# Patient Record
Sex: MALE | Race: WHITE | NOT HISPANIC OR LATINO | URBAN - METROPOLITAN AREA
[De-identification: names, ages, dates, MRNs, and addresses within clinical notes are randomized per-mention and may not be internally consistent; named-entity substitution may affect disease eponyms.]

---

## 2021-09-30 PROBLEM — Z00.00 ENCOUNTER FOR PREVENTIVE HEALTH EXAMINATION: Status: ACTIVE | Noted: 2021-09-30

## 2023-06-12 ENCOUNTER — OUTPATIENT (OUTPATIENT)
Dept: OUTPATIENT SERVICES | Facility: HOSPITAL | Age: 78
LOS: 1 days | End: 2023-06-12
Payer: OTHER GOVERNMENT

## 2023-06-12 ENCOUNTER — APPOINTMENT (OUTPATIENT)
Dept: NUCLEAR MEDICINE | Facility: IMAGING CENTER | Age: 78
End: 2023-06-12
Payer: OTHER GOVERNMENT

## 2023-06-12 DIAGNOSIS — Z00.8 ENCOUNTER FOR OTHER GENERAL EXAMINATION: ICD-10-CM

## 2023-06-12 PROCEDURE — 78816 PET IMAGE W/CT FULL BODY: CPT | Mod: 26,PS,MH

## 2023-06-12 PROCEDURE — A9552: CPT

## 2023-06-12 PROCEDURE — 78816 PET IMAGE W/CT FULL BODY: CPT

## 2023-07-06 ENCOUNTER — INPATIENT (INPATIENT)
Facility: HOSPITAL | Age: 78
LOS: 4 days | Discharge: NURSING, REHAB OR LONG-TERM CARE | End: 2023-07-11
Attending: INTERNAL MEDICINE | Admitting: INTERNAL MEDICINE
Payer: MEDICARE

## 2023-07-06 VITALS
HEART RATE: 79 BPM | OXYGEN SATURATION: 99 % | SYSTOLIC BLOOD PRESSURE: 106 MMHG | DIASTOLIC BLOOD PRESSURE: 60 MMHG | RESPIRATION RATE: 16 BRPM | TEMPERATURE: 98 F

## 2023-07-06 LAB
ALBUMIN SERPL ELPH-MCNC: 3.2 G/DL — LOW (ref 3.3–5)
ALP SERPL-CCNC: 129 U/L — HIGH (ref 40–120)
ALT FLD-CCNC: 10 U/L — SIGNIFICANT CHANGE UP (ref 4–41)
ANION GAP SERPL CALC-SCNC: 9 MMOL/L — SIGNIFICANT CHANGE UP (ref 7–14)
AST SERPL-CCNC: 13 U/L — SIGNIFICANT CHANGE UP (ref 4–40)
BASE EXCESS BLDV CALC-SCNC: 2.6 MMOL/L — SIGNIFICANT CHANGE UP (ref -2–3)
BASOPHILS # BLD AUTO: 0.01 K/UL — SIGNIFICANT CHANGE UP (ref 0–0.2)
BASOPHILS NFR BLD AUTO: 0.2 % — SIGNIFICANT CHANGE UP (ref 0–2)
BILIRUB SERPL-MCNC: 0.3 MG/DL — SIGNIFICANT CHANGE UP (ref 0.2–1.2)
BLOOD GAS VENOUS COMPREHENSIVE RESULT: SIGNIFICANT CHANGE UP
BUN SERPL-MCNC: 29 MG/DL — HIGH (ref 7–23)
CALCIUM SERPL-MCNC: 9.5 MG/DL — SIGNIFICANT CHANGE UP (ref 8.4–10.5)
CHLORIDE BLDV-SCNC: 111 MMOL/L — HIGH (ref 96–108)
CHLORIDE SERPL-SCNC: 108 MMOL/L — HIGH (ref 98–107)
CO2 BLDV-SCNC: 29.3 MMOL/L — HIGH (ref 22–26)
CO2 SERPL-SCNC: 24 MMOL/L — SIGNIFICANT CHANGE UP (ref 22–31)
CREAT SERPL-MCNC: 1.44 MG/DL — HIGH (ref 0.5–1.3)
EGFR: 50 ML/MIN/1.73M2 — LOW
EOSINOPHIL # BLD AUTO: 0.07 K/UL — SIGNIFICANT CHANGE UP (ref 0–0.5)
EOSINOPHIL NFR BLD AUTO: 1.3 % — SIGNIFICANT CHANGE UP (ref 0–6)
GAS PNL BLDV: 143 MMOL/L — SIGNIFICANT CHANGE UP (ref 136–145)
GLUCOSE BLDV-MCNC: 138 MG/DL — HIGH (ref 70–99)
GLUCOSE SERPL-MCNC: 136 MG/DL — HIGH (ref 70–99)
HCO3 BLDV-SCNC: 28 MMOL/L — SIGNIFICANT CHANGE UP (ref 22–29)
HCT VFR BLD CALC: 35 % — LOW (ref 39–50)
HCT VFR BLDA CALC: 36 % — LOW (ref 39–51)
HGB BLD CALC-MCNC: 11.9 G/DL — LOW (ref 12.6–17.4)
HGB BLD-MCNC: 11.5 G/DL — LOW (ref 13–17)
IANC: 3.59 K/UL — SIGNIFICANT CHANGE UP (ref 1.8–7.4)
IMM GRANULOCYTES NFR BLD AUTO: 0.2 % — SIGNIFICANT CHANGE UP (ref 0–0.9)
LACTATE BLDV-MCNC: 1.9 MMOL/L — SIGNIFICANT CHANGE UP (ref 0.5–2)
LYMPHOCYTES # BLD AUTO: 0.97 K/UL — LOW (ref 1–3.3)
LYMPHOCYTES # BLD AUTO: 18.1 % — SIGNIFICANT CHANGE UP (ref 13–44)
MCHC RBC-ENTMCNC: 32.9 GM/DL — SIGNIFICANT CHANGE UP (ref 32–36)
MCHC RBC-ENTMCNC: 34.2 PG — HIGH (ref 27–34)
MCV RBC AUTO: 104.2 FL — HIGH (ref 80–100)
MONOCYTES # BLD AUTO: 0.72 K/UL — SIGNIFICANT CHANGE UP (ref 0–0.9)
MONOCYTES NFR BLD AUTO: 13.4 % — SIGNIFICANT CHANGE UP (ref 2–14)
NEUTROPHILS # BLD AUTO: 3.59 K/UL — SIGNIFICANT CHANGE UP (ref 1.8–7.4)
NEUTROPHILS NFR BLD AUTO: 66.8 % — SIGNIFICANT CHANGE UP (ref 43–77)
NRBC # BLD: 0 /100 WBCS — SIGNIFICANT CHANGE UP (ref 0–0)
NRBC # FLD: 0 K/UL — SIGNIFICANT CHANGE UP (ref 0–0)
PCO2 BLDV: 45 MMHG — SIGNIFICANT CHANGE UP (ref 42–55)
PH BLDV: 7.4 — SIGNIFICANT CHANGE UP (ref 7.32–7.43)
PLATELET # BLD AUTO: 275 K/UL — SIGNIFICANT CHANGE UP (ref 150–400)
PO2 BLDV: 23 MMHG — LOW (ref 25–45)
POTASSIUM BLDV-SCNC: 4 MMOL/L — SIGNIFICANT CHANGE UP (ref 3.5–5.1)
POTASSIUM SERPL-MCNC: 4 MMOL/L — SIGNIFICANT CHANGE UP (ref 3.5–5.3)
POTASSIUM SERPL-SCNC: 4 MMOL/L — SIGNIFICANT CHANGE UP (ref 3.5–5.3)
PROT SERPL-MCNC: 11.8 G/DL — HIGH (ref 6–8.3)
RBC # BLD: 3.36 M/UL — LOW (ref 4.2–5.8)
RBC # FLD: 13.5 % — SIGNIFICANT CHANGE UP (ref 10.3–14.5)
SAO2 % BLDV: 29.9 % — LOW (ref 67–88)
SODIUM SERPL-SCNC: 141 MMOL/L — SIGNIFICANT CHANGE UP (ref 135–145)
WBC # BLD: 5.37 K/UL — SIGNIFICANT CHANGE UP (ref 3.8–10.5)
WBC # FLD AUTO: 5.37 K/UL — SIGNIFICANT CHANGE UP (ref 3.8–10.5)

## 2023-07-06 PROCEDURE — 73521 X-RAY EXAM HIPS BI 2 VIEWS: CPT | Mod: 26

## 2023-07-06 PROCEDURE — 72100 X-RAY EXAM L-S SPINE 2/3 VWS: CPT | Mod: 26

## 2023-07-06 PROCEDURE — 99285 EMERGENCY DEPT VISIT HI MDM: CPT | Mod: FS

## 2023-07-06 RX ORDER — SODIUM CHLORIDE 9 MG/ML
1000 INJECTION INTRAMUSCULAR; INTRAVENOUS; SUBCUTANEOUS ONCE
Refills: 0 | Status: COMPLETED | OUTPATIENT
Start: 2023-07-06 | End: 2023-07-06

## 2023-07-06 RX ORDER — HALOPERIDOL DECANOATE 100 MG/ML
2.5 INJECTION INTRAMUSCULAR ONCE
Refills: 0 | Status: DISCONTINUED | OUTPATIENT
Start: 2023-07-06 | End: 2023-07-07

## 2023-07-06 RX ORDER — HALOPERIDOL DECANOATE 100 MG/ML
2.5 INJECTION INTRAMUSCULAR ONCE
Refills: 0 | Status: COMPLETED | OUTPATIENT
Start: 2023-07-06 | End: 2023-07-06

## 2023-07-06 RX ADMIN — SODIUM CHLORIDE 1000 MILLILITER(S): 9 INJECTION INTRAMUSCULAR; INTRAVENOUS; SUBCUTANEOUS at 19:10

## 2023-07-06 RX ADMIN — HALOPERIDOL DECANOATE 2.5 MILLIGRAM(S): 100 INJECTION INTRAMUSCULAR at 19:51

## 2023-07-06 RX ADMIN — SODIUM CHLORIDE 1000 MILLILITER(S): 9 INJECTION INTRAMUSCULAR; INTRAVENOUS; SUBCUTANEOUS at 20:10

## 2023-07-06 NOTE — ED ADULT NURSE NOTE - BIRTH SEX
[facility] ENDO 01 / [address]    Anesthesia Start:  4739 Anesthesia Stop:      Procedures:       EGD BIOPSY (N/A Esophagus)      EGD DILATION SAVORY (N/A Esophagus) Diagnosis:  (CHRONIC HEARTBURN)    Surgeon:  Evonne Flanagan MD Responsible Provider:  MACKENZIE Agustin CRNA    Male

## 2023-07-06 NOTE — ED ADULT NURSE NOTE - CHIEF COMPLAINT QUOTE
Pt accompanied by family who reports pt sustained fall this morning, falling off bed now c/o of lower back pain. Was advised to come see MD Grace for r/o fracture. Hx: dementia, multiple myeloma

## 2023-07-06 NOTE — ED ADULT NURSE NOTE - NSFALLRISKINTERV_ED_ALL_ED
Assistance OOB with selected safe patient handling equipment if applicable/Assistance with ambulation/Communicate fall risk and risk factors to all staff, patient, and family/Monitor gait and stability/Provide visual cue: yellow wristband, yellow gown, etc/Reinforce activity limits and safety measures with patient and family/Call bell, personal items and telephone in reach/Instruct patient to call for assistance before getting out of bed/chair/stretcher/Non-slip footwear applied when patient is off stretcher/[place] to call system/Physically safe environment - no spills, clutter or unnecessary equipment/Purposeful Proactive Rounding/Room/bathroom lighting operational, light cord in reach

## 2023-07-06 NOTE — ED ADULT NURSE NOTE - OBJECTIVE STATEMENT
Pt brought to the ED by daughter due to unwitnessed fall. As per daughter, pt was at home with his son, and son heard a loud fall. Then found patient on the floor complaining of back pain. Daughter reports since the fall patient has been having difficulty ambulating. Pt disoriented. Pt poor historian. All information obtained via daughter. As per daughter pt has history of multiple myeloma, and dementia. Pt hypotensive on assessment, BP 86/55. 20g IV placed in RAC. Labs collected. NS infusing. BP increased to 98/60. Pt unable to have CT scan and Xray completed due to combative and anxious behavior. MD Celaya made aware. Support ongoing. Ciara

## 2023-07-06 NOTE — ED ADULT NURSE REASSESSMENT NOTE - NSFALLHARMRISKINTERV_ED_ALL_ED

## 2023-07-06 NOTE — ED ADULT NURSE REASSESSMENT NOTE - NS ED NURSE REASSESS COMMENT FT1
Pt agitated and combative at CT scan and Xray. Unable to have test completed due to behavior. MD Bocanegra made aware. Haldol 2.5mg IM administered in R deltoid. Baseline EKG in chart. Pt on cardiac monitor; Sb noted at rate 60 bpm. Pulse ox- 97% on room air. BP 97/58. Support ongoing. Ciara
Pt received from previous shift on stretcher, hx of dementia, baseline confused  Pt denies chest pain and SOB during reassessment, Breathing is even and unlabored on room air  Abdomin is soft and non distended, non tender to touch  Pt moves all four extremities on command, skin is intact  Pt not in any visible apparent distress at time of reassessment, resting comfortably at the bedside  Vital sign stable  Pending Ct imaging

## 2023-07-06 NOTE — ED PROVIDER NOTE - OBJECTIVE STATEMENT
78 y/o male pmh multiple myeloma, dementia c/o unwitnessed fall. Pt unable to provide any hx due to dementia. As per daughter, pt was at home and his brother heard a loud noise, went up to check on him and he was getting up off the ground. Pt then began to cry and c/o low back pain. Pt was brought to the ER for eval. Pt currently eating pizza during exam. 78 y/o male pmh multiple myeloma, dementia c/o unwitnessed fall. Pt unable to provide any hx due to dementia. As per daughter, pt was at home and his brother heard a loud noise, went up to check on him and he was getting up off the ground. Pt then began to cry and c/o low back pain. Pt was brought to the ER for eval. Pt currently eating pizza during exam.    Attending/Yomi: 76 yo M as described above, pt is a poor historian. Pt at beside reports being a "friend" and requesting placement in a nursing home.

## 2023-07-06 NOTE — ED PROVIDER NOTE - NS ED ATTENDING STATEMENT MOD
This was a shared visit with the TRISHA. I reviewed and verified the documentation and independently performed the documented:

## 2023-07-06 NOTE — ED PROVIDER NOTE - CLINICAL SUMMARY MEDICAL DECISION MAKING FREE TEXT BOX
78 y/o male pmh multiple myeloma (not on treatment), dementia w/ unwitnessed fall while at home. Pt was able to get up on his own but then began to c/o of back pain. unknown LOC due to poor historian. Pt was found by his brother shortly after the fall. Wll appearing on exam, nad, at baseline mental status as per family member, no signs of trauma, no bony midline tenderness in neck or back- due to unwitnessed fall will obtain labs, ua, ekg, xrays, head/c spine CT and reassess.

## 2023-07-06 NOTE — ED PROVIDER NOTE - PROGRESS NOTE DETAILS
Rogelio Stoddard MD PGY2: pt signed out to me. pt agitated and would not hold still for cxr and cth. to give haldol and retry. Rogelio Stoddard MD PGY2: Labs unactionable. Imaging without fxr, cth and cs neg. Called pt brother who he lives with, would be unsafe to go home as he cannot take care of pt. Called friend Karla Adiel x2 with no answer, message left. Pt tba as unsafe discharge. Called Dr. Kuldeep Muñiz x1 to admit pt, no answer, will retry. Rogelio Stoddard MD PGY2: Spoke with dr. sky, pt admitted.

## 2023-07-06 NOTE — ED ADULT TRIAGE NOTE - CHIEF COMPLAINT QUOTE
Pt accompanied by family who reports pt sustained fall this morning, falling off bed now c/o of lower back pain. Was advised to come see MD Grace. Hx: dementia, multiple myeloma Pt accompanied by family who reports pt sustained fall this morning, falling off bed now c/o of lower back pain. Was advised to come see MD Grace for r/o fracture. Hx: dementia, multiple myeloma

## 2023-07-06 NOTE — ED PROVIDER NOTE - PHYSICAL EXAMINATION
no bony midline tenderness in neck or back.  no signs of trauma, no abrasions or alcerations no bony midline tenderness in neck or back.  no signs of trauma, no abrasions or alcerations    Attending/Yomi: NAD; PERRL/EOMI, non-icterus, supple, no JAG, no JVD, RRR, CTAB; Abd-soft, NT/ND, no HSM; no spinal PT, no LE edema, A&Ox1, nonfocal; Skin-warm/dry/no ecchymosis

## 2023-07-07 DIAGNOSIS — R29.6 REPEATED FALLS: ICD-10-CM

## 2023-07-07 PROCEDURE — 70450 CT HEAD/BRAIN W/O DYE: CPT | Mod: 26,MA

## 2023-07-07 PROCEDURE — 93010 ELECTROCARDIOGRAM REPORT: CPT

## 2023-07-07 PROCEDURE — 72125 CT NECK SPINE W/O DYE: CPT | Mod: 26,MA

## 2023-07-07 RX ORDER — DONEPEZIL HYDROCHLORIDE 10 MG/1
1 TABLET, FILM COATED ORAL
Refills: 0 | DISCHARGE
Start: 2023-07-07

## 2023-07-07 RX ORDER — SERTRALINE 25 MG/1
12.5 TABLET, FILM COATED ORAL DAILY
Refills: 0 | Status: DISCONTINUED | OUTPATIENT
Start: 2023-07-07 | End: 2023-07-11

## 2023-07-07 RX ORDER — SERTRALINE 25 MG/1
0.5 TABLET, FILM COATED ORAL
Refills: 0 | DISCHARGE
Start: 2023-07-07

## 2023-07-07 RX ORDER — MEMANTINE HYDROCHLORIDE 10 MG/1
10 TABLET ORAL
Refills: 0 | Status: DISCONTINUED | OUTPATIENT
Start: 2023-07-07 | End: 2023-07-11

## 2023-07-07 RX ORDER — METOPROLOL TARTRATE 50 MG
12.5 TABLET ORAL
Refills: 0 | Status: DISCONTINUED | OUTPATIENT
Start: 2023-07-07 | End: 2023-07-11

## 2023-07-07 RX ORDER — DONEPEZIL HYDROCHLORIDE 10 MG/1
10 TABLET, FILM COATED ORAL AT BEDTIME
Refills: 0 | Status: DISCONTINUED | OUTPATIENT
Start: 2023-07-07 | End: 2023-07-11

## 2023-07-07 RX ORDER — HALOPERIDOL DECANOATE 100 MG/ML
2.5 INJECTION INTRAMUSCULAR ONCE
Refills: 0 | Status: COMPLETED | OUTPATIENT
Start: 2023-07-07 | End: 2023-07-07

## 2023-07-07 RX ORDER — LISINOPRIL 2.5 MG/1
20 TABLET ORAL DAILY
Refills: 0 | Status: DISCONTINUED | OUTPATIENT
Start: 2023-07-07 | End: 2023-07-11

## 2023-07-07 RX ORDER — PREGABALIN 225 MG/1
1000 CAPSULE ORAL DAILY
Refills: 0 | Status: DISCONTINUED | OUTPATIENT
Start: 2023-07-08 | End: 2023-07-11

## 2023-07-07 RX ORDER — MEMANTINE HYDROCHLORIDE 10 MG/1
1 TABLET ORAL
Refills: 0 | DISCHARGE
Start: 2023-07-07

## 2023-07-07 RX ADMIN — MEMANTINE HYDROCHLORIDE 10 MILLIGRAM(S): 10 TABLET ORAL at 19:13

## 2023-07-07 RX ADMIN — DONEPEZIL HYDROCHLORIDE 10 MILLIGRAM(S): 10 TABLET, FILM COATED ORAL at 22:56

## 2023-07-07 RX ADMIN — HALOPERIDOL DECANOATE 2.5 MILLIGRAM(S): 100 INJECTION INTRAMUSCULAR at 01:07

## 2023-07-07 RX ADMIN — HALOPERIDOL DECANOATE 2.5 MILLIGRAM(S): 100 INJECTION INTRAMUSCULAR at 17:21

## 2023-07-07 RX ADMIN — Medication 1 MILLIGRAM(S): at 19:12

## 2023-07-07 NOTE — PATIENT PROFILE ADULT - FUNCTIONAL ASSESSMENT - BASIC MOBILITY 6.
3-calculated by average/Not able to assess (calculate score using [facility] averaging method)

## 2023-07-07 NOTE — CONSULT NOTE ADULT - ASSESSMENT
78 y/o male with multiple myeloma, dementia c/o unwitnessed fall.    CTH neg   CT c spine neg     Imprssion:   1) ams likely delerium on top of dementia   2) fall    - check reversible causes of dementia, b2, RPR and TSH if not arleady cehcked   - cardio called for syncope workup.  TTE, tele   - check orthoatics   - can check CD   - will defer rEEG    - PT/OT   - check FS, glucose control <180  - GI/DVT ppx  - Counseling on diet, exercise, and medication adherence was done  - Counseling on smoking cessation and alcohol consumption offered when appropriate.  - Pain assessed and judicious use of narcotics when appropriate was discussed.    - Stroke education given when appropriate.  - Importance of fall prevention discussed.   - Differential diagnosis and plan of care discussed with patient and/or family and primary team  - Thank you for allowing me to participate in the care of this patient. Call with questions.   Nahun Montero MD  Vascular Neurology  Office: [phone] 
 78 y/o male pmh multiple myeloma, dementia c/o unwitnessed fall. nephrology consulted for elevated scr    DAVID vs CKD  unclear baseline  admitted with scr 1.44  check urine cr, urine na, UA  check renal us  avoid nephrotoxic agents  monitor    s/p fall  check CK level  check orthostatic  f/u cardio    htn  controlled  continue home meds  monitor
This is a 77 year old male with multiple myeloma who presents after a fall.    Multiple myeloma   -- History obtained from his brother Mookie- pt is currently on Revlimid, uncertain about the name of his hematologist/oncologist but states last visit was about 1.5 months ago   -- Hold all systemic treatment at this time until he follows up outpatient   -- Will send myeloma labs for now   -- PET/CT from 06/12 reviewed: FDG-avid compression fracture/deformity of T9, T10, L2; several FDG-avid bilateral rib lesions w expansile lesion in L 8th rib suspicious for myeloma   -- Cont to monitor CBC      Jihan Matthews PA-C  Hematology/Oncology  [facility]  [phone] (office)  [phone] (alt office)  Evenings and weekends please call MD on call or office

## 2023-07-07 NOTE — PATIENT PROFILE ADULT - FALL HARM RISK - HARM RISK INTERVENTIONS
Assistance OOB with selected safe patient handling equipment/Communicate Risk of Fall with Harm to all staff/Discuss with provider need for PT consult/Monitor for mental status changes/Monitor gait and stability/Move patient closer to nurses' station/Provide patient with walking aids - walker, cane, crutches/Reinforce activity limits and safety measures with patient and family/Reorient to person, place and time as needed/Tailored Fall Risk Interventions/Toileting schedule using arm’s reach rule for commode and bathroom/Use of alarms - bed, chair and/or voice tab/Visual Cue: Yellow wristband and red socks/Bed in lowest position, wheels locked, appropriate side rails in place/Call bell, personal items and telephone in reach/Instruct patient to call for assistance before getting out of bed or chair/Non-slip footwear when patient is out of bed/[place] to call system/Physically safe environment - no spills, clutter or unnecessary equipment/Purposeful Proactive Rounding/Room/bathroom lighting operational, light cord in reach

## 2023-07-07 NOTE — H&P ADULT - HISTORY OF PRESENT ILLNESS
78 y/o male pmh multiple myeloma, dementia c/o unwitnessed fall. Pt unable to provide any hx due to dementia. As per daughter, pt was at home and his brother heard a loud noise, went up to check on him and he was getting up off the ground. Pt then began to cry and c/o low back pain. Pt was brought to the ER for eval.

## 2023-07-07 NOTE — CONSULT NOTE ADULT - SUBJECTIVE AND OBJECTIVE BOX
Reason for consult: multiple myeloma     HPI:   78 y/o male pmh multiple myeloma, dementia c/o unwitnessed fall. Pt unable to provide any hx due to dementia. As per daughter, pt was at home and his brother heard a loud noise, went up to check on him and he was getting up off the ground. Pt then began to cry and c/o low back pain. Pt was brought to the ER for eval.    (07 Jul 2023 07:56)    Hematology/Oncology consulted on this 77 year old male with multiple myeloma who presented after an unwitnessed fall. Unable to obtain history from patient due to dementia. Called his brother Mookie on the phone for collateral. Per Mookie, patient is actively on treatment for his multiple myeloma with Revlimid, though he is uncertain the name of his oncologist.     PAST MEDICAL & SURGICAL HISTORY:  Dementia          FAMILY HISTORY:      Alochol: Denied  Smoking: Nonsmoker  Drug Use: Denied  Marital Status:         Allergies    No Known Allergies    Intolerances        MEDICATIONS  (STANDING):    MEDICATIONS  (PRN):      ROS  Unable to obtain     T(C): 36.7 (07-07-23 @ 13:40), Max: 36.7 (07-06-23 @ 17:10)  HR: 91 (07-07-23 @ 13:40) (65 - 91)  BP: 124/77 (07-07-23 @ 13:40) (89/54 - 146/84)  RR: 16 (07-07-23 @ 13:40) (13 - 18)  SpO2: 96% (07-07-23 @ 13:40) (96% - 100%)  Wt(kg): --    PE  NAD  Awake, confused   Anicteric, MMM  No c/c/e  No rash grossly                            11.5   5.37  )-----------( 275      ( 06 Jul 2023 18:39 )             35.0       07-06    141  |  108<H>  |  29<H>  ----------------------------<  136<H>  4.0   |  24  |  1.44<H>    Ca    9.5      06 Jul 2023 18:39    TPro  11.8<H>  /  Alb  3.2<L>  /  TBili  0.3  /  DBili  x   /  AST  13  /  ALT  10  /  AlkPhos  129<H>  07-06        ADDENDUM:  Please note that this addendum is being made to correct the   ordering physician's demographic information noted above. There is no   change in the interpretation/impression of this report.  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 Evaluate for lytic lesion. Evaluate for FDG-avid disease.    TREATMENT STRATEGY EVALUATION: Subsequent  FASTING BLOOD SUGAR: 180 mg/dL  RADIOPHARMACEUTICAL:  10.86 mCi F-18, FDG, I.V.  UPTAKE PERIOD: 55 minutes  SCANNER: [employer]  ORAL CONTRAST: None  PHARMACOLOGIC INTERVENTION: None.    TECHNIQUE: Following intravenous injection of radiopharmaceutical and   above uptake period, PET/CT was obtained from vertex of skull to feet. CT   protocol was optimized for PET attenuation correction and anatomic   localization and was not designed to produce and cannot replace   state-of-the-art diagnostic CT images with specific imaging protocols for   different body parts and indications. Images were reconstructed and   reviewed in axial, coronal and sagittal views and three-dimensional MIP.    The standardized uptake values (SUV) are normalized to patient body   weight and indicate the highest activity concentration (SUVmax) in a   given site. All image numbers refer to axial image number.    COMPARISON:  No prior FDG-PET/CT    OTHER STUDIES USED FOR CORRELATION: None    FINDINGS:    HEAD/NECK: Physiologic FDG activity in visualized brain, head, and neck.    THORAX: No abnormal FDG activity. No lymphadenopathy. Median sternotomy.    LUNGS: No abnormal FDG activity. No nodule.    PLEURA/PERICARDIUM: No abnormal FDG activity. No effusion.    HEPATOBILIARY/PANCREAS: Physiologic FDG activity.  For reference, normal   liver demonstrates SUV mean 2.8.    SPLEEN: Physiologic FDG activity. Normal in size.    ADRENAL GLANDS: No abnormal FDG activity. No nodule.    KIDNEYS/URINARY BLADDER: Physiologic excreted FDG activity.    REPRODUCTIVE ORGANS: No abnormal FDG activity. Prostate gland is   enlarged, measuring 5.6 cm in maximum transverse diameter.    ABDOMINOPELVIC LYMPH NODES/RETROPERITONEUM: No enlarged or FDG-avid lymph   node.    ESOPHAGUS/STOMACH/BOWEL/PERITONEUM/MESENTERY: FDG-avid focus, proximal   ascending colon, not well delineated on CT, raises the possibility of a   polyp (SUV 6.7; image 215).    VESSELS: Coronary artery calcifications and/or stent. Atherosclerotic   changes in the aorta and its branches. No aneurysm.    BONES/SOFT TISSUES: FDG-avid mild superior endplate compression deformity   of L2 vertebral body (SUV 7.2; image 184). FDG-avid moderate compression   deformity of T9 vertebral body (SUV 4.6; image 142), and minimally   FDG-avid, mild compression deformity of T10 vertebral body which are   heterogeneous in appearance on CT. Increased FDG activity in the superior   endplate of the T6 vertebral body, not well delineated on CT (SUV 5.3;   image 118).    There is an FDG-avid expansile lesion in the anterolateral aspect of the   left eighth rib (SUV 4.6; image 169). There is a mildly FDG-avid healing   fracture in the adjacent left seventh rib (SUV 2.4; image 168). There are   several additional foci of mild radiotracer activity in several bilateral   ribs, predominantly without corresponding abnormalities on CT.    There is FDG-avid severe osteoarthritic changes in the left greater than   right hips.    IMPRESSION: Abnormal skull-to-thigh FDG-PET/CT scan.    1. Mild to moderately FDG-avid compression fracture/deformity of   approximately T9, T10, and L2 vertebral bodies. The T9 and T10 vertebral   bodies are heterogeneous in appearance on CT. There also is increased FDG   activity in the superior endplate of the T6 vertebral body which is not   well delineated on CT. Etiology is indeterminate. MRI may be obtained for   further evaluation.    2. Several FDG-avid bilateral rib lesions, predominantly without   corresponding abnormalities on CT. The most FDG-avid focus corresponds to   an expansile lesion in the anterolateral aspect of the left eighth rib,   suspicious for myeloma. There is a mildly FDG-avid healing fracture in   the adjacent left seventh rib which may be pathologic.    3. FDG-avid severe osteoarthritic changes in the left greater than right   hips.    --- End of Report ---      ACC: [id number] EXAM:  CT CERVICAL SPINE   ORDERED BY: PATTI TRAYLOR     ACC: [id number] EXAM:  CT BRAIN   ORDERED BY: PATTI TRAYLOR     PROCEDURE DATE:  07/07/2023          INTERPRETATION:  CLINICAL INFORMATION: Status post fall.    COMPARISON: None.    PROCEDURE:  Noncontrast CT of the head and cervical spine. Coronal and Sagittal   reformats were obtained.    FINDINGS:    CT head:    There is no acute intracranial hemorrhage, mass effect, midline shift,   extra-axial collection, hydrocephalus, or evidence of acute vascular   territorial infarction. Mild-to-moderate patchy hypodensities within the   periventricular and subcortical white matter, although nonspecific,   likely reflect chronic microvascular disease. Cerebral volume loss   contributes to prominence of the ventricles and sulci.    The visualized paranasal sinuses and mastoid air cells are clear. No   calvarial fracture.    CT cervical spine:    No acute cervical spine fracture or evidence of traumatic malalignment.   Preservation of the normal cervical lordosis. Craniocervical junction is   unremarkable. No facet joint dislocation. No prevertebral soft tissue   swelling.    There are multilevel degenerative change of the spine characterized by   degenerative disc disease as well as uncovertebral and facet joint   arthrosis, contributing to varying degrees of neuroforaminal stenoses,   most prominent at the C3-C4 level on the right and C6-C7 level on the   left. Mild multilevel spinal canal stenoses.    Visualized lung apices are clear.    IMPRESSION:    CT Head: No acute intracranial hemorrhage or calvarial fracture.    CT cervical spine: No acute cervical spine fracture or evidence of   traumatic malalignment.    --- End of Report ---        ACC: [id number] EXAM:  XR LS SPINE AP LAT 2-3 VIEWS   ORDERED BY: PATTI TRAYLOR     ACC: [id number] EXAM:  XR HIPS BI WITH PELV 2V   ORDERED BY: PATTI TRAYLOR     PROCEDURE DATE:  07/06/2023          INTERPRETATION:  CLINICAL INFORMATION: Hip and lower back pain status   post fall.    TECHNIQUE: One view of the pelvis, one view of the right hip, 2 views of   the lumbar spine.    IMPRESSION:    Hip and pelvis: No acute fractures or dislocations. Advanced bilateral   hip osteoarthritis.    Lumbar spine: The lateral view is significantly limited by positioning as   the patient could not tolerate the exam. The L2 compression fracture seen   on the prior PET-CT of 6/12/2023 is not well demonstrated on this exam.   Advanced multilevel spondylosis.        --- End of Report ---

## 2023-07-07 NOTE — CONSULT NOTE ADULT - SUBJECTIVE AND OBJECTIVE BOX
Neurology Consult    Reason for Consult: Patient is a 77y old  Male who presents with a chief complaint of fall (07 Jul 2023 16:20)      HPI:   78 y/o male pmh multiple myeloma, dementia c/o unwitnessed fall. Pt unable to provide any hx due to dementia. As per daughter, pt was at home and his brother heard a loud noise, went up to check on him and he was getting up off the ground. Pt then began to cry and c/o low back pain. Pt was brought to the ER for eval.    (07 Jul 2023 07:56)       PAST MEDICAL & SURGICAL HISTORY:  Dementia          Allergies: Allergies    No Known Allergies    Intolerances        Social History: Denies toxic habits including tobacco, ETOH or illicit drugs.    Family History: FAMILY HISTORY:  . No family history of strokes    Medications: MEDICATIONS  (STANDING):    MEDICATIONS  (PRN):      Review of Systems:  unable given mental status     Vitals:  Vital Signs Last 24 Hrs  T(C): 36.7 (07 Jul 2023 13:40), Max: 36.7 (07 Jul 2023 13:40)  T(F): 98 (07 Jul 2023 13:40), Max: 98 (07 Jul 2023 13:40)  HR: 91 (07 Jul 2023 13:40) (65 - 91)  BP: 124/77 (07 Jul 2023 13:40) (93/60 - 146/84)  BP(mean): 70 (06 Jul 2023 18:33) (70 - 70)  RR: 16 (07 Jul 2023 13:40) (13 - 18)  SpO2: 96% (07 Jul 2023 13:40) (96% - 100%)    Parameters below as of 07 Jul 2023 13:40  Patient On (Oxygen Delivery Method): room air    Orthostatic VS      General Exam:   General Appearance: Appropriately dressed and in no acute distress       Head: Normocephalic, atraumatic and no dysmorphic features  Ear, Nose, and Throat: Moist mucous membranes  CVS: S1S2+  Resp: No SOB, no wheeze or rhonchi  GI: soft NT/ND  Extremities: No edema or cyanosis  Skin: No bruises or rashes     Neurological Exam:  Mental Status: Awake, alert and oriented x 1.  Able to follow simple  verbal commands. Able to name and repeat. fluent speech. No obvious aphasia or dysarthria noted.   Cranial Nerves: PERRL, EOMI, VFFC, sensation V1-V3 intact,  no obvious facial asymmetry, equal elevation of palate, scm/trap 5/5, tongue is midline on protrusion. no obvious papilledema on fundoscopic exam. hearing is grossly intact.   Motor:  CHO at least 4/5   Sensation: Intact to light touch and pinprick throughout. no right/left confusion. no extinction to tactile on DSS.    Reflexes: 1+ throughout at biceps, brachioradialis, triceps, patellars and ankles bilaterally and equal. No clonus. R toe and L toe were both downgoing.  Coordination: unable   Gait: deferred     Data/Labs/Imaging which I personally reviewed.     Labs:     CBC Full  -  ( 06 Jul 2023 18:39 )  WBC Count : 5.37 K/uL  RBC Count : 3.36 M/uL  Hemoglobin : 11.5 g/dL  Hematocrit : 35.0 %  Platelet Count - Automated : 275 K/uL  Mean Cell Volume : 104.2 fL  Mean Cell Hemoglobin : 34.2 pg  Mean Cell Hemoglobin Concentration : 32.9 gm/dL  Auto Neutrophil # : 3.59 K/uL  Auto Lymphocyte # : 0.97 K/uL  Auto Monocyte # : 0.72 K/uL  Auto Eosinophil # : 0.07 K/uL  Auto Basophil # : 0.01 K/uL  Auto Neutrophil % : 66.8 %  Auto Lymphocyte % : 18.1 %  Auto Monocyte % : 13.4 %  Auto Eosinophil % : 1.3 %  Auto Basophil % : 0.2 %    07-06    141  |  108<H>  |  29<H>  ----------------------------<  136<H>  4.0   |  24  |  1.44<H>    Ca    9.5      06 Jul 2023 18:39    TPro  11.8<H>  /  Alb  3.2<L>  /  TBili  0.3  /  DBili  x   /  AST  13  /  ALT  10  /  AlkPhos  129<H>  07-06    LIVER FUNCTIONS - ( 06 Jul 2023 18:39 )  Alb: 3.2 g/dL / Pro: 11.8 g/dL / ALK PHOS: 129 U/L / ALT: 10 U/L / AST: 13 U/L / GGT: x             Urinalysis Basic - ( 06 Jul 2023 18:39 )    Color: x / Appearance: x / SG: x / pH: x  Gluc: 136 mg/dL / Ketone: x  / Bili: x / Urobili: x   Blood: x / Protein: x / Nitrite: x   Leuk Esterase: x / RBC: x / WBC x   Sq Epi: x / Non Sq Epi: x / Bacteria: x          < from: CT Head No Cont (07.07.23 @ 02:32) >    ACC: [id number] EXAM:  CT CERVICAL SPINE   ORDERED BY: PATTI TRAYLOR     ACC: [id number] EXAM:  CT BRAIN   ORDERED BY: PATTI TRAYLOR     PROCEDURE DATE:  07/07/2023          INTERPRETATION:  CLINICAL INFORMATION: Status post fall.    COMPARISON: None.    PROCEDURE:  Noncontrast CT of the head and cervical spine. Coronal and Sagittal   reformats were obtained.    FINDINGS:    CT head:    There is no acute intracranial hemorrhage, mass effect, midline shift,   extra-axial collection, hydrocephalus, or evidence of acute vascular   territorial infarction. Mild-to-moderate patchy hypodensities within the   periventricular and subcortical white matter, although nonspecific,   likely reflect chronic microvascular disease. Cerebral volume loss   contributes to prominence of the ventricles and sulci.    The visualized paranasal sinuses and mastoid air cells are clear. No   calvarial fracture.    CT cervical spine:    No acute cervical spine fracture or evidence of traumatic malalignment.   Preservation of the normal cervical lordosis. Craniocervical junction is   unremarkable. No facet joint dislocation. No prevertebral soft tissue   swelling.    There are multilevel degenerative change of the spine characterized by   degenerative disc disease as well as uncovertebral and facet joint   arthrosis, contributing to varying degrees of neuroforaminal stenoses,   most prominent at the C3-C4 level on the right and C6-C7 level on the   left. Mild multilevel spinal canal stenoses.    Visualized lung apices are clear.    IMPRESSION:    CT Head: No acute intracranial hemorrhage or calvarial fracture.    CT cervical spine: No acute cervical spine fracture or evidence of   traumatic malalignment.    --- End of Report ---      ASHANTI READ MD; Attending Radiologist  This document has been electronically signed. Jul 7 2023  4:12AM    < end of copied text >   97

## 2023-07-07 NOTE — CONSULT NOTE ADULT - NS ATTEND AMEND GEN_ALL_CORE FT
Seen and examined. Confused.     Review of systems: Unable to obtain    donepezil 10 milliGRAM(s) Oral at bedtime  lisinopril 20 milliGRAM(s) Oral daily  memantine 10 milliGRAM(s) Oral two times a day  metoprolol tartrate 12.5 milliGRAM(s) Oral two times a day  sertraline 12.5 milliGRAM(s) Oral daily      VITAL:  T(C): , Max: 36.7 (07-07-23 @ 13:40)  T(F): , Max: 98.1 (07-07-23 @ 18:36)  HR: 90 (07-07-23 @ 18:36)  BP: 124/69 (07-07-23 @ 18:36)  BP(mean): --  RR: 18 (07-07-23 @ 18:36)  SpO2: 96% (07-07-23 @ 18:36)  Wt(kg): --    07-07-23 @ 07:01  -  07-07-23 @ 21:38  --------------------------------------------------------  IN: 480 mL / OUT: 0 mL / NET: 480 mL        PHYSICAL EXAM:  General: NAD; Alert  HEENT:  NCAT; PERRLA  Neck: No JVD; supple  Respiratory: CTA-b/l  Cardiac: RRR s1s2  Gastrointestinal: BS+, soft, NT/ND  Urologic: No calero  Extremities: No peripheral edema      LABS:                          11.5   5.37  )-----------( 275      ( 06 Jul 2023 18:39 )             35.0     Na(141)/K(4.0)/Cl(108)/HCO3(24)/BUN(29)/Cr(1.44)Glu(136)/Ca(9.5)/Mg(--)/PO4(--)    07-06 @ 18:39    Urinalysis Basic - ( 06 Jul 2023 18:39 )    Color: x / Appearance: x / SG: x / pH: x  Gluc: 136 mg/dL / Ketone: x  / Bili: x / Urobili: x   Blood: x / Protein: x / Nitrite: x   Leuk Esterase: x / RBC: x / WBC x   Sq Epi: x / Non Sq Epi: x / Bacteria: x            ASSESSMENT/PLAN  DAVID versus CKD  Please obtain Urine studies.   Urine creatinine, urine sodium, urine urea, urine protein and urine chloride  Obtain a kidney US  Avoid Nephrotoxic medications and NSAIDS.

## 2023-07-07 NOTE — CONSULT NOTE ADULT - NS ATTEND AMEND GEN_ALL_CORE FT
Patient with a history of recently diagnosed MM, history of difficult to obtain.  His brother cannot give us the name of his treating physician.   Recommend to hold further revlimid/decadron for now, check his myeloma labs.   Continue to attempt to obtain records.

## 2023-07-07 NOTE — CHART NOTE - NSCHARTNOTEFT_GEN_A_CORE
Pt with increase agitation pt was given haldol 2.5 mg and still need 1 mg ativan one hour later.  Reviewed with Dr. Sneed  Added home Med Rec including Donepezil 10mg at bedtime and Sertraline 12.5 mg this evening to help with mood control.

## 2023-07-07 NOTE — H&P ADULT - NSHPLABSRESULTS_GEN_ALL_CORE
Lab Results:  CBC  CBC Full  -  ( 06 Jul 2023 18:39 )  WBC Count : 5.37 K/uL  RBC Count : 3.36 M/uL  Hemoglobin : 11.5 g/dL  Hematocrit : 35.0 %  Platelet Count - Automated : 275 K/uL  Mean Cell Volume : 104.2 fL  Mean Cell Hemoglobin : 34.2 pg  Mean Cell Hemoglobin Concentration : 32.9 gm/dL  Auto Neutrophil # : 3.59 K/uL  Auto Lymphocyte # : 0.97 K/uL  Auto Monocyte # : 0.72 K/uL  Auto Eosinophil # : 0.07 K/uL  Auto Basophil # : 0.01 K/uL  Auto Neutrophil % : 66.8 %  Auto Lymphocyte % : 18.1 %  Auto Monocyte % : 13.4 %  Auto Eosinophil % : 1.3 %  Auto Basophil % : 0.2 %    .		Differential:	[] Automated		[] Manual  Chemistry                        11.5   5.37  )-----------( 275      ( 06 Jul 2023 18:39 )             35.0     07-06    141  |  108<H>  |  29<H>  ----------------------------<  136<H>  4.0   |  24  |  1.44<H>    Ca    9.5      06 Jul 2023 18:39    TPro  11.8<H>  /  Alb  3.2<L>  /  TBili  0.3  /  DBili  x   /  AST  13  /  ALT  10  /  AlkPhos  129<H>  07-06    LIVER FUNCTIONS - ( 06 Jul 2023 18:39 )  Alb: 3.2 g/dL / Pro: 11.8 g/dL / ALK PHOS: 129 U/L / ALT: 10 U/L / AST: 13 U/L / GGT: x             Urinalysis Basic - ( 06 Jul 2023 18:39 )    Color: x / Appearance: x / SG: x / pH: x  Gluc: 136 mg/dL / Ketone: x  / Bili: x / Urobili: x   Blood: x / Protein: x / Nitrite: x   Leuk Esterase: x / RBC: x / WBC x   Sq Epi: x / Non Sq Epi: x / Bacteria: x            MICROBIOLOGY/CULTURES:      RADIOLOGY RESULTS: reviewed

## 2023-07-07 NOTE — CONSULT NOTE ADULT - SUBJECTIVE AND OBJECTIVE BOX
C A R D I O L O G Y  *********************    DATE OF SERVICE: 07-07-23    HISTORY OF PRESENT ILLNESS: HPI:   Pts is a 78 y/o male pmh multiple myeloma, dementia c/o unwitnessed fall. Pt unable to provide any hx due to dementia. As per HPI, pt was at home and his brother heard a loud noise, went up to check on him and he was getting up off the ground. Pt then began to cry and c/o low back pain. Pt was brought to the ER for eval.       History difficult to obtain due to dementia, patient currently denies any complaints.      PAST MEDICAL & SURGICAL HISTORY:  Dementia  MM    MEDICATIONS:  MEDICATIONS  (STANDING):  Donepezil  [id number]  Memantine  Setraline   Amlodipine 10 mg  Metoprolol 50 mg    Allergies: No Known Allergies    FAMILY HISTORY:    Non-contributary for premature coronary disease or sudden cardiac death    SOCIAL HISTORY:    [X ] Non-smoker  [ ] Smoker  [ ] Alcohol    FLU VACCINE THIS YEAR STARTS IN AUGUST:  [ ] Yes    [ ] No    IF OVER 65 HAVE YOU EVER HAD A PNA VACCINE:  [ ] Yes    [ ] No       [ ] N/A    REVIEW OF SYSTEMS:  [ ]chest pain  [  ]shortness of breath  [  ]palpitations  [  ]syncope  [ ]near syncope [ ]upper extremity weakness   [ ] lower extremity weakness  [  ]diplopia  [  ]altered mental status   [  ]fevers  [ ]chills [ ]nausea  [ ]vomiting  [  ]dysphagia    [ ]abdominal pain  [ ]melena  [ ]BRBPR    [  ]epistaxis  [  ]rash    [ ]lower extremity edema    [X] All others negative	  [ ] Unable to obtain    LABS:	 	    CARDIAC MARKERS:                    11.5   5.37  )-----------( 275      ( 06 Jul 2023 18:39 )             35.0     Hb Trend: 11.5<--    07-06    141  |  108<H>  |  29<H>  ----------------------------<  136<H>  4.0   |  24  |  1.44<H>    Ca    9.5      06 Jul 2023 18:39    TPro  11.8<H>  /  Alb  3.2<L>  /  TBili  0.3  /  DBili  x   /  AST  13  /  ALT  10  /  AlkPhos  129<H>  07-06    Creatinine Trend: 1.44<--    PHYSICAL EXAM:  T(C): 36.6 (07-07-23 @ 12:17), Max: 36.7 (07-06-23 @ 17:10)  HR: 73 (07-07-23 @ 12:17) (65 - 79)  BP: 129/75 (07-07-23 @ 12:17) (89/54 - 146/84)  RR: 18 (07-07-23 @ 12:17) (13 - 18)  SpO2: 98% (07-07-23 @ 12:17) (97% - 100%)  Wt(kg): --     I&O's Summary      General:  Alert and Oriented * 1   Head: Normocephalic and atraumatic.   Neck: No JVD. No bruits. Supple. Does not appear to be enlarged.   Cardiovascular: + S1,S2 ; RRR Soft systolic murmur at the left lower sternal border. No rubs noted.    Lungs: CTA b/l. No rhonchi, rales or wheezes.   Abdomen: + BS, soft. Non tender. Non distended. No rebound. No guarding.   Extremities: No clubbing/cyanosis/edema.   Neurologic: Moves all four extremities. Full range of motion.   Skin: Warm and moist. The patient's skin has normal elasticity and good skin turgor.   Psychiatric: Appropriate mood and affect.  Musculoskeletal: Normal range of motion, normal strength     TELEMETRY: NSR	      ECG:  	NSR, Sinus Arrhythmia    ASSESSMENT/PLAN: 	 Pts is a 78 y/o male pmh multiple myeloma, dementia c/o unwitnessed fall.    1. Rule out Syncope  - unable to get history  - NSR on tele, EKG with no blocks and Narrow QRS, no murmurs heard on exam  - check orthostats  - check TTE  - restart home lisnopril, metoprolol and Amlodipine    Staci Muñiz MD  Pager: [phone]

## 2023-07-07 NOTE — H&P ADULT - NSHPPHYSICALEXAM_GEN_ALL_CORE
General: frail  PERRLA  Neurology: A&Ox0  Respiratory: CTA B/L  CV: RRR, S1S2, no murmurs, rubs or gallops  Abdominal: Soft, NT, ND +BS, Last BM  Extremities: No edema, + peripheral pulses  Skin Normal

## 2023-07-07 NOTE — H&P ADULT - ASSESSMENT
78 y/o male pmh multiple myeloma, dementia c/o unwitnessed fall. Pt unable to provide any hx due to dementia. As per daughter, pt was at home and his brother heard a loud noise, went up to check on him and he was getting up off the ground. Pt then began to cry and c/o low back pain. Pt was brought to the ER for eval.   76 y/o male pmh multiple myeloma, dementia c/o unwitnessed fall. Pt unable to provide any hx due to dementia. As per daughter, pt was at home and his brother heard a loud noise, went up to check on him and he was getting up off the ground. Pt then began to cry and c/o low back pain.     1Fall  - Imaging reviewed  - PT and rehab planning   - fall precautions  - dw daughter     2 MM  - monitor cbc  - heme fu     3 Dementia  - Frequent orientation  - fall precautions    Lovenox for DVT prophylaxis   78 y/o male pmh multiple myeloma, dementia c/o unwitnessed fall. Pt unable to provide any hx due to dementia. As per daughter, pt was at home and his brother heard a loud noise, went up to check on him and he was getting up off the ground. Pt then began to cry and c/o low back pain.     1Fall  - Imaging reviewed  - PT and rehab planning   - fall precautions  - dw daughter     2 MM  - monitor cbc  - heme fu     3 Dementia  - Frequent orientation  - fall precautions    4 DAVID  - monitor cr  - unknown baseline    Venodynes    called daughter for meds, she is update and do not have access to the list  Pts brother will bring the list to the hospital when he visits next  DISPLAY PLAN FREE TEXT

## 2023-07-07 NOTE — CONSULT NOTE ADULT - SUBJECTIVE AND OBJECTIVE BOX
[facility]   MD ANEL FOLEY MD KRISTINE SOLTANPOUR, DO ANGELA WONG, PA        TEL:  OFFICE: [phone]  From 5pm-7am answering service [phone]    --- INITIAL RENAL CONSULT NOTE ---date of service 07-07-23 @ 14:43    HPI:   76 y/o male pmh multiple myeloma, dementia c/o unwitnessed fall. Pt unable to provide any hx due to dementia. As per daughter, pt was at home and his brother heard a loud noise, went up to check on him and he was getting up off the ground. Pt then began to cry and c/o low back pain. Pt was brought to the ER for eval. pt seen in ED A+Ox1 only know his name does not know why he is here, denied pain or discomfort.   nephrology consulted for elevated scr    Allergies:  No Known Allergies      PAST MEDICAL & SURGICAL HISTORY:  Dementia          Home Medications Reviewed    Hospital Medications:   MEDICATIONS  (STANDING):      SOCIAL HISTORY:  Denies ETOh, Smoking,     FAMILY HISTORY:      REVIEW OF SYSTEMS:  unable to obtain    VITALS:  T(F): 98 (07-07-23 @ 13:40), Max: 98 (07-06-23 @ 17:10)  HR: 91 (07-07-23 @ 13:40)  BP: 124/77 (07-07-23 @ 13:40)  RR: 16 (07-07-23 @ 13:40)  SpO2: 96% (07-07-23 @ 13:40)  Wt(kg): --        PHYSICAL EXAM:  General: NAD  HEENT: anicteric sclera, oropharynx clear, MMM  Neck: No JVD  Respiratory: CTAB, no wheezes, rales or rhonchi  Cardiovascular: S1, S2, RRR  Gastrointestinal: BS+, soft, NT/ND  Extremities: No cyanosis or clubbing. No peripheral edema  Neurological: A/O x 1  Psychiatric: Normal mood, normal affect  : No CVA tenderness. No calero.   Skin: No rashes  Vascular Access: none    LABS:  07-06    141  |  108<H>  |  29<H>  ----------------------------<  136<H>  4.0   |  24  |  1.44<H>    Ca    9.5      06 Jul 2023 18:39    TPro  11.8<H>  /  Alb  3.2<L>  /  TBili  0.3  /  DBili      /  AST  13  /  ALT  10  /  AlkPhos  129<H>  07-06    Creatinine Trend: 1.44 <--                        11.5   5.37  )-----------( 275      ( 06 Jul 2023 18:39 )             35.0     Urine Studies:  Urinalysis Basic - ( 06 Jul 2023 18:39 )    Color:  / Appearance:  / SG:  / pH:   Gluc: 136 mg/dL / Ketone:   / Bili:  / Urobili:    Blood:  / Protein:  / Nitrite:    Leuk Esterase:  / RBC:  / WBC    Sq Epi:  / Non Sq Epi:  / Bacteria:           RADIOLOGY & ADDITIONAL STUDIES:                 [facility] [place] [facility]   MD ANEL FOLEY MD KRISTINE SOLTANPOUR, DO ANGELA WONG, PA        TEL:  OFFICE: [phone]  From 5pm-7am answering service [phone]    --- INITIAL RENAL CONSULT NOTE ---date of service 07-07-23 @ 14:43    HPI:   76 y/o male pmh multiple myeloma, dementia c/o unwitnessed fall. Pt unable to provide any hx due to dementia. As per daughter, pt was at home and his brother heard a loud noise, went up to check on him and he was getting up off the ground. Pt then began to cry and c/o low back pain. Pt was brought to the ER for eval. pt seen in ED A+Ox1 only know his name does not know why he is here, denied pain or discomfort. Nephrology consulted for elevated scr    Allergies:  No Known Allergies      PAST MEDICAL & SURGICAL HISTORY:  Dementia        Home Medications:  cyanocobalamin 1000 mcg oral tablet: 1 orally once a day (07 Jul 2023 18:30)  donepezil 10 mg oral tablet: 1 orally once a day (at bedtime) (07 Jul 2023 18:28)  ketoconazole 2% topical cream: Apply topically to affected area 2 times a day (07 Jul 2023 18:32)  lisinopril 40 mg oral tablet: 0.5 tablet orally once a day (07 Jul 2023 18:40)  memantine 10 mg oral tablet: 1 orally 2 times a day (07 Jul 2023 18:32)  metoprolol succinate 25 mg oral tablet, extended release: 1 orally once a day (07 Jul 2023 18:42)  sertraline 25 mg oral tablet: 0.5 tablet orally once a day (07 Jul 2023 18:41)    Home Medications Reviewed    Hospital Medications:   MEDICATIONS  (STANDING):      SOCIAL HISTORY:  Denies ETOh, Smoking,     FAMILY HISTORY:      REVIEW OF SYSTEMS:  unable to obtain    VITALS:  T(F): 98 (07-07-23 @ 13:40), Max: 98 (07-06-23 @ 17:10)  HR: 91 (07-07-23 @ 13:40)  BP: 124/77 (07-07-23 @ 13:40)  RR: 16 (07-07-23 @ 13:40)  SpO2: 96% (07-07-23 @ 13:40)  Wt(kg): --        PHYSICAL EXAM:  General: NAD  HEENT: anicteric sclera, oropharynx clear, MMM  Neck: No JVD  Respiratory: CTAB, no wheezes, rales or rhonchi  Cardiovascular: S1, S2, RRR  Gastrointestinal: BS+, soft, NT/ND  Extremities: No cyanosis or clubbing. No peripheral edema  Neurological: A/O x 1  Psychiatric: Normal mood, normal affect  : No CVA tenderness. No calero.   Skin: No rashes  Vascular Access: none    LABS:  07-06    141  |  108<H>  |  29<H>  ----------------------------<  136<H>  4.0   |  24  |  1.44<H>    Ca    9.5      06 Jul 2023 18:39    TPro  11.8<H>  /  Alb  3.2<L>  /  TBili  0.3  /  DBili      /  AST  13  /  ALT  10  /  AlkPhos  129<H>  07-06    Creatinine Trend: 1.44 <--                        11.5   5.37  )-----------( 275      ( 06 Jul 2023 18:39 )             35.0     Urine Studies:  Urinalysis Basic - ( 06 Jul 2023 18:39 )    Color:  / Appearance:  / SG:  / pH:   Gluc: 136 mg/dL / Ketone:   / Bili:  / Urobili:    Blood:  / Protein:  / Nitrite:    Leuk Esterase:  / RBC:  / WBC    Sq Epi:  / Non Sq Epi:  / Bacteria:           RADIOLOGY & ADDITIONAL STUDIES:

## 2023-07-08 LAB
ANION GAP SERPL CALC-SCNC: 10 MMOL/L — SIGNIFICANT CHANGE UP (ref 7–14)
BUN SERPL-MCNC: 29 MG/DL — HIGH (ref 7–23)
CALCIUM SERPL-MCNC: 9.3 MG/DL — SIGNIFICANT CHANGE UP (ref 8.4–10.5)
CHLORIDE SERPL-SCNC: 109 MMOL/L — HIGH (ref 98–107)
CO2 SERPL-SCNC: 22 MMOL/L — SIGNIFICANT CHANGE UP (ref 22–31)
CREAT SERPL-MCNC: 0.74 MG/DL — SIGNIFICANT CHANGE UP (ref 0.5–1.3)
EGFR: 94 ML/MIN/1.73M2 — SIGNIFICANT CHANGE UP
GLUCOSE SERPL-MCNC: 96 MG/DL — SIGNIFICANT CHANGE UP (ref 70–99)
HCT VFR BLD CALC: 31.7 % — LOW (ref 39–50)
HCV AB S/CO SERPL IA: 0.03 S/CO — SIGNIFICANT CHANGE UP (ref 0–0.99)
HCV AB SERPL-IMP: SIGNIFICANT CHANGE UP
HGB BLD-MCNC: 10.3 G/DL — LOW (ref 13–17)
IGA FLD-MCNC: 26 MG/DL — LOW (ref 84–499)
IGG FLD-MCNC: 5174 MG/DL — HIGH (ref 610–1660)
IGM SERPL-MCNC: 26 MG/DL — LOW (ref 35–242)
KAPPA LC SER QL IFE: 19.71 MG/DL — HIGH (ref 0.33–1.94)
KAPPA/LAMBDA FREE LIGHT CHAIN RATIO, SERUM: 46.93 RATIO — HIGH (ref 0.26–1.65)
LAMBDA LC SER QL IFE: 0.42 MG/DL — LOW (ref 0.57–2.63)
MAGNESIUM SERPL-MCNC: 2.2 MG/DL — SIGNIFICANT CHANGE UP (ref 1.6–2.6)
MCHC RBC-ENTMCNC: 32.5 GM/DL — SIGNIFICANT CHANGE UP (ref 32–36)
MCHC RBC-ENTMCNC: 34.3 PG — HIGH (ref 27–34)
MCV RBC AUTO: 105.7 FL — HIGH (ref 80–100)
NRBC # BLD: 0 /100 WBCS — SIGNIFICANT CHANGE UP (ref 0–0)
NRBC # FLD: 0 K/UL — SIGNIFICANT CHANGE UP (ref 0–0)
PHOSPHATE SERPL-MCNC: 3 MG/DL — SIGNIFICANT CHANGE UP (ref 2.5–4.5)
PLATELET # BLD AUTO: 265 K/UL — SIGNIFICANT CHANGE UP (ref 150–400)
POTASSIUM SERPL-MCNC: 3.6 MMOL/L — SIGNIFICANT CHANGE UP (ref 3.5–5.3)
POTASSIUM SERPL-SCNC: 3.6 MMOL/L — SIGNIFICANT CHANGE UP (ref 3.5–5.3)
PROT SERPL-MCNC: 10.1 G/DL — HIGH (ref 6–8.3)
RBC # BLD: 3 M/UL — LOW (ref 4.2–5.8)
RBC # FLD: 13.5 % — SIGNIFICANT CHANGE UP (ref 10.3–14.5)
SODIUM SERPL-SCNC: 141 MMOL/L — SIGNIFICANT CHANGE UP (ref 135–145)
WBC # BLD: 4.88 K/UL — SIGNIFICANT CHANGE UP (ref 3.8–10.5)
WBC # FLD AUTO: 4.88 K/UL — SIGNIFICANT CHANGE UP (ref 3.8–10.5)

## 2023-07-08 PROCEDURE — 84165 PROTEIN E-PHORESIS SERUM: CPT | Mod: 26

## 2023-07-08 PROCEDURE — 86334 IMMUNOFIX E-PHORESIS SERUM: CPT | Mod: 26

## 2023-07-08 RX ORDER — AMLODIPINE BESYLATE 2.5 MG/1
10 TABLET ORAL DAILY
Refills: 0 | Status: DISCONTINUED | OUTPATIENT
Start: 2023-07-08 | End: 2023-07-11

## 2023-07-08 RX ORDER — ENOXAPARIN SODIUM 100 MG/ML
40 INJECTION SUBCUTANEOUS EVERY 24 HOURS
Refills: 0 | Status: DISCONTINUED | OUTPATIENT
Start: 2023-07-08 | End: 2023-07-11

## 2023-07-08 RX ORDER — HALOPERIDOL DECANOATE 100 MG/ML
5 INJECTION INTRAMUSCULAR ONCE
Refills: 0 | Status: COMPLETED | OUTPATIENT
Start: 2023-07-08 | End: 2023-07-08

## 2023-07-08 RX ADMIN — AMLODIPINE BESYLATE 10 MILLIGRAM(S): 2.5 TABLET ORAL at 12:42

## 2023-07-08 RX ADMIN — HALOPERIDOL DECANOATE 5 MILLIGRAM(S): 100 INJECTION INTRAMUSCULAR at 03:06

## 2023-07-08 RX ADMIN — Medication 12.5 MILLIGRAM(S): at 06:51

## 2023-07-08 RX ADMIN — PREGABALIN 1000 MICROGRAM(S): 225 CAPSULE ORAL at 12:43

## 2023-07-08 RX ADMIN — SERTRALINE 12.5 MILLIGRAM(S): 25 TABLET, FILM COATED ORAL at 12:43

## 2023-07-08 RX ADMIN — MEMANTINE HYDROCHLORIDE 10 MILLIGRAM(S): 10 TABLET ORAL at 18:04

## 2023-07-08 RX ADMIN — LISINOPRIL 20 MILLIGRAM(S): 2.5 TABLET ORAL at 06:59

## 2023-07-08 RX ADMIN — MEMANTINE HYDROCHLORIDE 10 MILLIGRAM(S): 10 TABLET ORAL at 06:51

## 2023-07-08 RX ADMIN — DONEPEZIL HYDROCHLORIDE 10 MILLIGRAM(S): 10 TABLET, FILM COATED ORAL at 22:42

## 2023-07-08 RX ADMIN — Medication 12.5 MILLIGRAM(S): at 18:05

## 2023-07-08 RX ADMIN — ENOXAPARIN SODIUM 40 MILLIGRAM(S): 100 INJECTION SUBCUTANEOUS at 18:04

## 2023-07-08 NOTE — PHYSICAL THERAPY INITIAL EVALUATION ADULT - PERTINENT HX OF CURRENT PROBLEM, REHAB EVAL
76 y/o male pmh multiple myeloma, dementia c/o unwitnessed fall. Pt unable to provide any hx due to dementia. As per daughter, pt was at home and his brother heard a loud noise, went up to check on him and he was getting up off the ground. Pt then began to cry and c/o low back pain.

## 2023-07-08 NOTE — DIETITIAN INITIAL EVALUATION ADULT - OTHER INFO
A&Ox2; unable to provide information. RN was at bedside. States pt ate 100% of breakfast this AM. No reported GI issues (nausea/vomiting/diarrhea/constipation.) No reported chewing or swallowing difficulties at this time. NKFA. Ht (estimated): 72 in. Wt (bed scale 7/8/23): 82.2 kg.

## 2023-07-08 NOTE — DIETITIAN INITIAL EVALUATION ADULT - PERTINENT LABORATORY DATA
07-08    141  |  109<H>  |  29<H>  ----------------------------<  96  3.6   |  22  |  0.74    Ca    9.3      08 Jul 2023 06:35  Phos  3.0     07-08  Mg     2.20     07-08    TPro  10.1<H>  /  Alb  x   /  TBili  x   /  DBili  x   /  AST  x   /  ALT  x   /  AlkPhos  x   07-08

## 2023-07-08 NOTE — PHYSICAL THERAPY INITIAL EVALUATION ADULT - ADDITIONAL COMMENTS
Unable to obtain accurate social history secondary to pt. presenting with confusion during subjective history, limited social history obtained through past medical documents, please refer to social work for more attainable social history. Pt states he lives alone in a house with no stairs.   Post PT evaluation, pt left semi-supine, alarm on, call bell and remote within reach, all precautions maintained, NAD. RN aware.

## 2023-07-08 NOTE — DIETITIAN INITIAL EVALUATION ADULT - PERTINENT MEDS FT
MEDICATIONS  (STANDING):  amLODIPine   Tablet 10 milliGRAM(s) Oral daily  cyanocobalamin 1000 MICROGram(s) Oral daily  donepezil 10 milliGRAM(s) Oral at bedtime  enoxaparin Injectable 40 milliGRAM(s) SubCutaneous every 24 hours  lisinopril 20 milliGRAM(s) Oral daily  memantine 10 milliGRAM(s) Oral two times a day  metoprolol tartrate 12.5 milliGRAM(s) Oral two times a day  sertraline 12.5 milliGRAM(s) Oral daily    MEDICATIONS  (PRN):

## 2023-07-08 NOTE — PHYSICAL THERAPY INITIAL EVALUATION ADULT - LEVEL OF INDEPENDENCE: GAIT, REHAB EVAL
Pt with unsteadiness noted, 1 loss of balance noted during ambulation/moderate assist (50% patients effort)

## 2023-07-09 LAB
ANION GAP SERPL CALC-SCNC: 10 MMOL/L — SIGNIFICANT CHANGE UP (ref 7–14)
BUN SERPL-MCNC: 25 MG/DL — HIGH (ref 7–23)
CALCIUM SERPL-MCNC: 9.2 MG/DL — SIGNIFICANT CHANGE UP (ref 8.4–10.5)
CHLORIDE SERPL-SCNC: 100 MMOL/L — SIGNIFICANT CHANGE UP (ref 98–107)
CO2 SERPL-SCNC: 22 MMOL/L — SIGNIFICANT CHANGE UP (ref 22–31)
CREAT ?TM UR-MCNC: 107 MG/DL — SIGNIFICANT CHANGE UP
CREAT SERPL-MCNC: 0.66 MG/DL — SIGNIFICANT CHANGE UP (ref 0.5–1.3)
EGFR: 97 ML/MIN/1.73M2 — SIGNIFICANT CHANGE UP
GLUCOSE SERPL-MCNC: 87 MG/DL — SIGNIFICANT CHANGE UP (ref 70–99)
HCT VFR BLD CALC: 33.3 % — LOW (ref 39–50)
HGB BLD-MCNC: 11.1 G/DL — LOW (ref 13–17)
MAGNESIUM SERPL-MCNC: 1.9 MG/DL — SIGNIFICANT CHANGE UP (ref 1.6–2.6)
MCHC RBC-ENTMCNC: 33.3 GM/DL — SIGNIFICANT CHANGE UP (ref 32–36)
MCHC RBC-ENTMCNC: 33.8 PG — SIGNIFICANT CHANGE UP (ref 27–34)
MCV RBC AUTO: 101.5 FL — HIGH (ref 80–100)
NRBC # BLD: 0 /100 WBCS — SIGNIFICANT CHANGE UP (ref 0–0)
NRBC # FLD: 0 K/UL — SIGNIFICANT CHANGE UP (ref 0–0)
OSMOLALITY UR: 749 MOSM/KG — SIGNIFICANT CHANGE UP (ref 50–1200)
PHOSPHATE SERPL-MCNC: 2.9 MG/DL — SIGNIFICANT CHANGE UP (ref 2.5–4.5)
PLATELET # BLD AUTO: 253 K/UL — SIGNIFICANT CHANGE UP (ref 150–400)
POTASSIUM SERPL-MCNC: 3.5 MMOL/L — SIGNIFICANT CHANGE UP (ref 3.5–5.3)
POTASSIUM SERPL-SCNC: 3.5 MMOL/L — SIGNIFICANT CHANGE UP (ref 3.5–5.3)
PROT ?TM UR-MCNC: 32 MG/DL — SIGNIFICANT CHANGE UP
PROT/CREAT UR-RTO: 0.3 RATIO — HIGH (ref 0–0.2)
RBC # BLD: 3.28 M/UL — LOW (ref 4.2–5.8)
RBC # FLD: 13.2 % — SIGNIFICANT CHANGE UP (ref 10.3–14.5)
SODIUM SERPL-SCNC: 132 MMOL/L — LOW (ref 135–145)
SODIUM UR-SCNC: 145 MMOL/L — SIGNIFICANT CHANGE UP
WBC # BLD: 4.37 K/UL — SIGNIFICANT CHANGE UP (ref 3.8–10.5)
WBC # FLD AUTO: 4.37 K/UL — SIGNIFICANT CHANGE UP (ref 3.8–10.5)

## 2023-07-09 RX ORDER — SODIUM CHLORIDE 9 MG/ML
1000 INJECTION, SOLUTION INTRAVENOUS
Refills: 0 | Status: DISCONTINUED | OUTPATIENT
Start: 2023-07-09 | End: 2023-07-09

## 2023-07-09 RX ORDER — SODIUM CHLORIDE 9 MG/ML
1000 INJECTION, SOLUTION INTRAVENOUS
Refills: 0 | Status: DISCONTINUED | OUTPATIENT
Start: 2023-07-09 | End: 2023-07-10

## 2023-07-09 RX ORDER — HALOPERIDOL DECANOATE 100 MG/ML
2.5 INJECTION INTRAMUSCULAR ONCE
Refills: 0 | Status: COMPLETED | OUTPATIENT
Start: 2023-07-09 | End: 2023-07-09

## 2023-07-09 RX ADMIN — SODIUM CHLORIDE 60 MILLILITER(S): 9 INJECTION, SOLUTION INTRAVENOUS at 21:42

## 2023-07-09 RX ADMIN — SODIUM CHLORIDE 60 MILLILITER(S): 9 INJECTION, SOLUTION INTRAVENOUS at 12:13

## 2023-07-09 RX ADMIN — AMLODIPINE BESYLATE 10 MILLIGRAM(S): 2.5 TABLET ORAL at 06:01

## 2023-07-09 RX ADMIN — LISINOPRIL 20 MILLIGRAM(S): 2.5 TABLET ORAL at 06:02

## 2023-07-09 RX ADMIN — MEMANTINE HYDROCHLORIDE 10 MILLIGRAM(S): 10 TABLET ORAL at 19:03

## 2023-07-09 RX ADMIN — HALOPERIDOL DECANOATE 2.5 MILLIGRAM(S): 100 INJECTION INTRAMUSCULAR at 01:22

## 2023-07-09 RX ADMIN — Medication 12.5 MILLIGRAM(S): at 06:01

## 2023-07-09 RX ADMIN — MEMANTINE HYDROCHLORIDE 10 MILLIGRAM(S): 10 TABLET ORAL at 06:01

## 2023-07-09 RX ADMIN — DONEPEZIL HYDROCHLORIDE 10 MILLIGRAM(S): 10 TABLET, FILM COATED ORAL at 21:42

## 2023-07-09 RX ADMIN — Medication 12.5 MILLIGRAM(S): at 19:04

## 2023-07-09 RX ADMIN — ENOXAPARIN SODIUM 40 MILLIGRAM(S): 100 INJECTION SUBCUTANEOUS at 19:04

## 2023-07-09 RX ADMIN — PREGABALIN 1000 MICROGRAM(S): 225 CAPSULE ORAL at 11:52

## 2023-07-09 RX ADMIN — SERTRALINE 12.5 MILLIGRAM(S): 25 TABLET, FILM COATED ORAL at 11:52

## 2023-07-10 LAB
T PALLIDUM AB TITR SER: NEGATIVE — SIGNIFICANT CHANGE UP
VIT B12 SERPL-MCNC: 815 PG/ML — SIGNIFICANT CHANGE UP (ref 200–900)

## 2023-07-10 PROCEDURE — 93306 TTE W/DOPPLER COMPLETE: CPT | Mod: 26

## 2023-07-10 RX ADMIN — PREGABALIN 1000 MICROGRAM(S): 225 CAPSULE ORAL at 18:01

## 2023-07-10 RX ADMIN — Medication 12.5 MILLIGRAM(S): at 18:00

## 2023-07-10 RX ADMIN — SERTRALINE 12.5 MILLIGRAM(S): 25 TABLET, FILM COATED ORAL at 18:00

## 2023-07-10 RX ADMIN — MEMANTINE HYDROCHLORIDE 10 MILLIGRAM(S): 10 TABLET ORAL at 05:17

## 2023-07-10 RX ADMIN — ENOXAPARIN SODIUM 40 MILLIGRAM(S): 100 INJECTION SUBCUTANEOUS at 18:01

## 2023-07-10 RX ADMIN — Medication 12.5 MILLIGRAM(S): at 05:17

## 2023-07-10 RX ADMIN — MEMANTINE HYDROCHLORIDE 10 MILLIGRAM(S): 10 TABLET ORAL at 17:59

## 2023-07-10 RX ADMIN — DONEPEZIL HYDROCHLORIDE 10 MILLIGRAM(S): 10 TABLET, FILM COATED ORAL at 21:39

## 2023-07-10 RX ADMIN — LISINOPRIL 20 MILLIGRAM(S): 2.5 TABLET ORAL at 05:17

## 2023-07-10 RX ADMIN — AMLODIPINE BESYLATE 10 MILLIGRAM(S): 2.5 TABLET ORAL at 05:17

## 2023-07-10 NOTE — PROGRESS NOTE ADULT - NUTRITIONAL ASSESSMENT
This patient has been assessed with a concern for Malnutrition and has been determined to have a diagnosis/diagnoses of Moderate protein-calorie malnutrition.    This patient is being managed with:   Diet DASH/TLC-  Sodium & Cholesterol Restricted  Supplement Feeding Modality:  Oral  Ensure Plus High Protein Cans or Servings Per Day:  1       Frequency:  Two Times a day  Entered: Jul 8 2023  3:40PM  

## 2023-07-11 ENCOUNTER — TRANSCRIPTION ENCOUNTER (OUTPATIENT)
Age: 78
End: 2023-07-11

## 2023-07-11 VITALS
OXYGEN SATURATION: 95 % | TEMPERATURE: 98 F | SYSTOLIC BLOOD PRESSURE: 102 MMHG | HEART RATE: 70 BPM | RESPIRATION RATE: 18 BRPM | DIASTOLIC BLOOD PRESSURE: 63 MMHG

## 2023-07-11 LAB
% ALBUMIN: 28.5 % — SIGNIFICANT CHANGE UP
% ALPHA 1: 2.8 % — SIGNIFICANT CHANGE UP
% ALPHA 2: 8.8 % — SIGNIFICANT CHANGE UP
% BETA: 7.9 % — SIGNIFICANT CHANGE UP
% GAMMA: 52 % — SIGNIFICANT CHANGE UP
% M SPIKE: 47.6 % — SIGNIFICANT CHANGE UP
ALBUMIN SERPL ELPH-MCNC: 2.88 G/DL — LOW (ref 3.3–4.4)
ALBUMIN/GLOB SERPL ELPH: 0.4 RATIO — SIGNIFICANT CHANGE UP
ALPHA1 GLOB SERPL ELPH-MCNC: 0.28 G/DL — SIGNIFICANT CHANGE UP (ref 0.1–0.3)
ALPHA2 GLOB SERPL ELPH-MCNC: 0.9 G/DL — SIGNIFICANT CHANGE UP (ref 0.6–1)
ANION GAP SERPL CALC-SCNC: 10 MMOL/L — SIGNIFICANT CHANGE UP (ref 7–14)
B-GLOBULIN SERPL ELPH-MCNC: 0.8 G/DL — SIGNIFICANT CHANGE UP (ref 0.6–1.1)
BUN SERPL-MCNC: 36 MG/DL — HIGH (ref 7–23)
CALCIUM SERPL-MCNC: 11.6 MG/DL — HIGH (ref 8.4–10.5)
CHLORIDE SERPL-SCNC: 102 MMOL/L — SIGNIFICANT CHANGE UP (ref 98–107)
CO2 SERPL-SCNC: 21 MMOL/L — LOW (ref 22–31)
CREAT SERPL-MCNC: 0.79 MG/DL — SIGNIFICANT CHANGE UP (ref 0.5–1.3)
EGFR: 92 ML/MIN/1.73M2 — SIGNIFICANT CHANGE UP
GAMMA GLOBULIN: 5.25 G/DL — HIGH (ref 0.7–1.7)
GLUCOSE SERPL-MCNC: 129 MG/DL — HIGH (ref 70–99)
HCT VFR BLD CALC: 31.6 % — LOW (ref 39–50)
HGB BLD-MCNC: 10.7 G/DL — LOW (ref 13–17)
M-SPIKE: 4.8 G/DL — SIGNIFICANT CHANGE UP
MAGNESIUM SERPL-MCNC: 2.1 MG/DL — SIGNIFICANT CHANGE UP (ref 1.6–2.6)
MCHC RBC-ENTMCNC: 33.9 GM/DL — SIGNIFICANT CHANGE UP (ref 32–36)
MCHC RBC-ENTMCNC: 34.1 PG — HIGH (ref 27–34)
MCV RBC AUTO: 100.6 FL — HIGH (ref 80–100)
NRBC # BLD: 0 /100 WBCS — SIGNIFICANT CHANGE UP (ref 0–0)
NRBC # FLD: 0 K/UL — SIGNIFICANT CHANGE UP (ref 0–0)
PHOSPHATE SERPL-MCNC: 3.3 MG/DL — SIGNIFICANT CHANGE UP (ref 2.5–4.5)
PLATELET # BLD AUTO: 252 K/UL — SIGNIFICANT CHANGE UP (ref 150–400)
POTASSIUM SERPL-MCNC: 4 MMOL/L — SIGNIFICANT CHANGE UP (ref 3.5–5.3)
POTASSIUM SERPL-SCNC: 4 MMOL/L — SIGNIFICANT CHANGE UP (ref 3.5–5.3)
PROT PATTERN SERPL ELPH-IMP: SIGNIFICANT CHANGE UP
PROT SERPL-MCNC: 10.1 G/DL — SIGNIFICANT CHANGE UP
RBC # BLD: 3.14 M/UL — LOW (ref 4.2–5.8)
RBC # FLD: 13.4 % — SIGNIFICANT CHANGE UP (ref 10.3–14.5)
SARS-COV-2 RNA SPEC QL NAA+PROBE: SIGNIFICANT CHANGE UP
SODIUM SERPL-SCNC: 133 MMOL/L — LOW (ref 135–145)
WBC # BLD: 8.66 K/UL — SIGNIFICANT CHANGE UP (ref 3.8–10.5)
WBC # FLD AUTO: 8.66 K/UL — SIGNIFICANT CHANGE UP (ref 3.8–10.5)

## 2023-07-11 RX ORDER — METOPROLOL TARTRATE 50 MG
1 TABLET ORAL
Refills: 0 | DISCHARGE

## 2023-07-11 RX ORDER — AMLODIPINE BESYLATE 2.5 MG/1
1 TABLET ORAL
Qty: 0 | Refills: 0 | DISCHARGE
Start: 2023-07-11

## 2023-07-11 RX ORDER — METOPROLOL TARTRATE 50 MG
12.5 TABLET ORAL
Qty: 0 | Refills: 0 | DISCHARGE
Start: 2023-07-11

## 2023-07-11 RX ORDER — LISINOPRIL 2.5 MG/1
1 TABLET ORAL
Qty: 0 | Refills: 0 | DISCHARGE
Start: 2023-07-11

## 2023-07-11 RX ORDER — LISINOPRIL 2.5 MG/1
0.5 TABLET ORAL
Refills: 0 | DISCHARGE

## 2023-07-11 RX ADMIN — LISINOPRIL 20 MILLIGRAM(S): 2.5 TABLET ORAL at 05:22

## 2023-07-11 RX ADMIN — Medication 12.5 MILLIGRAM(S): at 05:22

## 2023-07-11 RX ADMIN — AMLODIPINE BESYLATE 10 MILLIGRAM(S): 2.5 TABLET ORAL at 05:22

## 2023-07-11 RX ADMIN — SERTRALINE 12.5 MILLIGRAM(S): 25 TABLET, FILM COATED ORAL at 12:13

## 2023-07-11 RX ADMIN — PREGABALIN 1000 MICROGRAM(S): 225 CAPSULE ORAL at 12:13

## 2023-07-11 RX ADMIN — MEMANTINE HYDROCHLORIDE 10 MILLIGRAM(S): 10 TABLET ORAL at 05:22

## 2023-07-11 NOTE — DISCHARGE NOTE PROVIDER - DETAILS OF MALNUTRITION DIAGNOSIS/DIAGNOSES
This patient has been assessed with a concern for Malnutrition and was treated during this hospitalization for the following Nutrition diagnosis/diagnoses:     -  07/08/2023: Moderate protein-calorie malnutrition

## 2023-07-11 NOTE — DISCHARGE NOTE NURSING/CASE MANAGEMENT/SOCIAL WORK - NSDCFUADDAPPT_GEN_ALL_CORE_FT
Please follow up with your outpatient hematologist for ongoing management of your multiple myeloma and to resume your Revlimid medication within 1 week.

## 2023-07-11 NOTE — PROGRESS NOTE ADULT - NS ATTEND OPT1 GEN_ALL_CORE

## 2023-07-11 NOTE — PROGRESS NOTE ADULT - NS ATTEND AMEND GEN_ALL_CORE FT
Amended as above.    Briefly, 76 y/o male with multiple myeloma, on lenalidomide, admitted with fall. Myeloma labs continued to show large amount of disease though unclear what his outpatient values have been. Will need follow-up with his outpatient hematologist to discuss ongoing treatment, goals of care.
Patient seen and examined.  Agree with above.   Check tte  Follow up neurology     Elgin Maynard MD
Patient seen and examined. Agree with plan as detailed in PA/NP Note.       F/u TTE    Staci Muñiz MD  Pager: [phone]
discharge planning
Patient seen and examined.  Agree with above.   Check TTE  Follow up neuro    Elgin Maynard MD
Scr improving.

## 2023-07-11 NOTE — PROGRESS NOTE ADULT - SUBJECTIVE AND OBJECTIVE BOX
no events overnight     amLODIPine   Tablet 10 milliGRAM(s) Oral daily  cyanocobalamin 1000 MICROGram(s) Oral daily  donepezil 10 milliGRAM(s) Oral at bedtime  enoxaparin Injectable 40 milliGRAM(s) SubCutaneous every 24 hours  lisinopril 20 milliGRAM(s) Oral daily  memantine 10 milliGRAM(s) Oral two times a day  metoprolol tartrate 12.5 milliGRAM(s) Oral two times a day  sertraline 12.5 milliGRAM(s) Oral daily                            10.3   4.88  )-----------( 265      ( 08 Jul 2023 06:35 )             31.7       Hemoglobin: 10.3 g/dL (07-08 @ 06:35)  Hemoglobin: 11.5 g/dL (07-06 @ 18:39)      07-06    141  |  108<H>  |  29<H>  ----------------------------<  136<H>  4.0   |  24  |  1.44<H>    Ca    9.5      06 Jul 2023 18:39    TPro  11.8<H>  /  Alb  3.2<L>  /  TBili  0.3  /  DBili  x   /  AST  13  /  ALT  10  /  AlkPhos  129<H>  07-06    Creatinine Trend: 1.44<--    COAGS:           T(C): 36.6 (07-08-23 @ 06:30), Max: 36.8 (07-07-23 @ 22:31)  HR: 79 (07-08-23 @ 06:30) (67 - 91)  BP: 121/87 (07-08-23 @ 06:30) (98/61 - 146/84)  RR: 18 (07-08-23 @ 06:30) (16 - 18)  SpO2: 96% (07-08-23 @ 06:30) (96% - 100%)  Wt(kg): --    I&O's Summary    07 Jul 2023 07:01  -  08 Jul 2023 07:00  --------------------------------------------------------  IN: 480 mL / OUT: 0 mL / NET: 480 mL      General:  Alert and Oriented * 1   Head: Normocephalic and atraumatic.   Neck: No JVD. No bruits. Supple. Does not appear to be enlarged.   Cardiovascular: + S1,S2 ; RRR Soft systolic murmur at the left lower sternal border. No rubs noted.    Lungs: CTA b/l. No rhonchi, rales or wheezes.   Abdomen: + BS, soft. Non tender. Non distended. No rebound. No guarding.   Extremities: No clubbing/cyanosis/edema.   Neurologic: Moves all four extremities. Full range of motion.   Skin: Warm and moist. The patient's skin has normal elasticity and good skin turgor.   Psychiatric: Appropriate mood and affect.  Musculoskeletal: Normal range of motion, normal strength     TELEMETRY: NSR	      ASSESSMENT/PLAN: 	 Pts is a 76 y/o male pmh multiple myeloma, dementia c/o unwitnessed fall.    1. Rule out Syncope  - neuro follow up   -  tele stable overnight   - Neg  orthostats  - check TTE- pending   - tolerating  lisnopril, metoprolol and Amlodipine    
   pt seen and examined, no complaint     amLODIPine   Tablet 10 milliGRAM(s) Oral daily  cyanocobalamin 1000 MICROGram(s) Oral daily  dextrose 5% + sodium chloride 0.9%. 1000 milliLiter(s) IV Continuous <Continuous>  donepezil 10 milliGRAM(s) Oral at bedtime  enoxaparin Injectable 40 milliGRAM(s) SubCutaneous every 24 hours  lisinopril 20 milliGRAM(s) Oral daily  memantine 10 milliGRAM(s) Oral two times a day  metoprolol tartrate 12.5 milliGRAM(s) Oral two times a day  sertraline 12.5 milliGRAM(s) Oral daily                            11.1   4.37  )-----------( 253      ( 09 Jul 2023 05:55 )             33.3       Hemoglobin: 11.1 g/dL (07-09 @ 05:55)  Hemoglobin: 10.3 g/dL (07-08 @ 06:35)  Hemoglobin: 11.5 g/dL (07-06 @ 18:39)      07-09    132<L>  |  100  |  25<H>  ----------------------------<  87  3.5   |  22  |  0.66    Ca    9.2      09 Jul 2023 05:55  Phos  2.9     07-09  Mg     1.90     07-09    TPro  10.1<H>  /  Alb  x   /  TBili  x   /  DBili  x   /  AST  x   /  ALT  x   /  AlkPhos  x   07-08    Creatinine Trend: 0.66<--, 0.74<--, 1.44<--    COAGS:           T(C): 36.3 (07-09-23 @ 06:00), Max: 37 (07-09-23 @ 02:08)  HR: 66 (07-09-23 @ 06:00) (64 - 73)  BP: 136/82 (07-09-23 @ 06:00) (105/59 - 136/82)  RR: 17 (07-09-23 @ 06:00) (17 - 18)  SpO2: 98% (07-09-23 @ 06:00) (95% - 98%)  Wt(kg): --    I&O's Summary    08 Jul 2023 07:01 - 09 Jul 2023 07:00  --------------------------------------------------------  IN: 480 mL / OUT: 0 mL / NET: 480 mL        General:  Alert and Oriented * 1   Head: Normocephalic and atraumatic.   Neck: No JVD. No bruits. Supple. Does not appear to be enlarged.   Cardiovascular: + S1,S2 ; RRR Soft systolic murmur at the left lower sternal border. No rubs noted.    Lungs: CTA b/l. No rhonchi, rales or wheezes.   Abdomen: + BS, soft. Non tender. Non distended. No rebound. No guarding.   Extremities: No clubbing/cyanosis/edema.   Neurologic: Moves all four extremities. Full range of motion.   Skin: Warm and moist. The patient's skin has normal elasticity and good skin turgor.   Psychiatric: Appropriate mood and affect.  Musculoskeletal: Normal range of motion, normal strength     TELEMETRY: NSR	      ASSESSMENT/PLAN: 	 Pts is a 78 y/o male pmh multiple myeloma, dementia c/o unwitnessed fall.    1. Rule out Syncope  - neuro follow up   -  tele stable overnight   - Neg  orthostats  - check TTE- pending   - tolerating  lisnopril, metoprolol and Amlodipine    
[facility]   MD ANEL FOLEY MD KRISTINE SOLTANPOUR, DO ANGELA WONG, PA    TEL:  OFFICE: [phone]  From 5pm-7am Answering Service [phone]    -- RENAL FOLLOW UP NOTE ---Date of Service 07-08-23 @ 22:39    Patient is a 77y old  Male who presents with a chief complaint of fall (08 Jul 2023 18:27)      Patient seen and examined at bedside. No chest pain/sob    VITALS:  T(F): 98.1 (07-08-23 @ 21:05), Max: 98.1 (07-08-23 @ 21:05)  HR: 73 (07-08-23 @ 21:05)  BP: 135/72 (07-08-23 @ 21:05)  RR: 18 (07-08-23 @ 21:05)  SpO2: 95% (07-08-23 @ 21:05)  Wt(kg): --    07-07 @ 07:01  -  07-08 @ 07:00  --------------------------------------------------------  IN: 480 mL / OUT: 0 mL / NET: 480 mL          PHYSICAL EXAM:  General: NAD  Neck: No JVD  Respiratory: CTAB, no wheezes, rales or rhonchi  Cardiovascular: S1, S2, RRR  Gastrointestinal: BS+, soft, NT/ND  Extremities: No peripheral edema    Hospital Medications:   MEDICATIONS  (STANDING):  amLODIPine   Tablet 10 milliGRAM(s) Oral daily  cyanocobalamin 1000 MICROGram(s) Oral daily  donepezil 10 milliGRAM(s) Oral at bedtime  enoxaparin Injectable 40 milliGRAM(s) SubCutaneous every 24 hours  lisinopril 20 milliGRAM(s) Oral daily  memantine 10 milliGRAM(s) Oral two times a day  metoprolol tartrate 12.5 milliGRAM(s) Oral two times a day  sertraline 12.5 milliGRAM(s) Oral daily      LABS:  07-08    141  |  109<H>  |  29<H>  ----------------------------<  96  3.6   |  22  |  0.74    Ca    9.3      08 Jul 2023 06:35  Phos  3.0     07-08  Mg     2.20     07-08    TPro  10.1<H>  /  Alb      /  TBili      /  DBili      /  AST      /  ALT      /  AlkPhos      07-08    Creatinine Trend: 0.74 <--, 1.44 <--    Phosphorus: 3.0 mg/dL (07-08 @ 06:35)                              10.3   4.88  )-----------( 265      ( 08 Jul 2023 06:35 )             31.7     Urine Studies:  Urinalysis - [07-08-23 @ 06:35]      Color  / Appearance  / SG  / pH       Gluc 96 / Ketone   / Bili  / Urobili        Blood  / Protein  / Leuk Est  / Nitrite       RBC  / WBC  / Hyaline  / Gran  / Sq Epi  / Non Sq Epi  / Bacteria         HCV 0.03, Nonreact      [07-08-23 @ 06:35]    Free Light Chains: kappa 19.71, lambda 0.42, ratio = 46.93      [07-08 @ 06:35]    RADIOLOGY & ADDITIONAL STUDIES:  
[facility] [place]   MD ANEL FOLEY MD KRISTINE SOLTANPOUR, ANTELMO TOLBERT    TEL:  OFFICE: [phone]  From 5pm-7am Answering Service [phone]    -- RENAL FOLLOW UP NOTE ---Date of Service 07-09-23 @ 09:23    Patient is a 77y old  Male who presents with a chief complaint of fall (09 Jul 2023 09:09)      Patient seen and examined at bedside. No chest pain/sob    VITALS:  T(F): 97.4 (07-09-23 @ 06:00), Max: 98.6 (07-09-23 @ 02:08)  HR: 66 (07-09-23 @ 06:00)  BP: 136/82 (07-09-23 @ 06:00)  RR: 17 (07-09-23 @ 06:00)  SpO2: 98% (07-09-23 @ 06:00)  Wt(kg): --    07-08 @ 07:01  -  07-09 @ 07:00  --------------------------------------------------------  IN: 480 mL / OUT: 0 mL / NET: 480 mL          PHYSICAL EXAM:  General: NAD  Neck: No JVD  Respiratory: CTAB, no wheezes, rales or rhonchi  Cardiovascular: S1, S2, RRR  Gastrointestinal: BS+, soft, NT/ND  Extremities: No peripheral edema    Hospital Medications:   MEDICATIONS  (STANDING):  amLODIPine   Tablet 10 milliGRAM(s) Oral daily  cyanocobalamin 1000 MICROGram(s) Oral daily  dextrose 5% + sodium chloride 0.9%. 1000 milliLiter(s) (60 mL/Hr) IV Continuous <Continuous>  donepezil 10 milliGRAM(s) Oral at bedtime  enoxaparin Injectable 40 milliGRAM(s) SubCutaneous every 24 hours  lisinopril 20 milliGRAM(s) Oral daily  memantine 10 milliGRAM(s) Oral two times a day  metoprolol tartrate 12.5 milliGRAM(s) Oral two times a day  sertraline 12.5 milliGRAM(s) Oral daily      LABS:  07-09    132<L>  |  100  |  25<H>  ----------------------------<  87  3.5   |  22  |  0.66    Ca    9.2      09 Jul 2023 05:55  Phos  2.9     07-09  Mg     1.90     07-09    TPro  10.1<H>  /  Alb      /  TBili      /  DBili      /  AST      /  ALT      /  AlkPhos      07-08    Creatinine Trend: 0.66 <--, 0.74 <--, 1.44 <--    Phosphorus: 2.9 mg/dL (07-09 @ 05:55)                              11.1   4.37  )-----------( 253      ( 09 Jul 2023 05:55 )             33.3     Urine Studies:  Urinalysis - [07-09-23 @ 05:55]      Color  / Appearance  / SG  / pH       Gluc 87 / Ketone   / Bili  / Urobili        Blood  / Protein  / Leuk Est  / Nitrite       RBC  / WBC  / Hyaline  / Gran  / Sq Epi  / Non Sq Epi  / Bacteria         HCV 0.03, Nonreact      [07-08-23 @ 06:35]    Free Light Chains: kappa 19.71, lambda 0.42, ratio = 46.93      [07-08 @ 06:35]    RADIOLOGY & ADDITIONAL STUDIES:  
Neurology Progress Note    S: Patient seen and examined. No new events overnight   '  Medication:  amLODIPine   Tablet 10 milliGRAM(s) Oral daily  cyanocobalamin 1000 MICROGram(s) Oral daily  dextrose 5% + sodium chloride 0.9%. 1000 milliLiter(s) IV Continuous <Continuous>  donepezil 10 milliGRAM(s) Oral at bedtime  enoxaparin Injectable 40 milliGRAM(s) SubCutaneous every 24 hours  lisinopril 20 milliGRAM(s) Oral daily  memantine 10 milliGRAM(s) Oral two times a day  metoprolol tartrate 12.5 milliGRAM(s) Oral two times a day  sertraline 12.5 milliGRAM(s) Oral daily      Vitals:  Vital Signs Last 24 Hrs  T(C): 36.3 (09 Jul 2023 06:00), Max: 37 (09 Jul 2023 02:08)  T(F): 97.4 (09 Jul 2023 06:00), Max: 98.6 (09 Jul 2023 02:08)  HR: 66 (09 Jul 2023 06:00) (64 - 73)  BP: 136/82 (09 Jul 2023 06:00) (105/59 - 136/82)  BP(mean): --  RR: 17 (09 Jul 2023 06:00) (17 - 18)  SpO2: 98% (09 Jul 2023 06:00) (95% - 98%)    Parameters below as of 09 Jul 2023 06:00  Patient On (Oxygen Delivery Method): room air        General Exam:   General Appearance: Appropriately dressed and in no acute distress       Head: Normocephalic, atraumatic and no dysmorphic features  Ear, Nose, and Throat: Moist mucous membranes  CVS: S1S2+  Resp: No SOB, no wheeze or rhonchi  Abd: soft NTND  Extremities: No edema, no cyanosis  Skin: No bruises, no rashes     Neurological Exam:  Mental Status: Awake, alert and oriented x 1.  Able to follow simple  verbal commands. Able to name and repeat. fluent speech. No obvious aphasia or dysarthria noted.   Cranial Nerves: PERRL, EOMI, VFFC, sensation V1-V3 intact,  no obvious facial asymmetry, equal elevation of palate, scm/trap 5/5, tongue is midline on protrusion. no obvious papilledema on fundoscopic exam. hearing is grossly intact.   Motor:  CHO at least 4/5   Sensation: Intact to light touch and pinprick throughout. no right/left confusion. no extinction to tactile on DSS.    Reflexes: 1+ throughout at biceps, brachioradialis, triceps, patellars and ankles bilaterally and equal. No clonus. R toe and L toe were both downgoing.  Coordination: unable   Gait: deferred     I personally reviewed the below data/images/labs:      CBC Full  -  ( 09 Jul 2023 05:55 )  WBC Count : 4.37 K/uL  RBC Count : 3.28 M/uL  Hemoglobin : 11.1 g/dL  Hematocrit : 33.3 %  Platelet Count - Automated : 253 K/uL  Mean Cell Volume : 101.5 fL  Mean Cell Hemoglobin : 33.8 pg  Mean Cell Hemoglobin Concentration : 33.3 gm/dL  Auto Neutrophil # : x  Auto Lymphocyte # : x  Auto Monocyte # : x  Auto Eosinophil # : x  Auto Basophil # : x  Auto Neutrophil % : x  Auto Lymphocyte % : x  Auto Monocyte % : x  Auto Eosinophil % : x  Auto Basophil % : x    07-09    132<L>  |  100  |  25<H>  ----------------------------<  87  3.5   |  22  |  0.66    Ca    9.2      09 Jul 2023 05:55  Phos  2.9     07-09  Mg     1.90     07-09    TPro  10.1<H>  /  Alb  x   /  TBili  x   /  DBili  x   /  AST  x   /  ALT  x   /  AlkPhos  x   07-08    LIVER FUNCTIONS - ( 08 Jul 2023 06:35 )  Alb: x     / Pro: 10.1 g/dL / ALK PHOS: x     / ALT: x     / AST: x     / GGT: x             Urinalysis Basic - ( 09 Jul 2023 05:55 )    Color: x / Appearance: x / SG: x / pH: x  Gluc: 87 mg/dL / Ketone: x  / Bili: x / Urobili: x   Blood: x / Protein: x / Nitrite: x   Leuk Esterase: x / RBC: x / WBC x   Sq Epi: x / Non Sq Epi: x / Bacteria: x      < from: CT Head No Cont (07.07.23 @ 02:32) >    ACC: [id number] EXAM:  CT CERVICAL SPINE   ORDERED BY: PATTI TRAYLOR     ACC: [id number] EXAM:  CT BRAIN   ORDERED BY: PATTI TRAYLOR     PROCEDURE DATE:  07/07/2023          INTERPRETATION:  CLINICAL INFORMATION: Status post fall.    COMPARISON: None.    PROCEDURE:  Noncontrast CT of the head and cervical spine. Coronal and Sagittal   reformats were obtained.    FINDINGS:    CT head:    There is no acute intracranial hemorrhage, mass effect, midline shift,   extra-axial collection, hydrocephalus, or evidence of acute vascular   territorial infarction. Mild-to-moderate patchy hypodensities within the   periventricular and subcortical white matter, although nonspecific,   likely reflect chronic microvascular disease. Cerebral volume loss   contributes to prominence of the ventricles and sulci.    The visualized paranasal sinuses and mastoid air cells are clear. No   calvarial fracture.    CT cervical spine:    No acute cervical spine fracture or evidence of traumatic malalignment.   Preservation of the normal cervical lordosis. Craniocervical junction is   unremarkable. No facet joint dislocation. No prevertebral soft tissue   swelling.    There are multilevel degenerative change of the spine characterized by   degenerative disc disease as well as uncovertebral and facet joint   arthrosis, contributing to varying degrees of neuroforaminal stenoses,   most prominent at the C3-C4 level on the right and C6-C7 level on the   left. Mild multilevel spinal canal stenoses.    Visualized lung apices are clear.    IMPRESSION:    CT Head: No acute intracranial hemorrhage or calvarial fracture.    CT cervical spine: No acute cervical spine fracture or evidence of   traumatic malalignment.    --- End of Report ---      ASHANTI READ MD; Attending Radiologist  This document has been electronically signed. Jul 7 2023  4:12AM    < end of copied text >   
Neurology Progress Note    S: Patient seen and examined. No new events overnight ; agitated at times   '  Medication:    MEDICATIONS  (STANDING):  amLODIPine   Tablet 10 milliGRAM(s) Oral daily  cyanocobalamin 1000 MICROGram(s) Oral daily  donepezil 10 milliGRAM(s) Oral at bedtime  enoxaparin Injectable 40 milliGRAM(s) SubCutaneous every 24 hours  lisinopril 20 milliGRAM(s) Oral daily  memantine 10 milliGRAM(s) Oral two times a day  metoprolol tartrate 12.5 milliGRAM(s) Oral two times a day  sertraline 12.5 milliGRAM(s) Oral daily    MEDICATIONS  (PRN):        Vitals:    Vital Signs Last 24 Hrs  T(C): 36.7 (07-10-23 @ 10:10), Max: 36.9 (07-10-23 @ 05:15)  T(F): 98 (07-10-23 @ 10:10), Max: 98.4 (07-10-23 @ 05:15)  HR: 91 (07-10-23 @ 10:10) (58 - 91)  BP: 126/78 (07-10-23 @ 10:10) (116/67 - 148/85)  BP(mean): --  RR: 18 (07-10-23 @ 10:10) (16 - 18)  SpO2: 99% (07-10-23 @ 10:10) (94% - 99%)            General Exam:   General Appearance: Appropriately dressed and in no acute distress       Head: Normocephalic, atraumatic and no dysmorphic features  Ear, Nose, and Throat: Moist mucous membranes  CVS: S1S2+  Resp: No SOB, no wheeze or rhonchi  Abd: soft NTND  Extremities: No edema, no cyanosis  Skin: No bruises, no rashes     Neurological Exam:  Mental Status: Awake, alert and oriented x 1.  Able to follow simple  verbal commands. Able to name and repeat. fluent speech. No obvious aphasia or dysarthria noted.   Cranial Nerves: PERRL, EOMI, VFFC, sensation V1-V3 intact,  no obvious facial asymmetry, equal elevation of palate, scm/trap 5/5, tongue is midline on protrusion. no obvious papilledema on fundoscopic exam. hearing is grossly intact.   Motor:  CHO at least 4/5   Sensation: Intact to light touch and pinprick throughout. no right/left confusion. no extinction to tactile on DSS.    Reflexes: 1+ throughout at biceps, brachioradialis, triceps, patellars and ankles bilaterally and equal. No clonus. R toe and L toe were both downgoing.  Coordination: unable   Gait: deferred     I personally reviewed the below data/images/labs:       CBC Full  -  ( 09 Jul 2023 05:55 )  WBC Count : 4.37 K/uL  RBC Count : 3.28 M/uL  Hemoglobin : 11.1 g/dL  Hematocrit : 33.3 %  Platelet Count - Automated : 253 K/uL  Mean Cell Volume : 101.5 fL  Mean Cell Hemoglobin : 33.8 pg  Mean Cell Hemoglobin Concentration : 33.3 gm/dL  Auto Neutrophil # : x  Auto Lymphocyte # : x  Auto Monocyte # : x  Auto Eosinophil # : x  Auto Basophil # : x  Auto Neutrophil % : x  Auto Lymphocyte % : x  Auto Monocyte % : x  Auto Eosinophil % : x  Auto Basophil % : x  07-09    132<L>  |  100  |  25<H>  ----------------------------<  87  3.5   |  22  |  0.66    Ca    9.2      09 Jul 2023 05:55  Phos  2.9     07-09  Mg     1.90     07-09          Urinalysis Basic - ( 09 Jul 2023 05:55 )    Color: x / Appearance: x / SG: x / pH: x  Gluc: 87 mg/dL / Ketone: x  / Bili: x / Urobili: x   Blood: x / Protein: x / Nitrite: x   Leuk Esterase: x / RBC: x / WBC x   Sq Epi: x / Non Sq Epi: x / Bacteria: x      < from: CT Head No Cont (07.07.23 @ 02:32) >    ACC: [id number] EXAM:  CT CERVICAL SPINE   ORDERED BY: PATTI TRAYLOR     ACC: [id number] EXAM:  CT BRAIN   ORDERED BY: PATTI TRAYLOR     PROCEDURE DATE:  07/07/2023          INTERPRETATION:  CLINICAL INFORMATION: Status post fall.    COMPARISON: None.    PROCEDURE:  Noncontrast CT of the head and cervical spine. Coronal and Sagittal   reformats were obtained.    FINDINGS:    CT head:    There is no acute intracranial hemorrhage, mass effect, midline shift,   extra-axial collection, hydrocephalus, or evidence of acute vascular   territorial infarction. Mild-to-moderate patchy hypodensities within the   periventricular and subcortical white matter, although nonspecific,   likely reflect chronic microvascular disease. Cerebral volume loss   contributes to prominence of the ventricles and sulci.    The visualized paranasal sinuses and mastoid air cells are clear. No   calvarial fracture.    CT cervical spine:    No acute cervical spine fracture or evidence of traumatic malalignment.   Preservation of the normal cervical lordosis. Craniocervical junction is   unremarkable. No facet joint dislocation. No prevertebral soft tissue   swelling.    There are multilevel degenerative change of the spine characterized by   degenerative disc disease as well as uncovertebral and facet joint   arthrosis, contributing to varying degrees of neuroforaminal stenoses,   most prominent at the C3-C4 level on the right and C6-C7 level on the   left. Mild multilevel spinal canal stenoses.    Visualized lung apices are clear.    IMPRESSION:    CT Head: No acute intracranial hemorrhage or calvarial fracture.    CT cervical spine: No acute cervical spine fracture or evidence of   traumatic malalignment.    --- End of Report ---      ASHANTI READ MD; Attending Radiologist  This document has been electronically signed. Jul 7 2023  4:12AM    < end of copied text >   
Patient is a 77y old  Male who presents with a chief complaint of fall (09 Jul 2023 09:37)    Date of servie : 07-09-23 @ 15:01  INTERVAL HPI/OVERNIGHT EVENTS:  T(C): 36.6 (07-09-23 @ 10:02), Max: 37 (07-09-23 @ 02:08)  HR: 70 (07-09-23 @ 10:02) (66 - 73)  BP: 105/52 (07-09-23 @ 10:02) (105/52 - 136/82)  RR: 18 (07-09-23 @ 10:02) (17 - 18)  SpO2: 96% (07-09-23 @ 10:02) (95% - 98%)  Wt(kg): --  I&O's Summary    08 Jul 2023 07:01  -  09 Jul 2023 07:00  --------------------------------------------------------  IN: 480 mL / OUT: 0 mL / NET: 480 mL        LABS:                        11.1   4.37  )-----------( 253      ( 09 Jul 2023 05:55 )             33.3     07-09    132<L>  |  100  |  25<H>  ----------------------------<  87  3.5   |  22  |  0.66    Ca    9.2      09 Jul 2023 05:55  Phos  2.9     07-09  Mg     1.90     07-09    TPro  10.1<H>  /  Alb  x   /  TBili  x   /  DBili  x   /  AST  x   /  ALT  x   /  AlkPhos  x   07-08      Urinalysis Basic - ( 09 Jul 2023 05:55 )    Color: x / Appearance: x / SG: x / pH: x  Gluc: 87 mg/dL / Ketone: x  / Bili: x / Urobili: x   Blood: x / Protein: x / Nitrite: x   Leuk Esterase: x / RBC: x / WBC x   Sq Epi: x / Non Sq Epi: x / Bacteria: x      CAPILLARY BLOOD GLUCOSE            Urinalysis Basic - ( 09 Jul 2023 05:55 )    Color: x / Appearance: x / SG: x / pH: x  Gluc: 87 mg/dL / Ketone: x  / Bili: x / Urobili: x   Blood: x / Protein: x / Nitrite: x   Leuk Esterase: x / RBC: x / WBC x   Sq Epi: x / Non Sq Epi: x / Bacteria: x        MEDICATIONS  (STANDING):  amLODIPine   Tablet 10 milliGRAM(s) Oral daily  cyanocobalamin 1000 MICROGram(s) Oral daily  dextrose 5% + sodium chloride 0.9%. 1000 milliLiter(s) (60 mL/Hr) IV Continuous <Continuous>  donepezil 10 milliGRAM(s) Oral at bedtime  enoxaparin Injectable 40 milliGRAM(s) SubCutaneous every 24 hours  lisinopril 20 milliGRAM(s) Oral daily  memantine 10 milliGRAM(s) Oral two times a day  metoprolol tartrate 12.5 milliGRAM(s) Oral two times a day  sertraline 12.5 milliGRAM(s) Oral daily    MEDICATIONS  (PRN):          PHYSICAL EXAM:  GENERAL: NAD, well-groomed, well-developed  HEAD:  Atraumatic, Normocephalic  CHEST/LUNG: Clear to percussion bilaterally; No rales, rhonchi, wheezing, or rubs  HEART: Regular rate and rhythm; No murmurs, rubs, or gallops  ABDOMEN: Soft, Nontender, Nondistended; Bowel sounds present  EXTREMITIES:  2+ Peripheral Pulses, No clubbing, cyanosis, or edema  LYMPH: No lymphadenopathy noted  SKIN: No rashes or lesions    Care Discussed with Consultants/Other Providers [ x] YES  [ ] NO
Patient is a 77y old  Male who presents with a chief complaint of fall (10 Jul 2023 13:05)    Date of servie : 07-10-23 @ 13:30  INTERVAL HPI/OVERNIGHT EVENTS:  T(C): 36.7 (07-10-23 @ 10:10), Max: 36.9 (07-10-23 @ 05:15)  HR: 91 (07-10-23 @ 10:10) (58 - 91)  BP: 126/78 (07-10-23 @ 10:10) (116/67 - 148/85)  RR: 18 (07-10-23 @ 10:10) (16 - 18)  SpO2: 99% (07-10-23 @ 10:10) (94% - 99%)  Wt(kg): --  I&O's Summary    09 Jul 2023 07:01  -  10 Jul 2023 07:00  --------------------------------------------------------  IN: 0 mL / OUT: 400 mL / NET: -400 mL        LABS:                        11.1   4.37  )-----------( 253      ( 09 Jul 2023 05:55 )             33.3     07-09    132<L>  |  100  |  25<H>  ----------------------------<  87  3.5   |  22  |  0.66    Ca    9.2      09 Jul 2023 05:55  Phos  2.9     07-09  Mg     1.90     07-09        Urinalysis Basic - ( 09 Jul 2023 05:55 )    Color: x / Appearance: x / SG: x / pH: x  Gluc: 87 mg/dL / Ketone: x  / Bili: x / Urobili: x   Blood: x / Protein: x / Nitrite: x   Leuk Esterase: x / RBC: x / WBC x   Sq Epi: x / Non Sq Epi: x / Bacteria: x      CAPILLARY BLOOD GLUCOSE            Urinalysis Basic - ( 09 Jul 2023 05:55 )    Color: x / Appearance: x / SG: x / pH: x  Gluc: 87 mg/dL / Ketone: x  / Bili: x / Urobili: x   Blood: x / Protein: x / Nitrite: x   Leuk Esterase: x / RBC: x / WBC x   Sq Epi: x / Non Sq Epi: x / Bacteria: x        MEDICATIONS  (STANDING):  amLODIPine   Tablet 10 milliGRAM(s) Oral daily  cyanocobalamin 1000 MICROGram(s) Oral daily  donepezil 10 milliGRAM(s) Oral at bedtime  enoxaparin Injectable 40 milliGRAM(s) SubCutaneous every 24 hours  lisinopril 20 milliGRAM(s) Oral daily  memantine 10 milliGRAM(s) Oral two times a day  metoprolol tartrate 12.5 milliGRAM(s) Oral two times a day  sertraline 12.5 milliGRAM(s) Oral daily    MEDICATIONS  (PRN):          PHYSICAL EXAM:  GENERAL: NAD, well-groomed, well-developed  HEAD:  Atraumatic, Normocephalic  CHEST/LUNG: Clear to percussion bilaterally; No rales, rhonchi, wheezing, or rubs  HEART: Regular rate and rhythm; No murmurs, rubs, or gallops  ABDOMEN: Soft, Nontender, Nondistended; Bowel sounds present  EXTREMITIES:  2+ Peripheral Pulses, No clubbing, cyanosis, or edema  LYMPH: No lymphadenopathy noted  SKIN: No rashes or lesions    Care Discussed with Consultants/Other Providers [ ] YES  [ ] NO
Patient is a 77y old  Male who presents with a chief complaint of Repeated falls  76 y/o male pmh multiple myeloma, dementia c/o unwitnessed fall.      (08 Jul 2023 13:50)    Date of servie : 07-08-23 @ 18:28  INTERVAL HPI/OVERNIGHT EVENTS:  T(C): 36.4 (07-08-23 @ 16:50), Max: 36.8 (07-07-23 @ 22:31)  HR: 69 (07-08-23 @ 16:50) (64 - 90)  BP: 105/59 (07-08-23 @ 16:50) (98/61 - 124/69)  RR: 18 (07-08-23 @ 16:50) (18 - 18)  SpO2: 96% (07-08-23 @ 16:50) (96% - 98%)  Wt(kg): --  I&O's Summary    07 Jul 2023 07:01  -  08 Jul 2023 07:00  --------------------------------------------------------  IN: 480 mL / OUT: 0 mL / NET: 480 mL        LABS:                        10.3   4.88  )-----------( 265      ( 08 Jul 2023 06:35 )             31.7     07-08    141  |  109<H>  |  29<H>  ----------------------------<  96  3.6   |  22  |  0.74    Ca    9.3      08 Jul 2023 06:35  Phos  3.0     07-08  Mg     2.20     07-08    TPro  10.1<H>  /  Alb  x   /  TBili  x   /  DBili  x   /  AST  x   /  ALT  x   /  AlkPhos  x   07-08      Urinalysis Basic - ( 08 Jul 2023 06:35 )    Color: x / Appearance: x / SG: x / pH: x  Gluc: 96 mg/dL / Ketone: x  / Bili: x / Urobili: x   Blood: x / Protein: x / Nitrite: x   Leuk Esterase: x / RBC: x / WBC x   Sq Epi: x / Non Sq Epi: x / Bacteria: x      CAPILLARY BLOOD GLUCOSE            Urinalysis Basic - ( 08 Jul 2023 06:35 )    Color: x / Appearance: x / SG: x / pH: x  Gluc: 96 mg/dL / Ketone: x  / Bili: x / Urobili: x   Blood: x / Protein: x / Nitrite: x   Leuk Esterase: x / RBC: x / WBC x   Sq Epi: x / Non Sq Epi: x / Bacteria: x        MEDICATIONS  (STANDING):  amLODIPine   Tablet 10 milliGRAM(s) Oral daily  cyanocobalamin 1000 MICROGram(s) Oral daily  donepezil 10 milliGRAM(s) Oral at bedtime  enoxaparin Injectable 40 milliGRAM(s) SubCutaneous every 24 hours  lisinopril 20 milliGRAM(s) Oral daily  memantine 10 milliGRAM(s) Oral two times a day  metoprolol tartrate 12.5 milliGRAM(s) Oral two times a day  sertraline 12.5 milliGRAM(s) Oral daily    MEDICATIONS  (PRN):          PHYSICAL EXAM:  GENERAL: NAD, well-groomed, well-developed  HEAD:  Atraumatic, Normocephalic  CHEST/LUNG: Clear to percussion bilaterally; No rales, rhonchi, wheezing, or rubs  HEART: Regular rate and rhythm; No murmurs, rubs, or gallops  ABDOMEN: Soft, Nontender, Nondistended; Bowel sounds present  EXTREMITIES:  2+ Peripheral Pulses, No clubbing, cyanosis, or edema  LYMPH: No lymphadenopathy noted  SKIN: No rashes or lesions    Care Discussed with Consultants/Other Providers [ ] YES  [ ] NO
[facility] [place] [facility]   MD ANEL FOLEY MD KRISTINE SOLTANPOUR, DO ANGELA WONG, PA    TEL:  OFFICE: [phone]  From 5pm-7am Answering Service [phone]    -- RENAL FOLLOW UP NOTE ---Date of Service 07-11-23 @ 12:38    Patient is a 77y old  Male who presents with a chief complaint of fall (11 Jul 2023 11:33)      Patient seen and examined at bedside. No chest pain/sob    VITALS:  T(F): 97.8 (07-11-23 @ 09:51), Max: 98.4 (07-10-23 @ 16:50)  HR: 77 (07-11-23 @ 09:51)  BP: 109/66 (07-11-23 @ 09:51)  RR: 18 (07-11-23 @ 09:51)  SpO2: 97% (07-11-23 @ 09:51)  Wt(kg): --    07-10 @ 07:01  -  07-11 @ 07:00  --------------------------------------------------------  IN: 0 mL / OUT: 1 mL / NET: -1 mL          PHYSICAL EXAM:  General: NAD  Neck: No JVD  Respiratory: CTAB, no wheezes, rales or rhonchi  Cardiovascular: S1, S2, RRR  Gastrointestinal: BS+, soft, NT/ND  Extremities: No peripheral edema    Hospital Medications:   MEDICATIONS  (STANDING):  amLODIPine   Tablet 10 milliGRAM(s) Oral daily  cyanocobalamin 1000 MICROGram(s) Oral daily  donepezil 10 milliGRAM(s) Oral at bedtime  enoxaparin Injectable 40 milliGRAM(s) SubCutaneous every 24 hours  lisinopril 20 milliGRAM(s) Oral daily  memantine 10 milliGRAM(s) Oral two times a day  metoprolol tartrate 12.5 milliGRAM(s) Oral two times a day  sertraline 12.5 milliGRAM(s) Oral daily      LABS:  07-11    133<L>  |  102  |  36<H>  ----------------------------<  129<H>  4.0   |  21<L>  |  0.79    Ca    11.6<H>      11 Jul 2023 03:05  Phos  3.3     07-11  Mg     2.10     07-11      Creatinine Trend: 0.79 <--, 0.66 <--, 0.74 <--, 1.44 <--    Phosphorus: 3.3 mg/dL (07-11 @ 03:05)                              10.7   8.66  )-----------( 252      ( 11 Jul 2023 03:05 )             31.6     Urine Studies:  Urinalysis - [07-11-23 @ 03:05]      Color  / Appearance  / SG  / pH       Gluc 129 / Ketone   / Bili  / Urobili        Blood  / Protein  / Leuk Est  / Nitrite       RBC  / WBC  / Hyaline  / Gran  / Sq Epi  / Non Sq Epi  / Bacteria     Urine Creatinine 107      [07-09-23 @ 19:20]  Urine Protein 32      [07-09-23 @ 19:20]  Urine Sodium 145      [07-09-23 @ 19:20]  Urine Osmolality 749      [07-09-23 @ 19:20]      HCV 0.03, Nonreact      [07-08-23 @ 06:35]    Syphilis Screen (Treponema Pallidum Ab) Negative      [07-10-23 @ 06:23]  Free Light Chains: kappa 19.71, lambda 0.42, ratio = 46.93      [07-08 @ 06:35]    RADIOLOGY & ADDITIONAL STUDIES:  
Date of service 7/10/23    chief complaint: fall    extended hpi:  78 y/o male pmh multiple myeloma, dementia c/o unwitnessed fall. Pt unable to provide any hx due to dementia. As per HPI, pt was at home and his brother heard a loud noise, went up to check on him and he was getting up off the ground. Pt then began to cry and c/o low back pain.    Pt appears comfortable, no events noted overnight    Review of Systems:   Constitutional: [ ] fevers, [ ] chills.   Skin: [ ] dry skin. [ ] rashes.  Psychiatric: [ ] depression, [ ] anxiety.   Gastrointestinal: [ ] BRBPR, [ ] melena.   Neurological: [ ] confusion. [ ] seizures. [ ] shuffling gait.   Ears,Nose,Mouth and Throat: [ ] ear pain [ ] sore throat.   Eyes: [ ] diplopia.   Respiratory: [ ] hemoptysis. [ ] shortness of breath  Cardiovascular: See HPI above  Hematologic/Lymphatic: [ ] anemia. [ ] painful nodes. [ ] prolonged bleeding.   Genitourinary: [ ] hematuria. [ ] flank pain.   Endocrine: [ ] significant change in weight. [ ] intolerance to heat and cold.     Review of systems [x ] otherwise negative, [ ] otherwise unable to obtain    FH: no family history of sudden cardiac death in first degree relatives    SH: [ ] tobacco, [ ] alcohol, [ ] drugs    amLODIPine   Tablet 10 milliGRAM(s) Oral daily  cyanocobalamin 1000 MICROGram(s) Oral daily  donepezil 10 milliGRAM(s) Oral at bedtime  enoxaparin Injectable 40 milliGRAM(s) SubCutaneous every 24 hours  lisinopril 20 milliGRAM(s) Oral daily  memantine 10 milliGRAM(s) Oral two times a day  metoprolol tartrate 12.5 milliGRAM(s) Oral two times a day  sertraline 12.5 milliGRAM(s) Oral daily                          11.1   4.37  )-----------( 253      ( 09 Jul 2023 05:55 )             33.3     132<L>  |  100  |  25<H>  ----------------------------<  87  3.5   |  22  |  0.66    Ca    9.2      09 Jul 2023 05:55  Phos  2.9     07-09  Mg     1.90     07-09      T(C): 36.7 (07-10-23 @ 10:10), Max: 36.9 (07-10-23 @ 05:15)  HR: 91 (07-10-23 @ 10:10) (58 - 91)  BP: 126/78 (07-10-23 @ 10:10) (116/67 - 148/85)  RR: 18 (07-10-23 @ 10:10) (16 - 18)  SpO2: 99% (07-10-23 @ 10:10) (94% - 99%)  Wt(kg): --    General: no distress  Head: Normocephalic and atraumatic.   Neck: No JVD. No bruits. Supple. Does not appear to be enlarged.   Cardiovascular: + S1,S2 ; RRR Soft systolic murmur at the left lower sternal border. No rubs noted.    Lungs: CTA b/l. No rhonchi, rales or wheezes.   Abdomen: + BS, soft. Non tender. Non distended. No rebound. No guarding.   Extremities: No clubbing/cyanosis/edema.   Neurologic: Moves all four extremities.   Skin: Warm and moist. The patient's skin has normal elasticity and good skin turgor.   Psychiatric: unable to assess  Musculoskeletal: unable to assess    DATA    ECG:  	NSR, Sinus Arrhythmia    ASSESSMENT/PLAN: 	 Pts is a 78 y/o male pmh multiple myeloma, dementia c/o unwitnessed fall.    1. Rule out Syncope  - unable to get history  - NSR on tele, EKG with no blocks and Narrow QRS, no murmurs heard on exam  - check orthostatic vitals  - check TTE  - restart home lisinopril, metoprolol and Amlodipine                  
Date of service 7/11/23    chief complaint: fall    extended hpi:  76 y/o male pmh multiple myeloma, dementia c/o unwitnessed fall. Pt unable to provide any hx due to dementia. As per HPI, pt was at home and his brother heard a loud noise, went up to check on him and he was getting up off the ground. Pt then began to cry and c/o low back pain.      Patient denies chest pain or shortness of breath.   Review of symptoms otherwise negative.    MEDICATIONS:  amLODIPine   Tablet 10 milliGRAM(s) Oral daily  cyanocobalamin 1000 MICROGram(s) Oral daily  donepezil 10 milliGRAM(s) Oral at bedtime  enoxaparin Injectable 40 milliGRAM(s) SubCutaneous every 24 hours  lisinopril 20 milliGRAM(s) Oral daily  memantine 10 milliGRAM(s) Oral two times a day  metoprolol tartrate 12.5 milliGRAM(s) Oral two times a day  sertraline 12.5 milliGRAM(s) Oral daily      LABS:                        10.7   8.66  )-----------( 252      ( 11 Jul 2023 03:05 )             31.6       Hemoglobin: 10.7 g/dL (07-11 @ 03:05)  Hemoglobin: 11.1 g/dL (07-09 @ 05:55)  Hemoglobin: 10.3 g/dL (07-08 @ 06:35)  Hemoglobin: 11.5 g/dL (07-06 @ 18:39)      07-11    133<L>  |  102  |  36<H>  ----------------------------<  129<H>  4.0   |  21<L>  |  0.79    Ca    11.6<H>      11 Jul 2023 03:05  Phos  3.3     07-11  Mg     2.10     07-11      Creatinine Trend: 0.79<--, 0.66<--, 0.74<--, 1.44<--    PHYSICAL EXAM:  T(C): 36.6 (07-11-23 @ 09:51), Max: 36.9 (07-10-23 @ 16:50)  HR: 77 (07-11-23 @ 09:51) (77 - 92)  BP: 109/66 (07-11-23 @ 09:51) (104/65 - 129/71)  RR: 18 (07-11-23 @ 09:51) (18 - 18)  SpO2: 97% (07-11-23 @ 09:51) (93% - 97%)  Wt(kg): --    I&O's Summary    10 Jul 2023 07:01  -  11 Jul 2023 07:00  --------------------------------------------------------  IN: 0 mL / OUT: 1 mL / NET: -1 mL      General: no distress  Head: Normocephalic and atraumatic.   Neck: No JVD. No bruits. Supple. Does not appear to be enlarged.   Cardiovascular: + S1,S2 ; RRR Soft systolic murmur at the left lower sternal border. No rubs noted.    Lungs: CTA b/l. No rhonchi, rales or wheezes.   Abdomen: + BS, soft. Non tender. Non distended. No rebound. No guarding.   Extremities: No clubbing/cyanosis/edema.   Neurologic: Moves all four extremities.   Skin: Warm and moist. The patient's skin has normal elasticity and good skin turgor.   Psychiatric: unable to assess  Musculoskeletal: unable to assess    DATA    ECG:  	NSR, Sinus Arrhythmia    TTE:  CONCLUSIONS:  1. Calcified trileaflet aortic valve with normal opening.  Minimal aortic regurgitation.  2. Endocardium not well visualized; grossly decreased  possibly mild left ventricular systolic dysfunction.  3. The right ventricle is not well visualized; grossly  normal right ventricular systolic function.      ASSESSMENT/PLAN: 	 Pts is a 76 y/o male pmh multiple myeloma, dementia c/o unwitnessed fall.    1. Rule out Syncope  - unable to get history  - NSR on tele, EKG with no blocks and Narrow QRS, no murmurs heard on exam  - TTE noted, obtain outpatient records, no further inpatient work-up planned due to advanced dementia and MM status  - c/w lisinopril, metoprolol and Amlodipine      Staci Muñiz MD  Pager: [phone]                 
Patient is a 77y old  Male who presents with a chief complaint of fall (10 Jul 2023 11:44)    Patient seen this morning. He is calm, denies pain or discomfort at this time.     MEDICATIONS  (STANDING):  amLODIPine   Tablet 10 milliGRAM(s) Oral daily  cyanocobalamin 1000 MICROGram(s) Oral daily  donepezil 10 milliGRAM(s) Oral at bedtime  enoxaparin Injectable 40 milliGRAM(s) SubCutaneous every 24 hours  lisinopril 20 milliGRAM(s) Oral daily  memantine 10 milliGRAM(s) Oral two times a day  metoprolol tartrate 12.5 milliGRAM(s) Oral two times a day  sertraline 12.5 milliGRAM(s) Oral daily    MEDICATIONS  (PRN):        Vital Signs Last 24 Hrs  T(C): 36.7 (10 Jul 2023 10:10), Max: 36.9 (10 Jul 2023 05:15)  T(F): 98 (10 Jul 2023 10:10), Max: 98.4 (10 Jul 2023 05:15)  HR: 91 (10 Jul 2023 10:10) (58 - 91)  BP: 126/78 (10 Jul 2023 10:10) (116/67 - 148/85)  BP(mean): --  RR: 18 (10 Jul 2023 10:10) (16 - 18)  SpO2: 99% (10 Jul 2023 10:10) (94% - 99%)    Parameters below as of 10 Jul 2023 10:10  Patient On (Oxygen Delivery Method): room air        PE  calm, NAD  Anicteric, MMM  Abd soft, NT, ND  No c/c/e  No rash grossly                            11.1   4.37  )-----------( 253      ( 09 Jul 2023 05:55 )             33.3       07-09    132<L>  |  100  |  25<H>  ----------------------------<  87  3.5   |  22  |  0.66    Ca    9.2      09 Jul 2023 05:55  Phos  2.9     07-09  Mg     1.90     07-09        
[facility] [place] [facility]   MD ANEL FOLEY MD KRISTINE SOLTANPOUR, ANTELMO TOLBERT    TEL:  OFFICE: [phone]  From 5pm-7am Answering Service [phone]    -- RENAL FOLLOW UP NOTE ---Date of Service 07-10-23 @ 13:04    Patient is a 77y old  Male who presents with a chief complaint of fall (10 Jul 2023 11:44)      Patient seen and examined at bedside.     VITALS:  T(F): 98 (07-10-23 @ 10:10), Max: 98.4 (07-10-23 @ 05:15)  HR: 91 (07-10-23 @ 10:10)  BP: 126/78 (07-10-23 @ 10:10)  RR: 18 (07-10-23 @ 10:10)  SpO2: 99% (07-10-23 @ 10:10)  Wt(kg): --    07-09 @ 07:01  -  07-10 @ 07:00  --------------------------------------------------------  IN: 0 mL / OUT: 400 mL / NET: -400 mL      Height (cm): 190.5 (07-10 @ 10:10)  Weight (kg): 85.729 (07-10 @ 10:10)  BMI (kg/m2): 23.6 (07-10 @ 10:10)  BSA (m2): 2.14 (07-10 @ 10:10)    PHYSICAL EXAM:  General: laying in bed, non verbal  Neck: No JVD  Respiratory: +rhonchi  Cardiovascular: S1, S2, RRR  Gastrointestinal: BS+, soft, NT/ND  Extremities: No peripheral edema    Hospital Medications:   MEDICATIONS  (STANDING):  amLODIPine   Tablet 10 milliGRAM(s) Oral daily  cyanocobalamin 1000 MICROGram(s) Oral daily  donepezil 10 milliGRAM(s) Oral at bedtime  enoxaparin Injectable 40 milliGRAM(s) SubCutaneous every 24 hours  lisinopril 20 milliGRAM(s) Oral daily  memantine 10 milliGRAM(s) Oral two times a day  metoprolol tartrate 12.5 milliGRAM(s) Oral two times a day  sertraline 12.5 milliGRAM(s) Oral daily      LABS:  07-09    132<L>  |  100  |  25<H>  ----------------------------<  87  3.5   |  22  |  0.66    Ca    9.2      09 Jul 2023 05:55  Phos  2.9     07-09  Mg     1.90     07-09      Creatinine Trend: 0.66 <--, 0.74 <--, 1.44 <--                                11.1   4.37  )-----------( 253      ( 09 Jul 2023 05:55 )             33.3     Urine Studies:  Urinalysis - [07-09-23 @ 05:55]      Color  / Appearance  / SG  / pH       Gluc 87 / Ketone   / Bili  / Urobili        Blood  / Protein  / Leuk Est  / Nitrite       RBC  / WBC  / Hyaline  / Gran  / Sq Epi  / Non Sq Epi  / Bacteria     Urine Creatinine 107      [07-09-23 @ 19:20]  Urine Protein 32      [07-09-23 @ 19:20]  Urine Sodium 145      [07-09-23 @ 19:20]  Urine Osmolality 749      [07-09-23 @ 19:20]      HCV 0.03, Nonreact      [07-08-23 @ 06:35]    Free Light Chains: kappa 19.71, lambda 0.42, ratio = 46.93      [07-08 @ 06:35]    RADIOLOGY & ADDITIONAL STUDIES:  
Patient is a 77y old  Male who presents with a chief complaint of fall (11 Jul 2023 10:23)    Patient seen this morning. He is calm, denies complaints.     MEDICATIONS  (STANDING):  amLODIPine   Tablet 10 milliGRAM(s) Oral daily  cyanocobalamin 1000 MICROGram(s) Oral daily  donepezil 10 milliGRAM(s) Oral at bedtime  enoxaparin Injectable 40 milliGRAM(s) SubCutaneous every 24 hours  lisinopril 20 milliGRAM(s) Oral daily  memantine 10 milliGRAM(s) Oral two times a day  metoprolol tartrate 12.5 milliGRAM(s) Oral two times a day  sertraline 12.5 milliGRAM(s) Oral daily    MEDICATIONS  (PRN):        Vital Signs Last 24 Hrs  T(C): 36.6 (11 Jul 2023 09:51), Max: 36.9 (10 Jul 2023 16:50)  T(F): 97.8 (11 Jul 2023 09:51), Max: 98.4 (10 Jul 2023 16:50)  HR: 77 (11 Jul 2023 09:51) (77 - 92)  BP: 109/66 (11 Jul 2023 09:51) (104/65 - 129/71)  BP(mean): --  RR: 18 (11 Jul 2023 09:51) (18 - 18)  SpO2: 97% (11 Jul 2023 09:51) (93% - 97%)    Parameters below as of 11 Jul 2023 09:51  Patient On (Oxygen Delivery Method): room air        PE  calm, NAD   Anicteric, MMM  Abd soft, NT, ND  No c/c/e  No rash grossly                            10.7   8.66  )-----------( 252      ( 11 Jul 2023 03:05 )             31.6       07-11    133<L>  |  102  |  36<H>  ----------------------------<  129<H>  4.0   |  21<L>  |  0.79    Ca    11.6<H>      11 Jul 2023 03:05  Phos  3.3     07-11  Mg     2.10     07-11

## 2023-07-11 NOTE — DISCHARGE NOTE PROVIDER - NSDCMRMEDTOKEN_GEN_ALL_CORE_FT
amLODIPine 10 mg oral tablet: 1 tab(s) orally once a day  cyanocobalamin 1000 mcg oral tablet: 1 orally once a day  donepezil 10 mg oral tablet: 1 orally once a day (at bedtime)  ketoconazole 2% topical cream: Apply topically to affected area 2 times a day  lisinopril 20 mg oral tablet: 1 tab(s) orally once a day  memantine 10 mg oral tablet: 1 orally 2 times a day  metoprolol: 12.5 milligram(s) orally 2 times a day - metoprolol tartrate  sertraline 25 mg oral tablet: 0.5 tablet orally once a day

## 2023-07-11 NOTE — PROGRESS NOTE ADULT - ASSESSMENT
78 y/o male pmh multiple myeloma, dementia c/o unwitnessed fall. Pt unable to provide any hx due to dementia. As per daughter, pt was at home and his brother heard a loud noise, went up to check on him and he was getting up off the ground. Pt then began to cry and c/o low back pain.     1Fall  - Imaging reviewed  - PT and rehab planning   - fall precautions  - dw daughter     2 MM  - monitor cbc  - heme fu     3 Dementia  - Frequent orientation  - fall precautions    4 DAVID  - monitor cr  - unknown baseline    PT and dc planning 
This is a 77 year old male with multiple myeloma who presents after a fall.    Multiple myeloma   -- History obtained from his brother Mookie- pt is currently on Revlimid, uncertain about the name of his hematologist/oncologist but states last visit was about 1.5 months ago   -- Hold all systemic treatment at this time until he follows up outpatient   -- PET/CT from 06/12 reviewed: FDG-avid compression fracture/deformity of T9, T10, L2; several FDG-avid bilateral rib lesions w expansile lesion in L 8th rib suspicious for myeloma   -- Cont to monitor CBC  -- IgG >5K, K/L ratio 47- his myeloma is not controlled. Will need outpatient follow-up with his hematologist to discuss further treatment.     Discharge planning in progress.     Jihan Matthews PA-C  Hematology/Oncology  [facility]  [phone] (office)  [phone] (alt office)  Evenings and weekends please call MD on call or office  
This is a 77 year old male with multiple myeloma who presents after a fall.    Multiple myeloma   -- History obtained from his brother Mookie- pt is currently on Revlimid, uncertain about the name of his hematologist/oncologist but states last visit was about 1.5 months ago   -- Hold all systemic treatment at this time until he follows up outpatient   -- PET/CT from 06/12 reviewed: FDG-avid compression fracture/deformity of T9, T10, L2; several FDG-avid bilateral rib lesions w expansile lesion in L 8th rib suspicious for myeloma   -- Cont to monitor CBC  -- IgG >5K, K/L ratio 47- labs show large burden of disease though unclear what his outpatient values have been. Will need outpatient follow-up with his hematologist to discuss further treatment/goals of care.    Discharge planning in progress.     Jihan Matthews PA-C  Hematology/Oncology  [facility]  [phone] (office)  [phone] (alt office)  Evenings and weekends please call MD on call or office  
 76 y/o male pmh multiple myeloma, dementia c/o unwitnessed fall. Nephrology consulted for elevated scr.    DAVID   unclear baseline  admitted with scr 1.44   likely prerenal azotemia.   no need for renal us as renal function improved  avoid nephrotoxic agents  monitor    s/p fall  check CK level  check orthostatic  f/u cardio    HTN  controlled  continue home meds  monitor    abnormal lung sound  abnormal lung sound on exam this am  consider check xray  
 76 y/o male pmh multiple myeloma, dementia c/o unwitnessed fall. Nephrology consulted for elevated scr.    DAVID   unclear baseline  admitted with scr 1.44  Scr improved   check urine cr, urine na, UA  Looks like prerenal azotemia.   check renal us  avoid nephrotoxic agents  monitor    s/p fall  check CK level  check orthostatic  f/u cardio    HTN  controlled  continue home meds  monitor  
 78 y/o male pmh multiple myeloma, dementia c/o unwitnessed fall. Nephrology consulted for elevated scr.    DAVID   unclear baseline  admitted with scr 1.44   likely prerenal azotemia.   no need for renal us as renal function improved  avoid nephrotoxic agents  monitor    s/p fall  check CK level  check orthostatic  f/u cardio    HTN  controlled  continue home meds  monitor    hypercalcemia  ca elevated today  hx of MM with large burden  heme once on board  check pth, pthrp, vit d   consider gentle hydration  monitor
 76 y/o male pmh multiple myeloma, dementia c/o unwitnessed fall. Pt unable to provide any hx due to dementia. As per daughter, pt was at home and his brother heard a loud noise, went up to check on him and he was getting up off the ground. Pt then began to cry and c/o low back pain.     1Fall  - Imaging reviewed  - PT and rehab planning   - fall precautions  - dw daughter     2 MM  - monitor cbc  - heme fu     3 Dementia  - Frequent orientation  - fall precautions    4 DAVID  - monitor cr  - unknown baseline    PT and dc planning 
 78 y/o male with multiple myeloma, dementia c/o unwitnessed fall.    CTH neg   CT c spine neg   b12 815,   Imprssion:   1) ams likely delerium on top of dementia   2) fall    - for agitation can give seroquel or zyprexa PRN if QTC< 500   - can check reversible causes of dementia, b12, RPR and TSH if not arleady cehcked   - cardio called for syncope workup.  TTE pending , tele   - check orthoatics   - can check CD   - TTE pending   - will defer rEEG    - PT/OT   - check FS, glucose control <180  - GI/DVT ppx   - Thank you for allowing me to participate in the care of this patient. Call with questions.   dc plan when medically cleared. can pursue syncope workup outpatient if needed ; eventually rehab   Nahun Montero MD  Vascular Neurology  Office: [phone]   
 76 y/o male with multiple myeloma, dementia c/o unwitnessed fall.    CTH neg   CT c spine neg     Imprssion:   1) ams likely delerium on top of dementia   2) fall    - can check reversible causes of dementia, b12, RPR and TSH if not arleady cehcked   - cardio called for syncope workup.  TTE pending , tele   - check orthoatics   - can check CD   - will defer rEEG    - PT/OT   - check FS, glucose control <180  - GI/DVT ppx   - Thank you for allowing me to participate in the care of this patient. Call with questions.   dc plan when medically cleared. can pursue syncope workup outpatient if needed   Nahun Montero MD  Vascular Neurology  Office: [phone]   
 78 y/o male pmh multiple myeloma, dementia c/o unwitnessed fall. Nephrology consulted for elevated scr.    DAVID   unclear baseline  admitted with scr 1.44  Scr improved   check urine cr, urine na, UA  Looks like prerenal azotemia.   check renal us  avoid nephrotoxic agents  monitor    s/p fall  check CK level  check orthostatic  f/u cardio    HTN  controlled  continue home meds  monitor    
 76 y/o male pmh multiple myeloma, dementia c/o unwitnessed fall. Pt unable to provide any hx due to dementia. As per daughter, pt was at home and his brother heard a loud noise, went up to check on him and he was getting up off the ground. Pt then began to cry and c/o low back pain.     1Fall  - Imaging reviewed  - PT and rehab planning   - fall precautions  - dw daughter     2 MM  - monitor cbc  - heme fu     3 Dementia  - Frequent orientation  - fall precautions    4 DAVID  - monitor cr  - unknown baseline    PT and dc planning

## 2023-07-11 NOTE — DISCHARGE NOTE PROVIDER - PROVIDER TOKENS
PROVIDER:[TOKEN:[[id number]],FOLLOWUP:[1 week],ESTABLISHEDPATIENT:[T]],PROVIDER:[TOKEN:[[id number]]],PROVIDER:[TOKEN:[[id number]],FOLLOWUP:[1 week]]

## 2023-07-11 NOTE — PROGRESS NOTE ADULT - PROVIDER SPECIALTY LIST ADULT
Cardiology
Nephrology
Cardiology
Heme/Onc
Hospitalist
Cardiology
Nephrology
Nephrology
Neurology
Cardiology
Heme/Onc
Nephrology
Neurology
Hospitalist
Hospitalist

## 2023-07-11 NOTE — DISCHARGE NOTE PROVIDER - NSDCCPCAREPLAN_GEN_ALL_CORE_FT
PRINCIPAL DISCHARGE DIAGNOSIS  Diagnosis: Unwitnessed fall  Assessment and Plan of Treatment: You were admitted to the hospital after a fall. Please maintain a safe environment where you reside. Place night lights in the hallways and non-slip rugs/mats on hard surfaces. It is recommended that you move in a careful manner to prevent future events. Perform any exercises recommended by physical therapy and follow-up with your primary care provider. You are now being discharge to Rehab to help you regain strength.         SECONDARY DISCHARGE DIAGNOSES  Diagnosis: Multiple myeloma  Assessment and Plan of Treatment: Please see your outpatient Hematologist after discharge for follow up and to resume your Mutiple Myeloma medications.     PRINCIPAL DISCHARGE DIAGNOSIS  Diagnosis: Unwitnessed fall  Assessment and Plan of Treatment: You were admitted to the hospital after a fall. Please maintain a safe environment where you reside. Place night lights in the hallways and non-slip rugs/mats on hard surfaces. It is recommended that you move in a careful manner to prevent future events. Perform any exercises recommended by physical therapy and follow-up with your primary care provider. You are now being discharge to Rehab to help you regain strength.         SECONDARY DISCHARGE DIAGNOSES  Diagnosis: Multiple myeloma  Assessment and Plan of Treatment: Please see your outpatient Hematologist after discharge for follow up and to resume your Revlimid medication. It is very important that you follow up.

## 2023-07-11 NOTE — DISCHARGE NOTE PROVIDER - HOSPITAL COURSE
76 y/o male pmh multiple myeloma, dementia c/o unwitnessed fall. Pt unable to provide any hx due to dementia. As per daughter, pt was at home and his brother heard a loud noise, went up to check on him and he was getting up off the ground. Pt then began to cry and c/o low back pain.     Hospital Course:     Fall  - Imaging reviewed: CTH neg, CT cervical C spine neg, Hip and pelvis XR no acute fx, OA noted, L spine XR - Advanced multilevel spondylosis. L2 compression fx seen on previous PET-CT from 6/12 not well visualized.    - Orthostatics  - Echo poor study, grossly decreased possibly mild LVS dysfunction   - PT recommending Rehab     MM  - monitor cbc  - hematology consulted  - outpatient follow up     Dementia  - Frequent orientation  - fall precautions    DAVID  - monitor cr  - unknown baseline  - Nephrology consulted   - DAVID resolved  - outpatient follow up     PT: Recommends Rehab    On 7/11/23 this case was reviewed with Dr. Muñiz, the patient is medically stable and optimized for discharge. All medications were reviewed and prescriptions were sent to mutually agreed upon pharmacy.

## 2023-07-11 NOTE — DISCHARGE NOTE NURSING/CASE MANAGEMENT/SOCIAL WORK - NSDCPEFALRISK_GEN_ALL_CORE
For information on Fall & Injury Prevention, visit: [email or web address] OR  [email or web address] OR  https://www.cdc.gov/steadi/patient.html

## 2023-07-11 NOTE — DISCHARGE NOTE PROVIDER - CARE PROVIDER_API CALL
Kuldeep Muñiz  Internal Medicine  [address] [place] [address]  Phone: [phone]  Fax: [phone]  Established Patient  Follow Up Time: 1 week    Gloria Martinez  Internal Medicine  [id number] [place] [address]  Phone: [phone]  Fax: [phone]  Follow Up Time:     Nahun Montero  Neurology  [address], [address]  [place] [address]  Phone: [phone]  Fax: [phone]  Follow Up Time: 1 week

## 2023-07-11 NOTE — DISCHARGE NOTE PROVIDER - NSDCFUADDAPPT_GEN_ALL_CORE_FT
Please follow up with your outpatient hematologist for ongoing management of your multiple myeloma in 1 week.  Please follow up with your outpatient hematologist for ongoing management of your multiple myeloma and to resume your Revlimid medication within 1 week.

## 2023-07-11 NOTE — DISCHARGE NOTE NURSING/CASE MANAGEMENT/SOCIAL WORK - PATIENT PORTAL LINK FT
You can access the FollowMyHealth Patient Portal offered by [facility] by registering at the following website: [email or web address]. By joining [employer]’s FollowMyHealth portal, you will also be able to view your health information using other applications (apps) compatible with our system.

## 2023-07-12 LAB — INTERPRETATION SERPL IFE-IMP: SIGNIFICANT CHANGE UP

## 2024-01-02 ENCOUNTER — INPATIENT (INPATIENT)
Facility: HOSPITAL | Age: 79
LOS: 21 days | Discharge: NURSING, REHAB OR LONG-TERM CARE | End: 2024-01-24
Attending: INTERNAL MEDICINE | Admitting: INTERNAL MEDICINE
Payer: OTHER GOVERNMENT

## 2024-01-02 VITALS
HEART RATE: 92 BPM | OXYGEN SATURATION: 96 % | RESPIRATION RATE: 16 BRPM | TEMPERATURE: 99 F | SYSTOLIC BLOOD PRESSURE: 88 MMHG | DIASTOLIC BLOOD PRESSURE: 55 MMHG

## 2024-01-02 LAB
ALBUMIN SERPL ELPH-MCNC: 2.7 G/DL — LOW (ref 3.3–5)
ALBUMIN SERPL ELPH-MCNC: 2.7 G/DL — LOW (ref 3.3–5)
ALP SERPL-CCNC: 93 U/L — SIGNIFICANT CHANGE UP (ref 40–120)
ALP SERPL-CCNC: 93 U/L — SIGNIFICANT CHANGE UP (ref 40–120)
ALT FLD-CCNC: 8 U/L — SIGNIFICANT CHANGE UP (ref 4–41)
ALT FLD-CCNC: 8 U/L — SIGNIFICANT CHANGE UP (ref 4–41)
ANION GAP SERPL CALC-SCNC: 7 MMOL/L — SIGNIFICANT CHANGE UP (ref 7–14)
ANION GAP SERPL CALC-SCNC: 7 MMOL/L — SIGNIFICANT CHANGE UP (ref 7–14)
APPEARANCE UR: CLEAR — SIGNIFICANT CHANGE UP
APPEARANCE UR: CLEAR — SIGNIFICANT CHANGE UP
APTT BLD: 28.7 SEC — SIGNIFICANT CHANGE UP (ref 24.5–35.6)
APTT BLD: 28.7 SEC — SIGNIFICANT CHANGE UP (ref 24.5–35.6)
AST SERPL-CCNC: 9 U/L — SIGNIFICANT CHANGE UP (ref 4–40)
AST SERPL-CCNC: 9 U/L — SIGNIFICANT CHANGE UP (ref 4–40)
BACTERIA # UR AUTO: NEGATIVE /HPF — SIGNIFICANT CHANGE UP
BACTERIA # UR AUTO: NEGATIVE /HPF — SIGNIFICANT CHANGE UP
BASE EXCESS BLDV CALC-SCNC: -1.1 MMOL/L — SIGNIFICANT CHANGE UP (ref -2–3)
BASE EXCESS BLDV CALC-SCNC: -1.1 MMOL/L — SIGNIFICANT CHANGE UP (ref -2–3)
BASOPHILS # BLD AUTO: 0.01 K/UL — SIGNIFICANT CHANGE UP (ref 0–0.2)
BASOPHILS # BLD AUTO: 0.01 K/UL — SIGNIFICANT CHANGE UP (ref 0–0.2)
BASOPHILS NFR BLD AUTO: 0.2 % — SIGNIFICANT CHANGE UP (ref 0–2)
BASOPHILS NFR BLD AUTO: 0.2 % — SIGNIFICANT CHANGE UP (ref 0–2)
BILIRUB SERPL-MCNC: 0.5 MG/DL — SIGNIFICANT CHANGE UP (ref 0.2–1.2)
BILIRUB SERPL-MCNC: 0.5 MG/DL — SIGNIFICANT CHANGE UP (ref 0.2–1.2)
BILIRUB UR-MCNC: NEGATIVE — SIGNIFICANT CHANGE UP
BILIRUB UR-MCNC: NEGATIVE — SIGNIFICANT CHANGE UP
BLOOD GAS VENOUS COMPREHENSIVE RESULT: SIGNIFICANT CHANGE UP
BLOOD GAS VENOUS COMPREHENSIVE RESULT: SIGNIFICANT CHANGE UP
BUN SERPL-MCNC: 26 MG/DL — HIGH (ref 7–23)
BUN SERPL-MCNC: 26 MG/DL — HIGH (ref 7–23)
CALCIUM SERPL-MCNC: 10.1 MG/DL — SIGNIFICANT CHANGE UP (ref 8.4–10.5)
CALCIUM SERPL-MCNC: 10.1 MG/DL — SIGNIFICANT CHANGE UP (ref 8.4–10.5)
CAST: 6 /LPF — HIGH (ref 0–4)
CAST: 6 /LPF — HIGH (ref 0–4)
CHLORIDE BLDV-SCNC: 103 MMOL/L — SIGNIFICANT CHANGE UP (ref 96–108)
CHLORIDE BLDV-SCNC: 103 MMOL/L — SIGNIFICANT CHANGE UP (ref 96–108)
CHLORIDE SERPL-SCNC: 101 MMOL/L — SIGNIFICANT CHANGE UP (ref 98–107)
CHLORIDE SERPL-SCNC: 101 MMOL/L — SIGNIFICANT CHANGE UP (ref 98–107)
CO2 BLDV-SCNC: 26.2 MMOL/L — HIGH (ref 22–26)
CO2 BLDV-SCNC: 26.2 MMOL/L — HIGH (ref 22–26)
CO2 SERPL-SCNC: 24 MMOL/L — SIGNIFICANT CHANGE UP (ref 22–31)
CO2 SERPL-SCNC: 24 MMOL/L — SIGNIFICANT CHANGE UP (ref 22–31)
COLOR SPEC: YELLOW — SIGNIFICANT CHANGE UP
COLOR SPEC: YELLOW — SIGNIFICANT CHANGE UP
CREAT SERPL-MCNC: 1.41 MG/DL — HIGH (ref 0.5–1.3)
CREAT SERPL-MCNC: 1.41 MG/DL — HIGH (ref 0.5–1.3)
DIFF PNL FLD: ABNORMAL
DIFF PNL FLD: ABNORMAL
EGFR: 51 ML/MIN/1.73M2 — LOW
EGFR: 51 ML/MIN/1.73M2 — LOW
EOSINOPHIL # BLD AUTO: 0 K/UL — SIGNIFICANT CHANGE UP (ref 0–0.5)
EOSINOPHIL # BLD AUTO: 0 K/UL — SIGNIFICANT CHANGE UP (ref 0–0.5)
EOSINOPHIL NFR BLD AUTO: 0 % — SIGNIFICANT CHANGE UP (ref 0–6)
EOSINOPHIL NFR BLD AUTO: 0 % — SIGNIFICANT CHANGE UP (ref 0–6)
FLUAV AG NPH QL: DETECTED
FLUAV AG NPH QL: DETECTED
FLUBV AG NPH QL: SIGNIFICANT CHANGE UP
FLUBV AG NPH QL: SIGNIFICANT CHANGE UP
GAS PNL BLDV: 137 MMOL/L — SIGNIFICANT CHANGE UP (ref 136–145)
GAS PNL BLDV: 137 MMOL/L — SIGNIFICANT CHANGE UP (ref 136–145)
GLUCOSE BLDV-MCNC: 104 MG/DL — HIGH (ref 70–99)
GLUCOSE BLDV-MCNC: 104 MG/DL — HIGH (ref 70–99)
GLUCOSE SERPL-MCNC: 100 MG/DL — HIGH (ref 70–99)
GLUCOSE SERPL-MCNC: 100 MG/DL — HIGH (ref 70–99)
GLUCOSE UR QL: NEGATIVE MG/DL — SIGNIFICANT CHANGE UP
GLUCOSE UR QL: NEGATIVE MG/DL — SIGNIFICANT CHANGE UP
HCO3 BLDV-SCNC: 25 MMOL/L — SIGNIFICANT CHANGE UP (ref 22–29)
HCO3 BLDV-SCNC: 25 MMOL/L — SIGNIFICANT CHANGE UP (ref 22–29)
HCT VFR BLD CALC: 25.1 % — LOW (ref 39–50)
HCT VFR BLD CALC: 25.1 % — LOW (ref 39–50)
HCT VFR BLDA CALC: 26 % — LOW (ref 39–51)
HCT VFR BLDA CALC: 26 % — LOW (ref 39–51)
HGB BLD CALC-MCNC: 8.7 G/DL — LOW (ref 12.6–17.4)
HGB BLD CALC-MCNC: 8.7 G/DL — LOW (ref 12.6–17.4)
HGB BLD-MCNC: 8.1 G/DL — LOW (ref 13–17)
HGB BLD-MCNC: 8.1 G/DL — LOW (ref 13–17)
IANC: 4.09 K/UL — SIGNIFICANT CHANGE UP (ref 1.8–7.4)
IANC: 4.09 K/UL — SIGNIFICANT CHANGE UP (ref 1.8–7.4)
IMM GRANULOCYTES NFR BLD AUTO: 0.7 % — SIGNIFICANT CHANGE UP (ref 0–0.9)
IMM GRANULOCYTES NFR BLD AUTO: 0.7 % — SIGNIFICANT CHANGE UP (ref 0–0.9)
INR BLD: 1.26 RATIO — HIGH (ref 0.85–1.18)
INR BLD: 1.26 RATIO — HIGH (ref 0.85–1.18)
KETONES UR-MCNC: NEGATIVE MG/DL — SIGNIFICANT CHANGE UP
KETONES UR-MCNC: NEGATIVE MG/DL — SIGNIFICANT CHANGE UP
LACTATE BLDV-MCNC: 2.4 MMOL/L — HIGH (ref 0.5–2)
LACTATE BLDV-MCNC: 2.4 MMOL/L — HIGH (ref 0.5–2)
LEUKOCYTE ESTERASE UR-ACNC: ABNORMAL
LEUKOCYTE ESTERASE UR-ACNC: ABNORMAL
LYMPHOCYTES # BLD AUTO: 0.59 K/UL — LOW (ref 1–3.3)
LYMPHOCYTES # BLD AUTO: 0.59 K/UL — LOW (ref 1–3.3)
LYMPHOCYTES # BLD AUTO: 10.3 % — LOW (ref 13–44)
LYMPHOCYTES # BLD AUTO: 10.3 % — LOW (ref 13–44)
MCHC RBC-ENTMCNC: 32.3 GM/DL — SIGNIFICANT CHANGE UP (ref 32–36)
MCHC RBC-ENTMCNC: 32.3 GM/DL — SIGNIFICANT CHANGE UP (ref 32–36)
MCHC RBC-ENTMCNC: 34.3 PG — HIGH (ref 27–34)
MCHC RBC-ENTMCNC: 34.3 PG — HIGH (ref 27–34)
MCV RBC AUTO: 106.4 FL — HIGH (ref 80–100)
MCV RBC AUTO: 106.4 FL — HIGH (ref 80–100)
MONOCYTES # BLD AUTO: 0.98 K/UL — HIGH (ref 0–0.9)
MONOCYTES # BLD AUTO: 0.98 K/UL — HIGH (ref 0–0.9)
MONOCYTES NFR BLD AUTO: 17.2 % — HIGH (ref 2–14)
MONOCYTES NFR BLD AUTO: 17.2 % — HIGH (ref 2–14)
NEUTROPHILS # BLD AUTO: 4.09 K/UL — SIGNIFICANT CHANGE UP (ref 1.8–7.4)
NEUTROPHILS # BLD AUTO: 4.09 K/UL — SIGNIFICANT CHANGE UP (ref 1.8–7.4)
NEUTROPHILS NFR BLD AUTO: 71.6 % — SIGNIFICANT CHANGE UP (ref 43–77)
NEUTROPHILS NFR BLD AUTO: 71.6 % — SIGNIFICANT CHANGE UP (ref 43–77)
NITRITE UR-MCNC: POSITIVE
NITRITE UR-MCNC: POSITIVE
NRBC # BLD: 0 /100 WBCS — SIGNIFICANT CHANGE UP (ref 0–0)
NRBC # BLD: 0 /100 WBCS — SIGNIFICANT CHANGE UP (ref 0–0)
NRBC # FLD: 0.05 K/UL — HIGH (ref 0–0)
NRBC # FLD: 0.05 K/UL — HIGH (ref 0–0)
PCO2 BLDV: 46 MMHG — SIGNIFICANT CHANGE UP (ref 42–55)
PCO2 BLDV: 46 MMHG — SIGNIFICANT CHANGE UP (ref 42–55)
PH BLDV: 7.34 — SIGNIFICANT CHANGE UP (ref 7.32–7.43)
PH BLDV: 7.34 — SIGNIFICANT CHANGE UP (ref 7.32–7.43)
PH UR: 5.5 — SIGNIFICANT CHANGE UP (ref 5–8)
PH UR: 5.5 — SIGNIFICANT CHANGE UP (ref 5–8)
PLATELET # BLD AUTO: 219 K/UL — SIGNIFICANT CHANGE UP (ref 150–400)
PLATELET # BLD AUTO: 219 K/UL — SIGNIFICANT CHANGE UP (ref 150–400)
PO2 BLDV: 22 MMHG — LOW (ref 25–45)
PO2 BLDV: 22 MMHG — LOW (ref 25–45)
POTASSIUM BLDV-SCNC: 4.4 MMOL/L — SIGNIFICANT CHANGE UP (ref 3.5–5.1)
POTASSIUM BLDV-SCNC: 4.4 MMOL/L — SIGNIFICANT CHANGE UP (ref 3.5–5.1)
POTASSIUM SERPL-MCNC: 4.5 MMOL/L — SIGNIFICANT CHANGE UP (ref 3.5–5.3)
POTASSIUM SERPL-MCNC: 4.5 MMOL/L — SIGNIFICANT CHANGE UP (ref 3.5–5.3)
POTASSIUM SERPL-SCNC: 4.5 MMOL/L — SIGNIFICANT CHANGE UP (ref 3.5–5.3)
POTASSIUM SERPL-SCNC: 4.5 MMOL/L — SIGNIFICANT CHANGE UP (ref 3.5–5.3)
PROT SERPL-MCNC: 11.9 G/DL — HIGH (ref 6–8.3)
PROT SERPL-MCNC: 11.9 G/DL — HIGH (ref 6–8.3)
PROT UR-MCNC: SIGNIFICANT CHANGE UP MG/DL
PROT UR-MCNC: SIGNIFICANT CHANGE UP MG/DL
PROTHROM AB SERPL-ACNC: 14.1 SEC — HIGH (ref 9.5–13)
PROTHROM AB SERPL-ACNC: 14.1 SEC — HIGH (ref 9.5–13)
RBC # BLD: 2.36 M/UL — LOW (ref 4.2–5.8)
RBC # BLD: 2.36 M/UL — LOW (ref 4.2–5.8)
RBC # FLD: 13.6 % — SIGNIFICANT CHANGE UP (ref 10.3–14.5)
RBC # FLD: 13.6 % — SIGNIFICANT CHANGE UP (ref 10.3–14.5)
RBC CASTS # UR COMP ASSIST: 11 /HPF — HIGH (ref 0–4)
RBC CASTS # UR COMP ASSIST: 11 /HPF — HIGH (ref 0–4)
REVIEW: SIGNIFICANT CHANGE UP
REVIEW: SIGNIFICANT CHANGE UP
RSV RNA NPH QL NAA+NON-PROBE: SIGNIFICANT CHANGE UP
RSV RNA NPH QL NAA+NON-PROBE: SIGNIFICANT CHANGE UP
SAO2 % BLDV: 19.2 % — LOW (ref 67–88)
SAO2 % BLDV: 19.2 % — LOW (ref 67–88)
SARS-COV-2 RNA SPEC QL NAA+PROBE: SIGNIFICANT CHANGE UP
SARS-COV-2 RNA SPEC QL NAA+PROBE: SIGNIFICANT CHANGE UP
SODIUM SERPL-SCNC: 132 MMOL/L — LOW (ref 135–145)
SODIUM SERPL-SCNC: 132 MMOL/L — LOW (ref 135–145)
SP GR SPEC: 1.01 — SIGNIFICANT CHANGE UP (ref 1–1.03)
SP GR SPEC: 1.01 — SIGNIFICANT CHANGE UP (ref 1–1.03)
SQUAMOUS # UR AUTO: 2 /HPF — SIGNIFICANT CHANGE UP (ref 0–5)
SQUAMOUS # UR AUTO: 2 /HPF — SIGNIFICANT CHANGE UP (ref 0–5)
UROBILINOGEN FLD QL: 0.2 MG/DL — SIGNIFICANT CHANGE UP (ref 0.2–1)
UROBILINOGEN FLD QL: 0.2 MG/DL — SIGNIFICANT CHANGE UP (ref 0.2–1)
WBC # BLD: 5.71 K/UL — SIGNIFICANT CHANGE UP (ref 3.8–10.5)
WBC # BLD: 5.71 K/UL — SIGNIFICANT CHANGE UP (ref 3.8–10.5)
WBC # FLD AUTO: 5.71 K/UL — SIGNIFICANT CHANGE UP (ref 3.8–10.5)
WBC # FLD AUTO: 5.71 K/UL — SIGNIFICANT CHANGE UP (ref 3.8–10.5)
WBC UR QL: 12 /HPF — HIGH (ref 0–5)
WBC UR QL: 12 /HPF — HIGH (ref 0–5)

## 2024-01-02 PROCEDURE — 99285 EMERGENCY DEPT VISIT HI MDM: CPT | Mod: GC

## 2024-01-02 PROCEDURE — 71045 X-RAY EXAM CHEST 1 VIEW: CPT | Mod: 26

## 2024-01-02 RX ORDER — ACETAMINOPHEN 500 MG
1000 TABLET ORAL ONCE
Refills: 0 | Status: COMPLETED | OUTPATIENT
Start: 2024-01-02 | End: 2024-01-02

## 2024-01-02 RX ORDER — SODIUM CHLORIDE 9 MG/ML
500 INJECTION INTRAMUSCULAR; INTRAVENOUS; SUBCUTANEOUS ONCE
Refills: 0 | Status: COMPLETED | OUTPATIENT
Start: 2024-01-02 | End: 2024-01-02

## 2024-01-02 RX ORDER — CEFEPIME 1 G/1
2000 INJECTION, POWDER, FOR SOLUTION INTRAMUSCULAR; INTRAVENOUS ONCE
Refills: 0 | Status: COMPLETED | OUTPATIENT
Start: 2024-01-02 | End: 2024-01-02

## 2024-01-02 RX ORDER — VANCOMYCIN HCL 1 G
1000 VIAL (EA) INTRAVENOUS ONCE
Refills: 0 | Status: COMPLETED | OUTPATIENT
Start: 2024-01-02 | End: 2024-01-02

## 2024-01-02 RX ADMIN — SODIUM CHLORIDE 500 MILLILITER(S): 9 INJECTION INTRAMUSCULAR; INTRAVENOUS; SUBCUTANEOUS at 22:49

## 2024-01-02 RX ADMIN — Medication 75 MILLIGRAM(S): at 23:26

## 2024-01-02 RX ADMIN — Medication 250 MILLIGRAM(S): at 20:38

## 2024-01-02 RX ADMIN — CEFEPIME 100 MILLIGRAM(S): 1 INJECTION, POWDER, FOR SOLUTION INTRAMUSCULAR; INTRAVENOUS at 19:59

## 2024-01-02 RX ADMIN — Medication 400 MILLIGRAM(S): at 19:20

## 2024-01-02 NOTE — ED ADULT NURSE REASSESSMENT NOTE - NS ED NURSE REASSESS COMMENT FT1
Report received from IZAIAH Banuelos. Pt resting in stretcher, alert to self only, offers no complaints of pain or discomfort at this time. Pt denies lightheadedness/dizziness, chest pain, SOB, numbness/tingling. RR equal and unlabored, safety maintained, medicated as per EMAR, awaiting further evaluation at this time.

## 2024-01-02 NOTE — ED ADULT NURSE NOTE - CAS EDP DISCH DISPOSITION ADMI
[facility]

## 2024-01-02 NOTE — ED PROVIDER NOTE - ST/T WAVE
Discharge Planner Post-Acute Rehab OT:      Discharge Plan: home with assist as needed and HH OT     Precautions: fall, abdominal, NWB of LLE, wound vac on LLE, moderate thick liquids     Current Status:  ADLs:  Mobility: Wc based, slide board Ax1, *wife cleared to assist bed<->w/c via slideboard, Nursing Assist 1-2 Via Slideboard, OT SB transfer bed to droparm padded commode w/ min A and cues for tech and commode to w/c w/ th placing sliding board and providing min  A and directives for technique  Grooming: set up seated  Dressing: UB: set up, LB min A in supine, mod A w/ use of reacher from commode after th provided instructions  Bathing: Ax2 using slideboard to rolling blue and white shower chair, min A for bathing seated  Toileting: slide board bed to commode or Wc to commode over the toilet with Ax1 in therapy, Ax2 with nursing, mod A for clothing management and total assist rear pericares  IADLs: Wife can assist  Vision/Cognition: baseline visual deficits as pt is legally blind, need to further assess cognition.     Assessment: Focus SB transfers EOB>W/C and W/C<>drop arm BSC. Pt continued to require assistance with placement of SB. Pt c/o of headache and stomach ache throughout session, nsg notified.      Other Barriers to Discharge (DME, Family Training, etc): Family training  [facility], drop arm commode, tub bench, pt needs a bedrail and slide board   no specific acute ischemic changes

## 2024-01-02 NOTE — ED PROVIDER NOTE - PROGRESS NOTE DETAILS
Rogelio Stodadrd MD PGY2: FluA pos. To give tamiflu. Otherwise labs nonactionable. Urine pending, cth pending. Discussed with pt's significant other Karla ([phone]). Rogelio Stoddard MD PGY2: FluA pos. To give tamiflu. Otherwise labs nonactionable. Urine pending, cth pending. Discussed with pt's significant other Karla ([phone]). Rogelio Stoddard MD PGY2: Pt with [facility]. Discussed with dr sky pcp, to admit. Rogelio Stoddard MD PGY2: Pt with [facility]. Discussed with dr sky pcp, to admit.

## 2024-01-02 NOTE — ED PROVIDER NOTE - ATTENDING CONTRIBUTION TO CARE
78 year-old male past medical history dementia, melanoma, hypertension presents for lethargy and hypotension from nursing home.  Patient unable to give additional history.  No further information reported from nursing home  Exam as above  Patient hypotensive and febrile in ED.  Concern for infectious etiology.  Plan: Labs, x-ray, symptom relief, reassess.  Will obtain CT as unclear if significant change from baseline.  No known history of trauma.  Patient will require admission.

## 2024-01-02 NOTE — ED PROVIDER NOTE - CLINICAL SUMMARY MEDICAL DECISION MAKING FREE TEXT BOX
Patient is a 78-year-old male past medical history significant for dementia, multiple myeloma, hypertension presenting for lethargy and hypotension. Vital signs currently notable in triage for hypotension to 88 over 50s, febrile to 100.7.  Presenting for hypotension and increased lethargy at the nursing home, unable to provide further history from patient.  Differential includes but not limited to metabolic encephalopathy including infectious etiology which may be viral or bacterial including pneumonia or urine.  Less likely intra-abdominal based on soft abdomen and no tenderness on exam.  Additionally possible ICH or brain mets given history of falls and cancer.  Versus electrolyte abnormality.  To evaluate labs, cultures, urine, chest x-ray, CT head, give empiric antibiotics, antipyretics, reassess.  Patient was moved to room had improvement in blood pressure to 117/70, will continue to closely monitor.  DNR/DNI.  Likely admission to Dr. Muñiz.

## 2024-01-02 NOTE — ED ADULT NURSE NOTE - NSFALLRISKINTERV_ED_ALL_ED
Assistance OOB with selected safe patient handling equipment if applicable/Assistance with ambulation/Communicate fall risk and risk factors to all staff, patient, and family/Monitor gait and stability/Monitor for mental status changes and reorient to person, place, and time, as needed/Provide visual cue: yellow wristband, yellow gown, etc/Reinforce activity limits and safety measures with patient and family/Toileting schedule using arm’s reach rule for commode and bathroom/Use of alarms - bed, stretcher, chair and/or video monitoring/Call bell, personal items and telephone in reach/Instruct patient to call for assistance before getting out of bed/chair/stretcher/Non-slip footwear applied when patient is off stretcher/[place] to call system/Physically safe environment - no spills, clutter or unnecessary equipment/Purposeful Proactive Rounding/Room/bathroom lighting operational, light cord in reach Assistance OOB with selected safe patient handling equipment if applicable/Assistance with ambulation/Communicate fall risk and risk factors to all staff, patient, and family/Monitor gait and stability/Monitor for mental status changes and reorient to person, place, and time, as needed/Provide visual cue: yellow wristband, yellow gown, etc/Reinforce activity limits and safety measures with patient and family/Toileting schedule using arm’s reach rule for commode and bathroom/Use of alarms - bed, stretcher, chair and/or video monitoring/Call bell, personal items and telephone in reach/Instruct patient to call for assistance before getting out of bed/chair/stretcher/Non-slip footwear applied when patient is off stretcher/[place] to call system/Physically safe environment - no spills, clutter or unnecessary equipment/Purposeful Proactive Rounding/Room/bathroom lighting operational, light cord in reach

## 2024-01-02 NOTE — ED ADULT TRIAGE NOTE - CHIEF COMPLAINT QUOTE
pt brought in by [facility] from nursing home for hypotension and generalized weakness. pt denies chest pain, sob.

## 2024-01-02 NOTE — ED ADULT TRIAGE NOTE - NS ED NURSE AMBULANCES
[facility]

## 2024-01-02 NOTE — ED PROVIDER NOTE - PHYSICAL EXAMINATION
CONSTITUTIONAL: awake; in no acute distress.   SKIN: warm, dry  HEAD: Normocephalic; atraumatic.  EYES: no conjunctival injection. PERRL. No scleral icterus  ENT: No nasal discharge; airway clear.  NECK: Supple; non tender.  CARD: S1, S2 normal; no murmurs, gallops, or rubs. Regular rate and rhythm.   RESP: No wheezes, rales or rhonchi. Good air movement bilaterally.   ABD: soft ntnd, no guarding, no distention, no rigidity.   EXT: No cyanosis or edema.   NEURO: Alert, oriented to person but not place, time. Moves all extremities equally. Does not follow commands. 3 = A little assistance

## 2024-01-02 NOTE — ED PROVIDER NOTE - OBJECTIVE STATEMENT
Patient is a 78-year-old male past medical history significant for dementia, multiple myeloma, hypertension presenting for lethargy and hypotension.  Patient unable to provide history.  History obtained from triage note from EMS as well as from documentation in chart from nursing home.  States that he was more lethargic than usual today that he also was hypotensive to 80s over 50s.  Has had prior hospitalizations here and follows with Dr. Kuldeep Muñiz outpatient.  Noted to have oral temperature to mid 99 at nursing home. DNR/DNI per chart hayst.

## 2024-01-02 NOTE — ED ADULT NURSE NOTE - OBJECTIVE STATEMENT
Pt received in no acute distress, aroused, confused, poor historian, pt placed on continuous cardiac monitor, clothing removed, to cleaned, diapered placed, rectal temp performed, labs drawn, IV established, and nasal swab performed. Pt noted to be febrile. IV tylenol started.

## 2024-01-03 DIAGNOSIS — D53.9 NUTRITIONAL ANEMIA, UNSPECIFIED: ICD-10-CM

## 2024-01-03 DIAGNOSIS — N17.9 ACUTE KIDNEY FAILURE, UNSPECIFIED: ICD-10-CM

## 2024-01-03 DIAGNOSIS — C90.00 MULTIPLE MYELOMA NOT HAVING ACHIEVED REMISSION: ICD-10-CM

## 2024-01-03 DIAGNOSIS — J10.1 INFLUENZA DUE TO OTHER IDENTIFIED INFLUENZA VIRUS WITH OTHER RESPIRATORY MANIFESTATIONS: ICD-10-CM

## 2024-01-03 DIAGNOSIS — A41.9 SEPSIS, UNSPECIFIED ORGANISM: ICD-10-CM

## 2024-01-03 DIAGNOSIS — Z29.9 ENCOUNTER FOR PROPHYLACTIC MEASURES, UNSPECIFIED: ICD-10-CM

## 2024-01-03 DIAGNOSIS — N39.0 URINARY TRACT INFECTION, SITE NOT SPECIFIED: ICD-10-CM

## 2024-01-03 DIAGNOSIS — I10 ESSENTIAL (PRIMARY) HYPERTENSION: ICD-10-CM

## 2024-01-03 DIAGNOSIS — F03.90 UNSPECIFIED DEMENTIA WITHOUT BEHAVIORAL DISTURBANCE: ICD-10-CM

## 2024-01-03 LAB
APPEARANCE UR: CLEAR — SIGNIFICANT CHANGE UP
APPEARANCE UR: CLEAR — SIGNIFICANT CHANGE UP
BACTERIA # UR AUTO: NEGATIVE /HPF — SIGNIFICANT CHANGE UP
BACTERIA # UR AUTO: NEGATIVE /HPF — SIGNIFICANT CHANGE UP
BILIRUB UR-MCNC: NEGATIVE — SIGNIFICANT CHANGE UP
BILIRUB UR-MCNC: NEGATIVE — SIGNIFICANT CHANGE UP
BLOOD GAS VENOUS COMPREHENSIVE RESULT: SIGNIFICANT CHANGE UP
BLOOD GAS VENOUS COMPREHENSIVE RESULT: SIGNIFICANT CHANGE UP
CAST: 8 /LPF — HIGH (ref 0–4)
CAST: 8 /LPF — HIGH (ref 0–4)
COLOR SPEC: YELLOW — SIGNIFICANT CHANGE UP
COLOR SPEC: YELLOW — SIGNIFICANT CHANGE UP
CULTURE RESULTS: NO GROWTH — SIGNIFICANT CHANGE UP
CULTURE RESULTS: NO GROWTH — SIGNIFICANT CHANGE UP
DIFF PNL FLD: ABNORMAL
DIFF PNL FLD: ABNORMAL
GLUCOSE UR QL: NEGATIVE MG/DL — SIGNIFICANT CHANGE UP
GLUCOSE UR QL: NEGATIVE MG/DL — SIGNIFICANT CHANGE UP
HYALINE CASTS # UR AUTO: PRESENT
HYALINE CASTS # UR AUTO: PRESENT
KETONES UR-MCNC: NEGATIVE MG/DL — SIGNIFICANT CHANGE UP
KETONES UR-MCNC: NEGATIVE MG/DL — SIGNIFICANT CHANGE UP
LEUKOCYTE ESTERASE UR-ACNC: ABNORMAL
LEUKOCYTE ESTERASE UR-ACNC: ABNORMAL
NITRITE UR-MCNC: POSITIVE
NITRITE UR-MCNC: POSITIVE
PH UR: 5.5 — SIGNIFICANT CHANGE UP (ref 5–8)
PH UR: 5.5 — SIGNIFICANT CHANGE UP (ref 5–8)
PROT UR-MCNC: SIGNIFICANT CHANGE UP MG/DL
PROT UR-MCNC: SIGNIFICANT CHANGE UP MG/DL
RBC CASTS # UR COMP ASSIST: 9 /HPF — HIGH (ref 0–4)
RBC CASTS # UR COMP ASSIST: 9 /HPF — HIGH (ref 0–4)
REVIEW: SIGNIFICANT CHANGE UP
REVIEW: SIGNIFICANT CHANGE UP
SP GR SPEC: 1.01 — SIGNIFICANT CHANGE UP (ref 1–1.03)
SP GR SPEC: 1.01 — SIGNIFICANT CHANGE UP (ref 1–1.03)
SPECIMEN SOURCE: SIGNIFICANT CHANGE UP
SPECIMEN SOURCE: SIGNIFICANT CHANGE UP
SQUAMOUS # UR AUTO: 2 /HPF — SIGNIFICANT CHANGE UP (ref 0–5)
SQUAMOUS # UR AUTO: 2 /HPF — SIGNIFICANT CHANGE UP (ref 0–5)
UROBILINOGEN FLD QL: 0.2 MG/DL — SIGNIFICANT CHANGE UP (ref 0.2–1)
UROBILINOGEN FLD QL: 0.2 MG/DL — SIGNIFICANT CHANGE UP (ref 0.2–1)
WBC UR QL: 11 /HPF — HIGH (ref 0–5)
WBC UR QL: 11 /HPF — HIGH (ref 0–5)

## 2024-01-03 PROCEDURE — 70450 CT HEAD/BRAIN W/O DYE: CPT | Mod: 26,MA

## 2024-01-03 PROCEDURE — 99223 1ST HOSP IP/OBS HIGH 75: CPT

## 2024-01-03 RX ORDER — QUETIAPINE FUMARATE 200 MG/1
12.5 TABLET, FILM COATED ORAL EVERY 6 HOURS
Refills: 0 | Status: DISCONTINUED | OUTPATIENT
Start: 2024-01-03 | End: 2024-01-24

## 2024-01-03 RX ORDER — FOLIC ACID 0.8 MG
1 TABLET ORAL DAILY
Refills: 0 | Status: DISCONTINUED | OUTPATIENT
Start: 2024-01-03 | End: 2024-01-24

## 2024-01-03 RX ORDER — ACETAMINOPHEN 500 MG
650 TABLET ORAL EVERY 6 HOURS
Refills: 0 | Status: DISCONTINUED | OUTPATIENT
Start: 2024-01-03 | End: 2024-01-24

## 2024-01-03 RX ORDER — PREGABALIN 225 MG/1
1000 CAPSULE ORAL DAILY
Refills: 0 | Status: DISCONTINUED | OUTPATIENT
Start: 2024-01-03 | End: 2024-01-24

## 2024-01-03 RX ORDER — RISPERIDONE 4 MG/1
0.5 TABLET ORAL AT BEDTIME
Refills: 0 | Status: DISCONTINUED | OUTPATIENT
Start: 2024-01-03 | End: 2024-01-24

## 2024-01-03 RX ORDER — RISPERIDONE 4 MG/1
0.25 TABLET ORAL DAILY
Refills: 0 | Status: DISCONTINUED | OUTPATIENT
Start: 2024-01-03 | End: 2024-01-24

## 2024-01-03 RX ORDER — KETOCONAZOLE 20 MG/G
1 AEROSOL, FOAM TOPICAL
Refills: 0 | DISCHARGE

## 2024-01-03 RX ORDER — MEMANTINE HYDROCHLORIDE 10 MG/1
10 TABLET ORAL
Refills: 0 | Status: DISCONTINUED | OUTPATIENT
Start: 2024-01-03 | End: 2024-01-24

## 2024-01-03 RX ORDER — LANOLIN ALCOHOL/MO/W.PET/CERES
3 CREAM (GRAM) TOPICAL AT BEDTIME
Refills: 0 | Status: DISCONTINUED | OUTPATIENT
Start: 2024-01-03 | End: 2024-01-24

## 2024-01-03 RX ORDER — DONEPEZIL HYDROCHLORIDE 10 MG/1
10 TABLET, FILM COATED ORAL AT BEDTIME
Refills: 0 | Status: DISCONTINUED | OUTPATIENT
Start: 2024-01-03 | End: 2024-01-24

## 2024-01-03 RX ORDER — ONDANSETRON 8 MG/1
4 TABLET, FILM COATED ORAL EVERY 8 HOURS
Refills: 0 | Status: DISCONTINUED | OUTPATIENT
Start: 2024-01-03 | End: 2024-01-24

## 2024-01-03 RX ORDER — CEFTRIAXONE 500 MG/1
1000 INJECTION, POWDER, FOR SOLUTION INTRAMUSCULAR; INTRAVENOUS EVERY 24 HOURS
Refills: 0 | Status: DISCONTINUED | OUTPATIENT
Start: 2024-01-03 | End: 2024-01-03

## 2024-01-03 RX ORDER — SODIUM CHLORIDE 9 MG/ML
1000 INJECTION, SOLUTION INTRAVENOUS
Refills: 0 | Status: DISCONTINUED | OUTPATIENT
Start: 2024-01-03 | End: 2024-01-04

## 2024-01-03 RX ORDER — HEPARIN SODIUM 5000 [USP'U]/ML
5000 INJECTION INTRAVENOUS; SUBCUTANEOUS EVERY 8 HOURS
Refills: 0 | Status: DISCONTINUED | OUTPATIENT
Start: 2024-01-03 | End: 2024-01-24

## 2024-01-03 RX ORDER — SERTRALINE 25 MG/1
50 TABLET, FILM COATED ORAL DAILY
Refills: 0 | Status: DISCONTINUED | OUTPATIENT
Start: 2024-01-03 | End: 2024-01-24

## 2024-01-03 RX ADMIN — PREGABALIN 1000 MICROGRAM(S): 225 CAPSULE ORAL at 12:45

## 2024-01-03 RX ADMIN — Medication 30 MILLIGRAM(S): at 17:46

## 2024-01-03 RX ADMIN — Medication 1 MILLIGRAM(S): at 12:45

## 2024-01-03 RX ADMIN — SODIUM CHLORIDE 100 MILLILITER(S): 9 INJECTION, SOLUTION INTRAVENOUS at 08:41

## 2024-01-03 RX ADMIN — MEMANTINE HYDROCHLORIDE 10 MILLIGRAM(S): 10 TABLET ORAL at 17:46

## 2024-01-03 RX ADMIN — CEFTRIAXONE 100 MILLIGRAM(S): 500 INJECTION, POWDER, FOR SOLUTION INTRAMUSCULAR; INTRAVENOUS at 12:46

## 2024-01-03 RX ADMIN — HEPARIN SODIUM 5000 UNIT(S): 5000 INJECTION INTRAVENOUS; SUBCUTANEOUS at 13:54

## 2024-01-03 RX ADMIN — SERTRALINE 50 MILLIGRAM(S): 25 TABLET, FILM COATED ORAL at 12:45

## 2024-01-03 RX ADMIN — RISPERIDONE 0.25 MILLIGRAM(S): 4 TABLET ORAL at 12:45

## 2024-01-03 NOTE — CONSULT NOTE ADULT - SUBJECTIVE AND OBJECTIVE BOX
24 @ 13:41    Patient is a 78y old  Male who presents with a chief complaint of Hypotension, lethargy (2024 12:49)      HPI:  This is a 78M with history of MM, HTN, and Dementia (AAO x 1-2 to self, to place, not to time at baseline per brother) who presents to the hospital from [facility] for hypotension and lethargy. Patient currently awake, alert, AAO x2 (to self, to hospital but not name, not to time) but very poor historian, denies any acute complaints when asked. Spoke with brother Mookie who said that the patient was sent to the hospital for lethargy and cough though he states that the patient has had a cough for several months. Brother denies any other known acute complaints for the patient. Called [facility] [phone] but there was no staff available who knew the patient. As per NH paperwork and ED note, patient was sent to the hospital for hypotention to 81/55 and lethargy. He was noted to have a low grade temp of 99.6 and saturation of 93% at his NH. He was given tylenol 650mg x1 prior to being sent to the hospital.     On arrival to the hospital his vitals were T 99.4 -> 100.7 rectal, P 92, BP 88/55 -> 117/70, RR 16, O2 sat 96% RA. His lab work was significant for worsening macrocytic anemia, DAVID with mild hyponatremia, elevated protein gap, elevated lactate with improvement on repeat. His UA was positive for nitrite, leuk esterase but negative for bacteria. His respiratory swab was positive for influenza A. His imaging did not show acute abnormalities. He was given ofirmev 1g, NS 500cc x1, vanc 1g, cefepime 2g, and tamiflu 75mg PO x1. He was admitted to medicine.  (2024 06:06):    now pulm called:  he is from NH:  above history noted:  he seems to be responding to simple commands:  he say he has no underlying lung history  never smoked:  on room air:  adm with influenza:       ?FOLLOWING PRESENT  [x ] Hx of PE/DVT, x[ ] Hx COPD, [x ] Hx of Asthma, x[ ] Hx of Hospitalization, [ x]  Hx of BiPAP/CPAP use, [ x] Hx of DMITRIY    Allergies    No Known Allergies    Intolerances        PAST MEDICAL & SURGICAL HISTORY:  Dementia      Multiple myeloma      Essential hypertension      No significant past surgical history          FAMILY HISTORY:  No pertinent family history in first degree relatives        Social History: [ x ] TOBACCO                  [  x] ETOH                                 [ x ] IVDA/DRUGS    REVIEW OF SYSTEMS    pt deies any symptoms except mild cough   General:	    Skin/Breast:  	  Ophthalmologic:  	  ENMT:	    Respiratory and Thorax:  	  Cardiovascular:	    Gastrointestinal:	    Genitourinary:	    Musculoskeletal:	    Neurological:	    Psychiatric:	    Hematology/Lymphatics:	    Endocrine:	    Allergic/Immunologic:	    MEDICATIONS  (STANDING):  cefTRIAXone   IVPB 1000 milliGRAM(s) IV Intermittent every 24 hours  cyanocobalamin 1000 MICROGram(s) Oral daily  donepezil 10 milliGRAM(s) Oral at bedtime  folic acid 1 milliGRAM(s) Oral daily  heparin   Injectable 5000 Unit(s) SubCutaneous every 8 hours  lactated ringers. 1000 milliLiter(s) (100 mL/Hr) IV Continuous <Continuous>  memantine 10 milliGRAM(s) Oral two times a day  oseltamivir 30 milliGRAM(s) Oral two times a day  risperiDONE   Tablet 0.25 milliGRAM(s) Oral daily  risperiDONE   Tablet 0.5 milliGRAM(s) Oral at bedtime  sertraline 50 milliGRAM(s) Oral daily    MEDICATIONS  (PRN):  acetaminophen     Tablet .. 650 milliGRAM(s) Oral every 6 hours PRN Temp greater or equal to 38C (100.4F), Mild Pain (1 - 3)  aluminum hydroxide/magnesium hydroxide/simethicone Suspension 30 milliLiter(s) Oral every 4 hours PRN Dyspepsia  melatonin 3 milliGRAM(s) Oral at bedtime PRN Insomnia  ondansetron Injectable 4 milliGRAM(s) IV Push every 8 hours PRN Nausea and/or Vomiting  QUEtiapine 12.5 milliGRAM(s) Oral every 6 hours PRN for agitation       Vital Signs Last 24 Hrs  T(C): 36.6 (2024 11:58), Max: 38.2 (2024 19:15)  T(F): 97.8 (2024 11:58), Max: 100.7 (2024 19:15)  HR: 75 (2024 11:58) (62 - 92)  BP: 133/57 (2024 11:58) (88/55 - 133/72)  BP(mean): --  RR: 18 (2024 11:58) (16 - 19)  SpO2: 97% (2024 11:58) (96% - 98%)    Parameters below as of 2024 11:58  Patient On (Oxygen Delivery Method): room air    Orthostatic VS          I&O's Summary      Physical Exam:   GENERAL: NAD, well-groomed, well-developed  HEENT: CHRISTINA/   Atraumatic, Normocephalic  ENMT: No tonsillar erythema, exudates, or enlargement; Moist mucous membranes, Good dentition, No lesions  NECK: Supple, No JVD, Normal thyroid  CHEST/LUNG: Clear to auscultation bilaterally- no wheezing  CVS: Regular rate and rhythm; No murmurs, rubs, or gallops  GI: : Soft, Nontender, Nondistended; Bowel sounds present  NERVOUS SYSTEM:  Alert & Oriented X2  EXTREMITIES:  2+ Peripheral Pulses, No clubbing, cyanosis, or edema  LYMPH: No lymphadenopathy noted  SKIN: No rashes or lesions  ENDOCRINOLOGY: No Thyromegaly  PSYCH: demented    Labs:  Venous<46<4>>29<<7.355>>Venous<<3><<4><<5<<299>>, -1.1<46<4>>22<<7.345>>-1.1<<3><<4><<5<<229>>COVID-19 PCR: NotDetec (2023 12:45)                              8.1    5.71  )-----------( 219      ( 2024 19:10 )             25.1     01-02    132<L>  |  101  |  26<H>  ----------------------------<  100<H>  4.5   |  24  |  1.41<H>    Ca    10.1      2024 19:10    TPro  11.9<H>  /  Alb  2.7<L>  /  TBili  0.5  /  DBili  x   /  AST  9   /  ALT  8   /  AlkPhos  93  01-02    CAPILLARY BLOOD GLUCOSE        LIVER FUNCTIONS - ( 2024 19:10 )  Alb: 2.7 g/dL / Pro: 11.9 g/dL / ALK PHOS: 93 U/L / ALT: 8 U/L / AST: 9 U/L / GGT: x           PT/INR - ( 2024 19:10 )   PT: 14.1 sec;   INR: 1.26 ratio         PTT - ( 2024 19:10 )  PTT:28.7 sec  Urinalysis Basic - ( 2024 00:30 )    Color: Yellow / Appearance: Clear / S.013 / pH: x  Gluc: x / Ketone: Negative mg/dL  / Bili: Negative / Urobili: 0.2 mg/dL   Blood: x / Protein: Trace mg/dL / Nitrite: Positive   Leuk Esterase: Trace / RBC: 9 /HPF / WBC 11 /HPF   Sq Epi: x / Non Sq Epi: 2 /HPF / Bacteria: Negative /HPF      D DImerLactate, Blood: 0.9 mmol/L ( @ 22:32)        Studies  Chest X-RAY  CT SCAN Chest   CT Abdomen  Venous Dopplers: LE:   Others  rad< from: Xray Chest 1 View AP/PA (24 @ 19:36) >    ACC: [id number] EXAM:  XR CHEST AP OR PA 1V   ORDERED BY: CATHIE MULLER     PROCEDURE DATE:  2024          INTERPRETATION:  CLINICAL INDICATION: Sepsis.    EXAM: Chest x-ray 2 views.    COMPARISON: None available.    FINDINGS:  Surgical clips overlying the midline of the neck.  Bilateral lung fields partially obscured by patient positioning.   Visualized lung fields are clear.  There is no pleural effusion or pneumothorax.  The heart is not well evaluated in this position. Median sternotomy.  The visualized osseous structures demonstrate no acute pathology.    IMPRESSION:  No focal consolidations.    --- End of Report ---          MIR LAW MD; Resident Radiologist  This document has been electronically signed.  KOFI GARCIA MD; Attending Radiologist  This document has been electronically signed. 2024  7:43PM    < end of copied text >  < from: NM PET/CT Oncology FDG WB, Subsq (23 @ 21:29) >  fracture in the adjacent left seventh rib (SUV 2.4; image 168). There are   several additional foci of mild radiotracer activity in several bilateral   ribs, predominantly without corresponding abnormalities on CT.    There is FDG-avid severe osteoarthritic changes in the left greater than   right hips.    IMPRESSION: Abnormal gcaex-tq-enezkZWK-PET/CT scan.    1. Mild to moderately FDG-avid compression fracture/deformity of   approximately T9, T10, and L2 vertebral bodies. The T9 and T10 vertebral   bodies are heterogeneous in appearance on CT. There also is increased FDG   activity in the superior endplate of the T6 vertebral body which is not   well delineated on CT. Etiology is indeterminate. MRI may be obtained for   further evaluation.    2. Several FDG-avid bilateral rib lesions, predominantly without   corresponding abnormalities on CT. The most FDG-avid focus corresponds to   an expansile lesion in the anterolateral aspect of the left eighth rib,   suspicious for myeloma. There is a mildly FDG-avid healing fracture in   the adjacent left seventh rib which may be pathologic.    3. FDG-avid severe osteoarthritic changes in the left greater than right   hips.    --- End of Report ---      ***Please see the addendum at the top of this report. It may contain   additional important information or changes.****         < end of copied text >                     24 @ 13:41    Patient is a 78y old  Male who presents with a chief complaint of Hypotension, lethargy (2024 12:49)      HPI:  This is a 78M with history of MM, HTN, and Dementia (AAO x 1-2 to self, to place, not to time at baseline per brother) who presents to the hospital from [facility] for hypotension and lethargy. Patient currently awake, alert, AAO x2 (to self, to hospital but not name, not to time) but very poor historian, denies any acute complaints when asked. Spoke with brother Mookie who said that the patient was sent to the hospital for lethargy and cough though he states that the patient has had a cough for several months. Brother denies any other known acute complaints for the patient. Called [facility] [phone] but there was no staff available who knew the patient. As per NH paperwork and ED note, patient was sent to the hospital for hypotention to 81/55 and lethargy. He was noted to have a low grade temp of 99.6 and saturation of 93% at his NH. He was given tylenol 650mg x1 prior to being sent to the hospital.     On arrival to the hospital his vitals were T 99.4 -> 100.7 rectal, P 92, BP 88/55 -> 117/70, RR 16, O2 sat 96% RA. His lab work was significant for worsening macrocytic anemia, DAVID with mild hyponatremia, elevated protein gap, elevated lactate with improvement on repeat. His UA was positive for nitrite, leuk esterase but negative for bacteria. His respiratory swab was positive for influenza A. His imaging did not show acute abnormalities. He was given ofirmev 1g, NS 500cc x1, vanc 1g, cefepime 2g, and tamiflu 75mg PO x1. He was admitted to medicine.  (2024 06:06):    now pulm called:  he is from NH:  above history noted:  he seems to be responding to simple commands:  he say he has no underlying lung history  never smoked:  on room air:  adm with influenza:       ?FOLLOWING PRESENT  [x ] Hx of PE/DVT, x[ ] Hx COPD, [x ] Hx of Asthma, x[ ] Hx of Hospitalization, [ x]  Hx of BiPAP/CPAP use, [ x] Hx of DMITRIY    Allergies    No Known Allergies    Intolerances        PAST MEDICAL & SURGICAL HISTORY:  Dementia      Multiple myeloma      Essential hypertension      No significant past surgical history          FAMILY HISTORY:  No pertinent family history in first degree relatives        Social History: [ x ] TOBACCO                  [  x] ETOH                                 [ x ] IVDA/DRUGS    REVIEW OF SYSTEMS    pt deies any symptoms except mild cough   General:	    Skin/Breast:  	  Ophthalmologic:  	  ENMT:	    Respiratory and Thorax:  	  Cardiovascular:	    Gastrointestinal:	    Genitourinary:	    Musculoskeletal:	    Neurological:	    Psychiatric:	    Hematology/Lymphatics:	    Endocrine:	    Allergic/Immunologic:	    MEDICATIONS  (STANDING):  cefTRIAXone   IVPB 1000 milliGRAM(s) IV Intermittent every 24 hours  cyanocobalamin 1000 MICROGram(s) Oral daily  donepezil 10 milliGRAM(s) Oral at bedtime  folic acid 1 milliGRAM(s) Oral daily  heparin   Injectable 5000 Unit(s) SubCutaneous every 8 hours  lactated ringers. 1000 milliLiter(s) (100 mL/Hr) IV Continuous <Continuous>  memantine 10 milliGRAM(s) Oral two times a day  oseltamivir 30 milliGRAM(s) Oral two times a day  risperiDONE   Tablet 0.25 milliGRAM(s) Oral daily  risperiDONE   Tablet 0.5 milliGRAM(s) Oral at bedtime  sertraline 50 milliGRAM(s) Oral daily    MEDICATIONS  (PRN):  acetaminophen     Tablet .. 650 milliGRAM(s) Oral every 6 hours PRN Temp greater or equal to 38C (100.4F), Mild Pain (1 - 3)  aluminum hydroxide/magnesium hydroxide/simethicone Suspension 30 milliLiter(s) Oral every 4 hours PRN Dyspepsia  melatonin 3 milliGRAM(s) Oral at bedtime PRN Insomnia  ondansetron Injectable 4 milliGRAM(s) IV Push every 8 hours PRN Nausea and/or Vomiting  QUEtiapine 12.5 milliGRAM(s) Oral every 6 hours PRN for agitation       Vital Signs Last 24 Hrs  T(C): 36.6 (2024 11:58), Max: 38.2 (2024 19:15)  T(F): 97.8 (2024 11:58), Max: 100.7 (2024 19:15)  HR: 75 (2024 11:58) (62 - 92)  BP: 133/57 (2024 11:58) (88/55 - 133/72)  BP(mean): --  RR: 18 (2024 11:58) (16 - 19)  SpO2: 97% (2024 11:58) (96% - 98%)    Parameters below as of 2024 11:58  Patient On (Oxygen Delivery Method): room air    Orthostatic VS          I&O's Summary      Physical Exam:   GENERAL: NAD, well-groomed, well-developed  HEENT: CHRISTINA/   Atraumatic, Normocephalic  ENMT: No tonsillar erythema, exudates, or enlargement; Moist mucous membranes, Good dentition, No lesions  NECK: Supple, No JVD, Normal thyroid  CHEST/LUNG: Clear to auscultation bilaterally- no wheezing  CVS: Regular rate and rhythm; No murmurs, rubs, or gallops  GI: : Soft, Nontender, Nondistended; Bowel sounds present  NERVOUS SYSTEM:  Alert & Oriented X2  EXTREMITIES:  2+ Peripheral Pulses, No clubbing, cyanosis, or edema  LYMPH: No lymphadenopathy noted  SKIN: No rashes or lesions  ENDOCRINOLOGY: No Thyromegaly  PSYCH: demented    Labs:  Venous<46<4>>29<<7.355>>Venous<<3><<4><<5<<299>>, -1.1<46<4>>22<<7.345>>-1.1<<3><<4><<5<<229>>COVID-19 PCR: NotDetec (2023 12:45)                              8.1    5.71  )-----------( 219      ( 2024 19:10 )             25.1     01-02    132<L>  |  101  |  26<H>  ----------------------------<  100<H>  4.5   |  24  |  1.41<H>    Ca    10.1      2024 19:10    TPro  11.9<H>  /  Alb  2.7<L>  /  TBili  0.5  /  DBili  x   /  AST  9   /  ALT  8   /  AlkPhos  93  01-02    CAPILLARY BLOOD GLUCOSE        LIVER FUNCTIONS - ( 2024 19:10 )  Alb: 2.7 g/dL / Pro: 11.9 g/dL / ALK PHOS: 93 U/L / ALT: 8 U/L / AST: 9 U/L / GGT: x           PT/INR - ( 2024 19:10 )   PT: 14.1 sec;   INR: 1.26 ratio         PTT - ( 2024 19:10 )  PTT:28.7 sec  Urinalysis Basic - ( 2024 00:30 )    Color: Yellow / Appearance: Clear / S.013 / pH: x  Gluc: x / Ketone: Negative mg/dL  / Bili: Negative / Urobili: 0.2 mg/dL   Blood: x / Protein: Trace mg/dL / Nitrite: Positive   Leuk Esterase: Trace / RBC: 9 /HPF / WBC 11 /HPF   Sq Epi: x / Non Sq Epi: 2 /HPF / Bacteria: Negative /HPF      D DImerLactate, Blood: 0.9 mmol/L ( @ 22:32)        Studies  Chest X-RAY  CT SCAN Chest   CT Abdomen  Venous Dopplers: LE:   Others  rad< from: Xray Chest 1 View AP/PA (24 @ 19:36) >    ACC: [id number] EXAM:  XR CHEST AP OR PA 1V   ORDERED BY: CATHIE MULLER     PROCEDURE DATE:  2024          INTERPRETATION:  CLINICAL INDICATION: Sepsis.    EXAM: Chest x-ray 2 views.    COMPARISON: None available.    FINDINGS:  Surgical clips overlying the midline of the neck.  Bilateral lung fields partially obscured by patient positioning.   Visualized lung fields are clear.  There is no pleural effusion or pneumothorax.  The heart is not well evaluated in this position. Median sternotomy.  The visualized osseous structures demonstrate no acute pathology.    IMPRESSION:  No focal consolidations.    --- End of Report ---          MIR LAW MD; Resident Radiologist  This document has been electronically signed.  KOFI GARCIA MD; Attending Radiologist  This document has been electronically signed. 2024  7:43PM    < end of copied text >  < from: NM PET/CT Oncology FDG WB, Subsq (23 @ 21:29) >  fracture in the adjacent left seventh rib (SUV 2.4; image 168). There are   several additional foci of mild radiotracer activity in several bilateral   ribs, predominantly without corresponding abnormalities on CT.    There is FDG-avid severe osteoarthritic changes in the left greater than   right hips.    IMPRESSION: Abnormal rysfc-rl-srjfnOEQ-PET/CT scan.    1. Mild to moderately FDG-avid compression fracture/deformity of   approximately T9, T10, and L2 vertebral bodies. The T9 and T10 vertebral   bodies are heterogeneous in appearance on CT. There also is increased FDG   activity in the superior endplate of the T6 vertebral body which is not   well delineated on CT. Etiology is indeterminate. MRI may be obtained for   further evaluation.    2. Several FDG-avid bilateral rib lesions, predominantly without   corresponding abnormalities on CT. The most FDG-avid focus corresponds to   an expansile lesion in the anterolateral aspect of the left eighth rib,   suspicious for myeloma. There is a mildly FDG-avid healing fracture in   the adjacent left seventh rib which may be pathologic.    3. FDG-avid severe osteoarthritic changes in the left greater than right   hips.    --- End of Report ---      ***Please see the addendum at the top of this report. It may contain   additional important information or changes.****         < end of copied text >

## 2024-01-03 NOTE — CONSULT NOTE ADULT - NS ATTEND AMEND GEN_ALL_CORE FT
Patient seen and examined. Agree with plan as detailed in PA/NP Note.     Can hold BP meds for now, his BP is controlled in setting of sepsis  WIll continue to monitor to determine when Norvasc and BB can be restarted    Staci Muñiz MD  Pager: [phone] Patient seen and examined. Agree with plan as detailed in PA/NP Note.     Can hold BP meds for now, his BP is controlled in setting of sepsis  WIll continue to monitor to determine when Norvasc and BB can be restarted    Staci Muñiz MD  Pager: [phone]

## 2024-01-03 NOTE — CONSULT NOTE ADULT - ASSESSMENT
78M w/ MM, HTN, and Dementia (AAO x 1-2 to self, to place, not to time at baseline per brother) admitted from nursing home for hypotension and lethargy. Found to be septic with UTI and + influenza.  CTH with mod to severe volume loss and moderate microvascular ischemic changes, no acute pathology  On CTX for UTI and Tamiflu  Labs notable for lactic acidosis, hyponatremia, DAVID 78M w/ MM, HTN, and Dementia (AAO x 1-2 to self, to place, not to time at baseline per brother) admitted from nursing home for hypotension and lethargy. Found to be septic with UTI and + influenza.  CTH with mod to severe volume loss and moderate microvascular ischemic changes, no acute pathology  On CTX for UTI and Tamiflu  Labs notable for lactic acidosis, hyponatremia, DAVID  o/e is very confused, hard of hearing, perseverative    Encephalopathy from TME, superimposed on baseline dementia    Plan:  -supportive care for influenza  -correct metabolic derangements  -consider MRI if mental status doesnt improve to baseline   -cont home donepezil 10mg qhs, memantine 10mg bid  -cont risperidone, seroquel   -pt/ot  -dvt ppx    Thu Kwan DO  Vascular Neurology  Office [phone]       78M w/ MM, HTN, and Dementia (AAO x 1-2 to self, to place, not to time at baseline per brother) admitted from nursing home for hypotension and lethargy. Found to be septic with UTI and + influenza.  CTH with mod to severe volume loss and moderate microvascular ischemic changes, no acute pathology  On CTX for UTI and Tamiflu  Labs notable for lactic acidosis, hyponatremia, DAVID  o/e is very confused, hard of hearing, perseverative    Encephalopathy from TME, superimposed on baseline dementia    Plan:  -supportive care for influenza  -correct metabolic derangements  -consider MRI if mental status doesnt improve to baseline   -cont home donepezil 10mg qhs, memantine 10mg bid  -cont risperidone, seroquel   -pt/ot  -dvt ppx    Thu Kwan DO  Vascular Neurology  Office [phone]

## 2024-01-03 NOTE — H&P ADULT - PROBLEM SELECTOR PLAN 7
- Holding his BP meds for now given patient with hypotension when he came in  - Monitor BP, restart his home medications (amlodipine 10mg qD, metoprolol tart 12.5mg BID) as needed

## 2024-01-03 NOTE — H&P ADULT - PROBLEM SELECTOR PLAN 1
- Meets sepsis criteria with fever to 100.7 and HR > 90, influenza A positive and possible UTI  - Will start patient on tamiflu and ceftriaxone for influenza A and UTI respectively  - Check procalcitonin  - Lactate improved with hydration on repeat  - BCx and UCx in lab  - Monitor fever curve, WBC

## 2024-01-03 NOTE — H&P ADULT - NSHPSOCIALHISTORY_GEN_ALL_CORE
Currently resides at [facility]  Ambulatory with wheelchair/2 person assist  No known tobacco or EtOH use Currently resides at [facility]  Ambulatory with wheelchair/2 person assist  No known tobacco or EtOH use

## 2024-01-03 NOTE — CONSULT NOTE ADULT - SUBJECTIVE AND OBJECTIVE BOX
date of consult 1/3/24    HISTORY OF PRESENT ILLNESS: HPI:  This is a 78M with history of MM, HTN, and Dementia (AAO x 1-2 to self, to place, not to time at baseline per brother) who presents to the hospital from [facility] for hypotension and lethargy. Patient currently awake, alert, AAO x2 (to self, to hospital but not name, not to time) but very poor historian, denies any acute complaints when asked. Spoke with brother Mookie who said that the patient was sent to the hospital for lethargy and cough though he states that the patient has had a cough for several months. Brother denies any other known acute complaints for the patient. Called [facility] [phone] but there was no staff available who knew the patient. As per NH paperwork and ED note, patient was sent to the hospital for hypotention to 81/55 and lethargy. He was noted to have a low grade temp of 99.6 and saturation of 93% at his NH. He was given tylenol 650mg x1 prior to being sent to the hospital.     On arrival to the hospital his vitals were T 99.4 -> 100.7 rectal, P 92, BP 88/55 -> 117/70, RR 16, O2 sat 96% RA. His lab work was significant for worsening macrocytic anemia, DAVID with mild hyponatremia, elevated protein gap, elevated lactate with improvement on repeat. His UA was positive for nitrite, leuk esterase but negative for bacteria. His respiratory swab was positive for influenza A. His imaging did not show acute abnormalities. He was given ofirmev 1g, NS 500cc x1, vanc 1g, cefepime 2g, and tamiflu 75mg PO x1. He was admitted to medicine.  (03 Jan 2024 06:06)      PAST MEDICAL & SURGICAL HISTORY:  Dementia      Multiple myeloma      Essential hypertension      No significant past surgical history      MEDICATIONS  (STANDING):  cefTRIAXone   IVPB 1000 milliGRAM(s) IV Intermittent every 24 hours  cyanocobalamin 1000 MICROGram(s) Oral daily  donepezil 10 milliGRAM(s) Oral at bedtime  folic acid 1 milliGRAM(s) Oral daily  heparin   Injectable 5000 Unit(s) SubCutaneous every 8 hours  lactated ringers. 1000 milliLiter(s) (100 mL/Hr) IV Continuous <Continuous>  memantine 10 milliGRAM(s) Oral two times a day  oseltamivir 30 milliGRAM(s) Oral two times a day  risperiDONE   Tablet 0.25 milliGRAM(s) Oral daily  risperiDONE   Tablet 0.5 milliGRAM(s) Oral at bedtime  sertraline 50 milliGRAM(s) Oral daily      Allergies  No Known Allergies    FAMILY HISTORY:  No pertinent family history in first degree relatives  Non-contributary for premature coronary disease or sudden cardiac death    SOCIAL HISTORY:    [x ] Non-smoker  [ ] Smoker  [ ] Alcohol    FLU VACCINE THIS YEAR STARTS IN AUGUST:  [ ] Yes    [ ] No    IF OVER 65 HAVE YOU EVER HAD A PNA VACCINE:  [ ] Yes    [ ] No       [ ] N/A      REVIEW OF SYSTEMS:  [ ]chest pain  [  ]shortness of breath  [  ]palpitations  [  ]syncope  [ ]near syncope [ ]upper extremity weakness   [ ] lower extremity weakness  [  ]diplopia  [  ]altered mental status   [  ]fevers  [ ]chills [ ]nausea  [ ]vomiting  [  ]dysphagia    [ ]abdominal pain  [ ]melena  [ ]BRBPR    [  ]epistaxis  [  ]rash    [ ]lower extremity edema        [ ] All others negative	  [ x] Unable to obtain      LABS:	                         8.1    5.71  )-----------( 219      ( 02 Jan 2024 19:10 )             25.1       132<L>  |  101  |  26<H>  ----------------------------<  100<H>  4.5   |  24  |  1.41<H>    Ca    10.1      02 Jan 2024 19:10    TPro  11.9<H>  /  Alb  2.7<L>  /  TBili  0.5  /  DBili  x   /  AST  9   /  ALT  8   /  AlkPhos  93  01-02    Creatinine Trend: 1.41<--    Coags:  PT/INR - ( 02 Jan 2024 19:10 )   PT: 14.1 sec;   INR: 1.26 ratio         PTT - ( 02 Jan 2024 19:10 )  PTT:28.7 sec      PHYSICAL EXAM:  T(C): 36.6 (01-03-24 @ 11:58), Max: 38.2 (01-02-24 @ 19:15)  HR: 75 (01-03-24 @ 11:58) (62 - 92)  BP: 133/57 (01-03-24 @ 11:58) (88/55 - 133/72)  RR: 18 (01-03-24 @ 11:58) (16 - 19)  SpO2: 97% (01-03-24 @ 11:58) (96% - 98%)  Wt(kg): --   BMI (kg/m2): 21.3 (01-03-24 @ 07:49)    Gen: Appears comfortable, lethargic but arousable  HEENT:  (-)icterus (-)pallor  CV: N S1 S2 1/6 HOLLAND (+)2 Pulses B/l  Resp:  Clear to ausculatation B/L, normal effort  GI: (+) BS Soft, NT, ND  Lymph:  (-)Edema, (-)obvious lymphadenopathy  Skin: Warm to touch, Normal turgor  Psych: unable to eval    ECG:  	Sinus tachycardia    < from: Xray Chest 1 View AP/PA (01.02.24 @ 19:36) >  IMPRESSION:  No focal consolidations.  < end of copied text >    < from: Transthoracic Echocardiogram (07.10.23 @ 11:15) >  CONCLUSIONS:  1. Calcified trileaflet aortic valve with normal opening.  Minimal aortic regurgitation.  2. Endocardium not well visualized; grossly decreased  possibly mild left ventricular systolic dysfunction.  3. The right ventricle is not well visualized; grossly  normal right ventricular systolic function.  ------------------------------------------------------------------------  Confirmed on  7/10/2023 - 13:57:46 by MARLA Vegas  < end of copied text >      ASSESSMENT/PLAN: 	78M with history of MM, HTN, and Dementia (AAO x 1-2 to self, to place, not to time at baseline per brother) who presents to the hospital from [facility] for hypotension and lethargy found to have FLU A    -#Hypotension  --BP stable  --Norvasc and Lopressor on hold given acute illness  -eventually resume before DC    #lethargy  --due to Flu  --tamiflu and Abx per medicine  --supportive care  --PT when able                               date of consult 1/3/24    HISTORY OF PRESENT ILLNESS: HPI:  This is a 78M with history of MM, HTN, and Dementia (AAO x 1-2 to self, to place, not to time at baseline per brother) who presents to the hospital from [facility] for hypotension and lethargy. Patient currently awake, alert, AAO x2 (to self, to hospital but not name, not to time) but very poor historian, denies any acute complaints when asked. Spoke with brother Mookie who said that the patient was sent to the hospital for lethargy and cough though he states that the patient has had a cough for several months. Brother denies any other known acute complaints for the patient. Called [facility] [phone] but there was no staff available who knew the patient. As per NH paperwork and ED note, patient was sent to the hospital for hypotention to 81/55 and lethargy. He was noted to have a low grade temp of 99.6 and saturation of 93% at his NH. He was given tylenol 650mg x1 prior to being sent to the hospital.     On arrival to the hospital his vitals were T 99.4 -> 100.7 rectal, P 92, BP 88/55 -> 117/70, RR 16, O2 sat 96% RA. His lab work was significant for worsening macrocytic anemia, DAVID with mild hyponatremia, elevated protein gap, elevated lactate with improvement on repeat. His UA was positive for nitrite, leuk esterase but negative for bacteria. His respiratory swab was positive for influenza A. His imaging did not show acute abnormalities. He was given ofirmev 1g, NS 500cc x1, vanc 1g, cefepime 2g, and tamiflu 75mg PO x1. He was admitted to medicine.  (03 Jan 2024 06:06)      PAST MEDICAL & SURGICAL HISTORY:  Dementia      Multiple myeloma      Essential hypertension      No significant past surgical history      MEDICATIONS  (STANDING):  cefTRIAXone   IVPB 1000 milliGRAM(s) IV Intermittent every 24 hours  cyanocobalamin 1000 MICROGram(s) Oral daily  donepezil 10 milliGRAM(s) Oral at bedtime  folic acid 1 milliGRAM(s) Oral daily  heparin   Injectable 5000 Unit(s) SubCutaneous every 8 hours  lactated ringers. 1000 milliLiter(s) (100 mL/Hr) IV Continuous <Continuous>  memantine 10 milliGRAM(s) Oral two times a day  oseltamivir 30 milliGRAM(s) Oral two times a day  risperiDONE   Tablet 0.25 milliGRAM(s) Oral daily  risperiDONE   Tablet 0.5 milliGRAM(s) Oral at bedtime  sertraline 50 milliGRAM(s) Oral daily      Allergies  No Known Allergies    FAMILY HISTORY:  No pertinent family history in first degree relatives  Non-contributary for premature coronary disease or sudden cardiac death    SOCIAL HISTORY:    [x ] Non-smoker  [ ] Smoker  [ ] Alcohol    FLU VACCINE THIS YEAR STARTS IN AUGUST:  [ ] Yes    [ ] No    IF OVER 65 HAVE YOU EVER HAD A PNA VACCINE:  [ ] Yes    [ ] No       [ ] N/A      REVIEW OF SYSTEMS:  [ ]chest pain  [  ]shortness of breath  [  ]palpitations  [  ]syncope  [ ]near syncope [ ]upper extremity weakness   [ ] lower extremity weakness  [  ]diplopia  [  ]altered mental status   [  ]fevers  [ ]chills [ ]nausea  [ ]vomiting  [  ]dysphagia    [ ]abdominal pain  [ ]melena  [ ]BRBPR    [  ]epistaxis  [  ]rash    [ ]lower extremity edema        [ ] All others negative	  [ x] Unable to obtain      LABS:	                         8.1    5.71  )-----------( 219      ( 02 Jan 2024 19:10 )             25.1       132<L>  |  101  |  26<H>  ----------------------------<  100<H>  4.5   |  24  |  1.41<H>    Ca    10.1      02 Jan 2024 19:10    TPro  11.9<H>  /  Alb  2.7<L>  /  TBili  0.5  /  DBili  x   /  AST  9   /  ALT  8   /  AlkPhos  93  01-02    Creatinine Trend: 1.41<--    Coags:  PT/INR - ( 02 Jan 2024 19:10 )   PT: 14.1 sec;   INR: 1.26 ratio         PTT - ( 02 Jan 2024 19:10 )  PTT:28.7 sec      PHYSICAL EXAM:  T(C): 36.6 (01-03-24 @ 11:58), Max: 38.2 (01-02-24 @ 19:15)  HR: 75 (01-03-24 @ 11:58) (62 - 92)  BP: 133/57 (01-03-24 @ 11:58) (88/55 - 133/72)  RR: 18 (01-03-24 @ 11:58) (16 - 19)  SpO2: 97% (01-03-24 @ 11:58) (96% - 98%)  Wt(kg): --   BMI (kg/m2): 21.3 (01-03-24 @ 07:49)    Gen: Appears comfortable, lethargic but arousable  HEENT:  (-)icterus (-)pallor  CV: N S1 S2 1/6 HOLLAND (+)2 Pulses B/l  Resp:  Clear to ausculatation B/L, normal effort  GI: (+) BS Soft, NT, ND  Lymph:  (-)Edema, (-)obvious lymphadenopathy  Skin: Warm to touch, Normal turgor  Psych: unable to eval    ECG:  	Sinus tachycardia    < from: Xray Chest 1 View AP/PA (01.02.24 @ 19:36) >  IMPRESSION:  No focal consolidations.  < end of copied text >    < from: Transthoracic Echocardiogram (07.10.23 @ 11:15) >  CONCLUSIONS:  1. Calcified trileaflet aortic valve with normal opening.  Minimal aortic regurgitation.  2. Endocardium not well visualized; grossly decreased  possibly mild left ventricular systolic dysfunction.  3. The right ventricle is not well visualized; grossly  normal right ventricular systolic function.  ------------------------------------------------------------------------  Confirmed on  7/10/2023 - 13:57:46 by MARLA Vegas  < end of copied text >      ASSESSMENT/PLAN: 	78M with history of MM, HTN, and Dementia (AAO x 1-2 to self, to place, not to time at baseline per brother) who presents to the hospital from [facility] for hypotension and lethargy found to have FLU A    -#Hypotension  --BP stable  --Norvasc and Lopressor on hold given acute illness  -eventually resume before DC    #lethargy  --due to Flu  --tamiflu and Abx per medicine  --supportive care  --PT when able

## 2024-01-03 NOTE — H&P ADULT - PROBLEM SELECTOR PLAN 6
- Lethargic at NH, currently awake and alert, oriented x2 to self and place (hospital, not to name of hospital, not to time) which is his baseline as per brother  - c/w home donezepil, namenda, risperidone, seroquel  - Check dysphagia screen, if passes then start patient on dysphagia 2 DASH diet (on chopped diet at his NH)

## 2024-01-03 NOTE — H&P ADULT - NSHPSOURCEINFOTX_GEN_ALL_CORE
Brother - Mookie Wen [phone]; HCP - Karla Souza [phone] Brother - Mookie Wen [phone]; HCP - Karla Souza [phone]

## 2024-01-03 NOTE — H&P ADULT - PROBLEM SELECTOR PLAN 2
- Tamiflu as above (renally dosed)  - CXR poor study but with grossly clear lungs in the visualized portion  - Lungs grossly clear to auscultation  - Monitor cough/sputum  - Check procalcitonin  - Monitor O2 sats (here saturating well on RA)

## 2024-01-03 NOTE — H&P ADULT - PROBLEM SELECTOR PLAN 3
- UA positive for leuk esterase and nitrites, negative for bacteria  - s/p vanc/cefepime in ED  - Will place on ceftriaxone for 3 doses for possible UTI  - UCx in lab

## 2024-01-03 NOTE — H&P ADULT - PROBLEM SELECTOR PROBLEM 2
Influenza A

## 2024-01-03 NOTE — H&P ADULT - PROBLEM SELECTOR PLAN 5
- Worsening macrocytic anemia, no known bleeding issues as per Brother  - On B12 and folic acid -> c/w  - Recheck B12, folate, check homocysteine and methylmalonic acid levels

## 2024-01-03 NOTE — H&P ADULT - NSHPLABSRESULTS_GEN_ALL_CORE
LABS and ADDITIONAL STUDIES:                        8.1    5.71  )-----------( 219      ( 2024 19:10 )             25.1     132<L>  |  101  |  26<H>  ----------------------------<  100<H>       4.5   |  24  |  1.41<H>    Ca    10.1      2024 19:10    TPro  11.9<H>  /  Alb  2.7<L>  /  TBili  0.5  /  DBili  x   /  AST  9   /  ALT  8   /  AlkPhos  93      PT/INR: 14.1/1.26 (24 @ 19:10)  PTT: 28.7 (24 @ 19:10)    00:02 - VBG - pH: 7.35  | pCO2: 46    | pO2: 29    | Lactate: 1.2    19:10 - VBG - pH: 7.34  | pCO2: 46    | pO2: 22    | Lactate: 2.4      infFlu With COVID-19 By ALISHA (24 @ 19:10)   Influenza A Result: Detected  [facility]    Urinalysis Basic - ( 2024 00:30 )  Color: Yellow / Appearance: Clear / S.013 / pH: 5.5  Gluc: Negative mg/dL / Ketone: Negative mg/dL  / Bili: Negative / Urobili: 0.2 mg/dL   Blood: Moderate / Protein: Trace mg/dL / Nitrite: Positive   Leuk Esterase: Trace / RBC: 9 /HPF / WBC 11 /HPF   Sq Epi: x / Non Sq Epi: x / Bacteria: Negative /HPF    < from: Xray Chest 1 View AP/PA (24 @ 19:36) >  FINDINGS:  Surgical clips overlying the midline of the neck.  Bilateral lung fields partially obscured by patient positioning.   Visualized lung fields are clear.  There is no pleural effusion or pneumothorax.  The heart is not well evaluated in this position. Median sternotomy.  The visualized osseous structures demonstrate no acute pathology.    IMPRESSION:  No focal consolidations.  --- End of Report ---  < end of copied text >    < from: CT Head No Cont (24 @ 02:00) >  FINDINGS:  Intracranial Contents:  Moderate to severe cerebral volume loss.. Mild to moderate chronic microvascular ischemic changes Gray-white differentiation is preserved. No hydrocephalus. The basilar cisterns are clear. No focal edema or acute mass effect. There is no intracranial fluid collection or acute hemorrhage.    Bones and Extracranial Soft Tissues:  There is no fracture identified. Scattered paranasal sinus mucosal thickening. Mastoid air cells are clear. Scalp and imaged facial soft tissues are within normal limits.    IMPRESSION:  1. No acute intracranial CT abnormality.  --- End of Report ---  < end of copied text >    EKG - Sinus rhythm at 100 with 1st degree AV block (present on prior), QTc 451, QRS 98, poor baseline but no significant ST-T wave changes, repeat EKG without acute changes LABS and ADDITIONAL STUDIES:                        8.1    5.71  )-----------( 219      ( 2024 19:10 )             25.1     132<L>  |  101  |  26<H>  ----------------------------<  100<H>       4.5   |  24  |  1.41<H>    Ca    10.1      2024 19:10    TPro  11.9<H>  /  Alb  2.7<L>  /  TBili  0.5  /  DBili  x   /  AST  9   /  ALT  8   /  AlkPhos  93      PT/INR: 14.1/1.26 (24 @ 19:10)  PTT: 28.7 (24 @ 19:10)    00:02 - VBG - pH: 7.35  | pCO2: 46    | pO2: 29    | Lactate: 1.2    19:10 - VBG - pH: 7.34  | pCO2: 46    | pO2: 22    | Lactate: 2.4      infFlu With COVID-19 By ALISHA (24 @ 19:10)   Influenza A Result: Detected  [facility]    Urinalysis Basic - ( 2024 00:30 )  Color: Yellow / Appearance: Clear / S.013 / pH: 5.5  Gluc: Negative mg/dL / Ketone: Negative mg/dL  / Bili: Negative / Urobili: 0.2 mg/dL   Blood: Moderate / Protein: Trace mg/dL / Nitrite: Positive   Leuk Esterase: Trace / RBC: 9 /HPF / WBC 11 /HPF   Sq Epi: x / Non Sq Epi: x / Bacteria: Negative /HPF    < from: Xray Chest 1 View AP/PA (24 @ 19:36) >  FINDINGS:  Surgical clips overlying the midline of the neck.  Bilateral lung fields partially obscured by patient positioning.   Visualized lung fields are clear.  There is no pleural effusion or pneumothorax.  The heart is not well evaluated in this position. Median sternotomy.  The visualized osseous structures demonstrate no acute pathology.    IMPRESSION:  No focal consolidations.  --- End of Report ---  < end of copied text >    < from: CT Head No Cont (24 @ 02:00) >  FINDINGS:  Intracranial Contents:  Moderate to severe cerebral volume loss.. Mild to moderate chronic microvascular ischemic changes Gray-white differentiation is preserved. No hydrocephalus. The basilar cisterns are clear. No focal edema or acute mass effect. There is no intracranial fluid collection or acute hemorrhage.    Bones and Extracranial Soft Tissues:  There is no fracture identified. Scattered paranasal sinus mucosal thickening. Mastoid air cells are clear. Scalp and imaged facial soft tissues are within normal limits.    IMPRESSION:  1. No acute intracranial CT abnormality.  --- End of Report ---  < end of copied text >    EKG - Sinus rhythm at 100 with 1st degree AV block (present on prior), QTc 451, QRS 98, poor baseline but no significant ST-T wave changes, repeat EKG without acute changes

## 2024-01-03 NOTE — PATIENT PROFILE ADULT - DEAF OR HARD OF HEARING?
Subjective   Va Pastor is a 58 y.o. female. Bipolar depression    You have chosen to receive care through a telephone visit. Do you consent to use a telephone visit for your medical care today? Yes      History of Present Illness     Bipolar depression -bipolar depression getting a little worse.  She is asking that we increase her lamotrigine again to 200 mg from 150 mg.  We had last gone up on it about 1 month ago.  Denies any suicidal thoughts.  Otherwise says her mood is low but stable.  Denies anxiety.    She has having some urinary incontinence.  She would like to go on a medication to help with her urinary incontinence.  She says she was on a medication before but cannot remember what it was.  She says she is having urge incontinence and lots of times will have accidents.  She is planning on transferring to the nurse practitioner here so she is going to come in about a month and discuss things with her.    The following portions of the patient's history were reviewed and updated as appropriate: allergies, current medications, past family history, past medical history, past social history, past surgical history and problem list.    Review of Systems   Genitourinary: Negative for difficulty urinating.        Incontinence of urine   Psychiatric/Behavioral: Positive for decreased concentration, dysphoric mood and sleep disturbance. Negative for agitation, behavioral problems, confusion, hallucinations, self-injury and suicidal ideas. The patient is not nervous/anxious and is not hyperactive.        Objective   Physical Exam N/A    Assessment/Plan   Diagnoses and all orders for this visit:    1. Bipolar affective disorder, currently depressed, moderate (CMS/HCC) (Primary)  -     lamoTRIgine (LaMICtal) 200 MG tablet; Take 1 tablet by mouth Daily.  Dispense: 30 tablet; Refill: 5    2. Urinary incontinence, unspecified type  -     oxybutynin XL (Ditropan XL) 10 MG 24 hr tablet; Take 1 tablet by mouth Daily.   Dispense: 30 tablet; Refill: 5      Increase lamotrigine to 200 mg and start oxybutynin.  We will have her follow-up with Janet in about a month.      I spent 15 minutes on the call and another 8 minutes completing the encounter along with research.           no

## 2024-01-03 NOTE — H&P ADULT - PROBLEM SELECTOR PLAN 9
DVT ppx - HSQ   Diet - Pending dysphagia screen  Activity - OOB with assistance, increase as tolerated  Code status - Patient with MOLST form in chart stating he is DNR/DNI, attempted to verify this with patient's brother Mookie but he said that patient's HCP is Ms. Acosta Libby [phone]. Called her but there was no , left  for call back. If verified with HCP that patient is DNR/DNI then would place order in chart.     Fall and aspiration precautions DVT ppx - HSQ   Diet - Pending dysphagia screen  Activity - OOB with assistance, increase as tolerated  Code status - Patient with MOLST form in chart stating he is DNR/DNI, attempted to verify this with patient's brother Mookie but he said that patient's HCP is Ms. Acosta Libby [phone]. Called her but there was no , left  for call back. If verified with HCP that patient is DNR/DNI then would place order in chart.     Fall and aspiration precautions

## 2024-01-03 NOTE — ED ADULT NURSE REASSESSMENT NOTE - NS ED NURSE REASSESS COMMENT FT1
Pt resting in stretcher, alert to self only, offers no complaints of pain or discomfort at this time. Pt occasionally screening, does not attempt to get out of bed, safety maintained, comfort measures provided. RR equal and unlabored, VSS, awaiting transport to bed placement at this time.

## 2024-01-03 NOTE — CONSULT NOTE ADULT - ASSESSMENT
79 y/o M PMhx MM, HTN, and Dementia who presented from [facility] for hypotension and lethargy found to have flu A    influenza A, fever  febrile to 100.7 on admission  no leukocytosis, no hypoxia  CXR clear    patient denies urinary symptoms, although he is a poor historian  UA not consistent w/ UTI    Recommendations  c/w tamiflu 30mg bid (renally dosed)  UA not consistent w/ UTI, will discontinue ceftriaxone    Sylvester Trinidad M.D.  [facility], Division of Infectious Diseases  [phone]  After 5pm on weekdays and all day on weekends - please call [phone]  79 y/o M PMhx MM, HTN, and Dementia who presented from [facility] for hypotension and lethargy found to have flu A    influenza A, fever  febrile to 100.7 on admission  no leukocytosis, no hypoxia  CXR clear    patient denies urinary symptoms, although he is a poor historian  UA not consistent w/ UTI    Recommendations  c/w tamiflu 30mg bid (renally dosed)  UA not consistent w/ UTI, will discontinue ceftriaxone    Sylvester Trinidad M.D.  [facility], Division of Infectious Diseases  [phone]  After 5pm on weekdays and all day on weekends - please call [phone]

## 2024-01-03 NOTE — H&P ADULT - NSHPADDITIONALINFOADULT_GEN_ALL_CORE
no Patient seen and examined on 1/3/24, Dr. Muñiz or his colleagues to assume care of patient in AM.

## 2024-01-03 NOTE — ED ADULT NURSE REASSESSMENT NOTE - NS ED NURSE REASSESS COMMENT FT1
Pt resting in stretcher, A&O to self only, offers no complaints of pain or discomfort at this time. RR equal and unlabored, VS stable, safety maintained, comfort provided, repeat labs drawn and sent, awaiting further evaluation at this time.

## 2024-01-03 NOTE — H&P ADULT - ASSESSMENT
This is a 78M with history as above who presents to the hospital with hypotension and lethargy at his NH here found to have sepsis 2/2 influenza A and UTI.

## 2024-01-03 NOTE — H&P ADULT - NSHPPHYSICALEXAM_GEN_ALL_CORE
Vital Signs Last 24 Hrs  T(C): 36.9 (03 Jan 2024 08:37), Max: 38.2 (02 Jan 2024 19:15)  T(F): 98.5 (03 Jan 2024 08:37), Max: 100.7 (02 Jan 2024 19:15)  HR: 62 (03 Jan 2024 08:37) (62 - 92)  BP: 117/66 (03 Jan 2024 08:37) (88/55 - 133/72)  BP(mean): --  RR: 19 (03 Jan 2024 08:37) (16 - 19)  SpO2: 98% (03 Jan 2024 08:37) (96% - 98%)    Parameters below as of 03 Jan 2024 08:37  Patient On (Oxygen Delivery Method): room air    GENERAL: No acute distress, well-developed  EYES: EOMI, PERRL, conjunctiva and sclera clear  ENT: Neck supple, No JVD, moist mucosa  CHEST/LUNG: Clear to auscultation bilaterally; No wheeze, equal breath sounds bilaterally   BACK: No spinal tenderness  HEART: Regular rate and rhythm; No murmurs, rubs, or gallops  ABDOMEN: Soft, Nontender, Nondistended; Bowel sounds present  EXTREMITIES: +DP/PT/Radial pulses, No clubbing, cyanosis, or edema  PSYCH: Nl behavior, nl affect  NEUROLOGY: AAOx2 (to self, to place (hospital) but not name, not oriented to time -> baseline for patient as per brother), non-focal  SKIN: Normal color, No rashes or lesions

## 2024-01-03 NOTE — H&P ADULT - PROBLEM SELECTOR PLAN 4
- DAVID likely 2/2 hypotension and sepsis, unclear if patient had decreased PO intake at the NH  - Trend BUN/Cr, monitor I/O, will place on bladder scans q8h  - c/w IV hydration with LR at 100 cc/hr for 10 hrs  - Check urine sodium/creatinine

## 2024-01-03 NOTE — H&P ADULT - HISTORY OF PRESENT ILLNESS
This is a 78M with history of MM, HTN, and Dementia (AAO x 1-2 to self, to place, not to time at baseline per brother) who presents to the hospital from [facility] for hypotension and lethargy. Patient currently awake, alert, AAO x2 (to self, to hospital but not name, not to time) but very poor historian, denies any acute complaints when asked. Spoke with brother Mookie who said that the patient was sent to the hospital for lethargy and cough though he states that the patient has had a cough for several months. Brother denies any other known acute complaints for the patient. Called [facility] [phone] but there was no staff available who knew the patient. As per NH paperwork and ED note, patient was sent to the hospital for hypotention to 81/55 and lethargy. He was noted to have a low grade temp of 99.6 and saturation of 93% at his NH. He was given tylenol 650mg x1 prior to being sent to the hospital.     On arrival to the hospital his vitals were T 99.4 -> 100.7 rectal, P 92, BP 88/55 -> 117/70, RR 16, O2 sat 96% RA. His lab work was significant for worsening macrocytic anemia, DAVID with mild hyponatremia, elevated protein gap, elevated lactate with improvement on repeat. His UA was positive for nitrite, leuk esterase but negative for bacteria. His respiratory swab was positive for influenza A. His imaging did not show acute abnormalities. He was given ofirmev 1g, NS 500cc x1, vanc 1g, cefepime 2g, and tamiflu 75mg PO x1. He was admitted to medicine.  This is a 78M with history of MM, HTN, and Dementia (AAO x 1-2 to self, to place, not to time at baseline per brother) who presents to the hospital from [facility] for hypotension and lethargy. Patient currently awake, alert, AAO x2 (to self, to hospital but not name, not to time) but very poor historian, denies any acute complaints when asked. Spoke with brother Mookie who said that the patient was sent to the hospital for lethargy and cough though he states that the patient has had a cough for several months. Brother denies any other known acute complaints for the patient. Called [facility] [phone] but there was no staff available who knew the patient. As per NH paperwork and ED note, patient was sent to the hospital for hypotention to 81/55 and lethargy. He was noted to have a low grade temp of 99.6 and saturation of 93% at his NH. He was given tylenol 650mg x1 prior to being sent to the hospital.     On arrival to the hospital his vitals were T 99.4 -> 100.7 rectal, P 92, BP 88/55 -> 117/70, RR 16, O2 sat 96% RA. His lab work was significant for worsening macrocytic anemia, DAVID with mild hyponatremia, elevated protein gap, elevated lactate with improvement on repeat. His UA was positive for nitrite, leuk esterase but negative for bacteria. His respiratory swab was positive for influenza A. His imaging did not show acute abnormalities. He was given ofirmev 1g, NS 500cc x1, vanc 1g, cefepime 2g, and tamiflu 75mg PO x1. He was admitted to medicine.

## 2024-01-03 NOTE — CONSULT NOTE ADULT - ASSESSMENT
This is a 78M with history of MM, HTN, and Dementia (AAO x 1-2 to self, to place, not to time at baseline per brother) who presents to the hospital from [facility] for hypotension and lethargy. Patient currently awake, alert, AAO x2 (to self, to hospital but not name, not to time) but very poor historian, denies any acute complaints when asked. Spoke with brother Mookie who said that the patient was sent to the hospital for lethargy and cough though he states that the patient has had a cough for several months. Brother denies any other known acute complaints for the patient. Called [facility] [phone] but there was no staff available who knew the patient. As per NH paperwork and ED note, patient was sent to the hospital for hypotention to 81/55 and lethargy. He was noted to have a low grade temp of 99.6 and saturation of 93% at his NH. He was given tylenol 650mg x1 prior to being sent to the hospital.   On arrival to the hospital his vitals were T 99.4 -> 100.7 rectal, P 92, BP 88/55 -> 117/70, RR 16, O2 sat 96% RA. His lab work was significant for worsening macrocytic anemia, DAVID with mild hyponatremia, elevated protein gap, elevated lactate with improvement on repeat. His UA was positive for nitrite, leuk esterase but negative for bacteria. His respiratory swab was positive for influenza A. His imaging did not show acute abnormalities. He was given ofirmev 1g, NS 500cc x1, vanc 1g, cefepime 2g, and tamiflu 75mg PO x1. He was admitted to medicine.  (03 Jan 2024 06:06):  now pulm called:  he is from NH:  above history noted:  he seems to be responding to simple commands:  he say he has no underlying lung history  never smoked:  on room air:  adm with influenza:     Influenza:   Multiple Myeloma  HTN  Dementia      Influenza:   -low grade fever  -Influenza:  on tamilflu  -cxr is clear:  -he is not wheezing    Multiple Myeloma  -per primary team : FDG PET scan  noted:  rib lesions present likely secondary to MM   HTN  -controlled  Dementia  -supportive care:    karyn clifton  This is a 78M with history of MM, HTN, and Dementia (AAO x 1-2 to self, to place, not to time at baseline per brother) who presents to the hospital from [facility] for hypotension and lethargy. Patient currently awake, alert, AAO x2 (to self, to hospital but not name, not to time) but very poor historian, denies any acute complaints when asked. Spoke with brother Mookie who said that the patient was sent to the hospital for lethargy and cough though he states that the patient has had a cough for several months. Brother denies any other known acute complaints for the patient. Called [facility] [phone] but there was no staff available who knew the patient. As per NH paperwork and ED note, patient was sent to the hospital for hypotention to 81/55 and lethargy. He was noted to have a low grade temp of 99.6 and saturation of 93% at his NH. He was given tylenol 650mg x1 prior to being sent to the hospital.   On arrival to the hospital his vitals were T 99.4 -> 100.7 rectal, P 92, BP 88/55 -> 117/70, RR 16, O2 sat 96% RA. His lab work was significant for worsening macrocytic anemia, DAVID with mild hyponatremia, elevated protein gap, elevated lactate with improvement on repeat. His UA was positive for nitrite, leuk esterase but negative for bacteria. His respiratory swab was positive for influenza A. His imaging did not show acute abnormalities. He was given ofirmev 1g, NS 500cc x1, vanc 1g, cefepime 2g, and tamiflu 75mg PO x1. He was admitted to medicine.  (03 Jan 2024 06:06):  now pulm called:  he is from NH:  above history noted:  he seems to be responding to simple commands:  he say he has no underlying lung history  never smoked:  on room air:  adm with influenza:     Influenza:   Multiple Myeloma  HTN  Dementia      Influenza:   -low grade fever  -Influenza:  on tamilflu  -cxr is clear:  -he is not wheezing    Multiple Myeloma  -per primary team : FDG PET scan  noted:  rib lesions present likely secondary to MM   HTN  -controlled  Dementia  -supportive care:    karyn clifton

## 2024-01-03 NOTE — CONSULT NOTE ADULT - SUBJECTIVE AND OBJECTIVE BOX
[facility], Division of Infectious Diseases  MIRTHA Bagley S. Shah, Y. Patel, G. Amos  [phone]  ([phone] - weekdays after 5pm and weekends)    GUS DELUNA  78y, Male  [id number]    HPI--  HPI:  This is a 78M with history of MM, HTN, and Dementia (AAO x 1-2 to self, to place, not to time at baseline per brother) who presents to the hospital from [facility] for hypotension and lethargy. Patient currently awake, alert, AAO x2 (to self, to hospital but not name, not to time) but very poor historian, denies any acute complaints when asked. Spoke with brother Mookie who said that the patient was sent to the hospital for lethargy and cough though he states that the patient has had a cough for several months. Brother denies any other known acute complaints for the patient. Called [facility] [phone] but there was no staff available who knew the patient. As per NH paperwork and ED note, patient was sent to the hospital for hypotention to 81/55 and lethargy. He was noted to have a low grade temp of 99.6 and saturation of 93% at his NH. He was given tylenol 650mg x1 prior to being sent to the hospital.     On arrival to the hospital his vitals were T 99.4 -> 100.7 rectal, P 92, BP 88/55 -> 117/70, RR 16, O2 sat 96% RA. His lab work was significant for worsening macrocytic anemia, DAVID with mild hyponatremia, elevated protein gap, elevated lactate with improvement on repeat. His UA was positive for nitrite, leuk esterase but negative for bacteria. His respiratory swab was positive for influenza A. His imaging did not show acute abnormalities. He was given ofirmev 1g, NS 500cc x1, vanc 1g, cefepime 2g, and tamiflu 75mg PO x1. He was admitted to medicine.  (2024 06:06)  Patient unable to provide history therefore history obtained from chart review.     ROS: limited 2/2 patient's dementia  denies SOB, abd pain, dysuria    Allergies: No Known Allergies    PMH -- Dementia    Multiple myeloma    Essential hypertension      PSH -- No significant past surgical history      FH -- No pertinent family history in first degree relatives      Social History -- denies tobacco, alcohol or illicit drug use    Physical Exam--  Vital Signs Last 24 Hrs  T(F): 97.8 (2024 11:58), Max: 100.7 (2024 19:15)  HR: 75 (2024 11:58) (62 - 92)  BP: 133/57 (2024 11:58) (88/55 - 133/72)  RR: 18 (2024 11:58) (16 - 19)  SpO2: 97% (2024 11:58) (96% - 98%)  General: nontoxic-appearing, no acute distress  HEENT: anicteric  Lungs: decreased breath sounds  Heart: S1, S2, normal rate.  Abdomen: Soft. Nondistended. Nontender.   Neuro: responds to most questions  Extremities: No LE edema.   Skin: Warm. Dry.   Psychiatric: flat affect    Laboratory & Imaging Data--  CBC:                       8.1    5.71  )-----------( 219      ( 2024 19:10 )             25.1     WBC Count: 5.71 K/uL (24 @ 19:10)    CMP:     132<L>  |  101  |  26<H>  ----------------------------<  100<H>  4.5   |  24  |  1.41<H>    Ca    10.1      2024 19:10    TPro  11.9<H>  /  Alb  2.7<L>  /  TBili  0.5  /  DBili  x   /  AST  9   /  ALT  8   /  AlkPhos  93  01-02    LIVER FUNCTIONS - ( 2024 19:10 )  Alb: 2.7 g/dL / Pro: 11.9 g/dL / ALK PHOS: 93 U/L / ALT: 8 U/L / AST: 9 U/L / GGT: x           Urinalysis Basic - ( 2024 00:30 )    Color: Yellow / Appearance: Clear / S.013 / pH: x  Gluc: x / Ketone: Negative mg/dL  / Bili: Negative / Urobili: 0.2 mg/dL   Blood: x / Protein: Trace mg/dL / Nitrite: Positive   Leuk Esterase: Trace / RBC: 9 /HPF / WBC 11 /HPF   Sq Epi: x / Non Sq Epi: 2 /HPF / Bacteria: Negative /HPF      Microbiology:       Radiology--  ***  Active Medications--  acetaminophen     Tablet .. 650 milliGRAM(s) Oral every 6 hours PRN  aluminum hydroxide/magnesium hydroxide/simethicone Suspension 30 milliLiter(s) Oral every 4 hours PRN  cefTRIAXone   IVPB 1000 milliGRAM(s) IV Intermittent every 24 hours  cyanocobalamin 1000 MICROGram(s) Oral daily  donepezil 10 milliGRAM(s) Oral at bedtime  folic acid 1 milliGRAM(s) Oral daily  heparin   Injectable 5000 Unit(s) SubCutaneous every 8 hours  lactated ringers. 1000 milliLiter(s) IV Continuous <Continuous>  melatonin 3 milliGRAM(s) Oral at bedtime PRN  memantine 10 milliGRAM(s) Oral two times a day  ondansetron Injectable 4 milliGRAM(s) IV Push every 8 hours PRN  oseltamivir 30 milliGRAM(s) Oral two times a day  QUEtiapine 12.5 milliGRAM(s) Oral every 6 hours PRN  risperiDONE   Tablet 0.25 milliGRAM(s) Oral daily  risperiDONE   Tablet 0.5 milliGRAM(s) Oral at bedtime  sertraline 50 milliGRAM(s) Oral daily    Antimicrobials:   cefTRIAXone   IVPB 1000 milliGRAM(s) IV Intermittent every 24 hours  oseltamivir 30 milliGRAM(s) Oral two times a day    Immunologic:    [facility], Division of Infectious Diseases  MIRTHA Bagley S. Shah, Y. Patel, G. Amos  [phone]  ([phone] - weekdays after 5pm and weekends)    GUS DELUNA  78y, Male  [id number]    HPI--  HPI:  This is a 78M with history of MM, HTN, and Dementia (AAO x 1-2 to self, to place, not to time at baseline per brother) who presents to the hospital from [facility] for hypotension and lethargy. Patient currently awake, alert, AAO x2 (to self, to hospital but not name, not to time) but very poor historian, denies any acute complaints when asked. Spoke with brother Mookie who said that the patient was sent to the hospital for lethargy and cough though he states that the patient has had a cough for several months. Brother denies any other known acute complaints for the patient. Called [facility] [phone] but there was no staff available who knew the patient. As per NH paperwork and ED note, patient was sent to the hospital for hypotention to 81/55 and lethargy. He was noted to have a low grade temp of 99.6 and saturation of 93% at his NH. He was given tylenol 650mg x1 prior to being sent to the hospital.     On arrival to the hospital his vitals were T 99.4 -> 100.7 rectal, P 92, BP 88/55 -> 117/70, RR 16, O2 sat 96% RA. His lab work was significant for worsening macrocytic anemia, DAVID with mild hyponatremia, elevated protein gap, elevated lactate with improvement on repeat. His UA was positive for nitrite, leuk esterase but negative for bacteria. His respiratory swab was positive for influenza A. His imaging did not show acute abnormalities. He was given ofirmev 1g, NS 500cc x1, vanc 1g, cefepime 2g, and tamiflu 75mg PO x1. He was admitted to medicine.  (2024 06:06)  Patient unable to provide history therefore history obtained from chart review.     ROS: limited 2/2 patient's dementia  denies SOB, abd pain, dysuria    Allergies: No Known Allergies    PMH -- Dementia    Multiple myeloma    Essential hypertension      PSH -- No significant past surgical history      FH -- No pertinent family history in first degree relatives      Social History -- denies tobacco, alcohol or illicit drug use    Physical Exam--  Vital Signs Last 24 Hrs  T(F): 97.8 (2024 11:58), Max: 100.7 (2024 19:15)  HR: 75 (2024 11:58) (62 - 92)  BP: 133/57 (2024 11:58) (88/55 - 133/72)  RR: 18 (2024 11:58) (16 - 19)  SpO2: 97% (2024 11:58) (96% - 98%)  General: nontoxic-appearing, no acute distress  HEENT: anicteric  Lungs: decreased breath sounds  Heart: S1, S2, normal rate.  Abdomen: Soft. Nondistended. Nontender.   Neuro: responds to most questions  Extremities: No LE edema.   Skin: Warm. Dry.   Psychiatric: flat affect    Laboratory & Imaging Data--  CBC:                       8.1    5.71  )-----------( 219      ( 2024 19:10 )             25.1     WBC Count: 5.71 K/uL (24 @ 19:10)    CMP:     132<L>  |  101  |  26<H>  ----------------------------<  100<H>  4.5   |  24  |  1.41<H>    Ca    10.1      2024 19:10    TPro  11.9<H>  /  Alb  2.7<L>  /  TBili  0.5  /  DBili  x   /  AST  9   /  ALT  8   /  AlkPhos  93  01-02    LIVER FUNCTIONS - ( 2024 19:10 )  Alb: 2.7 g/dL / Pro: 11.9 g/dL / ALK PHOS: 93 U/L / ALT: 8 U/L / AST: 9 U/L / GGT: x           Urinalysis Basic - ( 2024 00:30 )    Color: Yellow / Appearance: Clear / S.013 / pH: x  Gluc: x / Ketone: Negative mg/dL  / Bili: Negative / Urobili: 0.2 mg/dL   Blood: x / Protein: Trace mg/dL / Nitrite: Positive   Leuk Esterase: Trace / RBC: 9 /HPF / WBC 11 /HPF   Sq Epi: x / Non Sq Epi: 2 /HPF / Bacteria: Negative /HPF      Microbiology:       Radiology--  ***  Active Medications--  acetaminophen     Tablet .. 650 milliGRAM(s) Oral every 6 hours PRN  aluminum hydroxide/magnesium hydroxide/simethicone Suspension 30 milliLiter(s) Oral every 4 hours PRN  cefTRIAXone   IVPB 1000 milliGRAM(s) IV Intermittent every 24 hours  cyanocobalamin 1000 MICROGram(s) Oral daily  donepezil 10 milliGRAM(s) Oral at bedtime  folic acid 1 milliGRAM(s) Oral daily  heparin   Injectable 5000 Unit(s) SubCutaneous every 8 hours  lactated ringers. 1000 milliLiter(s) IV Continuous <Continuous>  melatonin 3 milliGRAM(s) Oral at bedtime PRN  memantine 10 milliGRAM(s) Oral two times a day  ondansetron Injectable 4 milliGRAM(s) IV Push every 8 hours PRN  oseltamivir 30 milliGRAM(s) Oral two times a day  QUEtiapine 12.5 milliGRAM(s) Oral every 6 hours PRN  risperiDONE   Tablet 0.25 milliGRAM(s) Oral daily  risperiDONE   Tablet 0.5 milliGRAM(s) Oral at bedtime  sertraline 50 milliGRAM(s) Oral daily    Antimicrobials:   cefTRIAXone   IVPB 1000 milliGRAM(s) IV Intermittent every 24 hours  oseltamivir 30 milliGRAM(s) Oral two times a day    Immunologic:

## 2024-01-03 NOTE — CONSULT NOTE ADULT - SUBJECTIVE AND OBJECTIVE BOX
Neurology Consult    Reason for Consult: Patient is a 78y old  Male who presents with a chief complaint of Hypotension, lethargy (2024 12:49)      HPI:  This is a 78M with history of MM, HTN, and Dementia (AAO x 1-2 to self, to place, not to time at baseline per brother) who presents to the hospital from [facility] for hypotension and lethargy. Patient currently awake, alert, AAO x2 (to self, to hospital but not name, not to time) but very poor historian, denies any acute complaints when asked. Spoke with brother Mookie who said that the patient was sent to the hospital for lethargy and cough though he states that the patient has had a cough for several months. Brother denies any other known acute complaints for the patient. Called [facility] [phone] but there was no staff available who knew the patient. As per NH paperwork and ED note, patient was sent to the hospital for hypotention to 81/55 and lethargy. He was noted to have a low grade temp of 99.6 and saturation of 93% at his NH. He was given tylenol 650mg x1 prior to being sent to the hospital.     On arrival to the hospital his vitals were T 99.4 -> 100.7 rectal, P 92, BP 88/55 -> 117/70, RR 16, O2 sat 96% RA. His lab work was significant for worsening macrocytic anemia, DAVID with mild hyponatremia, elevated protein gap, elevated lactate with improvement on repeat. His UA was positive for nitrite, leuk esterase but negative for bacteria. His respiratory swab was positive for influenza A. His imaging did not show acute abnormalities. He was given ofirmev 1g, NS 500cc x1, vanc 1g, cefepime 2g, and tamiflu 75mg PO x1. He was admitted to medicine.  (2024 06:06)       PAST MEDICAL & SURGICAL HISTORY:  Dementia      Multiple myeloma      Essential hypertension      No significant past surgical history          Allergies: Allergies    No Known Allergies    Intolerances        Social History: Denies toxic habits including tobacco, ETOH or illicit drugs.    Family History: FAMILY HISTORY:  No pertinent family history in first degree relatives    . No family history of strokes    Medications: MEDICATIONS  (STANDING):  cefTRIAXone   IVPB 1000 milliGRAM(s) IV Intermittent every 24 hours  cyanocobalamin 1000 MICROGram(s) Oral daily  donepezil 10 milliGRAM(s) Oral at bedtime  folic acid 1 milliGRAM(s) Oral daily  heparin   Injectable 5000 Unit(s) SubCutaneous every 8 hours  lactated ringers. 1000 milliLiter(s) (100 mL/Hr) IV Continuous <Continuous>  memantine 10 milliGRAM(s) Oral two times a day  oseltamivir 30 milliGRAM(s) Oral two times a day  risperiDONE   Tablet 0.25 milliGRAM(s) Oral daily  risperiDONE   Tablet 0.5 milliGRAM(s) Oral at bedtime  sertraline 50 milliGRAM(s) Oral daily    MEDICATIONS  (PRN):  acetaminophen     Tablet .. 650 milliGRAM(s) Oral every 6 hours PRN Temp greater or equal to 38C (100.4F), Mild Pain (1 - 3)  aluminum hydroxide/magnesium hydroxide/simethicone Suspension 30 milliLiter(s) Oral every 4 hours PRN Dyspepsia  melatonin 3 milliGRAM(s) Oral at bedtime PRN Insomnia  ondansetron Injectable 4 milliGRAM(s) IV Push every 8 hours PRN Nausea and/or Vomiting  QUEtiapine 12.5 milliGRAM(s) Oral every 6 hours PRN for agitation      Review of Systems:  CONSTITUTIONAL:  No weight loss, fever, chills, weakness or fatigue.  HEENT:  Eyes:  No visual loss, blurred vision, double vision or yellow sclera. Ears, Nose, Throat:  No hearing loss, sneezing, congestion, runny nose or sore throat.  SKIN:  No rash or itching.  CARDIOVASCULAR:  No chest pain, chest pressure or chest discomfort. No palpitations or edema.  RESPIRATORY:  No shortness of breath, cough or sputum.  GASTROINTESTINAL:  No anorexia, nausea, vomiting or diarrhea. No abdominal pain or blood.  GENITOURINARY:  No burning on urination or incontinence   NEUROLOGICAL:  No headache, dizziness, syncope, paralysis, ataxia, numbness or tingling in the extremities. No change in bowel or bladder control. no limb weakness. no vision changes.   MUSCULOSKELETAL:  No muscle, back pain, joint pain or stiffness.  HEMATOLOGIC:  No anemia, bleeding or bruising.  LYMPHATICS:  No enlarged nodes. No history of splenectomy.  PSYCHIATRIC:  No history of depression or anxiety.  ENDOCRINOLOGIC:  No reports of sweating, cold or heat intolerance. No polyuria or polydipsia.      Vitals:  Vital Signs Last 24 Hrs  T(C): 36.6 (2024 11:58), Max: 38.2 (2024 19:15)  T(F): 97.8 (2024 11:58), Max: 100.7 (2024 19:15)  HR: 75 (2024 11:58) (62 - 92)  BP: 133/57 (2024 11:58) (88/55 - 133/72)  BP(mean): --  RR: 18 (2024 11:58) (16 - 19)  SpO2: 97% (2024 11:58) (96% - 98%)    Parameters below as of 2024 11:58  Patient On (Oxygen Delivery Method): room air        General Exam:   General Appearance: Appropriately dressed and in no acute distress       Head: Normocephalic, atraumatic and no dysmorphic features  Ear, Nose, and Throat: Moist mucous membranes  CVS: S1S2+  Resp: No SOB, no wheeze or rhonchi  GI: soft NT/ND  Extremities: No edema or cyanosis  Skin: No bruises or rashes     Neurological Exam:  Mental Status: Awake, alert and oriented x 1.  Able to follow simple  verbal commands. Able to name and repeat. fluent speech. No obvious aphasia or dysarthria noted.   Cranial Nerves: PERRL, EOMI, VFFC, sensation V1-V3 intact,  no obvious facial asymmetry, equal elevation of palate, scm/trap 5/5, tongue is midline on protrusion. no obvious papilledema on fundoscopic exam. hearing is grossly intact.   Motor:  CHO at least 4/5   Sensation: Intact to light touch and pinprick throughout. no right/left confusion. no extinction to tactile on DSS.    Reflexes: 1+ throughout at biceps, brachioradialis, triceps, patellars and ankles bilaterally and equal. No clonus. R toe and L toe were both downgoing.  Coordination: unable   Gait: deferred       Data/Labs/Imaging which I personally reviewed.     Labs:     CBC Full  -  ( 2024 19:10 )  WBC Count : 5.71 K/uL  RBC Count : 2.36 M/uL  Hemoglobin : 8.1 g/dL  Hematocrit : 25.1 %  Platelet Count - Automated : 219 K/uL  Mean Cell Volume : 106.4 fL  Mean Cell Hemoglobin : 34.3 pg  Mean Cell Hemoglobin Concentration : 32.3 gm/dL  Auto Neutrophil # : 4.09 K/uL  Auto Lymphocyte # : 0.59 K/uL  Auto Monocyte # : 0.98 K/uL  Auto Eosinophil # : 0.00 K/uL  Auto Basophil # : 0.01 K/uL  Auto Neutrophil % : 71.6 %  Auto Lymphocyte % : 10.3 %  Auto Monocyte % : 17.2 %  Auto Eosinophil % : 0.0 %  Auto Basophil % : 0.2 %        132<L>  |  101  |  26<H>  ----------------------------<  100<H>  4.5   |  24  |  1.41<H>    Ca    10.1      2024 19:10    TPro  11.9<H>  /  Alb  2.7<L>  /  TBili  0.5  /  DBili  x   /  AST  9   /  ALT  8   /  AlkPhos  93  -02    LIVER FUNCTIONS - ( 2024 19:10 )  Alb: 2.7 g/dL / Pro: 11.9 g/dL / ALK PHOS: 93 U/L / ALT: 8 U/L / AST: 9 U/L / GGT: x           PT/INR - ( 2024 19:10 )   PT: 14.1 sec;   INR: 1.26 ratio         PTT - ( 2024 19:10 )  PTT:28.7 sec  Urinalysis Basic - ( 2024 00:30 )    Color: Yellow / Appearance: Clear / S.013 / pH: x  Gluc: x / Ketone: Negative mg/dL  / Bili: Negative / Urobili: 0.2 mg/dL   Blood: x / Protein: Trace mg/dL / Nitrite: Positive   Leuk Esterase: Trace / RBC: 9 /HPF / WBC 11 /HPF   Sq Epi: x / Non Sq Epi: 2 /HPF / Bacteria: Negative /HPF          < from: CT Head No Cont (24 @ 02:00) >    ACC: [id number] EXAM:  CT BRAIN   ORDERED BY: MALOU CRISOSTOMO     PROCEDURE DATE:  2024          INTERPRETATION:  EXAMINATION: CT HEAD    DATE: 1/3/2024 2:00 AM    INDICATION: lethargy, altered mental status. History of falls.    COMPARISON: CT head from 2023.    TECHNIQUE: Thin section noncontrast axial images were obtained through   the head.  RAPID AI was utilized for preliminary assessment of   intracranial hemorrhage.    FINDINGS:  Intracranial Contents:    Moderate to severe cerebral volume loss.. Mild to moderate chronic   microvascular ischemic changes Gray-white differentiation is preserved.   No hydrocephalus. The basilar cisterns are clear. No focal edema or acute   mass effect. There is no intracranial fluid collectionor acute   hemorrhage.    Bones and Extracranial Soft Tissues:    There is no fracture identified. Scattered paranasal sinus mucosal   thickening. Mastoid air cells are clear. Scalp and imaged facial soft   tissues are within normal limits.      IMPRESSION:  1. No acute intracranial CT abnormality.    < end of copied text >   Neurology Consult    Reason for Consult: Patient is a 78y old  Male who presents with a chief complaint of Hypotension, lethargy (2024 12:49)      HPI:  This is a 78M with history of MM, HTN, and Dementia (AAO x 1-2 to self, to place, not to time at baseline per brother) who presents to the hospital from [facility] for hypotension and lethargy. Patient currently awake, alert, AAO x2 (to self, to hospital but not name, not to time) but very poor historian, denies any acute complaints when asked. Spoke with brother Mookie who said that the patient was sent to the hospital for lethargy and cough though he states that the patient has had a cough for several months. Brother denies any other known acute complaints for the patient. Called [facility] [phone] but there was no staff available who knew the patient. As per NH paperwork and ED note, patient was sent to the hospital for hypotention to 81/55 and lethargy. He was noted to have a low grade temp of 99.6 and saturation of 93% at his NH. He was given tylenol 650mg x1 prior to being sent to the hospital.     On arrival to the hospital his vitals were T 99.4 -> 100.7 rectal, P 92, BP 88/55 -> 117/70, RR 16, O2 sat 96% RA. His lab work was significant for worsening macrocytic anemia, DAVID with mild hyponatremia, elevated protein gap, elevated lactate with improvement on repeat. His UA was positive for nitrite, leuk esterase but negative for bacteria. His respiratory swab was positive for influenza A. His imaging did not show acute abnormalities. He was given ofirmev 1g, NS 500cc x1, vanc 1g, cefepime 2g, and tamiflu 75mg PO x1. He was admitted to medicine.  (2024 06:06)       PAST MEDICAL & SURGICAL HISTORY:  Dementia      Multiple myeloma      Essential hypertension      No significant past surgical history          Allergies: Allergies    No Known Allergies    Intolerances        Social History: Denies toxic habits including tobacco, ETOH or illicit drugs.    Family History: FAMILY HISTORY:  No pertinent family history in first degree relatives    . No family history of strokes    Medications: MEDICATIONS  (STANDING):  cefTRIAXone   IVPB 1000 milliGRAM(s) IV Intermittent every 24 hours  cyanocobalamin 1000 MICROGram(s) Oral daily  donepezil 10 milliGRAM(s) Oral at bedtime  folic acid 1 milliGRAM(s) Oral daily  heparin   Injectable 5000 Unit(s) SubCutaneous every 8 hours  lactated ringers. 1000 milliLiter(s) (100 mL/Hr) IV Continuous <Continuous>  memantine 10 milliGRAM(s) Oral two times a day  oseltamivir 30 milliGRAM(s) Oral two times a day  risperiDONE   Tablet 0.25 milliGRAM(s) Oral daily  risperiDONE   Tablet 0.5 milliGRAM(s) Oral at bedtime  sertraline 50 milliGRAM(s) Oral daily    MEDICATIONS  (PRN):  acetaminophen     Tablet .. 650 milliGRAM(s) Oral every 6 hours PRN Temp greater or equal to 38C (100.4F), Mild Pain (1 - 3)  aluminum hydroxide/magnesium hydroxide/simethicone Suspension 30 milliLiter(s) Oral every 4 hours PRN Dyspepsia  melatonin 3 milliGRAM(s) Oral at bedtime PRN Insomnia  ondansetron Injectable 4 milliGRAM(s) IV Push every 8 hours PRN Nausea and/or Vomiting  QUEtiapine 12.5 milliGRAM(s) Oral every 6 hours PRN for agitation      Review of Systems:  CONSTITUTIONAL:  No weight loss, fever, chills, weakness or fatigue.  HEENT:  Eyes:  No visual loss, blurred vision, double vision or yellow sclera. Ears, Nose, Throat:  No hearing loss, sneezing, congestion, runny nose or sore throat.  SKIN:  No rash or itching.  CARDIOVASCULAR:  No chest pain, chest pressure or chest discomfort. No palpitations or edema.  RESPIRATORY:  No shortness of breath, cough or sputum.  GASTROINTESTINAL:  No anorexia, nausea, vomiting or diarrhea. No abdominal pain or blood.  GENITOURINARY:  No burning on urination or incontinence   NEUROLOGICAL:  No headache, dizziness, syncope, paralysis, ataxia, numbness or tingling in the extremities. No change in bowel or bladder control. no limb weakness. no vision changes.   MUSCULOSKELETAL:  No muscle, back pain, joint pain or stiffness.  HEMATOLOGIC:  No anemia, bleeding or bruising.  LYMPHATICS:  No enlarged nodes. No history of splenectomy.  PSYCHIATRIC:  No history of depression or anxiety.  ENDOCRINOLOGIC:  No reports of sweating, cold or heat intolerance. No polyuria or polydipsia.      Vitals:  Vital Signs Last 24 Hrs  T(C): 36.6 (2024 11:58), Max: 38.2 (2024 19:15)  T(F): 97.8 (2024 11:58), Max: 100.7 (2024 19:15)  HR: 75 (2024 11:58) (62 - 92)  BP: 133/57 (2024 11:58) (88/55 - 133/72)  BP(mean): --  RR: 18 (2024 11:58) (16 - 19)  SpO2: 97% (2024 11:58) (96% - 98%)    Parameters below as of 2024 11:58  Patient On (Oxygen Delivery Method): room air        General Exam:   General Appearance: Appropriately dressed and in no acute distress       Head: Normocephalic, atraumatic and no dysmorphic features  Ear, Nose, and Throat: Moist mucous membranes  CVS: S1S2+  Resp: No SOB, no wheeze or rhonchi  GI: soft NT/ND  Extremities: No edema or cyanosis  Skin: No bruises or rashes     Neurological Exam:  Mental Status: Awake, alert and oriented x 1.  Able to follow simple  verbal commands. Able to name and repeat. fluent speech. No obvious aphasia or dysarthria noted.   Cranial Nerves: PERRL, EOMI, VFFC, sensation V1-V3 intact,  no obvious facial asymmetry, equal elevation of palate, scm/trap 5/5, tongue is midline on protrusion. no obvious papilledema on fundoscopic exam. hearing is grossly intact.   Motor:  CHO at least 4/5   Sensation: Intact to light touch and pinprick throughout. no right/left confusion. no extinction to tactile on DSS.    Reflexes: 1+ throughout at biceps, brachioradialis, triceps, patellars and ankles bilaterally and equal. No clonus. R toe and L toe were both downgoing.  Coordination: unable   Gait: deferred       Data/Labs/Imaging which I personally reviewed.     Labs:     CBC Full  -  ( 2024 19:10 )  WBC Count : 5.71 K/uL  RBC Count : 2.36 M/uL  Hemoglobin : 8.1 g/dL  Hematocrit : 25.1 %  Platelet Count - Automated : 219 K/uL  Mean Cell Volume : 106.4 fL  Mean Cell Hemoglobin : 34.3 pg  Mean Cell Hemoglobin Concentration : 32.3 gm/dL  Auto Neutrophil # : 4.09 K/uL  Auto Lymphocyte # : 0.59 K/uL  Auto Monocyte # : 0.98 K/uL  Auto Eosinophil # : 0.00 K/uL  Auto Basophil # : 0.01 K/uL  Auto Neutrophil % : 71.6 %  Auto Lymphocyte % : 10.3 %  Auto Monocyte % : 17.2 %  Auto Eosinophil % : 0.0 %  Auto Basophil % : 0.2 %        132<L>  |  101  |  26<H>  ----------------------------<  100<H>  4.5   |  24  |  1.41<H>    Ca    10.1      2024 19:10    TPro  11.9<H>  /  Alb  2.7<L>  /  TBili  0.5  /  DBili  x   /  AST  9   /  ALT  8   /  AlkPhos  93  -02    LIVER FUNCTIONS - ( 2024 19:10 )  Alb: 2.7 g/dL / Pro: 11.9 g/dL / ALK PHOS: 93 U/L / ALT: 8 U/L / AST: 9 U/L / GGT: x           PT/INR - ( 2024 19:10 )   PT: 14.1 sec;   INR: 1.26 ratio         PTT - ( 2024 19:10 )  PTT:28.7 sec  Urinalysis Basic - ( 2024 00:30 )    Color: Yellow / Appearance: Clear / S.013 / pH: x  Gluc: x / Ketone: Negative mg/dL  / Bili: Negative / Urobili: 0.2 mg/dL   Blood: x / Protein: Trace mg/dL / Nitrite: Positive   Leuk Esterase: Trace / RBC: 9 /HPF / WBC 11 /HPF   Sq Epi: x / Non Sq Epi: 2 /HPF / Bacteria: Negative /HPF          < from: CT Head No Cont (24 @ 02:00) >    ACC: [id number] EXAM:  CT BRAIN   ORDERED BY: MALOU CRISOSTOMO     PROCEDURE DATE:  2024          INTERPRETATION:  EXAMINATION: CT HEAD    DATE: 1/3/2024 2:00 AM    INDICATION: lethargy, altered mental status. History of falls.    COMPARISON: CT head from 2023.    TECHNIQUE: Thin section noncontrast axial images were obtained through   the head.  RAPID AI was utilized for preliminary assessment of   intracranial hemorrhage.    FINDINGS:  Intracranial Contents:    Moderate to severe cerebral volume loss.. Mild to moderate chronic   microvascular ischemic changes Gray-white differentiation is preserved.   No hydrocephalus. The basilar cisterns are clear. No focal edema or acute   mass effect. There is no intracranial fluid collectionor acute   hemorrhage.    Bones and Extracranial Soft Tissues:    There is no fracture identified. Scattered paranasal sinus mucosal   thickening. Mastoid air cells are clear. Scalp and imaged facial soft   tissues are within normal limits.      IMPRESSION:  1. No acute intracranial CT abnormality.    < end of copied text >   Neurology Consult    Reason for Consult: Patient is a 78y old  Male who presents with a chief complaint of Hypotension, lethargy (2024 12:49)      HPI:  This is a 78M with history of MM, HTN, and Dementia (AAO x 1-2 to self, to place, not to time at baseline per brother) who presents to the hospital from [facility] for hypotension and lethargy. Patient currently awake, alert, AAO x2 (to self, to hospital but not name, not to time) but very poor historian, denies any acute complaints when asked. Spoke with brother Mookie who said that the patient was sent to the hospital for lethargy and cough though he states that the patient has had a cough for several months. Brother denies any other known acute complaints for the patient. Called [facility] [phone] but there was no staff available who knew the patient. As per NH paperwork and ED note, patient was sent to the hospital for hypotention to 81/55 and lethargy. He was noted to have a low grade temp of 99.6 and saturation of 93% at his NH. He was given tylenol 650mg x1 prior to being sent to the hospital.     On arrival to the hospital his vitals were T 99.4 -> 100.7 rectal, P 92, BP 88/55 -> 117/70, RR 16, O2 sat 96% RA. His lab work was significant for worsening macrocytic anemia, DAVID with mild hyponatremia, elevated protein gap, elevated lactate with improvement on repeat. His UA was positive for nitrite, leuk esterase but negative for bacteria. His respiratory swab was positive for influenza A. His imaging did not show acute abnormalities. He was given ofirmev 1g, NS 500cc x1, vanc 1g, cefepime 2g, and tamiflu 75mg PO x1. He was admitted to medicine.  (2024 06:06)       PAST MEDICAL & SURGICAL HISTORY:  Dementia      Multiple myeloma      Essential hypertension      No significant past surgical history          Allergies: Allergies    No Known Allergies    Intolerances        Social History: Denies toxic habits including tobacco, ETOH or illicit drugs.    Family History: FAMILY HISTORY:  No pertinent family history in first degree relatives    . No family history of strokes    Medications: MEDICATIONS  (STANDING):  cefTRIAXone   IVPB 1000 milliGRAM(s) IV Intermittent every 24 hours  cyanocobalamin 1000 MICROGram(s) Oral daily  donepezil 10 milliGRAM(s) Oral at bedtime  folic acid 1 milliGRAM(s) Oral daily  heparin   Injectable 5000 Unit(s) SubCutaneous every 8 hours  lactated ringers. 1000 milliLiter(s) (100 mL/Hr) IV Continuous <Continuous>  memantine 10 milliGRAM(s) Oral two times a day  oseltamivir 30 milliGRAM(s) Oral two times a day  risperiDONE   Tablet 0.25 milliGRAM(s) Oral daily  risperiDONE   Tablet 0.5 milliGRAM(s) Oral at bedtime  sertraline 50 milliGRAM(s) Oral daily    MEDICATIONS  (PRN):  acetaminophen     Tablet .. 650 milliGRAM(s) Oral every 6 hours PRN Temp greater or equal to 38C (100.4F), Mild Pain (1 - 3)  aluminum hydroxide/magnesium hydroxide/simethicone Suspension 30 milliLiter(s) Oral every 4 hours PRN Dyspepsia  melatonin 3 milliGRAM(s) Oral at bedtime PRN Insomnia  ondansetron Injectable 4 milliGRAM(s) IV Push every 8 hours PRN Nausea and/or Vomiting  QUEtiapine 12.5 milliGRAM(s) Oral every 6 hours PRN for agitation      Review of Systems:  CONSTITUTIONAL:  No weight loss, fever, chills, weakness or fatigue.  HEENT:  Eyes:  No visual loss, blurred vision, double vision or yellow sclera. Ears, Nose, Throat:  No hearing loss, sneezing, congestion, runny nose or sore throat.  SKIN:  No rash or itching.  CARDIOVASCULAR:  No chest pain, chest pressure or chest discomfort. No palpitations or edema.  RESPIRATORY:  No shortness of breath, cough or sputum.  GASTROINTESTINAL:  No anorexia, nausea, vomiting or diarrhea. No abdominal pain or blood.  GENITOURINARY:  No burning on urination or incontinence   NEUROLOGICAL:  No headache, dizziness, syncope, paralysis, ataxia, numbness or tingling in the extremities. No change in bowel or bladder control. no limb weakness. no vision changes.   MUSCULOSKELETAL:  No muscle, back pain, joint pain or stiffness.  HEMATOLOGIC:  No anemia, bleeding or bruising.  LYMPHATICS:  No enlarged nodes. No history of splenectomy.  PSYCHIATRIC:  No history of depression or anxiety.  ENDOCRINOLOGIC:  No reports of sweating, cold or heat intolerance. No polyuria or polydipsia.      Vitals:  Vital Signs Last 24 Hrs  T(C): 36.6 (2024 11:58), Max: 38.2 (2024 19:15)  T(F): 97.8 (2024 11:58), Max: 100.7 (2024 19:15)  HR: 75 (2024 11:58) (62 - 92)  BP: 133/57 (2024 11:58) (88/55 - 133/72)  BP(mean): --  RR: 18 (2024 11:58) (16 - 19)  SpO2: 97% (2024 11:58) (96% - 98%)    Parameters below as of 2024 11:58  Patient On (Oxygen Delivery Method): room air        General Exam:   General Appearance: Appropriately dressed and in no acute distress       Head: Normocephalic, atraumatic and no dysmorphic features  Ear, Nose, and Throat: Moist mucous membranes  CVS: S1S2+  Resp: No SOB, no wheeze or rhonchi  GI: soft NT/ND  Extremities: No edema or cyanosis  Skin: No bruises or rashes     Neurological Exam:  Mental Status: Awake, alert and oriented x 1.  Able to follow simple verbal commands, with perseveration. Can name some objects, refuses to participate further. No dysarthria  Cranial Nerves: PERRL, EOMI, VFFC, sensation V1-V3 intact,  no obvious facial asymmetry, equal elevation of palate, scm/trap 5/5, tongue is midline on protrusion. . hearing is grossly intact.   Motor:  CHO at least 4/5   Sensation: Intact to light touch and pinprick throughout  Reflexes: 1+ throughout at biceps, brachioradialis, triceps, patellars and ankles bilaterally and equal. No clonus. R toe and L toe were both downgoing.  Coordination: unable   Gait: deferred       Data/Labs/Imaging which I personally reviewed.     Labs:     CBC Full  -  ( 2024 19:10 )  WBC Count : 5.71 K/uL  RBC Count : 2.36 M/uL  Hemoglobin : 8.1 g/dL  Hematocrit : 25.1 %  Platelet Count - Automated : 219 K/uL  Mean Cell Volume : 106.4 fL  Mean Cell Hemoglobin : 34.3 pg  Mean Cell Hemoglobin Concentration : 32.3 gm/dL  Auto Neutrophil # : 4.09 K/uL  Auto Lymphocyte # : 0.59 K/uL  Auto Monocyte # : 0.98 K/uL  Auto Eosinophil # : 0.00 K/uL  Auto Basophil # : 0.01 K/uL  Auto Neutrophil % : 71.6 %  Auto Lymphocyte % : 10.3 %  Auto Monocyte % : 17.2 %  Auto Eosinophil % : 0.0 %  Auto Basophil % : 0.2 %        132<L>  |  101  |  26<H>  ----------------------------<  100<H>  4.5   |  24  |  1.41<H>    Ca    10.1      2024 19:10    TPro  11.9<H>  /  Alb  2.7<L>  /  TBili  0.5  /  DBili  x   /  AST  9   /  ALT  8   /  AlkPhos  93  -    LIVER FUNCTIONS - ( 2024 19:10 )  Alb: 2.7 g/dL / Pro: 11.9 g/dL / ALK PHOS: 93 U/L / ALT: 8 U/L / AST: 9 U/L / GGT: x           PT/INR - ( 2024 19:10 )   PT: 14.1 sec;   INR: 1.26 ratio         PTT - ( 2024 19:10 )  PTT:28.7 sec  Urinalysis Basic - ( 2024 00:30 )    Color: Yellow / Appearance: Clear / S.013 / pH: x  Gluc: x / Ketone: Negative mg/dL  / Bili: Negative / Urobili: 0.2 mg/dL   Blood: x / Protein: Trace mg/dL / Nitrite: Positive   Leuk Esterase: Trace / RBC: 9 /HPF / WBC 11 /HPF   Sq Epi: x / Non Sq Epi: 2 /HPF / Bacteria: Negative /HPF          < from: CT Head No Cont (24 @ 02:00) >    ACC: [id number] EXAM:  CT BRAIN   ORDERED BY: MALOU CRISOSTOMO     PROCEDURE DATE:  2024          INTERPRETATION:  EXAMINATION: CT HEAD    DATE: 1/3/2024 2:00 AM    INDICATION: lethargy, altered mental status. History of falls.    COMPARISON: CT head from 2023.    TECHNIQUE: Thin section noncontrast axial images were obtained through   the head.  RAPID AI was utilized for preliminary assessment of   intracranial hemorrhage.    FINDINGS:  Intracranial Contents:    Moderate to severe cerebral volume loss.. Mild to moderate chronic   microvascular ischemic changes Gray-white differentiation is preserved.   No hydrocephalus. The basilar cisterns are clear. No focal edema or acute   mass effect. There is no intracranial fluid collectionor acute   hemorrhage.    Bones and Extracranial Soft Tissues:    There is no fracture identified. Scattered paranasal sinus mucosal   thickening. Mastoid air cells are clear. Scalp and imaged facial soft   tissues are within normal limits.      IMPRESSION:  1. No acute intracranial CT abnormality.    < end of copied text >   Neurology Consult    Reason for Consult: Patient is a 78y old  Male who presents with a chief complaint of Hypotension, lethargy (2024 12:49)      HPI:  This is a 78M with history of MM, HTN, and Dementia (AAO x 1-2 to self, to place, not to time at baseline per brother) who presents to the hospital from [facility] for hypotension and lethargy. Patient currently awake, alert, AAO x2 (to self, to hospital but not name, not to time) but very poor historian, denies any acute complaints when asked. Spoke with brother Mookie who said that the patient was sent to the hospital for lethargy and cough though he states that the patient has had a cough for several months. Brother denies any other known acute complaints for the patient. Called [facility] [phone] but there was no staff available who knew the patient. As per NH paperwork and ED note, patient was sent to the hospital for hypotention to 81/55 and lethargy. He was noted to have a low grade temp of 99.6 and saturation of 93% at his NH. He was given tylenol 650mg x1 prior to being sent to the hospital.     On arrival to the hospital his vitals were T 99.4 -> 100.7 rectal, P 92, BP 88/55 -> 117/70, RR 16, O2 sat 96% RA. His lab work was significant for worsening macrocytic anemia, DAVID with mild hyponatremia, elevated protein gap, elevated lactate with improvement on repeat. His UA was positive for nitrite, leuk esterase but negative for bacteria. His respiratory swab was positive for influenza A. His imaging did not show acute abnormalities. He was given ofirmev 1g, NS 500cc x1, vanc 1g, cefepime 2g, and tamiflu 75mg PO x1. He was admitted to medicine.  (2024 06:06)       PAST MEDICAL & SURGICAL HISTORY:  Dementia      Multiple myeloma      Essential hypertension      No significant past surgical history          Allergies: Allergies    No Known Allergies    Intolerances        Social History: Denies toxic habits including tobacco, ETOH or illicit drugs.    Family History: FAMILY HISTORY:  No pertinent family history in first degree relatives    . No family history of strokes    Medications: MEDICATIONS  (STANDING):  cefTRIAXone   IVPB 1000 milliGRAM(s) IV Intermittent every 24 hours  cyanocobalamin 1000 MICROGram(s) Oral daily  donepezil 10 milliGRAM(s) Oral at bedtime  folic acid 1 milliGRAM(s) Oral daily  heparin   Injectable 5000 Unit(s) SubCutaneous every 8 hours  lactated ringers. 1000 milliLiter(s) (100 mL/Hr) IV Continuous <Continuous>  memantine 10 milliGRAM(s) Oral two times a day  oseltamivir 30 milliGRAM(s) Oral two times a day  risperiDONE   Tablet 0.25 milliGRAM(s) Oral daily  risperiDONE   Tablet 0.5 milliGRAM(s) Oral at bedtime  sertraline 50 milliGRAM(s) Oral daily    MEDICATIONS  (PRN):  acetaminophen     Tablet .. 650 milliGRAM(s) Oral every 6 hours PRN Temp greater or equal to 38C (100.4F), Mild Pain (1 - 3)  aluminum hydroxide/magnesium hydroxide/simethicone Suspension 30 milliLiter(s) Oral every 4 hours PRN Dyspepsia  melatonin 3 milliGRAM(s) Oral at bedtime PRN Insomnia  ondansetron Injectable 4 milliGRAM(s) IV Push every 8 hours PRN Nausea and/or Vomiting  QUEtiapine 12.5 milliGRAM(s) Oral every 6 hours PRN for agitation      Review of Systems:  CONSTITUTIONAL:  No weight loss, fever, chills, weakness or fatigue.  HEENT:  Eyes:  No visual loss, blurred vision, double vision or yellow sclera. Ears, Nose, Throat:  No hearing loss, sneezing, congestion, runny nose or sore throat.  SKIN:  No rash or itching.  CARDIOVASCULAR:  No chest pain, chest pressure or chest discomfort. No palpitations or edema.  RESPIRATORY:  No shortness of breath, cough or sputum.  GASTROINTESTINAL:  No anorexia, nausea, vomiting or diarrhea. No abdominal pain or blood.  GENITOURINARY:  No burning on urination or incontinence   NEUROLOGICAL:  No headache, dizziness, syncope, paralysis, ataxia, numbness or tingling in the extremities. No change in bowel or bladder control. no limb weakness. no vision changes.   MUSCULOSKELETAL:  No muscle, back pain, joint pain or stiffness.  HEMATOLOGIC:  No anemia, bleeding or bruising.  LYMPHATICS:  No enlarged nodes. No history of splenectomy.  PSYCHIATRIC:  No history of depression or anxiety.  ENDOCRINOLOGIC:  No reports of sweating, cold or heat intolerance. No polyuria or polydipsia.      Vitals:  Vital Signs Last 24 Hrs  T(C): 36.6 (2024 11:58), Max: 38.2 (2024 19:15)  T(F): 97.8 (2024 11:58), Max: 100.7 (2024 19:15)  HR: 75 (2024 11:58) (62 - 92)  BP: 133/57 (2024 11:58) (88/55 - 133/72)  BP(mean): --  RR: 18 (2024 11:58) (16 - 19)  SpO2: 97% (2024 11:58) (96% - 98%)    Parameters below as of 2024 11:58  Patient On (Oxygen Delivery Method): room air        General Exam:   General Appearance: Appropriately dressed and in no acute distress       Head: Normocephalic, atraumatic and no dysmorphic features  Ear, Nose, and Throat: Moist mucous membranes  CVS: S1S2+  Resp: No SOB, no wheeze or rhonchi  GI: soft NT/ND  Extremities: No edema or cyanosis  Skin: No bruises or rashes     Neurological Exam:  Mental Status: Awake, alert and oriented x 1.  Able to follow simple verbal commands, with perseveration. Can name some objects, refuses to participate further. No dysarthria  Cranial Nerves: PERRL, EOMI, VFFC, sensation V1-V3 intact,  no obvious facial asymmetry, equal elevation of palate, scm/trap 5/5, tongue is midline on protrusion. . hearing is grossly intact.   Motor:  CHO at least 4/5   Sensation: Intact to light touch and pinprick throughout  Reflexes: 1+ throughout at biceps, brachioradialis, triceps, patellars and ankles bilaterally and equal. No clonus. R toe and L toe were both downgoing.  Coordination: unable   Gait: deferred       Data/Labs/Imaging which I personally reviewed.     Labs:     CBC Full  -  ( 2024 19:10 )  WBC Count : 5.71 K/uL  RBC Count : 2.36 M/uL  Hemoglobin : 8.1 g/dL  Hematocrit : 25.1 %  Platelet Count - Automated : 219 K/uL  Mean Cell Volume : 106.4 fL  Mean Cell Hemoglobin : 34.3 pg  Mean Cell Hemoglobin Concentration : 32.3 gm/dL  Auto Neutrophil # : 4.09 K/uL  Auto Lymphocyte # : 0.59 K/uL  Auto Monocyte # : 0.98 K/uL  Auto Eosinophil # : 0.00 K/uL  Auto Basophil # : 0.01 K/uL  Auto Neutrophil % : 71.6 %  Auto Lymphocyte % : 10.3 %  Auto Monocyte % : 17.2 %  Auto Eosinophil % : 0.0 %  Auto Basophil % : 0.2 %        132<L>  |  101  |  26<H>  ----------------------------<  100<H>  4.5   |  24  |  1.41<H>    Ca    10.1      2024 19:10    TPro  11.9<H>  /  Alb  2.7<L>  /  TBili  0.5  /  DBili  x   /  AST  9   /  ALT  8   /  AlkPhos  93  -    LIVER FUNCTIONS - ( 2024 19:10 )  Alb: 2.7 g/dL / Pro: 11.9 g/dL / ALK PHOS: 93 U/L / ALT: 8 U/L / AST: 9 U/L / GGT: x           PT/INR - ( 2024 19:10 )   PT: 14.1 sec;   INR: 1.26 ratio         PTT - ( 2024 19:10 )  PTT:28.7 sec  Urinalysis Basic - ( 2024 00:30 )    Color: Yellow / Appearance: Clear / S.013 / pH: x  Gluc: x / Ketone: Negative mg/dL  / Bili: Negative / Urobili: 0.2 mg/dL   Blood: x / Protein: Trace mg/dL / Nitrite: Positive   Leuk Esterase: Trace / RBC: 9 /HPF / WBC 11 /HPF   Sq Epi: x / Non Sq Epi: 2 /HPF / Bacteria: Negative /HPF          < from: CT Head No Cont (24 @ 02:00) >    ACC: [id number] EXAM:  CT BRAIN   ORDERED BY: MALOU CRISOSTOMO     PROCEDURE DATE:  2024          INTERPRETATION:  EXAMINATION: CT HEAD    DATE: 1/3/2024 2:00 AM    INDICATION: lethargy, altered mental status. History of falls.    COMPARISON: CT head from 2023.    TECHNIQUE: Thin section noncontrast axial images were obtained through   the head.  RAPID AI was utilized for preliminary assessment of   intracranial hemorrhage.    FINDINGS:  Intracranial Contents:    Moderate to severe cerebral volume loss.. Mild to moderate chronic   microvascular ischemic changes Gray-white differentiation is preserved.   No hydrocephalus. The basilar cisterns are clear. No focal edema or acute   mass effect. There is no intracranial fluid collectionor acute   hemorrhage.    Bones and Extracranial Soft Tissues:    There is no fracture identified. Scattered paranasal sinus mucosal   thickening. Mastoid air cells are clear. Scalp and imaged facial soft   tissues are within normal limits.      IMPRESSION:  1. No acute intracranial CT abnormality.    < end of copied text >

## 2024-01-03 NOTE — PATIENT PROFILE ADULT - FALL HARM RISK - HARM RISK INTERVENTIONS
Assistance with ambulation/Assistance OOB with selected safe patient handling equipment/Communicate Risk of Fall with Harm to all staff/Discuss with provider need for PT consult/Monitor gait and stability/Reinforce activity limits and safety measures with patient and family/Tailored Fall Risk Interventions/Visual Cue: Yellow wristband and red socks/Bed in lowest position, wheels locked, appropriate side rails in place/Call bell, personal items and telephone in reach/Instruct patient to call for assistance before getting out of bed or chair/Non-slip footwear when patient is out of bed/[place] to call system/Physically safe environment - no spills, clutter or unnecessary equipment/Purposeful Proactive Rounding/Room/bathroom lighting operational, light cord in reach Assistance with ambulation/Assistance OOB with selected safe patient handling equipment/Communicate Risk of Fall with Harm to all staff/Discuss with provider need for PT consult/Monitor gait and stability/Reinforce activity limits and safety measures with patient and family/Tailored Fall Risk Interventions/Visual Cue: Yellow wristband and red socks/Bed in lowest position, wheels locked, appropriate side rails in place/Call bell, personal items and telephone in reach/Instruct patient to call for assistance before getting out of bed or chair/Non-slip footwear when patient is out of bed/[place] to call system/Physically safe environment - no spills, clutter or unnecessary equipment/Purposeful Proactive Rounding/Room/bathroom lighting operational, light cord in reach

## 2024-01-04 LAB
ANION GAP SERPL CALC-SCNC: 7 MMOL/L — SIGNIFICANT CHANGE UP (ref 7–14)
ANION GAP SERPL CALC-SCNC: 7 MMOL/L — SIGNIFICANT CHANGE UP (ref 7–14)
BUN SERPL-MCNC: 18 MG/DL — SIGNIFICANT CHANGE UP (ref 7–23)
BUN SERPL-MCNC: 18 MG/DL — SIGNIFICANT CHANGE UP (ref 7–23)
CALCIUM SERPL-MCNC: 9.3 MG/DL — SIGNIFICANT CHANGE UP (ref 8.4–10.5)
CALCIUM SERPL-MCNC: 9.3 MG/DL — SIGNIFICANT CHANGE UP (ref 8.4–10.5)
CHLORIDE SERPL-SCNC: 100 MMOL/L — SIGNIFICANT CHANGE UP (ref 98–107)
CHLORIDE SERPL-SCNC: 100 MMOL/L — SIGNIFICANT CHANGE UP (ref 98–107)
CO2 SERPL-SCNC: 23 MMOL/L — SIGNIFICANT CHANGE UP (ref 22–31)
CO2 SERPL-SCNC: 23 MMOL/L — SIGNIFICANT CHANGE UP (ref 22–31)
CREAT SERPL-MCNC: 0.88 MG/DL — SIGNIFICANT CHANGE UP (ref 0.5–1.3)
CREAT SERPL-MCNC: 0.88 MG/DL — SIGNIFICANT CHANGE UP (ref 0.5–1.3)
CULTURE RESULTS: NO GROWTH — SIGNIFICANT CHANGE UP
CULTURE RESULTS: NO GROWTH — SIGNIFICANT CHANGE UP
EGFR: 89 ML/MIN/1.73M2 — SIGNIFICANT CHANGE UP
EGFR: 89 ML/MIN/1.73M2 — SIGNIFICANT CHANGE UP
GLUCOSE SERPL-MCNC: 96 MG/DL — SIGNIFICANT CHANGE UP (ref 70–99)
GLUCOSE SERPL-MCNC: 96 MG/DL — SIGNIFICANT CHANGE UP (ref 70–99)
HCT VFR BLD CALC: 22.4 % — LOW (ref 39–50)
HCT VFR BLD CALC: 22.4 % — LOW (ref 39–50)
HGB BLD-MCNC: 7.4 G/DL — LOW (ref 13–17)
HGB BLD-MCNC: 7.4 G/DL — LOW (ref 13–17)
MAGNESIUM SERPL-MCNC: 1.7 MG/DL — SIGNIFICANT CHANGE UP (ref 1.6–2.6)
MAGNESIUM SERPL-MCNC: 1.7 MG/DL — SIGNIFICANT CHANGE UP (ref 1.6–2.6)
MCHC RBC-ENTMCNC: 33 GM/DL — SIGNIFICANT CHANGE UP (ref 32–36)
MCHC RBC-ENTMCNC: 33 GM/DL — SIGNIFICANT CHANGE UP (ref 32–36)
MCHC RBC-ENTMCNC: 34.3 PG — HIGH (ref 27–34)
MCHC RBC-ENTMCNC: 34.3 PG — HIGH (ref 27–34)
MCV RBC AUTO: 103.7 FL — HIGH (ref 80–100)
MCV RBC AUTO: 103.7 FL — HIGH (ref 80–100)
NRBC # BLD: 0 /100 WBCS — SIGNIFICANT CHANGE UP (ref 0–0)
NRBC # BLD: 0 /100 WBCS — SIGNIFICANT CHANGE UP (ref 0–0)
NRBC # FLD: 0 K/UL — SIGNIFICANT CHANGE UP (ref 0–0)
NRBC # FLD: 0 K/UL — SIGNIFICANT CHANGE UP (ref 0–0)
PHOSPHATE SERPL-MCNC: 2.1 MG/DL — LOW (ref 2.5–4.5)
PHOSPHATE SERPL-MCNC: 2.1 MG/DL — LOW (ref 2.5–4.5)
PLATELET # BLD AUTO: 199 K/UL — SIGNIFICANT CHANGE UP (ref 150–400)
PLATELET # BLD AUTO: 199 K/UL — SIGNIFICANT CHANGE UP (ref 150–400)
POTASSIUM SERPL-MCNC: 4 MMOL/L — SIGNIFICANT CHANGE UP (ref 3.5–5.3)
POTASSIUM SERPL-MCNC: 4 MMOL/L — SIGNIFICANT CHANGE UP (ref 3.5–5.3)
POTASSIUM SERPL-SCNC: 4 MMOL/L — SIGNIFICANT CHANGE UP (ref 3.5–5.3)
POTASSIUM SERPL-SCNC: 4 MMOL/L — SIGNIFICANT CHANGE UP (ref 3.5–5.3)
RBC # BLD: 2.16 M/UL — LOW (ref 4.2–5.8)
RBC # BLD: 2.16 M/UL — LOW (ref 4.2–5.8)
RBC # FLD: 13.4 % — SIGNIFICANT CHANGE UP (ref 10.3–14.5)
RBC # FLD: 13.4 % — SIGNIFICANT CHANGE UP (ref 10.3–14.5)
SODIUM SERPL-SCNC: 130 MMOL/L — LOW (ref 135–145)
SODIUM SERPL-SCNC: 130 MMOL/L — LOW (ref 135–145)
SPECIMEN SOURCE: SIGNIFICANT CHANGE UP
SPECIMEN SOURCE: SIGNIFICANT CHANGE UP
VIT B12 SERPL-MCNC: 891 PG/ML — SIGNIFICANT CHANGE UP (ref 200–900)
VIT B12 SERPL-MCNC: 891 PG/ML — SIGNIFICANT CHANGE UP (ref 200–900)
WBC # BLD: 3.04 K/UL — LOW (ref 3.8–10.5)
WBC # BLD: 3.04 K/UL — LOW (ref 3.8–10.5)
WBC # FLD AUTO: 3.04 K/UL — LOW (ref 3.8–10.5)
WBC # FLD AUTO: 3.04 K/UL — LOW (ref 3.8–10.5)

## 2024-01-04 RX ORDER — IPRATROPIUM/ALBUTEROL SULFATE 18-103MCG
3 AEROSOL WITH ADAPTER (GRAM) INHALATION EVERY 6 HOURS
Refills: 0 | Status: DISCONTINUED | OUTPATIENT
Start: 2024-01-04 | End: 2024-01-06

## 2024-01-04 RX ADMIN — DONEPEZIL HYDROCHLORIDE 10 MILLIGRAM(S): 10 TABLET, FILM COATED ORAL at 23:02

## 2024-01-04 RX ADMIN — Medication 3 MILLILITER(S): at 20:33

## 2024-01-04 RX ADMIN — HEPARIN SODIUM 5000 UNIT(S): 5000 INJECTION INTRAVENOUS; SUBCUTANEOUS at 15:42

## 2024-01-04 RX ADMIN — HEPARIN SODIUM 5000 UNIT(S): 5000 INJECTION INTRAVENOUS; SUBCUTANEOUS at 23:02

## 2024-01-04 RX ADMIN — Medication 1 MILLIGRAM(S): at 11:53

## 2024-01-04 RX ADMIN — SERTRALINE 50 MILLIGRAM(S): 25 TABLET, FILM COATED ORAL at 11:54

## 2024-01-04 RX ADMIN — PREGABALIN 1000 MICROGRAM(S): 225 CAPSULE ORAL at 11:53

## 2024-01-04 RX ADMIN — Medication 30 MILLIGRAM(S): at 11:53

## 2024-01-04 RX ADMIN — RISPERIDONE 0.5 MILLIGRAM(S): 4 TABLET ORAL at 23:02

## 2024-01-04 RX ADMIN — RISPERIDONE 0.25 MILLIGRAM(S): 4 TABLET ORAL at 11:54

## 2024-01-04 NOTE — PROGRESS NOTE ADULT - ASSESSMENT
78M with history as above who presents to the hospital with hypotension and lethargy at his NH here found to have sepsis 2/2 influenza A and UTI.         Problem/Plan - 1:  ·  Problem: Sepsis, unspecified organism.   ·  Plan: - Meets sepsis criteria with fever to 100.7 and HR > 90, influenza A positive and possible UTI  - cw  tamiflu and ceftriaxone for influenza A and UTI respectively      Problem/Plan - 2:  ·  Problem: Influenza A.   ·  Plan: - Tamiflu as above (renally dosed)  - CXR poor study but with grossly clear lungs in the visualized portion  - Lungs grossly clear to auscultation  - Monitor cough/sputum  - Check procalcitonin  Problem/Plan - 3:  ·  Problem: Acute UTI.   ·  Plan: - UA positive for leuk esterase and nitrites, negative for bacteria  - cw antibiotics     Problem/Plan - 4:  ·  Problem: DAVID (acute kidney injury).   ·  Plan: - DAVID likely 2/2 hypotension and sepsis, unclear if patient had decreased PO intake at the NH  - Trend BUN/Cr, monitor I/O, will place on bladder scans q8h  - c/w IV hydration with LR at 100 cc/hr for 10 hrs  - Check urine sodium/creatinine.    Problem/Plan - 5:  ·  Problem: Macrocytic anemia.   ·  Plan: - Worsening macrocytic anemia, no known bleeding issues as per Brother  - On B12 and folic acid -> c/w      Problem/Plan - 6:  ·  Problem: Dementia.   ·  Plan: - Lethargic at NH, currently awake and alert, oriented x2 to self and place (hospital, not to name of hospital, not to time) which is his baseline as per brother  - c/w home donezepil, namenda, risperidone, seroquel  - Check dysphagia screen, if passes then start patient on dysphagia 2 DASH diet (on chopped diet at his NH).    Problem/Plan - 7:  ·  Problem: Essential hypertension.   ·  Plan: - Holding his BP meds for now given patient with hypotension when he came in  - Monitor BP, restart his home medications (amlodipine 10mg qD, metoprolol tart 12.5mg BID) as needed.    Problem/Plan - 8:  ·  Problem: Multiple myeloma.   ·  Plan: - Unclear if patient is on therapy, need to clarify in AM.

## 2024-01-04 NOTE — DIETITIAN INITIAL EVALUATION ADULT - PROBLEM/PLAN-5
This is a very nice 59-year-old with acute cystitis.  I would like her to drink a lot of extra fluids and report if she does not get better.  I will call her when the culture results are back if there is indication we need to change antibiotics.  
DISPLAY PLAN FREE TEXT

## 2024-01-04 NOTE — DIETITIAN INITIAL EVALUATION ADULT - NAME AND PHONE
Radha Condon, MS, RDN, CDN  Pager [phone]  Also available on MS Teams  Radha Condon, MS, RDN, CDN  Pager [phone]  Also available on MS Teams

## 2024-01-04 NOTE — DIETITIAN INITIAL EVALUATION ADULT - NS FNS DIET ORDER
Diet, Regular:   DASH/TLC {Sodium & Cholesterol Restricted} (DASH)  Pureed (PUREED) (01-03-24 @ 10:03)

## 2024-01-04 NOTE — DIETITIAN INITIAL EVALUATION ADULT - OTHER INFO
This is a 78M with history as above who presents to the hospital with hypotension and lethargy at his NH here found to have sepsis 2/2 influenza A and UTI.       Weight July 2023 - 82.2kg; current admission weight 73kg 1/3/24, indicating wt loss of 9.2kg / 11.2% x 6 months (significant).  Patient currently ordered for pureed diet; noted chopped diet being provided at NH.  No GI distress reported or noted at this time.  No food allergies noted or reported. No chewing or swallowing difficulties on pureed diet indicated/noted at this time.  This is a 78M with history as above who presents to the hospital with hypotension and lethargy at his NH here found to have sepsis 2/2 influenza A and UTI.       Patient presented as a poor historian and unable to participate in diet interview.  Weight July 2023 - 82.2kg; current admission weight 73kg 1/3/24, indicating wt loss of 9.2kg / 11.2% x 6 months (significant). Patient currently ordered for pureed diet; noted chopped diet being provided at NH.  No GI distress reported or noted at this time.  No food allergies noted or reported. No chewing or swallowing difficulties on pureed diet indicated/noted at this time.

## 2024-01-04 NOTE — PROGRESS NOTE ADULT - ASSESSMENT
This is a 78M with history of MM, HTN, and Dementia (AAO x 1-2 to self, to place, not to time at baseline per brother) who presents to the hospital from [facility] for hypotension and lethargy. Patient currently awake, alert, AAO x2 (to self, to hospital but not name, not to time) but very poor historian, denies any acute complaints when asked. Spoke with brother Mookie who said that the patient was sent to the hospital for lethargy and cough though he states that the patient has had a cough for several months. Brother denies any other known acute complaints for the patient. Called [facility] [phone] but there was no staff available who knew the patient. As per NH paperwork and ED note, patient was sent to the hospital for hypotention to 81/55 and lethargy. He was noted to have a low grade temp of 99.6 and saturation of 93% at his NH. He was given tylenol 650mg x1 prior to being sent to the hospital.   On arrival to the hospital his vitals were T 99.4 -> 100.7 rectal, P 92, BP 88/55 -> 117/70, RR 16, O2 sat 96% RA. His lab work was significant for worsening macrocytic anemia, DAVID with mild hyponatremia, elevated protein gap, elevated lactate with improvement on repeat. His UA was positive for nitrite, leuk esterase but negative for bacteria. His respiratory swab was positive for influenza A. His imaging did not show acute abnormalities. He was given ofirmev 1g, NS 500cc x1, vanc 1g, cefepime 2g, and tamiflu 75mg PO x1. He was admitted to medicine.  (03 Jan 2024 06:06):  now pulm called:  he is from NH:  above history noted:  he seems to be responding to simple commands:  he say he has no underlying lung history  never smoked:  on room air:  adm with influenza:     Influenza:   Multiple Myeloma  HTN  Dementia      Influenza:   -low grade fever  -Influenza:  on tamilflu  -cxr is clear:  -he is not wheezing    1/4: no change in pulm status today  : remains on room air  Multiple Myeloma  -per primary team : FDG PET scan  noted:  rib lesions present likely secondary to MM   HTN  -controlled  Dementia  -supportive care:    karyn acp  This is a 78M with history of MM, HTN, and Dementia (AAO x 1-2 to self, to place, not to time at baseline per brother) who presents to the hospital from [facility] for hypotension and lethargy. Patient currently awake, alert, AAO x2 (to self, to hospital but not name, not to time) but very poor historian, denies any acute complaints when asked. Spoke with brother Mookie who said that the patient was sent to the hospital for lethargy and cough though he states that the patient has had a cough for several months. Brother denies any other known acute complaints for the patient. Called [facility] [phone] but there was no staff available who knew the patient. As per NH paperwork and ED note, patient was sent to the hospital for hypotention to 81/55 and lethargy. He was noted to have a low grade temp of 99.6 and saturation of 93% at his NH. He was given tylenol 650mg x1 prior to being sent to the hospital.   On arrival to the hospital his vitals were T 99.4 -> 100.7 rectal, P 92, BP 88/55 -> 117/70, RR 16, O2 sat 96% RA. His lab work was significant for worsening macrocytic anemia, DAVID with mild hyponatremia, elevated protein gap, elevated lactate with improvement on repeat. His UA was positive for nitrite, leuk esterase but negative for bacteria. His respiratory swab was positive for influenza A. His imaging did not show acute abnormalities. He was given ofirmev 1g, NS 500cc x1, vanc 1g, cefepime 2g, and tamiflu 75mg PO x1. He was admitted to medicine.  (03 Jan 2024 06:06):  now pulm called:  he is from NH:  above history noted:  he seems to be responding to simple commands:  he say he has no underlying lung history  never smoked:  on room air:  adm with influenza:     Influenza:   Multiple Myeloma  HTN  Dementia      Influenza:   -low grade fever  -Influenza:  on tamilflu  -cxr is clear:  -he is not wheezing    1/4: no change in pulm status today  : remains on room air  Multiple Myeloma  -per primary team : FDG PET scan  noted:  rib lesions present likely secondary to MM   HTN  -controlled  Dementia  -supportive care:    karyn acp

## 2024-01-04 NOTE — DIETITIAN INITIAL EVALUATION ADULT - PERTINENT LABORATORY DATA
Jaja Desai is a(n) 21 year old female who presents for a telehealth visit via live interactive video with a secure connection. This visit was not recorded or stored.     Consent for telehealth visit was verified with the parent/guardian, including risk of electronic data, possibility of equipment failure or need for in person visit if condition cannot be fully assessed by telehealth. Parent/guardian was also informed that consent to treat includes permission to submit charges to the patient's insurance.     Provider location: Home  [facility].    Reason for visit: follow up  Reason for use of telehealth: reduce wait time      CC: follow up.    HPI: Jaja is a(n) 21 year old here for follow up.    At the last visit I wrote:  Jaja is a(n) 20 year old with persistent enuresis, bruxism, mouth breathing, and difficulty initiating and maintaining sleep, improved with behavioral interventions to relax prior to bedtime and orofacial myofunctional therapy.  Jaja's daytime urinary urgency suggests there may be benefit to biofeedback exercises to strengthen her pelvic floor muscles and build the habit of waiting after she notices her bladder is full.      Recommendations:  1. Continue to look for ways to decompress prior to bedtime  2. Continue orofacial myofunctional therapy  3. Start biofeedback exercises as described in the after visit summary      Since that time she has been waking up a few times at night, she is mouth breathing, and waking up sleepy. She catches herself snoring and her roommate has noticed her snoring. She will sometimes take two naps a day. She is sleeping from [id number].   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  She has only had one bedwetting episode in the past 4 months.      ROS:  As per history of present illness above. All other systems have been reviewed and are negative.    Current Outpatient Medications   Medication Sig  Dispense Refill    spironolactone (ALDACTONE) 50 MG tablet TAKE 1 TABLET BY MOUTH TWICE DAILY. INCREASE WATER INTAKE      Nicole 3-0.03 MG per tablet TAKE AS DIRECTED PER PACKAGE INSERT      fluticasone (FLONASE) 50 MCG/ACT nasal spray Spray 1 spray in each nostril in the morning and 1 spray in the evening. 1 each 5     No current facility-administered medications for this visit.       Please refer to past medical, surgical, family, and social history as documented on the patient's initial evaluation. No changes since then except as noted in the HPI.    Physical Examination:  General Appearance: The patient is alert and in no acute distress.  Psychiatric: oriented, normal affect  Head:  Normocephalic, atraumatic  Neck: no obvious masses, normal range of motion  Eyes: no dysconjugate gaze, pupils equal, round, and reactive to light, extraocular muscles intact  ENMT: Mucus membranes moist. No obvious lesions.  Respiratory: breathing comfortably, no nasal flaring or increased work of breathing.  Neurologic: Cranial nerves II-XII were intact.  Moving all extremities well.   Skin: no significant rashes, bruising, or lesions.      Ancillary studies:  I personally reviewed the results of the sleep study from 12/14/2021 which demonstrated increased upper airway resistance.    Impression:  Jaja is a(n) 21 year old with enuresis, bruxism, mouth breathing, and difficulty initiating and maintaining sleep.  Her symptoms suggest a high pre-test probability of obstructive sleep disordered breathing (pediatric PATRICIA). A sleep study is required to better characterize her sleep related respiratory status. If no significant sleep disordered breathing is noted an in lab study will be considered to better characterize her sleep architecture, and to evaluate for other disorders of sleep fragmentation such as periodic limb movements of sleep. Given her degree of sleepiness we might consider a next day nap study with an in lab study if  needed.  Jaja's mouth breathing and snoring suggest the possibility that a device to help her keep her mouth closed might be helpful. Depending on the results of the sleep study we might consider returning to orofacial myofunctional therapy.   Her enuresis has resolved for unknown reasons.    Recommendations:  - Home sleep test  - If this is normal we will consider an in lab study with a next day nap study  - Follow up in clinic after the study     Total time spent preparing for the visit, face to face time with patient and/or parent/caregiver, and post-visit coordination and documentation: 33 minutes.     I reviewed the above recommendations with Jaja who expressed understanding and agreed with this plan.      Twan Castellanos MD FAAP FAA  Director, [facility]     01-02    132<L>  |  101  |  26<H>  ----------------------------<  100<H>  4.5   |  24  |  1.41<H>    Ca    10.1      02 Jan 2024 19:10    TPro  11.9<H>  /  Alb  2.7<L>  /  TBili  0.5  /  DBili  x   /  AST  9   /  ALT  8   /  AlkPhos  93  01-02

## 2024-01-04 NOTE — DIETITIAN NUTRITION RISK NOTIFICATION - TREATMENT: THE FOLLOWING DIET HAS BEEN RECOMMENDED
Diet, Regular:   DASH/TLC {Sodium & Cholesterol Restricted} (DASH)  Pureed (PUREED) (01-03-24 @ 10:03) [Active]

## 2024-01-04 NOTE — PROGRESS NOTE ADULT - SUBJECTIVE AND OBJECTIVE BOX
Patient is a 78y old  Male who presents with a chief complaint of Hypotension, lethargu (2024 13:54)    Date of servie : 24 @ 15:30  INTERVAL HPI/OVERNIGHT EVENTS:  T(C): 36.8 (24 @ 10:17), Max: 36.9 (24 @ 01:00)  HR: 81 (24 @ 10:17) (64 - 81)  BP: 112/56 (24 @ 10:17) (112/56 - 128/70)  RR: 18 (24 @ 10:17) (18 - 18)  SpO2: 96% (24 @ 10:17) (94% - 96%)  Wt(kg): --  I&O's Summary    2024 07:01  -  2024 07:00  --------------------------------------------------------  IN: 0 mL / OUT: 950 mL / NET: -950 mL        LABS:                        8.1    5.71  )-----------( 219      ( 2024 19:10 )             25.1     01-02    132<L>  |  101  |  26<H>  ----------------------------<  100<H>  4.5   |  24  |  1.41<H>    Ca    10.1      2024 19:10    TPro  11.9<H>  /  Alb  2.7<L>  /  TBili  0.5  /  DBili  x   /  AST  9   /  ALT  8   /  AlkPhos  93  01-02    PT/INR - ( 2024 19:10 )   PT: 14.1 sec;   INR: 1.26 ratio         PTT - ( 2024 19:10 )  PTT:28.7 sec  Urinalysis Basic - ( 2024 00:30 )    Color: Yellow / Appearance: Clear / S.013 / pH: x  Gluc: x / Ketone: Negative mg/dL  / Bili: Negative / Urobili: 0.2 mg/dL   Blood: x / Protein: Trace mg/dL / Nitrite: Positive   Leuk Esterase: Trace / RBC: 9 /HPF / WBC 11 /HPF   Sq Epi: x / Non Sq Epi: 2 /HPF / Bacteria: Negative /HPF      CAPILLARY BLOOD GLUCOSE            Urinalysis Basic - ( 2024 00:30 )    Color: Yellow / Appearance: Clear / S.013 / pH: x  Gluc: x / Ketone: Negative mg/dL  / Bili: Negative / Urobili: 0.2 mg/dL   Blood: x / Protein: Trace mg/dL / Nitrite: Positive   Leuk Esterase: Trace / RBC: 9 /HPF / WBC 11 /HPF   Sq Epi: x / Non Sq Epi: 2 /HPF / Bacteria: Negative /HPF        MEDICATIONS  (STANDING):  albuterol/ipratropium for Nebulization 3 milliLiter(s) Nebulizer every 6 hours  cyanocobalamin 1000 MICROGram(s) Oral daily  donepezil 10 milliGRAM(s) Oral at bedtime  folic acid 1 milliGRAM(s) Oral daily  heparin   Injectable 5000 Unit(s) SubCutaneous every 8 hours  lactated ringers. 1000 milliLiter(s) (100 mL/Hr) IV Continuous <Continuous>  memantine 10 milliGRAM(s) Oral two times a day  oseltamivir 30 milliGRAM(s) Oral two times a day  risperiDONE   Tablet 0.25 milliGRAM(s) Oral daily  risperiDONE   Tablet 0.5 milliGRAM(s) Oral at bedtime  sertraline 50 milliGRAM(s) Oral daily    MEDICATIONS  (PRN):  acetaminophen     Tablet .. 650 milliGRAM(s) Oral every 6 hours PRN Temp greater or equal to 38C (100.4F), Mild Pain (1 - 3)  aluminum hydroxide/magnesium hydroxide/simethicone Suspension 30 milliLiter(s) Oral every 4 hours PRN Dyspepsia  melatonin 3 milliGRAM(s) Oral at bedtime PRN Insomnia  ondansetron Injectable 4 milliGRAM(s) IV Push every 8 hours PRN Nausea and/or Vomiting  QUEtiapine 12.5 milliGRAM(s) Oral every 6 hours PRN for agitation          PHYSICAL EXAM:  GENERAL: NAD, well-groomed, well-developed  HEAD:  Atraumatic, Normocephalic  CHEST/LUNG: Clear to percussion bilaterally; No rales, rhonchi, wheezing, or rubs  HEART: Regular rate and rhythm; No murmurs, rubs, or gallops  ABDOMEN: Soft, Nontender, Nondistended; Bowel sounds present  EXTREMITIES:  2+ Peripheral Pulses, No clubbing, cyanosis, or edema  LYMPH: No lymphadenopathy noted  SKIN: No rashes or lesions    Care Discussed with Consultants/Other Providers [ ] YES  [ ] NO

## 2024-01-04 NOTE — CONSULT NOTE ADULT - SUBJECTIVE AND OBJECTIVE BOX
[facility]   MD ANEL FOLEY MD ANGELA WONG, PA        TEL:  OFFICE: [phone]  From 5pm-7am answering service [phone]    --- INITIAL RENAL CONSULT NOTE ---date of service 24 @ 17:14    HPI: history from chart pt A+Ox1  This is a 78M with history of MM, HTN, and Dementia (AAO x 1-2 to self, to place, not to time at baseline per brother) who presents to the hospital from [facility] for hypotension and lethargy. Patient currently awake, alert, AAO x1 (to self,) but very poor historian, denies any acute complaints when asked. nephrology consulted for karina        Allergies:  No Known Allergies      PAST MEDICAL & SURGICAL HISTORY:  Dementia      Multiple myeloma      Essential hypertension      No significant past surgical history          Home Medications Reviewed    Hospital Medications:   MEDICATIONS  (STANDING):  albuterol/ipratropium for Nebulization 3 milliLiter(s) Nebulizer every 6 hours  cyanocobalamin 1000 MICROGram(s) Oral daily  donepezil 10 milliGRAM(s) Oral at bedtime  folic acid 1 milliGRAM(s) Oral daily  heparin   Injectable 5000 Unit(s) SubCutaneous every 8 hours  lactated ringers. 1000 milliLiter(s) (100 mL/Hr) IV Continuous <Continuous>  memantine 10 milliGRAM(s) Oral two times a day  oseltamivir 30 milliGRAM(s) Oral two times a day  risperiDONE   Tablet 0.25 milliGRAM(s) Oral daily  risperiDONE   Tablet 0.5 milliGRAM(s) Oral at bedtime  sertraline 50 milliGRAM(s) Oral daily      SOCIAL HISTORY:  Denies ETOh, Smoking,     FAMILY HISTORY:  No pertinent family history in first degree relatives        REVIEW OF SYSTEMS:  unable to obtain    VITALS:  T(F): 98.2 (24 @ 10:17), Max: 98.5 (24 @ 01:00)  HR: 81 (24 @ 10:17)  BP: 112/56 (24 @ 10:17)  RR: 18 (24 @ 10:17)  SpO2: 96% (24 @ 10:17)  Wt(kg): --     @ 07:01  -   @ 07:00  --------------------------------------------------------  IN: 0 mL / OUT: 950 mL / NET: -950 mL          PHYSICAL EXAM:  General: NAD  HEENT: anicteric sclera, oropharynx clear, MMM  Neck: No JVD  Respiratory: CTAB, no wheezes, rales or rhonchi  Cardiovascular: S1, S2, RRR  Gastrointestinal: BS+, soft, NT/ND  Extremities: No cyanosis or clubbing. No peripheral edema  Neurological: A/O x 1  : +calero.   Skin: No rashes  Vascular Access: none    LABS:      132<L>  |  101  |  26<H>  ----------------------------<  100<H>  4.5   |  24  |  1.41<H>    Ca    10.1      2024 19:10    TPro  11.9<H>  /  Alb  2.7<L>  /  TBili  0.5  /  DBili      /  AST  9   /  ALT  8   /  AlkPhos  93      Creatinine Trend: 1.41 <--                        7.4    3.04  )-----------( 199      ( 2024 16:00 )             22.4     Urine Studies:  Urinalysis Basic - ( 2024 00:30 )    Color: Yellow / Appearance: Clear / S.013 / pH:   Gluc:  / Ketone: Negative mg/dL  / Bili: Negative / Urobili: 0.2 mg/dL   Blood:  / Protein: Trace mg/dL / Nitrite: Positive   Leuk Esterase: Trace / RBC: 9 /HPF / WBC 11 /HPF   Sq Epi:  / Non Sq Epi: 2 /HPF / Bacteria: Negative /HPF          RADIOLOGY & ADDITIONAL STUDIES:                 [facility]   MD ANEL FOLEY MD ANGELA WONG, PA        TEL:  OFFICE: [phone]  From 5pm-7am answering service [phone]    --- INITIAL RENAL CONSULT NOTE ---date of service 24 @ 17:14    HPI: history from chart pt A+Ox1  This is a 78M with history of MM, HTN, and Dementia (AAO x 1-2 to self, to place, not to time at baseline per brother) who presents to the hospital from [facility] for hypotension and lethargy. Patient currently awake, alert, AAO x1 (to self,) but very poor historian, denies any acute complaints when asked. nephrology consulted for karina        Allergies:  No Known Allergies      PAST MEDICAL & SURGICAL HISTORY:  Dementia      Multiple myeloma      Essential hypertension      No significant past surgical history          Home Medications Reviewed    Hospital Medications:   MEDICATIONS  (STANDING):  albuterol/ipratropium for Nebulization 3 milliLiter(s) Nebulizer every 6 hours  cyanocobalamin 1000 MICROGram(s) Oral daily  donepezil 10 milliGRAM(s) Oral at bedtime  folic acid 1 milliGRAM(s) Oral daily  heparin   Injectable 5000 Unit(s) SubCutaneous every 8 hours  lactated ringers. 1000 milliLiter(s) (100 mL/Hr) IV Continuous <Continuous>  memantine 10 milliGRAM(s) Oral two times a day  oseltamivir 30 milliGRAM(s) Oral two times a day  risperiDONE   Tablet 0.25 milliGRAM(s) Oral daily  risperiDONE   Tablet 0.5 milliGRAM(s) Oral at bedtime  sertraline 50 milliGRAM(s) Oral daily      SOCIAL HISTORY:  Denies ETOh, Smoking,     FAMILY HISTORY:  No pertinent family history in first degree relatives        REVIEW OF SYSTEMS:  unable to obtain    VITALS:  T(F): 98.2 (24 @ 10:17), Max: 98.5 (24 @ 01:00)  HR: 81 (24 @ 10:17)  BP: 112/56 (24 @ 10:17)  RR: 18 (24 @ 10:17)  SpO2: 96% (24 @ 10:17)  Wt(kg): --     @ 07:01  -   @ 07:00  --------------------------------------------------------  IN: 0 mL / OUT: 950 mL / NET: -950 mL          PHYSICAL EXAM:  General: NAD  HEENT: anicteric sclera, oropharynx clear, MMM  Neck: No JVD  Respiratory: CTAB, no wheezes, rales or rhonchi  Cardiovascular: S1, S2, RRR  Gastrointestinal: BS+, soft, NT/ND  Extremities: No cyanosis or clubbing. No peripheral edema  Neurological: A/O x 1  : +calero.   Skin: No rashes  Vascular Access: none    LABS:      132<L>  |  101  |  26<H>  ----------------------------<  100<H>  4.5   |  24  |  1.41<H>    Ca    10.1      2024 19:10    TPro  11.9<H>  /  Alb  2.7<L>  /  TBili  0.5  /  DBili      /  AST  9   /  ALT  8   /  AlkPhos  93      Creatinine Trend: 1.41 <--                        7.4    3.04  )-----------( 199      ( 2024 16:00 )             22.4     Urine Studies:  Urinalysis Basic - ( 2024 00:30 )    Color: Yellow / Appearance: Clear / S.013 / pH:   Gluc:  / Ketone: Negative mg/dL  / Bili: Negative / Urobili: 0.2 mg/dL   Blood:  / Protein: Trace mg/dL / Nitrite: Positive   Leuk Esterase: Trace / RBC: 9 /HPF / WBC 11 /HPF   Sq Epi:  / Non Sq Epi: 2 /HPF / Bacteria: Negative /HPF          RADIOLOGY & ADDITIONAL STUDIES:

## 2024-01-04 NOTE — PROGRESS NOTE ADULT - ASSESSMENT
77 y/o M PMhx MM, HTN, and Dementia who [place] and lethargy found to have flu A    influenza A, fever  febrile to 100.7 on admission  no leukocytosis, no hypoxia  CXR clear    patient denies urinary symptoms, although he is a poor historian  UA not consistent w/ UTI  urine cx negative    Recommendations  c/w tamiflu 30mg bid (renally dosed)   - complete 5 day course w/ last day 1/7/2024    Sylvester Trinidad M.D.  [facility], Division of Infectious Diseases  [phone]  After 5pm on weekdays and all day on weekends - please call [phone]  77 y/o M PMhx MM, HTN, and Dementia who presented from [facility] for hypotension and lethargy found to have flu A    influenza A, fever  febrile to 100.7 on admission  no leukocytosis, no hypoxia  CXR clear    patient denies urinary symptoms, although he is a poor historian  UA not consistent w/ UTI  urine cx negative    Recommendations  c/w tamiflu 30mg bid (renally dosed)   - complete 5 day course w/ last day 1/7/2024    Sylvester Trinidad M.D.  [facility], Division of Infectious Diseases  [phone]  After 5pm on weekdays and all day on weekends - please call [phone]

## 2024-01-04 NOTE — DIETITIAN INITIAL EVALUATION ADULT - ADD RECOMMEND
1) Monitor weights, PO intake/diet tolerance, skin integrity, pertinent labs.  2) Please consistently document % PO intake in nursing flowsheets to assess adequacy of nutritional intake/monitor need for further nutritional intervention(s).

## 2024-01-04 NOTE — PROGRESS NOTE ADULT - SUBJECTIVE AND OBJECTIVE BOX
[facility], Division of Infectious Diseases  MIRTHA Bagley Y. Patel, S. Shah, G. Amos  [phone]  ([phone] - weekdays after 5pm and weekends)    Name: GUS DELUNA  Age/Gender: 78y Male  MRN: [id number]    Interval History:  Notes reviewed.   No concerning overnight events.  Afebrile.     Allergies: No Known Allergies      Objective:  Vitals:   T(F): 98.2 (24 @ 10:17), Max: 98.5 (24 @ 01:00)  HR: 81 (24 @ 10:17) (64 - 81)  BP: 112/56 (24 @ 10:17) (112/56 - 133/57)  RR: 18 (24 @ 10:17) (18 - 18)  SpO2: 96% (24 @ 10:17) (94% - 97%)  Physical Examination:  General: no acute distress  HEENT: anicteric  Cardio: S1, S2, normal rate  Resp: clear to auscultation anteriorly  Abd: soft, NT, ND  Ext: no LE edema  Skin: warm, dry    Laboratory Studies:  CBC:                       8.1    5.71  )-----------( 219      ( 2024 19:10 )             25.1     WBC Trend:  5.71 24 @ 19:10    CMP:     132<L>  |  101  |  26<H>  ----------------------------<  100<H>  4.5   |  24  |  1.41<H>    Ca    10.1      2024 19:10    TPro  11.9<H>  /  Alb  2.7<L>  /  TBili  0.5  /  DBili  x   /  AST  9   /  ALT  8   /  AlkPhos  93        LIVER FUNCTIONS - ( 2024 19:10 )  Alb: 2.7 g/dL / Pro: 11.9 g/dL / ALK PHOS: 93 U/L / ALT: 8 U/L / AST: 9 U/L / GGT: x             Urinalysis Basic - ( 2024 00:30 )    Color: Yellow / Appearance: Clear / S.013 / pH: x  Gluc: x / Ketone: Negative mg/dL  / Bili: Negative / Urobili: 0.2 mg/dL   Blood: x / Protein: Trace mg/dL / Nitrite: Positive   Leuk Esterase: Trace / RBC: 9 /HPF / WBC 11 /HPF   Sq Epi: x / Non Sq Epi: 2 /HPF / Bacteria: Negative /HPF      Microbiology: reviewed     Culture - Urine (collected 24 @ 22:39)  Source: Catheterized Catheterized  Final Report (24 @ 22:41):    No growth    Culture - Blood (collected 24 @ 19:49)  Source: .Blood Blood-Peripheral  Preliminary Report (24 @ 22:03):    No growth at 24 hours    Culture - Blood (collected 24 @ 19:49)  Source: .Blood Blood-Peripheral  Preliminary Report (24 @ 22:03):    No growth at 24 hours        Radiology: reviewed     Medications:  acetaminophen     Tablet .. 650 milliGRAM(s) Oral every 6 hours PRN  aluminum hydroxide/magnesium hydroxide/simethicone Suspension 30 milliLiter(s) Oral every 4 hours PRN  cyanocobalamin 1000 MICROGram(s) Oral daily  donepezil 10 milliGRAM(s) Oral at bedtime  folic acid 1 milliGRAM(s) Oral daily  heparin   Injectable 5000 Unit(s) SubCutaneous every 8 hours  lactated ringers. 1000 milliLiter(s) IV Continuous <Continuous>  melatonin 3 milliGRAM(s) Oral at bedtime PRN  memantine 10 milliGRAM(s) Oral two times a day  ondansetron Injectable 4 milliGRAM(s) IV Push every 8 hours PRN  oseltamivir 30 milliGRAM(s) Oral two times a day  QUEtiapine 12.5 milliGRAM(s) Oral every 6 hours PRN  risperiDONE   Tablet 0.25 milliGRAM(s) Oral daily  risperiDONE   Tablet 0.5 milliGRAM(s) Oral at bedtime  sertraline 50 milliGRAM(s) Oral daily    Antimicrobials:  oseltamivir 30 milliGRAM(s) Oral two times a day   [facility], Division of Infectious Diseases  MIRTHA Bagley Y. Patel, S. Shah, G. Amos  [phone]  ([phone] - weekdays after 5pm and weekends)    Name: GUS DELUNA  Age/Gender: 78y Male  MRN: [id number]    Interval History:  Notes reviewed.   No concerning overnight events.  Afebrile.     Allergies: No Known Allergies      Objective:  Vitals:   T(F): 98.2 (24 @ 10:17), Max: 98.5 (24 @ 01:00)  HR: 81 (24 @ 10:17) (64 - 81)  BP: 112/56 (24 @ 10:17) (112/56 - 133/57)  RR: 18 (24 @ 10:17) (18 - 18)  SpO2: 96% (24 @ 10:17) (94% - 97%)  Physical Examination:  General: no acute distress  HEENT: anicteric  Cardio: S1, S2, normal rate  Resp: clear to auscultation anteriorly  Abd: soft, NT, ND  Ext: no LE edema  Skin: warm, dry    Laboratory Studies:  CBC:                       8.1    5.71  )-----------( 219      ( 2024 19:10 )             25.1     WBC Trend:  5.71 24 @ 19:10    CMP:     132<L>  |  101  |  26<H>  ----------------------------<  100<H>  4.5   |  24  |  1.41<H>    Ca    10.1      2024 19:10    TPro  11.9<H>  /  Alb  2.7<L>  /  TBili  0.5  /  DBili  x   /  AST  9   /  ALT  8   /  AlkPhos  93        LIVER FUNCTIONS - ( 2024 19:10 )  Alb: 2.7 g/dL / Pro: 11.9 g/dL / ALK PHOS: 93 U/L / ALT: 8 U/L / AST: 9 U/L / GGT: x             Urinalysis Basic - ( 2024 00:30 )    Color: Yellow / Appearance: Clear / S.013 / pH: x  Gluc: x / Ketone: Negative mg/dL  / Bili: Negative / Urobili: 0.2 mg/dL   Blood: x / Protein: Trace mg/dL / Nitrite: Positive   Leuk Esterase: Trace / RBC: 9 /HPF / WBC 11 /HPF   Sq Epi: x / Non Sq Epi: 2 /HPF / Bacteria: Negative /HPF      Microbiology: reviewed     Culture - Urine (collected 24 @ 22:39)  Source: Catheterized Catheterized  Final Report (24 @ 22:41):    No growth    Culture - Blood (collected 24 @ 19:49)  Source: .Blood Blood-Peripheral  Preliminary Report (24 @ 22:03):    No growth at 24 hours    Culture - Blood (collected 24 @ 19:49)  Source: .Blood Blood-Peripheral  Preliminary Report (24 @ 22:03):    No growth at 24 hours        Radiology: reviewed     Medications:  acetaminophen     Tablet .. 650 milliGRAM(s) Oral every 6 hours PRN  aluminum hydroxide/magnesium hydroxide/simethicone Suspension 30 milliLiter(s) Oral every 4 hours PRN  cyanocobalamin 1000 MICROGram(s) Oral daily  donepezil 10 milliGRAM(s) Oral at bedtime  folic acid 1 milliGRAM(s) Oral daily  heparin   Injectable 5000 Unit(s) SubCutaneous every 8 hours  lactated ringers. 1000 milliLiter(s) IV Continuous <Continuous>  melatonin 3 milliGRAM(s) Oral at bedtime PRN  memantine 10 milliGRAM(s) Oral two times a day  ondansetron Injectable 4 milliGRAM(s) IV Push every 8 hours PRN  oseltamivir 30 milliGRAM(s) Oral two times a day  QUEtiapine 12.5 milliGRAM(s) Oral every 6 hours PRN  risperiDONE   Tablet 0.25 milliGRAM(s) Oral daily  risperiDONE   Tablet 0.5 milliGRAM(s) Oral at bedtime  sertraline 50 milliGRAM(s) Oral daily    Antimicrobials:  oseltamivir 30 milliGRAM(s) Oral two times a day

## 2024-01-04 NOTE — PROGRESS NOTE ADULT - SUBJECTIVE AND OBJECTIVE BOX
DATE OF SERVICE: 01-04-24    Patient denies chest pain or shortness of breath.   Review of symptoms otherwise negative.    MEDICATIONS:  acetaminophen     Tablet .. 650 milliGRAM(s) Oral every 6 hours PRN  albuterol/ipratropium for Nebulization 3 milliLiter(s) Nebulizer every 6 hours  aluminum hydroxide/magnesium hydroxide/simethicone Suspension 30 milliLiter(s) Oral every 4 hours PRN  cyanocobalamin 1000 MICROGram(s) Oral daily  donepezil 10 milliGRAM(s) Oral at bedtime  folic acid 1 milliGRAM(s) Oral daily  heparin   Injectable 5000 Unit(s) SubCutaneous every 8 hours  lactated ringers. 1000 milliLiter(s) IV Continuous <Continuous>  melatonin 3 milliGRAM(s) Oral at bedtime PRN  memantine 10 milliGRAM(s) Oral two times a day  ondansetron Injectable 4 milliGRAM(s) IV Push every 8 hours PRN  oseltamivir 30 milliGRAM(s) Oral two times a day  QUEtiapine 12.5 milliGRAM(s) Oral every 6 hours PRN  risperiDONE   Tablet 0.25 milliGRAM(s) Oral daily  risperiDONE   Tablet 0.5 milliGRAM(s) Oral at bedtime  sertraline 50 milliGRAM(s) Oral daily      LABS:                        8.1    5.71  )-----------( 219      ( 02 Jan 2024 19:10 )             25.1       Hemoglobin: 8.1 g/dL (01-02 @ 19:10)      01-02    132<L>  |  101  |  26<H>  ----------------------------<  100<H>  4.5   |  24  |  1.41<H>    Ca    10.1      02 Jan 2024 19:10    TPro  11.9<H>  /  Alb  2.7<L>  /  TBili  0.5  /  DBili  x   /  AST  9   /  ALT  8   /  AlkPhos  93  01-02    Creatinine Trend: 1.41<--    COAGS:           PHYSICAL EXAM:  T(C): 36.8 (01-04-24 @ 10:17), Max: 36.9 (01-04-24 @ 01:00)  HR: 81 (01-04-24 @ 10:17) (64 - 81)  BP: 112/56 (01-04-24 @ 10:17) (112/56 - 128/70)  RR: 18 (01-04-24 @ 10:17) (18 - 18)  SpO2: 96% (01-04-24 @ 10:17) (94% - 96%)  Wt(kg): --    I&O's Summary    03 Jan 2024 07:01  -  04 Jan 2024 07:00  --------------------------------------------------------  IN: 0 mL / OUT: 950 mL / NET: -950 mL      General:  Alert and Oriented * 3.   Head: Normocephalic and atraumatic.   Neck: No JVD. No bruits. Supple. Does not appear to be enlarged.   Cardiovascular: + S1,S2 ; RRR Soft systolic murmur at the left lower sternal border. No rubs noted.    Lungs: CTA b/l. No rhonchi, rales or wheezes.   Abdomen: + BS, soft. Non tender. Non distended. No rebound. No guarding.   Extremities: No clubbing/cyanosis/edema.   Neurologic: Moves all four extremities. Full range of motion.   Skin: Warm and moist. The patient's skin has normal elasticity and good skin turgor.   Psychiatric: Appropriate mood and affect.  Musculoskeletal: Normal range of motion, normal strength       Gen: Appears comfortable, lethargic but arousable  HEENT:  (-)icterus (-)pallor  CV: N S1 S2 1/6 HOLLAND (+)2 Pulses B/l  Resp:  Clear to ausculatation B/L, normal effort  GI: (+) BS Soft, NT, ND  Lymph:  (-)Edema, (-)obvious lymphadenopathy  Skin: Warm to touch, Normal turgor  Psych: unable to eval    ECG:  	Sinus tachycardia    < from: Xray Chest 1 View AP/PA (01.02.24 @ 19:36) >  IMPRESSION:  No focal consolidations.  < end of copied text >    < from: Transthoracic Echocardiogram (07.10.23 @ 11:15) >  CONCLUSIONS:  1. Calcified trileaflet aortic valve with normal opening.  Minimal aortic regurgitation.  2. Endocardium not well visualized; grossly decreased  possibly mild left ventricular systolic dysfunction.  3. The right ventricle is not well visualized; grossly  normal right ventricular systolic function.  ------------------------------------------------------------------------  Confirmed on  7/10/2023 - 13:57:46 by Jolene Rowan M.D. RPVI  < end of copied text >      ASSESSMENT/PLAN: 	78M with history of MM, HTN, and Dementia (AAO x 1-2 to self, to place, not to time at baseline per brother) who presents to the hospital from [facility] for hypotension and lethargy found to have FLU A    -#Hypotension  --BP stable  --Norvasc and Lopressor on hold given acute illness  -- eventually resume before DC    #lethargy  --due to Flu  --tamiflu and Abx per medicine  --supportive care  --PT when able    Staci Muñiz MD  Pager: [phone]    DATE OF SERVICE: 01-04-24    Patient denies chest pain or shortness of breath.   Review of symptoms otherwise negative.    MEDICATIONS:  acetaminophen     Tablet .. 650 milliGRAM(s) Oral every 6 hours PRN  albuterol/ipratropium for Nebulization 3 milliLiter(s) Nebulizer every 6 hours  aluminum hydroxide/magnesium hydroxide/simethicone Suspension 30 milliLiter(s) Oral every 4 hours PRN  cyanocobalamin 1000 MICROGram(s) Oral daily  donepezil 10 milliGRAM(s) Oral at bedtime  folic acid 1 milliGRAM(s) Oral daily  heparin   Injectable 5000 Unit(s) SubCutaneous every 8 hours  lactated ringers. 1000 milliLiter(s) IV Continuous <Continuous>  melatonin 3 milliGRAM(s) Oral at bedtime PRN  memantine 10 milliGRAM(s) Oral two times a day  ondansetron Injectable 4 milliGRAM(s) IV Push every 8 hours PRN  oseltamivir 30 milliGRAM(s) Oral two times a day  QUEtiapine 12.5 milliGRAM(s) Oral every 6 hours PRN  risperiDONE   Tablet 0.25 milliGRAM(s) Oral daily  risperiDONE   Tablet 0.5 milliGRAM(s) Oral at bedtime  sertraline 50 milliGRAM(s) Oral daily      LABS:                        8.1    5.71  )-----------( 219      ( 02 Jan 2024 19:10 )             25.1       Hemoglobin: 8.1 g/dL (01-02 @ 19:10)      01-02    132<L>  |  101  |  26<H>  ----------------------------<  100<H>  4.5   |  24  |  1.41<H>    Ca    10.1      02 Jan 2024 19:10    TPro  11.9<H>  /  Alb  2.7<L>  /  TBili  0.5  /  DBili  x   /  AST  9   /  ALT  8   /  AlkPhos  93  01-02    Creatinine Trend: 1.41<--    COAGS:           PHYSICAL EXAM:  T(C): 36.8 (01-04-24 @ 10:17), Max: 36.9 (01-04-24 @ 01:00)  HR: 81 (01-04-24 @ 10:17) (64 - 81)  BP: 112/56 (01-04-24 @ 10:17) (112/56 - 128/70)  RR: 18 (01-04-24 @ 10:17) (18 - 18)  SpO2: 96% (01-04-24 @ 10:17) (94% - 96%)  Wt(kg): --    I&O's Summary    03 Jan 2024 07:01  -  04 Jan 2024 07:00  --------------------------------------------------------  IN: 0 mL / OUT: 950 mL / NET: -950 mL      General:  Alert and Oriented * 3.   Head: Normocephalic and atraumatic.   Neck: No JVD. No bruits. Supple. Does not appear to be enlarged.   Cardiovascular: + S1,S2 ; RRR Soft systolic murmur at the left lower sternal border. No rubs noted.    Lungs: CTA b/l. No rhonchi, rales or wheezes.   Abdomen: + BS, soft. Non tender. Non distended. No rebound. No guarding.   Extremities: No clubbing/cyanosis/edema.   Neurologic: Moves all four extremities. Full range of motion.   Skin: Warm and moist. The patient's skin has normal elasticity and good skin turgor.   Psychiatric: Appropriate mood and affect.  Musculoskeletal: Normal range of motion, normal strength       Gen: Appears comfortable, lethargic but arousable  HEENT:  (-)icterus (-)pallor  CV: N S1 S2 1/6 HOLLAND (+)2 Pulses B/l  Resp:  Clear to ausculatation B/L, normal effort  GI: (+) BS Soft, NT, ND  Lymph:  (-)Edema, (-)obvious lymphadenopathy  Skin: Warm to touch, Normal turgor  Psych: unable to eval    ECG:  	Sinus tachycardia    < from: Xray Chest 1 View AP/PA (01.02.24 @ 19:36) >  IMPRESSION:  No focal consolidations.  < end of copied text >    < from: Transthoracic Echocardiogram (07.10.23 @ 11:15) >  CONCLUSIONS:  1. Calcified trileaflet aortic valve with normal opening.  Minimal aortic regurgitation.  2. Endocardium not well visualized; grossly decreased  possibly mild left ventricular systolic dysfunction.  3. The right ventricle is not well visualized; grossly  normal right ventricular systolic function.  ------------------------------------------------------------------------  Confirmed on  7/10/2023 - 13:57:46 by Jolene Rowan M.D. RPVI  < end of copied text >      ASSESSMENT/PLAN: 	78M with history of MM, HTN, and Dementia (AAO x 1-2 to self, to place, not to time at baseline per brother) who presents to the hospital from [facility] for hypotension and lethargy found to have FLU A    -#Hypotension  --BP stable  --Norvasc and Lopressor on hold given acute illness  -- eventually resume before DC    #lethargy  --due to Flu  --tamiflu and Abx per medicine  --supportive care  --PT when able    Staci Muñiz MD  Pager: [phone]

## 2024-01-04 NOTE — CONSULT NOTE ADULT - REASON FOR ADMISSION
Hypotension, lethargu
Hypotension, lethargu
Hypotension, lethargy

## 2024-01-04 NOTE — DIETITIAN INITIAL EVALUATION ADULT - PERTINENT MEDS FT
MEDICATIONS  (STANDING):  cyanocobalamin 1000 MICROGram(s) Oral daily  donepezil 10 milliGRAM(s) Oral at bedtime  folic acid 1 milliGRAM(s) Oral daily  heparin   Injectable 5000 Unit(s) SubCutaneous every 8 hours  lactated ringers. 1000 milliLiter(s) (100 mL/Hr) IV Continuous <Continuous>  memantine 10 milliGRAM(s) Oral two times a day  oseltamivir 30 milliGRAM(s) Oral two times a day  risperiDONE   Tablet 0.25 milliGRAM(s) Oral daily  risperiDONE   Tablet 0.5 milliGRAM(s) Oral at bedtime  sertraline 50 milliGRAM(s) Oral daily    MEDICATIONS  (PRN):  acetaminophen     Tablet .. 650 milliGRAM(s) Oral every 6 hours PRN Temp greater or equal to 38C (100.4F), Mild Pain (1 - 3)  aluminum hydroxide/magnesium hydroxide/simethicone Suspension 30 milliLiter(s) Oral every 4 hours PRN Dyspepsia  melatonin 3 milliGRAM(s) Oral at bedtime PRN Insomnia  ondansetron Injectable 4 milliGRAM(s) IV Push every 8 hours PRN Nausea and/or Vomiting  QUEtiapine 12.5 milliGRAM(s) Oral every 6 hours PRN for agitation

## 2024-01-04 NOTE — DIETITIAN INITIAL EVALUATION ADULT - DIET TYPE
defer diet / liquid consistencies to physician / team, consider bedside swallow assessment/low sodium

## 2024-01-04 NOTE — DIETITIAN INITIAL EVALUATION ADULT - PROBLEM SELECTOR PLAN 9
DVT ppx - HSQ   Diet - Pending dysphagia screen  Activity - OOB with assistance, increase as tolerated  Code status - Patient with MOLST form in chart stating he is DNR/DNI, attempted to verify this with patient's brother Mookie but he said that patient's HCP is Ms. Acosta Libby [phone]. Called her but there was no , left  for call back. If verified with HCP that patient is DNR/DNI then would place order in chart.     Fall and aspiration precautions DVT ppx - HSQ   Diet - Pending dysphagia screen  Activity - OOB with assistance, increase as tolerated  Code status - Patient with MOLST form in chart stating he is DNR/DNI, attempted to verify this with patient's brother Mookie but he said that patient's HCP is Ms. Acosta Libby [phone]. Called her but there was no , left  for call back. If verified with HCP that patient is DNR/DNI then would place order in chart.     Fall and aspiration precautions

## 2024-01-04 NOTE — CONSULT NOTE ADULT - ASSESSMENT
This is a 78M with history of MM, HTN, and Dementia (AAO x 1-2 to self, to place, not to time at baseline per brother) who presents to the hospital from [facility] for hypotension and lethargy. Patient currently awake, alert, AAO x1 (to self,) but very poor historian, denies any acute complaints when asked. nephrology consulted for david    DAVID  admitted with scr 1.4  ? etiology  pt with sepsis possible ATN  no new lab  needs repeat bmp  urine cr, na  calero in place    hematuria  calero in place  repeat ua after calero removal  check renal us    hyponatremia  ? etiology  check urine na and osmo  check tsh and cortisol level  monitor  free water restriction <1.2L    anemia  iron panel  transfusion and work up per team   This is a 78M with history of MM, HTN, and Dementia (AAO x 1-2 to self, to place, not to time at baseline per brother) who presents to the hospital from [facility] for hypotension and lethargy. Patient currently awake, alert, AAO x1 (to self,) but very poor historian, denies any acute complaints when asked. nephrology consulted for david    DAVID  admitted with scr 1.4  ? etiology  pt with sepsis possible ATN  no new lab  needs repeat bmp  urine cr, na  calero in place    hematuria  calero in place  repeat ua after calero removal  check renal us    hyponatremia  ? etiology  check urine na and osmo  check tsh and cortisol level  monitor  free water restriction <1.2L    anemia  iron panel  transfusion and work up per team

## 2024-01-04 NOTE — CHART NOTE - NSCHARTNOTEFT_GEN_A_CORE
Family called and inquire for [facility] to be transfer [facility] [place], discussed with Dr Muñiz [facility]. HCP updated Family called and inquire for pt to be transfer to [facility] [place], discussed with Dr Muñiz pt [facility]. HCP updated

## 2024-01-04 NOTE — PROGRESS NOTE ADULT - SUBJECTIVE AND OBJECTIVE BOX
Date of Service: 24 @ 13:55    Patient is a 78y old  Male who presents with a chief complaint of Hypotension, lethargu (2024 13:21)      Any change in ROS: alert and awake and is confused:  no sob:  on room air:     MEDICATIONS  (STANDING):  albuterol/ipratropium for Nebulization 3 milliLiter(s) Nebulizer every 6 hours  cyanocobalamin 1000 MICROGram(s) Oral daily  donepezil 10 milliGRAM(s) Oral at bedtime  folic acid 1 milliGRAM(s) Oral daily  heparin   Injectable 5000 Unit(s) SubCutaneous every 8 hours  lactated ringers. 1000 milliLiter(s) (100 mL/Hr) IV Continuous <Continuous>  memantine 10 milliGRAM(s) Oral two times a day  oseltamivir 30 milliGRAM(s) Oral two times a day  risperiDONE   Tablet 0.25 milliGRAM(s) Oral daily  risperiDONE   Tablet 0.5 milliGRAM(s) Oral at bedtime  sertraline 50 milliGRAM(s) Oral daily    MEDICATIONS  (PRN):  acetaminophen     Tablet .. 650 milliGRAM(s) Oral every 6 hours PRN Temp greater or equal to 38C (100.4F), Mild Pain (1 - 3)  aluminum hydroxide/magnesium hydroxide/simethicone Suspension 30 milliLiter(s) Oral every 4 hours PRN Dyspepsia  melatonin 3 milliGRAM(s) Oral at bedtime PRN Insomnia  ondansetron Injectable 4 milliGRAM(s) IV Push every 8 hours PRN Nausea and/or Vomiting  QUEtiapine 12.5 milliGRAM(s) Oral every 6 hours PRN for agitation    Vital Signs Last 24 Hrs  T(C): 36.8 (2024 10:17), Max: 36.9 (2024 01:00)  T(F): 98.2 (2024 10:17), Max: 98.5 (2024 01:00)  HR: 81 (2024 10:17) (64 - 81)  BP: 112/56 (2024 10:17) (112/56 - 128/70)  BP(mean): --  RR: 18 (2024 10:17) (18 - 18)  SpO2: 96% (2024 10:17) (94% - 96%)    Parameters below as of 2024 10:17  Patient On (Oxygen Delivery Method): room air        I&O's Summary    2024 07:01  -  2024 07:00  --------------------------------------------------------  IN: 0 mL / OUT: 950 mL / NET: -950 mL          Physical Exam:   GENERAL: NAD, well-groomed, well-developed  HEENT: CHRISTINA/   Atraumatic, Normocephalic  ENMT: No tonsillar erythema, exudates, or enlargement; Moist mucous membranes, Good dentition, No lesions  NECK: Supple, No JVD, Normal thyroid  CHEST/LUNG: Clear to auscultaion  CVS: Regular rate and rhythm; No murmurs, rubs, or gallops  GI: : Soft, Nontender, Nondistended; Bowel sounds present  NERVOUS SYSTEM:  Alert & awake ; confused:  on room air  EXTREMITIES:  2+ Peripheral Pulses, No clubbing, cyanosis, or edema  LYMPH: No lymphadenopathy noted  SKIN: No rashes or lesions  ENDOCRINOLOGY: No Thyromegaly  PSYCH: dementia    Labs:  25, 25                            8.1    5.71  )-----------( 219      ( 2024 19:10 )             25.1     01-    132<L>  |  101  |  26<H>  ----------------------------<  100<H>  4.5   |  24  |  1.41<H>    Ca    10.1      2024 19:10    TPro  11.9<H>  /  Alb  2.7<L>  /  TBili  0.5  /  DBili  x   /  AST  9   /  ALT  8   /  AlkPhos  93  -    CAPILLARY BLOOD GLUCOSE          LIVER FUNCTIONS - ( 2024 19:10 )  Alb: 2.7 g/dL / Pro: 11.9 g/dL / ALK PHOS: 93 U/L / ALT: 8 U/L / AST: 9 U/L / GGT: x           PT/INR - ( 2024 19:10 )   PT: 14.1 sec;   INR: 1.26 ratio         PTT - ( 2024 19:10 )  PTT:28.7 sec  Urinalysis Basic - ( 2024 00:30 )    Color: Yellow / Appearance: Clear / S.013 / pH: x  Gluc: x / Ketone: Negative mg/dL  / Bili: Negative / Urobili: 0.2 mg/dL   Blood: x / Protein: Trace mg/dL / Nitrite: Positive   Leuk Esterase: Trace / RBC: 9 /HPF / WBC 11 /HPF   Sq Epi: x / Non Sq Epi: 2 /HPF / Bacteria: Negative /HPF      Lactate, Blood: 0.9 mmol/L ( @ 22:32)        RECENT CULTURES:   @ 00:20 Clean Catch Clean Catch (Midstream)         rad< from: Xray Chest 1 View AP/PA (24 @ 19:36) >      INTERPRETATION:  CLINICAL INDICATION: Sepsis.    EXAM: Chest x-ray 2 views.    COMPARISON: None available.    FINDINGS:  Surgical clips overlying the midline of the neck.  Bilateral lung fields partially obscured by patient positioning.   Visualized lung fields are clear.  There is no pleural effusion or pneumothorax.  The heart is not well evaluated in this position. Median sternotomy.  The visualized osseous structures demonstrate no acute pathology.    IMPRESSION:  No focal consolidations.    --- End of Report ---          MIR LAW MD; Resident Radiologist  This document has been electronically signed.  KOFI GARCIA MD; Attending Radiologist  This document has been electronically signed. 2024  7:43PM    < end of copied text >         No growth     @ 22:39 Catheterized Catheterized                No growth     @ 19:49 .Blood Blood-Peripheral                No growth at 24 hours          RESPIRATORY CULTURES:          Studies  Chest X-RAY  CT SCAN Chest   Venous Dopplers: LE:   CT Abdomen  Others

## 2024-01-05 ENCOUNTER — TRANSCRIPTION ENCOUNTER (OUTPATIENT)
Age: 79
End: 2024-01-05

## 2024-01-05 LAB
ANION GAP SERPL CALC-SCNC: 7 MMOL/L — SIGNIFICANT CHANGE UP (ref 7–14)
ANION GAP SERPL CALC-SCNC: 7 MMOL/L — SIGNIFICANT CHANGE UP (ref 7–14)
BUN SERPL-MCNC: 16 MG/DL — SIGNIFICANT CHANGE UP (ref 7–23)
BUN SERPL-MCNC: 16 MG/DL — SIGNIFICANT CHANGE UP (ref 7–23)
CALCIUM SERPL-MCNC: 9.4 MG/DL — SIGNIFICANT CHANGE UP (ref 8.4–10.5)
CALCIUM SERPL-MCNC: 9.4 MG/DL — SIGNIFICANT CHANGE UP (ref 8.4–10.5)
CHLORIDE SERPL-SCNC: 100 MMOL/L — SIGNIFICANT CHANGE UP (ref 98–107)
CHLORIDE SERPL-SCNC: 100 MMOL/L — SIGNIFICANT CHANGE UP (ref 98–107)
CO2 SERPL-SCNC: 23 MMOL/L — SIGNIFICANT CHANGE UP (ref 22–31)
CO2 SERPL-SCNC: 23 MMOL/L — SIGNIFICANT CHANGE UP (ref 22–31)
CREAT SERPL-MCNC: 0.77 MG/DL — SIGNIFICANT CHANGE UP (ref 0.5–1.3)
CREAT SERPL-MCNC: 0.77 MG/DL — SIGNIFICANT CHANGE UP (ref 0.5–1.3)
EGFR: 92 ML/MIN/1.73M2 — SIGNIFICANT CHANGE UP
EGFR: 92 ML/MIN/1.73M2 — SIGNIFICANT CHANGE UP
FOLATE SERPL-MCNC: 19.9 NG/ML — HIGH (ref 3.1–17.5)
FOLATE SERPL-MCNC: 19.9 NG/ML — HIGH (ref 3.1–17.5)
FOLATE SERPL-MCNC: >20 NG/ML — HIGH (ref 3.1–17.5)
FOLATE SERPL-MCNC: >20 NG/ML — HIGH (ref 3.1–17.5)
GLUCOSE SERPL-MCNC: 90 MG/DL — SIGNIFICANT CHANGE UP (ref 70–99)
GLUCOSE SERPL-MCNC: 90 MG/DL — SIGNIFICANT CHANGE UP (ref 70–99)
HCT VFR BLD CALC: 23.1 % — LOW (ref 39–50)
HCT VFR BLD CALC: 23.1 % — LOW (ref 39–50)
HGB BLD-MCNC: 7.7 G/DL — LOW (ref 13–17)
HGB BLD-MCNC: 7.7 G/DL — LOW (ref 13–17)
MAGNESIUM SERPL-MCNC: 1.7 MG/DL — SIGNIFICANT CHANGE UP (ref 1.6–2.6)
MAGNESIUM SERPL-MCNC: 1.7 MG/DL — SIGNIFICANT CHANGE UP (ref 1.6–2.6)
MCHC RBC-ENTMCNC: 33.3 GM/DL — SIGNIFICANT CHANGE UP (ref 32–36)
MCHC RBC-ENTMCNC: 33.3 GM/DL — SIGNIFICANT CHANGE UP (ref 32–36)
MCHC RBC-ENTMCNC: 34.2 PG — HIGH (ref 27–34)
MCHC RBC-ENTMCNC: 34.2 PG — HIGH (ref 27–34)
MCV RBC AUTO: 102.7 FL — HIGH (ref 80–100)
MCV RBC AUTO: 102.7 FL — HIGH (ref 80–100)
NRBC # BLD: 0 /100 WBCS — SIGNIFICANT CHANGE UP (ref 0–0)
NRBC # BLD: 0 /100 WBCS — SIGNIFICANT CHANGE UP (ref 0–0)
NRBC # FLD: 0 K/UL — SIGNIFICANT CHANGE UP (ref 0–0)
NRBC # FLD: 0 K/UL — SIGNIFICANT CHANGE UP (ref 0–0)
PHOSPHATE SERPL-MCNC: 2.3 MG/DL — LOW (ref 2.5–4.5)
PHOSPHATE SERPL-MCNC: 2.3 MG/DL — LOW (ref 2.5–4.5)
PLATELET # BLD AUTO: 194 K/UL — SIGNIFICANT CHANGE UP (ref 150–400)
PLATELET # BLD AUTO: 194 K/UL — SIGNIFICANT CHANGE UP (ref 150–400)
POTASSIUM SERPL-MCNC: 3.8 MMOL/L — SIGNIFICANT CHANGE UP (ref 3.5–5.3)
POTASSIUM SERPL-MCNC: 3.8 MMOL/L — SIGNIFICANT CHANGE UP (ref 3.5–5.3)
POTASSIUM SERPL-SCNC: 3.8 MMOL/L — SIGNIFICANT CHANGE UP (ref 3.5–5.3)
POTASSIUM SERPL-SCNC: 3.8 MMOL/L — SIGNIFICANT CHANGE UP (ref 3.5–5.3)
RBC # BLD: 2.25 M/UL — LOW (ref 4.2–5.8)
RBC # BLD: 2.25 M/UL — LOW (ref 4.2–5.8)
RBC # FLD: 13.3 % — SIGNIFICANT CHANGE UP (ref 10.3–14.5)
RBC # FLD: 13.3 % — SIGNIFICANT CHANGE UP (ref 10.3–14.5)
SODIUM SERPL-SCNC: 130 MMOL/L — LOW (ref 135–145)
SODIUM SERPL-SCNC: 130 MMOL/L — LOW (ref 135–145)
VIT B12 SERPL-MCNC: 914 PG/ML — HIGH (ref 200–900)
VIT B12 SERPL-MCNC: 914 PG/ML — HIGH (ref 200–900)
WBC # BLD: 3.08 K/UL — LOW (ref 3.8–10.5)
WBC # BLD: 3.08 K/UL — LOW (ref 3.8–10.5)
WBC # FLD AUTO: 3.08 K/UL — LOW (ref 3.8–10.5)
WBC # FLD AUTO: 3.08 K/UL — LOW (ref 3.8–10.5)

## 2024-01-05 PROCEDURE — 76770 US EXAM ABDO BACK WALL COMP: CPT | Mod: 26

## 2024-01-05 RX ADMIN — DONEPEZIL HYDROCHLORIDE 10 MILLIGRAM(S): 10 TABLET, FILM COATED ORAL at 22:58

## 2024-01-05 RX ADMIN — RISPERIDONE 0.5 MILLIGRAM(S): 4 TABLET ORAL at 22:55

## 2024-01-05 RX ADMIN — Medication 3 MILLILITER(S): at 03:33

## 2024-01-05 RX ADMIN — SERTRALINE 50 MILLIGRAM(S): 25 TABLET, FILM COATED ORAL at 11:46

## 2024-01-05 RX ADMIN — Medication 30 MILLIGRAM(S): at 06:32

## 2024-01-05 RX ADMIN — Medication 3 MILLILITER(S): at 21:31

## 2024-01-05 RX ADMIN — MEMANTINE HYDROCHLORIDE 10 MILLIGRAM(S): 10 TABLET ORAL at 11:46

## 2024-01-05 RX ADMIN — MEMANTINE HYDROCHLORIDE 10 MILLIGRAM(S): 10 TABLET ORAL at 20:28

## 2024-01-05 RX ADMIN — HEPARIN SODIUM 5000 UNIT(S): 5000 INJECTION INTRAVENOUS; SUBCUTANEOUS at 22:55

## 2024-01-05 RX ADMIN — HEPARIN SODIUM 5000 UNIT(S): 5000 INJECTION INTRAVENOUS; SUBCUTANEOUS at 14:52

## 2024-01-05 RX ADMIN — RISPERIDONE 0.25 MILLIGRAM(S): 4 TABLET ORAL at 11:47

## 2024-01-05 RX ADMIN — Medication 63.75 MILLIMOLE(S): at 14:51

## 2024-01-05 RX ADMIN — PREGABALIN 1000 MICROGRAM(S): 225 CAPSULE ORAL at 11:47

## 2024-01-05 RX ADMIN — Medication 1 MILLIGRAM(S): at 11:47

## 2024-01-05 RX ADMIN — Medication 30 MILLIGRAM(S): at 20:28

## 2024-01-05 NOTE — PROGRESS NOTE ADULT - ASSESSMENT
78M w/ MM, HTN, and Dementia (AAO x 1-2 to self, to place, not to time at baseline per brother) admitted from nursing home for hypotension and lethargy. Found to be septic with UTI and + influenza.  CTH with mod to severe volume loss and moderate microvascular ischemic changes, no acute pathology  On CTX for UTI and Tamiflu  Labs notable for lactic acidosis, hyponatremia, DAVID  o/e is very confused, hard of hearing, perseverative    Encephalopathy from TME, superimposed on baseline dementia    Plan:  -supportive care for influenza  -correct metabolic derangements  - MRI not needed  -cont home donepezil 10mg qhs, memantine 10mg bid  -cont risperidone, seroquel   -pt/ot  -dvt ppx    Thu Kwan DO  Vascular Neurology  Office [phone]         78M w/ MM, HTN, and Dementia (AAO x 1-2 to self, to place, not to time at baseline per brother) admitted from nursing home for hypotension and lethargy. Found to be septic with UTI and + influenza.  CTH with mod to severe volume loss and moderate microvascular ischemic changes, no acute pathology  On CTX for UTI and Tamiflu  Labs notable for lactic acidosis, hyponatremia, DAVID  o/e is very confused, hard of hearing, perseverative    Encephalopathy from TME, superimposed on baseline dementia    Plan:  -supportive care for influenza  -correct metabolic derangements  - MRI not needed  -cont home donepezil 10mg qhs, memantine 10mg bid  -cont risperidone, seroquel   -pt/ot  -dvt ppx    Thu Kwan DO  Vascular Neurology  Office [phone]

## 2024-01-05 NOTE — PROGRESS NOTE ADULT - SUBJECTIVE AND OBJECTIVE BOX
Neurology Progress Note    S: Patient seen and examined. No new events overnight.    Medication:  acetaminophen     Tablet .. 650 milliGRAM(s) Oral every 6 hours PRN  albuterol/ipratropium for Nebulization 3 milliLiter(s) Nebulizer every 6 hours  aluminum hydroxide/magnesium hydroxide/simethicone Suspension 30 milliLiter(s) Oral every 4 hours PRN  cyanocobalamin 1000 MICROGram(s) Oral daily  donepezil 10 milliGRAM(s) Oral at bedtime  folic acid 1 milliGRAM(s) Oral daily  heparin   Injectable 5000 Unit(s) SubCutaneous every 8 hours  melatonin 3 milliGRAM(s) Oral at bedtime PRN  memantine 10 milliGRAM(s) Oral two times a day  ondansetron Injectable 4 milliGRAM(s) IV Push every 8 hours PRN  oseltamivir 30 milliGRAM(s) Oral two times a day  QUEtiapine 12.5 milliGRAM(s) Oral every 6 hours PRN  risperiDONE   Tablet 0.25 milliGRAM(s) Oral daily  risperiDONE   Tablet 0.5 milliGRAM(s) Oral at bedtime  sertraline 50 milliGRAM(s) Oral daily      Vitals:  Vital Signs Last 24 Hrs  T(C): 36.3 (05 Jan 2024 10:33), Max: 36.4 (04 Jan 2024 17:26)  T(F): 97.4 (05 Jan 2024 10:33), Max: 97.5 (04 Jan 2024 17:26)  HR: 72 (05 Jan 2024 10:33) (64 - 84)  BP: 103/54 (05 Jan 2024 10:33) (103/54 - 119/70)  BP(mean): --  RR: 17 (05 Jan 2024 10:33) (17 - 18)  SpO2: 98% (05 Jan 2024 10:33) (96% - 98%)    Parameters below as of 05 Jan 2024 10:33  Patient On (Oxygen Delivery Method): room air        General Exam:   General Appearance: Appropriately dressed and in no acute distress       Head: Normocephalic, atraumatic and no dysmorphic features  Ear, Nose, and Throat: Moist mucous membranes  CVS: S1S2+  Resp: No SOB, no wheeze or rhonchi  Abd: soft NTND  Extremities: No edema, no cyanosis  Skin: No bruises, no rashes     Neurological Exam:  Mental Status: Awake, alert and oriented x 1.  Able to follow simple verbal commands, with perseveration. Can name some objects, refuses to participate further. No dysarthria  Cranial Nerves: PERRL, EOMI, VFFC, sensation V1-V3 intact,  no obvious facial asymmetry, equal elevation of palate, scm/trap 5/5, tongue is midline on protrusion. . hearing is grossly intact.   Motor:  CHO at least 4/5   Sensation: Intact to light touch and pinprick throughout  Reflexes: 1+ throughout at biceps, brachioradialis, triceps, patellars and ankles bilaterally and equal. No clonus. R toe and L toe were both downgoing.  Coordination: unable   Gait: deferred       I personally reviewed the below data/images/labs:      CBC Full  -  ( 05 Jan 2024 05:21 )  WBC Count : 3.08 K/uL  RBC Count : 2.25 M/uL  Hemoglobin : 7.7 g/dL  Hematocrit : 23.1 %  Platelet Count - Automated : 194 K/uL  Mean Cell Volume : 102.7 fL  Mean Cell Hemoglobin : 34.2 pg  Mean Cell Hemoglobin Concentration : 33.3 gm/dL  Auto Neutrophil # : x  Auto Lymphocyte # : x  Auto Monocyte # : x  Auto Eosinophil # : x  Auto Basophil # : x  Auto Neutrophil % : x  Auto Lymphocyte % : x  Auto Monocyte % : x  Auto Eosinophil % : x  Auto Basophil % : x    01-05    130<L>  |  100  |  16  ----------------------------<  90  3.8   |  23  |  0.77    Ca    9.4      05 Jan 2024 05:21  Phos  2.3     01-05  Mg     1.70     01-05          Urinalysis Basic - ( 05 Jan 2024 05:21 )    Color: x / Appearance: x / SG: x / pH: x  Gluc: 90 mg/dL / Ketone: x  / Bili: x / Urobili: x   Blood: x / Protein: x / Nitrite: x   Leuk Esterase: x / RBC: x / WBC x   Sq Epi: x / Non Sq Epi: x / Bacteria: x        < from: CT Head No Cont (01.03.24 @ 02:00) >    ACC: [id number] EXAM:  CT BRAIN   ORDERED BY: MALOU CRISOSTOMO     PROCEDURE DATE:  01/03/2024          INTERPRETATION:  EXAMINATION: CT HEAD    DATE: 1/3/2024 2:00 AM    INDICATION: lethargy, altered mental status. History of falls.    COMPARISON: CT head from 7/7/2023.    TECHNIQUE: Thin section noncontrast axial images were obtained through   the head.  RAPID AI was utilized for preliminary assessment of   intracranial hemorrhage.    FINDINGS:  Intracranial Contents:    Moderate to severe cerebral volume loss.. Mild to moderate chronic   microvascular ischemic changes Gray-white differentiation is preserved.   No hydrocephalus. The basilar cisterns are clear. No focal edema or acute   mass effect. There is no intracranial fluid collectionor acute   hemorrhage.    Bones and Extracranial Soft Tissues:    There is no fracture identified. Scattered paranasal sinus mucosal   thickening. Mastoid air cells are clear. Scalp and imaged facial soft   tissues are within normal limits.      IMPRESSION:  1. No acute intracranial CT abnormality.    < end of copied text >         Neurology Progress Note    S: Patient seen and examined. No new events overnight.    Medication:  acetaminophen     Tablet .. 650 milliGRAM(s) Oral every 6 hours PRN  albuterol/ipratropium for Nebulization 3 milliLiter(s) Nebulizer every 6 hours  aluminum hydroxide/magnesium hydroxide/simethicone Suspension 30 milliLiter(s) Oral every 4 hours PRN  cyanocobalamin 1000 MICROGram(s) Oral daily  donepezil 10 milliGRAM(s) Oral at bedtime  folic acid 1 milliGRAM(s) Oral daily  heparin   Injectable 5000 Unit(s) SubCutaneous every 8 hours  melatonin 3 milliGRAM(s) Oral at bedtime PRN  memantine 10 milliGRAM(s) Oral two times a day  ondansetron Injectable 4 milliGRAM(s) IV Push every 8 hours PRN  oseltamivir 30 milliGRAM(s) Oral two times a day  QUEtiapine 12.5 milliGRAM(s) Oral every 6 hours PRN  risperiDONE   Tablet 0.25 milliGRAM(s) Oral daily  risperiDONE   Tablet 0.5 milliGRAM(s) Oral at bedtime  sertraline 50 milliGRAM(s) Oral daily      Vitals:  Vital Signs Last 24 Hrs  T(C): 36.3 (05 Jan 2024 10:33), Max: 36.4 (04 Jan 2024 17:26)  T(F): 97.4 (05 Jan 2024 10:33), Max: 97.5 (04 Jan 2024 17:26)  HR: 72 (05 Jan 2024 10:33) (64 - 84)  BP: 103/54 (05 Jan 2024 10:33) (103/54 - 119/70)  BP(mean): --  RR: 17 (05 Jan 2024 10:33) (17 - 18)  SpO2: 98% (05 Jan 2024 10:33) (96% - 98%)    Parameters below as of 05 Jan 2024 10:33  Patient On (Oxygen Delivery Method): room air        General Exam:   General Appearance: Appropriately dressed and in no acute distress       Head: Normocephalic, atraumatic and no dysmorphic features  Ear, Nose, and Throat: Moist mucous membranes  CVS: S1S2+  Resp: No SOB, no wheeze or rhonchi  Abd: soft NTND  Extremities: No edema, no cyanosis  Skin: No bruises, no rashes     Neurological Exam:  Mental Status: Awake, alert and oriented x 1.  Able to follow simple verbal commands, with perseveration. Can name some objects, refuses to participate further. No dysarthria  Cranial Nerves: PERRL, EOMI, VFFC, sensation V1-V3 intact,  no obvious facial asymmetry, equal elevation of palate, scm/trap 5/5, tongue is midline on protrusion. . hearing is grossly intact.   Motor:  CHO at least 4/5   Sensation: Intact to light touch and pinprick throughout  Reflexes: 1+ throughout at biceps, brachioradialis, triceps, patellars and ankles bilaterally and equal. No clonus. R toe and L toe were both downgoing.  Coordination: unable   Gait: deferred       I personally reviewed the below data/images/labs:      CBC Full  -  ( 05 Jan 2024 05:21 )  WBC Count : 3.08 K/uL  RBC Count : 2.25 M/uL  Hemoglobin : 7.7 g/dL  Hematocrit : 23.1 %  Platelet Count - Automated : 194 K/uL  Mean Cell Volume : 102.7 fL  Mean Cell Hemoglobin : 34.2 pg  Mean Cell Hemoglobin Concentration : 33.3 gm/dL  Auto Neutrophil # : x  Auto Lymphocyte # : x  Auto Monocyte # : x  Auto Eosinophil # : x  Auto Basophil # : x  Auto Neutrophil % : x  Auto Lymphocyte % : x  Auto Monocyte % : x  Auto Eosinophil % : x  Auto Basophil % : x    01-05    130<L>  |  100  |  16  ----------------------------<  90  3.8   |  23  |  0.77    Ca    9.4      05 Jan 2024 05:21  Phos  2.3     01-05  Mg     1.70     01-05          Urinalysis Basic - ( 05 Jan 2024 05:21 )    Color: x / Appearance: x / SG: x / pH: x  Gluc: 90 mg/dL / Ketone: x  / Bili: x / Urobili: x   Blood: x / Protein: x / Nitrite: x   Leuk Esterase: x / RBC: x / WBC x   Sq Epi: x / Non Sq Epi: x / Bacteria: x        < from: CT Head No Cont (01.03.24 @ 02:00) >    ACC: [id number] EXAM:  CT BRAIN   ORDERED BY: MALOU CRISOSTOMO     PROCEDURE DATE:  01/03/2024          INTERPRETATION:  EXAMINATION: CT HEAD    DATE: 1/3/2024 2:00 AM    INDICATION: lethargy, altered mental status. History of falls.    COMPARISON: CT head from 7/7/2023.    TECHNIQUE: Thin section noncontrast axial images were obtained through   the head.  RAPID AI was utilized for preliminary assessment of   intracranial hemorrhage.    FINDINGS:  Intracranial Contents:    Moderate to severe cerebral volume loss.. Mild to moderate chronic   microvascular ischemic changes Gray-white differentiation is preserved.   No hydrocephalus. The basilar cisterns are clear. No focal edema or acute   mass effect. There is no intracranial fluid collectionor acute   hemorrhage.    Bones and Extracranial Soft Tissues:    There is no fracture identified. Scattered paranasal sinus mucosal   thickening. Mastoid air cells are clear. Scalp and imaged facial soft   tissues are within normal limits.      IMPRESSION:  1. No acute intracranial CT abnormality.    < end of copied text >

## 2024-01-05 NOTE — DISCHARGE NOTE PROVIDER - NSDCFUADDAPPT_GEN_ALL_CORE_FT
Follow up with PCP within 1-2 weeks of discharge. If you are in need of a general medicine physician and post-discharge medical follow-up for further care/recommendations you may contact the [facility] for an appointment at [phone].  Follow up with PCP within 1-2 weeks of discharge. If you are in need of a general medicine physician and post-discharge medical follow-up for further care/recommendations you may contact the [facility] for an appointment at [phone].

## 2024-01-05 NOTE — PROGRESS NOTE ADULT - ASSESSMENT
This is a 78M with history of MM, HTN, and Dementia (AAO x 1-2 to self, to place, not to time at baseline per brother) who presents to the hospital from [facility] for hypotension and lethargy. Patient currently awake, alert, AAO x2 (to self, to hospital but not name, not to time) but very poor historian, denies any acute complaints when asked. Spoke with brother Mookie who said that the patient was sent to the hospital for lethargy and cough though he states that the patient has had a cough for several months. Brother denies any other known acute complaints for the patient. Called [facility] [phone] but there was no staff available who knew the patient. As per NH paperwork and ED note, patient was sent to the hospital for hypotention to 81/55 and lethargy. He was noted to have a low grade temp of 99.6 and saturation of 93% at his NH. He was given tylenol 650mg x1 prior to being sent to the hospital.   On arrival to the hospital his vitals were T 99.4 -> 100.7 rectal, P 92, BP 88/55 -> 117/70, RR 16, O2 sat 96% RA. His lab work was significant for worsening macrocytic anemia, DAVID with mild hyponatremia, elevated protein gap, elevated lactate with improvement on repeat. His UA was positive for nitrite, leuk esterase but negative for bacteria. His respiratory swab was positive for influenza A. His imaging did not show acute abnormalities. He was given ofirmev 1g, NS 500cc x1, vanc 1g, cefepime 2g, and tamiflu 75mg PO x1. He was admitted to medicine.  (03 Jan 2024 06:06):  now pulm called:  he is from NH:  above history noted:  he seems to be responding to simple commands:  he say he has no underlying lung history  never smoked:  on room air:  adm with influenza:     Influenza:   Multiple Myeloma  HTN  Dementia      Influenza:   -low grade fever  -Influenza:  on tamilflu  -cxr is clear:  -he is not wheezing    1/4: no change in pulm status today  : remains on room air  1/5: doing ok : no sob:  no cough : no phlegm;   Multiple Myeloma  -per primary team : FDG PET scan  noted:  rib lesions present likely secondary to MM   HTN  -controlled  Dementia  -supportive care:    karyn acp  This is a 78M with history of MM, HTN, and Dementia (AAO x 1-2 to self, to place, not to time at baseline per brother) who presents to the hospital from [facility] for hypotension and lethargy. Patient currently awake, alert, AAO x2 (to self, to hospital but not name, not to time) but very poor historian, denies any acute complaints when asked. Spoke with brother Mookie who said that the patient was sent to the hospital for lethargy and cough though he states that the patient has had a cough for several months. Brother denies any other known acute complaints for the patient. Called [facility] [phone] but there was no staff available who knew the patient. As per NH paperwork and ED note, patient was sent to the hospital for hypotention to 81/55 and lethargy. He was noted to have a low grade temp of 99.6 and saturation of 93% at his NH. He was given tylenol 650mg x1 prior to being sent to the hospital.   On arrival to the hospital his vitals were T 99.4 -> 100.7 rectal, P 92, BP 88/55 -> 117/70, RR 16, O2 sat 96% RA. His lab work was significant for worsening macrocytic anemia, DAVID with mild hyponatremia, elevated protein gap, elevated lactate with improvement on repeat. His UA was positive for nitrite, leuk esterase but negative for bacteria. His respiratory swab was positive for influenza A. His imaging did not show acute abnormalities. He was given ofirmev 1g, NS 500cc x1, vanc 1g, cefepime 2g, and tamiflu 75mg PO x1. He was admitted to medicine.  (03 Jan 2024 06:06):  now pulm called:  he is from NH:  above history noted:  he seems to be responding to simple commands:  he say he has no underlying lung history  never smoked:  on room air:  adm with influenza:     Influenza:   Multiple Myeloma  HTN  Dementia      Influenza:   -low grade fever  -Influenza:  on tamilflu  -cxr is clear:  -he is not wheezing    1/4: no change in pulm status today  : remains on room air  1/5: doing ok : no sob:  no cough : no phlegm;   Multiple Myeloma  -per primary team : FDG PET scan  noted:  rib lesions present likely secondary to MM   HTN  -controlled  Dementia  -supportive care:    karyn acp

## 2024-01-05 NOTE — PHYSICAL THERAPY INITIAL EVALUATION ADULT - LEVEL OF INDEPENDENCE: SIT/STAND, REHAB EVAL
Pt resisting functional activity.  Pt also unable to follow commands at this time.  Attempted to sit pt at EOB however pt unwilling to at this time.  Pt performed Straight leg raises x 10 b/l.   Will take pt off PT program as pt unable to follow commands./unable to perform

## 2024-01-05 NOTE — PHYSICAL THERAPY INITIAL EVALUATION ADULT - PERTINENT HX OF CURRENT PROBLEM, REHAB EVAL
78M with history of MM, HTN, and Dementia (AAO x 1-2 to self, to place, not to time at baseline per brother) who presents to the hospital from [facility] for hypotension and lethargy found to have FLU A 78M with history of MM, HTN, and Dementia (AAO x 1-2 to self, to place, not to time at baseline per brother) who presents to the hospital from [facility] for hypotension and lethargy found to have FLU A

## 2024-01-05 NOTE — DISCHARGE NOTE PROVIDER - HOSPITAL COURSE
This is a 78M with history of MM, HTN, and Dementia (AAO x 1-2 to self, to place, not to time at baseline per brother) who presents to the hospital from [facility] for hypotension and lethargy. Patient currently awake, alert, AAO x2 (to self, to hospital but not name, not to time) but very poor historian, denies any acute complaints when asked. Spoke with brother Mookie who said that the patient was sent to the hospital for lethargy and cough though he states that the patient has had a cough for several months. Brother denies any other known acute complaints for the patient. Called [facility] [phone] but there was no staff available who knew the patient. As per NH paperwork and ED note, patient was sent to the hospital for hypotention to 81/55 and lethargy. He was noted to have a low grade temp of 99.6 and saturation of 93% at his NH. He was given tylenol 650mg x1 prior to being sent to the hospital.     On arrival to the hospital his vitals were T 99.4 -> 100.7 rectal, P 92, BP 88/55 -> 117/70, RR 16, O2 sat 96% RA. His lab work was significant for worsening macrocytic anemia, DAVID with mild hyponatremia, elevated protein gap, elevated lactate with improvement on repeat. His UA was positive for nitrite, leuk esterase but negative for bacteria. His respiratory swab was positive for influenza A. His imaging did not show acute abnormalities. He was given ofirmev 1g, NS 500cc x1, vanc 1g, cefepime 2g, and tamiflu 75mg PO x1. He was admitted to medicine.    Patient treated for influenza A with Tamiflu and placed on droplet precautions.     Patient also with DAVID on admission and given IVF with subsequent improvement in creatinine. US kidney and bladder neg for hydronephrosis.    Case reviewed with attending who cleared for discharge.   This is a 78M with history of MM, HTN, and Dementia (AAO x 1-2 to self, to place, not to time at baseline per brother) who presents to the hospital from [facility] for hypotension and lethargy. Patient currently awake, alert, AAO x2 (to self, to hospital but not name, not to time) but very poor historian, denies any acute complaints when asked. Spoke with brother Mookie who said that the patient was sent to the hospital for lethargy and cough though he states that the patient has had a cough for several months. Brother denies any other known acute complaints for the patient. Called [facility] [phone] but there was no staff available who knew the patient. As per NH paperwork and ED note, patient was sent to the hospital for hypotention to 81/55 and lethargy. He was noted to have a low grade temp of 99.6 and saturation of 93% at his NH. He was given tylenol 650mg x1 prior to being sent to the hospital.     On arrival to the hospital his vitals were T 99.4 -> 100.7 rectal, P 92, BP 88/55 -> 117/70, RR 16, O2 sat 96% RA. His lab work was significant for worsening macrocytic anemia, DAVID with mild hyponatremia, elevated protein gap, elevated lactate with improvement on repeat. His UA was positive for nitrite, leuk esterase but negative for bacteria. His respiratory swab was positive for influenza A. His imaging did not show acute abnormalities. He was given ofirmev 1g, NS 500cc x1, vanc 1g, cefepime 2g, and tamiflu 75mg PO x1. He was admitted to medicine.    Patient treated for influenza A with Tamiflu and placed on droplet precautions.     Patient also with DAVID on admission and given IVF with subsequent improvement in creatinine. US kidney and bladder neg for hydronephrosis.    Case reviewed with attending who cleared for discharge.   78-year-old male dementia, multiple myeloma, hypertension presenting for lethargy and hypotension. Found to be septic 2/2 flu. Started on Tamiflu. BCx NGTD. Course c/b DAVID - now resolved.   DC planning back to [facility].      78-year-old male dementia, multiple myeloma, hypertension presenting for lethargy and hypotension. Found to be septic 2/2 flu. Started on Tamiflu. BCx NGTD. Course c/b DAVID - now resolved.   DC planning back to [facility].     On 1/23/24 this case was reviewed with Dr. Muñiz, the patient is medically stable and optimized for discharge as per attending. All medications were reviewed and prescriptions were sent to mutually agreed upon pharmacy. Discharge plan reviewed with patient and/or family.       78-year-old male dementia, multiple myeloma, hypertension presenting for lethargy and hypotension. Found to be septic 2/2 flu. Started on Tamiflu. BCx NGTD. Course c/b DAVID - now resolved.   DC planning back to [facility].     On 1/23/24 this case was reviewed with Dr. Muñiz, the patient is medically stable and optimized for discharge as per attending. All medications were reviewed and prescriptions were sent to mutually agreed upon pharmacy. Discharge plan reviewed with patient and/or family.

## 2024-01-05 NOTE — PROGRESS NOTE ADULT - SUBJECTIVE AND OBJECTIVE BOX
[facility], Division of Infectious Diseases  IMRTHA Bagley Y. Patel, S. Shah, G. Amos  [phone]  ([phone] - weekdays after 5pm and weekends)    Name: GUS DELUNA  Age/Gender: 78y Male  MRN: [id number]    Interval History:  Notes reviewed.   No concerning overnight events.  Afebrile.   nephrology consulted yesterday for hyponatremia    Allergies: No Known Allergies      Objective:  Vitals:   T(F): 97.5 (01-04-24 @ 17:26), Max: 98.2 (01-04-24 @ 10:17)  HR: 84 (01-05-24 @ 02:00) (64 - 84)  BP: 118/69 (01-05-24 @ 02:00) (112/56 - 119/70)  RR: 17 (01-05-24 @ 02:00) (17 - 18)  SpO2: 96% (01-05-24 @ 02:00) (96% - 96%)  Physical Examination:  General: no acute distress  HEENT: anicteric  Cardio: normal rate  Resp: breathing comfortably on RA   Abd: soft, NT, ND  Ext: no LE edema  Skin: warm, dry    Laboratory Studies:  CBC:                       7.7    3.08  )-----------( 194      ( 05 Jan 2024 05:21 )             23.1     WBC Trend:  3.08 01-05-24 @ 05:21  3.04 01-04-24 @ 16:00  5.71 01-02-24 @ 19:10    CMP: 01-05    130<L>  |  100  |  16  ----------------------------<  90  3.8   |  23  |  0.77    Ca    9.4      05 Jan 2024 05:21  Phos  2.3     01-05  Mg     1.70     01-05            Urinalysis Basic - ( 05 Jan 2024 05:21 )    Color: x / Appearance: x / SG: x / pH: x  Gluc: 90 mg/dL / Ketone: x  / Bili: x / Urobili: x   Blood: x / Protein: x / Nitrite: x   Leuk Esterase: x / RBC: x / WBC x   Sq Epi: x / Non Sq Epi: x / Bacteria: x      Microbiology: reviewed     Culture - Urine (collected 01-03-24 @ 00:20)  Source: Clean Catch Clean Catch (Midstream)  Final Report (01-04-24 @ 10:55):    No growth    Culture - Urine (collected 01-02-24 @ 22:39)  Source: Catheterized Catheterized  Final Report (01-03-24 @ 22:41):    No growth    Culture - Blood (collected 01-02-24 @ 19:49)  Source: .Blood Blood-Peripheral  Preliminary Report (01-04-24 @ 22:02):    No growth at 48 Hours    Culture - Blood (collected 01-02-24 @ 19:49)  Source: .Blood Blood-Peripheral  Preliminary Report (01-04-24 @ 22:02):    No growth at 48 Hours        Radiology: reviewed     Medications:  acetaminophen     Tablet .. 650 milliGRAM(s) Oral every 6 hours PRN  albuterol/ipratropium for Nebulization 3 milliLiter(s) Nebulizer every 6 hours  aluminum hydroxide/magnesium hydroxide/simethicone Suspension 30 milliLiter(s) Oral every 4 hours PRN  cyanocobalamin 1000 MICROGram(s) Oral daily  donepezil 10 milliGRAM(s) Oral at bedtime  folic acid 1 milliGRAM(s) Oral daily  heparin   Injectable 5000 Unit(s) SubCutaneous every 8 hours  melatonin 3 milliGRAM(s) Oral at bedtime PRN  memantine 10 milliGRAM(s) Oral two times a day  ondansetron Injectable 4 milliGRAM(s) IV Push every 8 hours PRN  oseltamivir 30 milliGRAM(s) Oral two times a day  QUEtiapine 12.5 milliGRAM(s) Oral every 6 hours PRN  risperiDONE   Tablet 0.25 milliGRAM(s) Oral daily  risperiDONE   Tablet 0.5 milliGRAM(s) Oral at bedtime  sertraline 50 milliGRAM(s) Oral daily    Antimicrobials:  oseltamivir 30 milliGRAM(s) Oral two times a day   [facility], Division of Infectious Diseases  MIRTHA Bagley Y. Patel, S. Shah, G. Amos  [phone]  ([phone] - weekdays after 5pm and weekends)    Name: GUS DELUNA  Age/Gender: 78y Male  MRN: [id number]    Interval History:  Notes reviewed.   No concerning overnight events.  Afebrile.   nephrology consulted yesterday for hyponatremia    Allergies: No Known Allergies      Objective:  Vitals:   T(F): 97.5 (01-04-24 @ 17:26), Max: 98.2 (01-04-24 @ 10:17)  HR: 84 (01-05-24 @ 02:00) (64 - 84)  BP: 118/69 (01-05-24 @ 02:00) (112/56 - 119/70)  RR: 17 (01-05-24 @ 02:00) (17 - 18)  SpO2: 96% (01-05-24 @ 02:00) (96% - 96%)  Physical Examination:  General: no acute distress  HEENT: anicteric  Cardio: normal rate  Resp: breathing comfortably on RA   Abd: soft, NT, ND  Ext: no LE edema  Skin: warm, dry    Laboratory Studies:  CBC:                       7.7    3.08  )-----------( 194      ( 05 Jan 2024 05:21 )             23.1     WBC Trend:  3.08 01-05-24 @ 05:21  3.04 01-04-24 @ 16:00  5.71 01-02-24 @ 19:10    CMP: 01-05    130<L>  |  100  |  16  ----------------------------<  90  3.8   |  23  |  0.77    Ca    9.4      05 Jan 2024 05:21  Phos  2.3     01-05  Mg     1.70     01-05            Urinalysis Basic - ( 05 Jan 2024 05:21 )    Color: x / Appearance: x / SG: x / pH: x  Gluc: 90 mg/dL / Ketone: x  / Bili: x / Urobili: x   Blood: x / Protein: x / Nitrite: x   Leuk Esterase: x / RBC: x / WBC x   Sq Epi: x / Non Sq Epi: x / Bacteria: x      Microbiology: reviewed     Culture - Urine (collected 01-03-24 @ 00:20)  Source: Clean Catch Clean Catch (Midstream)  Final Report (01-04-24 @ 10:55):    No growth    Culture - Urine (collected 01-02-24 @ 22:39)  Source: Catheterized Catheterized  Final Report (01-03-24 @ 22:41):    No growth    Culture - Blood (collected 01-02-24 @ 19:49)  Source: .Blood Blood-Peripheral  Preliminary Report (01-04-24 @ 22:02):    No growth at 48 Hours    Culture - Blood (collected 01-02-24 @ 19:49)  Source: .Blood Blood-Peripheral  Preliminary Report (01-04-24 @ 22:02):    No growth at 48 Hours        Radiology: reviewed     Medications:  acetaminophen     Tablet .. 650 milliGRAM(s) Oral every 6 hours PRN  albuterol/ipratropium for Nebulization 3 milliLiter(s) Nebulizer every 6 hours  aluminum hydroxide/magnesium hydroxide/simethicone Suspension 30 milliLiter(s) Oral every 4 hours PRN  cyanocobalamin 1000 MICROGram(s) Oral daily  donepezil 10 milliGRAM(s) Oral at bedtime  folic acid 1 milliGRAM(s) Oral daily  heparin   Injectable 5000 Unit(s) SubCutaneous every 8 hours  melatonin 3 milliGRAM(s) Oral at bedtime PRN  memantine 10 milliGRAM(s) Oral two times a day  ondansetron Injectable 4 milliGRAM(s) IV Push every 8 hours PRN  oseltamivir 30 milliGRAM(s) Oral two times a day  QUEtiapine 12.5 milliGRAM(s) Oral every 6 hours PRN  risperiDONE   Tablet 0.25 milliGRAM(s) Oral daily  risperiDONE   Tablet 0.5 milliGRAM(s) Oral at bedtime  sertraline 50 milliGRAM(s) Oral daily    Antimicrobials:  oseltamivir 30 milliGRAM(s) Oral two times a day

## 2024-01-05 NOTE — PROGRESS NOTE ADULT - ASSESSMENT
78M with history as above who presents to the hospital with hypotension and lethargy at his NH here found to have sepsis 2/2 influenza A and UTI.         Problem/Plan - 1:  ·  Problem: Sepsis, unspecified organism.   ·  Plan: - Meets sepsis criteria with fever to 100.7 and HR > 90, influenza A positive and possible UTI  - cw  tamiflu and ceftriaxone for influenza A and UTI respectively      Problem/Plan - 2:  ·  Problem: Influenza A.   ·  Plan: - Tamiflu as above (renally dosed)  - CXR poor study but with grossly clear lungs in the visualized portion  - Lungs grossly clear to auscultation  - Monitor cough/sputum  \  Problem/Plan - 3:  ·  Problem: Acute UTI.   ·  Plan: - UA positive for leuk esterase and nitrites, negative for bacteria  - cw antibiotics     Problem/Plan - 4:·  Problem: DAVID (acute kidney injury).   ·  Plan: - DAVID likely 2/2 hypotension and sepsis, unclear if patient had decreased PO intake at the NH  - Trend BUN/Cr, monitor I/O, will place on bladder scans q8h  - c/w IV hydration with LR at 100 cc/hr for 10 hrs  - Check urine sodium/creatinine.    Problem/Plan - 5:  ·  Problem: Macrocytic anemia.   ·  Plan: - Worsening macrocytic anemia, no known bleeding issues as per Brother      Problem/Plan - 6:  ·  Problem: Dementia.   ·  Plan: - Lethargic at NH, currently awake and alert, oriented x2 to self and place (hospital, not to name of hospital, not to time) which is his baseline as per brother  - c/w home donezepil, namenda, risperidone, seroquel    Problem/Plan - 7:  ·  Problem: Essential hypertension.   ·  Plan: - cw current meds     dc planning

## 2024-01-05 NOTE — DISCHARGE NOTE PROVIDER - NSDCMRMEDTOKEN_GEN_ALL_CORE_FT
acetaminophen 325 mg oral tablet: 2 tab(s) orally every 6 hours as needed for  pain  amLODIPine 10 mg oral tablet: 1 tab(s) orally once a day  cyanocobalamin 1000 mcg oral tablet: 1 orally once a day  donepezil 10 mg oral tablet: 1 orally once a day (at bedtime)  folic acid 1 mg oral tablet: 1 tab(s) orally once a day  memantine 10 mg oral tablet: 1 orally 2 times a day  metoprolol tartrate 25 mg oral tablet: 0.5 tab(s) orally 2 times a day  RisperDAL 0.25 mg oral tablet: 1 tab(s) orally once a day  RisperDAL 0.5 mg oral tablet: 1 tab(s) orally once a day (at bedtime)  Seroquel 25 mg oral tablet: 0.5 tab(s) orally every 6 hours as needed for  agitation  sertraline 50 mg oral tablet: 1 tab(s) orally once a day   acetaminophen 325 mg oral tablet: 2 tab(s) orally every 6 hours As needed Temp greater or equal to 38C (100.4F), Mild Pain (1 - 3)  cholecalciferol oral tablet: 1000 unit(s) orally once a day  cyanocobalamin 1000 mcg oral tablet: 1 orally once a day  donepezil 10 mg oral tablet: 1 orally once a day (at bedtime)  folic acid 1 mg oral tablet: 1 tab(s) orally once a day  memantine 10 mg oral tablet: 1 orally 2 times a day  RisperDAL 0.25 mg oral tablet: 1 tab(s) orally once a day  RisperDAL 0.5 mg oral tablet: 1 tab(s) orally once a day (at bedtime)  Seroquel 25 mg oral tablet: 0.5 tab(s) orally every 6 hours as needed for  agitation  sertraline 50 mg oral tablet: 1 tab(s) orally once a day

## 2024-01-05 NOTE — PROGRESS NOTE ADULT - SUBJECTIVE AND OBJECTIVE BOX
[facility] [place] [facility]   MD ANEL FOLEY MD ANGELA WONG, PA    TEL:  OFFICE: [phone]  From 5pm-7am Answering Service [phone]    -- RENAL FOLLOW UP NOTE ---Date of Service 01-05-24 @ 12:22    Patient is a 78y old  Male who presents with a chief complaint of Hypotension, lethargu (05 Jan 2024 10:12)      Patient seen and examined at bedside.    VITALS:  T(F): 97.4 (01-05-24 @ 10:33), Max: 97.5 (01-04-24 @ 17:26)  HR: 72 (01-05-24 @ 10:33)  BP: 103/54 (01-05-24 @ 10:33)  RR: 17 (01-05-24 @ 10:33)  SpO2: 98% (01-05-24 @ 10:33)  Wt(kg): --    01-04 @ 07:01  -  01-05 @ 07:00  --------------------------------------------------------  IN: 0 mL / OUT: 500 mL / NET: -500 mL          PHYSICAL EXAM:  General: confused  Neck: No JVD  Respiratory: CTAB, no wheezes, rales or rhonchi  Cardiovascular: S1, S2, RRR  Gastrointestinal: BS+, soft, NT/ND  Extremities: No peripheral edema    Hospital Medications:   MEDICATIONS  (STANDING):  albuterol/ipratropium for Nebulization 3 milliLiter(s) Nebulizer every 6 hours  cyanocobalamin 1000 MICROGram(s) Oral daily  donepezil 10 milliGRAM(s) Oral at bedtime  folic acid 1 milliGRAM(s) Oral daily  heparin   Injectable 5000 Unit(s) SubCutaneous every 8 hours  memantine 10 milliGRAM(s) Oral two times a day  oseltamivir 30 milliGRAM(s) Oral two times a day  risperiDONE   Tablet 0.25 milliGRAM(s) Oral daily  risperiDONE   Tablet 0.5 milliGRAM(s) Oral at bedtime  sertraline 50 milliGRAM(s) Oral daily  sodium phosphate 15 milliMole(s)/250 mL IVPB 15 milliMole(s) IV Intermittent once      LABS:  01-05    130<L>  |  100  |  16  ----------------------------<  90  3.8   |  23  |  0.77    Ca    9.4      05 Jan 2024 05:21  Phos  2.3     01-05  Mg     1.70     01-05      Creatinine Trend: 0.77 <--, 0.88 <--, 1.41 <--    Phosphorus: 2.3 mg/dL (01-05 @ 05:21)  Phosphorus: 2.1 mg/dL (01-04 @ 16:00)                              7.7    3.08  )-----------( 194      ( 05 Jan 2024 05:21 )             23.1     Urine Studies:  Urinalysis - [01-05-24 @ 05:21]      Color  / Appearance  / SG  / pH       Gluc 90 / Ketone   / Bili  / Urobili        Blood  / Protein  / Leuk Est  / Nitrite       RBC  / WBC  / Hyaline  / Gran  / Sq Epi  / Non Sq Epi  / Bacteria             RADIOLOGY & ADDITIONAL STUDIES:   [facility] [place] [facility]   MD ANEL FOLEY MD ANGELA WONG, PA    TEL:  OFFICE: [phone]  From 5pm-7am Answering Service [phone]    -- RENAL FOLLOW UP NOTE ---Date of Service 01-05-24 @ 12:22    Patient is a 78y old  Male who presents with a chief complaint of Hypotension, lethargu (05 Jan 2024 10:12)      Patient seen and examined at bedside.    VITALS:  T(F): 97.4 (01-05-24 @ 10:33), Max: 97.5 (01-04-24 @ 17:26)  HR: 72 (01-05-24 @ 10:33)  BP: 103/54 (01-05-24 @ 10:33)  RR: 17 (01-05-24 @ 10:33)  SpO2: 98% (01-05-24 @ 10:33)  Wt(kg): --    01-04 @ 07:01  -  01-05 @ 07:00  --------------------------------------------------------  IN: 0 mL / OUT: 500 mL / NET: -500 mL          PHYSICAL EXAM:  General: confused  Neck: No JVD  Respiratory: CTAB, no wheezes, rales or rhonchi  Cardiovascular: S1, S2, RRR  Gastrointestinal: BS+, soft, NT/ND  Extremities: No peripheral edema    Hospital Medications:   MEDICATIONS  (STANDING):  albuterol/ipratropium for Nebulization 3 milliLiter(s) Nebulizer every 6 hours  cyanocobalamin 1000 MICROGram(s) Oral daily  donepezil 10 milliGRAM(s) Oral at bedtime  folic acid 1 milliGRAM(s) Oral daily  heparin   Injectable 5000 Unit(s) SubCutaneous every 8 hours  memantine 10 milliGRAM(s) Oral two times a day  oseltamivir 30 milliGRAM(s) Oral two times a day  risperiDONE   Tablet 0.25 milliGRAM(s) Oral daily  risperiDONE   Tablet 0.5 milliGRAM(s) Oral at bedtime  sertraline 50 milliGRAM(s) Oral daily  sodium phosphate 15 milliMole(s)/250 mL IVPB 15 milliMole(s) IV Intermittent once      LABS:  01-05    130<L>  |  100  |  16  ----------------------------<  90  3.8   |  23  |  0.77    Ca    9.4      05 Jan 2024 05:21  Phos  2.3     01-05  Mg     1.70     01-05      Creatinine Trend: 0.77 <--, 0.88 <--, 1.41 <--    Phosphorus: 2.3 mg/dL (01-05 @ 05:21)  Phosphorus: 2.1 mg/dL (01-04 @ 16:00)                              7.7    3.08  )-----------( 194      ( 05 Jan 2024 05:21 )             23.1     Urine Studies:  Urinalysis - [01-05-24 @ 05:21]      Color  / Appearance  / SG  / pH       Gluc 90 / Ketone   / Bili  / Urobili        Blood  / Protein  / Leuk Est  / Nitrite       RBC  / WBC  / Hyaline  / Gran  / Sq Epi  / Non Sq Epi  / Bacteria             RADIOLOGY & ADDITIONAL STUDIES:

## 2024-01-05 NOTE — DISCHARGE NOTE PROVIDER - NSDCCPCAREPLAN_GEN_ALL_CORE_FT
PRINCIPAL DISCHARGE DIAGNOSIS  Diagnosis: Influenza A  Assessment and Plan of Treatment: Please continue taking tamiflu for total of 5 days to completion. If any fevers arise take tylenol as needed. No further inpatient workup at this time. Follow up with your PCP in 2-3 weeks of discharge.      SECONDARY DISCHARGE DIAGNOSES  Diagnosis: DAVID (acute kidney injury)  Assessment and Plan of Treatment: You were treated with IV fluids with improvement and resolution of your DAVID.     PRINCIPAL DISCHARGE DIAGNOSIS  Diagnosis: Influenza A  Assessment and Plan of Treatment: You were treated for the flu with Tamiflu for total of 5 days No further inpatient workup at this time. Follow up with your PCP in 2-3 weeks of discharge.      SECONDARY DISCHARGE DIAGNOSES  Diagnosis: DAVID (acute kidney injury)  Assessment and Plan of Treatment: You were treated with IV fluids with improvement and resolution of your acute kidney injury.     PRINCIPAL DISCHARGE DIAGNOSIS  Diagnosis: Influenza A  Assessment and Plan of Treatment: You were treated for the flu with Tamiflu for total of 5 days No further inpatient workup at this time. Follow up with your PCP in 2-3 weeks of discharge.      SECONDARY DISCHARGE DIAGNOSES  Diagnosis: Sepsis, unspecified organism  Assessment and Plan of Treatment: Likely caused by influenza    Diagnosis: Essential hypertension  Assessment and Plan of Treatment: Continue blood pressure medication regimen as directed. Monitor for any visual changes, headaches or dizziness.  Monitor blood pressure regularly.  Follow up with your PCP for further management for high blood pressure.      Diagnosis: Acute UTI  Assessment and Plan of Treatment: UA positive for leuk esterase and nitrites, negative for bacteria. Monitored off antibiotics    Diagnosis: Multiple myeloma  Assessment and Plan of Treatment:     Diagnosis: Macrocytic anemia  Assessment and Plan of Treatment: no known bleeding issues    Diagnosis: Dementia  Assessment and Plan of Treatment: Continue with home donezepil, namenda, risperidone, seroquel    Diagnosis: DAVID (acute kidney injury)  Assessment and Plan of Treatment: You were treated with IV fluids with improvement and resolution of your acute kidney injury.

## 2024-01-05 NOTE — DISCHARGE NOTE PROVIDER - PROVIDER TOKENS
PROVIDER:[TOKEN:[[id number]]] PROVIDER:[TOKEN:[[id number]]] PROVIDER:[TOKEN:[[id number]]] PROVIDER:[TOKEN:[[id number]]]

## 2024-01-05 NOTE — PROGRESS NOTE ADULT - SUBJECTIVE AND OBJECTIVE BOX
DATE OF SERVICE: 01-05-24    No overnight events, resting comfortably  Review of symptoms otherwise negative.    MEDICATIONS:  acetaminophen     Tablet .. 650 milliGRAM(s) Oral every 6 hours PRN  albuterol/ipratropium for Nebulization 3 milliLiter(s) Nebulizer every 6 hours  aluminum hydroxide/magnesium hydroxide/simethicone Suspension 30 milliLiter(s) Oral every 4 hours PRN  cyanocobalamin 1000 MICROGram(s) Oral daily  donepezil 10 milliGRAM(s) Oral at bedtime  folic acid 1 milliGRAM(s) Oral daily  heparin   Injectable 5000 Unit(s) SubCutaneous every 8 hours  melatonin 3 milliGRAM(s) Oral at bedtime PRN  memantine 10 milliGRAM(s) Oral two times a day  ondansetron Injectable 4 milliGRAM(s) IV Push every 8 hours PRN  oseltamivir 30 milliGRAM(s) Oral two times a day  QUEtiapine 12.5 milliGRAM(s) Oral every 6 hours PRN  risperiDONE   Tablet 0.25 milliGRAM(s) Oral daily  risperiDONE   Tablet 0.5 milliGRAM(s) Oral at bedtime  sertraline 50 milliGRAM(s) Oral daily  sodium phosphate 15 milliMole(s)/250 mL IVPB 15 milliMole(s) IV Intermittent once      LABS:                        7.7    3.08  )-----------( 194      ( 05 Jan 2024 05:21 )             23.1       Hemoglobin: 7.7 g/dL (01-05 @ 05:21)  Hemoglobin: 7.4 g/dL (01-04 @ 16:00)  Hemoglobin: 8.1 g/dL (01-02 @ 19:10)      01-05    130<L>  |  100  |  16  ----------------------------<  90  3.8   |  23  |  0.77    Ca    9.4      05 Jan 2024 05:21  Phos  2.3     01-05  Mg     1.70     01-05      Creatinine Trend: 0.77<--, 0.88<--, 1.41<--    COAGS:           PHYSICAL EXAM:  T(C): 36.4 (01-04-24 @ 17:26), Max: 36.8 (01-04-24 @ 10:17)  HR: 84 (01-05-24 @ 02:00) (64 - 84)  BP: 118/69 (01-05-24 @ 02:00) (112/56 - 119/70)  RR: 17 (01-05-24 @ 02:00) (17 - 18)  SpO2: 96% (01-05-24 @ 02:00) (96% - 96%)  Wt(kg): --    I&O's Summary    04 Jan 2024 07:01  -  05 Jan 2024 07:00  --------------------------------------------------------  IN: 0 mL / OUT: 500 mL / NET: -500 mL        General:  Alert and Oriented * 3.   Head: Normocephalic and atraumatic.   Neck: No JVD. No bruits. Supple. Does not appear to be enlarged.   Cardiovascular: + S1,S2 ; RRR Soft systolic murmur at the left lower sternal border. No rubs noted.    Lungs: CTA b/l. No rhonchi, rales or wheezes.   Abdomen: + BS, soft. Non tender. Non distended. No rebound. No guarding.   Extremities: No clubbing/cyanosis/edema.   Neurologic: Moves all four extremities. Full range of motion.   Skin: Warm and moist. The patient's skin has normal elasticity and good skin turgor.   Psychiatric: Appropriate mood and affect.  Musculoskeletal: Normal range of motion, normal strength       ECG:  	Sinus tachycardia    < from: Xray Chest 1 View AP/PA (01.02.24 @ 19:36) >  IMPRESSION:  No focal consolidations.  < end of copied text >    < from: Transthoracic Echocardiogram (07.10.23 @ 11:15) >  CONCLUSIONS:  1. Calcified trileaflet aortic valve with normal opening.  Minimal aortic regurgitation.  2. Endocardium not well visualized; grossly decreased  possibly mild left ventricular systolic dysfunction.  3. The right ventricle is not well visualized; grossly  normal right ventricular systolic function.  ------------------------------------------------------------------------  Confirmed on  7/10/2023 - 13:57:46 by Jolene Rowan M.D. RPVI  < end of copied text >      ASSESSMENT/PLAN: 	78M with history of MM, HTN, and Dementia (AAO x 1-2 to self, to place, not to time at baseline per brother) who presents to the hospital from [facility] for hypotension and lethargy found to have FLU A    -#Hypotension  -- BP stable  -- Norvasc and Lopressor on hold given acute illness  -- eventually resume before DC    #lethargy  --due to Flu  --tamiflu and Abx per medicine  --supportive care  --PT when able    Staci Muñiz MD  Pager: [phone]    DATE OF SERVICE: 01-05-24    No overnight events, resting comfortably  Review of symptoms otherwise negative.    MEDICATIONS:  acetaminophen     Tablet .. 650 milliGRAM(s) Oral every 6 hours PRN  albuterol/ipratropium for Nebulization 3 milliLiter(s) Nebulizer every 6 hours  aluminum hydroxide/magnesium hydroxide/simethicone Suspension 30 milliLiter(s) Oral every 4 hours PRN  cyanocobalamin 1000 MICROGram(s) Oral daily  donepezil 10 milliGRAM(s) Oral at bedtime  folic acid 1 milliGRAM(s) Oral daily  heparin   Injectable 5000 Unit(s) SubCutaneous every 8 hours  melatonin 3 milliGRAM(s) Oral at bedtime PRN  memantine 10 milliGRAM(s) Oral two times a day  ondansetron Injectable 4 milliGRAM(s) IV Push every 8 hours PRN  oseltamivir 30 milliGRAM(s) Oral two times a day  QUEtiapine 12.5 milliGRAM(s) Oral every 6 hours PRN  risperiDONE   Tablet 0.25 milliGRAM(s) Oral daily  risperiDONE   Tablet 0.5 milliGRAM(s) Oral at bedtime  sertraline 50 milliGRAM(s) Oral daily  sodium phosphate 15 milliMole(s)/250 mL IVPB 15 milliMole(s) IV Intermittent once      LABS:                        7.7    3.08  )-----------( 194      ( 05 Jan 2024 05:21 )             23.1       Hemoglobin: 7.7 g/dL (01-05 @ 05:21)  Hemoglobin: 7.4 g/dL (01-04 @ 16:00)  Hemoglobin: 8.1 g/dL (01-02 @ 19:10)      01-05    130<L>  |  100  |  16  ----------------------------<  90  3.8   |  23  |  0.77    Ca    9.4      05 Jan 2024 05:21  Phos  2.3     01-05  Mg     1.70     01-05      Creatinine Trend: 0.77<--, 0.88<--, 1.41<--    COAGS:           PHYSICAL EXAM:  T(C): 36.4 (01-04-24 @ 17:26), Max: 36.8 (01-04-24 @ 10:17)  HR: 84 (01-05-24 @ 02:00) (64 - 84)  BP: 118/69 (01-05-24 @ 02:00) (112/56 - 119/70)  RR: 17 (01-05-24 @ 02:00) (17 - 18)  SpO2: 96% (01-05-24 @ 02:00) (96% - 96%)  Wt(kg): --    I&O's Summary    04 Jan 2024 07:01  -  05 Jan 2024 07:00  --------------------------------------------------------  IN: 0 mL / OUT: 500 mL / NET: -500 mL        General:  Alert and Oriented * 3.   Head: Normocephalic and atraumatic.   Neck: No JVD. No bruits. Supple. Does not appear to be enlarged.   Cardiovascular: + S1,S2 ; RRR Soft systolic murmur at the left lower sternal border. No rubs noted.    Lungs: CTA b/l. No rhonchi, rales or wheezes.   Abdomen: + BS, soft. Non tender. Non distended. No rebound. No guarding.   Extremities: No clubbing/cyanosis/edema.   Neurologic: Moves all four extremities. Full range of motion.   Skin: Warm and moist. The patient's skin has normal elasticity and good skin turgor.   Psychiatric: Appropriate mood and affect.  Musculoskeletal: Normal range of motion, normal strength       ECG:  	Sinus tachycardia    < from: Xray Chest 1 View AP/PA (01.02.24 @ 19:36) >  IMPRESSION:  No focal consolidations.  < end of copied text >    < from: Transthoracic Echocardiogram (07.10.23 @ 11:15) >  CONCLUSIONS:  1. Calcified trileaflet aortic valve with normal opening.  Minimal aortic regurgitation.  2. Endocardium not well visualized; grossly decreased  possibly mild left ventricular systolic dysfunction.  3. The right ventricle is not well visualized; grossly  normal right ventricular systolic function.  ------------------------------------------------------------------------  Confirmed on  7/10/2023 - 13:57:46 by Jolene Rowan M.D. RPVI  < end of copied text >      ASSESSMENT/PLAN: 	78M with history of MM, HTN, and Dementia (AAO x 1-2 to self, to place, not to time at baseline per brother) who presents to the hospital from [facility] for hypotension and lethargy found to have FLU A    -#Hypotension  -- BP stable  -- Norvasc and Lopressor on hold given acute illness  -- eventually resume before DC    #lethargy  --due to Flu  --tamiflu and Abx per medicine  --supportive care  --PT when able    Staci Muñiz MD  Pager: [phone]

## 2024-01-05 NOTE — PROGRESS NOTE ADULT - ASSESSMENT
This is a 78M with history of MM, HTN, and Dementia (AAO x 1-2 to self, to place, not to time at baseline per brother) who presents to the hospital from [facility] for hypotension and lethargy. Patient currently awake, alert, AAO x1 (to self,) but very poor historian, denies any acute complaints when asked. nephrology consulted for david    DAVID  admitted with scr 1.4  ? etiology  pt with sepsis possible ATN  david resovled  check urine cr, na  calero in place    hematuria  calero in place  repeat ua after calero removal  check renal us    hyponatremia  ? etiology  check urine na and osmo  check tsh and cortisol level  monitor  free water restriction <1.2L    anemia  iron panel  transfusion and work up per team   This is a 78M with history of MM, HTN, and Dementia (AAO x 1-2 to self, to place, not to time at baseline per brother) who presents to the hospital from [facility] for hypotension and lethargy. Patient currently awake, alert, AAO x1 (to self,) but very poor historian, denies any acute complaints when asked. nephrology consulted for david    DAVID  admitted with scr 1.4  ? etiology  pt with sepsis possible ATN  david resovled  check urine cr, na  calero in place    hematuria  calero in place  repeat ua after calero removal  check renal us    hyponatremia  ? etiology  check urine na and osmo  check tsh and cortisol level  monitor  free water restriction <1.2L    anemia  iron panel  transfusion and work up per team   This is a 78M with history of MM, HTN, and Dementia (AAO x 1-2 to self, to place, not to time at baseline per brother) who presents to the hospital from [facility] for hypotension and lethargy. Patient currently awake, alert, AAO x1 (to self,) but very poor historian, denies any acute complaints when asked. nephrology consulted for david    DAVID  admitted with scr 1.4  ? etiology  pt with sepsis possible ATN  david improved  check urine cr, na if worsens  calero in place    hematuria  calero in place  repeat ua after calero removal  check renal us    hyponatremia  ? etiology  check urine na and osmo  check tsh and cortisol level  monitor  free water restriction <1.2L    anemia  iron panel  transfusion and work up per team   This is a 78M with history of MM, HTN, and Dementia (AAO x 1-2 to self, to place, not to time at baseline per brother) who presents to the hospital from [facility] for hypotension and lethargy. Patient currently awake, alert, AAO x1 (to self,) but very poor historian, denies any acute complaints when asked. nephrology consulted for david    DAVID  admitted with scr 1.4  ? etiology  pt with sepsis possible ATN  david improved  check urine cr, na if worsens  calero in place    hematuria  calero in place  repeat ua after calero removal  check renal us    hyponatremia  ? etiology  check urine na and osmo  check tsh and cortisol level  monitor  free water restriction <1.2L    anemia  iron panel  transfusion and work up per team

## 2024-01-05 NOTE — PROVIDER CONTACT NOTE (MEDICATION) - ACTION/TREATMENT ORDERED:
provider was made aware.  inform incoming nurse to attempt to give later on, day nurse made aware during shift change.

## 2024-01-05 NOTE — PHYSICAL THERAPY INITIAL EVALUATION ADULT - ADDITIONAL COMMENTS
Unable to gather information form pt.  Pt confused.  Chart review reporting pt comes from a nursing home.

## 2024-01-05 NOTE — PROGRESS NOTE ADULT - ASSESSMENT
79 y/o M PMhx MM, HTN, and Dementia who presented from [facility] for hypotension and lethargy found to have flu A    influenza A  fever resolved  CXR clear    patient denies urinary symptoms, although he is a poor historian  UA not consistent w/ UTI  urine cx negative    hyponatremia  f/u nephrology recs    Recommendations  c/w tamiflu 30mg bid (renally dosed)   - complete 5 day course w/ last day 1/7/2024    Sylvester Trinidad M.D.  [facility], Division of Infectious Diseases  [phone]  After 5pm on weekdays and all day on weekends - please call [phone]  77 y/o M PMhx MM, HTN, and Dementia who presented from [facility] for hypotension and lethargy found to have flu A    influenza A  fever resolved  CXR clear    patient denies urinary symptoms, although he is a poor historian  UA not consistent w/ UTI  urine cx negative    hyponatremia  f/u nephrology recs    Recommendations  c/w tamiflu 30mg bid (renally dosed)   - complete 5 day course w/ last day 1/7/2024    Sylvester Trinidad M.D.  [facility], Division of Infectious Diseases  [phone]  After 5pm on weekdays and all day on weekends - please call [phone]

## 2024-01-05 NOTE — PROGRESS NOTE ADULT - SUBJECTIVE AND OBJECTIVE BOX
Patient is a 78y old  Male who presents with a chief complaint of Hypotension, lethargu (05 Jan 2024 13:05)    Date of servie : 01-05-24 @ 13:41  INTERVAL HPI/OVERNIGHT EVENTS:  T(C): 36.3 (01-05-24 @ 10:33), Max: 36.4 (01-04-24 @ 17:26)  HR: 72 (01-05-24 @ 10:33) (64 - 84)  BP: 103/54 (01-05-24 @ 10:33) (103/54 - 119/70)  RR: 17 (01-05-24 @ 10:33) (17 - 18)  SpO2: 98% (01-05-24 @ 10:33) (96% - 98%)  Wt(kg): --  I&O's Summary    04 Jan 2024 07:01  -  05 Jan 2024 07:00  --------------------------------------------------------  IN: 0 mL / OUT: 500 mL / NET: -500 mL    05 Jan 2024 07:01  -  05 Jan 2024 13:41  --------------------------------------------------------  IN: 400 mL / OUT: 400 mL / NET: 0 mL        LABS:                        7.7    3.08  )-----------( 194      ( 05 Jan 2024 05:21 )             23.1     01-05    130<L>  |  100  |  16  ----------------------------<  90  3.8   |  23  |  0.77    Ca    9.4      05 Jan 2024 05:21  Phos  2.3     01-05  Mg     1.70     01-05        Urinalysis Basic - ( 05 Jan 2024 05:21 )    Color: x / Appearance: x / SG: x / pH: x  Gluc: 90 mg/dL / Ketone: x  / Bili: x / Urobili: x   Blood: x / Protein: x / Nitrite: x   Leuk Esterase: x / RBC: x / WBC x   Sq Epi: x / Non Sq Epi: x / Bacteria: x      CAPILLARY BLOOD GLUCOSE            Urinalysis Basic - ( 05 Jan 2024 05:21 )    Color: x / Appearance: x / SG: x / pH: x  Gluc: 90 mg/dL / Ketone: x  / Bili: x / Urobili: x   Blood: x / Protein: x / Nitrite: x   Leuk Esterase: x / RBC: x / WBC x   Sq Epi: x / Non Sq Epi: x / Bacteria: x        MEDICATIONS  (STANDING):  albuterol/ipratropium for Nebulization 3 milliLiter(s) Nebulizer every 6 hours  cyanocobalamin 1000 MICROGram(s) Oral daily  donepezil 10 milliGRAM(s) Oral at bedtime  folic acid 1 milliGRAM(s) Oral daily  heparin   Injectable 5000 Unit(s) SubCutaneous every 8 hours  memantine 10 milliGRAM(s) Oral two times a day  oseltamivir 30 milliGRAM(s) Oral two times a day  risperiDONE   Tablet 0.25 milliGRAM(s) Oral daily  risperiDONE   Tablet 0.5 milliGRAM(s) Oral at bedtime  sertraline 50 milliGRAM(s) Oral daily  sodium phosphate 15 milliMole(s)/250 mL IVPB 15 milliMole(s) IV Intermittent once    MEDICATIONS  (PRN):  acetaminophen     Tablet .. 650 milliGRAM(s) Oral every 6 hours PRN Temp greater or equal to 38C (100.4F), Mild Pain (1 - 3)  aluminum hydroxide/magnesium hydroxide/simethicone Suspension 30 milliLiter(s) Oral every 4 hours PRN Dyspepsia  melatonin 3 milliGRAM(s) Oral at bedtime PRN Insomnia  ondansetron Injectable 4 milliGRAM(s) IV Push every 8 hours PRN Nausea and/or Vomiting  QUEtiapine 12.5 milliGRAM(s) Oral every 6 hours PRN for agitation          PHYSICAL EXAM:  GENERAL: NAD, well-groomed, well-developed  HEAD:  Atraumatic, Normocephalic  CHEST/LUNG: Clear to percussion bilaterally; No rales, rhonchi, wheezing, or rubs  HEART: Regular rate and rhythm; No murmurs, rubs, or gallops  ABDOMEN: Soft, Nontender, Nondistended; Bowel sounds present  EXTREMITIES:  2+ Peripheral Pulses, No clubbing, cyanosis, or edema  LYMPH: No lymphadenopathy noted  SKIN: No rashes or lesions    Care Discussed with Consultants/Other Providers [ ] YES  [ ] NO

## 2024-01-05 NOTE — DISCHARGE NOTE PROVIDER - CARE PROVIDER_API CALL
[facility]  Internal Medicine  [address] [place] [address]  Phone: [phone]  Fax: [phone]  Follow Up Time:    [facility]  Internal Medicine  [address] [place] [address]  Phone: [phone]  Fax: [phone]  Follow Up Time:    [facility]  Internal Medicine  [address] [place] [id number]  Phone: [phone]  Fax: [phone]  Follow Up Time:    [facility]  Internal Medicine  [address] [place] [id number]  Phone: [phone]  Fax: [phone]  Follow Up Time:

## 2024-01-05 NOTE — DISCHARGE NOTE PROVIDER - DETAILS OF MALNUTRITION DIAGNOSIS/DIAGNOSES
This patient has been assessed with a concern for Malnutrition and was treated during this hospitalization for the following Nutrition diagnosis/diagnoses:     -  01/04/2024: Severe protein-calorie malnutrition

## 2024-01-05 NOTE — PROGRESS NOTE ADULT - SUBJECTIVE AND OBJECTIVE BOX
Date of Service: 01-05-24 @ 13:05    Patient is a 78y old  Male who presents with a chief complaint of Hypotension, lethargu (05 Jan 2024 12:22)      Any change in ROS:  doing  OK: on room air:     MEDICATIONS  (STANDING):  albuterol/ipratropium for Nebulization 3 milliLiter(s) Nebulizer every 6 hours  cyanocobalamin 1000 MICROGram(s) Oral daily  donepezil 10 milliGRAM(s) Oral at bedtime  folic acid 1 milliGRAM(s) Oral daily  heparin   Injectable 5000 Unit(s) SubCutaneous every 8 hours  memantine 10 milliGRAM(s) Oral two times a day  oseltamivir 30 milliGRAM(s) Oral two times a day  risperiDONE   Tablet 0.25 milliGRAM(s) Oral daily  risperiDONE   Tablet 0.5 milliGRAM(s) Oral at bedtime  sertraline 50 milliGRAM(s) Oral daily  sodium phosphate 15 milliMole(s)/250 mL IVPB 15 milliMole(s) IV Intermittent once    MEDICATIONS  (PRN):  acetaminophen     Tablet .. 650 milliGRAM(s) Oral every 6 hours PRN Temp greater or equal to 38C (100.4F), Mild Pain (1 - 3)  aluminum hydroxide/magnesium hydroxide/simethicone Suspension 30 milliLiter(s) Oral every 4 hours PRN Dyspepsia  melatonin 3 milliGRAM(s) Oral at bedtime PRN Insomnia  ondansetron Injectable 4 milliGRAM(s) IV Push every 8 hours PRN Nausea and/or Vomiting  QUEtiapine 12.5 milliGRAM(s) Oral every 6 hours PRN for agitation    Vital Signs Last 24 Hrs  T(C): 36.3 (05 Jan 2024 10:33), Max: 36.4 (04 Jan 2024 17:26)  T(F): 97.4 (05 Jan 2024 10:33), Max: 97.5 (04 Jan 2024 17:26)  HR: 72 (05 Jan 2024 10:33) (64 - 84)  BP: 103/54 (05 Jan 2024 10:33) (103/54 - 119/70)  BP(mean): --  RR: 17 (05 Jan 2024 10:33) (17 - 18)  SpO2: 98% (05 Jan 2024 10:33) (96% - 98%)    Parameters below as of 05 Jan 2024 10:33  Patient On (Oxygen Delivery Method): room air        I&O's Summary    04 Jan 2024 07:01  -  05 Jan 2024 07:00  --------------------------------------------------------  IN: 0 mL / OUT: 500 mL / NET: -500 mL    05 Jan 2024 07:01  -  05 Jan 2024 13:05  --------------------------------------------------------  IN: 400 mL / OUT: 400 mL / NET: 0 mL          Physical Exam:   GENERAL: NAD, well-groomed, well-developed  HEENT: CHRISTINA/   Atraumatic, Normocephalic  ENMT: No tonsillar erythema, exudates, or enlargement; Moist mucous membranes, Good dentition, No lesions  NECK: Supple, No JVD, Normal thyroid  CHEST/LUNG: Clear to auscultaion  CVS: Regular rate and rhythm; No murmurs, rubs, or gallops  GI: : Soft, Nontender, Nondistended; Bowel sounds present  NERVOUS SYSTEM:  Alert & awake  EXTREMITIES:  -edema  LYMPH: No lymphadenopathy noted  SKIN: No rashes or lesions  ENDOCRINOLOGY: No Thyromegaly  PSYCH: Appropriate    Labs:  25, 25                            7.7    3.08  )-----------( 194      ( 05 Jan 2024 05:21 )             23.1                         7.4    3.04  )-----------( 199      ( 04 Jan 2024 16:00 )             22.4                         8.1    5.71  )-----------( 219      ( 02 Jan 2024 19:10 )             25.1     01-05    130<L>  |  100  |  16  ----------------------------<  90  3.8   |  23  |  0.77  01-04    130<L>  |  100  |  18  ----------------------------<  96  4.0   |  23  |  0.88  01-02    132<L>  |  101  |  26<H>  ----------------------------<  100<H>  4.5   |  24  |  1.41<H>    Ca    9.4      05 Jan 2024 05:21  Ca    9.3      04 Jan 2024 16:00  Phos  2.3     01-05  Phos  2.1     01-04  Mg     1.70     01-05  Mg     1.70     01-04    TPro  11.9<H>  /  Alb  2.7<L>  /  TBili  0.5  /  DBili  x   /  AST  9   /  ALT  8   /  AlkPhos  93  01-02    CAPILLARY BLOOD GLUCOSE              Urinalysis Basic - ( 05 Jan 2024 05:21 )    Color: x / Appearance: x / SG: x / pH: x  Gluc: 90 mg/dL / Ketone: x  / Bili: x / Urobili: x   Blood: x / Protein: x / Nitrite: x   Leuk Esterase: x / RBC: x / WBC x   Sq Epi: x / Non Sq Epi: x / Bacteria: x      Lactate, Blood: 0.9 mmol/L (01-02 @ 22:32)        RECENT CULTURES:  01-03 @ 00:20 Clean Catch Clean Catch (Midstream)       rd< from: Xray Chest 1 View AP/PA (01.02.24 @ 19:36) >  ACC: [id number] EXAM:  XR CHEST AP OR PA 1V   ORDERED BY: CATHIE MULLER     PROCEDURE DATE:  01/02/2024          INTERPRETATION:  CLINICAL INDICATION: Sepsis.    EXAM: Chest x-ray 2 views.    COMPARISON: None available.    FINDINGS:  Surgical clips overlying the midline of the neck.  Bilateral lung fields partially obscured by patient positioning.   Visualized lung fields are clear.  There is no pleural effusion or pneumothorax.  The heart is not well evaluated in this position. Median sternotomy.  The visualized osseous structures demonstrate no acute pathology.    IMPRESSION:  No focal consolidations.    --- End of Report ---          MIR ALW MD; Resident Radiologist  This document has been electronically signed.  KOFI GARCIA MD; Attending Radiologist  This document has been electronically signed. Jan 2 2024  7:43PM    < end of copied text >  < from: CT Head No Cont (01.03.24 @ 02:00) >  INDICATION: lethargy, altered mental status. History of falls.    COMPARISON: CT head from 7/7/2023.    TECHNIQUE: Thin section noncontrast axial images were obtained through   the head.  RAPID AI was utilized for preliminary assessment of   intracranial hemorrhage.    FINDINGS:  Intracranial Contents:    Moderate to severe cerebral volume loss.. Mild to moderate chronic   microvascular ischemic changes Gray-white differentiation is preserved.   No hydrocephalus. The basilar cisterns are clear. No focal edema or acute   mass effect. There is no intracranial fluid collectionor acute   hemorrhage.    Bones and Extracranial Soft Tissues:    There is no fracture identified. Scattered paranasal sinus mucosal   thickening. Mastoid air cells are clear. Scalp and imaged facial soft   tissues are within normal limits.      IMPRESSION:  1. No acute intracranial CT abnormality.    --- End of Report ---          MAIKEL MORENO MD; Resident Radiologist  This document has been electronically signed.  ELLEN CARTWRIGHT MD; Attending Radiologist  This document has been electronically signed. Josef  3 2024  2:17AM    < end of copied text >           No growth    01-02 @ 22:39 Catheterized Catheterized                No growth    01-02 @ 19:49 .Blood Blood-Peripheral                No growth at 48 Hours          RESPIRATORY CULTURES:          Studies  Chest X-RAY  CT SCAN Chest   Venous Dopplers: LE:   CT Abdomen  Others               Date of Service: 01-05-24 @ 13:05    Patient is a 78y old  Male who presents with a chief complaint of Hypotension, lethargu (05 Jan 2024 12:22)      Any change in ROS:  doing  OK: on room air:     MEDICATIONS  (STANDING):  albuterol/ipratropium for Nebulization 3 milliLiter(s) Nebulizer every 6 hours  cyanocobalamin 1000 MICROGram(s) Oral daily  donepezil 10 milliGRAM(s) Oral at bedtime  folic acid 1 milliGRAM(s) Oral daily  heparin   Injectable 5000 Unit(s) SubCutaneous every 8 hours  memantine 10 milliGRAM(s) Oral two times a day  oseltamivir 30 milliGRAM(s) Oral two times a day  risperiDONE   Tablet 0.25 milliGRAM(s) Oral daily  risperiDONE   Tablet 0.5 milliGRAM(s) Oral at bedtime  sertraline 50 milliGRAM(s) Oral daily  sodium phosphate 15 milliMole(s)/250 mL IVPB 15 milliMole(s) IV Intermittent once    MEDICATIONS  (PRN):  acetaminophen     Tablet .. 650 milliGRAM(s) Oral every 6 hours PRN Temp greater or equal to 38C (100.4F), Mild Pain (1 - 3)  aluminum hydroxide/magnesium hydroxide/simethicone Suspension 30 milliLiter(s) Oral every 4 hours PRN Dyspepsia  melatonin 3 milliGRAM(s) Oral at bedtime PRN Insomnia  ondansetron Injectable 4 milliGRAM(s) IV Push every 8 hours PRN Nausea and/or Vomiting  QUEtiapine 12.5 milliGRAM(s) Oral every 6 hours PRN for agitation    Vital Signs Last 24 Hrs  T(C): 36.3 (05 Jan 2024 10:33), Max: 36.4 (04 Jan 2024 17:26)  T(F): 97.4 (05 Jan 2024 10:33), Max: 97.5 (04 Jan 2024 17:26)  HR: 72 (05 Jan 2024 10:33) (64 - 84)  BP: 103/54 (05 Jan 2024 10:33) (103/54 - 119/70)  BP(mean): --  RR: 17 (05 Jan 2024 10:33) (17 - 18)  SpO2: 98% (05 Jan 2024 10:33) (96% - 98%)    Parameters below as of 05 Jan 2024 10:33  Patient On (Oxygen Delivery Method): room air        I&O's Summary    04 Jan 2024 07:01  -  05 Jan 2024 07:00  --------------------------------------------------------  IN: 0 mL / OUT: 500 mL / NET: -500 mL    05 Jan 2024 07:01  -  05 Jan 2024 13:05  --------------------------------------------------------  IN: 400 mL / OUT: 400 mL / NET: 0 mL          Physical Exam:   GENERAL: NAD, well-groomed, well-developed  HEENT: CHRISTINA/   Atraumatic, Normocephalic  ENMT: No tonsillar erythema, exudates, or enlargement; Moist mucous membranes, Good dentition, No lesions  NECK: Supple, No JVD, Normal thyroid  CHEST/LUNG: Clear to auscultaion  CVS: Regular rate and rhythm; No murmurs, rubs, or gallops  GI: : Soft, Nontender, Nondistended; Bowel sounds present  NERVOUS SYSTEM:  Alert & awake  EXTREMITIES:  -edema  LYMPH: No lymphadenopathy noted  SKIN: No rashes or lesions  ENDOCRINOLOGY: No Thyromegaly  PSYCH: Appropriate    Labs:  25, 25                            7.7    3.08  )-----------( 194      ( 05 Jan 2024 05:21 )             23.1                         7.4    3.04  )-----------( 199      ( 04 Jan 2024 16:00 )             22.4                         8.1    5.71  )-----------( 219      ( 02 Jan 2024 19:10 )             25.1     01-05    130<L>  |  100  |  16  ----------------------------<  90  3.8   |  23  |  0.77  01-04    130<L>  |  100  |  18  ----------------------------<  96  4.0   |  23  |  0.88  01-02    132<L>  |  101  |  26<H>  ----------------------------<  100<H>  4.5   |  24  |  1.41<H>    Ca    9.4      05 Jan 2024 05:21  Ca    9.3      04 Jan 2024 16:00  Phos  2.3     01-05  Phos  2.1     01-04  Mg     1.70     01-05  Mg     1.70     01-04    TPro  11.9<H>  /  Alb  2.7<L>  /  TBili  0.5  /  DBili  x   /  AST  9   /  ALT  8   /  AlkPhos  93  01-02    CAPILLARY BLOOD GLUCOSE              Urinalysis Basic - ( 05 Jan 2024 05:21 )    Color: x / Appearance: x / SG: x / pH: x  Gluc: 90 mg/dL / Ketone: x  / Bili: x / Urobili: x   Blood: x / Protein: x / Nitrite: x   Leuk Esterase: x / RBC: x / WBC x   Sq Epi: x / Non Sq Epi: x / Bacteria: x      Lactate, Blood: 0.9 mmol/L (01-02 @ 22:32)        RECENT CULTURES:  01-03 @ 00:20 Clean Catch Clean Catch (Midstream)       rd< from: Xray Chest 1 View AP/PA (01.02.24 @ 19:36) >  ACC: [id number] EXAM:  XR CHEST AP OR PA 1V   ORDERED BY: CATHIE MULLER     PROCEDURE DATE:  01/02/2024          INTERPRETATION:  CLINICAL INDICATION: Sepsis.    EXAM: Chest x-ray 2 views.    COMPARISON: None available.    FINDINGS:  Surgical clips overlying the midline of the neck.  Bilateral lung fields partially obscured by patient positioning.   Visualized lung fields are clear.  There is no pleural effusion or pneumothorax.  The heart is not well evaluated in this position. Median sternotomy.  The visualized osseous structures demonstrate no acute pathology.    IMPRESSION:  No focal consolidations.    --- End of Report ---          MIR LAW MD; Resident Radiologist  This document has been electronically signed.  KOFI GARCIA MD; Attending Radiologist  This document has been electronically signed. Jan 2 2024  7:43PM    < end of copied text >  < from: CT Head No Cont (01.03.24 @ 02:00) >  INDICATION: lethargy, altered mental status. History of falls.    COMPARISON: CT head from 7/7/2023.    TECHNIQUE: Thin section noncontrast axial images were obtained through   the head.  RAPID AI was utilized for preliminary assessment of   intracranial hemorrhage.    FINDINGS:  Intracranial Contents:    Moderate to severe cerebral volume loss.. Mild to moderate chronic   microvascular ischemic changes Gray-white differentiation is preserved.   No hydrocephalus. The basilar cisterns are clear. No focal edema or acute   mass effect. There is no intracranial fluid collectionor acute   hemorrhage.    Bones and Extracranial Soft Tissues:    There is no fracture identified. Scattered paranasal sinus mucosal   thickening. Mastoid air cells are clear. Scalp and imaged facial soft   tissues are within normal limits.      IMPRESSION:  1. No acute intracranial CT abnormality.    --- End of Report ---          MAIKEL MORENO MD; Resident Radiologist  This document has been electronically signed.  ELLEN CARTWRIGHT MD; Attending Radiologist  This document has been electronically signed. Josef  3 2024  2:17AM    < end of copied text >           No growth    01-02 @ 22:39 Catheterized Catheterized                No growth    01-02 @ 19:49 .Blood Blood-Peripheral                No growth at 48 Hours          RESPIRATORY CULTURES:          Studies  Chest X-RAY  CT SCAN Chest   Venous Dopplers: LE:   CT Abdomen  Others

## 2024-01-05 NOTE — DISCHARGE NOTE PROVIDER - CARE PROVIDERS DIRECT ADDRESSES
,DirectAddress_Unknown ,[email or web address]

## 2024-01-06 RX ORDER — SODIUM,POTASSIUM PHOSPHATES 278-250MG
1 POWDER IN PACKET (EA) ORAL
Refills: 0 | Status: COMPLETED | OUTPATIENT
Start: 2024-01-06 | End: 2024-01-07

## 2024-01-06 RX ORDER — IPRATROPIUM/ALBUTEROL SULFATE 18-103MCG
3 AEROSOL WITH ADAPTER (GRAM) INHALATION EVERY 12 HOURS
Refills: 0 | Status: DISCONTINUED | OUTPATIENT
Start: 2024-01-06 | End: 2024-01-24

## 2024-01-06 RX ORDER — POTASSIUM PHOSPHATE, MONOBASIC POTASSIUM PHOSPHATE, DIBASIC 236; 224 MG/ML; MG/ML
15 INJECTION, SOLUTION INTRAVENOUS ONCE
Refills: 0 | Status: DISCONTINUED | OUTPATIENT
Start: 2024-01-06 | End: 2024-01-06

## 2024-01-06 RX ORDER — ONDANSETRON 8 MG/1
4 TABLET, FILM COATED ORAL ONCE
Refills: 0 | Status: COMPLETED | OUTPATIENT
Start: 2024-01-06 | End: 2024-01-06

## 2024-01-06 RX ORDER — SODIUM CHLORIDE 9 MG/ML
1 INJECTION INTRAMUSCULAR; INTRAVENOUS; SUBCUTANEOUS THREE TIMES A DAY
Refills: 0 | Status: DISCONTINUED | OUTPATIENT
Start: 2024-01-06 | End: 2024-01-08

## 2024-01-06 RX ADMIN — ONDANSETRON 4 MILLIGRAM(S): 8 TABLET, FILM COATED ORAL at 12:48

## 2024-01-06 RX ADMIN — HEPARIN SODIUM 5000 UNIT(S): 5000 INJECTION INTRAVENOUS; SUBCUTANEOUS at 06:58

## 2024-01-06 RX ADMIN — Medication 30 MILLIGRAM(S): at 06:57

## 2024-01-06 RX ADMIN — Medication 1 TABLET(S): at 12:46

## 2024-01-06 RX ADMIN — MEMANTINE HYDROCHLORIDE 10 MILLIGRAM(S): 10 TABLET ORAL at 17:32

## 2024-01-06 RX ADMIN — MEMANTINE HYDROCHLORIDE 10 MILLIGRAM(S): 10 TABLET ORAL at 07:08

## 2024-01-06 RX ADMIN — SERTRALINE 50 MILLIGRAM(S): 25 TABLET, FILM COATED ORAL at 12:42

## 2024-01-06 RX ADMIN — Medication 3 MILLILITER(S): at 03:31

## 2024-01-06 RX ADMIN — Medication 30 MILLIGRAM(S): at 17:32

## 2024-01-06 RX ADMIN — Medication 3 MILLILITER(S): at 08:50

## 2024-01-06 RX ADMIN — Medication 1 MILLIGRAM(S): at 12:41

## 2024-01-06 RX ADMIN — Medication 1 TABLET(S): at 17:30

## 2024-01-06 RX ADMIN — HEPARIN SODIUM 5000 UNIT(S): 5000 INJECTION INTRAVENOUS; SUBCUTANEOUS at 13:33

## 2024-01-06 RX ADMIN — SODIUM CHLORIDE 1 GRAM(S): 9 INJECTION INTRAMUSCULAR; INTRAVENOUS; SUBCUTANEOUS at 13:33

## 2024-01-06 RX ADMIN — Medication 3 MILLILITER(S): at 20:11

## 2024-01-06 RX ADMIN — RISPERIDONE 0.25 MILLIGRAM(S): 4 TABLET ORAL at 12:41

## 2024-01-06 RX ADMIN — PREGABALIN 1000 MICROGRAM(S): 225 CAPSULE ORAL at 12:42

## 2024-01-06 NOTE — PROGRESS NOTE ADULT - SUBJECTIVE AND OBJECTIVE BOX
[facility] NEPHROLOGY PRACTICE   MD ANEL FOLEY MD RUORU WONG, PA    TEL:  FROM 9 AM to 5 PM ---OFFICE: [phone]  AVAILABLE ON TEAMS    FROM 5 PM - 9 AM PLEASE CALL ANSWERING SERVICE: [phone]    RENAL FOLLOW UP NOTE--Date of Service 01-06-24 @ 10:32  --------------------------------------------------------------------------------  HPI:      Pt seen and examined at bedside.       PAST HISTORY  --------------------------------------------------------------------------------  No significant changes to PMH, PSH, FHx, SHx, unless otherwise noted    ALLERGIES & MEDICATIONS  --------------------------------------------------------------------------------  Allergies    No Known Allergies    Intolerances      Standing Inpatient Medications  albuterol/ipratropium for Nebulization 3 milliLiter(s) Nebulizer every 12 hours  cyanocobalamin 1000 MICROGram(s) Oral daily  donepezil 10 milliGRAM(s) Oral at bedtime  folic acid 1 milliGRAM(s) Oral daily  heparin   Injectable 5000 Unit(s) SubCutaneous every 8 hours  memantine 10 milliGRAM(s) Oral two times a day  ondansetron    Tablet 4 milliGRAM(s) Oral once  oseltamivir 30 milliGRAM(s) Oral two times a day  potassium phosphate IVPB 15 milliMole(s) IV Intermittent once  risperiDONE   Tablet 0.25 milliGRAM(s) Oral daily  risperiDONE   Tablet 0.5 milliGRAM(s) Oral at bedtime  sertraline 50 milliGRAM(s) Oral daily    PRN Inpatient Medications  acetaminophen     Tablet .. 650 milliGRAM(s) Oral every 6 hours PRN  aluminum hydroxide/magnesium hydroxide/simethicone Suspension 30 milliLiter(s) Oral every 4 hours PRN  melatonin 3 milliGRAM(s) Oral at bedtime PRN  ondansetron Injectable 4 milliGRAM(s) IV Push every 8 hours PRN  QUEtiapine 12.5 milliGRAM(s) Oral every 6 hours PRN      REVIEW OF SYSTEMS  --------------------------------------------------------------------------------  General: no fever  CVS: no chest pain  MSK: no edema     VITALS/PHYSICAL EXAM  --------------------------------------------------------------------------------  T(C): 36.7 (01-06-24 @ 10:26), Max: 37.1 (01-05-24 @ 19:16)  HR: 74 (01-06-24 @ 10:26) (55 - 74)  BP: 98/59 (01-06-24 @ 10:26) (98/59 - 110/70)  RR: 16 (01-06-24 @ 10:26) (16 - 18)  SpO2: 96% (01-06-24 @ 10:26) (96% - 98%)  Wt(kg): --        01-05-24 @ 07:01  -  01-06-24 @ 07:00  --------------------------------------------------------  IN: 400 mL / OUT: 400 mL / NET: 0 mL      Physical Exam:  	Gen: NAD  	HEENT: MMM  	Pulm: CTA B/L  	CV: S1S2  	Abd: Soft, +BS  	Ext: No LE edema B/L                      Neuro: Awake   	Skin: Warm and Dry   	Vascular access: NO HD catheter           SHANNON calero  LABS/STUDIES  --------------------------------------------------------------------------------              7.7    3.08  >-----------<  194      [01-05-24 @ 05:21]              23.1     130  |  100  |  16  ----------------------------<  90      [01-05-24 @ 05:21]  3.8   |  23  |  0.77        Ca     9.4     [01-05-24 @ 05:21]      Mg     1.70     [01-05-24 @ 05:21]      Phos  2.3     [01-05-24 @ 05:21]            Creatinine Trend:  SCr 0.77 [01-05 @ 05:21]  SCr 0.88 [01-04 @ 16:00]  SCr 1.41 [01-02 @ 19:10]    Urinalysis - [01-05-24 @ 05:21]      Color  / Appearance  / SG  / pH       Gluc 90 / Ketone   / Bili  / Urobili        Blood  / Protein  / Leuk Est  / Nitrite       RBC  / WBC  / Hyaline  / Gran  / Sq Epi  / Non Sq Epi  / Bacteria            [facility]ACT[facility]   MD ANEL FOLEY MD RUORU WONG, PA    TEL:  FROM 9 AM to 5 PM ---OFFICE: [phone]  AVAILABLE ON TEAMS    FROM 5 PM - 9 AM PLEASE CALL ANSWERING SERVICE: [phone]    RENAL FOLLOW UP NOTE--Date of Service 01-06-24 @ 10:32  --------------------------------------------------------------------------------  HPI:      Pt seen and examined at bedside.       PAST HISTORY  --------------------------------------------------------------------------------  No significant changes to PMH, PSH, FHx, SHx, unless otherwise noted    ALLERGIES & MEDICATIONS  --------------------------------------------------------------------------------  Allergies    No Known Allergies    Intolerances      Standing Inpatient Medications  albuterol/ipratropium for Nebulization 3 milliLiter(s) Nebulizer every 12 hours  cyanocobalamin 1000 MICROGram(s) Oral daily  donepezil 10 milliGRAM(s) Oral at bedtime  folic acid 1 milliGRAM(s) Oral daily  heparin   Injectable 5000 Unit(s) SubCutaneous every 8 hours  memantine 10 milliGRAM(s) Oral two times a day  ondansetron    Tablet 4 milliGRAM(s) Oral once  oseltamivir 30 milliGRAM(s) Oral two times a day  potassium phosphate IVPB 15 milliMole(s) IV Intermittent once  risperiDONE   Tablet 0.25 milliGRAM(s) Oral daily  risperiDONE   Tablet 0.5 milliGRAM(s) Oral at bedtime  sertraline 50 milliGRAM(s) Oral daily    PRN Inpatient Medications  acetaminophen     Tablet .. 650 milliGRAM(s) Oral every 6 hours PRN  aluminum hydroxide/magnesium hydroxide/simethicone Suspension 30 milliLiter(s) Oral every 4 hours PRN  melatonin 3 milliGRAM(s) Oral at bedtime PRN  ondansetron Injectable 4 milliGRAM(s) IV Push every 8 hours PRN  QUEtiapine 12.5 milliGRAM(s) Oral every 6 hours PRN      REVIEW OF SYSTEMS  --------------------------------------------------------------------------------  General: no fever  CVS: no chest pain  MSK: no edema     VITALS/PHYSICAL EXAM  --------------------------------------------------------------------------------  T(C): 36.7 (01-06-24 @ 10:26), Max: 37.1 (01-05-24 @ 19:16)  HR: 74 (01-06-24 @ 10:26) (55 - 74)  BP: 98/59 (01-06-24 @ 10:26) (98/59 - 110/70)  RR: 16 (01-06-24 @ 10:26) (16 - 18)  SpO2: 96% (01-06-24 @ 10:26) (96% - 98%)  Wt(kg): --        01-05-24 @ 07:01  -  01-06-24 @ 07:00  --------------------------------------------------------  IN: 400 mL / OUT: 400 mL / NET: 0 mL      Physical Exam:  	Gen: NAD  	HEENT: MMM  	Pulm: CTA B/L  	CV: S1S2  	Abd: Soft, +BS  	Ext: No LE edema B/L                      Neuro: Awake   	Skin: Warm and Dry   	Vascular access: NO HD catheter           SHANNON calero  LABS/STUDIES  --------------------------------------------------------------------------------              7.7    3.08  >-----------<  194      [01-05-24 @ 05:21]              23.1     130  |  100  |  16  ----------------------------<  90      [01-05-24 @ 05:21]  3.8   |  23  |  0.77        Ca     9.4     [01-05-24 @ 05:21]      Mg     1.70     [01-05-24 @ 05:21]      Phos  2.3     [01-05-24 @ 05:21]            Creatinine Trend:  SCr 0.77 [01-05 @ 05:21]  SCr 0.88 [01-04 @ 16:00]  SCr 1.41 [01-02 @ 19:10]    Urinalysis - [01-05-24 @ 05:21]      Color  / Appearance  / SG  / pH       Gluc 90 / Ketone   / Bili  / Urobili        Blood  / Protein  / Leuk Est  / Nitrite       RBC  / WBC  / Hyaline  / Gran  / Sq Epi  / Non Sq Epi  / Bacteria

## 2024-01-06 NOTE — PROGRESS NOTE ADULT - ASSESSMENT
This is a 78M with history of MM, HTN, and Dementia (AAO x 1-2 to self, to place, not to time at baseline per brother) who presents to the hospital from [facility] for hypotension and lethargy. Patient currently awake, alert, AAO x2 (to self, to hospital but not name, not to time) but very poor historian, denies any acute complaints when asked. Spoke with brother Mookie who said that the patient was sent to the hospital for lethargy and cough though he states that the patient has had a cough for several months. Brother denies any other known acute complaints for the patient. Called [facility] [phone] but there was no staff available who knew the patient. As per NH paperwork and ED note, patient was sent to the hospital for hypotention to 81/55 and lethargy. He was noted to have a low grade temp of 99.6 and saturation of 93% at his NH. He was given tylenol 650mg x1 prior to being sent to the hospital.   On arrival to the hospital his vitals were T 99.4 -> 100.7 rectal, P 92, BP 88/55 -> 117/70, RR 16, O2 sat 96% RA. His lab work was significant for worsening macrocytic anemia, DAVID with mild hyponatremia, elevated protein gap, elevated lactate with improvement on repeat. His UA was positive for nitrite, leuk esterase but negative for bacteria. His respiratory swab was positive for influenza A. His imaging did not show acute abnormalities. He was given ofirmev 1g, NS 500cc x1, vanc 1g, cefepime 2g, and tamiflu 75mg PO x1. He was admitted to medicine.  (03 Jan 2024 06:06):  now pulm called:  he is from NH:  above history noted:  he seems to be responding to simple commands:  he say he has no underlying lung history  never smoked:  on room air:  adm with influenza:     Influenza:   Multiple Myeloma  HTN  Dementia      Influenza:   -low grade fever  -Influenza:  on tamilflu  -cxr is clear:  -he is not wheezing    1/4: no change in pulm status today  : remains on room air  1/5: doing ok : no sob:  no cough : no phlegm;   1/6: on Tamiflu  no sob:  on room air  Multiple Myeloma  -per primary team : FDG PET scan  noted:  rib lesions present likely secondary to MM   HTN  -controlled  Dementia  -supportive care:    dw acp  This is a 78M with history of MM, HTN, and Dementia (AAO x 1-2 to self, to place, not to time at baseline per brother) who presents to the hospital from [facility] for hypotension and lethargy. Patient currently awake, alert, AAO x2 (to self, to hospital but not name, not to time) but very poor historian, denies any acute complaints when asked. Spoke with brother Mookie who said that the patient was sent to the hospital for lethargy and cough though he states that the patient has had a cough for several months. Brother denies any other known acute complaints for the patient. Called [facility] [phone] but there was no staff available who knew the patient. As per NH paperwork and ED note, patient was sent to the hospital for hypotention to 81/55 and lethargy. He was noted to have a low grade temp of 99.6 and saturation of 93% at his NH. He was given tylenol 650mg x1 prior to being sent to the hospital.   On arrival to the hospital his vitals were T 99.4 -> 100.7 rectal, P 92, BP 88/55 -> 117/70, RR 16, O2 sat 96% RA. His lab work was significant for worsening macrocytic anemia, DAVID with mild hyponatremia, elevated protein gap, elevated lactate with improvement on repeat. His UA was positive for nitrite, leuk esterase but negative for bacteria. His respiratory swab was positive for influenza A. His imaging did not show acute abnormalities. He was given ofirmev 1g, NS 500cc x1, vanc 1g, cefepime 2g, and tamiflu 75mg PO x1. He was admitted to medicine.  (03 Jan 2024 06:06):  now pulm called:  he is from NH:  above history noted:  he seems to be responding to simple commands:  he say he has no underlying lung history  never smoked:  on room air:  adm with influenza:     Influenza:   Multiple Myeloma  HTN  Dementia      Influenza:   -low grade fever  -Influenza:  on tamilflu  -cxr is clear:  -he is not wheezing    1/4: no change in pulm status today  : remains on room air  1/5: doing ok : no sob:  no cough : no phlegm;   1/6: on Tamiflu  no sob:  on room air  Multiple Myeloma  -per primary team : FDG PET scan  noted:  rib lesions present likely secondary to MM   HTN  -controlled  Dementia  -supportive care:    dw acp

## 2024-01-06 NOTE — PROGRESS NOTE ADULT - ASSESSMENT
79 y/o M PMhx MM, HTN, and Dementia who presented from [facility] for hypotension and lethargy found to have flu A    influenza A  fever resolved  CXR clear    UA not consistent w/ UTI  urine cx negative    hyponatremia  f/u nephrology recs    Recommendations  c/w tamiflu 30mg bid (renally dosed)   - complete 5 day course w/ last day tomorrow 1/7/2024    Sylvester Trinidad M.D.  [facility], Division of Infectious Diseases  [phone]  After 5pm on weekdays and all day on weekends - please call [phone]  79 y/o M PMhx MM, HTN, and Dementia who presented from [facility] for hypotension and lethargy found to have flu A    influenza A  fever resolved  CXR clear    UA not consistent w/ UTI  urine cx negative    hyponatremia  f/u nephrology recs    Recommendations  c/w tamiflu 30mg bid (renally dosed)   - complete 5 day course w/ last day tomorrow 1/7/2024    Sylvester Trinidad M.D.  [facility], Division of Infectious Diseases  [phone]  After 5pm on weekdays and all day on weekends - please call [phone]

## 2024-01-06 NOTE — PROGRESS NOTE ADULT - SUBJECTIVE AND OBJECTIVE BOX
Patient is a 78y old  Male who presents with a chief complaint of Hypotension, lethargu (06 Jan 2024 11:55)    Date of servie : 01-06-24 @ 16:39  INTERVAL HPI/OVERNIGHT EVENTS:  T(C): 36.7 (01-06-24 @ 10:26), Max: 37.1 (01-05-24 @ 19:16)  HR: 74 (01-06-24 @ 10:26) (55 - 74)  BP: 98/59 (01-06-24 @ 10:26) (98/59 - 110/70)  RR: 16 (01-06-24 @ 10:26) (16 - 18)  SpO2: 96% (01-06-24 @ 10:26) (96% - 97%)  Wt(kg): --  I&O's Summary    05 Jan 2024 07:01  -  06 Jan 2024 07:00  --------------------------------------------------------  IN: 400 mL / OUT: 400 mL / NET: 0 mL        LABS:                        7.7    3.08  )-----------( 194      ( 05 Jan 2024 05:21 )             23.1     01-05    130<L>  |  100  |  16  ----------------------------<  90  3.8   |  23  |  0.77    Ca    9.4      05 Jan 2024 05:21  Phos  2.3     01-05  Mg     1.70     01-05        Urinalysis Basic - ( 05 Jan 2024 05:21 )    Color: x / Appearance: x / SG: x / pH: x  Gluc: 90 mg/dL / Ketone: x  / Bili: x / Urobili: x   Blood: x / Protein: x / Nitrite: x   Leuk Esterase: x / RBC: x / WBC x   Sq Epi: x / Non Sq Epi: x / Bacteria: x      CAPILLARY BLOOD GLUCOSE            Urinalysis Basic - ( 05 Jan 2024 05:21 )    Color: x / Appearance: x / SG: x / pH: x  Gluc: 90 mg/dL / Ketone: x  / Bili: x / Urobili: x   Blood: x / Protein: x / Nitrite: x   Leuk Esterase: x / RBC: x / WBC x   Sq Epi: x / Non Sq Epi: x / Bacteria: x        MEDICATIONS  (STANDING):  albuterol/ipratropium for Nebulization 3 milliLiter(s) Nebulizer every 12 hours  cyanocobalamin 1000 MICROGram(s) Oral daily  donepezil 10 milliGRAM(s) Oral at bedtime  folic acid 1 milliGRAM(s) Oral daily  heparin   Injectable 5000 Unit(s) SubCutaneous every 8 hours  memantine 10 milliGRAM(s) Oral two times a day  oseltamivir 30 milliGRAM(s) Oral two times a day  potassium phosphate / sodium phosphate Tablet (K-PHOS No. 2) 1 Tablet(s) Oral four times a day with meals  risperiDONE   Tablet 0.25 milliGRAM(s) Oral daily  risperiDONE   Tablet 0.5 milliGRAM(s) Oral at bedtime  sertraline 50 milliGRAM(s) Oral daily  sodium chloride 1 Gram(s) Oral three times a day    MEDICATIONS  (PRN):  acetaminophen     Tablet .. 650 milliGRAM(s) Oral every 6 hours PRN Temp greater or equal to 38C (100.4F), Mild Pain (1 - 3)  aluminum hydroxide/magnesium hydroxide/simethicone Suspension 30 milliLiter(s) Oral every 4 hours PRN Dyspepsia  melatonin 3 milliGRAM(s) Oral at bedtime PRN Insomnia  ondansetron Injectable 4 milliGRAM(s) IV Push every 8 hours PRN Nausea and/or Vomiting  QUEtiapine 12.5 milliGRAM(s) Oral every 6 hours PRN for agitation          PHYSICAL EXAM:  GENERAL: NAD, well-groomed, well-developed  HEAD:  Atraumatic, Normocephalic  CHEST/LUNG: Clear to percussion bilaterally; No rales, rhonchi, wheezing, or rubs  HEART: Regular rate and rhythm; No murmurs, rubs, or gallops  ABDOMEN: Soft, Nontender, Nondistended; Bowel sounds present  EXTREMITIES:  2+ Peripheral Pulses, No clubbing, cyanosis, or edema  LYMPH: No lymphadenopathy noted  SKIN: No rashes or lesions    Care Discussed with Consultants/Other Providers [ ] YES  [ ] NO

## 2024-01-06 NOTE — PROGRESS NOTE ADULT - SUBJECTIVE AND OBJECTIVE BOX
Date of Service: 01-06-24 @ 11:00    Patient is a 78y old  Male who presents with a chief complaint of Hypotension, lethargu (06 Jan 2024 10:32)      Any change in ROS: seems to be doing ok : no sob: no cough : no phlegm   on room air     MEDICATIONS  (STANDING):  albuterol/ipratropium for Nebulization 3 milliLiter(s) Nebulizer every 12 hours  cyanocobalamin 1000 MICROGram(s) Oral daily  donepezil 10 milliGRAM(s) Oral at bedtime  folic acid 1 milliGRAM(s) Oral daily  heparin   Injectable 5000 Unit(s) SubCutaneous every 8 hours  memantine 10 milliGRAM(s) Oral two times a day  ondansetron    Tablet 4 milliGRAM(s) Oral once  oseltamivir 30 milliGRAM(s) Oral two times a day  potassium phosphate IVPB 15 milliMole(s) IV Intermittent once  risperiDONE   Tablet 0.25 milliGRAM(s) Oral daily  risperiDONE   Tablet 0.5 milliGRAM(s) Oral at bedtime  sertraline 50 milliGRAM(s) Oral daily  sodium chloride 1 Gram(s) Oral three times a day    MEDICATIONS  (PRN):  acetaminophen     Tablet .. 650 milliGRAM(s) Oral every 6 hours PRN Temp greater or equal to 38C (100.4F), Mild Pain (1 - 3)  aluminum hydroxide/magnesium hydroxide/simethicone Suspension 30 milliLiter(s) Oral every 4 hours PRN Dyspepsia  melatonin 3 milliGRAM(s) Oral at bedtime PRN Insomnia  ondansetron Injectable 4 milliGRAM(s) IV Push every 8 hours PRN Nausea and/or Vomiting  QUEtiapine 12.5 milliGRAM(s) Oral every 6 hours PRN for agitation    Vital Signs Last 24 Hrs  T(C): 36.7 (06 Jan 2024 10:26), Max: 37.1 (05 Jan 2024 19:16)  T(F): 98.1 (06 Jan 2024 10:26), Max: 98.8 (05 Jan 2024 19:16)  HR: 74 (06 Jan 2024 10:26) (55 - 74)  BP: 98/59 (06 Jan 2024 10:26) (98/59 - 110/70)  BP(mean): --  RR: 16 (06 Jan 2024 10:26) (16 - 18)  SpO2: 96% (06 Jan 2024 10:26) (96% - 97%)    Parameters below as of 06 Jan 2024 10:26  Patient On (Oxygen Delivery Method): room air        I&O's Summary    05 Jan 2024 07:01  -  06 Jan 2024 07:00  --------------------------------------------------------  IN: 400 mL / OUT: 400 mL / NET: 0 mL          Physical Exam:   GENERAL: NAD, well-groomed, well-developed  HEENT: CHRISTINA/   Atraumatic, Normocephalic  ENMT: No tonsillar erythema, exudates, or enlargement; Moist mucous membranes, Good dentition, No lesions  NECK: Supple, No JVD, Normal thyroid  CHEST/LUNG: Clear to auscultaion-no wheezing  CVS: Regular rate and rhythm; No murmurs, rubs, or gallops  GI: : Soft, Nontender, Nondistended; Bowel sounds present  NERVOUS SYSTEM:  Alert & awake  EXTREMITIES:  - edema  LYMPH: No lymphadenopathy noted  SKIN: No rashes or lesions  ENDOCRINOLOGY: No Thyromegaly  PSYCH: calm     Labs:  25, 25                            7.7    3.08  )-----------( 194      ( 05 Jan 2024 05:21 )             23.1                         7.4    3.04  )-----------( 199      ( 04 Jan 2024 16:00 )             22.4                         8.1    5.71  )-----------( 219      ( 02 Jan 2024 19:10 )             25.1     01-05    130<L>  |  100  |  16  ----------------------------<  90  3.8   |  23  |  0.77  01-04    130<L>  |  100  |  18  ----------------------------<  96  4.0   |  23  |  0.88  01-02    132<L>  |  101  |  26<H>  ----------------------------<  100<H>  4.5   |  24  |  1.41<H>    Ca    9.4      05 Jan 2024 05:21  Ca    9.3      04 Jan 2024 16:00  Phos  2.3     01-05  Phos  2.1     01-04  Mg     1.70     01-05  Mg     1.70     01-04    TPro  11.9<H>  /  Alb  2.7<L>  /  TBili  0.5  /  DBili  x   /  AST  9   /  ALT  8   /  AlkPhos  93  01-02    CAPILLARY BLOOD GLUCOSE        ad< from: Xray Chest 1 View AP/PA (01.02.24 @ 19:36) >    ACC: [id number] EXAM:  XR CHEST AP OR PA 1V   ORDERED BY: CATHIE MULLER     PROCEDURE DATE:  01/02/2024          INTERPRETATION:  CLINICAL INDICATION: Sepsis.    EXAM: Chest x-ray 2 views.    COMPARISON: None available.    FINDINGS:  Surgical clips overlying the midline of the neck.  Bilateral lung fields partially obscured by patient positioning.   Visualized lung fields are clear.  There is no pleural effusion or pneumothorax.  The heart is not well evaluated in this position. Median sternotomy.  The visualized osseous structures demonstrate no acute pathology.    IMPRESSION:  No focal consolidations.    --- End of Report ---          MIR LAW MD; Resident Radiologist  This document has been electronically signed.  KOFI GARCIA MD; Attending Radiologist  This document has been electronically signed. Jan 2 2024  7:43PM    < end of copied text >        Urinalysis Basic - ( 05 Jan 2024 05:21 )    Color: x / Appearance: x / SG: x / pH: x  Gluc: 90 mg/dL / Ketone: x  / Bili: x / Urobili: x   Blood: x / Protein: x / Nitrite: x   Leuk Esterase: x / RBC: x / WBC x   Sq Epi: x / Non Sq Epi: x / Bacteria: x      Lactate, Blood: 0.9 mmol/L (01-02 @ 22:32)        RECENT CULTURES:  01-03 @ 00:20 Clean Catch Clean Catch (Midstream)                No growth    01-02 @ 22:39 Catheterized Catheterized                No growth    01-02 @ 19:49 .Blood Blood-Peripheral                No growth at 72 Hours          RESPIRATORY CULTURES:          Studies  Chest X-RAY  CT SCAN Chest   Venous Dopplers: LE:   CT Abdomen  Others               Date of Service: 01-06-24 @ 11:00    Patient is a 78y old  Male who presents with a chief complaint of Hypotension, lethargu (06 Jan 2024 10:32)      Any change in ROS: seems to be doing ok : no sob: no cough : no phlegm   on room air     MEDICATIONS  (STANDING):  albuterol/ipratropium for Nebulization 3 milliLiter(s) Nebulizer every 12 hours  cyanocobalamin 1000 MICROGram(s) Oral daily  donepezil 10 milliGRAM(s) Oral at bedtime  folic acid 1 milliGRAM(s) Oral daily  heparin   Injectable 5000 Unit(s) SubCutaneous every 8 hours  memantine 10 milliGRAM(s) Oral two times a day  ondansetron    Tablet 4 milliGRAM(s) Oral once  oseltamivir 30 milliGRAM(s) Oral two times a day  potassium phosphate IVPB 15 milliMole(s) IV Intermittent once  risperiDONE   Tablet 0.25 milliGRAM(s) Oral daily  risperiDONE   Tablet 0.5 milliGRAM(s) Oral at bedtime  sertraline 50 milliGRAM(s) Oral daily  sodium chloride 1 Gram(s) Oral three times a day    MEDICATIONS  (PRN):  acetaminophen     Tablet .. 650 milliGRAM(s) Oral every 6 hours PRN Temp greater or equal to 38C (100.4F), Mild Pain (1 - 3)  aluminum hydroxide/magnesium hydroxide/simethicone Suspension 30 milliLiter(s) Oral every 4 hours PRN Dyspepsia  melatonin 3 milliGRAM(s) Oral at bedtime PRN Insomnia  ondansetron Injectable 4 milliGRAM(s) IV Push every 8 hours PRN Nausea and/or Vomiting  QUEtiapine 12.5 milliGRAM(s) Oral every 6 hours PRN for agitation    Vital Signs Last 24 Hrs  T(C): 36.7 (06 Jan 2024 10:26), Max: 37.1 (05 Jan 2024 19:16)  T(F): 98.1 (06 Jan 2024 10:26), Max: 98.8 (05 Jan 2024 19:16)  HR: 74 (06 Jan 2024 10:26) (55 - 74)  BP: 98/59 (06 Jan 2024 10:26) (98/59 - 110/70)  BP(mean): --  RR: 16 (06 Jan 2024 10:26) (16 - 18)  SpO2: 96% (06 Jan 2024 10:26) (96% - 97%)    Parameters below as of 06 Jan 2024 10:26  Patient On (Oxygen Delivery Method): room air        I&O's Summary    05 Jan 2024 07:01  -  06 Jan 2024 07:00  --------------------------------------------------------  IN: 400 mL / OUT: 400 mL / NET: 0 mL          Physical Exam:   GENERAL: NAD, well-groomed, well-developed  HEENT: CHRISTINA/   Atraumatic, Normocephalic  ENMT: No tonsillar erythema, exudates, or enlargement; Moist mucous membranes, Good dentition, No lesions  NECK: Supple, No JVD, Normal thyroid  CHEST/LUNG: Clear to auscultaion-no wheezing  CVS: Regular rate and rhythm; No murmurs, rubs, or gallops  GI: : Soft, Nontender, Nondistended; Bowel sounds present  NERVOUS SYSTEM:  Alert & awake  EXTREMITIES:  - edema  LYMPH: No lymphadenopathy noted  SKIN: No rashes or lesions  ENDOCRINOLOGY: No Thyromegaly  PSYCH: calm     Labs:  25, 25                            7.7    3.08  )-----------( 194      ( 05 Jan 2024 05:21 )             23.1                         7.4    3.04  )-----------( 199      ( 04 Jan 2024 16:00 )             22.4                         8.1    5.71  )-----------( 219      ( 02 Jan 2024 19:10 )             25.1     01-05    130<L>  |  100  |  16  ----------------------------<  90  3.8   |  23  |  0.77  01-04    130<L>  |  100  |  18  ----------------------------<  96  4.0   |  23  |  0.88  01-02    132<L>  |  101  |  26<H>  ----------------------------<  100<H>  4.5   |  24  |  1.41<H>    Ca    9.4      05 Jan 2024 05:21  Ca    9.3      04 Jan 2024 16:00  Phos  2.3     01-05  Phos  2.1     01-04  Mg     1.70     01-05  Mg     1.70     01-04    TPro  11.9<H>  /  Alb  2.7<L>  /  TBili  0.5  /  DBili  x   /  AST  9   /  ALT  8   /  AlkPhos  93  01-02    CAPILLARY BLOOD GLUCOSE        ad< from: Xray Chest 1 View AP/PA (01.02.24 @ 19:36) >    ACC: [id number] EXAM:  XR CHEST AP OR PA 1V   ORDERED BY: CATHIE MULLER     PROCEDURE DATE:  01/02/2024          INTERPRETATION:  CLINICAL INDICATION: Sepsis.    EXAM: Chest x-ray 2 views.    COMPARISON: None available.    FINDINGS:  Surgical clips overlying the midline of the neck.  Bilateral lung fields partially obscured by patient positioning.   Visualized lung fields are clear.  There is no pleural effusion or pneumothorax.  The heart is not well evaluated in this position. Median sternotomy.  The visualized osseous structures demonstrate no acute pathology.    IMPRESSION:  No focal consolidations.    --- End of Report ---          MIR LAW MD; Resident Radiologist  This document has been electronically signed.  KOFI GARCIA MD; Attending Radiologist  This document has been electronically signed. Jan 2 2024  7:43PM    < end of copied text >        Urinalysis Basic - ( 05 Jan 2024 05:21 )    Color: x / Appearance: x / SG: x / pH: x  Gluc: 90 mg/dL / Ketone: x  / Bili: x / Urobili: x   Blood: x / Protein: x / Nitrite: x   Leuk Esterase: x / RBC: x / WBC x   Sq Epi: x / Non Sq Epi: x / Bacteria: x      Lactate, Blood: 0.9 mmol/L (01-02 @ 22:32)        RECENT CULTURES:  01-03 @ 00:20 Clean Catch Clean Catch (Midstream)                No growth    01-02 @ 22:39 Catheterized Catheterized                No growth    01-02 @ 19:49 .Blood Blood-Peripheral                No growth at 72 Hours          RESPIRATORY CULTURES:          Studies  Chest X-RAY  CT SCAN Chest   Venous Dopplers: LE:   CT Abdomen  Others

## 2024-01-06 NOTE — PROGRESS NOTE ADULT - SUBJECTIVE AND OBJECTIVE BOX
[facility], Division of Infectious Diseases  MIRTHA Bagley Y. Patel, S. Shah, G. Amos  [phone]  ([phone] - weekdays after 5pm and weekends)    Name: GUS DELUNA  Age/Gender: 78y Male  MRN: [id number]    Interval History:  Notes reviewed.   No concerning overnight events.  Afebrile.     Allergies: No Known Allergies      Objective:  Vitals:   T(F): 97.8 (01-06-24 @ 05:40), Max: 98.8 (01-05-24 @ 19:16)  HR: 56 (01-06-24 @ 05:40) (56 - 72)  BP: 110/70 (01-06-24 @ 05:40) (103/54 - 110/70)  RR: 18 (01-06-24 @ 05:40) (17 - 18)  SpO2: 97% (01-06-24 @ 05:40) (97% - 98%)  Physical Examination:  General: no acute distress  HEENT: anicteric  Cardio: normal rate  Resp: breathing comfortably on RA   Abd: soft, NT, ND  Ext: no LE edema  Skin: warm, dry    Laboratory Studies:  CBC:                       7.7    3.08  )-----------( 194      ( 05 Jan 2024 05:21 )             23.1     WBC Trend:  3.08 01-05-24 @ 05:21  3.04 01-04-24 @ 16:00  5.71 01-02-24 @ 19:10    CMP: 01-05    130<L>  |  100  |  16  ----------------------------<  90  3.8   |  23  |  0.77    Ca    9.4      05 Jan 2024 05:21  Phos  2.3     01-05  Mg     1.70     01-05            Urinalysis Basic - ( 05 Jan 2024 05:21 )    Color: x / Appearance: x / SG: x / pH: x  Gluc: 90 mg/dL / Ketone: x  / Bili: x / Urobili: x   Blood: x / Protein: x / Nitrite: x   Leuk Esterase: x / RBC: x / WBC x   Sq Epi: x / Non Sq Epi: x / Bacteria: x      Microbiology: reviewed     Culture - Urine (collected 01-03-24 @ 00:20)  Source: Clean Catch Clean Catch (Midstream)  Final Report (01-04-24 @ 10:55):    No growth    Culture - Urine (collected 01-02-24 @ 22:39)  Source: Catheterized Catheterized  Final Report (01-03-24 @ 22:41):    No growth    Culture - Blood (collected 01-02-24 @ 19:49)  Source: .Blood Blood-Peripheral  Preliminary Report (01-05-24 @ 22:01):    No growth at 72 Hours    Culture - Blood (collected 01-02-24 @ 19:49)  Source: .Blood Blood-Peripheral  Preliminary Report (01-05-24 @ 22:01):    No growth at 72 Hours        Radiology: reviewed     Medications:  acetaminophen     Tablet .. 650 milliGRAM(s) Oral every 6 hours PRN  albuterol/ipratropium for Nebulization 3 milliLiter(s) Nebulizer every 6 hours  aluminum hydroxide/magnesium hydroxide/simethicone Suspension 30 milliLiter(s) Oral every 4 hours PRN  cyanocobalamin 1000 MICROGram(s) Oral daily  donepezil 10 milliGRAM(s) Oral at bedtime  folic acid 1 milliGRAM(s) Oral daily  heparin   Injectable 5000 Unit(s) SubCutaneous every 8 hours  melatonin 3 milliGRAM(s) Oral at bedtime PRN  memantine 10 milliGRAM(s) Oral two times a day  ondansetron Injectable 4 milliGRAM(s) IV Push every 8 hours PRN  oseltamivir 30 milliGRAM(s) Oral two times a day  QUEtiapine 12.5 milliGRAM(s) Oral every 6 hours PRN  risperiDONE   Tablet 0.25 milliGRAM(s) Oral daily  risperiDONE   Tablet 0.5 milliGRAM(s) Oral at bedtime  sertraline 50 milliGRAM(s) Oral daily    Antimicrobials:  oseltamivir 30 milliGRAM(s) Oral two times a day   [facility], Division of Infectious Diseases  MIRTHA Bagley Y. Patel, S. Shah, G. Amos  [phone]  ([phone] - weekdays after 5pm and weekends)    Name: GUS DELUNA  Age/Gender: 78y Male  MRN: [id number]    Interval History:  Notes reviewed.   No concerning overnight events.  Afebrile.     Allergies: No Known Allergies      Objective:  Vitals:   T(F): 97.8 (01-06-24 @ 05:40), Max: 98.8 (01-05-24 @ 19:16)  HR: 56 (01-06-24 @ 05:40) (56 - 72)  BP: 110/70 (01-06-24 @ 05:40) (103/54 - 110/70)  RR: 18 (01-06-24 @ 05:40) (17 - 18)  SpO2: 97% (01-06-24 @ 05:40) (97% - 98%)  Physical Examination:  General: no acute distress  HEENT: anicteric  Cardio: normal rate  Resp: breathing comfortably on RA   Abd: soft, NT, ND  Ext: no LE edema  Skin: warm, dry    Laboratory Studies:  CBC:                       7.7    3.08  )-----------( 194      ( 05 Jan 2024 05:21 )             23.1     WBC Trend:  3.08 01-05-24 @ 05:21  3.04 01-04-24 @ 16:00  5.71 01-02-24 @ 19:10    CMP: 01-05    130<L>  |  100  |  16  ----------------------------<  90  3.8   |  23  |  0.77    Ca    9.4      05 Jan 2024 05:21  Phos  2.3     01-05  Mg     1.70     01-05            Urinalysis Basic - ( 05 Jan 2024 05:21 )    Color: x / Appearance: x / SG: x / pH: x  Gluc: 90 mg/dL / Ketone: x  / Bili: x / Urobili: x   Blood: x / Protein: x / Nitrite: x   Leuk Esterase: x / RBC: x / WBC x   Sq Epi: x / Non Sq Epi: x / Bacteria: x      Microbiology: reviewed     Culture - Urine (collected 01-03-24 @ 00:20)  Source: Clean Catch Clean Catch (Midstream)  Final Report (01-04-24 @ 10:55):    No growth    Culture - Urine (collected 01-02-24 @ 22:39)  Source: Catheterized Catheterized  Final Report (01-03-24 @ 22:41):    No growth    Culture - Blood (collected 01-02-24 @ 19:49)  Source: .Blood Blood-Peripheral  Preliminary Report (01-05-24 @ 22:01):    No growth at 72 Hours    Culture - Blood (collected 01-02-24 @ 19:49)  Source: .Blood Blood-Peripheral  Preliminary Report (01-05-24 @ 22:01):    No growth at 72 Hours        Radiology: reviewed     Medications:  acetaminophen     Tablet .. 650 milliGRAM(s) Oral every 6 hours PRN  albuterol/ipratropium for Nebulization 3 milliLiter(s) Nebulizer every 6 hours  aluminum hydroxide/magnesium hydroxide/simethicone Suspension 30 milliLiter(s) Oral every 4 hours PRN  cyanocobalamin 1000 MICROGram(s) Oral daily  donepezil 10 milliGRAM(s) Oral at bedtime  folic acid 1 milliGRAM(s) Oral daily  heparin   Injectable 5000 Unit(s) SubCutaneous every 8 hours  melatonin 3 milliGRAM(s) Oral at bedtime PRN  memantine 10 milliGRAM(s) Oral two times a day  ondansetron Injectable 4 milliGRAM(s) IV Push every 8 hours PRN  oseltamivir 30 milliGRAM(s) Oral two times a day  QUEtiapine 12.5 milliGRAM(s) Oral every 6 hours PRN  risperiDONE   Tablet 0.25 milliGRAM(s) Oral daily  risperiDONE   Tablet 0.5 milliGRAM(s) Oral at bedtime  sertraline 50 milliGRAM(s) Oral daily    Antimicrobials:  oseltamivir 30 milliGRAM(s) Oral two times a day

## 2024-01-06 NOTE — PROGRESS NOTE ADULT - SUBJECTIVE AND OBJECTIVE BOX
DATE OF SERVICE: 01-06-24    No overnight events, resting comfortably  Review of symptoms otherwise negative.  BP meds on hold while SBP 90s-100s during influenza treatment.    acetaminophen     Tablet .. 650 milliGRAM(s) Oral every 6 hours PRN  albuterol/ipratropium for Nebulization 3 milliLiter(s) Nebulizer every 12 hours  aluminum hydroxide/magnesium hydroxide/simethicone Suspension 30 milliLiter(s) Oral every 4 hours PRN  cyanocobalamin 1000 MICROGram(s) Oral daily  donepezil 10 milliGRAM(s) Oral at bedtime  folic acid 1 milliGRAM(s) Oral daily  heparin   Injectable 5000 Unit(s) SubCutaneous every 8 hours  melatonin 3 milliGRAM(s) Oral at bedtime PRN  memantine 10 milliGRAM(s) Oral two times a day  ondansetron    Tablet 4 milliGRAM(s) Oral once  ondansetron Injectable 4 milliGRAM(s) IV Push every 8 hours PRN  oseltamivir 30 milliGRAM(s) Oral two times a day  potassium phosphate / sodium phosphate Tablet (K-PHOS No. 2) 1 Tablet(s) Oral four times a day with meals  QUEtiapine 12.5 milliGRAM(s) Oral every 6 hours PRN  risperiDONE   Tablet 0.5 milliGRAM(s) Oral at bedtime  risperiDONE   Tablet 0.25 milliGRAM(s) Oral daily  sertraline 50 milliGRAM(s) Oral daily  sodium chloride 1 Gram(s) Oral three times a day                            7.7    3.08  )-----------( 194      ( 05 Jan 2024 05:21 )             23.1       01-05    130<L>  |  100  |  16  ----------------------------<  90  3.8   |  23  |  0.77    Ca    9.4      05 Jan 2024 05:21  Phos  2.3     01-05  Mg     1.70     01-05    T(C): 36.7 (01-06-24 @ 10:26), Max: 37.1 (01-05-24 @ 19:16)  HR: 74 (01-06-24 @ 10:26) (55 - 74)  BP: 98/59 (01-06-24 @ 10:26) (98/59 - 110/70)  RR: 16 (01-06-24 @ 10:26) (16 - 18)  SpO2: 96% (01-06-24 @ 10:26) (96% - 97%)  Wt(kg): --    I&O's Summary    05 Jan 2024 07:01  -  06 Jan 2024 07:00  --------------------------------------------------------  IN: 400 mL / OUT: 400 mL / NET: 0 mL    General:  Alert and Oriented * 3.   Head: Normocephalic and atraumatic.   Neck: No JVD. No bruits. Supple. Does not appear to be enlarged.   Cardiovascular: + S1,S2 ; RRR Soft systolic murmur at the left lower sternal border. No rubs noted.    Lungs: CTA b/l. No rhonchi, rales or wheezes.   Abdomen: + BS, soft. Non tender. Non distended. No rebound. No guarding.   Extremities: No clubbing/cyanosis/edema.   Neurologic: Moves all four extremities. Full range of motion.   Skin: Warm and moist. The patient's skin has normal elasticity and good skin turgor.   Psychiatric: Appropriate mood and affect.  Musculoskeletal: Normal range of motion, normal strength       ECG:  	Sinus tachycardia    < from: Xray Chest 1 View AP/PA (01.02.24 @ 19:36) >  IMPRESSION:  No focal consolidations.  < end of copied text >    < from: Transthoracic Echocardiogram (07.10.23 @ 11:15) >  CONCLUSIONS:  1. Calcified trileaflet aortic valve with normal opening.  Minimal aortic regurgitation.  2. Endocardium not well visualized; grossly decreased  possibly mild left ventricular systolic dysfunction.  3. The right ventricle is not well visualized; grossly  normal right ventricular systolic function.  ------------------------------------------------------------------------  Confirmed on  7/10/2023 - 13:57:46 by Jolene Rowan M.D. RPVI  < end of copied text >      ASSESSMENT/PLAN: 	78M with history of MM, HTN, and Dementia (AAO x 1-2 to self, to place, not to time at baseline per brother) who presents to the hospital from [facility] for hypotension and lethargy found to have FLU A    -#Hypotension  -- BP stable  -- Norvasc and Lopressor on hold given acute illness  -- eventually resume before DC    #lethargy  --due to Flu.  at baseline only AO x 1.  --tamiflu and Abx per medicine  --supportive care  --PT when able    Jimy Swann M.D.  Cardiac Electrophysiology  [phone] DATE OF SERVICE: 01-06-24    No overnight events, resting comfortably  Review of symptoms otherwise negative.  BP meds on hold while SBP 90s-100s during influenza treatment.    acetaminophen     Tablet .. 650 milliGRAM(s) Oral every 6 hours PRN  albuterol/ipratropium for Nebulization 3 milliLiter(s) Nebulizer every 12 hours  aluminum hydroxide/magnesium hydroxide/simethicone Suspension 30 milliLiter(s) Oral every 4 hours PRN  cyanocobalamin 1000 MICROGram(s) Oral daily  donepezil 10 milliGRAM(s) Oral at bedtime  folic acid 1 milliGRAM(s) Oral daily  heparin   Injectable 5000 Unit(s) SubCutaneous every 8 hours  melatonin 3 milliGRAM(s) Oral at bedtime PRN  memantine 10 milliGRAM(s) Oral two times a day  ondansetron    Tablet 4 milliGRAM(s) Oral once  ondansetron Injectable 4 milliGRAM(s) IV Push every 8 hours PRN  oseltamivir 30 milliGRAM(s) Oral two times a day  potassium phosphate / sodium phosphate Tablet (K-PHOS No. 2) 1 Tablet(s) Oral four times a day with meals  QUEtiapine 12.5 milliGRAM(s) Oral every 6 hours PRN  risperiDONE   Tablet 0.5 milliGRAM(s) Oral at bedtime  risperiDONE   Tablet 0.25 milliGRAM(s) Oral daily  sertraline 50 milliGRAM(s) Oral daily  sodium chloride 1 Gram(s) Oral three times a day                            7.7    3.08  )-----------( 194      ( 05 Jan 2024 05:21 )             23.1       01-05    130<L>  |  100  |  16  ----------------------------<  90  3.8   |  23  |  0.77    Ca    9.4      05 Jan 2024 05:21  Phos  2.3     01-05  Mg     1.70     01-05    T(C): 36.7 (01-06-24 @ 10:26), Max: 37.1 (01-05-24 @ 19:16)  HR: 74 (01-06-24 @ 10:26) (55 - 74)  BP: 98/59 (01-06-24 @ 10:26) (98/59 - 110/70)  RR: 16 (01-06-24 @ 10:26) (16 - 18)  SpO2: 96% (01-06-24 @ 10:26) (96% - 97%)  Wt(kg): --    I&O's Summary    05 Jan 2024 07:01  -  06 Jan 2024 07:00  --------------------------------------------------------  IN: 400 mL / OUT: 400 mL / NET: 0 mL    General:  Alert and Oriented * 3.   Head: Normocephalic and atraumatic.   Neck: No JVD. No bruits. Supple. Does not appear to be enlarged.   Cardiovascular: + S1,S2 ; RRR Soft systolic murmur at the left lower sternal border. No rubs noted.    Lungs: CTA b/l. No rhonchi, rales or wheezes.   Abdomen: + BS, soft. Non tender. Non distended. No rebound. No guarding.   Extremities: No clubbing/cyanosis/edema.   Neurologic: Moves all four extremities. Full range of motion.   Skin: Warm and moist. The patient's skin has normal elasticity and good skin turgor.   Psychiatric: Appropriate mood and affect.  Musculoskeletal: Normal range of motion, normal strength       ECG:  	Sinus tachycardia    < from: Xray Chest 1 View AP/PA (01.02.24 @ 19:36) >  IMPRESSION:  No focal consolidations.  < end of copied text >    < from: Transthoracic Echocardiogram (07.10.23 @ 11:15) >  CONCLUSIONS:  1. Calcified trileaflet aortic valve with normal opening.  Minimal aortic regurgitation.  2. Endocardium not well visualized; grossly decreased  possibly mild left ventricular systolic dysfunction.  3. The right ventricle is not well visualized; grossly  normal right ventricular systolic function.  ------------------------------------------------------------------------  Confirmed on  7/10/2023 - 13:57:46 by Jolene Rowan M.D. RPVI  < end of copied text >      ASSESSMENT/PLAN: 	78M with history of MM, HTN, and Dementia (AAO x 1-2 to self, to place, not to time at baseline per brother) who presents to the hospital from [facility] for hypotension and lethargy found to have FLU A    -#Hypotension  -- BP stable  -- Norvasc and Lopressor on hold given acute illness  -- eventually resume before DC    #lethargy  --due to Flu.  at baseline only AO x 1.  --tamiflu and Abx per medicine  --supportive care  --PT when able    Jimy Swann M.D.  Cardiac Electrophysiology  [phone]

## 2024-01-06 NOTE — PROGRESS NOTE ADULT - ASSESSMENT
78M with history as above who presents to the hospital with hypotension and lethargy at his NH here found to have sepsis 2/2 influenza A and UTI.         Problem/Plan - 1:  ·  Problem: Sepsis, unspecified organism.   ·  Plan: - Meets sepsis criteria with fever to 100.7 and HR > 90, influenza A positive and possible UTI  - cw  tamiflu and ceftriaxone for influenza A and UTI respectively      Problem/Plan - 2:  ·  Problem: Influenza A.   ·  Plan: - Tamiflu as above (renally dosed)  - CXR poor study but with grossly clear lungs in the visualized portion  - Lungs grossly clear to auscultation  - Monitor cough/sputum    Problem/Plan - 3:  ·  Problem: Acute UTI.   ·  Plan: - UA positive for leuk esterase and nitrites, negative for bacteria  - off antibiotics   Problem/Plan - 4:·  Problem: DAVID (acute kidney injury).   ·  Plan: - DAVID likely 2/2 hypotension and sepsis, unclear if patient had decreased PO intake at the NH  - Trend BUN/Cr, monitor I/O, will place on bladder scans q8h  - c/w IV hydration with LR at 100 cc/hr for 10 hrs  - Check urine sodium/creatinine.    Problem/Plan - 5:  ·  Problem: Macrocytic anemia.   ·  Plan: - Worsening macrocytic anemia, no known bleeding issues as per Brother      Problem/Plan - 6:  ·  Problem: Dementia.   ·  Plan: - Lethargic at NH, currently awake and alert, oriented x2 to self and place (hospital, not to name of hospital, not to time) which is his baseline as per brother  - c/w home donezepil, namenda, risperidone, seroquel    Problem/Plan - 7:  ·  Problem: Essential hypertension.   ·  Plan: - cw current meds     dc planning

## 2024-01-06 NOTE — PROGRESS NOTE ADULT - ASSESSMENT
This is a 78M with history of MM, HTN, and Dementia (AAO x 1-2 to self, to place, not to time at baseline per brother) who presents to the hospital from [facility] for hypotension and lethargy. Patient currently awake, alert, AAO x1 (to self,) but very poor historian, denies any acute complaints when asked. nephrology consulted for david    DAVID  admitted with scr 1.4  ? etiology  pt with sepsis possible ATN  david improved  check urine cr, na if worsens  calero in place    hematuria  calero in place  repeat ua after calero removal  renal us with no mass, bladder diverticula--outpt urology    hyponatremia  ? etiology  check urine na and osmo--pending   start salt tab 1 gm TID  check tsh and cortisol level  monitor  free water restriction <1.2L    anemia  iron panel  transfusion and work up per team   This is a 78M with history of MM, HTN, and Dementia (AAO x 1-2 to self, to place, not to time at baseline per brother) who presents to the hospital from [facility] for hypotension and lethargy. Patient currently awake, alert, AAO x1 (to self,) but very poor historian, denies any acute complaints when asked. nephrology consulted for david    DAVID  admitted with scr 1.4  ? etiology  pt with sepsis possible ATN  david improved  check urine cr, na if worsens  calero in place    hematuria  calero in place  repeat ua after calero removal  renal us with no mass, bladder diverticula--outpt urology    hyponatremia  ? etiology  check urine na and osmo--pending   start salt tab 1 gm TID  check tsh and cortisol level  monitor  free water restriction <1.2L    anemia  iron panel  transfusion and work up per team

## 2024-01-07 LAB
CULTURE RESULTS: SIGNIFICANT CHANGE UP
SPECIMEN SOURCE: SIGNIFICANT CHANGE UP

## 2024-01-07 RX ADMIN — SODIUM CHLORIDE 1 GRAM(S): 9 INJECTION INTRAMUSCULAR; INTRAVENOUS; SUBCUTANEOUS at 10:10

## 2024-01-07 RX ADMIN — Medication 30 MILLIGRAM(S): at 17:23

## 2024-01-07 RX ADMIN — MEMANTINE HYDROCHLORIDE 10 MILLIGRAM(S): 10 TABLET ORAL at 10:10

## 2024-01-07 RX ADMIN — Medication 30 MILLIGRAM(S): at 10:10

## 2024-01-07 RX ADMIN — QUETIAPINE FUMARATE 12.5 MILLIGRAM(S): 200 TABLET, FILM COATED ORAL at 07:48

## 2024-01-07 RX ADMIN — Medication 1 MILLIGRAM(S): at 11:18

## 2024-01-07 RX ADMIN — MEMANTINE HYDROCHLORIDE 10 MILLIGRAM(S): 10 TABLET ORAL at 17:22

## 2024-01-07 RX ADMIN — SODIUM CHLORIDE 1 GRAM(S): 9 INJECTION INTRAMUSCULAR; INTRAVENOUS; SUBCUTANEOUS at 14:31

## 2024-01-07 RX ADMIN — SODIUM CHLORIDE 1 GRAM(S): 9 INJECTION INTRAMUSCULAR; INTRAVENOUS; SUBCUTANEOUS at 22:11

## 2024-01-07 RX ADMIN — Medication 3 MILLILITER(S): at 09:59

## 2024-01-07 RX ADMIN — RISPERIDONE 0.25 MILLIGRAM(S): 4 TABLET ORAL at 11:15

## 2024-01-07 RX ADMIN — PREGABALIN 1000 MICROGRAM(S): 225 CAPSULE ORAL at 11:19

## 2024-01-07 RX ADMIN — RISPERIDONE 0.5 MILLIGRAM(S): 4 TABLET ORAL at 22:11

## 2024-01-07 RX ADMIN — SERTRALINE 50 MILLIGRAM(S): 25 TABLET, FILM COATED ORAL at 11:19

## 2024-01-07 RX ADMIN — HEPARIN SODIUM 5000 UNIT(S): 5000 INJECTION INTRAVENOUS; SUBCUTANEOUS at 07:49

## 2024-01-07 RX ADMIN — DONEPEZIL HYDROCHLORIDE 10 MILLIGRAM(S): 10 TABLET, FILM COATED ORAL at 22:11

## 2024-01-07 RX ADMIN — HEPARIN SODIUM 5000 UNIT(S): 5000 INJECTION INTRAVENOUS; SUBCUTANEOUS at 14:31

## 2024-01-07 RX ADMIN — Medication 1 TABLET(S): at 07:49

## 2024-01-07 RX ADMIN — HEPARIN SODIUM 5000 UNIT(S): 5000 INJECTION INTRAVENOUS; SUBCUTANEOUS at 22:10

## 2024-01-07 NOTE — PROGRESS NOTE ADULT - SUBJECTIVE AND OBJECTIVE BOX
[facility], Division of Infectious Diseases  MIRTHA Bagley Y. Patel, S. Shah, G. Amos  [phone]  ([phone] - weekdays after 5pm and weekends)    Name: GUS DELUNA  Age/Gender: 78y Male  MRN: [id number]    Interval History:  Notes reviewed.   No concerning overnight events.  Afebrile.     Allergies: No Known Allergies      Objective:  Vitals:   T(F): 97.7 (01-07-24 @ 06:20), Max: 98.1 (01-06-24 @ 10:26)  HR: 62 (01-07-24 @ 06:20) (55 - 75)  BP: 139/74 (01-07-24 @ 06:20) (98/59 - 139/74)  RR: 18 (01-07-24 @ 06:20) (16 - 18)  SpO2: 99% (01-07-24 @ 06:20) (96% - 99%)  Physical Examination:  General: no acute distress  HEENT: anicteric  Cardio: normal rate  Resp: breathing comfortably on RA   Abd: soft, NT, ND  Ext: no LE edema  Skin: warm, dry    Laboratory Studies:  CBC:   WBC Trend:  3.08 01-05-24 @ 05:21  3.04 01-04-24 @ 16:00  5.71 01-02-24 @ 19:10    CMP:               Microbiology: reviewed     Culture - Urine (collected 01-03-24 @ 00:20)  Source: Clean Catch Clean Catch (Midstream)  Final Report (01-04-24 @ 10:55):    No growth    Culture - Urine (collected 01-02-24 @ 22:39)  Source: Catheterized Catheterized  Final Report (01-03-24 @ 22:41):    No growth    Culture - Blood (collected 01-02-24 @ 19:49)  Source: .Blood Blood-Peripheral  Preliminary Report (01-06-24 @ 22:01):    No growth at 4 days    Culture - Blood (collected 01-02-24 @ 19:49)  Source: .Blood Blood-Peripheral  Preliminary Report (01-06-24 @ 22:01):    No growth at 4 days        Radiology: reviewed     Medications:  acetaminophen     Tablet .. 650 milliGRAM(s) Oral every 6 hours PRN  albuterol/ipratropium for Nebulization 3 milliLiter(s) Nebulizer every 12 hours  aluminum hydroxide/magnesium hydroxide/simethicone Suspension 30 milliLiter(s) Oral every 4 hours PRN  cyanocobalamin 1000 MICROGram(s) Oral daily  donepezil 10 milliGRAM(s) Oral at bedtime  folic acid 1 milliGRAM(s) Oral daily  heparin   Injectable 5000 Unit(s) SubCutaneous every 8 hours  melatonin 3 milliGRAM(s) Oral at bedtime PRN  memantine 10 milliGRAM(s) Oral two times a day  ondansetron Injectable 4 milliGRAM(s) IV Push every 8 hours PRN  oseltamivir 30 milliGRAM(s) Oral two times a day  potassium phosphate / sodium phosphate Tablet (K-PHOS No. 2) 1 Tablet(s) Oral four times a day with meals  QUEtiapine 12.5 milliGRAM(s) Oral every 6 hours PRN  risperiDONE   Tablet 0.5 milliGRAM(s) Oral at bedtime  risperiDONE   Tablet 0.25 milliGRAM(s) Oral daily  sertraline 50 milliGRAM(s) Oral daily  sodium chloride 1 Gram(s) Oral three times a day    Antimicrobials:  oseltamivir 30 milliGRAM(s) Oral two times a day   [facility], Division of Infectious Diseases  MIRTHA Bagley Y. Patel, S. Shah, G. Amos  [phone]  ([phone] - weekdays after 5pm and weekends)    Name: GUS DELUNA  Age/Gender: 78y Male  MRN: [id number]    Interval History:  Notes reviewed.   No concerning overnight events.  Afebrile.     Allergies: No Known Allergies      Objective:  Vitals:   T(F): 97.7 (01-07-24 @ 06:20), Max: 98.1 (01-06-24 @ 10:26)  HR: 62 (01-07-24 @ 06:20) (55 - 75)  BP: 139/74 (01-07-24 @ 06:20) (98/59 - 139/74)  RR: 18 (01-07-24 @ 06:20) (16 - 18)  SpO2: 99% (01-07-24 @ 06:20) (96% - 99%)  Physical Examination:  General: no acute distress  HEENT: anicteric  Cardio: normal rate  Resp: breathing comfortably on RA   Abd: soft, NT, ND  Ext: no LE edema  Skin: warm, dry    Laboratory Studies:  CBC:   WBC Trend:  3.08 01-05-24 @ 05:21  3.04 01-04-24 @ 16:00  5.71 01-02-24 @ 19:10    CMP:               Microbiology: reviewed     Culture - Urine (collected 01-03-24 @ 00:20)  Source: Clean Catch Clean Catch (Midstream)  Final Report (01-04-24 @ 10:55):    No growth    Culture - Urine (collected 01-02-24 @ 22:39)  Source: Catheterized Catheterized  Final Report (01-03-24 @ 22:41):    No growth    Culture - Blood (collected 01-02-24 @ 19:49)  Source: .Blood Blood-Peripheral  Preliminary Report (01-06-24 @ 22:01):    No growth at 4 days    Culture - Blood (collected 01-02-24 @ 19:49)  Source: .Blood Blood-Peripheral  Preliminary Report (01-06-24 @ 22:01):    No growth at 4 days        Radiology: reviewed     Medications:  acetaminophen     Tablet .. 650 milliGRAM(s) Oral every 6 hours PRN  albuterol/ipratropium for Nebulization 3 milliLiter(s) Nebulizer every 12 hours  aluminum hydroxide/magnesium hydroxide/simethicone Suspension 30 milliLiter(s) Oral every 4 hours PRN  cyanocobalamin 1000 MICROGram(s) Oral daily  donepezil 10 milliGRAM(s) Oral at bedtime  folic acid 1 milliGRAM(s) Oral daily  heparin   Injectable 5000 Unit(s) SubCutaneous every 8 hours  melatonin 3 milliGRAM(s) Oral at bedtime PRN  memantine 10 milliGRAM(s) Oral two times a day  ondansetron Injectable 4 milliGRAM(s) IV Push every 8 hours PRN  oseltamivir 30 milliGRAM(s) Oral two times a day  potassium phosphate / sodium phosphate Tablet (K-PHOS No. 2) 1 Tablet(s) Oral four times a day with meals  QUEtiapine 12.5 milliGRAM(s) Oral every 6 hours PRN  risperiDONE   Tablet 0.5 milliGRAM(s) Oral at bedtime  risperiDONE   Tablet 0.25 milliGRAM(s) Oral daily  sertraline 50 milliGRAM(s) Oral daily  sodium chloride 1 Gram(s) Oral three times a day    Antimicrobials:  oseltamivir 30 milliGRAM(s) Oral two times a day

## 2024-01-07 NOTE — PROGRESS NOTE ADULT - ASSESSMENT
This is a 78M with history of MM, HTN, and Dementia (AAO x 1-2 to self, to place, not to time at baseline per brother) who presents to the hospital from [facility] for hypotension and lethargy. Patient currently awake, alert, AAO x2 (to self, to hospital but not name, not to time) but very poor historian, denies any acute complaints when asked. Spoke with brother Mookie who said that the patient was sent to the hospital for lethargy and cough though he states that the patient has had a cough for several months. Brother denies any other known acute complaints for the patient. Called [facility] [phone] but there was no staff available who knew the patient. As per NH paperwork and ED note, patient was sent to the hospital for hypotention to 81/55 and lethargy. He was noted to have a low grade temp of 99.6 and saturation of 93% at his NH. He was given tylenol 650mg x1 prior to being sent to the hospital.   On arrival to the hospital his vitals were T 99.4 -> 100.7 rectal, P 92, BP 88/55 -> 117/70, RR 16, O2 sat 96% RA. His lab work was significant for worsening macrocytic anemia, DAVID with mild hyponatremia, elevated protein gap, elevated lactate with improvement on repeat. His UA was positive for nitrite, leuk esterase but negative for bacteria. His respiratory swab was positive for influenza A. His imaging did not show acute abnormalities. He was given ofirmev 1g, NS 500cc x1, vanc 1g, cefepime 2g, and tamiflu 75mg PO x1. He was admitted to medicine.  (03 Jan 2024 06:06):  now pulm called:  he is from NH:  above history noted:  he seems to be responding to simple commands:  he say he has no underlying lung history  never smoked:  on room air:  adm with influenza:     Influenza:   Multiple Myeloma  HTN  Dementia      Influenza:   -low grade fever  -Influenza:  on tamilflu  -cxr is clear:  -he is not wheezing    1/4: no change in pulm status today  : remains on room air  1/5: doing ok : no sob:  no cough : no phlegm;   1/6: on Tamiflu  no sob:  on room air  1/7: seems to be doing ok : no sob:  no cough:   Multiple Myeloma  -per primary team : FDG PET scan  noted:  rib lesions present likely secondary to MM   HTN  -controlled  Dementia  -supportive care:    dw acp  This is a 78M with history of MM, HTN, and Dementia (AAO x 1-2 to self, to place, not to time at baseline per brother) who presents to the hospital from [facility] for hypotension and lethargy. Patient currently awake, alert, AAO x2 (to self, to hospital but not name, not to time) but very poor historian, denies any acute complaints when asked. Spoke with brother Mookie who said that the patient was sent to the hospital for lethargy and cough though he states that the patient has had a cough for several months. Brother denies any other known acute complaints for the patient. Called [facility] [phone] but there was no staff available who knew the patient. As per NH paperwork and ED note, patient was sent to the hospital for hypotention to 81/55 and lethargy. He was noted to have a low grade temp of 99.6 and saturation of 93% at his NH. He was given tylenol 650mg x1 prior to being sent to the hospital.   On arrival to the hospital his vitals were T 99.4 -> 100.7 rectal, P 92, BP 88/55 -> 117/70, RR 16, O2 sat 96% RA. His lab work was significant for worsening macrocytic anemia, DAVID with mild hyponatremia, elevated protein gap, elevated lactate with improvement on repeat. His UA was positive for nitrite, leuk esterase but negative for bacteria. His respiratory swab was positive for influenza A. His imaging did not show acute abnormalities. He was given ofirmev 1g, NS 500cc x1, vanc 1g, cefepime 2g, and tamiflu 75mg PO x1. He was admitted to medicine.  (03 Jan 2024 06:06):  now pulm called:  he is from NH:  above history noted:  he seems to be responding to simple commands:  he say he has no underlying lung history  never smoked:  on room air:  adm with influenza:     Influenza:   Multiple Myeloma  HTN  Dementia      Influenza:   -low grade fever  -Influenza:  on tamilflu  -cxr is clear:  -he is not wheezing    1/4: no change in pulm status today  : remains on room air  1/5: doing ok : no sob:  no cough : no phlegm;   1/6: on Tamiflu  no sob:  on room air  1/7: seems to be doing ok : no sob:  no cough:   Multiple Myeloma  -per primary team : FDG PET scan  noted:  rib lesions present likely secondary to MM   HTN  -controlled  Dementia  -supportive care:    dw acp

## 2024-01-07 NOTE — PROGRESS NOTE ADULT - ASSESSMENT
This is a 78M with history of MM, HTN, and Dementia (AAO x 1-2 to self, to place, not to time at baseline per brother) who presents to the hospital from [facility] for hypotension and lethargy. Patient currently awake, alert, AAO x1 (to self,) but very poor historian, denies any acute complaints when asked. nephrology consulted for david    DAVID  admitted with scr 1.4  ? etiology  pt with sepsis possible ATN  david improved  check urine cr, na if worsens  calero in place    hematuria  calero in place  repeat ua after calero removal  renal us with no mass, bladder diverticula--outpt urology    hyponatremia  ? etiology  pending  new labs   check urine na and osmo--pending   on  salt tab 1 gm TID  check tsh and cortisol level  monitor  free water restriction <1.2L    anemia  iron panel  transfusion and work up per team   This is a 78M with history of MM, HTN, and Dementia (AAO x 1-2 to self, to place, not to time at baseline per brother) who presents to the hospital from [facility] for hypotension and lethargy. Patient currently awake, alert, AAO x1 (to self,) but very poor historian, denies any acute complaints when asked. nephrology consulted for david    DAVID  admitted with scr 1.4  ? etiology  pt with sepsis possible ATN  david improved  check urine cr, na if worsens  calero in place    hematuria  calero in place  repeat ua after calero removal  renal us with no mass, bladder diverticula--outpt urology    hyponatremia  ? etiology  pending  new labs   check urine na and osmo--pending   on  salt tab 1 gm TID  check tsh and cortisol level  monitor  free water restriction <1.2L    anemia  iron panel  transfusion and work up per team

## 2024-01-07 NOTE — PROGRESS NOTE ADULT - ASSESSMENT
77 y/o M PMhx MM, HTN, and Dementia who presented from [facility] for hypotension and lethargy found to have flu A    influenza A  fever resolved  CXR clear    UA not consistent w/ UTI  urine cx negative    Recommendations  c/w tamiflu 30mg bid (renally dosed)   - complete 5 day course w/ last day today    Sylvester Trinidad M.D.  [facility], Division of Infectious Diseases  [phone]  After 5pm on weekdays and all day on weekends - please call [phone]  79 y/o M PMhx MM, HTN, and Dementia who presented from [facility] for hypotension and lethargy found to have flu A    influenza A  fever resolved  CXR clear    UA not consistent w/ UTI  urine cx negative    Recommendations  c/w tamiflu 30mg bid (renally dosed)   - complete 5 day course w/ last day today    Sylvester Trinidad M.D.  [facility], Division of Infectious Diseases  [phone]  After 5pm on weekdays and all day on weekends - please call [phone]

## 2024-01-07 NOTE — PROGRESS NOTE ADULT - SUBJECTIVE AND OBJECTIVE BOX
78M with history as above who presents to the hospital with hypotension and lethargy at his NH here found to have sepsis 2/2 influenza A and UTI.         Problem/Plan - 1:  ·  Problem: Sepsis, unspecified organism.   ·  Plan: - Meets sepsis criteria with fever to 100.7 and HR > 90, influenza A positive and possible UTI  - cw  tamiflu and ceftriaxone for influenza A and UTI respectively      Problem/Plan - 2:  ·  Problem: Influenza A.   ·  Plan: - Tamiflu as above (renally dosed)  - CXR poor study but with grossly clear lungs in the visualized portion  - Lungs grossly clear to auscultation  - Monitor cough/sputum    Problem/Plan - 3:  ·  Problem: Acute UTI.   ·  Plan: - UA positive for leuk esterase and nitrites, negative for bacteria  - off antibiotics     Problem/Plan - 4:·  Problem: DAVID (acute kidney injury).   ·  Plan: - DAVID likely 2/2 hypotension and sepsis, unclear if patient had decreased PO intake at the NH  - monitor cr    Problem/Plan - 5:  ·  Problem: Macrocytic anemia.   ·  Plan: - Worsening macrocytic anemia, no known bleeding issues as per Brother      Problem/Plan - 6:  ·  Problem: Dementia.   ·  Plan: - Lethargic at NH, currently awake and alert, oriented x2 to self and place (hospital, not to name of hospital, not to time) which is his baseline as per brother  - c/w home donezepil, namenda, risperidone, seroquel    Problem/Plan - 7:  ·  Problem: Essential hypertension.   ·  Plan: - cw current meds     dc planning

## 2024-01-07 NOTE — PROGRESS NOTE ADULT - SUBJECTIVE AND OBJECTIVE BOX
[facility] NEPHROLOGY PRACTICE   MD ANEL FOLEY MD RUORU WONG, PA    TEL:  FROM 9 AM to 5 PM ---OFFICE: [phone]  AVAILABLE ON TEAMS    FROM 5 PM - 9 AM PLEASE CALL ANSWERING SERVICE: [phone]    RENAL FOLLOW UP NOTE--Date of Service 01-07-24 @ 11:11  --------------------------------------------------------------------------------  HPI:      Pt seen and examined at bedside.       PAST HISTORY  --------------------------------------------------------------------------------  No significant changes to PMH, PSH, FHx, SHx, unless otherwise noted    ALLERGIES & MEDICATIONS  --------------------------------------------------------------------------------  Allergies    No Known Allergies    Intolerances      Standing Inpatient Medications  albuterol/ipratropium for Nebulization 3 milliLiter(s) Nebulizer every 12 hours  cyanocobalamin 1000 MICROGram(s) Oral daily  donepezil 10 milliGRAM(s) Oral at bedtime  folic acid 1 milliGRAM(s) Oral daily  heparin   Injectable 5000 Unit(s) SubCutaneous every 8 hours  memantine 10 milliGRAM(s) Oral two times a day  oseltamivir 30 milliGRAM(s) Oral two times a day  risperiDONE   Tablet 0.25 milliGRAM(s) Oral daily  risperiDONE   Tablet 0.5 milliGRAM(s) Oral at bedtime  sertraline 50 milliGRAM(s) Oral daily  sodium chloride 1 Gram(s) Oral three times a day    PRN Inpatient Medications  acetaminophen     Tablet .. 650 milliGRAM(s) Oral every 6 hours PRN  aluminum hydroxide/magnesium hydroxide/simethicone Suspension 30 milliLiter(s) Oral every 4 hours PRN  melatonin 3 milliGRAM(s) Oral at bedtime PRN  ondansetron Injectable 4 milliGRAM(s) IV Push every 8 hours PRN  QUEtiapine 12.5 milliGRAM(s) Oral every 6 hours PRN      REVIEW OF SYSTEMS  --------------------------------------------------------------------------------  General: no fever  MSK: no edema     VITALS/PHYSICAL EXAM  --------------------------------------------------------------------------------  T(C): 36.5 (01-07-24 @ 06:20), Max: 36.6 (01-06-24 @ 21:45)  HR: 60 (01-07-24 @ 09:59) (60 - 75)  BP: 139/74 (01-07-24 @ 06:20) (114/68 - 139/74)  RR: 18 (01-07-24 @ 06:20) (17 - 18)  SpO2: 95% (01-07-24 @ 09:59) (95% - 99%)  Wt(kg): --        Physical Exam:  	General: confused  Neck: No JVD  Respiratory: CTAB, no wheezes, rales or rhonchi  Cardiovascular: S1, S2, RRR  Gastrointestinal: BS+, soft, NT/ND  Extremities: No peripheral edema  LABS/STUDIES  --------------------------------------------------------------------------------                Creatinine Trend:  SCr 0.77 [01-05 @ 05:21]  SCr 0.88 [01-04 @ 16:00]  SCr 1.41 [01-02 @ 19:10]    Urinalysis - [01-05-24 @ 05:21]      Color  / Appearance  / SG  / pH       Gluc 90 / Ketone   / Bili  / Urobili        Blood  / Protein  / Leuk Est  / Nitrite       RBC  / WBC  / Hyaline  / Gran  / Sq Epi  / Non Sq Epi  / Bacteria            [facility]OLOGY PRACT[facility]   MD ANEL FOLEY MD RUORU WONG, PA    TEL:  FROM 9 AM to 5 PM ---OFFICE: [phone]  AVAILABLE ON TEAMS    FROM 5 PM - 9 AM PLEASE CALL ANSWERING SERVICE: [phone]    RENAL FOLLOW UP NOTE--Date of Service 01-07-24 @ 11:11  --------------------------------------------------------------------------------  HPI:      Pt seen and examined at bedside.       PAST HISTORY  --------------------------------------------------------------------------------  No significant changes to PMH, PSH, FHx, SHx, unless otherwise noted    ALLERGIES & MEDICATIONS  --------------------------------------------------------------------------------  Allergies    No Known Allergies    Intolerances      Standing Inpatient Medications  albuterol/ipratropium for Nebulization 3 milliLiter(s) Nebulizer every 12 hours  cyanocobalamin 1000 MICROGram(s) Oral daily  donepezil 10 milliGRAM(s) Oral at bedtime  folic acid 1 milliGRAM(s) Oral daily  heparin   Injectable 5000 Unit(s) SubCutaneous every 8 hours  memantine 10 milliGRAM(s) Oral two times a day  oseltamivir 30 milliGRAM(s) Oral two times a day  risperiDONE   Tablet 0.25 milliGRAM(s) Oral daily  risperiDONE   Tablet 0.5 milliGRAM(s) Oral at bedtime  sertraline 50 milliGRAM(s) Oral daily  sodium chloride 1 Gram(s) Oral three times a day    PRN Inpatient Medications  acetaminophen     Tablet .. 650 milliGRAM(s) Oral every 6 hours PRN  aluminum hydroxide/magnesium hydroxide/simethicone Suspension 30 milliLiter(s) Oral every 4 hours PRN  melatonin 3 milliGRAM(s) Oral at bedtime PRN  ondansetron Injectable 4 milliGRAM(s) IV Push every 8 hours PRN  QUEtiapine 12.5 milliGRAM(s) Oral every 6 hours PRN      REVIEW OF SYSTEMS  --------------------------------------------------------------------------------  General: no fever  MSK: no edema     VITALS/PHYSICAL EXAM  --------------------------------------------------------------------------------  T(C): 36.5 (01-07-24 @ 06:20), Max: 36.6 (01-06-24 @ 21:45)  HR: 60 (01-07-24 @ 09:59) (60 - 75)  BP: 139/74 (01-07-24 @ 06:20) (114/68 - 139/74)  RR: 18 (01-07-24 @ 06:20) (17 - 18)  SpO2: 95% (01-07-24 @ 09:59) (95% - 99%)  Wt(kg): --        Physical Exam:  	General: confused  Neck: No JVD  Respiratory: CTAB, no wheezes, rales or rhonchi  Cardiovascular: S1, S2, RRR  Gastrointestinal: BS+, soft, NT/ND  Extremities: No peripheral edema  LABS/STUDIES  --------------------------------------------------------------------------------                Creatinine Trend:  SCr 0.77 [01-05 @ 05:21]  SCr 0.88 [01-04 @ 16:00]  SCr 1.41 [01-02 @ 19:10]    Urinalysis - [01-05-24 @ 05:21]      Color  / Appearance  / SG  / pH       Gluc 90 / Ketone   / Bili  / Urobili        Blood  / Protein  / Leuk Est  / Nitrite       RBC  / WBC  / Hyaline  / Gran  / Sq Epi  / Non Sq Epi  / Bacteria

## 2024-01-07 NOTE — PROGRESS NOTE ADULT - SUBJECTIVE AND OBJECTIVE BOX
Date of Service: 01-07-24 @ 10:18    Patient is a 78y old  Male who presents with a chief complaint of Hypotension, lethargu (07 Jan 2024 07:15)      Any change in ROS: seems to be doing  ok ; no sob:  on room air     MEDICATIONS  (STANDING):  albuterol/ipratropium for Nebulization 3 milliLiter(s) Nebulizer every 12 hours  cyanocobalamin 1000 MICROGram(s) Oral daily  donepezil 10 milliGRAM(s) Oral at bedtime  folic acid 1 milliGRAM(s) Oral daily  heparin   Injectable 5000 Unit(s) SubCutaneous every 8 hours  memantine 10 milliGRAM(s) Oral two times a day  oseltamivir 30 milliGRAM(s) Oral two times a day  risperiDONE   Tablet 0.25 milliGRAM(s) Oral daily  risperiDONE   Tablet 0.5 milliGRAM(s) Oral at bedtime  sertraline 50 milliGRAM(s) Oral daily  sodium chloride 1 Gram(s) Oral three times a day    MEDICATIONS  (PRN):  acetaminophen     Tablet .. 650 milliGRAM(s) Oral every 6 hours PRN Temp greater or equal to 38C (100.4F), Mild Pain (1 - 3)  aluminum hydroxide/magnesium hydroxide/simethicone Suspension 30 milliLiter(s) Oral every 4 hours PRN Dyspepsia  melatonin 3 milliGRAM(s) Oral at bedtime PRN Insomnia  ondansetron Injectable 4 milliGRAM(s) IV Push every 8 hours PRN Nausea and/or Vomiting  QUEtiapine 12.5 milliGRAM(s) Oral every 6 hours PRN for agitation    Vital Signs Last 24 Hrs  T(C): 36.5 (07 Jan 2024 06:20), Max: 36.7 (06 Jan 2024 10:26)  T(F): 97.7 (07 Jan 2024 06:20), Max: 98.1 (06 Jan 2024 10:26)  HR: 62 (07 Jan 2024 06:20) (62 - 75)  BP: 139/74 (07 Jan 2024 06:20) (98/59 - 139/74)  BP(mean): --  RR: 18 (07 Jan 2024 06:20) (16 - 18)  SpO2: 99% (07 Jan 2024 06:20) (96% - 99%)    Parameters below as of 07 Jan 2024 06:20  Patient On (Oxygen Delivery Method): room air        I&O's Summary        Physical Exam:   GENERAL: NAD, well-groomed, well-developed  HEENT: CHRISTINA/   Atraumatic, Normocephalic  ENMT: No tonsillar erythema, exudates, or enlargement; Moist mucous membranes, Good dentition, No lesions  NECK: Supple, No JVD, Normal thyroid  CHEST/LUNG: Clear to auscultaion- no wheezing  CVS: Regular rate and rhythm; No murmurs, rubs, or gallops  GI: : Soft, Nontender, Nondistended; Bowel sounds present  NERVOUS SYSTEM:  Alert & Oriented X3  EXTREMITIES: - edema  LYMPH: No lymphadenopathy noted  SKIN: No rashes or lesions  ENDOCRINOLOGY: No Thyromegaly  PSYCH: calm    Labs:  25, 25                            7.7    3.08  )-----------( 194      ( 05 Jan 2024 05:21 )             23.1                         7.4    3.04  )-----------( 199      ( 04 Jan 2024 16:00 )             22.4     01-05    130<L>  |  100  |  16  ----------------------------<  90  3.8   |  23  |  0.77  01-04    130<L>  |  100  |  18  ----------------------------<  96  4.0   |  23  |  0.88        CAPILLARY BLOOD GLUCOSE            rad< from: Xray Chest 1 View AP/PA (01.02.24 @ 19:36) >    ACC: [id number] EXAM:  XR CHEST AP OR PA 1V   ORDERED BY: CATHIE MULLER     PROCEDURE DATE:  01/02/2024          INTERPRETATION:  CLINICAL INDICATION: Sepsis.    EXAM: Chest x-ray 2 views.    COMPARISON: None available.    FINDINGS:  Surgical clips overlying the midline of the neck.  Bilateral lung fields partially obscured by patient positioning.   Visualized lung fields are clear.  There is no pleural effusion or pneumothorax.  The heart is not well evaluated in this position. Median sternotomy.  The visualized osseous structures demonstrate no acute pathology.    IMPRESSION:  No focal consolidations.    --- End of Report ---          MIR LAW MD; Resident Radiologist  This document has been electronically signed.  KOFI GARCIA MD; Attending Radiologist  This document has been electronically signed. Jan 2 2024  7:43PM    < end of copied text >            RECENT CULTURES:  01-03 @ 00:20 Clean Catch Clean Catch (Midstream)                No growth    01-02 @ 22:39 Catheterized Catheterized                No growth    01-02 @ 19:49 .Blood Blood-Peripheral                No growth at 4 days          RESPIRATORY CULTURES:          Studies  Chest X-RAY  CT SCAN Chest   Venous Dopplers: LE:   CT Abdomen  Others               Date of Service: 01-07-24 @ 10:18    Patient is a 78y old  Male who presents with a chief complaint of Hypotension, lethargu (07 Jan 2024 07:15)      Any change in ROS: seems to be doing  ok ; no sob:  on room air     MEDICATIONS  (STANDING):  albuterol/ipratropium for Nebulization 3 milliLiter(s) Nebulizer every 12 hours  cyanocobalamin 1000 MICROGram(s) Oral daily  donepezil 10 milliGRAM(s) Oral at bedtime  folic acid 1 milliGRAM(s) Oral daily  heparin   Injectable 5000 Unit(s) SubCutaneous every 8 hours  memantine 10 milliGRAM(s) Oral two times a day  oseltamivir 30 milliGRAM(s) Oral two times a day  risperiDONE   Tablet 0.25 milliGRAM(s) Oral daily  risperiDONE   Tablet 0.5 milliGRAM(s) Oral at bedtime  sertraline 50 milliGRAM(s) Oral daily  sodium chloride 1 Gram(s) Oral three times a day    MEDICATIONS  (PRN):  acetaminophen     Tablet .. 650 milliGRAM(s) Oral every 6 hours PRN Temp greater or equal to 38C (100.4F), Mild Pain (1 - 3)  aluminum hydroxide/magnesium hydroxide/simethicone Suspension 30 milliLiter(s) Oral every 4 hours PRN Dyspepsia  melatonin 3 milliGRAM(s) Oral at bedtime PRN Insomnia  ondansetron Injectable 4 milliGRAM(s) IV Push every 8 hours PRN Nausea and/or Vomiting  QUEtiapine 12.5 milliGRAM(s) Oral every 6 hours PRN for agitation    Vital Signs Last 24 Hrs  T(C): 36.5 (07 Jan 2024 06:20), Max: 36.7 (06 Jan 2024 10:26)  T(F): 97.7 (07 Jan 2024 06:20), Max: 98.1 (06 Jan 2024 10:26)  HR: 62 (07 Jan 2024 06:20) (62 - 75)  BP: 139/74 (07 Jan 2024 06:20) (98/59 - 139/74)  BP(mean): --  RR: 18 (07 Jan 2024 06:20) (16 - 18)  SpO2: 99% (07 Jan 2024 06:20) (96% - 99%)    Parameters below as of 07 Jan 2024 06:20  Patient On (Oxygen Delivery Method): room air        I&O's Summary        Physical Exam:   GENERAL: NAD, well-groomed, well-developed  HEENT: CHRISTINA/   Atraumatic, Normocephalic  ENMT: No tonsillar erythema, exudates, or enlargement; Moist mucous membranes, Good dentition, No lesions  NECK: Supple, No JVD, Normal thyroid  CHEST/LUNG: Clear to auscultaion- no wheezing  CVS: Regular rate and rhythm; No murmurs, rubs, or gallops  GI: : Soft, Nontender, Nondistended; Bowel sounds present  NERVOUS SYSTEM:  Alert & Oriented X3  EXTREMITIES: - edema  LYMPH: No lymphadenopathy noted  SKIN: No rashes or lesions  ENDOCRINOLOGY: No Thyromegaly  PSYCH: calm    Labs:  25, 25                            7.7    3.08  )-----------( 194      ( 05 Jan 2024 05:21 )             23.1                         7.4    3.04  )-----------( 199      ( 04 Jan 2024 16:00 )             22.4     01-05    130<L>  |  100  |  16  ----------------------------<  90  3.8   |  23  |  0.77  01-04    130<L>  |  100  |  18  ----------------------------<  96  4.0   |  23  |  0.88        CAPILLARY BLOOD GLUCOSE            rad< from: Xray Chest 1 View AP/PA (01.02.24 @ 19:36) >    ACC: [id number] EXAM:  XR CHEST AP OR PA 1V   ORDERED BY: CATHIE MULLER     PROCEDURE DATE:  01/02/2024          INTERPRETATION:  CLINICAL INDICATION: Sepsis.    EXAM: Chest x-ray 2 views.    COMPARISON: None available.    FINDINGS:  Surgical clips overlying the midline of the neck.  Bilateral lung fields partially obscured by patient positioning.   Visualized lung fields are clear.  There is no pleural effusion or pneumothorax.  The heart is not well evaluated in this position. Median sternotomy.  The visualized osseous structures demonstrate no acute pathology.    IMPRESSION:  No focal consolidations.    --- End of Report ---          MIR LAW MD; Resident Radiologist  This document has been electronically signed.  KOFI GARCIA MD; Attending Radiologist  This document has been electronically signed. Jan 2 2024  7:43PM    < end of copied text >            RECENT CULTURES:  01-03 @ 00:20 Clean Catch Clean Catch (Midstream)                No growth    01-02 @ 22:39 Catheterized Catheterized                No growth    01-02 @ 19:49 .Blood Blood-Peripheral                No growth at 4 days          RESPIRATORY CULTURES:          Studies  Chest X-RAY  CT SCAN Chest   Venous Dopplers: LE:   CT Abdomen  Others

## 2024-01-08 ENCOUNTER — TRANSCRIPTION ENCOUNTER (OUTPATIENT)
Age: 79
End: 2024-01-08

## 2024-01-08 RX ADMIN — QUETIAPINE FUMARATE 12.5 MILLIGRAM(S): 200 TABLET, FILM COATED ORAL at 23:11

## 2024-01-08 RX ADMIN — QUETIAPINE FUMARATE 12.5 MILLIGRAM(S): 200 TABLET, FILM COATED ORAL at 16:54

## 2024-01-08 RX ADMIN — HEPARIN SODIUM 5000 UNIT(S): 5000 INJECTION INTRAVENOUS; SUBCUTANEOUS at 23:11

## 2024-01-08 RX ADMIN — Medication 3 MILLILITER(S): at 22:36

## 2024-01-08 RX ADMIN — Medication 30 MILLIGRAM(S): at 06:02

## 2024-01-08 RX ADMIN — QUETIAPINE FUMARATE 12.5 MILLIGRAM(S): 200 TABLET, FILM COATED ORAL at 06:03

## 2024-01-08 RX ADMIN — MEMANTINE HYDROCHLORIDE 10 MILLIGRAM(S): 10 TABLET ORAL at 06:01

## 2024-01-08 RX ADMIN — SODIUM CHLORIDE 1 GRAM(S): 9 INJECTION INTRAMUSCULAR; INTRAVENOUS; SUBCUTANEOUS at 06:01

## 2024-01-08 RX ADMIN — MEMANTINE HYDROCHLORIDE 10 MILLIGRAM(S): 10 TABLET ORAL at 17:53

## 2024-01-08 RX ADMIN — Medication 3 MILLILITER(S): at 08:39

## 2024-01-08 RX ADMIN — SODIUM CHLORIDE 1 GRAM(S): 9 INJECTION INTRAMUSCULAR; INTRAVENOUS; SUBCUTANEOUS at 13:48

## 2024-01-08 RX ADMIN — SERTRALINE 50 MILLIGRAM(S): 25 TABLET, FILM COATED ORAL at 11:47

## 2024-01-08 RX ADMIN — RISPERIDONE 0.25 MILLIGRAM(S): 4 TABLET ORAL at 11:47

## 2024-01-08 RX ADMIN — HEPARIN SODIUM 5000 UNIT(S): 5000 INJECTION INTRAVENOUS; SUBCUTANEOUS at 06:01

## 2024-01-08 RX ADMIN — HEPARIN SODIUM 5000 UNIT(S): 5000 INJECTION INTRAVENOUS; SUBCUTANEOUS at 13:48

## 2024-01-08 RX ADMIN — Medication 1 MILLIGRAM(S): at 11:47

## 2024-01-08 RX ADMIN — DONEPEZIL HYDROCHLORIDE 10 MILLIGRAM(S): 10 TABLET, FILM COATED ORAL at 23:09

## 2024-01-08 RX ADMIN — PREGABALIN 1000 MICROGRAM(S): 225 CAPSULE ORAL at 11:48

## 2024-01-08 RX ADMIN — RISPERIDONE 0.5 MILLIGRAM(S): 4 TABLET ORAL at 23:10

## 2024-01-08 NOTE — PROGRESS NOTE ADULT - SUBJECTIVE AND OBJECTIVE BOX
[facility], Division of Infectious Diseases  MIRTHA Bagley Y. Patel, S. Shah, G. Amos  [phone]  ([phone] - weekdays after 5pm and weekends)    Name: GUS DELUNA  Age/Gender: 78y Male  MRN: [id number]    Interval History:  Notes reviewed.   No concerning overnight events.  Afebrile.     Allergies: No Known Allergies      Objective:  Vitals:   T(F): 97.5 (01-08-24 @ 09:40), Max: 97.8 (01-07-24 @ 16:38)  HR: 70 (01-08-24 @ 09:40) (66 - 79)  BP: 128/70 (01-08-24 @ 09:40) (128/70 - 143/71)  RR: 17 (01-08-24 @ 09:40) (17 - 18)  SpO2: 97% (01-08-24 @ 09:40) (95% - 99%)  Physical Examination:  General: no acute distress  HEENT: anicteric  Cardio: normal rate  Resp: breathing comfortably on RA   Abd: non-distended  Ext: no LE edema    Laboratory Studies:  CBC:   WBC Trend:  3.08 01-05-24 @ 05:21  3.04 01-04-24 @ 16:00  5.71 01-02-24 @ 19:10    CMP:               Microbiology: reviewed     Culture - Urine (collected 01-03-24 @ 00:20)  Source: Clean Catch Clean Catch (Midstream)  Final Report (01-04-24 @ 10:55):    No growth    Culture - Urine (collected 01-02-24 @ 22:39)  Source: Catheterized Catheterized  Final Report (01-03-24 @ 22:41):    No growth    Culture - Blood (collected 01-02-24 @ 19:49)  Source: .Blood Blood-Peripheral  Final Report (01-07-24 @ 22:00):    No growth at 5 days    Culture - Blood (collected 01-02-24 @ 19:49)  Source: .Blood Blood-Peripheral  Final Report (01-07-24 @ 22:00):    No growth at 5 days        Radiology: reviewed     Medications:  acetaminophen     Tablet .. 650 milliGRAM(s) Oral every 6 hours PRN  albuterol/ipratropium for Nebulization 3 milliLiter(s) Nebulizer every 12 hours  aluminum hydroxide/magnesium hydroxide/simethicone Suspension 30 milliLiter(s) Oral every 4 hours PRN  cyanocobalamin 1000 MICROGram(s) Oral daily  donepezil 10 milliGRAM(s) Oral at bedtime  folic acid 1 milliGRAM(s) Oral daily  heparin   Injectable 5000 Unit(s) SubCutaneous every 8 hours  melatonin 3 milliGRAM(s) Oral at bedtime PRN  memantine 10 milliGRAM(s) Oral two times a day  ondansetron Injectable 4 milliGRAM(s) IV Push every 8 hours PRN  QUEtiapine 12.5 milliGRAM(s) Oral every 6 hours PRN  risperiDONE   Tablet 0.25 milliGRAM(s) Oral daily  risperiDONE   Tablet 0.5 milliGRAM(s) Oral at bedtime  sertraline 50 milliGRAM(s) Oral daily  sodium chloride 1 Gram(s) Oral three times a day    Antimicrobials:   [facility], Division of Infectious Diseases  MIRTHA Bagley Y. Patel, S. Shah, G. Amos  [phone]  ([phone] - weekdays after 5pm and weekends)    Name: GUS DELUNA  Age/Gender: 78y Male  MRN: [id number]    Interval History:  Notes reviewed.   No concerning overnight events.  Afebrile.     Allergies: No Known Allergies      Objective:  Vitals:   T(F): 97.5 (01-08-24 @ 09:40), Max: 97.8 (01-07-24 @ 16:38)  HR: 70 (01-08-24 @ 09:40) (66 - 79)  BP: 128/70 (01-08-24 @ 09:40) (128/70 - 143/71)  RR: 17 (01-08-24 @ 09:40) (17 - 18)  SpO2: 97% (01-08-24 @ 09:40) (95% - 99%)  Physical Examination:  General: no acute distress  HEENT: anicteric  Cardio: normal rate  Resp: breathing comfortably on RA   Abd: non-distended  Ext: no LE edema    Laboratory Studies:  CBC:   WBC Trend:  3.08 01-05-24 @ 05:21  3.04 01-04-24 @ 16:00  5.71 01-02-24 @ 19:10    CMP:               Microbiology: reviewed     Culture - Urine (collected 01-03-24 @ 00:20)  Source: Clean Catch Clean Catch (Midstream)  Final Report (01-04-24 @ 10:55):    No growth    Culture - Urine (collected 01-02-24 @ 22:39)  Source: Catheterized Catheterized  Final Report (01-03-24 @ 22:41):    No growth    Culture - Blood (collected 01-02-24 @ 19:49)  Source: .Blood Blood-Peripheral  Final Report (01-07-24 @ 22:00):    No growth at 5 days    Culture - Blood (collected 01-02-24 @ 19:49)  Source: .Blood Blood-Peripheral  Final Report (01-07-24 @ 22:00):    No growth at 5 days        Radiology: reviewed     Medications:  acetaminophen     Tablet .. 650 milliGRAM(s) Oral every 6 hours PRN  albuterol/ipratropium for Nebulization 3 milliLiter(s) Nebulizer every 12 hours  aluminum hydroxide/magnesium hydroxide/simethicone Suspension 30 milliLiter(s) Oral every 4 hours PRN  cyanocobalamin 1000 MICROGram(s) Oral daily  donepezil 10 milliGRAM(s) Oral at bedtime  folic acid 1 milliGRAM(s) Oral daily  heparin   Injectable 5000 Unit(s) SubCutaneous every 8 hours  melatonin 3 milliGRAM(s) Oral at bedtime PRN  memantine 10 milliGRAM(s) Oral two times a day  ondansetron Injectable 4 milliGRAM(s) IV Push every 8 hours PRN  QUEtiapine 12.5 milliGRAM(s) Oral every 6 hours PRN  risperiDONE   Tablet 0.25 milliGRAM(s) Oral daily  risperiDONE   Tablet 0.5 milliGRAM(s) Oral at bedtime  sertraline 50 milliGRAM(s) Oral daily  sodium chloride 1 Gram(s) Oral three times a day    Antimicrobials:

## 2024-01-08 NOTE — CHART NOTE - NSCHARTNOTEFT_GEN_A_CORE
Reason for Follow-Up Assessment: Severe Malnutrition     78M with history as above who presents to the hospital with hypotension and lethargy at his NH here found to have sepsis 2/2 influenza A and UTI.   s/p completion of Tamiflu.    Patient with PO intake between poor to fair, needs feeding assistance.  Diet being supplemented with Ensure Plus High Protein Therapeutic Shake 2x daily which is currently being subbed for Ensure Plus (700kcal / 26gm pro/day) due to supplement availability. No GI distress noted i.e. nausea, vomiting, diarrhea. No reports of chewing or swallowing issues on prescribed diet. No new weights available for assessment.    Diet Education:  [  ] Given (date / education provided)  [  ] Given on previous assessment by RD  [ X ] Not applicable due to mental status / cognitive deficits  [  ] Patient deferred / refused   [  ] Not applicable due to current medical status / prognosis     Source: [ ] Patient [ ] Family [ ] RN [ X ] Chart      Diet, Regular:   DASH/TLC {Sodium & Cholesterol Restricted} (DASH)  Pureed (PUREED)  Supplement Feeding Modality:  Oral  Ensure Plus High Protein Cans or Servings Per Day:  2       Frequency:  Daily (01-05-24 @ 14:53)    GI: Fecal incontinence. Last BM noted on [1/7 ]    PO intake:  [ X ] Poor < 50%  to Fair 50-75% [ ] Good  % [ ] Inconsistent PO intake    Enteral /Parenteral Nutrition:       [ X ] n/a    Anthropometrics:   Height (cm): 188 (01-03)  Weight (kg): 75.296 (01-03)  BMI (kg/m2): 21.3 (01-03)    Edema: No edema per nursing flowsheets.     Pressure Injuries: No pressure injuries noted     _______________ Pertinent Medications_______________  MEDICATIONS  (STANDING):  albuterol/ipratropium for Nebulization 3 milliLiter(s) Nebulizer every 12 hours  cyanocobalamin 1000 MICROGram(s) Oral daily  donepezil 10 milliGRAM(s) Oral at bedtime  folic acid 1 milliGRAM(s) Oral daily  heparin   Injectable 5000 Unit(s) SubCutaneous every 8 hours  memantine 10 milliGRAM(s) Oral two times a day  risperiDONE   Tablet 0.25 milliGRAM(s) Oral daily  risperiDONE   Tablet 0.5 milliGRAM(s) Oral at bedtime  sertraline 50 milliGRAM(s) Oral daily  sodium chloride 1 Gram(s) Oral three times a day    MEDICATIONS  (PRN):  acetaminophen     Tablet .. 650 milliGRAM(s) Oral every 6 hours PRN Temp greater or equal to 38C (100.4F), Mild Pain (1 - 3)  aluminum hydroxide/magnesium hydroxide/simethicone Suspension 30 milliLiter(s) Oral every 4 hours PRN Dyspepsia  melatonin 3 milliGRAM(s) Oral at bedtime PRN Insomnia  ondansetron Injectable 4 milliGRAM(s) IV Push every 8 hours PRN Nausea and/or Vomiting  QUEtiapine 12.5 milliGRAM(s) Oral every 6 hours PRN for agitation      __________________ Pertinent Labs__________________    01-05 Phos 2.3 mg/dL<L> 01-02 Alb 2.7 g/dL<L>    Estimated Needs:   [X] no change since previous assessment  [ ] recalculated:     Previous Nutrition Diagnosis: Severe Malnutrition, Inadequate Oral Intake    Nutrition Diagnosis is [X] ongoing  [ ] resolved [ ] not applicable     New Nutrition Diagnosis: n/a     Monitoring and Evaluation:     [ x ] Tolerance to diet prescription / adequacy of meal intake  [ x ] Weight trends   [ x ] Pertinent labs    Recommendations:  1) Diet / Supplement Changes: Continue diet as tolerated / oral supplement Ensure Plus 2x day to optimize nutrition. Continue to assist patient at meals to aid in optimizing PO intake.  2) Obtain and record current weights to best monitor for acute changes in nutritional status.  3) Please consistently document % meal intake in nursing flowsheets.     Radha Condon, MS, RDN, CDN  Pager [phone]  Also available on MS Teams Reason for Follow-Up Assessment: Severe Malnutrition     78M with history as above who presents to the hospital with hypotension and lethargy at his NH here found to have sepsis 2/2 influenza A and UTI.   s/p completion of Tamiflu.    Patient with PO intake between poor to fair, needs feeding assistance.  Diet being supplemented with Ensure Plus High Protein Therapeutic Shake 2x daily which is currently being subbed for Ensure Plus (700kcal / 26gm pro/day) due to supplement availability. No GI distress noted i.e. nausea, vomiting, diarrhea. No reports of chewing or swallowing issues on prescribed diet. No new weights available for assessment.    Diet Education:  [  ] Given (date / education provided)  [  ] Given on previous assessment by RD  [ X ] Not applicable due to mental status / cognitive deficits  [  ] Patient deferred / refused   [  ] Not applicable due to current medical status / prognosis     Source: [ ] Patient [ ] Family [ ] RN [ X ] Chart      Diet, Regular:   DASH/TLC {Sodium & Cholesterol Restricted} (DASH)  Pureed (PUREED)  Supplement Feeding Modality:  Oral  Ensure Plus High Protein Cans or Servings Per Day:  2       Frequency:  Daily (01-05-24 @ 14:53)    GI: Fecal incontinence. Last BM noted on [1/7 ]    PO intake:  [ X ] Poor < 50%  to Fair 50-75% [ ] Good  % [ ] Inconsistent PO intake    Enteral /Parenteral Nutrition:       [ X ] n/a    Anthropometrics:   Height (cm): 188 (01-03)  Weight (kg): 75.296 (01-03)  BMI (kg/m2): 21.3 (01-03)    Edema: No edema per nursing flowsheets.     Pressure Injuries: No pressure injuries noted     _______________ Pertinent Medications_______________  MEDICATIONS  (STANDING):  albuterol/ipratropium for Nebulization 3 milliLiter(s) Nebulizer every 12 hours  cyanocobalamin 1000 MICROGram(s) Oral daily  donepezil 10 milliGRAM(s) Oral at bedtime  folic acid 1 milliGRAM(s) Oral daily  heparin   Injectable 5000 Unit(s) SubCutaneous every 8 hours  memantine 10 milliGRAM(s) Oral two times a day  risperiDONE   Tablet 0.25 milliGRAM(s) Oral daily  risperiDONE   Tablet 0.5 milliGRAM(s) Oral at bedtime  sertraline 50 milliGRAM(s) Oral daily  sodium chloride 1 Gram(s) Oral three times a day    MEDICATIONS  (PRN):  acetaminophen     Tablet .. 650 milliGRAM(s) Oral every 6 hours PRN Temp greater or equal to 38C (100.4F), Mild Pain (1 - 3)  aluminum hydroxide/magnesium hydroxide/simethicone Suspension 30 milliLiter(s) Oral every 4 hours PRN Dyspepsia  melatonin 3 milliGRAM(s) Oral at bedtime PRN Insomnia  ondansetron Injectable 4 milliGRAM(s) IV Push every 8 hours PRN Nausea and/or Vomiting  QUEtiapine 12.5 milliGRAM(s) Oral every 6 hours PRN for agitation      __________________ Pertinent Labs__________________    01-05 Phos 2.3 mg/dL<L> 01-02 Alb 2.7 g/dL<L>    Estimated Needs:   [X] no change since previous assessment  [ ] recalculated:     Previous Nutrition Diagnosis: Severe Malnutrition, Inadequate Oral Intake    Nutrition Diagnosis is [X] ongoing  [ ] resolved [ ] not applicable     New Nutrition Diagnosis: n/a     Monitoring and Evaluation:     [ x ] Tolerance to diet prescription / adequacy of meal intake  [ x ] Weight trends   [ x ] Pertinent labs    Recommendations:  1) Diet / Supplement Changes: Continue diet as tolerated / oral supplement Ensure Plus 2x day to optimize nutrition. Continue to assist patient at meals to aid in optimizing PO intake.  2) Obtain and record current weights to best monitor for acute changes in nutritional status.  3) Please consistently document % meal intake in nursing flowsheets.     Radha Condon, MS, RDN, CDN  Pager [phone]  Also available on MS Teams

## 2024-01-08 NOTE — DISCHARGE NOTE NURSING/CASE MANAGEMENT/SOCIAL WORK - NSDCFUADDAPPT_GEN_ALL_CORE_FT
Follow up with PCP within 1-2 weeks of discharge. If you are in need of a general medicine physician and post-discharge medical follow-up for further care/recommendations you may contact the [facility] for an appointment at [phone].  Follow up with PCP within 1-2 weeks of discharge. If you are in need of a general medicine physician and post-discharge medical follow-up for further care/recommendations you may contact the [facility] for an appointment at [phone].

## 2024-01-08 NOTE — PROGRESS NOTE ADULT - SUBJECTIVE AND OBJECTIVE BOX
[facility] [place] [facility]   MD ANEL FOLEY MD ANGELA WONG, PA    TEL:  OFFICE: [phone]  From 5pm-7am Answering Service [phone]    -- RENAL FOLLOW UP NOTE ---Date of Service 01-08-24 @ 15:08    Patient is a 78y old  Male who presents with a chief complaint of Hypotension, lethargu (08 Jan 2024 12:01)      Patient seen and examined at bedside. No chest pain/sob    VITALS:  T(F): 97.5 (01-08-24 @ 09:40), Max: 97.8 (01-07-24 @ 16:38)  HR: 70 (01-08-24 @ 09:40)  BP: 128/70 (01-08-24 @ 09:40)  RR: 17 (01-08-24 @ 09:40)  SpO2: 97% (01-08-24 @ 09:40)  Wt(kg): --        PHYSICAL EXAM:  General: NAD  Neck: No JVD  Respiratory: CTAB, no wheezes, rales or rhonchi  Cardiovascular: S1, S2, RRR  Gastrointestinal: BS+, soft, NT/ND  Extremities: No peripheral edema    Hospital Medications:   MEDICATIONS  (STANDING):  albuterol/ipratropium for Nebulization 3 milliLiter(s) Nebulizer every 12 hours  cyanocobalamin 1000 MICROGram(s) Oral daily  donepezil 10 milliGRAM(s) Oral at bedtime  folic acid 1 milliGRAM(s) Oral daily  heparin   Injectable 5000 Unit(s) SubCutaneous every 8 hours  memantine 10 milliGRAM(s) Oral two times a day  risperiDONE   Tablet 0.25 milliGRAM(s) Oral daily  risperiDONE   Tablet 0.5 milliGRAM(s) Oral at bedtime  sertraline 50 milliGRAM(s) Oral daily      LABS:        Creatinine Trend: 0.77 <--, 0.88 <--, 1.41 <--            Urine Studies:  Urinalysis - [01-05-24 @ 05:21]      Color  / Appearance  / SG  / pH       Gluc 90 / Ketone   / Bili  / Urobili        Blood  / Protein  / Leuk Est  / Nitrite       RBC  / WBC  / Hyaline  / Gran  / Sq Epi  / Non Sq Epi  / Bacteria             RADIOLOGY & ADDITIONAL STUDIES:   [facility] [place] [facility]   MD ANEL FOLEY MD ANGELA WONG, PA    TEL:  OFFICE: [phone]  From 5pm-7am Answering Service [phone]    -- RENAL FOLLOW UP NOTE ---Date of Service 01-08-24 @ 15:08    Patient is a 78y old  Male who presents with a chief complaint of Hypotension, lethargu (08 Jan 2024 12:01)      Patient seen and examined at bedside. No chest pain/sob    VITALS:  T(F): 97.5 (01-08-24 @ 09:40), Max: 97.8 (01-07-24 @ 16:38)  HR: 70 (01-08-24 @ 09:40)  BP: 128/70 (01-08-24 @ 09:40)  RR: 17 (01-08-24 @ 09:40)  SpO2: 97% (01-08-24 @ 09:40)  Wt(kg): --        PHYSICAL EXAM:  General: NAD  Neck: No JVD  Respiratory: CTAB, no wheezes, rales or rhonchi  Cardiovascular: S1, S2, RRR  Gastrointestinal: BS+, soft, NT/ND  Extremities: No peripheral edema    Hospital Medications:   MEDICATIONS  (STANDING):  albuterol/ipratropium for Nebulization 3 milliLiter(s) Nebulizer every 12 hours  cyanocobalamin 1000 MICROGram(s) Oral daily  donepezil 10 milliGRAM(s) Oral at bedtime  folic acid 1 milliGRAM(s) Oral daily  heparin   Injectable 5000 Unit(s) SubCutaneous every 8 hours  memantine 10 milliGRAM(s) Oral two times a day  risperiDONE   Tablet 0.25 milliGRAM(s) Oral daily  risperiDONE   Tablet 0.5 milliGRAM(s) Oral at bedtime  sertraline 50 milliGRAM(s) Oral daily      LABS:        Creatinine Trend: 0.77 <--, 0.88 <--, 1.41 <--            Urine Studies:  Urinalysis - [01-05-24 @ 05:21]      Color  / Appearance  / SG  / pH       Gluc 90 / Ketone   / Bili  / Urobili        Blood  / Protein  / Leuk Est  / Nitrite       RBC  / WBC  / Hyaline  / Gran  / Sq Epi  / Non Sq Epi  / Bacteria             RADIOLOGY & ADDITIONAL STUDIES:

## 2024-01-08 NOTE — PROGRESS NOTE ADULT - ASSESSMENT
This is a 78M with history of MM, HTN, and Dementia (AAO x 1-2 to self, to place, not to time at baseline per brother) who presents to the hospital from [facility] for hypotension and lethargy. Patient currently awake, alert, AAO x2 (to self, to hospital but not name, not to time) but very poor historian, denies any acute complaints when asked. Spoke with brother Mookie who said that the patient was sent to the hospital for lethargy and cough though he states that the patient has had a cough for several months. Brother denies any other known acute complaints for the patient. Called [facility] [phone] but there was no staff available who knew the patient. As per NH paperwork and ED note, patient was sent to the hospital for hypotention to 81/55 and lethargy. He was noted to have a low grade temp of 99.6 and saturation of 93% at his NH. He was given tylenol 650mg x1 prior to being sent to the hospital.   On arrival to the hospital his vitals were T 99.4 -> 100.7 rectal, P 92, BP 88/55 -> 117/70, RR 16, O2 sat 96% RA. His lab work was significant for worsening macrocytic anemia, DAVID with mild hyponatremia, elevated protein gap, elevated lactate with improvement on repeat. His UA was positive for nitrite, leuk esterase but negative for bacteria. His respiratory swab was positive for influenza A. His imaging did not show acute abnormalities. He was given ofirmev 1g, NS 500cc x1, vanc 1g, cefepime 2g, and tamiflu 75mg PO x1. He was admitted to medicine.  (03 Jan 2024 06:06):  now pulm called:  he is from NH:  above history noted:  he seems to be responding to simple commands:  he say he has no underlying lung history  never smoked:  on room air:  adm with influenza:     Influenza:   Multiple Myeloma  HTN  Dementia      Influenza:   -low grade fever  -Influenza:  on tamilflu  -cxr is clear:  -he is not wheezing    1/4: no change in pulm status today  : remains on room air  1/5: doing ok : no sob:  no cough : no phlegm;   1/6: on Tamiflu  no sob:  on room air  1/7: seems to be doing ok : no sob:  no cough:   1/8: seems OK: no sob:  n ocugh : no phlegm  on room air  Multiple Myeloma  -per primary team : FDG PET scan  noted:  rib lesions present likely secondary to MM   HTN  -controlled  Dementia  -supportive care:    karyn acp  This is a 78M with history of MM, HTN, and Dementia (AAO x 1-2 to self, to place, not to time at baseline per brother) who presents to the hospital from [facility] for hypotension and lethargy. Patient currently awake, alert, AAO x2 (to self, to hospital but not name, not to time) but very poor historian, denies any acute complaints when asked. Spoke with brother Mookie who said that the patient was sent to the hospital for lethargy and cough though he states that the patient has had a cough for several months. Brother denies any other known acute complaints for the patient. Called [facility] [phone] but there was no staff available who knew the patient. As per NH paperwork and ED note, patient was sent to the hospital for hypotention to 81/55 and lethargy. He was noted to have a low grade temp of 99.6 and saturation of 93% at his NH. He was given tylenol 650mg x1 prior to being sent to the hospital.   On arrival to the hospital his vitals were T 99.4 -> 100.7 rectal, P 92, BP 88/55 -> 117/70, RR 16, O2 sat 96% RA. His lab work was significant for worsening macrocytic anemia, DAVID with mild hyponatremia, elevated protein gap, elevated lactate with improvement on repeat. His UA was positive for nitrite, leuk esterase but negative for bacteria. His respiratory swab was positive for influenza A. His imaging did not show acute abnormalities. He was given ofirmev 1g, NS 500cc x1, vanc 1g, cefepime 2g, and tamiflu 75mg PO x1. He was admitted to medicine.  (03 Jan 2024 06:06):  now pulm called:  he is from NH:  above history noted:  he seems to be responding to simple commands:  he say he has no underlying lung history  never smoked:  on room air:  adm with influenza:     Influenza:   Multiple Myeloma  HTN  Dementia      Influenza:   -low grade fever  -Influenza:  on tamilflu  -cxr is clear:  -he is not wheezing    1/4: no change in pulm status today  : remains on room air  1/5: doing ok : no sob:  no cough : no phlegm;   1/6: on Tamiflu  no sob:  on room air  1/7: seems to be doing ok : no sob:  no cough:   1/8: seems OK: no sob:  n ocugh : no phlegm  on room air  Multiple Myeloma  -per primary team : FDG PET scan  noted:  rib lesions present likely secondary to MM   HTN  -controlled  Dementia  -supportive care:    karyn acp

## 2024-01-08 NOTE — PROGRESS NOTE ADULT - SUBJECTIVE AND OBJECTIVE BOX
DATE OF SERVICE: 01-08-24    No overnight events, resting comfortably  Review of symptoms otherwise negative.    Review of Systems:   Constitutional: [ ] fevers, [ ] chills.   Skin: [ ] dry skin. [ ] rashes.  Psychiatric: [ ] depression, [ ] anxiety.   Gastrointestinal: [ ] BRBPR, [ ] melena.   Neurological: [ ] confusion. [ ] seizures. [ ] shuffling gait.   Ears,Nose,Mouth and Throat: [ ] ear pain [ ] sore throat.   Eyes: [ ] diplopia.   Respiratory: [ ] hemoptysis. [ ] shortness of breath  Cardiovascular: See HPI above  Hematologic/Lymphatic: [ ] anemia. [ ] painful nodes. [ ] prolonged bleeding.   Genitourinary: [ ] hematuria. [ ] flank pain.   Endocrine: [ ] significant change in weight. [ ] intolerance to heat and cold.     Review of systems [ ] otherwise negative, [ ] otherwise unable to obtain    FH: no family history of sudden cardiac death in first degree relatives    SH: [ ] tobacco, [ ] alcohol, [ ] drugs    acetaminophen     Tablet .. 650 milliGRAM(s) Oral every 6 hours PRN  albuterol/ipratropium for Nebulization 3 milliLiter(s) Nebulizer every 12 hours  aluminum hydroxide/magnesium hydroxide/simethicone Suspension 30 milliLiter(s) Oral every 4 hours PRN  cyanocobalamin 1000 MICROGram(s) Oral daily  donepezil 10 milliGRAM(s) Oral at bedtime  folic acid 1 milliGRAM(s) Oral daily  heparin   Injectable 5000 Unit(s) SubCutaneous every 8 hours  melatonin 3 milliGRAM(s) Oral at bedtime PRN  memantine 10 milliGRAM(s) Oral two times a day  ondansetron Injectable 4 milliGRAM(s) IV Push every 8 hours PRN  QUEtiapine 12.5 milliGRAM(s) Oral every 6 hours PRN  risperiDONE   Tablet 0.25 milliGRAM(s) Oral daily  risperiDONE   Tablet 0.5 milliGRAM(s) Oral at bedtime  sertraline 50 milliGRAM(s) Oral daily  sodium chloride 1 Gram(s) Oral three times a day      T(C): 36.4 (01-08-24 @ 09:40), Max: 36.6 (01-07-24 @ 16:38)  HR: 70 (01-08-24 @ 09:40) (66 - 79)  BP: 128/70 (01-08-24 @ 09:40) (128/70 - 143/71)  RR: 17 (01-08-24 @ 09:40) (17 - 18)  SpO2: 97% (01-08-24 @ 09:40) (95% - 99%)  Wt(kg): --    General:  Alert and Oriented  Head: Normocephalic and atraumatic.   Neck: No JVD. No bruits. Supple. Does not appear to be enlarged.   Cardiovascular: + S1,S2 ; RRR Soft systolic murmur at the left lower sternal border. No rubs noted.    Lungs: CTA b/l. No rhonchi, rales or wheezes.   Abdomen: + BS, soft. Non tender. Non distended. No rebound. No guarding.   Extremities: No clubbing/cyanosis/edema.   Neurologic: Moves all four extremities. Full range of motion.   Skin: Warm and moist. The patient's skin has normal elasticity and good skin turgor.   Psychiatric: Appropriate mood and affect.  Musculoskeletal: Normal range of motion, normal strength       ECG:  	Sinus tachycardia    < from: Xray Chest 1 View AP/PA (01.02.24 @ 19:36) >  IMPRESSION:  No focal consolidations.  < end of copied text >    < from: Transthoracic Echocardiogram (07.10.23 @ 11:15) >  CONCLUSIONS:  1. Calcified trileaflet aortic valve with normal opening.  Minimal aortic regurgitation.  2. Endocardium not well visualized; grossly decreased  possibly mild left ventricular systolic dysfunction.  3. The right ventricle is not well visualized; grossly  normal right ventricular systolic function.  ------------------------------------------------------------------------  Confirmed on  7/10/2023 - 13:57:46 by MARLA Vegas  < end of copied text >      ASSESSMENT/PLAN: 	78M with history of MM, HTN, and Dementia (AAO x 1-2 to self, to place, not to time at baseline per brother) who presents to the hospital from [facility] for hypotension and lethargy found to have FLU A    -#Hypotension  -- BP stable  -- Norvasc and Lopressor on hold given acute illness--ok to resume today at DC    #lethargy  --due to Flu, now resolved  --s/p tamiflu and Abx per medicine  DC planning     DATE OF SERVICE: 01-08-24    No overnight events, resting comfortably  Review of symptoms otherwise negative.    Review of Systems:   Constitutional: [ ] fevers, [ ] chills.   Skin: [ ] dry skin. [ ] rashes.  Psychiatric: [ ] depression, [ ] anxiety.   Gastrointestinal: [ ] BRBPR, [ ] melena.   Neurological: [ ] confusion. [ ] seizures. [ ] shuffling gait.   Ears,Nose,Mouth and Throat: [ ] ear pain [ ] sore throat.   Eyes: [ ] diplopia.   Respiratory: [ ] hemoptysis. [ ] shortness of breath  Cardiovascular: See HPI above  Hematologic/Lymphatic: [ ] anemia. [ ] painful nodes. [ ] prolonged bleeding.   Genitourinary: [ ] hematuria. [ ] flank pain.   Endocrine: [ ] significant change in weight. [ ] intolerance to heat and cold.     Review of systems [ ] otherwise negative, [ ] otherwise unable to obtain    FH: no family history of sudden cardiac death in first degree relatives    SH: [ ] tobacco, [ ] alcohol, [ ] drugs    acetaminophen     Tablet .. 650 milliGRAM(s) Oral every 6 hours PRN  albuterol/ipratropium for Nebulization 3 milliLiter(s) Nebulizer every 12 hours  aluminum hydroxide/magnesium hydroxide/simethicone Suspension 30 milliLiter(s) Oral every 4 hours PRN  cyanocobalamin 1000 MICROGram(s) Oral daily  donepezil 10 milliGRAM(s) Oral at bedtime  folic acid 1 milliGRAM(s) Oral daily  heparin   Injectable 5000 Unit(s) SubCutaneous every 8 hours  melatonin 3 milliGRAM(s) Oral at bedtime PRN  memantine 10 milliGRAM(s) Oral two times a day  ondansetron Injectable 4 milliGRAM(s) IV Push every 8 hours PRN  QUEtiapine 12.5 milliGRAM(s) Oral every 6 hours PRN  risperiDONE   Tablet 0.25 milliGRAM(s) Oral daily  risperiDONE   Tablet 0.5 milliGRAM(s) Oral at bedtime  sertraline 50 milliGRAM(s) Oral daily  sodium chloride 1 Gram(s) Oral three times a day      T(C): 36.4 (01-08-24 @ 09:40), Max: 36.6 (01-07-24 @ 16:38)  HR: 70 (01-08-24 @ 09:40) (66 - 79)  BP: 128/70 (01-08-24 @ 09:40) (128/70 - 143/71)  RR: 17 (01-08-24 @ 09:40) (17 - 18)  SpO2: 97% (01-08-24 @ 09:40) (95% - 99%)  Wt(kg): --    General:  Alert and Oriented  Head: Normocephalic and atraumatic.   Neck: No JVD. No bruits. Supple. Does not appear to be enlarged.   Cardiovascular: + S1,S2 ; RRR Soft systolic murmur at the left lower sternal border. No rubs noted.    Lungs: CTA b/l. No rhonchi, rales or wheezes.   Abdomen: + BS, soft. Non tender. Non distended. No rebound. No guarding.   Extremities: No clubbing/cyanosis/edema.   Neurologic: Moves all four extremities. Full range of motion.   Skin: Warm and moist. The patient's skin has normal elasticity and good skin turgor.   Psychiatric: Appropriate mood and affect.  Musculoskeletal: Normal range of motion, normal strength       ECG:  	Sinus tachycardia    < from: Xray Chest 1 View AP/PA (01.02.24 @ 19:36) >  IMPRESSION:  No focal consolidations.  < end of copied text >    < from: Transthoracic Echocardiogram (07.10.23 @ 11:15) >  CONCLUSIONS:  1. Calcified trileaflet aortic valve with normal opening.  Minimal aortic regurgitation.  2. Endocardium not well visualized; grossly decreased  possibly mild left ventricular systolic dysfunction.  3. The right ventricle is not well visualized; grossly  normal right ventricular systolic function.  ------------------------------------------------------------------------  Confirmed on  7/10/2023 - 13:57:46 by MARLA Vegas  < end of copied text >      ASSESSMENT/PLAN: 	78M with history of MM, HTN, and Dementia (AAO x 1-2 to self, to place, not to time at baseline per brother) who presents to the hospital from [facility] for hypotension and lethargy found to have FLU A    -#Hypotension  -- BP stable  -- Norvasc and Lopressor on hold given acute illness--ok to resume today at DC    #lethargy  --due to Flu, now resolved  --s/p tamiflu and Abx per medicine  DC planning

## 2024-01-08 NOTE — PROGRESS NOTE ADULT - SUBJECTIVE AND OBJECTIVE BOX
Date of Service: 01-08-24 @ 10:55    Patient is a 78y old  Male who presents with a chief complaint of Hypotension, lethargy (07 Jan 2024 16:50)      Any change in ROS: seems OK: no sob:  no cough : no phlegm      MEDICATIONS  (STANDING):  albuterol/ipratropium for Nebulization 3 milliLiter(s) Nebulizer every 12 hours  cyanocobalamin 1000 MICROGram(s) Oral daily  donepezil 10 milliGRAM(s) Oral at bedtime  folic acid 1 milliGRAM(s) Oral daily  heparin   Injectable 5000 Unit(s) SubCutaneous every 8 hours  memantine 10 milliGRAM(s) Oral two times a day  risperiDONE   Tablet 0.25 milliGRAM(s) Oral daily  risperiDONE   Tablet 0.5 milliGRAM(s) Oral at bedtime  sertraline 50 milliGRAM(s) Oral daily  sodium chloride 1 Gram(s) Oral three times a day    MEDICATIONS  (PRN):  acetaminophen     Tablet .. 650 milliGRAM(s) Oral every 6 hours PRN Temp greater or equal to 38C (100.4F), Mild Pain (1 - 3)  aluminum hydroxide/magnesium hydroxide/simethicone Suspension 30 milliLiter(s) Oral every 4 hours PRN Dyspepsia  melatonin 3 milliGRAM(s) Oral at bedtime PRN Insomnia  ondansetron Injectable 4 milliGRAM(s) IV Push every 8 hours PRN Nausea and/or Vomiting  QUEtiapine 12.5 milliGRAM(s) Oral every 6 hours PRN for agitation    Vital Signs Last 24 Hrs  T(C): 36.4 (08 Jan 2024 09:40), Max: 36.6 (07 Jan 2024 16:38)  T(F): 97.5 (08 Jan 2024 09:40), Max: 97.8 (07 Jan 2024 16:38)  HR: 70 (08 Jan 2024 09:40) (66 - 79)  BP: 128/70 (08 Jan 2024 09:40) (128/70 - 143/71)  BP(mean): --  RR: 17 (08 Jan 2024 09:40) (17 - 18)  SpO2: 97% (08 Jan 2024 09:40) (95% - 99%)    Parameters below as of 08 Jan 2024 09:40  Patient On (Oxygen Delivery Method): room air        I&O's Summary        Physical Exam:   GENERAL: NAD, well-groomed, well-developed  HEENT: CHRISTINA/   Atraumatic, Normocephalic  ENMT: No tonsillar erythema, exudates, or enlargement; Moist mucous membranes, Good dentition, No lesions  NECK: Supple, No JVD, Normal thyroid  CHEST/LUNG: Clear to auscultaion- no wheezing  CVS: Regular rate and rhythm; No murmurs, rubs, or gallops  GI: : Soft, Nontender, Nondistended; Bowel sounds present  NERVOUS SYSTEM:  Alert & awake  EXTREMITIES: - edema  LYMPH: No lymphadenopathy noted  SKIN: No rashes or lesions  ENDOCRINOLOGY: No Thyromegaly  PSYCH: calm     Labs:  25, 25                            7.7    3.08  )-----------( 194      ( 05 Jan 2024 05:21 )             23.1                         7.4    3.04  )-----------( 199      ( 04 Jan 2024 16:00 )             22.4     01-05    130<L>  |  100  |  16  ----------------------------<  90  3.8   |  23  |  0.77  01-04    130<L>  |  100  |  18  ----------------------------<  96  4.0   |  23  |  0.88        CAPILLARY BLOOD GLUCOSE          rad< from: Xray Chest 1 View AP/PA (01.02.24 @ 19:36) >    ACC: [id number] EXAM:  XR CHEST AP OR PA 1V   ORDERED BY: CATHIE MULLER     PROCEDURE DATE:  01/02/2024          INTERPRETATION:  CLINICAL INDICATION: Sepsis.    EXAM: Chest x-ray 2 views.    COMPARISON: None available.    FINDINGS:  Surgical clips overlying the midline of the neck.  Bilateral lung fields partially obscured by patient positioning.   Visualized lung fields are clear.  There is no pleural effusion or pneumothorax.  The heart is not well evaluated in this position. Median sternotomy.  The visualized osseous structures demonstrate no acute pathology.    IMPRESSION:  No focal consolidations.    --- End of Report ---          MIR LAW MD; Resident Radiologist  This document has been electronically signed.  KOFI GARCIA MD; Attending Radiologist  This document has been electronically signed. Jan 2 2024  7:43PM    < end of copied text >  < from: CT Head No Cont (01.03.24 @ 02:00) >    ACC: [id number] EXAM:  CT BRAIN   ORDERED BY: MALOU CRISOSTOMO     PROCEDURE DATE:  01/03/2024          INTERPRETATION:  EXAMINATION: CT HEAD    DATE: 1/3/2024 2:00 AM    INDICATION: lethargy, altered mental status. History of falls.    COMPARISON: CT head from 7/7/2023.    TECHNIQUE: Thin section noncontrast axial images were obtained through   the head.  RAPID AI was utilized for preliminary assessment of   intracranial hemorrhage.    FINDINGS:  Intracranial Contents:    Moderate to severe cerebral volume loss.. Mild to moderate chronic   microvascular ischemic changes Gray-white differentiation is preserved.   No hydrocephalus. The basilar cisterns are clear. No focal edema or acute   mass effect. There is no intracranial fluid collectionor acute   hemorrhage.    Bones and Extracranial Soft Tissues:    There is no fracture identified. Scattered paranasal sinus mucosal   thickening. Mastoid air cells are clear. Scalp and imaged facial soft   tissues are within normal limits.      IMPRESSION:  1. No acute intracranial CT abnormality.    --- End of Report ---          MAIKEL MORENO MD; Resident Radiologist  This document has been electronically signed.  ELLEN CARTWRIGHT MD; Attending Radiologist  This document has been electronically signed. Josef  3 2024  2:17AM    < end of copied text >              RECENT CULTURES:  01-03 @ 00:20 Clean Catch Clean Catch (Midstream)                No growth    01-02 @ 22:39 Catheterized Catheterized                No growth    01-02 @ 19:49 .Blood Blood-Peripheral                No growth at 5 days          RESPIRATORY CULTURES:          Studies  Chest X-RAY  CT SCAN Chest   Venous Dopplers: LE:   CT Abdomen  Others               Date of Service: 01-08-24 @ 10:55    Patient is a 78y old  Male who presents with a chief complaint of Hypotension, lethargy (07 Jan 2024 16:50)      Any change in ROS: seems OK: no sob:  no cough : no phlegm      MEDICATIONS  (STANDING):  albuterol/ipratropium for Nebulization 3 milliLiter(s) Nebulizer every 12 hours  cyanocobalamin 1000 MICROGram(s) Oral daily  donepezil 10 milliGRAM(s) Oral at bedtime  folic acid 1 milliGRAM(s) Oral daily  heparin   Injectable 5000 Unit(s) SubCutaneous every 8 hours  memantine 10 milliGRAM(s) Oral two times a day  risperiDONE   Tablet 0.25 milliGRAM(s) Oral daily  risperiDONE   Tablet 0.5 milliGRAM(s) Oral at bedtime  sertraline 50 milliGRAM(s) Oral daily  sodium chloride 1 Gram(s) Oral three times a day    MEDICATIONS  (PRN):  acetaminophen     Tablet .. 650 milliGRAM(s) Oral every 6 hours PRN Temp greater or equal to 38C (100.4F), Mild Pain (1 - 3)  aluminum hydroxide/magnesium hydroxide/simethicone Suspension 30 milliLiter(s) Oral every 4 hours PRN Dyspepsia  melatonin 3 milliGRAM(s) Oral at bedtime PRN Insomnia  ondansetron Injectable 4 milliGRAM(s) IV Push every 8 hours PRN Nausea and/or Vomiting  QUEtiapine 12.5 milliGRAM(s) Oral every 6 hours PRN for agitation    Vital Signs Last 24 Hrs  T(C): 36.4 (08 Jan 2024 09:40), Max: 36.6 (07 Jan 2024 16:38)  T(F): 97.5 (08 Jan 2024 09:40), Max: 97.8 (07 Jan 2024 16:38)  HR: 70 (08 Jan 2024 09:40) (66 - 79)  BP: 128/70 (08 Jan 2024 09:40) (128/70 - 143/71)  BP(mean): --  RR: 17 (08 Jan 2024 09:40) (17 - 18)  SpO2: 97% (08 Jan 2024 09:40) (95% - 99%)    Parameters below as of 08 Jan 2024 09:40  Patient On (Oxygen Delivery Method): room air        I&O's Summary        Physical Exam:   GENERAL: NAD, well-groomed, well-developed  HEENT: CHRISTINA/   Atraumatic, Normocephalic  ENMT: No tonsillar erythema, exudates, or enlargement; Moist mucous membranes, Good dentition, No lesions  NECK: Supple, No JVD, Normal thyroid  CHEST/LUNG: Clear to auscultaion- no wheezing  CVS: Regular rate and rhythm; No murmurs, rubs, or gallops  GI: : Soft, Nontender, Nondistended; Bowel sounds present  NERVOUS SYSTEM:  Alert & awake  EXTREMITIES: - edema  LYMPH: No lymphadenopathy noted  SKIN: No rashes or lesions  ENDOCRINOLOGY: No Thyromegaly  PSYCH: calm     Labs:  25, 25                            7.7    3.08  )-----------( 194      ( 05 Jan 2024 05:21 )             23.1                         7.4    3.04  )-----------( 199      ( 04 Jan 2024 16:00 )             22.4     01-05    130<L>  |  100  |  16  ----------------------------<  90  3.8   |  23  |  0.77  01-04    130<L>  |  100  |  18  ----------------------------<  96  4.0   |  23  |  0.88        CAPILLARY BLOOD GLUCOSE          rad< from: Xray Chest 1 View AP/PA (01.02.24 @ 19:36) >    ACC: [id number] EXAM:  XR CHEST AP OR PA 1V   ORDERED BY: CATHIE MULLER     PROCEDURE DATE:  01/02/2024          INTERPRETATION:  CLINICAL INDICATION: Sepsis.    EXAM: Chest x-ray 2 views.    COMPARISON: None available.    FINDINGS:  Surgical clips overlying the midline of the neck.  Bilateral lung fields partially obscured by patient positioning.   Visualized lung fields are clear.  There is no pleural effusion or pneumothorax.  The heart is not well evaluated in this position. Median sternotomy.  The visualized osseous structures demonstrate no acute pathology.    IMPRESSION:  No focal consolidations.    --- End of Report ---          MIR LAW MD; Resident Radiologist  This document has been electronically signed.  KOFI GARCIA MD; Attending Radiologist  This document has been electronically signed. Jan 2 2024  7:43PM    < end of copied text >  < from: CT Head No Cont (01.03.24 @ 02:00) >    ACC: [id number] EXAM:  CT BRAIN   ORDERED BY: MALOU CRISOSTOMO     PROCEDURE DATE:  01/03/2024          INTERPRETATION:  EXAMINATION: CT HEAD    DATE: 1/3/2024 2:00 AM    INDICATION: lethargy, altered mental status. History of falls.    COMPARISON: CT head from 7/7/2023.    TECHNIQUE: Thin section noncontrast axial images were obtained through   the head.  RAPID AI was utilized for preliminary assessment of   intracranial hemorrhage.    FINDINGS:  Intracranial Contents:    Moderate to severe cerebral volume loss.. Mild to moderate chronic   microvascular ischemic changes Gray-white differentiation is preserved.   No hydrocephalus. The basilar cisterns are clear. No focal edema or acute   mass effect. There is no intracranial fluid collectionor acute   hemorrhage.    Bones and Extracranial Soft Tissues:    There is no fracture identified. Scattered paranasal sinus mucosal   thickening. Mastoid air cells are clear. Scalp and imaged facial soft   tissues are within normal limits.      IMPRESSION:  1. No acute intracranial CT abnormality.    --- End of Report ---          MAIKEL MORENO MD; Resident Radiologist  This document has been electronically signed.  ELLEN CARTWRIGHT MD; Attending Radiologist  This document has been electronically signed. Josef  3 2024  2:17AM    < end of copied text >              RECENT CULTURES:  01-03 @ 00:20 Clean Catch Clean Catch (Midstream)                No growth    01-02 @ 22:39 Catheterized Catheterized                No growth    01-02 @ 19:49 .Blood Blood-Peripheral                No growth at 5 days          RESPIRATORY CULTURES:          Studies  Chest X-RAY  CT SCAN Chest   Venous Dopplers: LE:   CT Abdomen  Others

## 2024-01-08 NOTE — PROGRESS NOTE ADULT - ASSESSMENT
78M with history as above who presents to the hospital with hypotension and lethargy at his NH here found to have sepsis 2/2 influenza A and UTI.         Problem/Plan - 1:  ·  Problem: Sepsis, unspecified organism.   ·  Plan: - Meets sepsis criteria with fever to 100.7 and HR > 90, influenza A positive and possible UTI  - finished tamiflu     Problem/Plan - 2:  ·  Problem: Influenza A.   ·  Plan: - Tamiflu as above (renally dosed)  - CXR poor study but with grossly clear lungs in the visualized portion  - Lungs grossly clear to auscultation  - Monitor cough/sputum    Problem/Plan - 3:  ·  Problem: Acute UTI.   ·  Plan: - UA positive for leuk esterase and nitrites, negative for bacteria  - off antibiotics     Problem/Plan - 4:·  Problem: DAVID (acute kidney injury).   ·  Plan: - DAVID likely 2/2 hypotension and sepsis, unclear if patient had decreased PO intake at the NH  - monitor cr    Problem/Plan - 5:  ·  Problem: Macrocytic anemia.   ·  Plan: - Worsening macrocytic anemia, no known bleeding issues as per Brother  - monitor cbc     Problem/Plan - 6:  ·  Problem: Dementia.   ·  Plan: - Lethargic at NH, currently awake and alert, oriented x2 to self and place (hospital, not to name of hospital, not to time) which is his baseline as per brother  - c/w home donezepil, namenda, risperidone, seroquel    Problem/Plan - 7:  ·  Problem: Essential hypertension.   ·  Plan: - cw current meds     dc planning

## 2024-01-08 NOTE — PROGRESS NOTE ADULT - SUBJECTIVE AND OBJECTIVE BOX
Patient is a 78y old  Male who presents with a chief complaint of Hypotension, lethargu (08 Jan 2024 15:08)    Date of servie : 01-08-24 @ 15:22  INTERVAL HPI/OVERNIGHT EVENTS:  T(C): 36.4 (01-08-24 @ 09:40), Max: 36.6 (01-07-24 @ 16:38)  HR: 70 (01-08-24 @ 09:40) (66 - 79)  BP: 128/70 (01-08-24 @ 09:40) (128/70 - 143/71)  RR: 17 (01-08-24 @ 09:40) (17 - 18)  SpO2: 97% (01-08-24 @ 09:40) (95% - 99%)  Wt(kg): --  I&O's Summary      LABS:              CAPILLARY BLOOD GLUCOSE                MEDICATIONS  (STANDING):  albuterol/ipratropium for Nebulization 3 milliLiter(s) Nebulizer every 12 hours  cyanocobalamin 1000 MICROGram(s) Oral daily  donepezil 10 milliGRAM(s) Oral at bedtime  folic acid 1 milliGRAM(s) Oral daily  heparin   Injectable 5000 Unit(s) SubCutaneous every 8 hours  memantine 10 milliGRAM(s) Oral two times a day  risperiDONE   Tablet 0.25 milliGRAM(s) Oral daily  risperiDONE   Tablet 0.5 milliGRAM(s) Oral at bedtime  sertraline 50 milliGRAM(s) Oral daily    MEDICATIONS  (PRN):  acetaminophen     Tablet .. 650 milliGRAM(s) Oral every 6 hours PRN Temp greater or equal to 38C (100.4F), Mild Pain (1 - 3)  aluminum hydroxide/magnesium hydroxide/simethicone Suspension 30 milliLiter(s) Oral every 4 hours PRN Dyspepsia  melatonin 3 milliGRAM(s) Oral at bedtime PRN Insomnia  ondansetron Injectable 4 milliGRAM(s) IV Push every 8 hours PRN Nausea and/or Vomiting  QUEtiapine 12.5 milliGRAM(s) Oral every 6 hours PRN for agitation          PHYSICAL EXAM:  GENERAL: NAD, well-groomed, well-developed  HEAD:  Atraumatic, Normocephalic  CHEST/LUNG: Clear to percussion bilaterally; No rales, rhonchi, wheezing, or rubs  HEART: Regular rate and rhythm; No murmurs, rubs, or gallops  ABDOMEN: Soft, Nontender, Nondistended; Bowel sounds present  EXTREMITIES:  2+ Peripheral Pulses, No clubbing, cyanosis, or edema  LYMPH: No lymphadenopathy noted  SKIN: No rashes or lesions    Care Discussed with Consultants/Other Providers [ ] YES  [ ] NO

## 2024-01-08 NOTE — PROGRESS NOTE ADULT - ASSESSMENT
This is a 78M with history of MM, HTN, and Dementia (AAO x 1-2 to self, to place, not to time at baseline per brother) who presents to the hospital from [facility] for hypotension and lethargy. Patient currently awake, alert, AAO x1 (to self,) but very poor historian, denies any acute complaints when asked. nephrology consulted for david    DAVID  admitted with scr 1.4  ? etiology  pt with sepsis possible ATN  david improved. please get labs  check urine cr, na if worsens  calero in place    hematuria  calero in place  repeat ua after calero removal  renal us with no mass, bladder diverticula--outpt urology    hyponatremia  ? etiology  pending  new labs   check urine na and osmo--pending   dc na tab  check tsh and cortisol level  monitor  free water restriction <1.2L    anemia  iron panel  transfusion and work up per team   This is a 78M with history of MM, HTN, and Dementia (AAO x 1-2 to self, to place, not to time at baseline per brother) who presents to the hospital from [facility] for hypotension and lethargy. Patient currently awake, alert, AAO x1 (to self,) but very poor historian, denies any acute complaints when asked. nephrology consulted for david    DAVID  admitted with scr 1.4  ? etiology  pt with sepsis possible ATN  david improved. please get labs  check urine cr, na if worsens  calero in place    hematuria  calero in place  repeat ua after calero removal  renal us with no mass, bladder diverticula--outpt urology    hyponatremia  ? etiology  pending  new labs   check urine na and osmo--pending   dc na tab  check tsh and cortisol level  monitor  free water restriction <1.2L    anemia  iron panel  transfusion and work up per team

## 2024-01-08 NOTE — DISCHARGE NOTE NURSING/CASE MANAGEMENT/SOCIAL WORK - NSDCPEFALRISK_GEN_ALL_CORE
For information on Fall & Injury Prevention, visit: [email or web address] OR  [email or web address] OR  https://www.cdc.gov/steadi/patient.html For information on Fall & Injury Prevention, visit: [email or web address] OR  [email or web address] OR  https://www.cdc.gov/steadi/patient.html

## 2024-01-08 NOTE — DISCHARGE NOTE NURSING/CASE MANAGEMENT/SOCIAL WORK - PATIENT PORTAL LINK FT
You can access the FollowMyHealth Patient Portal offered by [facility] by registering at the following website: [email or web address]. By joining [email or web address]’s FollowMyHealth portal, you will also be able to view your health information using other applications (apps) compatible with our system. You can access the FollowMyHealth Patient Portal offered by [facility] by registering at the following website: [email or web address]. By joining [employer]’s FollowMyHealth portal, you will also be able to view your health information using other applications (apps) compatible with our system.

## 2024-01-08 NOTE — PROGRESS NOTE ADULT - ASSESSMENT
79 y/o M PMhx MM, HTN, and Dementia who presented from [facility] for hypotension and lethargy found to have flu A    influenza A  fever resolved  CXR clear    UA not consistent w/ UTI  urine cx negative    Recommendations  s/p tamiflu 30mg bid, completed 1/7  discharge planning    We will sign off. Thank you for allowing us to participate in the care of Mr. Wen. Please feel free to call with any questions or concerns.     Sylvester Trinidad M.D.  [facility], Division of Infectious Diseases  [phone]  After 5pm on weekdays and all day on weekends - please call [phone]  79 y/o M PMhx MM, HTN, and Dementia who presented from [facility] for hypotension and lethargy found to have flu A    influenza A  fever resolved  CXR clear    UA not consistent w/ UTI  urine cx negative    Recommendations  s/p tamiflu 30mg bid, completed 1/7  discharge planning    We will sign off. Thank you for allowing us to participate in the care of Mr. Wen. Please feel free to call with any questions or concerns.     Sylvester Trinidad M.D.  [facility], Division of Infectious Diseases  [phone]  After 5pm on weekdays and all day on weekends - please call [phone]  77 y/o M PMhx MM, HTN, and Dementia who presented from [facility] for hypotension and lethargy found to have flu A    influenza A  fever resolved  CXR clear    UA not consistent w/ UTI  urine cx negative    Recommendations  s/p tamiflu 30mg bid, completed 1/7  discharge planning    Sylvester Trinidad M.D.  [facility], Division of Infectious Diseases  [phone]  After 5pm on weekdays and all day on weekends - please call [phone]  79 y/o M PMhx MM, HTN, and Dementia who presented from [facility] for hypotension and lethargy found to have flu A    influenza A  fever resolved  CXR clear    UA not consistent w/ UTI  urine cx negative    Recommendations  s/p tamiflu 30mg bid, completed 1/7  discharge planning    Sylvester Trinidad M.D.  [facility], Division of Infectious Diseases  [phone]  After 5pm on weekdays and all day on weekends - please call [phone]

## 2024-01-08 NOTE — DISCHARGE NOTE NURSING/CASE MANAGEMENT/SOCIAL WORK - NSDCPNINST_GEN_ALL_CORE
Make a follow up appointment with Dr. Muñiz. Get plenty of rest and stay hydrated. Take your medications as prescribed.

## 2024-01-09 LAB
ANION GAP SERPL CALC-SCNC: 10 MMOL/L — SIGNIFICANT CHANGE UP (ref 7–14)
ANION GAP SERPL CALC-SCNC: 10 MMOL/L — SIGNIFICANT CHANGE UP (ref 7–14)
BASOPHILS # BLD AUTO: 0.01 K/UL — SIGNIFICANT CHANGE UP (ref 0–0.2)
BASOPHILS # BLD AUTO: 0.01 K/UL — SIGNIFICANT CHANGE UP (ref 0–0.2)
BASOPHILS NFR BLD AUTO: 0.2 % — SIGNIFICANT CHANGE UP (ref 0–2)
BASOPHILS NFR BLD AUTO: 0.2 % — SIGNIFICANT CHANGE UP (ref 0–2)
BUN SERPL-MCNC: 19 MG/DL — SIGNIFICANT CHANGE UP (ref 7–23)
BUN SERPL-MCNC: 19 MG/DL — SIGNIFICANT CHANGE UP (ref 7–23)
CALCIUM SERPL-MCNC: 9.5 MG/DL — SIGNIFICANT CHANGE UP (ref 8.4–10.5)
CALCIUM SERPL-MCNC: 9.5 MG/DL — SIGNIFICANT CHANGE UP (ref 8.4–10.5)
CHLORIDE SERPL-SCNC: 105 MMOL/L — SIGNIFICANT CHANGE UP (ref 98–107)
CHLORIDE SERPL-SCNC: 105 MMOL/L — SIGNIFICANT CHANGE UP (ref 98–107)
CO2 SERPL-SCNC: 19 MMOL/L — LOW (ref 22–31)
CO2 SERPL-SCNC: 19 MMOL/L — LOW (ref 22–31)
CREAT SERPL-MCNC: 0.79 MG/DL — SIGNIFICANT CHANGE UP (ref 0.5–1.3)
CREAT SERPL-MCNC: 0.79 MG/DL — SIGNIFICANT CHANGE UP (ref 0.5–1.3)
EGFR: 92 ML/MIN/1.73M2 — SIGNIFICANT CHANGE UP
EGFR: 92 ML/MIN/1.73M2 — SIGNIFICANT CHANGE UP
EOSINOPHIL # BLD AUTO: 0.08 K/UL — SIGNIFICANT CHANGE UP (ref 0–0.5)
EOSINOPHIL # BLD AUTO: 0.08 K/UL — SIGNIFICANT CHANGE UP (ref 0–0.5)
EOSINOPHIL NFR BLD AUTO: 1.9 % — SIGNIFICANT CHANGE UP (ref 0–6)
EOSINOPHIL NFR BLD AUTO: 1.9 % — SIGNIFICANT CHANGE UP (ref 0–6)
GLUCOSE SERPL-MCNC: 81 MG/DL — SIGNIFICANT CHANGE UP (ref 70–99)
GLUCOSE SERPL-MCNC: 81 MG/DL — SIGNIFICANT CHANGE UP (ref 70–99)
HCT VFR BLD CALC: 24.9 % — LOW (ref 39–50)
HCT VFR BLD CALC: 24.9 % — LOW (ref 39–50)
HGB BLD-MCNC: 8.4 G/DL — LOW (ref 13–17)
HGB BLD-MCNC: 8.4 G/DL — LOW (ref 13–17)
HOMOCYSTEINE LEVEL: 10 UMOL/L — SIGNIFICANT CHANGE UP (ref 0–19.2)
HOMOCYSTEINE LEVEL: 10 UMOL/L — SIGNIFICANT CHANGE UP (ref 0–19.2)
IANC: 2.19 K/UL — SIGNIFICANT CHANGE UP (ref 1.8–7.4)
IANC: 2.19 K/UL — SIGNIFICANT CHANGE UP (ref 1.8–7.4)
IMM GRANULOCYTES NFR BLD AUTO: 0.5 % — SIGNIFICANT CHANGE UP (ref 0–0.9)
IMM GRANULOCYTES NFR BLD AUTO: 0.5 % — SIGNIFICANT CHANGE UP (ref 0–0.9)
LYMPHOCYTES # BLD AUTO: 1.44 K/UL — SIGNIFICANT CHANGE UP (ref 1–3.3)
LYMPHOCYTES # BLD AUTO: 1.44 K/UL — SIGNIFICANT CHANGE UP (ref 1–3.3)
LYMPHOCYTES # BLD AUTO: 34.5 % — SIGNIFICANT CHANGE UP (ref 13–44)
LYMPHOCYTES # BLD AUTO: 34.5 % — SIGNIFICANT CHANGE UP (ref 13–44)
MAGNESIUM SERPL-MCNC: 2 MG/DL — SIGNIFICANT CHANGE UP (ref 1.6–2.6)
MAGNESIUM SERPL-MCNC: 2 MG/DL — SIGNIFICANT CHANGE UP (ref 1.6–2.6)
MCHC RBC-ENTMCNC: 33.7 GM/DL — SIGNIFICANT CHANGE UP (ref 32–36)
MCHC RBC-ENTMCNC: 33.7 GM/DL — SIGNIFICANT CHANGE UP (ref 32–36)
MCHC RBC-ENTMCNC: 34.6 PG — HIGH (ref 27–34)
MCHC RBC-ENTMCNC: 34.6 PG — HIGH (ref 27–34)
MCV RBC AUTO: 102.5 FL — HIGH (ref 80–100)
MCV RBC AUTO: 102.5 FL — HIGH (ref 80–100)
METHYLMALONATE SERPL-SCNC: 121 NMOL/L — SIGNIFICANT CHANGE UP (ref 0–378)
METHYLMALONATE SERPL-SCNC: 121 NMOL/L — SIGNIFICANT CHANGE UP (ref 0–378)
MONOCYTES # BLD AUTO: 0.43 K/UL — SIGNIFICANT CHANGE UP (ref 0–0.9)
MONOCYTES # BLD AUTO: 0.43 K/UL — SIGNIFICANT CHANGE UP (ref 0–0.9)
MONOCYTES NFR BLD AUTO: 10.3 % — SIGNIFICANT CHANGE UP (ref 2–14)
MONOCYTES NFR BLD AUTO: 10.3 % — SIGNIFICANT CHANGE UP (ref 2–14)
NEUTROPHILS # BLD AUTO: 2.19 K/UL — SIGNIFICANT CHANGE UP (ref 1.8–7.4)
NEUTROPHILS # BLD AUTO: 2.19 K/UL — SIGNIFICANT CHANGE UP (ref 1.8–7.4)
NEUTROPHILS NFR BLD AUTO: 52.6 % — SIGNIFICANT CHANGE UP (ref 43–77)
NEUTROPHILS NFR BLD AUTO: 52.6 % — SIGNIFICANT CHANGE UP (ref 43–77)
NRBC # BLD: 0 /100 WBCS — SIGNIFICANT CHANGE UP (ref 0–0)
NRBC # BLD: 0 /100 WBCS — SIGNIFICANT CHANGE UP (ref 0–0)
NRBC # FLD: 0 K/UL — SIGNIFICANT CHANGE UP (ref 0–0)
NRBC # FLD: 0 K/UL — SIGNIFICANT CHANGE UP (ref 0–0)
PHOSPHATE SERPL-MCNC: 2.5 MG/DL — SIGNIFICANT CHANGE UP (ref 2.5–4.5)
PHOSPHATE SERPL-MCNC: 2.5 MG/DL — SIGNIFICANT CHANGE UP (ref 2.5–4.5)
PLATELET # BLD AUTO: 251 K/UL — SIGNIFICANT CHANGE UP (ref 150–400)
PLATELET # BLD AUTO: 251 K/UL — SIGNIFICANT CHANGE UP (ref 150–400)
POTASSIUM SERPL-MCNC: 4.1 MMOL/L — SIGNIFICANT CHANGE UP (ref 3.5–5.3)
POTASSIUM SERPL-MCNC: 4.1 MMOL/L — SIGNIFICANT CHANGE UP (ref 3.5–5.3)
POTASSIUM SERPL-SCNC: 4.1 MMOL/L — SIGNIFICANT CHANGE UP (ref 3.5–5.3)
POTASSIUM SERPL-SCNC: 4.1 MMOL/L — SIGNIFICANT CHANGE UP (ref 3.5–5.3)
RBC # BLD: 2.43 M/UL — LOW (ref 4.2–5.8)
RBC # BLD: 2.43 M/UL — LOW (ref 4.2–5.8)
RBC # FLD: 13.2 % — SIGNIFICANT CHANGE UP (ref 10.3–14.5)
RBC # FLD: 13.2 % — SIGNIFICANT CHANGE UP (ref 10.3–14.5)
SODIUM SERPL-SCNC: 134 MMOL/L — LOW (ref 135–145)
SODIUM SERPL-SCNC: 134 MMOL/L — LOW (ref 135–145)
WBC # BLD: 4.17 K/UL — SIGNIFICANT CHANGE UP (ref 3.8–10.5)
WBC # BLD: 4.17 K/UL — SIGNIFICANT CHANGE UP (ref 3.8–10.5)
WBC # FLD AUTO: 4.17 K/UL — SIGNIFICANT CHANGE UP (ref 3.8–10.5)
WBC # FLD AUTO: 4.17 K/UL — SIGNIFICANT CHANGE UP (ref 3.8–10.5)

## 2024-01-09 RX ADMIN — MEMANTINE HYDROCHLORIDE 10 MILLIGRAM(S): 10 TABLET ORAL at 17:00

## 2024-01-09 RX ADMIN — HEPARIN SODIUM 5000 UNIT(S): 5000 INJECTION INTRAVENOUS; SUBCUTANEOUS at 14:55

## 2024-01-09 RX ADMIN — Medication 3 MILLILITER(S): at 22:57

## 2024-01-09 RX ADMIN — QUETIAPINE FUMARATE 12.5 MILLIGRAM(S): 200 TABLET, FILM COATED ORAL at 21:22

## 2024-01-09 RX ADMIN — SERTRALINE 50 MILLIGRAM(S): 25 TABLET, FILM COATED ORAL at 12:56

## 2024-01-09 RX ADMIN — Medication 1 MILLIGRAM(S): at 12:56

## 2024-01-09 RX ADMIN — DONEPEZIL HYDROCHLORIDE 10 MILLIGRAM(S): 10 TABLET, FILM COATED ORAL at 21:22

## 2024-01-09 RX ADMIN — MEMANTINE HYDROCHLORIDE 10 MILLIGRAM(S): 10 TABLET ORAL at 06:55

## 2024-01-09 RX ADMIN — HEPARIN SODIUM 5000 UNIT(S): 5000 INJECTION INTRAVENOUS; SUBCUTANEOUS at 21:22

## 2024-01-09 RX ADMIN — Medication 3 MILLILITER(S): at 08:14

## 2024-01-09 RX ADMIN — HEPARIN SODIUM 5000 UNIT(S): 5000 INJECTION INTRAVENOUS; SUBCUTANEOUS at 06:55

## 2024-01-09 RX ADMIN — PREGABALIN 1000 MICROGRAM(S): 225 CAPSULE ORAL at 12:56

## 2024-01-09 RX ADMIN — RISPERIDONE 0.25 MILLIGRAM(S): 4 TABLET ORAL at 12:56

## 2024-01-09 RX ADMIN — QUETIAPINE FUMARATE 12.5 MILLIGRAM(S): 200 TABLET, FILM COATED ORAL at 06:55

## 2024-01-09 NOTE — PROGRESS NOTE ADULT - ASSESSMENT
78M with history as above who presents to the hospital with hypotension and lethargy at his NH here found to have sepsis 2/2 influenza A and UTI.         Problem/Plan - 1:  ·  Problem: Sepsis, unspecified organism.   ·  Plan: - Meets sepsis criteria with fever to 100.7 and HR > 90, influenza A positive and possible UTI  - finished tamiflu     Problem/Plan - 2:  ·  Problem: Influenza A.   ·  Plan: - Tamiflu as above (renally dosed)  - CXR poor study but with grossly clear lungs in the visualized portion  - Lungs grossly clear to auscultation  - Monitor cough/sputum    Problem/Plan - 3:  ·  Problem: Acute UTI.   ·  Plan: - UA positive for leuk esterase and nitrites, negative for bacteria  - off antibiotics     Problem/Plan - 4:·  Problem: DAVID (acute kidney injury).   ·  Plan: - DAVID likely 2/2 hypotension and sepsis, unclear if patient had decreased PO intake at the NH  - monitor cr    Problem/Plan - 5:  ·  Problem: Macrocytic anemia.   ·  Plan: - Worsening macrocytic anemia, no known bleeding issues as per Brother  - monitor cbc     Problem/Plan - 6:  ·  Problem: Dementia.   ·  Plan: - Lethargic at NH, currently awake and alert, oriented x2 to self and place (hospital, not to name of hospital, not to time) which is his baseline as per brother- c/w home donezepil, namenda, risperidone, seroquel    Problem/Plan - 7:  ·  Problem: Essential hypertension.   ·  Plan: - cw current meds     dc planning

## 2024-01-09 NOTE — PROGRESS NOTE ADULT - SUBJECTIVE AND OBJECTIVE BOX
[facility]   MD ANEL FOLEY MD ANGELA WONG, PA    TEL:  OFFICE: [phone]  From 5pm-7am Answering Service [phone]    -- RENAL FOLLOW UP NOTE ---Date of Service 01-09-24 @ 12:10    Patient is a 78y old  Male who presents with a chief complaint of Hypotension, lethargu (09 Jan 2024 12:05)      Patient seen and examined at bedside. No chest pain/sob    VITALS:  T(F): 97.7 (01-09-24 @ 09:26), Max: 98.9 (01-09-24 @ 06:52)  HR: 90 (01-09-24 @ 09:26)  BP: 127/67 (01-09-24 @ 09:26)  RR: 17 (01-09-24 @ 09:26)  SpO2: 99% (01-09-24 @ 09:26)  Wt(kg): --    01-08 @ 07:01  -  01-09 @ 07:00  --------------------------------------------------------  IN: 90 mL / OUT: 0 mL / NET: 90 mL          PHYSICAL EXAM:  General: NAD  Neck: No JVD  Respiratory: CTAB, no wheezes, rales or rhonchi  Cardiovascular: S1, S2, RRR  Gastrointestinal: BS+, soft, NT/ND  Extremities: No peripheral edema    Hospital Medications:   MEDICATIONS  (STANDING):  albuterol/ipratropium for Nebulization 3 milliLiter(s) Nebulizer every 12 hours  cyanocobalamin 1000 MICROGram(s) Oral daily  donepezil 10 milliGRAM(s) Oral at bedtime  folic acid 1 milliGRAM(s) Oral daily  heparin   Injectable 5000 Unit(s) SubCutaneous every 8 hours  memantine 10 milliGRAM(s) Oral two times a day  risperiDONE   Tablet 0.25 milliGRAM(s) Oral daily  risperiDONE   Tablet 0.5 milliGRAM(s) Oral at bedtime  sertraline 50 milliGRAM(s) Oral daily      LABS:  01-09    134<L>  |  105  |  19  ----------------------------<  81  4.1   |  19<L>  |  0.79    Ca    9.5      09 Jan 2024 07:01  Phos  2.5     01-09  Mg     2.00     01-09      Creatinine Trend: 0.79 <--, 0.77 <--, 0.88 <--, 1.41 <--    Phosphorus: 2.5 mg/dL (01-09 @ 07:01)                              8.4    4.17  )-----------( 251      ( 09 Jan 2024 07:01 )             24.9     Urine Studies:  Urinalysis - [01-09-24 @ 07:01]      Color  / Appearance  / SG  / pH       Gluc 81 / Ketone   / Bili  / Urobili        Blood  / Protein  / Leuk Est  / Nitrite       RBC  / WBC  / Hyaline  / Gran  / Sq Epi  / Non Sq Epi  / Bacteria             RADIOLOGY & ADDITIONAL STUDIES:   [facility]   MD ANEL FOLEY MD ANGELA WONG, PA    TEL:  OFFICE: [phone]  From 5pm-7am Answering Service [phone]    -- RENAL FOLLOW UP NOTE ---Date of Service 01-09-24 @ 12:10    Patient is a 78y old  Male who presents with a chief complaint of Hypotension, lethargu (09 Jan 2024 12:05)      Patient seen and examined at bedside. No chest pain/sob    VITALS:  T(F): 97.7 (01-09-24 @ 09:26), Max: 98.9 (01-09-24 @ 06:52)  HR: 90 (01-09-24 @ 09:26)  BP: 127/67 (01-09-24 @ 09:26)  RR: 17 (01-09-24 @ 09:26)  SpO2: 99% (01-09-24 @ 09:26)  Wt(kg): --    01-08 @ 07:01  -  01-09 @ 07:00  --------------------------------------------------------  IN: 90 mL / OUT: 0 mL / NET: 90 mL          PHYSICAL EXAM:  General: NAD  Neck: No JVD  Respiratory: CTAB, no wheezes, rales or rhonchi  Cardiovascular: S1, S2, RRR  Gastrointestinal: BS+, soft, NT/ND  Extremities: No peripheral edema    Hospital Medications:   MEDICATIONS  (STANDING):  albuterol/ipratropium for Nebulization 3 milliLiter(s) Nebulizer every 12 hours  cyanocobalamin 1000 MICROGram(s) Oral daily  donepezil 10 milliGRAM(s) Oral at bedtime  folic acid 1 milliGRAM(s) Oral daily  heparin   Injectable 5000 Unit(s) SubCutaneous every 8 hours  memantine 10 milliGRAM(s) Oral two times a day  risperiDONE   Tablet 0.25 milliGRAM(s) Oral daily  risperiDONE   Tablet 0.5 milliGRAM(s) Oral at bedtime  sertraline 50 milliGRAM(s) Oral daily      LABS:  01-09    134<L>  |  105  |  19  ----------------------------<  81  4.1   |  19<L>  |  0.79    Ca    9.5      09 Jan 2024 07:01  Phos  2.5     01-09  Mg     2.00     01-09      Creatinine Trend: 0.79 <--, 0.77 <--, 0.88 <--, 1.41 <--    Phosphorus: 2.5 mg/dL (01-09 @ 07:01)                              8.4    4.17  )-----------( 251      ( 09 Jan 2024 07:01 )             24.9     Urine Studies:  Urinalysis - [01-09-24 @ 07:01]      Color  / Appearance  / SG  / pH       Gluc 81 / Ketone   / Bili  / Urobili        Blood  / Protein  / Leuk Est  / Nitrite       RBC  / WBC  / Hyaline  / Gran  / Sq Epi  / Non Sq Epi  / Bacteria             RADIOLOGY & ADDITIONAL STUDIES:

## 2024-01-09 NOTE — PROGRESS NOTE ADULT - ASSESSMENT
This is a 78M with history of MM, HTN, and Dementia (AAO x 1-2 to self, to place, not to time at baseline per brother) who presents to the hospital from [facility] for hypotension and lethargy. Patient currently awake, alert, AAO x2 (to self, to hospital but not name, not to time) but very poor historian, denies any acute complaints when asked. Spoke with brother Mookie who said that the patient was sent to the hospital for lethargy and cough though he states that the patient has had a cough for several months. Brother denies any other known acute complaints for the patient. Called [facility] [phone] but there was no staff available who knew the patient. As per NH paperwork and ED note, patient was sent to the hospital for hypotention to 81/55 and lethargy. He was noted to have a low grade temp of 99.6 and saturation of 93% at his NH. He was given tylenol 650mg x1 prior to being sent to the hospital.   On arrival to the hospital his vitals were T 99.4 -> 100.7 rectal, P 92, BP 88/55 -> 117/70, RR 16, O2 sat 96% RA. His lab work was significant for worsening macrocytic anemia, DAVID with mild hyponatremia, elevated protein gap, elevated lactate with improvement on repeat. His UA was positive for nitrite, leuk esterase but negative for bacteria. His respiratory swab was positive for influenza A. His imaging did not show acute abnormalities. He was given ofirmev 1g, NS 500cc x1, vanc 1g, cefepime 2g, and tamiflu 75mg PO x1. He was admitted to medicine.  (03 Jan 2024 06:06):  now pulm called:  he is from NH:  above history noted:  he seems to be responding to simple commands:  he say he has no underlying lung history  never smoked:  on room air:  adm with influenza:     Influenza:   Multiple Myeloma  HTN  Dementia      Influenza:   -low grade fever  -Influenza:  on tamilflu  -cxr is clear:  -he is not wheezing    1/4: no change in pulm status today  : remains on room air  1/5: doing ok : no sob:  no cough : no phlegm;   1/6: on Tamiflu  no sob:  on room air  1/7: seems to be doing ok : no sob:  no cough:   1/8: seems OK: no sob:  no cough : no phlegm  on room air  1/9: doimg ok: no sob:   on cough :  Multiple Myeloma  -per primary team : FDG PET scan  noted:  rib lesions present likely secondary to MM   HTN  -controlled  Dementia  -supportive care:    karyn acp  This is a 78M with history of MM, HTN, and Dementia (AAO x 1-2 to self, to place, not to time at baseline per brother) who presents to the hospital from [facility] for hypotension and lethargy. Patient currently awake, alert, AAO x2 (to self, to hospital but not name, not to time) but very poor historian, denies any acute complaints when asked. Spoke with brother Mookie who said that the patient was sent to the hospital for lethargy and cough though he states that the patient has had a cough for several months. Brother denies any other known acute complaints for the patient. Called [facility] [phone] but there was no staff available who knew the patient. As per NH paperwork and ED note, patient was sent to the hospital for hypotention to 81/55 and lethargy. He was noted to have a low grade temp of 99.6 and saturation of 93% at his NH. He was given tylenol 650mg x1 prior to being sent to the hospital.   On arrival to the hospital his vitals were T 99.4 -> 100.7 rectal, P 92, BP 88/55 -> 117/70, RR 16, O2 sat 96% RA. His lab work was significant for worsening macrocytic anemia, DAVID with mild hyponatremia, elevated protein gap, elevated lactate with improvement on repeat. His UA was positive for nitrite, leuk esterase but negative for bacteria. His respiratory swab was positive for influenza A. His imaging did not show acute abnormalities. He was given ofirmev 1g, NS 500cc x1, vanc 1g, cefepime 2g, and tamiflu 75mg PO x1. He was admitted to medicine.  (03 Jan 2024 06:06):  now pulm called:  he is from NH:  above history noted:  he seems to be responding to simple commands:  he say he has no underlying lung history  never smoked:  on room air:  adm with influenza:     Influenza:   Multiple Myeloma  HTN  Dementia      Influenza:   -low grade fever  -Influenza:  on tamilflu  -cxr is clear:  -he is not wheezing    1/4: no change in pulm status today  : remains on room air  1/5: doing ok : no sob:  no cough : no phlegm;   1/6: on Tamiflu  no sob:  on room air  1/7: seems to be doing ok : no sob:  no cough:   1/8: seems OK: no sob:  no cough : no phlegm  on room air  1/9: doimg ok: no sob:   on cough :  Multiple Myeloma  -per primary team : FDG PET scan  noted:  rib lesions present likely secondary to MM   HTN  -controlled  Dementia  -supportive care:    karyn acp

## 2024-01-09 NOTE — PROGRESS NOTE ADULT - SUBJECTIVE AND OBJECTIVE BOX
Date of Service: 01-09-24 @ 12:05    Patient is a 78y old  Male who presents with a chief complaint of Hypotension, lethargy (09 Jan 2024 11:30)      Any change in ROS: seems to be geting OK; no sob:  no cough : no phlegm      MEDICATIONS  (STANDING):  albuterol/ipratropium for Nebulization 3 milliLiter(s) Nebulizer every 12 hours  cyanocobalamin 1000 MICROGram(s) Oral daily  donepezil 10 milliGRAM(s) Oral at bedtime  folic acid 1 milliGRAM(s) Oral daily  heparin   Injectable 5000 Unit(s) SubCutaneous every 8 hours  memantine 10 milliGRAM(s) Oral two times a day  risperiDONE   Tablet 0.25 milliGRAM(s) Oral daily  risperiDONE   Tablet 0.5 milliGRAM(s) Oral at bedtime  sertraline 50 milliGRAM(s) Oral daily    MEDICATIONS  (PRN):  acetaminophen     Tablet .. 650 milliGRAM(s) Oral every 6 hours PRN Temp greater or equal to 38C (100.4F), Mild Pain (1 - 3)  aluminum hydroxide/magnesium hydroxide/simethicone Suspension 30 milliLiter(s) Oral every 4 hours PRN Dyspepsia  melatonin 3 milliGRAM(s) Oral at bedtime PRN Insomnia  ondansetron Injectable 4 milliGRAM(s) IV Push every 8 hours PRN Nausea and/or Vomiting  QUEtiapine 12.5 milliGRAM(s) Oral every 6 hours PRN for agitation    Vital Signs Last 24 Hrs  T(C): 36.5 (09 Jan 2024 09:26), Max: 37.2 (09 Jan 2024 06:52)  T(F): 97.7 (09 Jan 2024 09:26), Max: 98.9 (09 Jan 2024 06:52)  HR: 90 (09 Jan 2024 09:26) (66 - 90)  BP: 127/67 (09 Jan 2024 09:26) (118/80 - 132/78)  BP(mean): --  RR: 17 (09 Jan 2024 09:26) (17 - 18)  SpO2: 99% (09 Jan 2024 09:26) (96% - 99%)    Parameters below as of 09 Jan 2024 09:26  Patient On (Oxygen Delivery Method): room air        I&O's Summary    08 Jan 2024 07:01  -  09 Jan 2024 07:00  --------------------------------------------------------  IN: 90 mL / OUT: 0 mL / NET: 90 mL          Physical Exam:   GENERAL: NAD, well-groomed, well-developed  HEENT: CHRISTINA/   Atraumatic, Normocephalic  ENMT: No tonsillar erythema, exudates, or enlargement; Moist mucous membranes, Good dentition, No lesions  NECK: Supple, No JVD, Normal thyroid  CHEST/LUNG: Clear to auscultaion  CVS: Regular rate and rhythm; No murmurs, rubs, or gallops  GI: : Soft, Nontender, Nondistended; Bowel sounds present  NERVOUS SYSTEM:  Alert & awake and on room air  EXTREMITIES:  - edema  LYMPH: No lymphadenopathy noted  SKIN: No rashes or lesions  ENDOCRINOLOGY: No Thyromegaly  PSYCH: Appropriate    Labs:  25, 25                            8.4    4.17  )-----------( 251      ( 09 Jan 2024 07:01 )             24.9     01-09    134<L>  |  105  |  19  ----------------------------<  81  4.1   |  19<L>  |  0.79    Ca    9.5      09 Jan 2024 07:01  Phos  2.5     01-09  Mg     2.00     01-09      CAPILLARY BLOOD GLUCOSE          rad< from: CT Head No Cont (01.03.24 @ 02:00) >  COMPARISON: CT head from 7/7/2023.    TECHNIQUE: Thin section noncontrast axial images were obtained through   the head.  RAPID AI was utilized for preliminary assessment of   intracranial hemorrhage.    FINDINGS:  Intracranial Contents:    Moderate to severe cerebral volume loss.. Mild to moderate chronic   microvascular ischemic changes Gray-white differentiation is preserved.   No hydrocephalus. The basilar cisterns are clear. No focal edema or acute   mass effect. There is no intracranial fluid collectionor acute   hemorrhage.    Bones and Extracranial Soft Tissues:    There is no fracture identified. Scattered paranasal sinus mucosal   thickening. Mastoid air cells are clear. Scalp and imaged facial soft   tissues are within normal limits.      IMPRESSION:  1. No acute intracranial CT abnormality.    --- End of Report ---          MAIKEL MORENO MD; Resident Radiologist  This document has been electronically signed.  ELLEN CARTWRIGHT MD; Attending Radiologist  This document has been electronically signed. Josef  3 2024  2:17AM    < end of copied text >      Urinalysis Basic - ( 09 Jan 2024 07:01 )    Color: x / Appearance: x / SG: x / pH: x  Gluc: 81 mg/dL / Ketone: x  / Bili: x / Urobili: x   Blood: x / Protein: x / Nitrite: x   Leuk Esterase: x / RBC: x / WBC x   Sq Epi: x / Non Sq Epi: x / Bacteria: x            RECENT CULTURES:  01-03 @ 00:20 Clean Catch Clean Catch (Midstream)                No growth    01-02 @ 22:39 Catheterized Catheterized                No growth    01-02 @ 19:49 .Blood Blood-Peripheral                No growth at 5 days          RESPIRATORY CULTURES:          Studies  Chest X-RAY  CT SCAN Chest   Venous Dopplers: LE:   CT Abdomen  Others

## 2024-01-09 NOTE — PROGRESS NOTE ADULT - ASSESSMENT
77 y/o M PMhx MM, HTN, and Dementia who presented from [facility] for hypotension and lethargy found to have flu A    influenza A  fever resolved  CXR clear    UA not consistent w/ UTI  urine cx negative    Recommendations  s/p tamiflu 30mg bid, completed 1/7  discharge planning    We will sign off. Thank you for allowing us to participate in the care of Mr. Wen. Please feel free to call with any questions or concerns.     Sylvester Trinidad M.D.  [facility], Division of Infectious Diseases  [phone]  After 5pm on weekdays and all day on weekends - please call [phone]  79 y/o M PMhx MM, HTN, and Dementia who presented from [facility] for hypotension and lethargy found to have flu A    influenza A  fever resolved  CXR clear    UA not consistent w/ UTI  urine cx negative    Recommendations  s/p tamiflu 30mg bid, completed 1/7  discharge planning    We will sign off. Thank you for allowing us to participate in the care of Mr. Wen. Please feel free to call with any questions or concerns.     Sylvester Trinidad M.D.  [facility], Division of Infectious Diseases  [phone]  After 5pm on weekdays and all day on weekends - please call [phone]  79 y/o M PMhx MM, HTN, and Dementia who presented from [facility] for hypotension and lethargy found to have flu A    influenza A  fever resolved  CXR clear    UA not consistent w/ UTI  urine cx negative    Recommendations  s/p tamiflu 30mg bid, completed 1/7  discharge planning    Sylvester Trinidad M.D.  [facility], Division of Infectious Diseases  [phone]  After 5pm on weekdays and all day on weekends - please call [phone]  79 y/o M PMhx MM, HTN, and Dementia who presented from [facility] for hypotension and lethargy found to have flu A    influenza A  fever resolved  CXR clear    UA not consistent w/ UTI  urine cx negative    Recommendations  s/p tamiflu 30mg bid, completed 1/7  discharge planning    Sylvester Trinidad M.D.  [facility], Division of Infectious Diseases  [phone]  After 5pm on weekdays and all day on weekends - please call [phone]

## 2024-01-09 NOTE — PROGRESS NOTE ADULT - SUBJECTIVE AND OBJECTIVE BOX
DATE OF SERVICE: 01-09-24    No overnight events, resting comfortably  Review of symptoms otherwise negative.    Review of Systems:   Constitutional: [ ] fevers, [ ] chills.   Skin: [ ] dry skin. [ ] rashes.  Psychiatric: [ ] depression, [ ] anxiety.   Gastrointestinal: [ ] BRBPR, [ ] melena.   Neurological: [ ] confusion. [ ] seizures. [ ] shuffling gait.   Ears,Nose,Mouth and Throat: [ ] ear pain [ ] sore throat.   Eyes: [ ] diplopia.   Respiratory: [ ] hemoptysis. [ ] shortness of breath  Cardiovascular: See HPI above  Hematologic/Lymphatic: [ ] anemia. [ ] painful nodes. [ ] prolonged bleeding.   Genitourinary: [ ] hematuria. [ ] flank pain.   Endocrine: [ ] significant change in weight. [ ] intolerance to heat and cold.     Review of systems [ ] otherwise negative, [ ] otherwise unable to obtain    FH: no family history of sudden cardiac death in first degree relatives    SH: [ ] tobacco, [ ] alcohol, [ ] drugs    acetaminophen     Tablet .. 650 milliGRAM(s) Oral every 6 hours PRN  albuterol/ipratropium for Nebulization 3 milliLiter(s) Nebulizer every 12 hours  aluminum hydroxide/magnesium hydroxide/simethicone Suspension 30 milliLiter(s) Oral every 4 hours PRN  cyanocobalamin 1000 MICROGram(s) Oral daily  donepezil 10 milliGRAM(s) Oral at bedtime  folic acid 1 milliGRAM(s) Oral daily  heparin   Injectable 5000 Unit(s) SubCutaneous every 8 hours  melatonin 3 milliGRAM(s) Oral at bedtime PRN  memantine 10 milliGRAM(s) Oral two times a day  ondansetron Injectable 4 milliGRAM(s) IV Push every 8 hours PRN  QUEtiapine 12.5 milliGRAM(s) Oral every 6 hours PRN  risperiDONE   Tablet 0.5 milliGRAM(s) Oral at bedtime  risperiDONE   Tablet 0.25 milliGRAM(s) Oral daily  sertraline 50 milliGRAM(s) Oral daily                            8.4    4.17  )-----------( 251      ( 09 Jan 2024 07:01 )             24.9       01-09    134<L>  |  105  |  19  ----------------------------<  81  4.1   |  19<L>  |  0.79    Ca    9.5      09 Jan 2024 07:01  Phos  2.5     01-09  Mg     2.00     01-09      T(C): 36.5 (01-09-24 @ 09:26), Max: 37.2 (01-09-24 @ 06:52)  HR: 90 (01-09-24 @ 09:26) (66 - 90)  BP: 127/67 (01-09-24 @ 09:26) (118/80 - 132/78)  RR: 17 (01-09-24 @ 09:26) (17 - 18)  SpO2: 99% (01-09-24 @ 09:26) (96% - 99%)  Wt(kg): --    I&O's Summary    08 Jan 2024 07:01  -  09 Jan 2024 07:00  --------------------------------------------------------  IN: 90 mL / OUT: 0 mL / NET: 90 mL      General:  Alert and Oriented  Head: Normocephalic and atraumatic.   Neck: No JVD. No bruits. Supple. Does not appear to be enlarged.   Cardiovascular: + S1,S2 ; RRR Soft systolic murmur at the left lower sternal border. No rubs noted.    Lungs: CTA b/l. No rhonchi, rales or wheezes.   Abdomen: + BS, soft. Non tender. Non distended. No rebound. No guarding.   Extremities: No clubbing/cyanosis/edema.   Neurologic: Moves all four extremities. Full range of motion.   Skin: Warm and moist. The patient's skin has normal elasticity and good skin turgor.   Psychiatric: Appropriate mood and affect.  Musculoskeletal: Normal range of motion, normal strength       ECG:  	Sinus tachycardia    < from: Xray Chest 1 View AP/PA (01.02.24 @ 19:36) >  IMPRESSION:  No focal consolidations.  < end of copied text >    < from: Transthoracic Echocardiogram (07.10.23 @ 11:15) >  CONCLUSIONS:  1. Calcified trileaflet aortic valve with normal opening.  Minimal aortic regurgitation.  2. Endocardium not well visualized; grossly decreased  possibly mild left ventricular systolic dysfunction.  3. The right ventricle is not well visualized; grossly  normal right ventricular systolic function.  ------------------------------------------------------------------------  Confirmed on  7/10/2023 - 13:57:46 by Jolene Rowan M.D. RPVI  < end of copied text >      ASSESSMENT/PLAN: 	78M with history of MM, HTN, and Dementia (AAO x 1-2 to self, to place, not to time at baseline per brother) who presents to the hospital from [facility] for hypotension and lethargy found to have FLU A    -#Hypotension  -- BP stable  -- Norvasc and Lopressor on hold given acute illness--ok to resume today at DC    #lethargy  --due to Flu, now resolved  --s/p tamiflu and Abx per medicine  DC planning    Jimy Swann M.D.  Cardiac Electrophysiology  [phone]   DATE OF SERVICE: 01-09-24    No overnight events, resting comfortably  Review of symptoms otherwise negative.    Review of Systems:   Constitutional: [ ] fevers, [ ] chills.   Skin: [ ] dry skin. [ ] rashes.  Psychiatric: [ ] depression, [ ] anxiety.   Gastrointestinal: [ ] BRBPR, [ ] melena.   Neurological: [ ] confusion. [ ] seizures. [ ] shuffling gait.   Ears,Nose,Mouth and Throat: [ ] ear pain [ ] sore throat.   Eyes: [ ] diplopia.   Respiratory: [ ] hemoptysis. [ ] shortness of breath  Cardiovascular: See HPI above  Hematologic/Lymphatic: [ ] anemia. [ ] painful nodes. [ ] prolonged bleeding.   Genitourinary: [ ] hematuria. [ ] flank pain.   Endocrine: [ ] significant change in weight. [ ] intolerance to heat and cold.     Review of systems [ ] otherwise negative, [ ] otherwise unable to obtain    FH: no family history of sudden cardiac death in first degree relatives    SH: [ ] tobacco, [ ] alcohol, [ ] drugs    acetaminophen     Tablet .. 650 milliGRAM(s) Oral every 6 hours PRN  albuterol/ipratropium for Nebulization 3 milliLiter(s) Nebulizer every 12 hours  aluminum hydroxide/magnesium hydroxide/simethicone Suspension 30 milliLiter(s) Oral every 4 hours PRN  cyanocobalamin 1000 MICROGram(s) Oral daily  donepezil 10 milliGRAM(s) Oral at bedtime  folic acid 1 milliGRAM(s) Oral daily  heparin   Injectable 5000 Unit(s) SubCutaneous every 8 hours  melatonin 3 milliGRAM(s) Oral at bedtime PRN  memantine 10 milliGRAM(s) Oral two times a day  ondansetron Injectable 4 milliGRAM(s) IV Push every 8 hours PRN  QUEtiapine 12.5 milliGRAM(s) Oral every 6 hours PRN  risperiDONE   Tablet 0.5 milliGRAM(s) Oral at bedtime  risperiDONE   Tablet 0.25 milliGRAM(s) Oral daily  sertraline 50 milliGRAM(s) Oral daily                            8.4    4.17  )-----------( 251      ( 09 Jan 2024 07:01 )             24.9       01-09    134<L>  |  105  |  19  ----------------------------<  81  4.1   |  19<L>  |  0.79    Ca    9.5      09 Jan 2024 07:01  Phos  2.5     01-09  Mg     2.00     01-09      T(C): 36.5 (01-09-24 @ 09:26), Max: 37.2 (01-09-24 @ 06:52)  HR: 90 (01-09-24 @ 09:26) (66 - 90)  BP: 127/67 (01-09-24 @ 09:26) (118/80 - 132/78)  RR: 17 (01-09-24 @ 09:26) (17 - 18)  SpO2: 99% (01-09-24 @ 09:26) (96% - 99%)  Wt(kg): --    I&O's Summary    08 Jan 2024 07:01  -  09 Jan 2024 07:00  --------------------------------------------------------  IN: 90 mL / OUT: 0 mL / NET: 90 mL      General:  Alert and Oriented  Head: Normocephalic and atraumatic.   Neck: No JVD. No bruits. Supple. Does not appear to be enlarged.   Cardiovascular: + S1,S2 ; RRR Soft systolic murmur at the left lower sternal border. No rubs noted.    Lungs: CTA b/l. No rhonchi, rales or wheezes.   Abdomen: + BS, soft. Non tender. Non distended. No rebound. No guarding.   Extremities: No clubbing/cyanosis/edema.   Neurologic: Moves all four extremities. Full range of motion.   Skin: Warm and moist. The patient's skin has normal elasticity and good skin turgor.   Psychiatric: Appropriate mood and affect.  Musculoskeletal: Normal range of motion, normal strength       ECG:  	Sinus tachycardia    < from: Xray Chest 1 View AP/PA (01.02.24 @ 19:36) >  IMPRESSION:  No focal consolidations.  < end of copied text >    < from: Transthoracic Echocardiogram (07.10.23 @ 11:15) >  CONCLUSIONS:  1. Calcified trileaflet aortic valve with normal opening.  Minimal aortic regurgitation.  2. Endocardium not well visualized; grossly decreased  possibly mild left ventricular systolic dysfunction.  3. The right ventricle is not well visualized; grossly  normal right ventricular systolic function.  ------------------------------------------------------------------------  Confirmed on  7/10/2023 - 13:57:46 by Jolene Rowan M.D. RPVI  < end of copied text >      ASSESSMENT/PLAN: 	78M with history of MM, HTN, and Dementia (AAO x 1-2 to self, to place, not to time at baseline per brother) who [place] the [place] for [place] and lethargy found to have FLU A    -#Hypotension  -- BP stable  -- Norvasc and Lopressor on hold given acute illness--ok to resume today at DC    #lethargy  --due to Flu, now resolved  --s/p tamiflu and Abx per medicine  DC planning    Jimy Swann M.D.  Cardiac Electrophysiology  [phone]

## 2024-01-09 NOTE — PROGRESS NOTE ADULT - ASSESSMENT
This is a 78M with history of MM, HTN, and Dementia (AAO x 1-2 to self, to place, not to time at baseline per brother) who presents to the hospital from [facility] for hypotension and lethargy. Patient currently awake, alert, AAO x1 (to self,) but very poor historian, denies any acute complaints when asked. nephrology consulted for david    DAVID  admitted with scr 1.4  ? etiology prerenal vs ATN  pt with sepsis possible ATN  david improved.   calero in place    hematuria  calero in place  repeat ua after calero removal  renal us with no mass, bladder diverticula--outpt urology    hyponatremia  ? etiology  na better  check urine na and osmo--pending   dc na tab  check tsh and cortisol level  monitor  free water restriction <1.2L    anemia  iron panel  transfusion and work up per team   This is a 78M with history of MM, HTN, and Dementia (AAO x 1-2 to self, to place, not to time at baseline per brother) who presents to the hospital from [facility] for hypotension and lethargy. Patient currently awake, alert, AAO x1 (to self,) but very poor historian, denies any acute complaints when asked. nephrology consulted for david    DAVID  admitted with scr 1.4  ? etiology prerenal vs ATN  pt with sepsis possible ATN  david improved.   calero in place    hematuria  calero in place  repeat ua after calero removal  renal us with no mass, bladder diverticula--outpt urology    hyponatremia  ? etiology  na better  check urine na and osmo--pending   dc na tab  check tsh and cortisol level  monitor  free water restriction <1.2L    anemia  iron panel  transfusion and work up per team

## 2024-01-09 NOTE — PROGRESS NOTE ADULT - SUBJECTIVE AND OBJECTIVE BOX
Patient is a 78y old  Male who presents with a chief complaint of Hypotension, lethargu (09 Jan 2024 13:06)    Date of servie : 01-09-24 @ 14:00  INTERVAL HPI/OVERNIGHT EVENTS:  T(C): 36.5 (01-09-24 @ 09:26), Max: 37.2 (01-09-24 @ 06:52)  HR: 90 (01-09-24 @ 09:26) (66 - 90)  BP: 127/67 (01-09-24 @ 09:26) (118/80 - 132/78)  RR: 17 (01-09-24 @ 09:26) (17 - 18)  SpO2: 99% (01-09-24 @ 09:26) (96% - 99%)  Wt(kg): --  I&O's Summary    08 Jan 2024 07:01  -  09 Jan 2024 07:00  --------------------------------------------------------  IN: 90 mL / OUT: 0 mL / NET: 90 mL        LABS:                        8.4    4.17  )-----------( 251      ( 09 Jan 2024 07:01 )             24.9     01-09    134<L>  |  105  |  19  ----------------------------<  81  4.1   |  19<L>  |  0.79    Ca    9.5      09 Jan 2024 07:01  Phos  2.5     01-09  Mg     2.00     01-09        Urinalysis Basic - ( 09 Jan 2024 07:01 )    Color: x / Appearance: x / SG: x / pH: x  Gluc: 81 mg/dL / Ketone: x  / Bili: x / Urobili: x   Blood: x / Protein: x / Nitrite: x   Leuk Esterase: x / RBC: x / WBC x   Sq Epi: x / Non Sq Epi: x / Bacteria: x      CAPILLARY BLOOD GLUCOSE            Urinalysis Basic - ( 09 Jan 2024 07:01 )    Color: x / Appearance: x / SG: x / pH: x  Gluc: 81 mg/dL / Ketone: x  / Bili: x / Urobili: x   Blood: x / Protein: x / Nitrite: x   Leuk Esterase: x / RBC: x / WBC x   Sq Epi: x / Non Sq Epi: x / Bacteria: x        MEDICATIONS  (STANDING):  albuterol/ipratropium for Nebulization 3 milliLiter(s) Nebulizer every 12 hours  cyanocobalamin 1000 MICROGram(s) Oral daily  donepezil 10 milliGRAM(s) Oral at bedtime  folic acid 1 milliGRAM(s) Oral daily  heparin   Injectable 5000 Unit(s) SubCutaneous every 8 hours  memantine 10 milliGRAM(s) Oral two times a day  risperiDONE   Tablet 0.25 milliGRAM(s) Oral daily  risperiDONE   Tablet 0.5 milliGRAM(s) Oral at bedtime  sertraline 50 milliGRAM(s) Oral daily    MEDICATIONS  (PRN):  acetaminophen     Tablet .. 650 milliGRAM(s) Oral every 6 hours PRN Temp greater or equal to 38C (100.4F), Mild Pain (1 - 3)  aluminum hydroxide/magnesium hydroxide/simethicone Suspension 30 milliLiter(s) Oral every 4 hours PRN Dyspepsia  melatonin 3 milliGRAM(s) Oral at bedtime PRN Insomnia  ondansetron Injectable 4 milliGRAM(s) IV Push every 8 hours PRN Nausea and/or Vomiting  QUEtiapine 12.5 milliGRAM(s) Oral every 6 hours PRN for agitation          PHYSICAL EXAM:  GENERAL: NAD, well-groomed, well-developed  HEAD:  Atraumatic, Normocephalic  CHEST/LUNG: Clear to percussion bilaterally; No rales, rhonchi, wheezing, or rubs  HEART: Regular rate and rhythm; No murmurs, rubs, or gallops  ABDOMEN: Soft, Nontender, Nondistended; Bowel sounds present  EXTREMITIES:  2+ Peripheral Pulses, No clubbing, cyanosis, or edema  LYMPH: No lymphadenopathy noted  SKIN: No rashes or lesions    Care Discussed with Consultants/Other Providers [ ] YES  [ ] NO

## 2024-01-09 NOTE — PROGRESS NOTE ADULT - SUBJECTIVE AND OBJECTIVE BOX
[facility], Division of Infectious Diseases  MIRTHA Bagley Y. Patel, S. Shah, G. Amos  [phone]  ([phone] - weekdays after 5pm and weekends)    Name: GUS DELUNA  Age/Gender: 78y Male  MRN: [id number]    Interval History:  Notes reviewed.   No concerning overnight events.  Afebrile.     Allergies: No Known Allergies      Objective:  Vitals:   T(F): 97.7 (01-09-24 @ 09:26), Max: 98.9 (01-09-24 @ 06:52)  HR: 90 (01-09-24 @ 09:26) (66 - 90)  BP: 127/67 (01-09-24 @ 09:26) (118/80 - 132/78)  RR: 17 (01-09-24 @ 09:26) (17 - 18)  SpO2: 99% (01-09-24 @ 09:26) (96% - 99%)  Physical Examination:  General: no acute distress  HEENT: anicteric  Cardio: normal rate  Resp: breathing comfortably on RA   Abd: non-distended  Ext: no LE edema    Laboratory Studies:  CBC:                       8.4    4.17  )-----------( 251      ( 09 Jan 2024 07:01 )             24.9     WBC Trend:  4.17 01-09-24 @ 07:01  3.08 01-05-24 @ 05:21  3.04 01-04-24 @ 16:00  5.71 01-02-24 @ 19:10    CMP: 01-09    134<L>  |  105  |  19  ----------------------------<  81  4.1   |  19<L>  |  0.79    Ca    9.5      09 Jan 2024 07:01  Phos  2.5     01-09  Mg     2.00     01-09            Urinalysis Basic - ( 09 Jan 2024 07:01 )    Color: x / Appearance: x / SG: x / pH: x  Gluc: 81 mg/dL / Ketone: x  / Bili: x / Urobili: x   Blood: x / Protein: x / Nitrite: x   Leuk Esterase: x / RBC: x / WBC x   Sq Epi: x / Non Sq Epi: x / Bacteria: x      Microbiology: reviewed     Culture - Urine (collected 01-03-24 @ 00:20)  Source: Clean Catch Clean Catch (Midstream)  Final Report (01-04-24 @ 10:55):    No growth    Culture - Urine (collected 01-02-24 @ 22:39)  Source: Catheterized Catheterized  Final Report (01-03-24 @ 22:41):    No growth    Culture - Blood (collected 01-02-24 @ 19:49)  Source: .Blood Blood-Peripheral  Final Report (01-07-24 @ 22:00):    No growth at 5 days    Culture - Blood (collected 01-02-24 @ 19:49)  Source: .Blood Blood-Peripheral  Final Report (01-07-24 @ 22:00):    No growth at 5 days        Radiology: reviewed     Medications:  acetaminophen     Tablet .. 650 milliGRAM(s) Oral every 6 hours PRN  albuterol/ipratropium for Nebulization 3 milliLiter(s) Nebulizer every 12 hours  aluminum hydroxide/magnesium hydroxide/simethicone Suspension 30 milliLiter(s) Oral every 4 hours PRN  cyanocobalamin 1000 MICROGram(s) Oral daily  donepezil 10 milliGRAM(s) Oral at bedtime  folic acid 1 milliGRAM(s) Oral daily  heparin   Injectable 5000 Unit(s) SubCutaneous every 8 hours  melatonin 3 milliGRAM(s) Oral at bedtime PRN  memantine 10 milliGRAM(s) Oral two times a day  ondansetron Injectable 4 milliGRAM(s) IV Push every 8 hours PRN  QUEtiapine 12.5 milliGRAM(s) Oral every 6 hours PRN  risperiDONE   Tablet 0.5 milliGRAM(s) Oral at bedtime  risperiDONE   Tablet 0.25 milliGRAM(s) Oral daily  sertraline 50 milliGRAM(s) Oral daily    Antimicrobials:   [facility], Division of Infectious Diseases  MIRTHA Bagley Y. Patel, S. Shah, G. Amos  [phone]  ([phone] - weekdays after 5pm and weekends)    Name: GUS DELUNA  Age/Gender: 78y Male  MRN: [id number]    Interval History:  Notes reviewed.   No concerning overnight events.  Afebrile.     Allergies: No Known Allergies      Objective:  Vitals:   T(F): 97.7 (01-09-24 @ 09:26), Max: 98.9 (01-09-24 @ 06:52)  HR: 90 (01-09-24 @ 09:26) (66 - 90)  BP: 127/67 (01-09-24 @ 09:26) (118/80 - 132/78)  RR: 17 (01-09-24 @ 09:26) (17 - 18)  SpO2: 99% (01-09-24 @ 09:26) (96% - 99%)  Physical Examination:  General: no acute distress  HEENT: anicteric  Cardio: normal rate  Resp: breathing comfortably on RA   Abd: non-distended  Ext: no LE edema    Laboratory Studies:  CBC:                       8.4    4.17  )-----------( 251      ( 09 Jan 2024 07:01 )             24.9     WBC Trend:  4.17 01-09-24 @ 07:01  3.08 01-05-24 @ 05:21  3.04 01-04-24 @ 16:00  5.71 01-02-24 @ 19:10    CMP: 01-09    134<L>  |  105  |  19  ----------------------------<  81  4.1   |  19<L>  |  0.79    Ca    9.5      09 Jan 2024 07:01  Phos  2.5     01-09  Mg     2.00     01-09            Urinalysis Basic - ( 09 Jan 2024 07:01 )    Color: x / Appearance: x / SG: x / pH: x  Gluc: 81 mg/dL / Ketone: x  / Bili: x / Urobili: x   Blood: x / Protein: x / Nitrite: x   Leuk Esterase: x / RBC: x / WBC x   Sq Epi: x / Non Sq Epi: x / Bacteria: x      Microbiology: reviewed     Culture - Urine (collected 01-03-24 @ 00:20)  Source: Clean Catch Clean Catch (Midstream)  Final Report (01-04-24 @ 10:55):    No growth    Culture - Urine (collected 01-02-24 @ 22:39)  Source: Catheterized Catheterized  Final Report (01-03-24 @ 22:41):    No growth    Culture - Blood (collected 01-02-24 @ 19:49)  Source: .Blood Blood-Peripheral  Final Report (01-07-24 @ 22:00):    No growth at 5 days    Culture - Blood (collected 01-02-24 @ 19:49)  Source: .Blood Blood-Peripheral  Final Report (01-07-24 @ 22:00):    No growth at 5 days        Radiology: reviewed     Medications:  acetaminophen     Tablet .. 650 milliGRAM(s) Oral every 6 hours PRN  albuterol/ipratropium for Nebulization 3 milliLiter(s) Nebulizer every 12 hours  aluminum hydroxide/magnesium hydroxide/simethicone Suspension 30 milliLiter(s) Oral every 4 hours PRN  cyanocobalamin 1000 MICROGram(s) Oral daily  donepezil 10 milliGRAM(s) Oral at bedtime  folic acid 1 milliGRAM(s) Oral daily  heparin   Injectable 5000 Unit(s) SubCutaneous every 8 hours  melatonin 3 milliGRAM(s) Oral at bedtime PRN  memantine 10 milliGRAM(s) Oral two times a day  ondansetron Injectable 4 milliGRAM(s) IV Push every 8 hours PRN  QUEtiapine 12.5 milliGRAM(s) Oral every 6 hours PRN  risperiDONE   Tablet 0.5 milliGRAM(s) Oral at bedtime  risperiDONE   Tablet 0.25 milliGRAM(s) Oral daily  sertraline 50 milliGRAM(s) Oral daily    Antimicrobials:

## 2024-01-10 LAB
HOMOCYSTEINE LEVEL: 9.4 UMOL/L — SIGNIFICANT CHANGE UP (ref 0–19.2)
HOMOCYSTEINE LEVEL: 9.4 UMOL/L — SIGNIFICANT CHANGE UP (ref 0–19.2)
METHYLMALONATE SERPL-SCNC: 174 NMOL/L — SIGNIFICANT CHANGE UP (ref 0–378)
METHYLMALONATE SERPL-SCNC: 174 NMOL/L — SIGNIFICANT CHANGE UP (ref 0–378)

## 2024-01-10 RX ADMIN — DONEPEZIL HYDROCHLORIDE 10 MILLIGRAM(S): 10 TABLET, FILM COATED ORAL at 21:27

## 2024-01-10 RX ADMIN — Medication 3 MILLILITER(S): at 22:12

## 2024-01-10 RX ADMIN — RISPERIDONE 0.5 MILLIGRAM(S): 4 TABLET ORAL at 21:27

## 2024-01-10 RX ADMIN — HEPARIN SODIUM 5000 UNIT(S): 5000 INJECTION INTRAVENOUS; SUBCUTANEOUS at 05:55

## 2024-01-10 RX ADMIN — HEPARIN SODIUM 5000 UNIT(S): 5000 INJECTION INTRAVENOUS; SUBCUTANEOUS at 13:42

## 2024-01-10 RX ADMIN — Medication 1 MILLIGRAM(S): at 13:39

## 2024-01-10 RX ADMIN — RISPERIDONE 0.25 MILLIGRAM(S): 4 TABLET ORAL at 13:39

## 2024-01-10 RX ADMIN — Medication 3 MILLILITER(S): at 10:49

## 2024-01-10 RX ADMIN — SERTRALINE 50 MILLIGRAM(S): 25 TABLET, FILM COATED ORAL at 13:40

## 2024-01-10 RX ADMIN — MEMANTINE HYDROCHLORIDE 10 MILLIGRAM(S): 10 TABLET ORAL at 05:55

## 2024-01-10 RX ADMIN — PREGABALIN 1000 MICROGRAM(S): 225 CAPSULE ORAL at 13:39

## 2024-01-10 RX ADMIN — HEPARIN SODIUM 5000 UNIT(S): 5000 INJECTION INTRAVENOUS; SUBCUTANEOUS at 21:27

## 2024-01-10 RX ADMIN — MEMANTINE HYDROCHLORIDE 10 MILLIGRAM(S): 10 TABLET ORAL at 18:45

## 2024-01-10 NOTE — PROGRESS NOTE ADULT - ASSESSMENT
79 y/o M PMhx MM, HTN, and Dementia who presented from [facility] for hypotension and lethargy found to have flu A    influenza A  fever resolved  CXR clear    UA not consistent w/ UTI  urine cx negative    Recommendations  s/p tamiflu 30mg bid, completed 1/7  discharge planning    We will sign off. Thank you for allowing us to participate in the care of Mr. Wen. Please feel free to call with any questions or concerns.     Sylvester Trinidad M.D.  [facility], Division of Infectious Diseases  [phone]  After 5pm on weekdays and all day on weekends - please call [phone]  79 y/o M PMhx MM, HTN, and Dementia who presented from [facility] for hypotension and lethargy found to have flu A    influenza A  fever resolved  CXR clear    UA not consistent w/ UTI  urine cx negative    Recommendations  s/p tamiflu 30mg bid, completed 1/7  discharge planning    We will sign off. Thank you for allowing us to participate in the care of Mr. Wen. Please feel free to call with any questions or concerns.     Sylvester Trinidad M.D.  [facility], Division of Infectious Diseases  [phone]  After 5pm on weekdays and all day on weekends - please call [phone]  79 y/o M PMhx MM, HTN, and Dementia who presented from [facility] for hypotension and lethargy found to have flu A    influenza A- treated  fever resolved  CXR clear    UA not consistent w/ UTI  urine cx negative    Recommendations  s/p tamiflu 30mg bid, completed 1/7  discharge planning    We will sign off. Thank you for allowing us to participate in the care of Mr. Wen. Please feel free to call with any questions or concerns.     Sylvester Trinidad M.D.  [facility], Division of Infectious Diseases  [phone]  After 5pm on weekdays and all day on weekends - please call [phone]  79 y/o M PMhx MM, HTN, and Dementia who presented from [facility] for hypotension and lethargy found to have flu A    influenza A- treated  fever resolved  CXR clear    UA not consistent w/ UTI  urine cx negative    Recommendations  s/p tamiflu 30mg bid, completed 1/7  discharge planning    We will sign off. Thank you for allowing us to participate in the care of Mr. Wen. Please feel free to call with any questions or concerns.     Sylvester Trinidad M.D.  [facility], Division of Infectious Diseases  [phone]  After 5pm on weekdays and all day on weekends - please call [phone]  77 y/o M PMhx MM, HTN, and Dementia who presented from [facility] for hypotension and lethargy found to have flu A    influenza A- treated  fever resolved  CXR clear    UA not consistent w/ UTI  urine cx negative    Recommendations  s/p tamiflu 30mg bid, completed 1/7  discharge planning    Sylvester Trinidad M.D.  [facility], Division of Infectious Diseases  [phone]  After 5pm on weekdays and all day on weekends - please call [phone]  77 y/o M PMhx MM, HTN, and Dementia who presented from [facility] for hypotension and lethargy found to have flu A    influenza A- treated  fever resolved  CXR clear    UA not consistent w/ UTI  urine cx negative    Recommendations  s/p tamiflu 30mg bid, completed 1/7  discharge planning    Sylvester Trinidad M.D.  [facility], Division of Infectious Diseases  [phone]  After 5pm on weekdays and all day on weekends - please call [phone]

## 2024-01-10 NOTE — PROGRESS NOTE ADULT - SUBJECTIVE AND OBJECTIVE BOX
Patient is a 78y old  Male who presents with a chief complaint of Hypotension, lethargu (10 Josef 2024 14:28)    Date of servie : 01-10-24 @ 15:45  INTERVAL HPI/OVERNIGHT EVENTS:  T(C): 36.3 (01-10-24 @ 09:06), Max: 36.9 (01-09-24 @ 17:16)  HR: 78 (01-10-24 @ 11:05) (66 - 79)  BP: 119/73 (01-10-24 @ 09:06) (115/62 - 132/72)  RR: 18 (01-10-24 @ 09:06) (16 - 18)  SpO2: 98% (01-10-24 @ 11:05) (94% - 100%)  Wt(kg): --  I&O's Summary    09 Jan 2024 07:01  -  10 Josef 2024 07:00  --------------------------------------------------------  IN: 490 mL / OUT: 0 mL / NET: 490 mL        LABS:                        8.4    4.17  )-----------( 251      ( 09 Jan 2024 07:01 )             24.9     01-09    134<L>  |  105  |  19  ----------------------------<  81  4.1   |  19<L>  |  0.79    Ca    9.5      09 Jan 2024 07:01  Phos  2.5     01-09  Mg     2.00     01-09        Urinalysis Basic - ( 09 Jan 2024 07:01 )    Color: x / Appearance: x / SG: x / pH: x  Gluc: 81 mg/dL / Ketone: x  / Bili: x / Urobili: x   Blood: x / Protein: x / Nitrite: x   Leuk Esterase: x / RBC: x / WBC x   Sq Epi: x / Non Sq Epi: x / Bacteria: x      CAPILLARY BLOOD GLUCOSE            Urinalysis Basic - ( 09 Jan 2024 07:01 )    Color: x / Appearance: x / SG: x / pH: x  Gluc: 81 mg/dL / Ketone: x  / Bili: x / Urobili: x   Blood: x / Protein: x / Nitrite: x   Leuk Esterase: x / RBC: x / WBC x   Sq Epi: x / Non Sq Epi: x / Bacteria: x        MEDICATIONS  (STANDING):  albuterol/ipratropium for Nebulization 3 milliLiter(s) Nebulizer every 12 hours  cyanocobalamin 1000 MICROGram(s) Oral daily  donepezil 10 milliGRAM(s) Oral at bedtime  folic acid 1 milliGRAM(s) Oral daily  heparin   Injectable 5000 Unit(s) SubCutaneous every 8 hours  memantine 10 milliGRAM(s) Oral two times a day  risperiDONE   Tablet 0.25 milliGRAM(s) Oral daily  risperiDONE   Tablet 0.5 milliGRAM(s) Oral at bedtime  sertraline 50 milliGRAM(s) Oral daily    MEDICATIONS  (PRN):  acetaminophen     Tablet .. 650 milliGRAM(s) Oral every 6 hours PRN Temp greater or equal to 38C (100.4F), Mild Pain (1 - 3)  aluminum hydroxide/magnesium hydroxide/simethicone Suspension 30 milliLiter(s) Oral every 4 hours PRN Dyspepsia  melatonin 3 milliGRAM(s) Oral at bedtime PRN Insomnia  ondansetron Injectable 4 milliGRAM(s) IV Push every 8 hours PRN Nausea and/or Vomiting  QUEtiapine 12.5 milliGRAM(s) Oral every 6 hours PRN for agitation          PHYSICAL EXAM:  GENERAL: NAD, well-groomed, well-developed  HEAD:  Atraumatic, Normocephalic  CHEST/LUNG: Clear to percussion bilaterally; No rales, rhonchi, wheezing, or rubs  HEART: Regular rate and rhythm; No murmurs, rubs, or gallops  ABDOMEN: Soft, Nontender, Nondistended; Bowel sounds present  EXTREMITIES:  2+ Peripheral Pulses, No clubbing, cyanosis, or edema  LYMPH: No lymphadenopathy noted  SKIN: No rashes or lesions    Care Discussed with Consultants/Other Providers [ ] YES  [ ] NO

## 2024-01-10 NOTE — PROGRESS NOTE ADULT - SUBJECTIVE AND OBJECTIVE BOX
DATE OF SERVICE: 01-10-24    No overnight events, resting comfortably  Review of symptoms otherwise negative.    Review of Systems:   Constitutional: [ ] fevers, [ ] chills.   Skin: [ ] dry skin. [ ] rashes.  Psychiatric: [ ] depression, [ ] anxiety.   Gastrointestinal: [ ] BRBPR, [ ] melena.   Neurological: [ ] confusion. [ ] seizures. [ ] shuffling gait.   Ears,Nose,Mouth and Throat: [ ] ear pain [ ] sore throat.   Eyes: [ ] diplopia.   Respiratory: [ ] hemoptysis. [ ] shortness of breath  Cardiovascular: See HPI above  Hematologic/Lymphatic: [ ] anemia. [ ] painful nodes. [ ] prolonged bleeding.   Genitourinary: [ ] hematuria. [ ] flank pain.   Endocrine: [ ] significant change in weight. [ ] intolerance to heat and cold.     Review of systems [x ] otherwise negative, [ ] otherwise unable to obtain    FH: no family history of sudden cardiac death in first degree relatives    SH: [ ] tobacco, [ ] alcohol, [ ] drugs    acetaminophen     Tablet .. 650 milliGRAM(s) Oral every 6 hours PRN  albuterol/ipratropium for Nebulization 3 milliLiter(s) Nebulizer every 12 hours  aluminum hydroxide/magnesium hydroxide/simethicone Suspension 30 milliLiter(s) Oral every 4 hours PRN  cyanocobalamin 1000 MICROGram(s) Oral daily  donepezil 10 milliGRAM(s) Oral at bedtime  folic acid 1 milliGRAM(s) Oral daily  heparin   Injectable 5000 Unit(s) SubCutaneous every 8 hours  melatonin 3 milliGRAM(s) Oral at bedtime PRN  memantine 10 milliGRAM(s) Oral two times a day  ondansetron Injectable 4 milliGRAM(s) IV Push every 8 hours PRN  QUEtiapine 12.5 milliGRAM(s) Oral every 6 hours PRN  risperiDONE   Tablet 0.25 milliGRAM(s) Oral daily  risperiDONE   Tablet 0.5 milliGRAM(s) Oral at bedtime  sertraline 50 milliGRAM(s) Oral daily                            8.4    4.17  )-----------( 251      ( 09 Jan 2024 07:01 )             24.9       134<L>  |  105  |  19  ----------------------------<  81  4.1   |  19<L>  |  0.79    Ca    9.5      09 Jan 2024 07:01  Phos  2.5     01-09  Mg     2.00     01-09        T(C): 36.3 (01-10-24 @ 09:06), Max: 36.9 (01-09-24 @ 17:16)  HR: 78 (01-10-24 @ 11:05) (66 - 79)  BP: 119/73 (01-10-24 @ 09:06) (115/62 - 132/72)  RR: 18 (01-10-24 @ 09:06) (16 - 18)  SpO2: 98% (01-10-24 @ 11:05) (94% - 100%)  Wt(kg): --    I&O's Summary    09 Jan 2024 07:01  -  10 Josef 2024 07:00  --------------------------------------------------------  IN: 490 mL / OUT: 0 mL / NET: 490 mL        General:  Alert and Oriented  Head: Normocephalic and atraumatic.   Neck: No JVD. No bruits. Supple. Does not appear to be enlarged.   Cardiovascular: + S1,S2 ; RRR Soft systolic murmur at the left lower sternal border. No rubs noted.    Lungs: CTA b/l. No rhonchi, rales or wheezes.   Abdomen: + BS, soft. Non tender. Non distended. No rebound. No guarding.   Extremities: No clubbing/cyanosis/edema.   Neurologic: Moves all four extremities. Full range of motion.   Skin: Warm and moist. The patient's skin has normal elasticity and good skin turgor.   Psychiatric: Appropriate mood and affect.  Musculoskeletal: Normal range of motion, normal strength       ECG:  	Sinus tachycardia    < from: Xray Chest 1 View AP/PA (01.02.24 @ 19:36) >  IMPRESSION:  No focal consolidations.  < end of copied text >    < from: Transthoracic Echocardiogram (07.10.23 @ 11:15) >  CONCLUSIONS:  1. Calcified trileaflet aortic valve with normal opening.  Minimal aortic regurgitation.  2. Endocardium not well visualized; grossly decreased  possibly mild left ventricular systolic dysfunction.  3. The right ventricle is not well visualized; grossly  normal right ventricular systolic function.  ------------------------------------------------------------------------  Confirmed on  7/10/2023 - 13:57:46 by MARLA Vegas  < end of copied text >      ASSESSMENT/PLAN: 	78M with history of MM, HTN, and Dementia (AAO x 1-2 to self, to place, not to time at baseline per brother) who presents to the hospital from [facility] for hypotension and lethargy found to have FLU A    -#Hypotension  -- BP stable  -- Norvasc and Lopressor on hold given acute illness--ok to resume at DC    #lethargy  --due to Flu, now resolved  --s/p tamiflu and Abx per medicine  DC planning     DATE OF SERVICE: 01-10-24    No overnight events, resting comfortably  Review of symptoms otherwise negative.    Review of Systems:   Constitutional: [ ] fevers, [ ] chills.   Skin: [ ] dry skin. [ ] rashes.  Psychiatric: [ ] depression, [ ] anxiety.   Gastrointestinal: [ ] BRBPR, [ ] melena.   Neurological: [ ] confusion. [ ] seizures. [ ] shuffling gait.   Ears,Nose,Mouth and Throat: [ ] ear pain [ ] sore throat.   Eyes: [ ] diplopia.   Respiratory: [ ] hemoptysis. [ ] shortness of breath  Cardiovascular: See HPI above  Hematologic/Lymphatic: [ ] anemia. [ ] painful nodes. [ ] prolonged bleeding.   Genitourinary: [ ] hematuria. [ ] flank pain.   Endocrine: [ ] significant change in weight. [ ] intolerance to heat and cold.     Review of systems [x ] otherwise negative, [ ] otherwise unable to obtain    FH: no family history of sudden cardiac death in first degree relatives    SH: [ ] tobacco, [ ] alcohol, [ ] drugs    acetaminophen     Tablet .. 650 milliGRAM(s) Oral every 6 hours PRN  albuterol/ipratropium for Nebulization 3 milliLiter(s) Nebulizer every 12 hours  aluminum hydroxide/magnesium hydroxide/simethicone Suspension 30 milliLiter(s) Oral every 4 hours PRN  cyanocobalamin 1000 MICROGram(s) Oral daily  donepezil 10 milliGRAM(s) Oral at bedtime  folic acid 1 milliGRAM(s) Oral daily  heparin   Injectable 5000 Unit(s) SubCutaneous every 8 hours  melatonin 3 milliGRAM(s) Oral at bedtime PRN  memantine 10 milliGRAM(s) Oral two times a day  ondansetron Injectable 4 milliGRAM(s) IV Push every 8 hours PRN  QUEtiapine 12.5 milliGRAM(s) Oral every 6 hours PRN  risperiDONE   Tablet 0.25 milliGRAM(s) Oral daily  risperiDONE   Tablet 0.5 milliGRAM(s) Oral at bedtime  sertraline 50 milliGRAM(s) Oral daily                            8.4    4.17  )-----------( 251      ( 09 Jan 2024 07:01 )             24.9       134<L>  |  105  |  19  ----------------------------<  81  4.1   |  19<L>  |  0.79    Ca    9.5      09 Jan 2024 07:01  Phos  2.5     01-09  Mg     2.00     01-09        T(C): 36.3 (01-10-24 @ 09:06), Max: 36.9 (01-09-24 @ 17:16)  HR: 78 (01-10-24 @ 11:05) (66 - 79)  BP: 119/73 (01-10-24 @ 09:06) (115/62 - 132/72)  RR: 18 (01-10-24 @ 09:06) (16 - 18)  SpO2: 98% (01-10-24 @ 11:05) (94% - 100%)  Wt(kg): --    I&O's Summary    09 Jan 2024 07:01  -  10 Josef 2024 07:00  --------------------------------------------------------  IN: 490 mL / OUT: 0 mL / NET: 490 mL        General:  Alert and Oriented  Head: Normocephalic and atraumatic.   Neck: No JVD. No bruits. Supple. Does not appear to be enlarged.   Cardiovascular: + S1,S2 ; RRR Soft systolic murmur at the left lower sternal border. No rubs noted.    Lungs: CTA b/l. No rhonchi, rales or wheezes.   Abdomen: + BS, soft. Non tender. Non distended. No rebound. No guarding.   Extremities: No clubbing/cyanosis/edema.   Neurologic: Moves all four extremities. Full range of motion.   Skin: Warm and moist. The patient's skin has normal elasticity and good skin turgor.   Psychiatric: Appropriate mood and affect.  Musculoskeletal: Normal range of motion, normal strength       ECG:  	Sinus tachycardia    < from: Xray Chest 1 View AP/PA (01.02.24 @ 19:36) >  IMPRESSION:  No focal consolidations.  < end of copied text >    < from: Transthoracic Echocardiogram (07.10.23 @ 11:15) >  CONCLUSIONS:  1. Calcified trileaflet aortic valve with normal opening.  Minimal aortic regurgitation.  2. Endocardium not well visualized; grossly decreased  possibly mild left ventricular systolic dysfunction.  3. The right ventricle is not well visualized; grossly  normal right ventricular systolic function.  ------------------------------------------------------------------------  Confirmed on  7/10/2023 - 13:57:46 by MARLA Vegas  < end of copied text >      ASSESSMENT/PLAN: 	78M with history of MM, HTN, and Dementia (AAO x 1-2 to self, to place, not to time at baseline per brother) who presents to the hospital from [facility] for hypotension and lethargy found to have FLU A    -#Hypotension  -- BP stable  -- Norvasc and Lopressor on hold given acute illness--ok to resume at DC    #lethargy  --due to Flu, now resolved  --s/p tamiflu and Abx per medicine  DC planning

## 2024-01-10 NOTE — PROGRESS NOTE ADULT - ASSESSMENT
This is a 78M with history of MM, HTN, and Dementia (AAO x 1-2 to self, to place, not to time at baseline per brother) who presents to the hospital from [facility] for hypotension and lethargy. Patient currently awake, alert, AAO x1 (to self,) but very poor historian, denies any acute complaints when asked. nephrology consulted for david    DAVID  admitted with scr 1.4  ? etiology prerenal vs ATN  pt with sepsis possible ATN  david improved.   calero in place    hematuria  repeat ua   renal us with no mass, bladder diverticula--outpt urology    hyponatremia  ? etiology  na better  check urine na and osmo--pending   dc na tab  check tsh and cortisol level  monitor  free water restriction <1.2L    anemia  iron panel  transfusion and work up per team   This is a 78M with history of MM, HTN, and Dementia (AAO x 1-2 to self, to place, not to time at baseline per brother) who presents to the hospital from [facility] for hypotension and lethargy. Patient currently awake, alert, AAO x1 (to self,) but very poor historian, denies any acute complaints when asked. nephrology consulted for david    DAVID  admitted with scr 1.4  ? etiology prerenal vs ATN  pt with sepsis possible ATN  david improved.   calero in place    hematuria  repeat ua   renal us with no mass, bladder diverticula--outpt urology    hyponatremia  ? etiology  na better  check urine na and osmo--pending   dc na tab  check tsh and cortisol level  monitor  free water restriction <1.2L    anemia  iron panel  transfusion and work up per team

## 2024-01-10 NOTE — PROGRESS NOTE ADULT - SUBJECTIVE AND OBJECTIVE BOX
Date of Service: 01-10-24 @ 12:26    Patient is a 78y old  Male who presents with a chief complaint of Hypotension, lethargu (10 Josef 2024 11:36)      Any change in ROS: no sob:  no cough : no phlegm     MEDICATIONS  (STANDING):  albuterol/ipratropium for Nebulization 3 milliLiter(s) Nebulizer every 12 hours  cyanocobalamin 1000 MICROGram(s) Oral daily  donepezil 10 milliGRAM(s) Oral at bedtime  folic acid 1 milliGRAM(s) Oral daily  heparin   Injectable 5000 Unit(s) SubCutaneous every 8 hours  memantine 10 milliGRAM(s) Oral two times a day  risperiDONE   Tablet 0.25 milliGRAM(s) Oral daily  risperiDONE   Tablet 0.5 milliGRAM(s) Oral at bedtime  sertraline 50 milliGRAM(s) Oral daily    MEDICATIONS  (PRN):  acetaminophen     Tablet .. 650 milliGRAM(s) Oral every 6 hours PRN Temp greater or equal to 38C (100.4F), Mild Pain (1 - 3)  aluminum hydroxide/magnesium hydroxide/simethicone Suspension 30 milliLiter(s) Oral every 4 hours PRN Dyspepsia  melatonin 3 milliGRAM(s) Oral at bedtime PRN Insomnia  ondansetron Injectable 4 milliGRAM(s) IV Push every 8 hours PRN Nausea and/or Vomiting  QUEtiapine 12.5 milliGRAM(s) Oral every 6 hours PRN for agitation    Vital Signs Last 24 Hrs  T(C): 36.3 (10 Josef 2024 09:06), Max: 36.9 (09 Jan 2024 17:16)  T(F): 97.3 (10 Josef 2024 09:06), Max: 98.5 (09 Jan 2024 17:16)  HR: 78 (10 Josef 2024 11:05) (66 - 88)  BP: 119/73 (10 Josef 2024 09:06) (115/62 - 132/72)  BP(mean): --  RR: 18 (10 Josef 2024 09:06) (16 - 18)  SpO2: 98% (10 Josef 2024 11:05) (94% - 100%)    Parameters below as of 10 Josef 2024 11:05  Patient On (Oxygen Delivery Method): room air        I&O's Summary    09 Jan 2024 07:01  -  10 Josef 2024 07:00  --------------------------------------------------------  IN: 490 mL / OUT: 0 mL / NET: 490 mL          Physical Exam:   GENERAL: NAD, well-groomed, well-developed  HEENT: CHRISTINA/   Atraumatic, Normocephalic  ENMT: No tonsillar erythema, exudates, or enlargement; Moist mucous membranes, Good dentition, No lesions  NECK: Supple, No JVD, Normal thyroid  CHEST/LUNG: Clear to auscultaion-  CVS: Regular rate and rhythm; No murmurs, rubs, or gallops  GI: : Soft, Nontender, Nondistended; Bowel sounds present  NERVOUS SYSTEM:  Letahrgic  EXTREMITIES: -edema  LYMPH: No lymphadenopathy noted  SKIN: No rashes or lesions  ENDOCRINOLOGY: No Thyromegaly  PSYCH: calm     Labs:                              8.4    4.17  )-----------( 251      ( 09 Jan 2024 07:01 )             24.9     01-09    134<L>  |  105  |  19  ----------------------------<  81  4.1   |  19<L>  |  0.79    Ca    9.5      09 Jan 2024 07:01  Phos  2.5     01-09  Mg     2.00     01-09      CAPILLARY BLOOD GLUCOSE    rad< from: US Kidney and Bladder (01.05.24 @ 08:29) >    PROCEDURE DATE:  01/05/2024          INTERPRETATION:  CLINICAL INFORMATION: Acute kidney injury.    COMPARISON: None available.    TECHNIQUE: Sonography of the kidneys and bladder.    FINDINGS:  Right kidney: 11.0 cm. No renal mass, hydronephrosis or calculi.    Left kidney: 11.2 cm. No renal mass, hydronephrosis or calculi.    Urinary bladder: Bladder diverticula.    IMPRESSION:  No hydronephrosis.    Bladder diverticula.    --- End of Report ---            JOCE KO MD; Attending Radiologist  This document has been electronically signed. Jan 5 2024  8:39AM    < end of copied text >  < from: CT Head No Cont (01.03.24 @ 02:00) >    INTERPRETATION:  EXAMINATION: CT HEAD    DATE: 1/3/2024 2:00 AM    INDICATION: lethargy, altered mental status. History of falls.    COMPARISON: CT head from 7/7/2023.    TECHNIQUE: Thin section noncontrast axial images were obtained through   the head.  RAPID AI was utilized for preliminary assessment of   intracranial hemorrhage.    FINDINGS:  Intracranial Contents:    Moderate to severe cerebral volume loss.. Mild to moderate chronic   microvascular ischemic changes Gray-white differentiation is preserved.   No hydrocephalus. The basilar cisterns are clear. No focal edema or acute   mass effect. There is no intracranial fluid collectionor acute   hemorrhage.    Bones and Extracranial Soft Tissues:    There is no fracture identified. Scattered paranasal sinus mucosal   thickening. Mastoid air cells are clear. Scalp and imaged facial soft   tissues are within normal limits.      IMPRESSION:  1. No acute intracranial CT abnormality.    --- End of Report ---          MAIEKL MORENO MD; Resident Radiologist  This document has been electronically signed.  ELLEN CARTWRIGHT MD; Attending Radiologist  This document has been electronically signed. Josef  3 2024  2:17AM    < end of copied text >  < from: Xray Chest 1 View AP/PA (01.02.24 @ 19:36) >      INTERPRETATION:  CLINICAL INDICATION: Sepsis.    EXAM: Chest x-ray 2 views.    COMPARISON: None available.    FINDINGS:  Surgical clips overlying the midline of the neck.  Bilateral lung fields partially obscured by patient positioning.   Visualized lung fields are clear.  There is no pleural effusion or pneumothorax.  The heart is not well evaluated in this position. Median sternotomy.  The visualized osseous structures demonstrate no acute pathology.    IMPRESSION:  No focal consolidations.    --- End of Report ---          MIR LAW MD; Resident Radiologist  This document has been electronically signed.  KOFI GARCIA MD; Attending Radiologist  This document has been electronically signed. Jan 2 2024  7:43PM    < end of copied text >            Urinalysis Basic - ( 09 Jan 2024 07:01 )    Color: x / Appearance: x / SG: x / pH: x  Gluc: 81 mg/dL / Ketone: x  / Bili: x / Urobili: x   Blood: x / Protein: x / Nitrite: x   Leuk Esterase: x / RBC: x / WBC x   Sq Epi: x / Non Sq Epi: x / Bacteria: x            RECENT CULTURES:        RESPIRATORY CULTURES:          Studies  Chest X-RAY  CT SCAN Chest   Venous Dopplers: LE:   CT Abdomen  Others               Date of Service: 01-10-24 @ 12:26    Patient is a 78y old  Male who presents with a chief complaint of Hypotension, lethargu (10 Josef 2024 11:36)      Any change in ROS: no sob:  no cough : no phlegm     MEDICATIONS  (STANDING):  albuterol/ipratropium for Nebulization 3 milliLiter(s) Nebulizer every 12 hours  cyanocobalamin 1000 MICROGram(s) Oral daily  donepezil 10 milliGRAM(s) Oral at bedtime  folic acid 1 milliGRAM(s) Oral daily  heparin   Injectable 5000 Unit(s) SubCutaneous every 8 hours  memantine 10 milliGRAM(s) Oral two times a day  risperiDONE   Tablet 0.25 milliGRAM(s) Oral daily  risperiDONE   Tablet 0.5 milliGRAM(s) Oral at bedtime  sertraline 50 milliGRAM(s) Oral daily    MEDICATIONS  (PRN):  acetaminophen     Tablet .. 650 milliGRAM(s) Oral every 6 hours PRN Temp greater or equal to 38C (100.4F), Mild Pain (1 - 3)  aluminum hydroxide/magnesium hydroxide/simethicone Suspension 30 milliLiter(s) Oral every 4 hours PRN Dyspepsia  melatonin 3 milliGRAM(s) Oral at bedtime PRN Insomnia  ondansetron Injectable 4 milliGRAM(s) IV Push every 8 hours PRN Nausea and/or Vomiting  QUEtiapine 12.5 milliGRAM(s) Oral every 6 hours PRN for agitation    Vital Signs Last 24 Hrs  T(C): 36.3 (10 Josef 2024 09:06), Max: 36.9 (09 Jan 2024 17:16)  T(F): 97.3 (10 Josef 2024 09:06), Max: 98.5 (09 Jan 2024 17:16)  HR: 78 (10 Josef 2024 11:05) (66 - 88)  BP: 119/73 (10 Josef 2024 09:06) (115/62 - 132/72)  BP(mean): --  RR: 18 (10 Josef 2024 09:06) (16 - 18)  SpO2: 98% (10 Josef 2024 11:05) (94% - 100%)    Parameters below as of 10 Josef 2024 11:05  Patient On (Oxygen Delivery Method): room air        I&O's Summary    09 Jan 2024 07:01  -  10 Josef 2024 07:00  --------------------------------------------------------  IN: 490 mL / OUT: 0 mL / NET: 490 mL          Physical Exam:   GENERAL: NAD, well-groomed, well-developed  HEENT: CHRISTINA/   Atraumatic, Normocephalic  ENMT: No tonsillar erythema, exudates, or enlargement; Moist mucous membranes, Good dentition, No lesions  NECK: Supple, No JVD, Normal thyroid  CHEST/LUNG: Clear to auscultaion-  CVS: Regular rate and rhythm; No murmurs, rubs, or gallops  GI: : Soft, Nontender, Nondistended; Bowel sounds present  NERVOUS SYSTEM:  Letahrgic  EXTREMITIES: -edema  LYMPH: No lymphadenopathy noted  SKIN: No rashes or lesions  ENDOCRINOLOGY: No Thyromegaly  PSYCH: calm     Labs:                              8.4    4.17  )-----------( 251      ( 09 Jan 2024 07:01 )             24.9     01-09    134<L>  |  105  |  19  ----------------------------<  81  4.1   |  19<L>  |  0.79    Ca    9.5      09 Jan 2024 07:01  Phos  2.5     01-09  Mg     2.00     01-09      CAPILLARY BLOOD GLUCOSE    rad< from: US Kidney and Bladder (01.05.24 @ 08:29) >    PROCEDURE DATE:  01/05/2024          INTERPRETATION:  CLINICAL INFORMATION: Acute kidney injury.    COMPARISON: None available.    TECHNIQUE: Sonography of the kidneys and bladder.    FINDINGS:  Right kidney: 11.0 cm. No renal mass, hydronephrosis or calculi.    Left kidney: 11.2 cm. No renal mass, hydronephrosis or calculi.    Urinary bladder: Bladder diverticula.    IMPRESSION:  No hydronephrosis.    Bladder diverticula.    --- End of Report ---            JOCE KO MD; Attending Radiologist  This document has been electronically signed. Jan 5 2024  8:39AM    < end of copied text >  < from: CT Head No Cont (01.03.24 @ 02:00) >    INTERPRETATION:  EXAMINATION: CT HEAD    DATE: 1/3/2024 2:00 AM    INDICATION: lethargy, altered mental status. History of falls.    COMPARISON: CT head from 7/7/2023.    TECHNIQUE: Thin section noncontrast axial images were obtained through   the head.  RAPID AI was utilized for preliminary assessment of   intracranial hemorrhage.    FINDINGS:  Intracranial Contents:    Moderate to severe cerebral volume loss.. Mild to moderate chronic   microvascular ischemic changes Gray-white differentiation is preserved.   No hydrocephalus. The basilar cisterns are clear. No focal edema or acute   mass effect. There is no intracranial fluid collectionor acute   hemorrhage.    Bones and Extracranial Soft Tissues:    There is no fracture identified. Scattered paranasal sinus mucosal   thickening. Mastoid air cells are clear. Scalp and imaged facial soft   tissues are within normal limits.      IMPRESSION:  1. No acute intracranial CT abnormality.    --- End of Report ---          MAIKEL MORENO MD; Resident Radiologist  This document has been electronically signed.  ELLEN CARTWRIGHT MD; Attending Radiologist  This document has been electronically signed. Josef  3 2024  2:17AM    < end of copied text >  < from: Xray Chest 1 View AP/PA (01.02.24 @ 19:36) >      INTERPRETATION:  CLINICAL INDICATION: Sepsis.    EXAM: Chest x-ray 2 views.    COMPARISON: None available.    FINDINGS:  Surgical clips overlying the midline of the neck.  Bilateral lung fields partially obscured by patient positioning.   Visualized lung fields are clear.  There is no pleural effusion or pneumothorax.  The heart is not well evaluated in this position. Median sternotomy.  The visualized osseous structures demonstrate no acute pathology.    IMPRESSION:  No focal consolidations.    --- End of Report ---          MIR LAW MD; Resident Radiologist  This document has been electronically signed.  KOFI GARCIA MD; Attending Radiologist  This document has been electronically signed. Jan 2 2024  7:43PM    < end of copied text >            Urinalysis Basic - ( 09 Jan 2024 07:01 )    Color: x / Appearance: x / SG: x / pH: x  Gluc: 81 mg/dL / Ketone: x  / Bili: x / Urobili: x   Blood: x / Protein: x / Nitrite: x   Leuk Esterase: x / RBC: x / WBC x   Sq Epi: x / Non Sq Epi: x / Bacteria: x            RECENT CULTURES:        RESPIRATORY CULTURES:          Studies  Chest X-RAY  CT SCAN Chest   Venous Dopplers: LE:   CT Abdomen  Others

## 2024-01-10 NOTE — PROGRESS NOTE ADULT - ASSESSMENT
This is a 78M with history of MM, HTN, and Dementia (AAO x 1-2 to self, to place, not to time at baseline per brother) who presents to the hospital from [facility] for hypotension and lethargy. Patient currently awake, alert, AAO x2 (to self, to hospital but not name, not to time) but very poor historian, denies any acute complaints when asked. Spoke with brother Mookie who said that the patient was sent to the hospital for lethargy and cough though he states that the patient has had a cough for several months. Brother denies any other known acute complaints for the patient. Called [facility] [phone] but there was no staff available who knew the patient. As per NH paperwork and ED note, patient was sent to the hospital for hypotention to 81/55 and lethargy. He was noted to have a low grade temp of 99.6 and saturation of 93% at his NH. He was given tylenol 650mg x1 prior to being sent to the hospital.   On arrival to the hospital his vitals were T 99.4 -> 100.7 rectal, P 92, BP 88/55 -> 117/70, RR 16, O2 sat 96% RA. His lab work was significant for worsening macrocytic anemia, DAVID with mild hyponatremia, elevated protein gap, elevated lactate with improvement on repeat. His UA was positive for nitrite, leuk esterase but negative for bacteria. His respiratory swab was positive for influenza A. His imaging did not show acute abnormalities. He was given ofirmev 1g, NS 500cc x1, vanc 1g, cefepime 2g, and tamiflu 75mg PO x1. He was admitted to medicine.  (03 Jan 2024 06:06):  now pulm called:  he is from NH:  above history noted:  he seems to be responding to simple commands:  he say he has no underlying lung history  never smoked:  on room air:  adm with influenza:     Influenza:   Multiple Myeloma  HTN  Dementia      Influenza:   -low grade fever  -Influenza:  on tamilflu  -cxr is clear:  -he is not wheezing    1/4: no change in pulm status today  : remains on room air  1/5: doing ok : no sob:  no cough : no phlegm;   1/6: on Tamiflu  no sob:  on room air  1/7: seems to be doing ok : no sob:  no cough:   1/8: seems OK: no sob:  no cough : no phlegm  on room air  1/9: doing ok: no sob:   on cough :  1/10: seems OK: no sob:  on room air:  remains lewtahrgic  Multiple Myeloma  -per primary team : FDG PET scan  noted:  rib lesions present likely secondary to MM   HTN  -controlled  Dementia  -supportive care:    karyn clifton  This is a 78M with history of MM, HTN, and Dementia (AAO x 1-2 to self, to place, not to time at baseline per brother) who presents to the hospital from [facility] for hypotension and lethargy. Patient currently awake, alert, AAO x2 (to self, to hospital but not name, not to time) but very poor historian, denies any acute complaints when asked. Spoke with brother Mookie who said that the patient was sent to the hospital for lethargy and cough though he states that the patient has had a cough for several months. Brother denies any other known acute complaints for the patient. Called [facility] [phone] but there was no staff available who knew the patient. As per NH paperwork and ED note, patient was sent to the hospital for hypotention to 81/55 and lethargy. He was noted to have a low grade temp of 99.6 and saturation of 93% at his NH. He was given tylenol 650mg x1 prior to being sent to the hospital.   On arrival to the hospital his vitals were T 99.4 -> 100.7 rectal, P 92, BP 88/55 -> 117/70, RR 16, O2 sat 96% RA. His lab work was significant for worsening macrocytic anemia, DAVID with mild hyponatremia, elevated protein gap, elevated lactate with improvement on repeat. His UA was positive for nitrite, leuk esterase but negative for bacteria. His respiratory swab was positive for influenza A. His imaging did not show acute abnormalities. He was given ofirmev 1g, NS 500cc x1, vanc 1g, cefepime 2g, and tamiflu 75mg PO x1. He was admitted to medicine.  (03 Jan 2024 06:06):  now pulm called:  he is from NH:  above history noted:  he seems to be responding to simple commands:  he say he has no underlying lung history  never smoked:  on room air:  adm with influenza:     Influenza:   Multiple Myeloma  HTN  Dementia      Influenza:   -low grade fever  -Influenza:  on tamilflu  -cxr is clear:  -he is not wheezing    1/4: no change in pulm status today  : remains on room air  1/5: doing ok : no sob:  no cough : no phlegm;   1/6: on Tamiflu  no sob:  on room air  1/7: seems to be doing ok : no sob:  no cough:   1/8: seems OK: no sob:  no cough : no phlegm  on room air  1/9: doing ok: no sob:   on cough :  1/10: seems OK: no sob:  on room air:  remains lewtahrgic  Multiple Myeloma  -per primary team : FDG PET scan  noted:  rib lesions present likely secondary to MM   HTN  -controlled  Dementia  -supportive care:    karyn clifton

## 2024-01-10 NOTE — PROGRESS NOTE ADULT - SUBJECTIVE AND OBJECTIVE BOX
[facility] [place] [facility]   MD ANEL FOLEY MD RUORU WONG, PA    TEL:  FROM 9 AM to 5 PM ---OFFICE: [phone]  AVAILABLE ON TEAMS    FROM 5 PM - 9 AM PLEASE CALL ANSWERING SERVICE: [phone]    RENAL FOLLOW UP NOTE--Date of Service 01-10-24 @ 09:18  --------------------------------------------------------------------------------  HPI:      Pt seen and examined at bedside.     PAST HISTORY  --------------------------------------------------------------------------------  No significant changes to PMH, PSH, FHx, SHx, unless otherwise noted    ALLERGIES & MEDICATIONS  --------------------------------------------------------------------------------  Allergies    No Known Allergies    Intolerances      Standing Inpatient Medications  albuterol/ipratropium for Nebulization 3 milliLiter(s) Nebulizer every 12 hours  cyanocobalamin 1000 MICROGram(s) Oral daily  donepezil 10 milliGRAM(s) Oral at bedtime  folic acid 1 milliGRAM(s) Oral daily  heparin   Injectable 5000 Unit(s) SubCutaneous every 8 hours  memantine 10 milliGRAM(s) Oral two times a day  risperiDONE   Tablet 0.25 milliGRAM(s) Oral daily  risperiDONE   Tablet 0.5 milliGRAM(s) Oral at bedtime  sertraline 50 milliGRAM(s) Oral daily    PRN Inpatient Medications  acetaminophen     Tablet .. 650 milliGRAM(s) Oral every 6 hours PRN  aluminum hydroxide/magnesium hydroxide/simethicone Suspension 30 milliLiter(s) Oral every 4 hours PRN  melatonin 3 milliGRAM(s) Oral at bedtime PRN  ondansetron Injectable 4 milliGRAM(s) IV Push every 8 hours PRN  QUEtiapine 12.5 milliGRAM(s) Oral every 6 hours PRN      REVIEW OF SYSTEMS  --------------------------------------------------------------------------------  General: no fever  MSK: no edema     VITALS/PHYSICAL EXAM  --------------------------------------------------------------------------------  T(C): 36.7 (01-10-24 @ 05:15), Max: 36.9 (01-09-24 @ 17:16)  HR: 78 (01-10-24 @ 05:15) (66 - 90)  BP: 132/72 (01-10-24 @ 05:15) (115/62 - 132/72)  RR: 18 (01-10-24 @ 05:15) (16 - 18)  SpO2: 100% (01-10-24 @ 05:15) (94% - 100%)  Wt(kg): --        01-09-24 @ 07:01  -  01-10-24 @ 07:00  --------------------------------------------------------  IN: 490 mL / OUT: 0 mL / NET: 490 mL      Physical Exam:  	Gen: NAD  	HEENT: MMM  	Pulm: CTA B/L  	CV: S1S2  	Abd: Soft, +BS  	Ext: No LE edema B/L                      Neuro: Awake   	Skin: Warm and Dry   	Vascular access: NO HD catheter            no  galilea  LABS/STUDIES  --------------------------------------------------------------------------------              8.4    4.17  >-----------<  251      [01-09-24 @ 07:01]              24.9     134  |  105  |  19  ----------------------------<  81      [01-09-24 @ 07:01]  4.1   |  19  |  0.79        Ca     9.5     [01-09-24 @ 07:01]      Mg     2.00     [01-09-24 @ 07:01]      Phos  2.5     [01-09-24 @ 07:01]            Creatinine Trend:  SCr 0.79 [01-09 @ 07:01]  SCr 0.77 [01-05 @ 05:21]  SCr 0.88 [01-04 @ 16:00]  SCr 1.41 [01-02 @ 19:10]    Urinalysis - [01-09-24 @ 07:01]      Color  / Appearance  / SG  / pH       Gluc 81 / Ketone   / Bili  / Urobili        Blood  / Protein  / Leuk Est  / Nitrite       RBC  / WBC  / Hyaline  / Gran  / Sq Epi  / Non Sq Epi  / Bacteria            [facility] [place] [facility]   MD ANEL FOLEY MD RUORU WONG, PA    TEL:  FROM 9 AM to 5 PM ---OFFICE: [phone]  AVAILABLE ON TEAMS    FROM 5 PM - 9 AM PLEASE CALL ANSWERING SERVICE: [phone]    RENAL FOLLOW UP NOTE--Date of Service 01-10-24 @ 09:18  --------------------------------------------------------------------------------  HPI:      Pt seen and examined at bedside.     PAST HISTORY  --------------------------------------------------------------------------------  No significant changes to PMH, PSH, FHx, SHx, unless otherwise noted    ALLERGIES & MEDICATIONS  --------------------------------------------------------------------------------  Allergies    No Known Allergies    Intolerances      Standing Inpatient Medications  albuterol/ipratropium for Nebulization 3 milliLiter(s) Nebulizer every 12 hours  cyanocobalamin 1000 MICROGram(s) Oral daily  donepezil 10 milliGRAM(s) Oral at bedtime  folic acid 1 milliGRAM(s) Oral daily  heparin   Injectable 5000 Unit(s) SubCutaneous every 8 hours  memantine 10 milliGRAM(s) Oral two times a day  risperiDONE   Tablet 0.25 milliGRAM(s) Oral daily  risperiDONE   Tablet 0.5 milliGRAM(s) Oral at bedtime  sertraline 50 milliGRAM(s) Oral daily    PRN Inpatient Medications  acetaminophen     Tablet .. 650 milliGRAM(s) Oral every 6 hours PRN  aluminum hydroxide/magnesium hydroxide/simethicone Suspension 30 milliLiter(s) Oral every 4 hours PRN  melatonin 3 milliGRAM(s) Oral at bedtime PRN  ondansetron Injectable 4 milliGRAM(s) IV Push every 8 hours PRN  QUEtiapine 12.5 milliGRAM(s) Oral every 6 hours PRN      REVIEW OF SYSTEMS  --------------------------------------------------------------------------------  General: no fever  MSK: no edema     VITALS/PHYSICAL EXAM  --------------------------------------------------------------------------------  T(C): 36.7 (01-10-24 @ 05:15), Max: 36.9 (01-09-24 @ 17:16)  HR: 78 (01-10-24 @ 05:15) (66 - 90)  BP: 132/72 (01-10-24 @ 05:15) (115/62 - 132/72)  RR: 18 (01-10-24 @ 05:15) (16 - 18)  SpO2: 100% (01-10-24 @ 05:15) (94% - 100%)  Wt(kg): --        01-09-24 @ 07:01  -  01-10-24 @ 07:00  --------------------------------------------------------  IN: 490 mL / OUT: 0 mL / NET: 490 mL      Physical Exam:  	Gen: NAD  	HEENT: MMM  	Pulm: CTA B/L  	CV: S1S2  	Abd: Soft, +BS  	Ext: No LE edema B/L                      Neuro: Awake   	Skin: Warm and Dry   	Vascular access: NO HD catheter            no  galilea  LABS/STUDIES  --------------------------------------------------------------------------------              8.4    4.17  >-----------<  251      [01-09-24 @ 07:01]              24.9     134  |  105  |  19  ----------------------------<  81      [01-09-24 @ 07:01]  4.1   |  19  |  0.79        Ca     9.5     [01-09-24 @ 07:01]      Mg     2.00     [01-09-24 @ 07:01]      Phos  2.5     [01-09-24 @ 07:01]            Creatinine Trend:  SCr 0.79 [01-09 @ 07:01]  SCr 0.77 [01-05 @ 05:21]  SCr 0.88 [01-04 @ 16:00]  SCr 1.41 [01-02 @ 19:10]    Urinalysis - [01-09-24 @ 07:01]      Color  / Appearance  / SG  / pH       Gluc 81 / Ketone   / Bili  / Urobili        Blood  / Protein  / Leuk Est  / Nitrite       RBC  / WBC  / Hyaline  / Gran  / Sq Epi  / Non Sq Epi  / Bacteria

## 2024-01-10 NOTE — PROGRESS NOTE ADULT - SUBJECTIVE AND OBJECTIVE BOX
[facility], Division of Infectious Diseases  MIRTHA Bagley Y. Patel, S. Shah, G. Amos  [phone]  ([phone] - weekdays after 5pm and weekends)    Name: GUS DELUNA  Age/Gender: 78y Male  MRN: [id number]    Interval History:  Notes reviewed.   No concerning overnight events.  Afebrile.   eating breakfast w/ assistance this AM    Allergies: No Known Allergies      Objective:  Vitals:   T(F): 97.3 (01-10-24 @ 09:06), Max: 98.5 (01-09-24 @ 17:16)  HR: 78 (01-10-24 @ 11:05) (66 - 88)  BP: 119/73 (01-10-24 @ 09:06) (115/62 - 132/72)  RR: 18 (01-10-24 @ 09:06) (16 - 18)  SpO2: 98% (01-10-24 @ 11:05) (94% - 100%)  Physical Examination:  General: no acute distress  HEENT: anicteric  Cardio: normal rate  Resp: breathing comfortably on RA   Abd: non-distended  Ext: no LE edema    Laboratory Studies:  CBC:                       8.4    4.17  )-----------( 251      ( 09 Jan 2024 07:01 )             24.9     WBC Trend:  4.17 01-09-24 @ 07:01  3.08 01-05-24 @ 05:21  3.04 01-04-24 @ 16:00    CMP: 01-09    134<L>  |  105  |  19  ----------------------------<  81  4.1   |  19<L>  |  0.79    Ca    9.5      09 Jan 2024 07:01  Phos  2.5     01-09  Mg     2.00     01-09            Urinalysis Basic - ( 09 Jan 2024 07:01 )    Color: x / Appearance: x / SG: x / pH: x  Gluc: 81 mg/dL / Ketone: x  / Bili: x / Urobili: x   Blood: x / Protein: x / Nitrite: x   Leuk Esterase: x / RBC: x / WBC x   Sq Epi: x / Non Sq Epi: x / Bacteria: x      Microbiology: reviewed     Culture - Urine (collected 01-03-24 @ 00:20)  Source: Clean Catch Clean Catch (Midstream)  Final Report (01-04-24 @ 10:55):    No growth    Culture - Urine (collected 01-02-24 @ 22:39)  Source: Catheterized Catheterized  Final Report (01-03-24 @ 22:41):    No growth    Culture - Blood (collected 01-02-24 @ 19:49)  Source: .Blood Blood-Peripheral  Final Report (01-07-24 @ 22:00):    No growth at 5 days    Culture - Blood (collected 01-02-24 @ 19:49)  Source: .Blood Blood-Peripheral  Final Report (01-07-24 @ 22:00):    No growth at 5 days        Radiology: reviewed     Medications:  acetaminophen     Tablet .. 650 milliGRAM(s) Oral every 6 hours PRN  albuterol/ipratropium for Nebulization 3 milliLiter(s) Nebulizer every 12 hours  aluminum hydroxide/magnesium hydroxide/simethicone Suspension 30 milliLiter(s) Oral every 4 hours PRN  cyanocobalamin 1000 MICROGram(s) Oral daily  donepezil 10 milliGRAM(s) Oral at bedtime  folic acid 1 milliGRAM(s) Oral daily  heparin   Injectable 5000 Unit(s) SubCutaneous every 8 hours  melatonin 3 milliGRAM(s) Oral at bedtime PRN  memantine 10 milliGRAM(s) Oral two times a day  ondansetron Injectable 4 milliGRAM(s) IV Push every 8 hours PRN  QUEtiapine 12.5 milliGRAM(s) Oral every 6 hours PRN  risperiDONE   Tablet 0.25 milliGRAM(s) Oral daily  risperiDONE   Tablet 0.5 milliGRAM(s) Oral at bedtime  sertraline 50 milliGRAM(s) Oral daily    Antimicrobials:   [facility], Division of Infectious Diseases  MIRTHA Bagley Y. Patel, S. Shah, G. Amos  [phone]  ([phone] - weekdays after 5pm and weekends)    Name: GUS DELUNA  Age/Gender: 78y Male  MRN: [id number]    Interval History:  Notes reviewed.   No concerning overnight events.  Afebrile.   eating breakfast w/ assistance this AM    Allergies: No Known Allergies      Objective:  Vitals:   T(F): 97.3 (01-10-24 @ 09:06), Max: 98.5 (01-09-24 @ 17:16)  HR: 78 (01-10-24 @ 11:05) (66 - 88)  BP: 119/73 (01-10-24 @ 09:06) (115/62 - 132/72)  RR: 18 (01-10-24 @ 09:06) (16 - 18)  SpO2: 98% (01-10-24 @ 11:05) (94% - 100%)  Physical Examination:  General: no acute distress  HEENT: anicteric  Cardio: normal rate  Resp: breathing comfortably on RA   Abd: non-distended  Ext: no LE edema    Laboratory Studies:  CBC:                       8.4    4.17  )-----------( 251      ( 09 Jan 2024 07:01 )             24.9     WBC Trend:  4.17 01-09-24 @ 07:01  3.08 01-05-24 @ 05:21  3.04 01-04-24 @ 16:00    CMP: 01-09    134<L>  |  105  |  19  ----------------------------<  81  4.1   |  19<L>  |  0.79    Ca    9.5      09 Jan 2024 07:01  Phos  2.5     01-09  Mg     2.00     01-09            Urinalysis Basic - ( 09 Jan 2024 07:01 )    Color: x / Appearance: x / SG: x / pH: x  Gluc: 81 mg/dL / Ketone: x  / Bili: x / Urobili: x   Blood: x / Protein: x / Nitrite: x   Leuk Esterase: x / RBC: x / WBC x   Sq Epi: x / Non Sq Epi: x / Bacteria: x      Microbiology: reviewed     Culture - Urine (collected 01-03-24 @ 00:20)  Source: Clean Catch Clean Catch (Midstream)  Final Report (01-04-24 @ 10:55):    No growth    Culture - Urine (collected 01-02-24 @ 22:39)  Source: Catheterized Catheterized  Final Report (01-03-24 @ 22:41):    No growth    Culture - Blood (collected 01-02-24 @ 19:49)  Source: .Blood Blood-Peripheral  Final Report (01-07-24 @ 22:00):    No growth at 5 days    Culture - Blood (collected 01-02-24 @ 19:49)  Source: .Blood Blood-Peripheral  Final Report (01-07-24 @ 22:00):    No growth at 5 days        Radiology: reviewed     Medications:  acetaminophen     Tablet .. 650 milliGRAM(s) Oral every 6 hours PRN  albuterol/ipratropium for Nebulization 3 milliLiter(s) Nebulizer every 12 hours  aluminum hydroxide/magnesium hydroxide/simethicone Suspension 30 milliLiter(s) Oral every 4 hours PRN  cyanocobalamin 1000 MICROGram(s) Oral daily  donepezil 10 milliGRAM(s) Oral at bedtime  folic acid 1 milliGRAM(s) Oral daily  heparin   Injectable 5000 Unit(s) SubCutaneous every 8 hours  melatonin 3 milliGRAM(s) Oral at bedtime PRN  memantine 10 milliGRAM(s) Oral two times a day  ondansetron Injectable 4 milliGRAM(s) IV Push every 8 hours PRN  QUEtiapine 12.5 milliGRAM(s) Oral every 6 hours PRN  risperiDONE   Tablet 0.25 milliGRAM(s) Oral daily  risperiDONE   Tablet 0.5 milliGRAM(s) Oral at bedtime  sertraline 50 milliGRAM(s) Oral daily    Antimicrobials:

## 2024-01-11 RX ADMIN — Medication 1 MILLIGRAM(S): at 12:25

## 2024-01-11 RX ADMIN — PREGABALIN 1000 MICROGRAM(S): 225 CAPSULE ORAL at 12:26

## 2024-01-11 RX ADMIN — SERTRALINE 50 MILLIGRAM(S): 25 TABLET, FILM COATED ORAL at 12:26

## 2024-01-11 RX ADMIN — Medication 3 MILLILITER(S): at 20:47

## 2024-01-11 RX ADMIN — RISPERIDONE 0.5 MILLIGRAM(S): 4 TABLET ORAL at 21:31

## 2024-01-11 RX ADMIN — HEPARIN SODIUM 5000 UNIT(S): 5000 INJECTION INTRAVENOUS; SUBCUTANEOUS at 05:48

## 2024-01-11 RX ADMIN — DONEPEZIL HYDROCHLORIDE 10 MILLIGRAM(S): 10 TABLET, FILM COATED ORAL at 21:32

## 2024-01-11 RX ADMIN — MEMANTINE HYDROCHLORIDE 10 MILLIGRAM(S): 10 TABLET ORAL at 05:48

## 2024-01-11 RX ADMIN — Medication 3 MILLILITER(S): at 09:49

## 2024-01-11 RX ADMIN — RISPERIDONE 0.25 MILLIGRAM(S): 4 TABLET ORAL at 12:25

## 2024-01-11 RX ADMIN — HEPARIN SODIUM 5000 UNIT(S): 5000 INJECTION INTRAVENOUS; SUBCUTANEOUS at 13:46

## 2024-01-11 RX ADMIN — MEMANTINE HYDROCHLORIDE 10 MILLIGRAM(S): 10 TABLET ORAL at 18:30

## 2024-01-11 RX ADMIN — HEPARIN SODIUM 5000 UNIT(S): 5000 INJECTION INTRAVENOUS; SUBCUTANEOUS at 21:35

## 2024-01-11 NOTE — PROGRESS NOTE ADULT - SUBJECTIVE AND OBJECTIVE BOX
Neurology Progress Note    S: Patient seen and examined. No new events overnight.    MEDICATIONS:    acetaminophen     Tablet .. 650 milliGRAM(s) Oral every 6 hours PRN  albuterol/ipratropium for Nebulization 3 milliLiter(s) Nebulizer every 12 hours  aluminum hydroxide/magnesium hydroxide/simethicone Suspension 30 milliLiter(s) Oral every 4 hours PRN  cyanocobalamin 1000 MICROGram(s) Oral daily  donepezil 10 milliGRAM(s) Oral at bedtime  folic acid 1 milliGRAM(s) Oral daily  heparin   Injectable 5000 Unit(s) SubCutaneous every 8 hours  melatonin 3 milliGRAM(s) Oral at bedtime PRN  memantine 10 milliGRAM(s) Oral two times a day  ondansetron Injectable 4 milliGRAM(s) IV Push every 8 hours PRN  QUEtiapine 12.5 milliGRAM(s) Oral every 6 hours PRN  risperiDONE   Tablet 0.25 milliGRAM(s) Oral daily  risperiDONE   Tablet 0.5 milliGRAM(s) Oral at bedtime  sertraline 50 milliGRAM(s) Oral daily      LABS:            CAPILLARY BLOOD GLUCOSE            I&O's Summary    10 Josef 2024 07:01  -  11 Jan 2024 07:00  --------------------------------------------------------  IN: 500 mL / OUT: 0 mL / NET: 500 mL      Vital Signs Last 24 Hrs  T(C): 36.4 (11 Jan 2024 06:00), Max: 36.7 (10 Josef 2024 18:00)  T(F): 97.6 (11 Jan 2024 06:00), Max: 98 (10 Josef 2024 18:00)  HR: 79 (11 Jan 2024 09:49) (63 - 88)  BP: 124/68 (11 Jan 2024 06:00) (108/64 - 124/73)  BP(mean): --  RR: 17 (11 Jan 2024 06:00) (16 - 18)  SpO2: 97% (11 Jan 2024 09:49) (96% - 100%)    Parameters below as of 11 Jan 2024 09:49  Patient On (Oxygen Delivery Method): room air          General Exam:   General Appearance: Appropriately dressed and in no acute distress       Head: Normocephalic, atraumatic and no dysmorphic features  Ear, Nose, and Throat: Moist mucous membranes  CVS: S1S2+  Resp: No SOB, no wheeze or rhonchi  Abd: soft NTND  Extremities: No edema, no cyanosis  Skin: No bruises, no rashes     Neurological Exam:  Mental Status: Awake, alert and oriented x 1.  Able to follow simple verbal commands, with perseveration. Can name some objects, refuses to participate further. No dysarthria  Cranial Nerves: PERRL, EOMI, VFFC, sensation V1-V3 intact,  no obvious facial asymmetry, equal elevation of palate, scm/trap 5/5, tongue is midline on protrusion. . hearing is grossly intact.   Motor:  CHO at least 4/5   Sensation: Intact to light touch and pinprick throughout  Reflexes: 1+ throughout at biceps, brachioradialis, triceps, patellars and ankles bilaterally and equal. No clonus. R toe and L toe were both downgoing.  Coordination: unable   Gait: deferred       I personally reviewed the below data/images/labs:      CBC Full  -  ( 05 Jan 2024 05:21 )  WBC Count : 3.08 K/uL  RBC Count : 2.25 M/uL  Hemoglobin : 7.7 g/dL  Hematocrit : 23.1 %  Platelet Count - Automated : 194 K/uL  Mean Cell Volume : 102.7 fL  Mean Cell Hemoglobin : 34.2 pg  Mean Cell Hemoglobin Concentration : 33.3 gm/dL  Auto Neutrophil # : x  Auto Lymphocyte # : x  Auto Monocyte # : x  Auto Eosinophil # : x  Auto Basophil # : x  Auto Neutrophil % : x  Auto Lymphocyte % : x  Auto Monocyte % : x  Auto Eosinophil % : x  Auto Basophil % : x    01-05    130<L>  |  100  |  16  ----------------------------<  90  3.8   |  23  |  0.77    Ca    9.4      05 Jan 2024 05:21  Phos  2.3     01-05  Mg     1.70     01-05          Urinalysis Basic - ( 05 Jan 2024 05:21 )    Color: x / Appearance: x / SG: x / pH: x  Gluc: 90 mg/dL / Ketone: x  / Bili: x / Urobili: x   Blood: x / Protein: x / Nitrite: x   Leuk Esterase: x / RBC: x / WBC x   Sq Epi: x / Non Sq Epi: x / Bacteria: x        < from: CT Head No Cont (01.03.24 @ 02:00) >    ACC: [id number] EXAM:  CT BRAIN   ORDERED BY: MALOU CRISOSTOMO     PROCEDURE DATE:  01/03/2024          INTERPRETATION:  EXAMINATION: CT HEAD    DATE: 1/3/2024 2:00 AM    INDICATION: lethargy, altered mental status. History of falls.    COMPARISON: CT head from 7/7/2023.    TECHNIQUE: Thin section noncontrast axial images were obtained through   the head.  RAPID AI was utilized for preliminary assessment of   intracranial hemorrhage.    FINDINGS:  Intracranial Contents:    Moderate to severe cerebral volume loss.. Mild to moderate chronic   microvascular ischemic changes Gray-white differentiation is preserved.   No hydrocephalus. The basilar cisterns are clear. No focal edema or acute   mass effect. There is no intracranial fluid collectionor acute   hemorrhage.    Bones and Extracranial Soft Tissues:    There is no fracture identified. Scattered paranasal sinus mucosal   thickening. Mastoid air cells are clear. Scalp and imaged facial soft   tissues are within normal limits.      IMPRESSION:  1. No acute intracranial CT abnormality.    < end of copied text >         Neurology Progress Note    S: Patient seen and examined. No new events overnight.    MEDICATIONS:    acetaminophen     Tablet .. 650 milliGRAM(s) Oral every 6 hours PRN  albuterol/ipratropium for Nebulization 3 milliLiter(s) Nebulizer every 12 hours  aluminum hydroxide/magnesium hydroxide/simethicone Suspension 30 milliLiter(s) Oral every 4 hours PRN  cyanocobalamin 1000 MICROGram(s) Oral daily  donepezil 10 milliGRAM(s) Oral at bedtime  folic acid 1 milliGRAM(s) Oral daily  heparin   Injectable 5000 Unit(s) SubCutaneous every 8 hours  melatonin 3 milliGRAM(s) Oral at bedtime PRN  memantine 10 milliGRAM(s) Oral two times a day  ondansetron Injectable 4 milliGRAM(s) IV Push every 8 hours PRN  QUEtiapine 12.5 milliGRAM(s) Oral every 6 hours PRN  risperiDONE   Tablet 0.25 milliGRAM(s) Oral daily  risperiDONE   Tablet 0.5 milliGRAM(s) Oral at bedtime  sertraline 50 milliGRAM(s) Oral daily      LABS:            CAPILLARY BLOOD GLUCOSE            I&O's Summary    10 Josef 2024 07:01  -  11 Jan 2024 07:00  --------------------------------------------------------  IN: 500 mL / OUT: 0 mL / NET: 500 mL      Vital Signs Last 24 Hrs  T(C): 36.4 (11 Jan 2024 06:00), Max: 36.7 (10 Josef 2024 18:00)  T(F): 97.6 (11 Jan 2024 06:00), Max: 98 (10 Josef 2024 18:00)  HR: 79 (11 Jan 2024 09:49) (63 - 88)  BP: 124/68 (11 Jan 2024 06:00) (108/64 - 124/73)  BP(mean): --  RR: 17 (11 Jan 2024 06:00) (16 - 18)  SpO2: 97% (11 Jan 2024 09:49) (96% - 100%)    Parameters below as of 11 Jan 2024 09:49  Patient On (Oxygen Delivery Method): room air          General Exam:   General Appearance: Appropriately dressed and in no acute distress       Head: Normocephalic, atraumatic and no dysmorphic features  Ear, Nose, and Throat: Moist mucous membranes  CVS: S1S2+  Resp: No SOB, no wheeze or rhonchi  Abd: soft NTND  Extremities: No edema, no cyanosis  Skin: No bruises, no rashes     Neurological Exam:  Mental Status: Awake, alert and oriented x 1.  Able to follow simple verbal commands, with perseveration. Can name some objects, refuses to participate further. No dysarthria  Cranial Nerves: PERRL, EOMI, VFFC, sensation V1-V3 intact,  no obvious facial asymmetry, equal elevation of palate, scm/trap 5/5, tongue is midline on protrusion. . hearing is grossly intact.   Motor:  CHO at least 4/5   Sensation: Intact to light touch and pinprick throughout  Reflexes: 1+ throughout at biceps, brachioradialis, triceps, patellars and ankles bilaterally and equal. No clonus. R toe and L toe were both downgoing.  Coordination: unable   Gait: deferred       I personally reviewed the below data/images/labs:      CBC Full  -  ( 05 Jan 2024 05:21 )  WBC Count : 3.08 K/uL  RBC Count : 2.25 M/uL  Hemoglobin : 7.7 g/dL  Hematocrit : 23.1 %  Platelet Count - Automated : 194 K/uL  Mean Cell Volume : 102.7 fL  Mean Cell Hemoglobin : 34.2 pg  Mean Cell Hemoglobin Concentration : 33.3 gm/dL  Auto Neutrophil # : x  Auto Lymphocyte # : x  Auto Monocyte # : x  Auto Eosinophil # : x  Auto Basophil # : x  Auto Neutrophil % : x  Auto Lymphocyte % : x  Auto Monocyte % : x  Auto Eosinophil % : x  Auto Basophil % : x    01-05    130<L>  |  100  |  16  ----------------------------<  90  3.8   |  23  |  0.77    Ca    9.4      05 Jan 2024 05:21  Phos  2.3     01-05  Mg     1.70     01-05          Urinalysis Basic - ( 05 Jan 2024 05:21 )    Color: x / Appearance: x / SG: x / pH: x  Gluc: 90 mg/dL / Ketone: x  / Bili: x / Urobili: x   Blood: x / Protein: x / Nitrite: x   Leuk Esterase: x / RBC: x / WBC x   Sq Epi: x / Non Sq Epi: x / Bacteria: x        < from: CT Head No Cont (01.03.24 @ 02:00) >    ACC: [id number] EXAM:  CT BRAIN   ORDERED BY: MALOU CRISOSTOMO     PROCEDURE DATE:  01/03/2024          INTERPRETATION:  EXAMINATION: CT HEAD    DATE: 1/3/2024 2:00 AM    INDICATION: lethargy, altered mental status. History of falls.    COMPARISON: CT head from 7/7/2023.    TECHNIQUE: Thin section noncontrast axial images were obtained through   the head.  RAPID AI was utilized for preliminary assessment of   intracranial hemorrhage.    FINDINGS:  Intracranial Contents:    Moderate to severe cerebral volume loss.. Mild to moderate chronic   microvascular ischemic changes Gray-white differentiation is preserved.   No hydrocephalus. The basilar cisterns are clear. No focal edema or acute   mass effect. There is no intracranial fluid collectionor acute   hemorrhage.    Bones and Extracranial Soft Tissues:    There is no fracture identified. Scattered paranasal sinus mucosal   thickening. Mastoid air cells are clear. Scalp and imaged facial soft   tissues are within normal limits.      IMPRESSION:  1. No acute intracranial CT abnormality.    < end of copied text >

## 2024-01-11 NOTE — PROGRESS NOTE ADULT - SUBJECTIVE AND OBJECTIVE BOX
DATE OF SERVICE: 01-11-24    No overnight events, resting comfortably  Review of symptoms otherwise negative.    Review of Systems:   Constitutional: [ ] fevers, [ ] chills.   Skin: [ ] dry skin. [ ] rashes.  Psychiatric: [ ] depression, [ ] anxiety.   Gastrointestinal: [ ] BRBPR, [ ] melena.   Neurological: [ ] confusion. [ ] seizures. [ ] shuffling gait.   Ears,Nose,Mouth and Throat: [ ] ear pain [ ] sore throat.   Eyes: [ ] diplopia.   Respiratory: [ ] hemoptysis. [ ] shortness of breath  Cardiovascular: See HPI above  Hematologic/Lymphatic: [ ] anemia. [ ] painful nodes. [ ] prolonged bleeding.   Genitourinary: [ ] hematuria. [ ] flank pain.   Endocrine: [ ] significant change in weight. [ ] intolerance to heat and cold.     Review of systems [x ] otherwise negative, [ ] otherwise unable to obtain    FH: no family history of sudden cardiac death in first degree relatives    SH: [ ] tobacco, [ ] alcohol, [ ] drugs    acetaminophen     Tablet .. 650 milliGRAM(s) Oral every 6 hours PRN  albuterol/ipratropium for Nebulization 3 milliLiter(s) Nebulizer every 12 hours  aluminum hydroxide/magnesium hydroxide/simethicone Suspension 30 milliLiter(s) Oral every 4 hours PRN  cyanocobalamin 1000 MICROGram(s) Oral daily  donepezil 10 milliGRAM(s) Oral at bedtime  folic acid 1 milliGRAM(s) Oral daily  heparin   Injectable 5000 Unit(s) SubCutaneous every 8 hours  melatonin 3 milliGRAM(s) Oral at bedtime PRN  memantine 10 milliGRAM(s) Oral two times a day  ondansetron Injectable 4 milliGRAM(s) IV Push every 8 hours PRN  QUEtiapine 12.5 milliGRAM(s) Oral every 6 hours PRN  risperiDONE   Tablet 0.25 milliGRAM(s) Oral daily  risperiDONE   Tablet 0.5 milliGRAM(s) Oral at bedtime  sertraline 50 milliGRAM(s) Oral daily        T(C): 36.4 (01-11-24 @ 06:00), Max: 36.7 (01-10-24 @ 18:00)  HR: 79 (01-11-24 @ 09:49) (63 - 88)  BP: 124/68 (01-11-24 @ 06:00) (108/64 - 124/73)  RR: 17 (01-11-24 @ 06:00) (16 - 18)  SpO2: 97% (01-11-24 @ 09:49) (96% - 100%)  Wt(kg): --    I&O's Summary    10 Josef 2024 07:01  -  11 Jan 2024 07:00  --------------------------------------------------------  IN: 500 mL / OUT: 0 mL / NET: 500 mL      General:  Alert and Oriented  Head: Normocephalic and atraumatic.   Neck: No JVD. No bruits. Supple. Does not appear to be enlarged.   Cardiovascular: + S1,S2 ; RRR Soft systolic murmur at the left lower sternal border. No rubs noted.    Lungs: CTA b/l. No rhonchi, rales or wheezes.   Abdomen: + BS, soft. Non tender. Non distended. No rebound. No guarding.   Extremities: No clubbing/cyanosis/edema.   Neurologic: Moves all four extremities. Full range of motion.   Skin: Warm and moist. The patient's skin has normal elasticity and good skin turgor.   Psychiatric: Appropriate mood and affect.  Musculoskeletal: Normal range of motion, normal strength       ECG:  	Sinus tachycardia    < from: Xray Chest 1 View AP/PA (01.02.24 @ 19:36) >  IMPRESSION:  No focal consolidations.  < end of copied text >    < from: Transthoracic Echocardiogram (07.10.23 @ 11:15) >  CONCLUSIONS:  1. Calcified trileaflet aortic valve with normal opening.  Minimal aortic regurgitation.  2. Endocardium not well visualized; grossly decreased  possibly mild left ventricular systolic dysfunction.  3. The right ventricle is not well visualized; grossly  normal right ventricular systolic function.  ------------------------------------------------------------------------  Confirmed on  7/10/2023 - 13:57:46 by Jolene Rowan M.D. RPVI  < end of copied text >      ASSESSMENT/PLAN: 	78M with history of MM, HTN, and Dementia (AAO x 1-2 to self, to place, not to time at baseline per brother) who presents to the hospital from [facility] for hypotension and lethargy found to have FLU A    -#Hypotension  -- BP stable  -- Norvasc and Lopressor on hold given acute illness--ok to resume at DC    #lethargy  --due to Flu, now resolved  --s/p tamiflu and Abx per medicine  DC planning     DATE OF SERVICE: 01-11-24    No overnight events, resting comfortably  Review of symptoms otherwise negative.    Review of Systems:   Constitutional: [ ] fevers, [ ] chills.   Skin: [ ] dry skin. [ ] rashes.  Psychiatric: [ ] depression, [ ] anxiety.   Gastrointestinal: [ ] BRBPR, [ ] melena.   Neurological: [ ] confusion. [ ] seizures. [ ] shuffling gait.   Ears,Nose,Mouth and Throat: [ ] ear pain [ ] sore throat.   Eyes: [ ] diplopia.   Respiratory: [ ] hemoptysis. [ ] shortness of breath  Cardiovascular: See HPI above  Hematologic/Lymphatic: [ ] anemia. [ ] painful nodes. [ ] prolonged bleeding.   Genitourinary: [ ] hematuria. [ ] flank pain.   Endocrine: [ ] significant change in weight. [ ] intolerance to heat and cold.     Review of systems [x ] otherwise negative, [ ] otherwise unable to obtain    FH: no family history of sudden cardiac death in first degree relatives    SH: [ ] tobacco, [ ] alcohol, [ ] drugs    acetaminophen     Tablet .. 650 milliGRAM(s) Oral every 6 hours PRN  albuterol/ipratropium for Nebulization 3 milliLiter(s) Nebulizer every 12 hours  aluminum hydroxide/magnesium hydroxide/simethicone Suspension 30 milliLiter(s) Oral every 4 hours PRN  cyanocobalamin 1000 MICROGram(s) Oral daily  donepezil 10 milliGRAM(s) Oral at bedtime  folic acid 1 milliGRAM(s) Oral daily  heparin   Injectable 5000 Unit(s) SubCutaneous every 8 hours  melatonin 3 milliGRAM(s) Oral at bedtime PRN  memantine 10 milliGRAM(s) Oral two times a day  ondansetron Injectable 4 milliGRAM(s) IV Push every 8 hours PRN  QUEtiapine 12.5 milliGRAM(s) Oral every 6 hours PRN  risperiDONE   Tablet 0.25 milliGRAM(s) Oral daily  risperiDONE   Tablet 0.5 milliGRAM(s) Oral at bedtime  sertraline 50 milliGRAM(s) Oral daily        T(C): 36.4 (01-11-24 @ 06:00), Max: 36.7 (01-10-24 @ 18:00)  HR: 79 (01-11-24 @ 09:49) (63 - 88)  BP: 124/68 (01-11-24 @ 06:00) (108/64 - 124/73)  RR: 17 (01-11-24 @ 06:00) (16 - 18)  SpO2: 97% (01-11-24 @ 09:49) (96% - 100%)  Wt(kg): --    I&O's Summary    10 Josef 2024 07:01  -  11 Jan 2024 07:00  --------------------------------------------------------  IN: 500 mL / OUT: 0 mL / NET: 500 mL      General:  Alert and Oriented  Head: Normocephalic and atraumatic.   Neck: No JVD. No bruits. Supple. Does not appear to be enlarged.   Cardiovascular: + S1,S2 ; RRR Soft systolic murmur at the left lower sternal border. No rubs noted.    Lungs: CTA b/l. No rhonchi, rales or wheezes.   Abdomen: + BS, soft. Non tender. Non distended. No rebound. No guarding.   Extremities: No clubbing/cyanosis/edema.   Neurologic: Moves all four extremities. Full range of motion.   Skin: Warm and moist. The patient's skin has normal elasticity and good skin turgor.   Psychiatric: Appropriate mood and affect.  Musculoskeletal: Normal range of motion, normal strength       ECG:  	Sinus tachycardia    < from: Xray Chest 1 View AP/PA (01.02.24 @ 19:36) >  IMPRESSION:  No focal consolidations.  < end of copied text >    < from: Transthoracic Echocardiogram (07.10.23 @ 11:15) >  CONCLUSIONS:  1. Calcified trileaflet aortic valve with normal opening.  Minimal aortic regurgitation.  2. Endocardium not well visualized; grossly decreased  possibly mild left ventricular systolic dysfunction.  3. The right ventricle is not well visualized; grossly  normal right ventricular systolic function.  ------------------------------------------------------------------------  Confirmed on  7/10/2023 - 13:57:46 by Jolene Rowan M.D. RPVI  < end of copied text >      ASSESSMENT/PLAN: 	78M with history of MM, HTN, and Dementia (AAO x 1-2 to self, to place, not to time at baseline per brother) who presents to the hospital from [facility] for hypotension and lethargy found to have FLU A    -#Hypotension  -- BP stable  -- Norvasc and Lopressor on hold given acute illness--ok to resume at DC    #lethargy  --due to Flu, now resolved  --s/p tamiflu and Abx per medicine  DC planning

## 2024-01-11 NOTE — PROGRESS NOTE ADULT - SUBJECTIVE AND OBJECTIVE BOX
Patient is a 78y old  Male who presents with a chief complaint of Hypotension, lethargu (11 Jan 2024 13:50)    Date of servie : 01-11-24 @ 15:48  INTERVAL HPI/OVERNIGHT EVENTS:  T(C): 37.4 (01-11-24 @ 14:00), Max: 37.4 (01-11-24 @ 14:00)  HR: 76 (01-11-24 @ 14:00) (63 - 88)  BP: 109/64 (01-11-24 @ 14:00) (109/64 - 124/73)  RR: 17 (01-11-24 @ 14:00) (16 - 17)  SpO2: 99% (01-11-24 @ 14:00) (96% - 100%)  Wt(kg): --  I&O's Summary    10 Josef 2024 07:01  -  11 Jan 2024 07:00  --------------------------------------------------------  IN: 500 mL / OUT: 0 mL / NET: 500 mL    11 Jan 2024 07:01  -  11 Jan 2024 15:48  --------------------------------------------------------  IN: 360 mL / OUT: 0 mL / NET: 360 mL        LABS:              CAPILLARY BLOOD GLUCOSE                MEDICATIONS  (STANDING):  albuterol/ipratropium for Nebulization 3 milliLiter(s) Nebulizer every 12 hours  cyanocobalamin 1000 MICROGram(s) Oral daily  donepezil 10 milliGRAM(s) Oral at bedtime  folic acid 1 milliGRAM(s) Oral daily  heparin   Injectable 5000 Unit(s) SubCutaneous every 8 hours  memantine 10 milliGRAM(s) Oral two times a day  risperiDONE   Tablet 0.25 milliGRAM(s) Oral daily  risperiDONE   Tablet 0.5 milliGRAM(s) Oral at bedtime  sertraline 50 milliGRAM(s) Oral daily    MEDICATIONS  (PRN):  acetaminophen     Tablet .. 650 milliGRAM(s) Oral every 6 hours PRN Temp greater or equal to 38C (100.4F), Mild Pain (1 - 3)  aluminum hydroxide/magnesium hydroxide/simethicone Suspension 30 milliLiter(s) Oral every 4 hours PRN Dyspepsia  melatonin 3 milliGRAM(s) Oral at bedtime PRN Insomnia  ondansetron Injectable 4 milliGRAM(s) IV Push every 8 hours PRN Nausea and/or Vomiting  QUEtiapine 12.5 milliGRAM(s) Oral every 6 hours PRN for agitation          PHYSICAL EXAM:  GENERAL: NAD, well-groomed, well-developed  HEAD:  Atraumatic, Normocephalic  CHEST/LUNG: Clear to percussion bilaterally; No rales, rhonchi, wheezing, or rubs  HEART: Regular rate and rhythm; No murmurs, rubs, or gallops  ABDOMEN: Soft, Nontender, Nondistended; Bowel sounds present  EXTREMITIES:  2+ Peripheral Pulses, No clubbing, cyanosis, or edema  LYMPH: No lymphadenopathy noted  SKIN: No rashes or lesions    Care Discussed with Consultants/Other Providers [ ] YES  [ ] NO

## 2024-01-11 NOTE — PROGRESS NOTE ADULT - ASSESSMENT
78M w/ MM, HTN, and Dementia (AAO x 1-2 to self, to place, not to time at baseline per brother) admitted from nursing home for hypotension and lethargy. Found to be septic with UTI and + influenza.  CTH with mod to severe volume loss and moderate microvascular ischemic changes, no acute pathology  On CTX for UTI and Tamiflu  Labs notable for lactic acidosis, hyponatremia, DAVID  o/e is very confused, hard of hearing, perseverative    Encephalopathy from TME, superimposed on baseline dementia    Plan:  -supportive care   -correct metabolic derangements  - MRI not needed  -cont home donepezil 10mg qhs, memantine 10mg bid  -cont risperidone, seroquel   -pt/ot  -dvt ppx

## 2024-01-11 NOTE — PROGRESS NOTE ADULT - SUBJECTIVE AND OBJECTIVE BOX
[facility]   MD ANEL FOLEY MD ANGELA WONG, PA    TEL:  OFFICE: [phone]  From 5pm-7am Answering Service [phone]    -- RENAL FOLLOW UP NOTE ---Date of Service 01-11-24 @ 11:06    Patient is a 78y old  Male who presents with a chief complaint of Hypotension, lethargu (11 Jan 2024 10:50)      Patient seen and examined at bedside. No chest pain/sob    VITALS:  T(F): 97.6 (01-11-24 @ 06:00), Max: 98 (01-10-24 @ 18:00)  HR: 79 (01-11-24 @ 09:49)  BP: 124/68 (01-11-24 @ 06:00)  RR: 17 (01-11-24 @ 06:00)  SpO2: 97% (01-11-24 @ 09:49)  Wt(kg): --    01-10 @ 07:01  -  01-11 @ 07:00  --------------------------------------------------------  IN: 500 mL / OUT: 0 mL / NET: 500 mL          PHYSICAL EXAM:  General: NAD  Neck: No JVD  Respiratory: CTAB, no wheezes, rales or rhonchi  Cardiovascular: S1, S2, RRR  Gastrointestinal: BS+, soft, NT/ND  Extremities: No peripheral edema    Hospital Medications:   MEDICATIONS  (STANDING):  albuterol/ipratropium for Nebulization 3 milliLiter(s) Nebulizer every 12 hours  cyanocobalamin 1000 MICROGram(s) Oral daily  donepezil 10 milliGRAM(s) Oral at bedtime  folic acid 1 milliGRAM(s) Oral daily  heparin   Injectable 5000 Unit(s) SubCutaneous every 8 hours  memantine 10 milliGRAM(s) Oral two times a day  risperiDONE   Tablet 0.25 milliGRAM(s) Oral daily  risperiDONE   Tablet 0.5 milliGRAM(s) Oral at bedtime  sertraline 50 milliGRAM(s) Oral daily      LABS:        Creatinine Trend: 0.79 <--, 0.77 <--, 0.88 <--            Urine Studies:  Urinalysis - [01-09-24 @ 07:01]      Color  / Appearance  / SG  / pH       Gluc 81 / Ketone   / Bili  / Urobili        Blood  / Protein  / Leuk Est  / Nitrite       RBC  / WBC  / Hyaline  / Gran  / Sq Epi  / Non Sq Epi  / Bacteria             RADIOLOGY & ADDITIONAL STUDIES:   [facility]   MD ANEL FOLEY MD ANGELA WONG, PA    TEL:  OFFICE: [phone]  From 5pm-7am Answering Service [phone]    -- RENAL FOLLOW UP NOTE ---Date of Service 01-11-24 @ 11:06    Patient is a 78y old  Male who presents with a chief complaint of Hypotension, lethargu (11 Jan 2024 10:50)      Patient seen and examined at bedside. No chest pain/sob    VITALS:  T(F): 97.6 (01-11-24 @ 06:00), Max: 98 (01-10-24 @ 18:00)  HR: 79 (01-11-24 @ 09:49)  BP: 124/68 (01-11-24 @ 06:00)  RR: 17 (01-11-24 @ 06:00)  SpO2: 97% (01-11-24 @ 09:49)  Wt(kg): --    01-10 @ 07:01  -  01-11 @ 07:00  --------------------------------------------------------  IN: 500 mL / OUT: 0 mL / NET: 500 mL          PHYSICAL EXAM:  General: NAD  Neck: No JVD  Respiratory: CTAB, no wheezes, rales or rhonchi  Cardiovascular: S1, S2, RRR  Gastrointestinal: BS+, soft, NT/ND  Extremities: No peripheral edema    Hospital Medications:   MEDICATIONS  (STANDING):  albuterol/ipratropium for Nebulization 3 milliLiter(s) Nebulizer every 12 hours  cyanocobalamin 1000 MICROGram(s) Oral daily  donepezil 10 milliGRAM(s) Oral at bedtime  folic acid 1 milliGRAM(s) Oral daily  heparin   Injectable 5000 Unit(s) SubCutaneous every 8 hours  memantine 10 milliGRAM(s) Oral two times a day  risperiDONE   Tablet 0.25 milliGRAM(s) Oral daily  risperiDONE   Tablet 0.5 milliGRAM(s) Oral at bedtime  sertraline 50 milliGRAM(s) Oral daily      LABS:        Creatinine Trend: 0.79 <--, 0.77 <--, 0.88 <--            Urine Studies:  Urinalysis - [01-09-24 @ 07:01]      Color  / Appearance  / SG  / pH       Gluc 81 / Ketone   / Bili  / Urobili        Blood  / Protein  / Leuk Est  / Nitrite       RBC  / WBC  / Hyaline  / Gran  / Sq Epi  / Non Sq Epi  / Bacteria             RADIOLOGY & ADDITIONAL STUDIES:   Simple / Intermediate / Complex Repair - Final Wound Length In Cm: 3.2

## 2024-01-11 NOTE — PROGRESS NOTE ADULT - ASSESSMENT
This is a 78M with history of MM, HTN, and Dementia (AAO x 1-2 to self, to place, not to time at baseline per brother) who presents to the hospital from [facility] for hypotension and lethargy. Patient currently awake, alert, AAO x1 (to self,) but very poor historian, denies any acute complaints when asked. nephrology consulted for david    DAVID  admitted with scr 1.4  ? etiology prerenal vs ATN  pt with sepsis possible ATN  david improved. pending repeat lab  calero in place    hematuria  repeat ua   renal us with no mass, bladder diverticula--outpt urology    hyponatremia  ? etiology  na better pending repeat lab  check urine na and osmo--pending   dc na tab  check tsh and cortisol level  monitor  free water restriction <1.2L    anemia  iron panel  transfusion and work up per team   This is a 78M with history of MM, HTN, and Dementia (AAO x 1-2 to self, to place, not to time at baseline per brother) who presents to the hospital from [facility] for hypotension and lethargy. Patient currently awake, alert, AAO x1 (to self,) but very poor historian, denies any acute complaints when asked. nephrology consulted for david    DAVID  admitted with scr 1.4  ? etiology prerenal vs ATN  pt with sepsis possible ATN  david improved. pending repeat lab  calero in place    hematuria  repeat ua   renal us with no mass, bladder diverticula--outpt urology    hyponatremia  ? etiology  na better pending repeat lab  check urine na and osmo--pending   dc na tab  check tsh and cortisol level  monitor  free water restriction <1.2L    anemia  iron panel  transfusion and work up per team

## 2024-01-11 NOTE — PROGRESS NOTE ADULT - SUBJECTIVE AND OBJECTIVE BOX
Date of Service: 01-11-24 @ 10:50    Patient is a 78y old  Male who presents with a chief complaint of Hypotension, lethargu (11 Jan 2024 10:35)      Any change in ROS:  doing  ok ; no sob: on room air:     MEDICATIONS  (STANDING):  albuterol/ipratropium for Nebulization 3 milliLiter(s) Nebulizer every 12 hours  cyanocobalamin 1000 MICROGram(s) Oral daily  donepezil 10 milliGRAM(s) Oral at bedtime  folic acid 1 milliGRAM(s) Oral daily  heparin   Injectable 5000 Unit(s) SubCutaneous every 8 hours  memantine 10 milliGRAM(s) Oral two times a day  risperiDONE   Tablet 0.5 milliGRAM(s) Oral at bedtime  risperiDONE   Tablet 0.25 milliGRAM(s) Oral daily  sertraline 50 milliGRAM(s) Oral daily    MEDICATIONS  (PRN):  acetaminophen     Tablet .. 650 milliGRAM(s) Oral every 6 hours PRN Temp greater or equal to 38C (100.4F), Mild Pain (1 - 3)  aluminum hydroxide/magnesium hydroxide/simethicone Suspension 30 milliLiter(s) Oral every 4 hours PRN Dyspepsia  melatonin 3 milliGRAM(s) Oral at bedtime PRN Insomnia  ondansetron Injectable 4 milliGRAM(s) IV Push every 8 hours PRN Nausea and/or Vomiting  QUEtiapine 12.5 milliGRAM(s) Oral every 6 hours PRN for agitation    Vital Signs Last 24 Hrs  T(C): 36.4 (11 Jan 2024 06:00), Max: 36.7 (10 Josef 2024 18:00)  T(F): 97.6 (11 Jan 2024 06:00), Max: 98 (10 Josef 2024 18:00)  HR: 79 (11 Jan 2024 09:49) (63 - 88)  BP: 124/68 (11 Jan 2024 06:00) (108/64 - 124/73)  BP(mean): --  RR: 17 (11 Jan 2024 06:00) (16 - 18)  SpO2: 97% (11 Jan 2024 09:49) (96% - 100%)    Parameters below as of 11 Jan 2024 09:49  Patient On (Oxygen Delivery Method): room air        I&O's Summary    10 Josef 2024 07:01  -  11 Jan 2024 07:00  --------------------------------------------------------  IN: 500 mL / OUT: 0 mL / NET: 500 mL          Physical Exam:   GENERAL: NAD, well-groomed, well-developed  HEENT: CHRISTINA/   Atraumatic, Normocephalic  ENMT: No tonsillar erythema, exudates, or enlargement; Moist mucous membranes, Good dentition, No lesions  NECK: Supple, No JVD, Normal thyroid  CHEST/LUNG: Clear to auscultaio  CVS: Regular rate and rhythm; No murmurs, rubs, or gallops  GI: : Soft, Nontender, Nondistended; Bowel sounds present  NERVOUS SYSTEM: awake:  sleepy  EXTREMITIES:  2+ Peripheral Pulses, No clubbing, cyanosis, or edema  LYMPH: No lymphadenopathy noted  SKIN: No rashes or lesions  ENDOCRINOLOGY: No Thyromegaly  PSYCH: dementia    Labs:                              8.4    4.17  )-----------( 251      ( 09 Jan 2024 07:01 )             24.9     01-09    134<L>  |  105  |  19  ----------------------------<  81  4.1   |  19<L>  |  0.79        CAPILLARY BLOOD GLUCOSE      rad< from: Xray Chest 1 View AP/PA (01.02.24 @ 19:36) >    ACC: [id number] EXAM:  XR CHEST AP OR PA 1V   ORDERED BY: CATHIE MULLER     PROCEDURE DATE:  01/02/2024          INTERPRETATION:  CLINICAL INDICATION: Sepsis.    EXAM: Chest x-ray 2 views.    COMPARISON: None available.    FINDINGS:  Surgical clips overlying the midline of the neck.  Bilateral lung fields partially obscured by patient positioning.   Visualized lung fields are clear.  There is no pleural effusion or pneumothorax.  The heart is not well evaluated in this position. Median sternotomy.  The visualized osseous structures demonstrate no acute pathology.    IMPRESSION:  No focal consolidations.    --- End of Report ---          MIR LAW MD; Resident Radiologist  This document has been electronically signed.  KOFI GARCIA MD; Attending Radiologist  This document has been electronically signed. Jan 2 2024  7:43PM    < end of copied text >  < from: CT Head No Cont (01.03.24 @ 02:00) >    DATE: 1/3/2024 2:00 AM    INDICATION: lethargy, altered mental status. History of falls.    COMPARISON: CT head from 7/7/2023.    TECHNIQUE: Thin section noncontrast axial images were obtained through   the head.  RAPID AI was utilized for preliminary assessment of   intracranial hemorrhage.    FINDINGS:  Intracranial Contents:    Moderate to severe cerebral volume loss.. Mild to moderate chronic   microvascular ischemic changes Gray-white differentiation is preserved.   No hydrocephalus. The basilar cisterns are clear. No focal edema or acute   mass effect. There is no intracranial fluid collectionor acute   hemorrhage.    Bones and Extracranial Soft Tissues:    There is no fracture identified. Scattered paranasal sinus mucosal   thickening. Mastoid air cells are clear. Scalp and imaged facial soft   tissues are within normal limits.      IMPRESSION:  1. No acute intracranial CT abnormality.    --- End of Report ---          MAIKEL MORENO MD; Resident Radiologist  This document has been electronically signed.  ELLEN CARTWRIGHT MD; Attending Radiologist  This document has been electronically signed. Josef  3 2024  2:17AM    < end of copied text >  < from: US Kidney and Bladder (01.05.24 @ 08:29) >    PROCEDURE DATE:  01/05/2024          INTERPRETATION:  CLINICAL INFORMATION: Acute kidney injury.    COMPARISON: None available.    TECHNIQUE: Sonography of the kidneys and bladder.    FINDINGS:  Right kidney: 11.0 cm. No renal mass, hydronephrosis or calculi.    Left kidney: 11.2 cm. No renal mass, hydronephrosis or calculi.    Urinary bladder: Bladder diverticula.    IMPRESSION:  No hydronephrosis.    Bladder diverticula.    --- End of Report ---            JOCE KO MD; Attending Radiologist  This document has been electronically signed. Jan 5 2024  8:39AM    < end of copied text >        rad< from: NM PET/CT Oncology FDG WB, Subsq (06.12.23 @ 21:29) >  bodies are heterogeneous in appearance on CT. There also is increased FDG   activity in the superior endplate of the T6 vertebral body which is not   well delineated on CT. Etiology is indeterminate. MRI may be obtained for   further evaluation.    2. Several FDG-avid bilateral rib lesions, predominantly without   corresponding abnormalities on CT. The most FDG-avid focus corresponds to   an expansile lesion in the anterolateral aspect of the left eighth rib,   suspicious for myeloma. There is a mildly FDG-avid healing fracture in   the adjacent left seventh rib which may be pathologic.    3. FDG-avid severe osteoarthritic changes in the left greater than right   hips.    --- End of Report ---      ***Please see the addendum at the top of this report. It may contain   additional important information or changes.****         AVTAR LOPEZ MD; Attending Nuclear Medicine   This document has been electronically signed. Jun 14 2023  3:37PM  1st Addendum: AVTAR LOPEZ MD; Attending Nuclear Medicine    < end of copied text >            RECENT CULTURES:        RESPIRATORY CULTURES:          Studies  Chest X-RAY  CT SCAN Chest   Venous Dopplers: LE:   CT Abdomen  Others               Date of Service: 01-11-24 @ 10:50    Patient is a 78y old  Male who presents with a chief complaint of Hypotension, lethargu (11 Jan 2024 10:35)      Any change in ROS:  doing  ok ; no sob: on room air:     MEDICATIONS  (STANDING):  albuterol/ipratropium for Nebulization 3 milliLiter(s) Nebulizer every 12 hours  cyanocobalamin 1000 MICROGram(s) Oral daily  donepezil 10 milliGRAM(s) Oral at bedtime  folic acid 1 milliGRAM(s) Oral daily  heparin   Injectable 5000 Unit(s) SubCutaneous every 8 hours  memantine 10 milliGRAM(s) Oral two times a day  risperiDONE   Tablet 0.5 milliGRAM(s) Oral at bedtime  risperiDONE   Tablet 0.25 milliGRAM(s) Oral daily  sertraline 50 milliGRAM(s) Oral daily    MEDICATIONS  (PRN):  acetaminophen     Tablet .. 650 milliGRAM(s) Oral every 6 hours PRN Temp greater or equal to 38C (100.4F), Mild Pain (1 - 3)  aluminum hydroxide/magnesium hydroxide/simethicone Suspension 30 milliLiter(s) Oral every 4 hours PRN Dyspepsia  melatonin 3 milliGRAM(s) Oral at bedtime PRN Insomnia  ondansetron Injectable 4 milliGRAM(s) IV Push every 8 hours PRN Nausea and/or Vomiting  QUEtiapine 12.5 milliGRAM(s) Oral every 6 hours PRN for agitation    Vital Signs Last 24 Hrs  T(C): 36.4 (11 Jan 2024 06:00), Max: 36.7 (10 Josef 2024 18:00)  T(F): 97.6 (11 Jan 2024 06:00), Max: 98 (10 Josef 2024 18:00)  HR: 79 (11 Jan 2024 09:49) (63 - 88)  BP: 124/68 (11 Jan 2024 06:00) (108/64 - 124/73)  BP(mean): --  RR: 17 (11 Jan 2024 06:00) (16 - 18)  SpO2: 97% (11 Jan 2024 09:49) (96% - 100%)    Parameters below as of 11 Jan 2024 09:49  Patient On (Oxygen Delivery Method): room air        I&O's Summary    10 Josef 2024 07:01  -  11 Jan 2024 07:00  --------------------------------------------------------  IN: 500 mL / OUT: 0 mL / NET: 500 mL          Physical Exam:   GENERAL: NAD, well-groomed, well-developed  HEENT: CHRISTINA/   Atraumatic, Normocephalic  ENMT: No tonsillar erythema, exudates, or enlargement; Moist mucous membranes, Good dentition, No lesions  NECK: Supple, No JVD, Normal thyroid  CHEST/LUNG: Clear to auscultaio  CVS: Regular rate and rhythm; No murmurs, rubs, or gallops  GI: : Soft, Nontender, Nondistended; Bowel sounds present  NERVOUS SYSTEM: awake:  sleepy  EXTREMITIES:  2+ Peripheral Pulses, No clubbing, cyanosis, or edema  LYMPH: No lymphadenopathy noted  SKIN: No rashes or lesions  ENDOCRINOLOGY: No Thyromegaly  PSYCH: dementia    Labs:                              8.4    4.17  )-----------( 251      ( 09 Jan 2024 07:01 )             24.9     01-09    134<L>  |  105  |  19  ----------------------------<  81  4.1   |  19<L>  |  0.79        CAPILLARY BLOOD GLUCOSE      rad< from: Xray Chest 1 View AP/PA (01.02.24 @ 19:36) >    ACC: [id number] EXAM:  XR CHEST AP OR PA 1V   ORDERED BY: CATHIE MULLER     PROCEDURE DATE:  01/02/2024          INTERPRETATION:  CLINICAL INDICATION: Sepsis.    EXAM: Chest x-ray 2 views.    COMPARISON: None available.    FINDINGS:  Surgical clips overlying the midline of the neck.  Bilateral lung fields partially obscured by patient positioning.   Visualized lung fields are clear.  There is no pleural effusion or pneumothorax.  The heart is not well evaluated in this position. Median sternotomy.  The visualized osseous structures demonstrate no acute pathology.    IMPRESSION:  No focal consolidations.    --- End of Report ---          MIR LAW MD; Resident Radiologist  This document has been electronically signed.  KOFI GARCIA MD; Attending Radiologist  This document has been electronically signed. Jan 2 2024  7:43PM    < end of copied text >  < from: CT Head No Cont (01.03.24 @ 02:00) >    DATE: 1/3/2024 2:00 AM    INDICATION: lethargy, altered mental status. History of falls.    COMPARISON: CT head from 7/7/2023.    TECHNIQUE: Thin section noncontrast axial images were obtained through   the head.  RAPID AI was utilized for preliminary assessment of   intracranial hemorrhage.    FINDINGS:  Intracranial Contents:    Moderate to severe cerebral volume loss.. Mild to moderate chronic   microvascular ischemic changes Gray-white differentiation is preserved.   No hydrocephalus. The basilar cisterns are clear. No focal edema or acute   mass effect. There is no intracranial fluid collectionor acute   hemorrhage.    Bones and Extracranial Soft Tissues:    There is no fracture identified. Scattered paranasal sinus mucosal   thickening. Mastoid air cells are clear. Scalp and imaged facial soft   tissues are within normal limits.      IMPRESSION:  1. No acute intracranial CT abnormality.    --- End of Report ---          MAIKEL MORENO MD; Resident Radiologist  This document has been electronically signed.  ELLEN CARTWRIGHT MD; Attending Radiologist  This document has been electronically signed. Josef  3 2024  2:17AM    < end of copied text >  < from: US Kidney and Bladder (01.05.24 @ 08:29) >    PROCEDURE DATE:  01/05/2024          INTERPRETATION:  CLINICAL INFORMATION: Acute kidney injury.    COMPARISON: None available.    TECHNIQUE: Sonography of the kidneys and bladder.    FINDINGS:  Right kidney: 11.0 cm. No renal mass, hydronephrosis or calculi.    Left kidney: 11.2 cm. No renal mass, hydronephrosis or calculi.    Urinary bladder: Bladder diverticula.    IMPRESSION:  No hydronephrosis.    Bladder diverticula.    --- End of Report ---            JOCE KO MD; Attending Radiologist  This document has been electronically signed. Jan 5 2024  8:39AM    < end of copied text >        rad< from: NM PET/CT Oncology FDG WB, Subsq (06.12.23 @ 21:29) >  bodies are heterogeneous in appearance on CT. There also is increased FDG   activity in the superior endplate of the T6 vertebral body which is not   well delineated on CT. Etiology is indeterminate. MRI may be obtained for   further evaluation.    2. Several FDG-avid bilateral rib lesions, predominantly without   corresponding abnormalities on CT. The most FDG-avid focus corresponds to   an expansile lesion in the anterolateral aspect of the left eighth rib,   suspicious for myeloma. There is a mildly FDG-avid healing fracture in   the adjacent left seventh rib which may be pathologic.    3. FDG-avid severe osteoarthritic changes in the left greater than right   hips.    --- End of Report ---      ***Please see the addendum at the top of this report. It may contain   additional important information or changes.****         AVTAR LOPEZ MD; Attending Nuclear Medicine   This document has been electronically signed. Jun 14 2023  3:37PM  1st Addendum: AVTAR LOPEZ MD; Attending Nuclear Medicine    < end of copied text >            RECENT CULTURES:        RESPIRATORY CULTURES:          Studies  Chest X-RAY  CT SCAN Chest   Venous Dopplers: LE:   CT Abdomen  Others

## 2024-01-11 NOTE — PROGRESS NOTE ADULT - SUBJECTIVE AND OBJECTIVE BOX
[facility], Division of Infectious Diseases  MIRTHA Bagley Y. Patel, S. Shah, G. Amos  [phone]  ([phone] - weekdays after 5pm and weekends)    Name: GUS DELUNA  Age/Gender: 78y Male  MRN: [id number]    Interval History:  Notes reviewed.   No concerning overnight events.  Afebrile.     Allergies: No Known Allergies      Objective:  Vitals:   T(F): 97.6 (01-11-24 @ 06:00), Max: 98 (01-10-24 @ 18:00)  HR: 79 (01-11-24 @ 09:49) (63 - 88)  BP: 124/68 (01-11-24 @ 06:00) (108/64 - 124/73)  RR: 17 (01-11-24 @ 06:00) (16 - 18)  SpO2: 97% (01-11-24 @ 09:49) (96% - 100%)  Physical Examination:  General: no acute distress, somnolent  HEENT: anicteric  Cardio: normal rate  Resp: breathing comfortably on RA   Abd: non-distended  Ext: no LE edema    Laboratory Studies:  CBC:   WBC Trend:  4.17 01-09-24 @ 07:01  3.08 01-05-24 @ 05:21  3.04 01-04-24 @ 16:00    CMP:               Microbiology: reviewed     Culture - Urine (collected 01-03-24 @ 00:20)  Source: Clean Catch Clean Catch (Midstream)  Final Report (01-04-24 @ 10:55):    No growth    Culture - Urine (collected 01-02-24 @ 22:39)  Source: Catheterized Catheterized  Final Report (01-03-24 @ 22:41):    No growth    Culture - Blood (collected 01-02-24 @ 19:49)  Source: .Blood Blood-Peripheral  Final Report (01-07-24 @ 22:00):    No growth at 5 days    Culture - Blood (collected 01-02-24 @ 19:49)  Source: .Blood Blood-Peripheral  Final Report (01-07-24 @ 22:00):    No growth at 5 days        Radiology: reviewed     Medications:  acetaminophen     Tablet .. 650 milliGRAM(s) Oral every 6 hours PRN  albuterol/ipratropium for Nebulization 3 milliLiter(s) Nebulizer every 12 hours  aluminum hydroxide/magnesium hydroxide/simethicone Suspension 30 milliLiter(s) Oral every 4 hours PRN  cyanocobalamin 1000 MICROGram(s) Oral daily  donepezil 10 milliGRAM(s) Oral at bedtime  folic acid 1 milliGRAM(s) Oral daily  heparin   Injectable 5000 Unit(s) SubCutaneous every 8 hours  melatonin 3 milliGRAM(s) Oral at bedtime PRN  memantine 10 milliGRAM(s) Oral two times a day  ondansetron Injectable 4 milliGRAM(s) IV Push every 8 hours PRN  QUEtiapine 12.5 milliGRAM(s) Oral every 6 hours PRN  risperiDONE   Tablet 0.25 milliGRAM(s) Oral daily  risperiDONE   Tablet 0.5 milliGRAM(s) Oral at bedtime  sertraline 50 milliGRAM(s) Oral daily    Antimicrobials:   [facility], Division of Infectious Diseases  MIRTHA Bagley Y. Patel, S. Shah, G. Amos  [phone]  ([phone] - weekdays after 5pm and weekends)    Name: GUS DELUNA  Age/Gender: 78y Male  MRN: [id number]    Interval History:  Notes reviewed.   No concerning overnight events.  Afebrile.     Allergies: No Known Allergies      Objective:  Vitals:   T(F): 97.6 (01-11-24 @ 06:00), Max: 98 (01-10-24 @ 18:00)  HR: 79 (01-11-24 @ 09:49) (63 - 88)  BP: 124/68 (01-11-24 @ 06:00) (108/64 - 124/73)  RR: 17 (01-11-24 @ 06:00) (16 - 18)  SpO2: 97% (01-11-24 @ 09:49) (96% - 100%)  Physical Examination:  General: no acute distress, somnolent  HEENT: anicteric  Cardio: normal rate  Resp: breathing comfortably on RA   Abd: non-distended  Ext: no LE edema    Laboratory Studies:  CBC:   WBC Trend:  4.17 01-09-24 @ 07:01  3.08 01-05-24 @ 05:21  3.04 01-04-24 @ 16:00    CMP:               Microbiology: reviewed     Culture - Urine (collected 01-03-24 @ 00:20)  Source: Clean Catch Clean Catch (Midstream)  Final Report (01-04-24 @ 10:55):    No growth    Culture - Urine (collected 01-02-24 @ 22:39)  Source: Catheterized Catheterized  Final Report (01-03-24 @ 22:41):    No growth    Culture - Blood (collected 01-02-24 @ 19:49)  Source: .Blood Blood-Peripheral  Final Report (01-07-24 @ 22:00):    No growth at 5 days    Culture - Blood (collected 01-02-24 @ 19:49)  Source: .Blood Blood-Peripheral  Final Report (01-07-24 @ 22:00):    No growth at 5 days        Radiology: reviewed     Medications:  acetaminophen     Tablet .. 650 milliGRAM(s) Oral every 6 hours PRN  albuterol/ipratropium for Nebulization 3 milliLiter(s) Nebulizer every 12 hours  aluminum hydroxide/magnesium hydroxide/simethicone Suspension 30 milliLiter(s) Oral every 4 hours PRN  cyanocobalamin 1000 MICROGram(s) Oral daily  donepezil 10 milliGRAM(s) Oral at bedtime  folic acid 1 milliGRAM(s) Oral daily  heparin   Injectable 5000 Unit(s) SubCutaneous every 8 hours  melatonin 3 milliGRAM(s) Oral at bedtime PRN  memantine 10 milliGRAM(s) Oral two times a day  ondansetron Injectable 4 milliGRAM(s) IV Push every 8 hours PRN  QUEtiapine 12.5 milliGRAM(s) Oral every 6 hours PRN  risperiDONE   Tablet 0.25 milliGRAM(s) Oral daily  risperiDONE   Tablet 0.5 milliGRAM(s) Oral at bedtime  sertraline 50 milliGRAM(s) Oral daily    Antimicrobials:

## 2024-01-11 NOTE — PROGRESS NOTE ADULT - ASSESSMENT
79 y/o M PMhx MM, HTN, and Dementia who presented from [facility] for hypotension and lethargy found to have flu A    influenza A- treated  fever resolved  CXR clear    UA not consistent w/ UTI  urine cx negative    Recommendations  s/p tamiflu 30mg bid, completed 1/7  discharge planning    Sylvester Trinidad M.D.  [facility], Division of Infectious Diseases  [phone]  After 5pm on weekdays and all day on weekends - please call [phone]  79 y/o M PMhx MM, HTN, and Dementia who presented from [facility] for hypotension and lethargy found to have flu A    influenza A- treated  fever resolved  CXR clear    UA not consistent w/ UTI  urine cx negative    Recommendations  s/p tamiflu 30mg bid, completed 1/7  discharge planning    Sylvester Trinidad M.D.  [facility], Division of Infectious Diseases  [phone]  After 5pm on weekdays and all day on weekends - please call [phone]

## 2024-01-11 NOTE — PROGRESS NOTE ADULT - ASSESSMENT
This is a 78M with history of MM, HTN, and Dementia (AAO x 1-2 to self, to place, not to time at baseline per brother) who presents to the hospital from [facility] for hypotension and lethargy. Patient currently awake, alert, AAO x2 (to self, to hospital but not name, not to time) but very poor historian, denies any acute complaints when asked. Spoke with brother Mookie who said that the patient was sent to the hospital for lethargy and cough though he states that the patient has had a cough for several months. Brother denies any other known acute complaints for the patient. Called [facility] [phone] but there was no staff available who knew the patient. As per NH paperwork and ED note, patient was sent to the hospital for hypotention to 81/55 and lethargy. He was noted to have a low grade temp of 99.6 and saturation of 93% at his NH. He was given tylenol 650mg x1 prior to being sent to the hospital.   On arrival to the hospital his vitals were T 99.4 -> 100.7 rectal, P 92, BP 88/55 -> 117/70, RR 16, O2 sat 96% RA. His lab work was significant for worsening macrocytic anemia, DAVID with mild hyponatremia, elevated protein gap, elevated lactate with improvement on repeat. His UA was positive for nitrite, leuk esterase but negative for bacteria. His respiratory swab was positive for influenza A. His imaging did not show acute abnormalities. He was given ofirmev 1g, NS 500cc x1, vanc 1g, cefepime 2g, and tamiflu 75mg PO x1. He was admitted to medicine.  (03 Jan 2024 06:06):  now pulm called:  he is from NH:  above history noted:  he seems to be responding to simple commands:  he say he has no underlying lung history  never smoked:  on room air:  adm with influenza:     Influenza:   Multiple Myeloma  HTN  Dementia      Influenza:   -low grade fever  -Influenza:  on tamilflu  -cxr is clear:  -he is not wheezing    1/4: no change in pulm status today  : remains on room air  1/5: doing ok : no sob:  no cough : no phlegm;   1/6: on Tamiflu  no sob:  on room air  1/7: seems to be doing ok : no sob:  no cough:   1/8: seems OK: no sob:  no cough : no phlegm  on room air  1/9: doing ok: no sob:   on cough :  1/10: seems OK: no sob:  on room air:  remains lethargic: Afebrile and is normal BC count  Multiple Myeloma  -per primary team : FDG PET scan  noted:  rib lesions present likely secondary to MM   HTN  -controlled  Dementia  -supportive care:    karyn acp  This is a 78M with history of MM, HTN, and Dementia (AAO x 1-2 to self, to place, not to time at baseline per brother) who presents to the hospital from [facility] for hypotension and lethargy. Patient currently awake, alert, AAO x2 (to self, to hospital but not name, not to time) but very poor historian, denies any acute complaints when asked. Spoke with brother Mookie who said that the patient was sent to the hospital for lethargy and cough though he states that the patient has had a cough for several months. Brother denies any other known acute complaints for the patient. Called [facility] [phone] but there was no staff available who knew the patient. As per NH paperwork and ED note, patient was sent to the hospital for hypotention to 81/55 and lethargy. He was noted to have a low grade temp of 99.6 and saturation of 93% at his NH. He was given tylenol 650mg x1 prior to being sent to the hospital.   On arrival to the hospital his vitals were T 99.4 -> 100.7 rectal, P 92, BP 88/55 -> 117/70, RR 16, O2 sat 96% RA. His lab work was significant for worsening macrocytic anemia, DAVID with mild hyponatremia, elevated protein gap, elevated lactate with improvement on repeat. His UA was positive for nitrite, leuk esterase but negative for bacteria. His respiratory swab was positive for influenza A. His imaging did not show acute abnormalities. He was given ofirmev 1g, NS 500cc x1, vanc 1g, cefepime 2g, and tamiflu 75mg PO x1. He was admitted to medicine.  (03 Jan 2024 06:06):  now pulm called:  he is from NH:  above history noted:  he seems to be responding to simple commands:  he say he has no underlying lung history  never smoked:  on room air:  adm with influenza:     Influenza:   Multiple Myeloma  HTN  Dementia      Influenza:   -low grade fever  -Influenza:  on tamilflu  -cxr is clear:  -he is not wheezing    1/4: no change in pulm status today  : remains on room air  1/5: doing ok : no sob:  no cough : no phlegm;   1/6: on Tamiflu  no sob:  on room air  1/7: seems to be doing ok : no sob:  no cough:   1/8: seems OK: no sob:  no cough : no phlegm  on room air  1/9: doing ok: no sob:   on cough :  1/10: seems OK: no sob:  on room air:  remains lethargic: Afebrile and is normal BC count  Multiple Myeloma  -per primary team : FDG PET scan  noted:  rib lesions present likely secondary to MM   HTN  -controlled  Dementia  -supportive care:    karyn acp

## 2024-01-12 LAB
ANION GAP SERPL CALC-SCNC: 6 MMOL/L — LOW (ref 7–14)
ANION GAP SERPL CALC-SCNC: 6 MMOL/L — LOW (ref 7–14)
BASOPHILS # BLD AUTO: 0.01 K/UL — SIGNIFICANT CHANGE UP (ref 0–0.2)
BASOPHILS # BLD AUTO: 0.01 K/UL — SIGNIFICANT CHANGE UP (ref 0–0.2)
BASOPHILS NFR BLD AUTO: 0.3 % — SIGNIFICANT CHANGE UP (ref 0–2)
BASOPHILS NFR BLD AUTO: 0.3 % — SIGNIFICANT CHANGE UP (ref 0–2)
BUN SERPL-MCNC: 26 MG/DL — HIGH (ref 7–23)
BUN SERPL-MCNC: 26 MG/DL — HIGH (ref 7–23)
CALCIUM SERPL-MCNC: 10.7 MG/DL — HIGH (ref 8.4–10.5)
CALCIUM SERPL-MCNC: 10.7 MG/DL — HIGH (ref 8.4–10.5)
CHLORIDE SERPL-SCNC: 108 MMOL/L — HIGH (ref 98–107)
CHLORIDE SERPL-SCNC: 108 MMOL/L — HIGH (ref 98–107)
CO2 SERPL-SCNC: 25 MMOL/L — SIGNIFICANT CHANGE UP (ref 22–31)
CO2 SERPL-SCNC: 25 MMOL/L — SIGNIFICANT CHANGE UP (ref 22–31)
CREAT SERPL-MCNC: 0.93 MG/DL — SIGNIFICANT CHANGE UP (ref 0.5–1.3)
CREAT SERPL-MCNC: 0.93 MG/DL — SIGNIFICANT CHANGE UP (ref 0.5–1.3)
EGFR: 85 ML/MIN/1.73M2 — SIGNIFICANT CHANGE UP
EGFR: 85 ML/MIN/1.73M2 — SIGNIFICANT CHANGE UP
EOSINOPHIL # BLD AUTO: 0.1 K/UL — SIGNIFICANT CHANGE UP (ref 0–0.5)
EOSINOPHIL # BLD AUTO: 0.1 K/UL — SIGNIFICANT CHANGE UP (ref 0–0.5)
EOSINOPHIL NFR BLD AUTO: 2.7 % — SIGNIFICANT CHANGE UP (ref 0–6)
EOSINOPHIL NFR BLD AUTO: 2.7 % — SIGNIFICANT CHANGE UP (ref 0–6)
GLUCOSE SERPL-MCNC: 93 MG/DL — SIGNIFICANT CHANGE UP (ref 70–99)
GLUCOSE SERPL-MCNC: 93 MG/DL — SIGNIFICANT CHANGE UP (ref 70–99)
HCT VFR BLD CALC: 23.5 % — LOW (ref 39–50)
HCT VFR BLD CALC: 23.5 % — LOW (ref 39–50)
HGB BLD-MCNC: 7.8 G/DL — LOW (ref 13–17)
HGB BLD-MCNC: 7.8 G/DL — LOW (ref 13–17)
IANC: 1.9 K/UL — SIGNIFICANT CHANGE UP (ref 1.8–7.4)
IANC: 1.9 K/UL — SIGNIFICANT CHANGE UP (ref 1.8–7.4)
IMM GRANULOCYTES NFR BLD AUTO: 0.3 % — SIGNIFICANT CHANGE UP (ref 0–0.9)
IMM GRANULOCYTES NFR BLD AUTO: 0.3 % — SIGNIFICANT CHANGE UP (ref 0–0.9)
LYMPHOCYTES # BLD AUTO: 1.09 K/UL — SIGNIFICANT CHANGE UP (ref 1–3.3)
LYMPHOCYTES # BLD AUTO: 1.09 K/UL — SIGNIFICANT CHANGE UP (ref 1–3.3)
LYMPHOCYTES # BLD AUTO: 29.1 % — SIGNIFICANT CHANGE UP (ref 13–44)
LYMPHOCYTES # BLD AUTO: 29.1 % — SIGNIFICANT CHANGE UP (ref 13–44)
MAGNESIUM SERPL-MCNC: 1.9 MG/DL — SIGNIFICANT CHANGE UP (ref 1.6–2.6)
MAGNESIUM SERPL-MCNC: 1.9 MG/DL — SIGNIFICANT CHANGE UP (ref 1.6–2.6)
MCHC RBC-ENTMCNC: 33.2 GM/DL — SIGNIFICANT CHANGE UP (ref 32–36)
MCHC RBC-ENTMCNC: 33.2 GM/DL — SIGNIFICANT CHANGE UP (ref 32–36)
MCHC RBC-ENTMCNC: 34.5 PG — HIGH (ref 27–34)
MCHC RBC-ENTMCNC: 34.5 PG — HIGH (ref 27–34)
MCV RBC AUTO: 104 FL — HIGH (ref 80–100)
MCV RBC AUTO: 104 FL — HIGH (ref 80–100)
MONOCYTES # BLD AUTO: 0.63 K/UL — SIGNIFICANT CHANGE UP (ref 0–0.9)
MONOCYTES # BLD AUTO: 0.63 K/UL — SIGNIFICANT CHANGE UP (ref 0–0.9)
MONOCYTES NFR BLD AUTO: 16.8 % — HIGH (ref 2–14)
MONOCYTES NFR BLD AUTO: 16.8 % — HIGH (ref 2–14)
NEUTROPHILS # BLD AUTO: 1.9 K/UL — SIGNIFICANT CHANGE UP (ref 1.8–7.4)
NEUTROPHILS # BLD AUTO: 1.9 K/UL — SIGNIFICANT CHANGE UP (ref 1.8–7.4)
NEUTROPHILS NFR BLD AUTO: 50.8 % — SIGNIFICANT CHANGE UP (ref 43–77)
NEUTROPHILS NFR BLD AUTO: 50.8 % — SIGNIFICANT CHANGE UP (ref 43–77)
NRBC # BLD: 0 /100 WBCS — SIGNIFICANT CHANGE UP (ref 0–0)
NRBC # BLD: 0 /100 WBCS — SIGNIFICANT CHANGE UP (ref 0–0)
NRBC # FLD: 0 K/UL — SIGNIFICANT CHANGE UP (ref 0–0)
NRBC # FLD: 0 K/UL — SIGNIFICANT CHANGE UP (ref 0–0)
PHOSPHATE SERPL-MCNC: 2.2 MG/DL — LOW (ref 2.5–4.5)
PHOSPHATE SERPL-MCNC: 2.2 MG/DL — LOW (ref 2.5–4.5)
PLATELET # BLD AUTO: 296 K/UL — SIGNIFICANT CHANGE UP (ref 150–400)
PLATELET # BLD AUTO: 296 K/UL — SIGNIFICANT CHANGE UP (ref 150–400)
POTASSIUM SERPL-MCNC: 3.6 MMOL/L — SIGNIFICANT CHANGE UP (ref 3.5–5.3)
POTASSIUM SERPL-MCNC: 3.6 MMOL/L — SIGNIFICANT CHANGE UP (ref 3.5–5.3)
POTASSIUM SERPL-SCNC: 3.6 MMOL/L — SIGNIFICANT CHANGE UP (ref 3.5–5.3)
POTASSIUM SERPL-SCNC: 3.6 MMOL/L — SIGNIFICANT CHANGE UP (ref 3.5–5.3)
RBC # BLD: 2.26 M/UL — LOW (ref 4.2–5.8)
RBC # BLD: 2.26 M/UL — LOW (ref 4.2–5.8)
RBC # FLD: 13.5 % — SIGNIFICANT CHANGE UP (ref 10.3–14.5)
RBC # FLD: 13.5 % — SIGNIFICANT CHANGE UP (ref 10.3–14.5)
SARS-COV-2 RNA SPEC QL NAA+PROBE: SIGNIFICANT CHANGE UP
SARS-COV-2 RNA SPEC QL NAA+PROBE: SIGNIFICANT CHANGE UP
SODIUM SERPL-SCNC: 139 MMOL/L — SIGNIFICANT CHANGE UP (ref 135–145)
SODIUM SERPL-SCNC: 139 MMOL/L — SIGNIFICANT CHANGE UP (ref 135–145)
WBC # BLD: 3.74 K/UL — LOW (ref 3.8–10.5)
WBC # BLD: 3.74 K/UL — LOW (ref 3.8–10.5)
WBC # FLD AUTO: 3.74 K/UL — LOW (ref 3.8–10.5)
WBC # FLD AUTO: 3.74 K/UL — LOW (ref 3.8–10.5)

## 2024-01-12 RX ORDER — HALOPERIDOL DECANOATE 100 MG/ML
2.5 INJECTION INTRAMUSCULAR ONCE
Refills: 0 | Status: COMPLETED | OUTPATIENT
Start: 2024-01-12 | End: 2024-01-12

## 2024-01-12 RX ORDER — SODIUM CHLORIDE 9 MG/ML
1000 INJECTION INTRAMUSCULAR; INTRAVENOUS; SUBCUTANEOUS
Refills: 0 | Status: DISCONTINUED | OUTPATIENT
Start: 2024-01-12 | End: 2024-01-24

## 2024-01-12 RX ORDER — SODIUM,POTASSIUM PHOSPHATES 278-250MG
1 POWDER IN PACKET (EA) ORAL
Refills: 0 | Status: COMPLETED | OUTPATIENT
Start: 2024-01-12 | End: 2024-01-12

## 2024-01-12 RX ADMIN — QUETIAPINE FUMARATE 12.5 MILLIGRAM(S): 200 TABLET, FILM COATED ORAL at 20:08

## 2024-01-12 RX ADMIN — SERTRALINE 50 MILLIGRAM(S): 25 TABLET, FILM COATED ORAL at 12:28

## 2024-01-12 RX ADMIN — Medication 1 MILLIGRAM(S): at 12:28

## 2024-01-12 RX ADMIN — Medication 1 PACKET(S): at 09:23

## 2024-01-12 RX ADMIN — RISPERIDONE 0.25 MILLIGRAM(S): 4 TABLET ORAL at 12:28

## 2024-01-12 RX ADMIN — RISPERIDONE 0.5 MILLIGRAM(S): 4 TABLET ORAL at 22:34

## 2024-01-12 RX ADMIN — MEMANTINE HYDROCHLORIDE 10 MILLIGRAM(S): 10 TABLET ORAL at 05:01

## 2024-01-12 RX ADMIN — SODIUM CHLORIDE 75 MILLILITER(S): 9 INJECTION INTRAMUSCULAR; INTRAVENOUS; SUBCUTANEOUS at 12:54

## 2024-01-12 RX ADMIN — Medication 3 MILLILITER(S): at 09:57

## 2024-01-12 RX ADMIN — Medication 1 PACKET(S): at 18:51

## 2024-01-12 RX ADMIN — DONEPEZIL HYDROCHLORIDE 10 MILLIGRAM(S): 10 TABLET, FILM COATED ORAL at 22:34

## 2024-01-12 RX ADMIN — PREGABALIN 1000 MICROGRAM(S): 225 CAPSULE ORAL at 12:27

## 2024-01-12 RX ADMIN — HEPARIN SODIUM 5000 UNIT(S): 5000 INJECTION INTRAVENOUS; SUBCUTANEOUS at 15:08

## 2024-01-12 RX ADMIN — HEPARIN SODIUM 5000 UNIT(S): 5000 INJECTION INTRAVENOUS; SUBCUTANEOUS at 05:01

## 2024-01-12 RX ADMIN — Medication 3 MILLILITER(S): at 22:02

## 2024-01-12 RX ADMIN — HEPARIN SODIUM 5000 UNIT(S): 5000 INJECTION INTRAVENOUS; SUBCUTANEOUS at 22:31

## 2024-01-12 RX ADMIN — HALOPERIDOL DECANOATE 2.5 MILLIGRAM(S): 100 INJECTION INTRAMUSCULAR at 22:31

## 2024-01-12 RX ADMIN — MEMANTINE HYDROCHLORIDE 10 MILLIGRAM(S): 10 TABLET ORAL at 18:49

## 2024-01-12 NOTE — PROGRESS NOTE ADULT - ASSESSMENT
This is a 78M with history of MM, HTN, and Dementia (AAO x 1-2 to self, to place, not to time at baseline per brother) who presents to the hospital from [facility] for hypotension and lethargy. Patient currently awake, alert, AAO x1 (to self,) but very poor historian, denies any acute complaints when asked. nephrology consulted for david    DAVID  admitted with scr 1.4  ? etiology prerenal vs ATN  pt with sepsis possible ATN  david improved.    hematuria  repeat ua   renal us with no mass, bladder diverticula--outpt urology    hyponatremia  ? etiology  check urine na and osmo--pending   improved sodium   monitor  free water restriction <1.2L    anemia  iron panel  transfusion and work up per team    hyper calcemia/ hypophosphatemna  replete k phos  Check VItamin D and PTH  Trial of gentle hydration today   This is a 78M with history of MM, HTN, and Dementia (AAO x 1-2 to self, to place, not to time at baseline per brother) who presents to the hospital from [facility] for hypotension and lethargy. Patient currently awake, alert, AAO x1 (to self,) but very poor historian, denies any acute complaints when asked. nephrology consulted for david    DAVID  admitted with scr 1.4  ? etiology prerenal vs ATN  pt with sepsis possible ATN  david improved.    hematuria  repeat ua   renal us with no mass, bladder diverticula--outpt urology    hyponatremia  ? etiology  check urine na and osmo--pending   improved sodium   monitor  free water restriction <1.2L    anemia  iron panel  transfusion and work up per team    hyper calcemia/ hypophosphatemna  replete k phos  Check VItamin D and PTH  Trial of gentle hydration today

## 2024-01-12 NOTE — PROGRESS NOTE ADULT - SUBJECTIVE AND OBJECTIVE BOX
[facility], Division of Infectious Diseases  MIRTHA Bagley Y. Patel, S. Shah, G. Amos  [phone]  ([phone] - weekdays after 5pm and weekends)    Name: GUS DELUNA  Age/Gender: 78y Male  MRN: [id number]    Interval History:  Notes reviewed.   No concerning overnight events.  Afebrile.   denies SOB    Allergies: No Known Allergies      Objective:  Vitals:   T(F): 97.8 (01-12-24 @ 05:00), Max: 99.3 (01-11-24 @ 14:00)  HR: 65 (01-12-24 @ 05:00) (65 - 88)  BP: 124/71 (01-12-24 @ 05:00) (105/67 - 124/71)  RR: 19 (01-12-24 @ 05:00) (17 - 19)  SpO2: 99% (01-12-24 @ 05:00) (96% - 99%)  Physical Examination:  General: no acute distress  HEENT: anicteric  Cardio: normal rate  Resp: breathing comfortably on RA   Abd: non-distended  Ext: no LE edema    Laboratory Studies:  CBC:                       7.8    3.74  )-----------( 296      ( 12 Jan 2024 05:23 )             23.5     WBC Trend:  3.74 01-12-24 @ 05:23  4.17 01-09-24 @ 07:01    CMP: 01-12    139  |  108<H>  |  26<H>  ----------------------------<  93  3.6   |  25  |  0.93    Ca    10.7<H>      12 Jan 2024 05:23  Phos  2.2     01-12  Mg     1.90     01-12            Urinalysis Basic - ( 12 Jan 2024 05:23 )    Color: x / Appearance: x / SG: x / pH: x  Gluc: 93 mg/dL / Ketone: x  / Bili: x / Urobili: x   Blood: x / Protein: x / Nitrite: x   Leuk Esterase: x / RBC: x / WBC x   Sq Epi: x / Non Sq Epi: x / Bacteria: x      Microbiology: reviewed     Culture - Urine (collected 01-03-24 @ 00:20)  Source: Clean Catch Clean Catch (Midstream)  Final Report (01-04-24 @ 10:55):    No growth    Culture - Urine (collected 01-02-24 @ 22:39)  Source: Catheterized Catheterized  Final Report (01-03-24 @ 22:41):    No growth    Culture - Blood (collected 01-02-24 @ 19:49)  Source: .Blood Blood-Peripheral  Final Report (01-07-24 @ 22:00):    No growth at 5 days    Culture - Blood (collected 01-02-24 @ 19:49)  Source: .Blood Blood-Peripheral  Final Report (01-07-24 @ 22:00):    No growth at 5 days        Radiology: reviewed     Medications:  acetaminophen     Tablet .. 650 milliGRAM(s) Oral every 6 hours PRN  albuterol/ipratropium for Nebulization 3 milliLiter(s) Nebulizer every 12 hours  aluminum hydroxide/magnesium hydroxide/simethicone Suspension 30 milliLiter(s) Oral every 4 hours PRN  cyanocobalamin 1000 MICROGram(s) Oral daily  donepezil 10 milliGRAM(s) Oral at bedtime  folic acid 1 milliGRAM(s) Oral daily  heparin   Injectable 5000 Unit(s) SubCutaneous every 8 hours  melatonin 3 milliGRAM(s) Oral at bedtime PRN  memantine 10 milliGRAM(s) Oral two times a day  ondansetron Injectable 4 milliGRAM(s) IV Push every 8 hours PRN  QUEtiapine 12.5 milliGRAM(s) Oral every 6 hours PRN  risperiDONE   Tablet 0.25 milliGRAM(s) Oral daily  risperiDONE   Tablet 0.5 milliGRAM(s) Oral at bedtime  sertraline 50 milliGRAM(s) Oral daily    Antimicrobials:   [facility], Division of Infectious Diseases  MIRTHA Bagley Y. Patel, S. Shah, G. Amos  [phone]  ([phone] - weekdays after 5pm and weekends)    Name: GUS DELUNA  Age/Gender: 78y Male  MRN: [id number]    Interval History:  Notes reviewed.   No concerning overnight events.  Afebrile.   denies SOB    Allergies: No Known Allergies      Objective:  Vitals:   T(F): 97.8 (01-12-24 @ 05:00), Max: 99.3 (01-11-24 @ 14:00)  HR: 65 (01-12-24 @ 05:00) (65 - 88)  BP: 124/71 (01-12-24 @ 05:00) (105/67 - 124/71)  RR: 19 (01-12-24 @ 05:00) (17 - 19)  SpO2: 99% (01-12-24 @ 05:00) (96% - 99%)  Physical Examination:  General: no acute distress  HEENT: anicteric  Cardio: normal rate  Resp: breathing comfortably on RA   Abd: non-distended  Ext: no LE edema    Laboratory Studies:  CBC:                       7.8    3.74  )-----------( 296      ( 12 Jan 2024 05:23 )             23.5     WBC Trend:  3.74 01-12-24 @ 05:23  4.17 01-09-24 @ 07:01    CMP: 01-12    139  |  108<H>  |  26<H>  ----------------------------<  93  3.6   |  25  |  0.93    Ca    10.7<H>      12 Jan 2024 05:23  Phos  2.2     01-12  Mg     1.90     01-12            Urinalysis Basic - ( 12 Jan 2024 05:23 )    Color: x / Appearance: x / SG: x / pH: x  Gluc: 93 mg/dL / Ketone: x  / Bili: x / Urobili: x   Blood: x / Protein: x / Nitrite: x   Leuk Esterase: x / RBC: x / WBC x   Sq Epi: x / Non Sq Epi: x / Bacteria: x      Microbiology: reviewed     Culture - Urine (collected 01-03-24 @ 00:20)  Source: Clean Catch Clean Catch (Midstream)  Final Report (01-04-24 @ 10:55):    No growth    Culture - Urine (collected 01-02-24 @ 22:39)  Source: Catheterized Catheterized  Final Report (01-03-24 @ 22:41):    No growth    Culture - Blood (collected 01-02-24 @ 19:49)  Source: .Blood Blood-Peripheral  Final Report (01-07-24 @ 22:00):    No growth at 5 days    Culture - Blood (collected 01-02-24 @ 19:49)  Source: .Blood Blood-Peripheral  Final Report (01-07-24 @ 22:00):    No growth at 5 days        Radiology: reviewed     Medications:  acetaminophen     Tablet .. 650 milliGRAM(s) Oral every 6 hours PRN  albuterol/ipratropium for Nebulization 3 milliLiter(s) Nebulizer every 12 hours  aluminum hydroxide/magnesium hydroxide/simethicone Suspension 30 milliLiter(s) Oral every 4 hours PRN  cyanocobalamin 1000 MICROGram(s) Oral daily  donepezil 10 milliGRAM(s) Oral at bedtime  folic acid 1 milliGRAM(s) Oral daily  heparin   Injectable 5000 Unit(s) SubCutaneous every 8 hours  melatonin 3 milliGRAM(s) Oral at bedtime PRN  memantine 10 milliGRAM(s) Oral two times a day  ondansetron Injectable 4 milliGRAM(s) IV Push every 8 hours PRN  QUEtiapine 12.5 milliGRAM(s) Oral every 6 hours PRN  risperiDONE   Tablet 0.25 milliGRAM(s) Oral daily  risperiDONE   Tablet 0.5 milliGRAM(s) Oral at bedtime  sertraline 50 milliGRAM(s) Oral daily    Antimicrobials:   [facility], Division of Infectious Diseases  MIRTHA Bagley Y. Patel, S. Shah, G. Amos  [phone]  ([phone] - weekdays after 5pm and weekends)    Name: GUS DELUNA  Age/Gender: 78y Male  MRN: [id number]    Interval History:  Notes reviewed.   No concerning overnight events.  Afebrile.   denies SOB    Allergies: No Known Allergies      Objective:  Vitals:   T(F): 97.8 (01-12-24 @ 05:00), Max: 99.3 (01-11-24 @ 14:00)  HR: 65 (01-12-24 @ 05:00) (65 - 88)  BP: 124/71 (01-12-24 @ 05:00) (105/67 - 124/71)  RR: 19 (01-12-24 @ 05:00) (17 - 19)  SpO2: 99% (01-12-24 @ 05:00) (96% - 99%)  Physical Examination:  General: no acute distress  HEENT: anicteric  Cardio: normal rate  Resp: breathing comfortably on RA   Abd: non-distended  Ext: no LE edema    Laboratory Studies:  CBC:                       7.8    3.74  )-----------( 296      ( 12 Jan 2024 05:23 )             23.5     WBC Trend:  3.74 01-12-24 @ 05:23  4.17 01-09-24 @ 07:01    CMP: 01-12    139  |  108<H>  |  26<H>  ----------------------------<  93  3.6   |  25  |  0.93    Ca    10.7<H>      12 Jan 2024 05:23  Phos  2.2     01-12  Mg     1.90     01-12            Urinalysis Basic - ( 12 Jan 2024 05:23 )    Color: x / Appearance: x / SG: x / pH: x  Gluc: 93 mg/dL / Ketone: x  / Bili: x / Urobili: x   Blood: x / Protein: x / Nitrite: x   Leuk Esterase: x / RBC: x / WBC x   Sq Epi: x / Non Sq Epi: x / Bacteria: x      Microbiology: reviewed     Culture - Urine (collected 01-03-24 @ 00:20)  Source: Clean Catch Clean Catch (Midstream)  Final Report (01-04-24 @ 10:55):    No growth    Culture - Urine (collected 01-02-24 @ 22:39)  Source: Catheterized Catheterized  Final Report (01-03-24 @ 22:41):    No growth    Culture - Blood (collected 01-02-24 @ 19:49)  Source: .Blood Blood-Peripheral  Final Report (01-07-24 @ 22:00):    No growth at 5 days    Culture - Blood (collected 01-02-24 @ 19:49)  Source: .Blood Blood-Peripheral  Final Report (01-07-24 @ 22:00):    No growth at 5 days        Radiology: reviewed     Medications:  acetaminophen     Tablet .. 650 milliGRAM(s) Oral every 6 hours PRN  albuterol/ipratropium for Nebulization 3 milliLiter(s) Nebulizer every 12 hours  aluminum hydroxide/magnesium hydroxide/simethicone Suspension 30 milliLiter(s) Oral every 4 hours PRN  cyanocobalamin 1000 MICROGram(s) Oral daily  donepezil 10 milliGRAM(s) Oral at bedtime  folic acid 1 milliGRAM(s) Oral daily  heparin   Injectable 5000 Unit(s) SubCutaneous every 8 hours  melatonin 3 milliGRAM(s) Oral at bedtime PRN  memantine 10 milliGRAM(s) Oral two times a day  ondansetron Injectable 4 milliGRAM(s) IV Push every 8 hours PRN  QUEtiapine 12.5 milliGRAM(s) Oral every 6 hours PRN  risperiDONE   Tablet 0.25 milliGRAM(s) Oral daily  risperiDONE   Tablet 0.5 milliGRAM(s) Oral at bedtime  sertraline 50 milliGRAM(s) Oral daily    Antimicrobials:

## 2024-01-12 NOTE — PROGRESS NOTE ADULT - SUBJECTIVE AND OBJECTIVE BOX
Patient is a 78y old  Male who presents with a chief complaint of Hypotension, lethargu (12 Jan 2024 14:19)    Date of servie : 01-12-24 @ 15:15  INTERVAL HPI/OVERNIGHT EVENTS:  T(C): 36.7 (01-12-24 @ 15:00), Max: 37.2 (01-11-24 @ 18:00)  HR: 99 (01-12-24 @ 15:00) (65 - 99)  BP: 99/62 (01-12-24 @ 15:00) (99/62 - 124/71)  RR: 18 (01-12-24 @ 15:00) (17 - 19)  SpO2: 100% (01-12-24 @ 15:00) (96% - 100%)  Wt(kg): --  I&O's Summary    11 Jan 2024 07:01  -  12 Jan 2024 07:00  --------------------------------------------------------  IN: 800 mL / OUT: 0 mL / NET: 800 mL    12 Jan 2024 07:01  -  12 Jan 2024 15:15  --------------------------------------------------------  IN: 240 mL / OUT: 0 mL / NET: 240 mL        LABS:                        7.8    3.74  )-----------( 296      ( 12 Jan 2024 05:23 )             23.5     01-12    139  |  108<H>  |  26<H>  ----------------------------<  93  3.6   |  25  |  0.93    Ca    10.7<H>      12 Jan 2024 05:23  Phos  2.2     01-12  Mg     1.90     01-12        Urinalysis Basic - ( 12 Jan 2024 05:23 )    Color: x / Appearance: x / SG: x / pH: x  Gluc: 93 mg/dL / Ketone: x  / Bili: x / Urobili: x   Blood: x / Protein: x / Nitrite: x   Leuk Esterase: x / RBC: x / WBC x   Sq Epi: x / Non Sq Epi: x / Bacteria: x      CAPILLARY BLOOD GLUCOSE            Urinalysis Basic - ( 12 Jan 2024 05:23 )    Color: x / Appearance: x / SG: x / pH: x  Gluc: 93 mg/dL / Ketone: x  / Bili: x / Urobili: x   Blood: x / Protein: x / Nitrite: x   Leuk Esterase: x / RBC: x / WBC x   Sq Epi: x / Non Sq Epi: x / Bacteria: x        MEDICATIONS  (STANDING):  albuterol/ipratropium for Nebulization 3 milliLiter(s) Nebulizer every 12 hours  cyanocobalamin 1000 MICROGram(s) Oral daily  donepezil 10 milliGRAM(s) Oral at bedtime  folic acid 1 milliGRAM(s) Oral daily  heparin   Injectable 5000 Unit(s) SubCutaneous every 8 hours  memantine 10 milliGRAM(s) Oral two times a day  potassium phosphate / sodium phosphate Powder (PHOS-NaK) 1 Packet(s) Oral two times a day  risperiDONE   Tablet 0.25 milliGRAM(s) Oral daily  risperiDONE   Tablet 0.5 milliGRAM(s) Oral at bedtime  sertraline 50 milliGRAM(s) Oral daily  sodium chloride 0.9%. 1000 milliLiter(s) (75 mL/Hr) IV Continuous <Continuous>    MEDICATIONS  (PRN):  acetaminophen     Tablet .. 650 milliGRAM(s) Oral every 6 hours PRN Temp greater or equal to 38C (100.4F), Mild Pain (1 - 3)  aluminum hydroxide/magnesium hydroxide/simethicone Suspension 30 milliLiter(s) Oral every 4 hours PRN Dyspepsia  melatonin 3 milliGRAM(s) Oral at bedtime PRN Insomnia  ondansetron Injectable 4 milliGRAM(s) IV Push every 8 hours PRN Nausea and/or Vomiting  QUEtiapine 12.5 milliGRAM(s) Oral every 6 hours PRN for agitation          PHYSICAL EXAM:  GENERAL: NAD, well-groomed, well-developed  HEAD:  Atraumatic, Normocephalic  CHEST/LUNG: Clear to percussion bilaterally; No rales, rhonchi, wheezing, or rubs  HEART: Regular rate and rhythm; No murmurs, rubs, or gallops  ABDOMEN: Soft, Nontender, Nondistended; Bowel sounds present  EXTREMITIES:  2+ Peripheral Pulses, No clubbing, cyanosis, or edema  LYMPH: No lymphadenopathy noted  SKIN: No rashes or lesions    Care Discussed with Consultants/Other Providers [ ] YES  [ ] NO

## 2024-01-12 NOTE — PROGRESS NOTE ADULT - SUBJECTIVE AND OBJECTIVE BOX
DATE OF SERVICE: 01-12-24    No overnight events, resting comfortably  Review of symptoms otherwise negative.    Review of Systems:   Constitutional: [ ] fevers, [ ] chills.   Skin: [ ] dry skin. [ ] rashes.  Psychiatric: [ ] depression, [ ] anxiety.   Gastrointestinal: [ ] BRBPR, [ ] melena.   Neurological: [ ] confusion. [ ] seizures. [ ] shuffling gait.   Ears,Nose,Mouth and Throat: [ ] ear pain [ ] sore throat.   Eyes: [ ] diplopia.   Respiratory: [ ] hemoptysis. [ ] shortness of breath  Cardiovascular: See HPI above  Hematologic/Lymphatic: [ ] anemia. [ ] painful nodes. [ ] prolonged bleeding.   Genitourinary: [ ] hematuria. [ ] flank pain.   Endocrine: [ ] significant change in weight. [ ] intolerance to heat and cold.     Review of systems [x] otherwise negative, [ ] otherwise unable to obtain    FH: no family history of sudden cardiac death in first degree relatives    SH: [ ] tobacco, [ ] alcohol, [ ] drugs    acetaminophen     Tablet .. 650 milliGRAM(s) Oral every 6 hours PRN  albuterol/ipratropium for Nebulization 3 milliLiter(s) Nebulizer every 12 hours  aluminum hydroxide/magnesium hydroxide/simethicone Suspension 30 milliLiter(s) Oral every 4 hours PRN  cyanocobalamin 1000 MICROGram(s) Oral daily  donepezil 10 milliGRAM(s) Oral at bedtime  folic acid 1 milliGRAM(s) Oral daily  heparin   Injectable 5000 Unit(s) SubCutaneous every 8 hours  melatonin 3 milliGRAM(s) Oral at bedtime PRN  memantine 10 milliGRAM(s) Oral two times a day  ondansetron Injectable 4 milliGRAM(s) IV Push every 8 hours PRN  potassium phosphate / sodium phosphate Powder (PHOS-NaK) 1 Packet(s) Oral two times a day  QUEtiapine 12.5 milliGRAM(s) Oral every 6 hours PRN  risperiDONE   Tablet 0.25 milliGRAM(s) Oral daily  risperiDONE   Tablet 0.5 milliGRAM(s) Oral at bedtime  sertraline 50 milliGRAM(s) Oral daily  sodium chloride 0.9%. 1000 milliLiter(s) IV Continuous <Continuous>                            7.8    3.74  )-----------( 296      ( 12 Jan 2024 05:23 )             23.5       01-12    139  |  108<H>  |  26<H>  ----------------------------<  93  3.6   |  25  |  0.93    Ca    10.7<H>      12 Jan 2024 05:23  Phos  2.2     01-12  Mg     1.90     01-12    T(C): 36.6 (01-12-24 @ 05:00), Max: 37.2 (01-11-24 @ 18:00)  HR: 71 (01-12-24 @ 09:57) (65 - 82)  BP: 124/71 (01-12-24 @ 05:00) (105/67 - 124/71)  RR: 19 (01-12-24 @ 05:00) (17 - 19)  SpO2: 97% (01-12-24 @ 09:57) (96% - 99%)  Wt(kg): --    I&O's Summary    11 Jan 2024 07:01  -  12 Jan 2024 07:00  --------------------------------------------------------  IN: 800 mL / OUT: 0 mL / NET: 800 mL    12 Jan 2024 07:01  -  12 Jan 2024 14:20  --------------------------------------------------------  IN: 240 mL / OUT: 0 mL / NET: 240 mL    General:  Alert and Oriented  Head: Normocephalic and atraumatic.   Neck: No JVD. No bruits. Supple. Does not appear to be enlarged.   Cardiovascular: + S1,S2 ; RRR Soft systolic murmur at the left lower sternal border. No rubs noted.    Lungs: CTA b/l. No rhonchi, rales or wheezes.   Abdomen: + BS, soft. Non tender. Non distended. No rebound. No guarding.   Extremities: No clubbing/cyanosis/edema.   Neurologic: Moves all four extremities. Full range of motion.   Skin: Warm and moist. The patient's skin has normal elasticity and good skin turgor.   Psychiatric: Appropriate mood and affect.  Musculoskeletal: Normal range of motion, normal strength       ECG:  	Sinus tachycardia    < from: Xray Chest 1 View AP/PA (01.02.24 @ 19:36) >  IMPRESSION:  No focal consolidations.  < end of copied text >    < from: Transthoracic Echocardiogram (07.10.23 @ 11:15) >  CONCLUSIONS:  1. Calcified trileaflet aortic valve with normal opening.  Minimal aortic regurgitation.  2. Endocardium not well visualized; grossly decreased  possibly mild left ventricular systolic dysfunction.  3. The right ventricle is not well visualized; grossly  normal right ventricular systolic function.  ------------------------------------------------------------------------  Confirmed on  7/10/2023 - 13:57:46 by MARLA Vegas  < end of copied text >      ASSESSMENT/PLAN: 	78M with history of MM, HTN, and Dementia (AAO x 1-2 to self, to place, not to time at baseline per brother) who presents to the hospital from [facility] for hypotension and lethargy found to have FLU A    -#Hypotension  -- BP stable  -- Norvasc and Lopressor on hold given acute illness--ok to resume at DC    #lethargy  --due to Flu, now resolved  --s/p tamiflu and Abx per medicine    DC planning     DATE OF SERVICE: 01-12-24    No overnight events, resting comfortably  Review of symptoms otherwise negative.    Review of Systems:   Constitutional: [ ] fevers, [ ] chills.   Skin: [ ] dry skin. [ ] rashes.  Psychiatric: [ ] depression, [ ] anxiety.   Gastrointestinal: [ ] BRBPR, [ ] melena.   Neurological: [ ] confusion. [ ] seizures. [ ] shuffling gait.   Ears,Nose,Mouth and Throat: [ ] ear pain [ ] sore throat.   Eyes: [ ] diplopia.   Respiratory: [ ] hemoptysis. [ ] shortness of breath  Cardiovascular: See HPI above  Hematologic/Lymphatic: [ ] anemia. [ ] painful nodes. [ ] prolonged bleeding.   Genitourinary: [ ] hematuria. [ ] flank pain.   Endocrine: [ ] significant change in weight. [ ] intolerance to heat and cold.     Review of systems [x] otherwise negative, [ ] otherwise unable to obtain    FH: no family history of sudden cardiac death in first degree relatives    SH: [ ] tobacco, [ ] alcohol, [ ] drugs    acetaminophen     Tablet .. 650 milliGRAM(s) Oral every 6 hours PRN  albuterol/ipratropium for Nebulization 3 milliLiter(s) Nebulizer every 12 hours  aluminum hydroxide/magnesium hydroxide/simethicone Suspension 30 milliLiter(s) Oral every 4 hours PRN  cyanocobalamin 1000 MICROGram(s) Oral daily  donepezil 10 milliGRAM(s) Oral at bedtime  folic acid 1 milliGRAM(s) Oral daily  heparin   Injectable 5000 Unit(s) SubCutaneous every 8 hours  melatonin 3 milliGRAM(s) Oral at bedtime PRN  memantine 10 milliGRAM(s) Oral two times a day  ondansetron Injectable 4 milliGRAM(s) IV Push every 8 hours PRN  potassium phosphate / sodium phosphate Powder (PHOS-NaK) 1 Packet(s) Oral two times a day  QUEtiapine 12.5 milliGRAM(s) Oral every 6 hours PRN  risperiDONE   Tablet 0.25 milliGRAM(s) Oral daily  risperiDONE   Tablet 0.5 milliGRAM(s) Oral at bedtime  sertraline 50 milliGRAM(s) Oral daily  sodium chloride 0.9%. 1000 milliLiter(s) IV Continuous <Continuous>                            7.8    3.74  )-----------( 296      ( 12 Jan 2024 05:23 )             23.5       01-12    139  |  108<H>  |  26<H>  ----------------------------<  93  3.6   |  25  |  0.93    Ca    10.7<H>      12 Jan 2024 05:23  Phos  2.2     01-12  Mg     1.90     01-12    T(C): 36.6 (01-12-24 @ 05:00), Max: 37.2 (01-11-24 @ 18:00)  HR: 71 (01-12-24 @ 09:57) (65 - 82)  BP: 124/71 (01-12-24 @ 05:00) (105/67 - 124/71)  RR: 19 (01-12-24 @ 05:00) (17 - 19)  SpO2: 97% (01-12-24 @ 09:57) (96% - 99%)  Wt(kg): --    I&O's Summary    11 Jan 2024 07:01  -  12 Jan 2024 07:00  --------------------------------------------------------  IN: 800 mL / OUT: 0 mL / NET: 800 mL    12 Jan 2024 07:01  -  12 Jan 2024 14:20  --------------------------------------------------------  IN: 240 mL / OUT: 0 mL / NET: 240 mL    General:  Alert and Oriented  Head: Normocephalic and atraumatic.   Neck: No JVD. No bruits. Supple. Does not appear to be enlarged.   Cardiovascular: + S1,S2 ; RRR Soft systolic murmur at the left lower sternal border. No rubs noted.    Lungs: CTA b/l. No rhonchi, rales or wheezes.   Abdomen: + BS, soft. Non tender. Non distended. No rebound. No guarding.   Extremities: No clubbing/cyanosis/edema.   Neurologic: Moves all four extremities. Full range of motion.   Skin: Warm and moist. The patient's skin has normal elasticity and good skin turgor.   Psychiatric: Appropriate mood and affect.  Musculoskeletal: Normal range of motion, normal strength       ECG:  	Sinus tachycardia    < from: Xray Chest 1 View AP/PA (01.02.24 @ 19:36) >  IMPRESSION:  No focal consolidations.  < end of copied text >    < from: Transthoracic Echocardiogram (07.10.23 @ 11:15) >  CONCLUSIONS:  1. Calcified trileaflet aortic valve with normal opening.  Minimal aortic regurgitation.  2. Endocardium not well visualized; grossly decreased  possibly mild left ventricular systolic dysfunction.  3. The right ventricle is not well visualized; grossly  normal right ventricular systolic function.  ------------------------------------------------------------------------  Confirmed on  7/10/2023 - 13:57:46 by MARLA Vegas  < end of copied text >      ASSESSMENT/PLAN: 	78M with history of MM, HTN, and Dementia (AAO x 1-2 to self, to place, not to time at baseline per brother) who presents to the hospital from [facility] for hypotension and lethargy found to have FLU A    -#Hypotension  -- BP stable  -- Norvasc and Lopressor on hold given acute illness--ok to resume at DC    #lethargy  --due to Flu, now resolved  --s/p tamiflu and Abx per medicine    DC planning

## 2024-01-12 NOTE — PROGRESS NOTE ADULT - SUBJECTIVE AND OBJECTIVE BOX
Date of Service: 01-12-24 @ 13:08    Patient is a 78y old  Male who presents with a chief complaint of Hypotension, lethargu (12 Jan 2024 11:30)      Any change in ROS: seems stable:  no sob:  no cough : no phlegm      MEDICATIONS  (STANDING):  albuterol/ipratropium for Nebulization 3 milliLiter(s) Nebulizer every 12 hours  cyanocobalamin 1000 MICROGram(s) Oral daily  donepezil 10 milliGRAM(s) Oral at bedtime  folic acid 1 milliGRAM(s) Oral daily  heparin   Injectable 5000 Unit(s) SubCutaneous every 8 hours  memantine 10 milliGRAM(s) Oral two times a day  potassium phosphate / sodium phosphate Powder (PHOS-NaK) 1 Packet(s) Oral two times a day  risperiDONE   Tablet 0.25 milliGRAM(s) Oral daily  risperiDONE   Tablet 0.5 milliGRAM(s) Oral at bedtime  sertraline 50 milliGRAM(s) Oral daily  sodium chloride 0.9%. 1000 milliLiter(s) (75 mL/Hr) IV Continuous <Continuous>    MEDICATIONS  (PRN):  acetaminophen     Tablet .. 650 milliGRAM(s) Oral every 6 hours PRN Temp greater or equal to 38C (100.4F), Mild Pain (1 - 3)  aluminum hydroxide/magnesium hydroxide/simethicone Suspension 30 milliLiter(s) Oral every 4 hours PRN Dyspepsia  melatonin 3 milliGRAM(s) Oral at bedtime PRN Insomnia  ondansetron Injectable 4 milliGRAM(s) IV Push every 8 hours PRN Nausea and/or Vomiting  QUEtiapine 12.5 milliGRAM(s) Oral every 6 hours PRN for agitation    Vital Signs Last 24 Hrs  T(C): 36.6 (12 Jan 2024 05:00), Max: 37.4 (11 Jan 2024 14:00)  T(F): 97.8 (12 Jan 2024 05:00), Max: 99.3 (11 Jan 2024 14:00)  HR: 71 (12 Jan 2024 09:57) (65 - 82)  BP: 124/71 (12 Jan 2024 05:00) (105/67 - 124/71)  BP(mean): --  RR: 19 (12 Jan 2024 05:00) (17 - 19)  SpO2: 97% (12 Jan 2024 09:57) (96% - 99%)    Parameters below as of 12 Jan 2024 09:57  Patient On (Oxygen Delivery Method): room air        I&O's Summary    11 Jan 2024 07:01  -  12 Jan 2024 07:00  --------------------------------------------------------  IN: 800 mL / OUT: 0 mL / NET: 800 mL    12 Jan 2024 07:01  -  12 Jan 2024 13:08  --------------------------------------------------------  IN: 240 mL / OUT: 0 mL / NET: 240 mL          Physical Exam:   GENERAL: NAD, well-groomed, well-developed  HEENT: CHRISTINA/   Atraumatic, Normocephalic  ENMT: No tonsillar erythema, exudates, or enlargement; Moist mucous membranes, Good dentition, No lesions  NECK: Supple, No JVD, Normal thyroid  CHEST/LUNG: Clear to auscultaion- no wheezing  CVS: Regular rate and rhythm; No murmurs, rubs, or gallops  GI: : Soft, Nontender, Nondistended; Bowel sounds present  NERVOUS SYSTEM:  Alert & awake:  dementia  EXTREMITIES:  2+ Peripheral Pulses, No clubbing, cyanosis, or edema  LYMPH: No lymphadenopathy noted  SKIN: No rashes or lesions  ENDOCRINOLOGY: No Thyromegaly  PSYCH: dementia    Labs:                              7.8    3.74  )-----------( 296      ( 12 Jan 2024 05:23 )             23.5                         8.4    4.17  )-----------( 251      ( 09 Jan 2024 07:01 )             24.9     01-12    139  |  108<H>  |  26<H>  ----------------------------<  93  3.6   |  25  |  0.93  01-09    134<L>  |  105  |  19  ----------------------------<  81  4.1   |  19<L>  |  0.79    Ca    10.7<H>      12 Jan 2024 05:23  Phos  2.2     01-12  Mg     1.90     01-12      CAPILLARY BLOOD GLUCOSE      rd< from: Xray Chest 1 View AP/PA (01.02.24 @ 19:36) >    ACC: [id number] EXAM:  XR CHEST AP OR PA 1V   ORDERED BY: CATHIE MULLER     PROCEDURE DATE:  01/02/2024          INTERPRETATION:  CLINICAL INDICATION: Sepsis.    EXAM: Chest x-ray 2 views.    COMPARISON: None available.    FINDINGS:  Surgical clips overlying the midline of the neck.  Bilateral lung fields partially obscured by patient positioning.   Visualized lung fields are clear.  There is no pleural effusion or pneumothorax.  The heart is not well evaluated in this position. Median sternotomy.  The visualized osseous structures demonstrate no acute pathology.    IMPRESSION:  No focal consolidations.    --- End of Report ---          MIR LAW MD; Resident Radiologist  This document has been electronically signed.  KOFI GARCIA MD; Attending Radiologist  This document has been electronically signed. Jan 2 2024  7:43PM    < end of copied text >  < from: NM PET/CT Oncology FDG WB, Subsq (06.12.23 @ 21:29) >  ABDOMINOPELVIC LYMPH NODES/RETROPERITONEUM: No enlarged or FDG-avid lymph   node.    ESOPHAGUS/STOMACH/BOWEL/PERITONEUM/MESENTERY: FDG-avid focus, proximal   ascending colon, not well delineated on CT, raises the possibility of a   polyp (SUV 6.7; image 215).    VESSELS: Coronary artery calcifications and/or stent. Atherosclerotic   changes in the aorta and its branches. No aneurysm.    BONES/SOFT TISSUES: FDG-avid mild superior endplate compression deformity   of L2 vertebral body (SUV 7.2; image 184). FDG-avid moderate compression   deformity of T9 vertebral body (SUV 4.6; image 142), and minimally   FDG-avid, mild compression deformity of T10 vertebral body which are   heterogeneous in appearance on CT. Increased FDG activity in the superior   endplate of the T6 vertebral body, not well delineated on CT (SUV 5.3;   image 118).    There is an FDG-avid expansile lesion in the anterolateral aspect of the   left eighth rib (SUV 4.6; image 169). There is a mildly FDG-avid healing   fracture in the adjacent left seventh rib (SUV 2.4; image 168). There are   several additional foci of mild radiotracer activity in several bilateral   ribs, predominantly without corresponding abnormalities on CT.    There is FDG-avid severe osteoarthritic changes in the left greater than   right hips.    IMPRESSION: Abnormal kbcdd-sm-epslvJSM-PET/CT scan.    1. Mild to moderately FDG-avid compression fracture/deformity of   approximately T9, T10, and L2 vertebral bodies. The T9 and T10 vertebral   bodies are heterogeneous in appearance on CT. There also is increased FDG   activity in the superior endplate of the T6 vertebral body which is not   well delineated on CT. Etiology is indeterminate. MRI may be obtained for   further evaluation.    2. Several FDG-avid bilateral rib lesions, predominantly without   corresponding abnormalities on CT. The most FDG-avid focus corresponds to   an expansile lesion in the anterolateral aspect of the left eighth rib,   suspicious for myeloma. There is a mildly FDG-avid healing fracture in   the adjacent left seventh rib which may be pathologic.    3. FDG-avid severe osteoarthritic changes in the left greater than right   hips.    --- End of Report ---      ***Please see the addendum at the top of this report. It may contain   additional important information or changes.****         AVTAR LOPEZ MD; Attending Nuclear Medicine   This document has been electronically signed. Jun 14 2023  3:37PM  1st Addendum: AVTAR LOPEZ MD; Attending Nuclear Medicine  The first addendum was electronically signed on: Jun 20 2023  2:12PM.    < end of copied text >          Urinalysis Basic - ( 12 Jan 2024 05:23 )    Color: x / Appearance: x / SG: x / pH: x  Gluc: 93 mg/dL / Ketone: x  / Bili: x / Urobili: x   Blood: x / Protein: x / Nitrite: x   Leuk Esterase: x / RBC: x / WBC x   Sq Epi: x / Non Sq Epi: x / Bacteria: x            RECENT CULTURES:        RESPIRATORY CULTURES:          Studies  Chest X-RAY  CT SCAN Chest   Venous Dopplers: LE:   CT Abdomen  Others               Date of Service: 01-12-24 @ 13:08    Patient is a 78y old  Male who presents with a chief complaint of Hypotension, lethargu (12 Jan 2024 11:30)      Any change in ROS: seems stable:  no sob:  no cough : no phlegm      MEDICATIONS  (STANDING):  albuterol/ipratropium for Nebulization 3 milliLiter(s) Nebulizer every 12 hours  cyanocobalamin 1000 MICROGram(s) Oral daily  donepezil 10 milliGRAM(s) Oral at bedtime  folic acid 1 milliGRAM(s) Oral daily  heparin   Injectable 5000 Unit(s) SubCutaneous every 8 hours  memantine 10 milliGRAM(s) Oral two times a day  potassium phosphate / sodium phosphate Powder (PHOS-NaK) 1 Packet(s) Oral two times a day  risperiDONE   Tablet 0.25 milliGRAM(s) Oral daily  risperiDONE   Tablet 0.5 milliGRAM(s) Oral at bedtime  sertraline 50 milliGRAM(s) Oral daily  sodium chloride 0.9%. 1000 milliLiter(s) (75 mL/Hr) IV Continuous <Continuous>    MEDICATIONS  (PRN):  acetaminophen     Tablet .. 650 milliGRAM(s) Oral every 6 hours PRN Temp greater or equal to 38C (100.4F), Mild Pain (1 - 3)  aluminum hydroxide/magnesium hydroxide/simethicone Suspension 30 milliLiter(s) Oral every 4 hours PRN Dyspepsia  melatonin 3 milliGRAM(s) Oral at bedtime PRN Insomnia  ondansetron Injectable 4 milliGRAM(s) IV Push every 8 hours PRN Nausea and/or Vomiting  QUEtiapine 12.5 milliGRAM(s) Oral every 6 hours PRN for agitation    Vital Signs Last 24 Hrs  T(C): 36.6 (12 Jan 2024 05:00), Max: 37.4 (11 Jan 2024 14:00)  T(F): 97.8 (12 Jan 2024 05:00), Max: 99.3 (11 Jan 2024 14:00)  HR: 71 (12 Jan 2024 09:57) (65 - 82)  BP: 124/71 (12 Jan 2024 05:00) (105/67 - 124/71)  BP(mean): --  RR: 19 (12 Jan 2024 05:00) (17 - 19)  SpO2: 97% (12 Jan 2024 09:57) (96% - 99%)    Parameters below as of 12 Jan 2024 09:57  Patient On (Oxygen Delivery Method): room air        I&O's Summary    11 Jan 2024 07:01  -  12 Jan 2024 07:00  --------------------------------------------------------  IN: 800 mL / OUT: 0 mL / NET: 800 mL    12 Jan 2024 07:01  -  12 Jan 2024 13:08  --------------------------------------------------------  IN: 240 mL / OUT: 0 mL / NET: 240 mL          Physical Exam:   GENERAL: NAD, well-groomed, well-developed  HEENT: CHRISTINA/   Atraumatic, Normocephalic  ENMT: No tonsillar erythema, exudates, or enlargement; Moist mucous membranes, Good dentition, No lesions  NECK: Supple, No JVD, Normal thyroid  CHEST/LUNG: Clear to auscultaion- no wheezing  CVS: Regular rate and rhythm; No murmurs, rubs, or gallops  GI: : Soft, Nontender, Nondistended; Bowel sounds present  NERVOUS SYSTEM:  Alert & awake:  dementia  EXTREMITIES:  2+ Peripheral Pulses, No clubbing, cyanosis, or edema  LYMPH: No lymphadenopathy noted  SKIN: No rashes or lesions  ENDOCRINOLOGY: No Thyromegaly  PSYCH: dementia    Labs:                              7.8    3.74  )-----------( 296      ( 12 Jan 2024 05:23 )             23.5                         8.4    4.17  )-----------( 251      ( 09 Jan 2024 07:01 )             24.9     01-12    139  |  108<H>  |  26<H>  ----------------------------<  93  3.6   |  25  |  0.93  01-09    134<L>  |  105  |  19  ----------------------------<  81  4.1   |  19<L>  |  0.79    Ca    10.7<H>      12 Jan 2024 05:23  Phos  2.2     01-12  Mg     1.90     01-12      CAPILLARY BLOOD GLUCOSE      rd< from: Xray Chest 1 View AP/PA (01.02.24 @ 19:36) >    ACC: [id number] EXAM:  XR CHEST AP OR PA 1V   ORDERED BY: CATHIE MULLER     PROCEDURE DATE:  01/02/2024          INTERPRETATION:  CLINICAL INDICATION: Sepsis.    EXAM: Chest x-ray 2 views.    COMPARISON: None available.    FINDINGS:  Surgical clips overlying the midline of the neck.  Bilateral lung fields partially obscured by patient positioning.   Visualized lung fields are clear.  There is no pleural effusion or pneumothorax.  The heart is not well evaluated in this position. Median sternotomy.  The visualized osseous structures demonstrate no acute pathology.    IMPRESSION:  No focal consolidations.    --- End of Report ---          MIR LAW MD; Resident Radiologist  This document has been electronically signed.  KOFI GARCIA MD; Attending Radiologist  This document has been electronically signed. Jan 2 2024  7:43PM    < end of copied text >  < from: NM PET/CT Oncology FDG WB, Subsq (06.12.23 @ 21:29) >  ABDOMINOPELVIC LYMPH NODES/RETROPERITONEUM: No enlarged or FDG-avid lymph   node.    ESOPHAGUS/STOMACH/BOWEL/PERITONEUM/MESENTERY: FDG-avid focus, proximal   ascending colon, not well delineated on CT, raises the possibility of a   polyp (SUV 6.7; image 215).    VESSELS: Coronary artery calcifications and/or stent. Atherosclerotic   changes in the aorta and its branches. No aneurysm.    BONES/SOFT TISSUES: FDG-avid mild superior endplate compression deformity   of L2 vertebral body (SUV 7.2; image 184). FDG-avid moderate compression   deformity of T9 vertebral body (SUV 4.6; image 142), and minimally   FDG-avid, mild compression deformity of T10 vertebral body which are   heterogeneous in appearance on CT. Increased FDG activity in the superior   endplate of the T6 vertebral body, not well delineated on CT (SUV 5.3;   image 118).    There is an FDG-avid expansile lesion in the anterolateral aspect of the   left eighth rib (SUV 4.6; image 169). There is a mildly FDG-avid healing   fracture in the adjacent left seventh rib (SUV 2.4; image 168). There are   several additional foci of mild radiotracer activity in several bilateral   ribs, predominantly without corresponding abnormalities on CT.    There is FDG-avid severe osteoarthritic changes in the left greater than   right hips.    IMPRESSION: Abnormal qxtlj-rw-uohsyYMO-PET/CT scan.    1. Mild to moderately FDG-avid compression fracture/deformity of   approximately T9, T10, and L2 vertebral bodies. The T9 and T10 vertebral   bodies are heterogeneous in appearance on CT. There also is increased FDG   activity in the superior endplate of the T6 vertebral body which is not   well delineated on CT. Etiology is indeterminate. MRI may be obtained for   further evaluation.    2. Several FDG-avid bilateral rib lesions, predominantly without   corresponding abnormalities on CT. The most FDG-avid focus corresponds to   an expansile lesion in the anterolateral aspect of the left eighth rib,   suspicious for myeloma. There is a mildly FDG-avid healing fracture in   the adjacent left seventh rib which may be pathologic.    3. FDG-avid severe osteoarthritic changes in the left greater than right   hips.    --- End of Report ---      ***Please see the addendum at the top of this report. It may contain   additional important information or changes.****         AVTAR LOPEZ MD; Attending Nuclear Medicine   This document has been electronically signed. Jun 14 2023  3:37PM  1st Addendum: AVTAR LOPEZ MD; Attending Nuclear Medicine  The first addendum was electronically signed on: Jun 20 2023  2:12PM.    < end of copied text >          Urinalysis Basic - ( 12 Jan 2024 05:23 )    Color: x / Appearance: x / SG: x / pH: x  Gluc: 93 mg/dL / Ketone: x  / Bili: x / Urobili: x   Blood: x / Protein: x / Nitrite: x   Leuk Esterase: x / RBC: x / WBC x   Sq Epi: x / Non Sq Epi: x / Bacteria: x            RECENT CULTURES:        RESPIRATORY CULTURES:          Studies  Chest X-RAY  CT SCAN Chest   Venous Dopplers: LE:   CT Abdomen  Others

## 2024-01-12 NOTE — PROGRESS NOTE ADULT - ASSESSMENT
79 y/o M PMhx MM, HTN, and Dementia who presented from [facility] for hypotension and lethargy found to have flu A    influenza A- treated  fever resolved  CXR clear    UA not consistent w/ UTI  urine cx negative    Recommendations  s/p tamiflu 30mg bid, completed 1/7  discharge planning    We will sign off. Thank you for allowing us to participate in the care of Mr. Wen. Please feel free to call with any questions or concerns.     Sylvester Trinidad M.D.  [facility], Division of Infectious Diseases  [phone]  After 5pm on weekdays and all day on weekends - please call [phone]  79 y/o M PMhx MM, HTN, and Dementia who presented from [facility] for hypotension and lethargy found to have flu A    influenza A- treated  fever resolved  CXR clear    UA not consistent w/ UTI  urine cx negative    Recommendations  s/p tamiflu 30mg bid, completed 1/7  discharge planning    We will sign off. Thank you for allowing us to participate in the care of Mr. Wen. Please feel free to call with any questions or concerns.     Sylvester Trinidad M.D.  [facility], Division of Infectious Diseases  [phone]  After 5pm on weekdays and all day on weekends - please call [phone]  79 y/o M PMhx MM, HTN, and Dementia who presented from [facility] for hypotension and lethargy found to have flu A    influenza A- treated  fever resolved  CXR clear    UA not consistent w/ UTI  urine cx negative    Recommendations  s/p tamiflu 30mg bid, completed 1/7  discharge planning    We will sign off. Thank you for allowing us to participate in the care of Mr. Wen. Please feel free to call with any questions or concerns.     Sylvester Trinidad M.D.  [facility], Division of Infectious Diseases  [phone]  After 5pm on weekdays and all day on weekends - please call [phone]  77 y/o M PMhx MM, HTN, and Dementia who presented from [facility] for hypotension and lethargy found to have flu A    influenza A- treated  fever resolved  CXR clear    UA not consistent w/ UTI  urine cx negative    Recommendations  s/p tamiflu 30mg bid, completed 1/7  discharge planning    Sylvester Trinidad M.D.  [facility], Division of Infectious Diseases  [phone]  After 5pm on weekdays and all day on weekends - please call [phone]  79 y/o M PMhx MM, HTN, and Dementia who presented from [facility] for hypotension and lethargy found to have flu A    influenza A- treated  fever resolved  CXR clear    UA not consistent w/ UTI  urine cx negative    Recommendations  s/p tamiflu 30mg bid, completed 1/7  discharge planning    Sylvester Trinidad M.D.  [facility], Division of Infectious Diseases  [phone]  After 5pm on weekdays and all day on weekends - please call [phone]  79 y/o M PMhx MM, HTN, and Dementia who presented from [facility] for hypotension and lethargy found to have flu A    influenza A- treated  fever resolved  CXR clear    UA not consistent w/ UTI  urine cx negative    Recommendations  s/p tamiflu 30mg bid, completed 1/7  discharge planning    Sylvester Trinidad M.D.  [facility], Division of Infectious Diseases  [phone]  After 5pm on weekdays and all day on weekends - please call [phone]

## 2024-01-12 NOTE — PROGRESS NOTE ADULT - SUBJECTIVE AND OBJECTIVE BOX
[facility] [place]   MD ANEL FOLEY MD RUORU WONG, PA    TEL:  FROM 9 AM to 5 PM ---OFFICE: [phone]  AVAILABLE ON TEAMS    FROM 5 PM - 9 AM PLEASE CALL ANSWERING SERVICE: [phone]    RENAL FOLLOW UP NOTE--Date of Service 01-12-24 @ 10:25  --------------------------------------------------------------------------------  HPI:      Pt seen and examined at bedside.       PAST HISTORY  --------------------------------------------------------------------------------  No significant changes to PMH, PSH, FHx, SHx, unless otherwise noted    ALLERGIES & MEDICATIONS  --------------------------------------------------------------------------------  Allergies    No Known Allergies    Intolerances      Standing Inpatient Medications  albuterol/ipratropium for Nebulization 3 milliLiter(s) Nebulizer every 12 hours  cyanocobalamin 1000 MICROGram(s) Oral daily  donepezil 10 milliGRAM(s) Oral at bedtime  folic acid 1 milliGRAM(s) Oral daily  heparin   Injectable 5000 Unit(s) SubCutaneous every 8 hours  memantine 10 milliGRAM(s) Oral two times a day  potassium phosphate / sodium phosphate Powder (PHOS-NaK) 1 Packet(s) Oral two times a day  risperiDONE   Tablet 0.25 milliGRAM(s) Oral daily  risperiDONE   Tablet 0.5 milliGRAM(s) Oral at bedtime  sertraline 50 milliGRAM(s) Oral daily    PRN Inpatient Medications  acetaminophen     Tablet .. 650 milliGRAM(s) Oral every 6 hours PRN  aluminum hydroxide/magnesium hydroxide/simethicone Suspension 30 milliLiter(s) Oral every 4 hours PRN  melatonin 3 milliGRAM(s) Oral at bedtime PRN  ondansetron Injectable 4 milliGRAM(s) IV Push every 8 hours PRN  QUEtiapine 12.5 milliGRAM(s) Oral every 6 hours PRN      REVIEW OF SYSTEMS  --------------------------------------------------------------------------------  General: no fever  MSK: no edema     VITALS/PHYSICAL EXAM  --------------------------------------------------------------------------------  T(C): 36.6 (01-12-24 @ 05:00), Max: 37.4 (01-11-24 @ 14:00)  HR: 65 (01-12-24 @ 05:00) (65 - 82)  BP: 124/71 (01-12-24 @ 05:00) (105/67 - 124/71)  RR: 19 (01-12-24 @ 05:00) (17 - 19)  SpO2: 99% (01-12-24 @ 05:00) (96% - 99%)  Wt(kg): --        01-11-24 @ 07:01  -  01-12-24 @ 07:00  --------------------------------------------------------  IN: 800 mL / OUT: 0 mL / NET: 800 mL      Physical Exam:  	Gen: NAD  	HEENT: MMM  	Pulm: CTA B/L  	CV: S1S2  	Abd: Soft, +BS  	Ext: No LE edema B/L                      Neuro: Awake   	Skin: Warm and Dry   	Vascular access: NO HD catheter            no  galilea  LABS/STUDIES  --------------------------------------------------------------------------------              7.8    3.74  >-----------<  296      [01-12-24 @ 05:23]              23.5     139  |  108  |  26  ----------------------------<  93      [01-12-24 @ 05:23]  3.6   |  25  |  0.93        Ca     10.7     [01-12-24 @ 05:23]      Mg     1.90     [01-12-24 @ 05:23]      Phos  2.2     [01-12-24 @ 05:23]            Creatinine Trend:  SCr 0.93 [01-12 @ 05:23]  SCr 0.79 [01-09 @ 07:01]  SCr 0.77 [01-05 @ 05:21]  SCr 0.88 [01-04 @ 16:00]  SCr 1.41 [01-02 @ 19:10]    Urinalysis - [01-12-24 @ 05:23]      Color  / Appearance  / SG  / pH       Gluc 93 / Ketone   / Bili  / Urobili        Blood  / Protein  / Leuk Est  / Nitrite       RBC  / WBC  / Hyaline  / Gran  / Sq Epi  / Non Sq Epi  / Bacteria            [facility] [place] [facility]   MD ANEL FOLEY MD RUORU WONG, PA    TEL:  FROM 9 AM to 5 PM ---OFFICE: [phone]  AVAILABLE ON TEAMS    FROM 5 PM - 9 AM PLEASE CALL ANSWERING SERVICE: [phone]    RENAL FOLLOW UP NOTE--Date of Service 01-12-24 @ 10:25  --------------------------------------------------------------------------------  HPI:      Pt seen and examined at bedside.       PAST HISTORY  --------------------------------------------------------------------------------  No significant changes to PMH, PSH, FHx, SHx, unless otherwise noted    ALLERGIES & MEDICATIONS  --------------------------------------------------------------------------------  Allergies    No Known Allergies    Intolerances      Standing Inpatient Medications  albuterol/ipratropium for Nebulization 3 milliLiter(s) Nebulizer every 12 hours  cyanocobalamin 1000 MICROGram(s) Oral daily  donepezil 10 milliGRAM(s) Oral at bedtime  folic acid 1 milliGRAM(s) Oral daily  heparin   Injectable 5000 Unit(s) SubCutaneous every 8 hours  memantine 10 milliGRAM(s) Oral two times a day  potassium phosphate / sodium phosphate Powder (PHOS-NaK) 1 Packet(s) Oral two times a day  risperiDONE   Tablet 0.25 milliGRAM(s) Oral daily  risperiDONE   Tablet 0.5 milliGRAM(s) Oral at bedtime  sertraline 50 milliGRAM(s) Oral daily    PRN Inpatient Medications  acetaminophen     Tablet .. 650 milliGRAM(s) Oral every 6 hours PRN  aluminum hydroxide/magnesium hydroxide/simethicone Suspension 30 milliLiter(s) Oral every 4 hours PRN  melatonin 3 milliGRAM(s) Oral at bedtime PRN  ondansetron Injectable 4 milliGRAM(s) IV Push every 8 hours PRN  QUEtiapine 12.5 milliGRAM(s) Oral every 6 hours PRN      REVIEW OF SYSTEMS  --------------------------------------------------------------------------------  General: no fever  MSK: no edema     VITALS/PHYSICAL EXAM  --------------------------------------------------------------------------------  T(C): 36.6 (01-12-24 @ 05:00), Max: 37.4 (01-11-24 @ 14:00)  HR: 65 (01-12-24 @ 05:00) (65 - 82)  BP: 124/71 (01-12-24 @ 05:00) (105/67 - 124/71)  RR: 19 (01-12-24 @ 05:00) (17 - 19)  SpO2: 99% (01-12-24 @ 05:00) (96% - 99%)  Wt(kg): --        01-11-24 @ 07:01  -  01-12-24 @ 07:00  --------------------------------------------------------  IN: 800 mL / OUT: 0 mL / NET: 800 mL      Physical Exam:  	Gen: NAD  	HEENT: MMM  	Pulm: CTA B/L  	CV: S1S2  	Abd: Soft, +BS  	Ext: No LE edema B/L                      Neuro: Awake   	Skin: Warm and Dry   	Vascular access: NO HD catheter            no  galilea  LABS/STUDIES  --------------------------------------------------------------------------------              7.8    3.74  >-----------<  296      [01-12-24 @ 05:23]              23.5     139  |  108  |  26  ----------------------------<  93      [01-12-24 @ 05:23]  3.6   |  25  |  0.93        Ca     10.7     [01-12-24 @ 05:23]      Mg     1.90     [01-12-24 @ 05:23]      Phos  2.2     [01-12-24 @ 05:23]            Creatinine Trend:  SCr 0.93 [01-12 @ 05:23]  SCr 0.79 [01-09 @ 07:01]  SCr 0.77 [01-05 @ 05:21]  SCr 0.88 [01-04 @ 16:00]  SCr 1.41 [01-02 @ 19:10]    Urinalysis - [01-12-24 @ 05:23]      Color  / Appearance  / SG  / pH       Gluc 93 / Ketone   / Bili  / Urobili        Blood  / Protein  / Leuk Est  / Nitrite       RBC  / WBC  / Hyaline  / Gran  / Sq Epi  / Non Sq Epi  / Bacteria

## 2024-01-12 NOTE — PROGRESS NOTE ADULT - ASSESSMENT
78M with history as above who presents to the hospital with hypotension and lethargy at his NH here found to have sepsis 2/2 influenza A and UTI.         Problem/Plan - 1:  ·  Problem: Sepsis, unspecified organism.   ·  Plan: - Meets sepsis criteria with fever to 100.7 and HR > 90, influenza A positive and possible UTI  - finished tamiflu     Problem/Plan - 2:  ·  Problem: Influenza A.   ·  Plan: - Tamiflu as above (renally dosed)  - CXR poor study but with grossly clear lungs in the visualized portion  - Lungs grossly clear to auscultation  - Monitor cough/sputum    Problem/Plan - 3:  ·  Problem: Acute UTI.   ·  Plan: - UA positive for leuk esterase and nitrites, negative for bacteria  - off antibiotics   Problem/Plan - 4:·  Problem: DAVID (acute kidney injury).   ·  Plan: - DAVID likely 2/2 hypotension and sepsis, unclear if patient had decreased PO intake at the NH  - monitor cr  Problem/Plan - 5:  ·  Problem: Macrocytic anemia.   ·  Plan: - Worsening macrocytic anemia, no known bleeding issues as per Brother  - monitor cbc     Problem/Plan - 6:  ·  Problem: Dementia.   ·  Plan: - Lethargic at NH, currently awake and alert, oriented x2 to self and place (hospital, not to name of hospital, not to time) which is his baseline as per brother- c/w home donezepil, namenda, risperidone, seroquel  Problem/Plan - 7:  ·  Problem: Essential hypertension.   ·  Plan: - cw current meds     dc planning awaiting rehab bed

## 2024-01-12 NOTE — PROGRESS NOTE ADULT - ASSESSMENT
This is a 78M with history of MM, HTN, and Dementia (AAO x 1-2 to self, to place, not to time at baseline per brother) who presents to the hospital from [facility] for hypotension and lethargy. Patient currently awake, alert, AAO x2 (to self, to hospital but not name, not to time) but very poor historian, denies any acute complaints when asked. Spoke with brother Mookie who said that the patient was sent to the hospital for lethargy and cough though he states that the patient has had a cough for several months. Brother denies any other known acute complaints for the patient. Called [facility] [phone] but there was no staff available who knew the patient. As per NH paperwork and ED note, patient was sent to the hospital for hypotention to 81/55 and lethargy. He was noted to have a low grade temp of 99.6 and saturation of 93% at his NH. He was given tylenol 650mg x1 prior to being sent to the hospital.   On arrival to the hospital his vitals were T 99.4 -> 100.7 rectal, P 92, BP 88/55 -> 117/70, RR 16, O2 sat 96% RA. His lab work was significant for worsening macrocytic anemia, DAVID with mild hyponatremia, elevated protein gap, elevated lactate with improvement on repeat. His UA was positive for nitrite, leuk esterase but negative for bacteria. His respiratory swab was positive for influenza A. His imaging did not show acute abnormalities. He was given ofirmev 1g, NS 500cc x1, vanc 1g, cefepime 2g, and tamiflu 75mg PO x1. He was admitted to medicine.  (03 Jan 2024 06:06):  now pulm called:  he is from NH:  above history noted:  he seems to be responding to simple commands:  he say he has no underlying lung history  never smoked:  on room air:  adm with influenza:     Influenza:   Multiple Myeloma  HTN  Dementia      Influenza:   -low grade fever  -Influenza:  on tamilflu  -cxr is clear:  -he is not wheezing    1/4: no change in pulm status today  : remains on room air  1/5: doing ok : no sob:  no cough : no phlegm;   1/6: on Tamiflu  no sob:  on room air  1/7: seems to be doing ok : no sob:  no cough:   1/8: seems OK: no sob:  no cough : no phlegm  on room air  1/9: doing ok: no sob:   on cough :  1/10: seems OK: no sob:  on room air:  remains lethargic: Afebrile and is normal BC count  1/11: resolved  Multiple Myeloma  -per primary team : FDG PET scan  noted:  rib lesions present likely secondary to MM   HTN  -controlled  Dementia  -supportive care:    karyn acp  : dc planning This is a 78M with history of MM, HTN, and Dementia (AAO x 1-2 to self, to place, not to time at baseline per brother) who presents to the hospital from [facility] for hypotension and lethargy. Patient currently awake, alert, AAO x2 (to self, to hospital but not name, not to time) but very poor historian, denies any acute complaints when asked. Spoke with brother Mookie who said that the patient was sent to the hospital for lethargy and cough though he states that the patient has had a cough for several months. Brother denies any other known acute complaints for the patient. Called [facility] [phone] but there was no staff available who knew the patient. As per NH paperwork and ED note, patient was sent to the hospital for hypotention to 81/55 and lethargy. He was noted to have a low grade temp of 99.6 and saturation of 93% at his NH. He was given tylenol 650mg x1 prior to being sent to the hospital.   On arrival to the hospital his vitals were T 99.4 -> 100.7 rectal, P 92, BP 88/55 -> 117/70, RR 16, O2 sat 96% RA. His lab work was significant for worsening macrocytic anemia, DAVID with mild hyponatremia, elevated protein gap, elevated lactate with improvement on repeat. His UA was positive for nitrite, leuk esterase but negative for bacteria. His respiratory swab was positive for influenza A. His imaging did not show acute abnormalities. He was given ofirmev 1g, NS 500cc x1, vanc 1g, cefepime 2g, and tamiflu 75mg PO x1. He was admitted to medicine.  (03 Jan 2024 06:06):  now pulm called:  he is from NH:  above history noted:  he seems to be responding to simple commands:  he say he has no underlying lung history  never smoked:  on room air:  adm with influenza:     Influenza:   Multiple Myeloma  HTN  Dementia      Influenza:   -low grade fever  -Influenza:  on tamilflu  -cxr is clear:  -he is not wheezing    1/4: no change in pulm status today  : remains on room air  1/5: doing ok : no sob:  no cough : no phlegm;   1/6: on Tamiflu  no sob:  on room air  1/7: seems to be doing ok : no sob:  no cough:   1/8: seems OK: no sob:  no cough : no phlegm  on room air  1/9: doing ok: no sob:   on cough :  1/10: seems OK: no sob:  on room air:  remains lethargic: Afebrile and is normal BC count  1/11: resolved  Multiple Myeloma  -per primary team : FDG PET scan  noted:  rib lesions present likely secondary to MM   HTN  -controlled  Dementia  -supportive care:    karyn acp  : dc planning

## 2024-01-12 NOTE — PROGRESS NOTE ADULT - SUBJECTIVE AND OBJECTIVE BOX
Neurology Progress Note    S: Patient seen and examined. No new events overnight.    MEDICATIONS:    acetaminophen     Tablet .. 650 milliGRAM(s) Oral every 6 hours PRN  albuterol/ipratropium for Nebulization 3 milliLiter(s) Nebulizer every 12 hours  aluminum hydroxide/magnesium hydroxide/simethicone Suspension 30 milliLiter(s) Oral every 4 hours PRN  cyanocobalamin 1000 MICROGram(s) Oral daily  donepezil 10 milliGRAM(s) Oral at bedtime  folic acid 1 milliGRAM(s) Oral daily  heparin   Injectable 5000 Unit(s) SubCutaneous every 8 hours  melatonin 3 milliGRAM(s) Oral at bedtime PRN  memantine 10 milliGRAM(s) Oral two times a day  ondansetron Injectable 4 milliGRAM(s) IV Push every 8 hours PRN  potassium phosphate / sodium phosphate Powder (PHOS-NaK) 1 Packet(s) Oral two times a day  QUEtiapine 12.5 milliGRAM(s) Oral every 6 hours PRN  risperiDONE   Tablet 0.25 milliGRAM(s) Oral daily  risperiDONE   Tablet 0.5 milliGRAM(s) Oral at bedtime  sertraline 50 milliGRAM(s) Oral daily  sodium chloride 0.9%. 1000 milliLiter(s) IV Continuous <Continuous>      LABS:                          7.8    3.74  )-----------( 296      ( 12 Jan 2024 05:23 )             23.5     01-12    139  |  108<H>  |  26<H>  ----------------------------<  93  3.6   |  25  |  0.93    Ca    10.7<H>      12 Jan 2024 05:23  Phos  2.2     01-12  Mg     1.90     01-12      CAPILLARY BLOOD GLUCOSE          Urinalysis Basic - ( 12 Jan 2024 05:23 )    Color: x / Appearance: x / SG: x / pH: x  Gluc: 93 mg/dL / Ketone: x  / Bili: x / Urobili: x   Blood: x / Protein: x / Nitrite: x   Leuk Esterase: x / RBC: x / WBC x   Sq Epi: x / Non Sq Epi: x / Bacteria: x      I&O's Summary    11 Jan 2024 07:01  -  12 Jan 2024 07:00  --------------------------------------------------------  IN: 800 mL / OUT: 0 mL / NET: 800 mL    12 Jan 2024 07:01  -  12 Jan 2024 11:30  --------------------------------------------------------  IN: 240 mL / OUT: 0 mL / NET: 240 mL      Vital Signs Last 24 Hrs  T(C): 36.6 (12 Jan 2024 05:00), Max: 37.4 (11 Jan 2024 14:00)  T(F): 97.8 (12 Jan 2024 05:00), Max: 99.3 (11 Jan 2024 14:00)  HR: 71 (12 Jan 2024 09:57) (65 - 82)  BP: 124/71 (12 Jan 2024 05:00) (105/67 - 124/71)  BP(mean): --  RR: 19 (12 Jan 2024 05:00) (17 - 19)  SpO2: 97% (12 Jan 2024 09:57) (96% - 99%)    Parameters below as of 12 Jan 2024 09:57  Patient On (Oxygen Delivery Method): room air          General Exam:   General Appearance: Appropriately dressed and in no acute distress       Head: Normocephalic, atraumatic and no dysmorphic features  Ear, Nose, and Throat: Moist mucous membranes  CVS: S1S2+  Resp: No SOB, no wheeze or rhonchi  Abd: soft NTND  Extremities: No edema, no cyanosis  Skin: No bruises, no rashes     Neurological Exam:  Mental Status: Awake, alert and oriented x 1.  Able to follow simple verbal commands, with perseveration. Can name some objects, refuses to participate further. No dysarthria  Cranial Nerves: PERRL, EOMI, VFFC, sensation V1-V3 intact,  no obvious facial asymmetry, equal elevation of palate, scm/trap 5/5, tongue is midline on protrusion. . hearing is grossly intact.   Motor:  CHO at least 4/5   Sensation: Intact to light touch and pinprick throughout  Reflexes: 1+ throughout at biceps, brachioradialis, triceps, patellars and ankles bilaterally and equal. No clonus. R toe and L toe were both downgoing.  Coordination: unable   Gait: deferred       I personally reviewed the below data/images/labs:      CBC Full  -  ( 05 Jan 2024 05:21 )  WBC Count : 3.08 K/uL  RBC Count : 2.25 M/uL  Hemoglobin : 7.7 g/dL  Hematocrit : 23.1 %  Platelet Count - Automated : 194 K/uL  Mean Cell Volume : 102.7 fL  Mean Cell Hemoglobin : 34.2 pg  Mean Cell Hemoglobin Concentration : 33.3 gm/dL  Auto Neutrophil # : x  Auto Lymphocyte # : x  Auto Monocyte # : x  Auto Eosinophil # : x  Auto Basophil # : x  Auto Neutrophil % : x  Auto Lymphocyte % : x  Auto Monocyte % : x  Auto Eosinophil % : x  Auto Basophil % : x    01-05    130<L>  |  100  |  16  ----------------------------<  90  3.8   |  23  |  0.77    Ca    9.4      05 Jan 2024 05:21  Phos  2.3     01-05  Mg     1.70     01-05          Urinalysis Basic - ( 05 Jan 2024 05:21 )    Color: x / Appearance: x / SG: x / pH: x  Gluc: 90 mg/dL / Ketone: x  / Bili: x / Urobili: x   Blood: x / Protein: x / Nitrite: x   Leuk Esterase: x / RBC: x / WBC x   Sq Epi: x / Non Sq Epi: x / Bacteria: x        < from: CT Head No Cont (01.03.24 @ 02:00) >    ACC: [id number] EXAM:  CT BRAIN   ORDERED BY: MALOU CRISOSTOMO     PROCEDURE DATE:  01/03/2024          INTERPRETATION:  EXAMINATION: CT HEAD    DATE: 1/3/2024 2:00 AM    INDICATION: lethargy, altered mental status. History of falls.    COMPARISON: CT head from 7/7/2023.    TECHNIQUE: Thin section noncontrast axial images were obtained through   the head.  RAPID AI was utilized for preliminary assessment of   intracranial hemorrhage.    FINDINGS:  Intracranial Contents:    Moderate to severe cerebral volume loss.. Mild to moderate chronic   microvascular ischemic changes Gray-white differentiation is preserved.   No hydrocephalus. The basilar cisterns are clear. No focal edema or acute   mass effect. There is no intracranial fluid collectionor acute   hemorrhage.    Bones and Extracranial Soft Tissues:    There is no fracture identified. Scattered paranasal sinus mucosal   thickening. Mastoid air cells are clear. Scalp and imaged facial soft   tissues are within normal limits.      IMPRESSION:  1. No acute intracranial CT abnormality.    < end of copied text >         Neurology Progress Note    S: Patient seen and examined. No new events overnight.    MEDICATIONS:    acetaminophen     Tablet .. 650 milliGRAM(s) Oral every 6 hours PRN  albuterol/ipratropium for Nebulization 3 milliLiter(s) Nebulizer every 12 hours  aluminum hydroxide/magnesium hydroxide/simethicone Suspension 30 milliLiter(s) Oral every 4 hours PRN  cyanocobalamin 1000 MICROGram(s) Oral daily  donepezil 10 milliGRAM(s) Oral at bedtime  folic acid 1 milliGRAM(s) Oral daily  heparin   Injectable 5000 Unit(s) SubCutaneous every 8 hours  melatonin 3 milliGRAM(s) Oral at bedtime PRN  memantine 10 milliGRAM(s) Oral two times a day  ondansetron Injectable 4 milliGRAM(s) IV Push every 8 hours PRN  potassium phosphate / sodium phosphate Powder (PHOS-NaK) 1 Packet(s) Oral two times a day  QUEtiapine 12.5 milliGRAM(s) Oral every 6 hours PRN  risperiDONE   Tablet 0.25 milliGRAM(s) Oral daily  risperiDONE   Tablet 0.5 milliGRAM(s) Oral at bedtime  sertraline 50 milliGRAM(s) Oral daily  sodium chloride 0.9%. 1000 milliLiter(s) IV Continuous <Continuous>      LABS:                          7.8    3.74  )-----------( 296      ( 12 Jan 2024 05:23 )             23.5     01-12    139  |  108<H>  |  26<H>  ----------------------------<  93  3.6   |  25  |  0.93    Ca    10.7<H>      12 Jan 2024 05:23  Phos  2.2     01-12  Mg     1.90     01-12      CAPILLARY BLOOD GLUCOSE          Urinalysis Basic - ( 12 Jan 2024 05:23 )    Color: x / Appearance: x / SG: x / pH: x  Gluc: 93 mg/dL / Ketone: x  / Bili: x / Urobili: x   Blood: x / Protein: x / Nitrite: x   Leuk Esterase: x / RBC: x / WBC x   Sq Epi: x / Non Sq Epi: x / Bacteria: x      I&O's Summary    11 Jan 2024 07:01  -  12 Jan 2024 07:00  --------------------------------------------------------  IN: 800 mL / OUT: 0 mL / NET: 800 mL    12 Jan 2024 07:01  -  12 Jan 2024 11:30  --------------------------------------------------------  IN: 240 mL / OUT: 0 mL / NET: 240 mL      Vital Signs Last 24 Hrs  T(C): 36.6 (12 Jan 2024 05:00), Max: 37.4 (11 Jan 2024 14:00)  T(F): 97.8 (12 Jan 2024 05:00), Max: 99.3 (11 Jan 2024 14:00)  HR: 71 (12 Jan 2024 09:57) (65 - 82)  BP: 124/71 (12 Jan 2024 05:00) (105/67 - 124/71)  BP(mean): --  RR: 19 (12 Jan 2024 05:00) (17 - 19)  SpO2: 97% (12 Jan 2024 09:57) (96% - 99%)    Parameters below as of 12 Jan 2024 09:57  Patient On (Oxygen Delivery Method): room air          General Exam:   General Appearance: Appropriately dressed and in no acute distress       Head: Normocephalic, atraumatic and no dysmorphic features  Ear, Nose, and Throat: Moist mucous membranes  CVS: S1S2+  Resp: No SOB, no wheeze or rhonchi  Abd: soft NTND  Extremities: No edema, no cyanosis  Skin: No bruises, no rashes     Neurological Exam:  Mental Status: Awake, alert and oriented x 1.  Able to follow simple verbal commands, with perseveration. Can name some objects, refuses to participate further. No dysarthria  Cranial Nerves: PERRL, EOMI, VFFC, sensation V1-V3 intact,  no obvious facial asymmetry, equal elevation of palate, scm/trap 5/5, tongue is midline on protrusion. . hearing is grossly intact.   Motor:  CHO at least 4/5   Sensation: Intact to light touch and pinprick throughout  Reflexes: 1+ throughout at biceps, brachioradialis, triceps, patellars and ankles bilaterally and equal. No clonus. R toe and L toe were both downgoing.  Coordination: unable   Gait: deferred       I personally reviewed the below data/images/labs:      CBC Full  -  ( 05 Jan 2024 05:21 )  WBC Count : 3.08 K/uL  RBC Count : 2.25 M/uL  Hemoglobin : 7.7 g/dL  Hematocrit : 23.1 %  Platelet Count - Automated : 194 K/uL  Mean Cell Volume : 102.7 fL  Mean Cell Hemoglobin : 34.2 pg  Mean Cell Hemoglobin Concentration : 33.3 gm/dL  Auto Neutrophil # : x  Auto Lymphocyte # : x  Auto Monocyte # : x  Auto Eosinophil # : x  Auto Basophil # : x  Auto Neutrophil % : x  Auto Lymphocyte % : x  Auto Monocyte % : x  Auto Eosinophil % : x  Auto Basophil % : x    01-05    130<L>  |  100  |  16  ----------------------------<  90  3.8   |  23  |  0.77    Ca    9.4      05 Jan 2024 05:21  Phos  2.3     01-05  Mg     1.70     01-05          Urinalysis Basic - ( 05 Jan 2024 05:21 )    Color: x / Appearance: x / SG: x / pH: x  Gluc: 90 mg/dL / Ketone: x  / Bili: x / Urobili: x   Blood: x / Protein: x / Nitrite: x   Leuk Esterase: x / RBC: x / WBC x   Sq Epi: x / Non Sq Epi: x / Bacteria: x        < from: CT Head No Cont (01.03.24 @ 02:00) >    ACC: [id number] EXAM:  CT BRAIN   ORDERED BY: MALOU CRISOSTOMO     PROCEDURE DATE:  01/03/2024          INTERPRETATION:  EXAMINATION: CT HEAD    DATE: 1/3/2024 2:00 AM    INDICATION: lethargy, altered mental status. History of falls.    COMPARISON: CT head from 7/7/2023.    TECHNIQUE: Thin section noncontrast axial images were obtained through   the head.  RAPID AI was utilized for preliminary assessment of   intracranial hemorrhage.    FINDINGS:  Intracranial Contents:    Moderate to severe cerebral volume loss.. Mild to moderate chronic   microvascular ischemic changes Gray-white differentiation is preserved.   No hydrocephalus. The basilar cisterns are clear. No focal edema or acute   mass effect. There is no intracranial fluid collectionor acute   hemorrhage.    Bones and Extracranial Soft Tissues:    There is no fracture identified. Scattered paranasal sinus mucosal   thickening. Mastoid air cells are clear. Scalp and imaged facial soft   tissues are within normal limits.      IMPRESSION:  1. No acute intracranial CT abnormality.    < end of copied text >

## 2024-01-13 LAB
24R-OH-CALCIDIOL SERPL-MCNC: 36.9 NG/ML — SIGNIFICANT CHANGE UP (ref 30–80)
24R-OH-CALCIDIOL SERPL-MCNC: 36.9 NG/ML — SIGNIFICANT CHANGE UP (ref 30–80)
ANION GAP SERPL CALC-SCNC: 6 MMOL/L — LOW (ref 7–14)
ANION GAP SERPL CALC-SCNC: 6 MMOL/L — LOW (ref 7–14)
ANION GAP SERPL CALC-SCNC: 7 MMOL/L — SIGNIFICANT CHANGE UP (ref 7–14)
ANION GAP SERPL CALC-SCNC: 7 MMOL/L — SIGNIFICANT CHANGE UP (ref 7–14)
BUN SERPL-MCNC: 25 MG/DL — HIGH (ref 7–23)
BUN SERPL-MCNC: 25 MG/DL — HIGH (ref 7–23)
BUN SERPL-MCNC: 27 MG/DL — HIGH (ref 7–23)
BUN SERPL-MCNC: 27 MG/DL — HIGH (ref 7–23)
CALCIUM SERPL-MCNC: 10.1 MG/DL — SIGNIFICANT CHANGE UP (ref 8.4–10.5)
CALCIUM SERPL-MCNC: 10.1 MG/DL — SIGNIFICANT CHANGE UP (ref 8.4–10.5)
CALCIUM SERPL-MCNC: 9.5 MG/DL — SIGNIFICANT CHANGE UP (ref 8.4–10.5)
CALCIUM SERPL-MCNC: 9.5 MG/DL — SIGNIFICANT CHANGE UP (ref 8.4–10.5)
CHLORIDE SERPL-SCNC: 105 MMOL/L — SIGNIFICANT CHANGE UP (ref 98–107)
CHLORIDE SERPL-SCNC: 105 MMOL/L — SIGNIFICANT CHANGE UP (ref 98–107)
CHLORIDE SERPL-SCNC: 107 MMOL/L — SIGNIFICANT CHANGE UP (ref 98–107)
CHLORIDE SERPL-SCNC: 107 MMOL/L — SIGNIFICANT CHANGE UP (ref 98–107)
CO2 SERPL-SCNC: 23 MMOL/L — SIGNIFICANT CHANGE UP (ref 22–31)
CO2 SERPL-SCNC: 23 MMOL/L — SIGNIFICANT CHANGE UP (ref 22–31)
CO2 SERPL-SCNC: 24 MMOL/L — SIGNIFICANT CHANGE UP (ref 22–31)
CO2 SERPL-SCNC: 24 MMOL/L — SIGNIFICANT CHANGE UP (ref 22–31)
CREAT SERPL-MCNC: 0.76 MG/DL — SIGNIFICANT CHANGE UP (ref 0.5–1.3)
CREAT SERPL-MCNC: 0.76 MG/DL — SIGNIFICANT CHANGE UP (ref 0.5–1.3)
CREAT SERPL-MCNC: 0.85 MG/DL — SIGNIFICANT CHANGE UP (ref 0.5–1.3)
CREAT SERPL-MCNC: 0.85 MG/DL — SIGNIFICANT CHANGE UP (ref 0.5–1.3)
EGFR: 90 ML/MIN/1.73M2 — SIGNIFICANT CHANGE UP
EGFR: 90 ML/MIN/1.73M2 — SIGNIFICANT CHANGE UP
EGFR: 93 ML/MIN/1.73M2 — SIGNIFICANT CHANGE UP
EGFR: 93 ML/MIN/1.73M2 — SIGNIFICANT CHANGE UP
GLUCOSE SERPL-MCNC: 84 MG/DL — SIGNIFICANT CHANGE UP (ref 70–99)
GLUCOSE SERPL-MCNC: 84 MG/DL — SIGNIFICANT CHANGE UP (ref 70–99)
GLUCOSE SERPL-MCNC: 94 MG/DL — SIGNIFICANT CHANGE UP (ref 70–99)
GLUCOSE SERPL-MCNC: 94 MG/DL — SIGNIFICANT CHANGE UP (ref 70–99)
HCT VFR BLD CALC: 22.7 % — LOW (ref 39–50)
HCT VFR BLD CALC: 22.7 % — LOW (ref 39–50)
HGB BLD-MCNC: 7.3 G/DL — LOW (ref 13–17)
HGB BLD-MCNC: 7.3 G/DL — LOW (ref 13–17)
MAGNESIUM SERPL-MCNC: 1.7 MG/DL — SIGNIFICANT CHANGE UP (ref 1.6–2.6)
MAGNESIUM SERPL-MCNC: 1.7 MG/DL — SIGNIFICANT CHANGE UP (ref 1.6–2.6)
MAGNESIUM SERPL-MCNC: 1.9 MG/DL — SIGNIFICANT CHANGE UP (ref 1.6–2.6)
MAGNESIUM SERPL-MCNC: 1.9 MG/DL — SIGNIFICANT CHANGE UP (ref 1.6–2.6)
MCHC RBC-ENTMCNC: 32.2 GM/DL — SIGNIFICANT CHANGE UP (ref 32–36)
MCHC RBC-ENTMCNC: 32.2 GM/DL — SIGNIFICANT CHANGE UP (ref 32–36)
MCHC RBC-ENTMCNC: 34.3 PG — HIGH (ref 27–34)
MCHC RBC-ENTMCNC: 34.3 PG — HIGH (ref 27–34)
MCV RBC AUTO: 106.6 FL — HIGH (ref 80–100)
MCV RBC AUTO: 106.6 FL — HIGH (ref 80–100)
NRBC # BLD: 0 /100 WBCS — SIGNIFICANT CHANGE UP (ref 0–0)
NRBC # BLD: 0 /100 WBCS — SIGNIFICANT CHANGE UP (ref 0–0)
NRBC # FLD: 0 K/UL — SIGNIFICANT CHANGE UP (ref 0–0)
NRBC # FLD: 0 K/UL — SIGNIFICANT CHANGE UP (ref 0–0)
PHOSPHATE SERPL-MCNC: 2.3 MG/DL — LOW (ref 2.5–4.5)
PHOSPHATE SERPL-MCNC: 2.3 MG/DL — LOW (ref 2.5–4.5)
PHOSPHATE SERPL-MCNC: 3.8 MG/DL — SIGNIFICANT CHANGE UP (ref 2.5–4.5)
PHOSPHATE SERPL-MCNC: 3.8 MG/DL — SIGNIFICANT CHANGE UP (ref 2.5–4.5)
PLATELET # BLD AUTO: 286 K/UL — SIGNIFICANT CHANGE UP (ref 150–400)
PLATELET # BLD AUTO: 286 K/UL — SIGNIFICANT CHANGE UP (ref 150–400)
POTASSIUM SERPL-MCNC: 3.7 MMOL/L — SIGNIFICANT CHANGE UP (ref 3.5–5.3)
POTASSIUM SERPL-MCNC: 3.7 MMOL/L — SIGNIFICANT CHANGE UP (ref 3.5–5.3)
POTASSIUM SERPL-MCNC: 4.6 MMOL/L — SIGNIFICANT CHANGE UP (ref 3.5–5.3)
POTASSIUM SERPL-MCNC: 4.6 MMOL/L — SIGNIFICANT CHANGE UP (ref 3.5–5.3)
POTASSIUM SERPL-SCNC: 3.7 MMOL/L — SIGNIFICANT CHANGE UP (ref 3.5–5.3)
POTASSIUM SERPL-SCNC: 3.7 MMOL/L — SIGNIFICANT CHANGE UP (ref 3.5–5.3)
POTASSIUM SERPL-SCNC: 4.6 MMOL/L — SIGNIFICANT CHANGE UP (ref 3.5–5.3)
POTASSIUM SERPL-SCNC: 4.6 MMOL/L — SIGNIFICANT CHANGE UP (ref 3.5–5.3)
PTH-INTACT FLD-MCNC: 6 PG/ML — LOW (ref 15–65)
PTH-INTACT FLD-MCNC: 6 PG/ML — LOW (ref 15–65)
RBC # BLD: 2.13 M/UL — LOW (ref 4.2–5.8)
RBC # BLD: 2.13 M/UL — LOW (ref 4.2–5.8)
RBC # FLD: 13.4 % — SIGNIFICANT CHANGE UP (ref 10.3–14.5)
RBC # FLD: 13.4 % — SIGNIFICANT CHANGE UP (ref 10.3–14.5)
SODIUM SERPL-SCNC: 135 MMOL/L — SIGNIFICANT CHANGE UP (ref 135–145)
SODIUM SERPL-SCNC: 135 MMOL/L — SIGNIFICANT CHANGE UP (ref 135–145)
SODIUM SERPL-SCNC: 137 MMOL/L — SIGNIFICANT CHANGE UP (ref 135–145)
SODIUM SERPL-SCNC: 137 MMOL/L — SIGNIFICANT CHANGE UP (ref 135–145)
WBC # BLD: 4.79 K/UL — SIGNIFICANT CHANGE UP (ref 3.8–10.5)
WBC # BLD: 4.79 K/UL — SIGNIFICANT CHANGE UP (ref 3.8–10.5)
WBC # FLD AUTO: 4.79 K/UL — SIGNIFICANT CHANGE UP (ref 3.8–10.5)
WBC # FLD AUTO: 4.79 K/UL — SIGNIFICANT CHANGE UP (ref 3.8–10.5)

## 2024-01-13 RX ORDER — POTASSIUM PHOSPHATE, MONOBASIC POTASSIUM PHOSPHATE, DIBASIC 236; 224 MG/ML; MG/ML
30 INJECTION, SOLUTION INTRAVENOUS ONCE
Refills: 0 | Status: COMPLETED | OUTPATIENT
Start: 2024-01-13 | End: 2024-01-13

## 2024-01-13 RX ORDER — SODIUM CHLORIDE 9 MG/ML
1000 INJECTION INTRAMUSCULAR; INTRAVENOUS; SUBCUTANEOUS
Refills: 0 | Status: DISCONTINUED | OUTPATIENT
Start: 2024-01-13 | End: 2024-01-24

## 2024-01-13 RX ORDER — CHOLECALCIFEROL (VITAMIN D3) 125 MCG
1000 CAPSULE ORAL DAILY
Refills: 0 | Status: DISCONTINUED | OUTPATIENT
Start: 2024-01-13 | End: 2024-01-24

## 2024-01-13 RX ORDER — SODIUM,POTASSIUM PHOSPHATES 278-250MG
1 POWDER IN PACKET (EA) ORAL THREE TIMES A DAY
Refills: 0 | Status: DISCONTINUED | OUTPATIENT
Start: 2024-01-13 | End: 2024-01-13

## 2024-01-13 RX ADMIN — Medication 1 PACKET(S): at 10:02

## 2024-01-13 RX ADMIN — DONEPEZIL HYDROCHLORIDE 10 MILLIGRAM(S): 10 TABLET, FILM COATED ORAL at 22:04

## 2024-01-13 RX ADMIN — QUETIAPINE FUMARATE 12.5 MILLIGRAM(S): 200 TABLET, FILM COATED ORAL at 05:38

## 2024-01-13 RX ADMIN — MEMANTINE HYDROCHLORIDE 10 MILLIGRAM(S): 10 TABLET ORAL at 17:41

## 2024-01-13 RX ADMIN — HEPARIN SODIUM 5000 UNIT(S): 5000 INJECTION INTRAVENOUS; SUBCUTANEOUS at 05:37

## 2024-01-13 RX ADMIN — Medication 3 MILLILITER(S): at 10:08

## 2024-01-13 RX ADMIN — MEMANTINE HYDROCHLORIDE 10 MILLIGRAM(S): 10 TABLET ORAL at 05:37

## 2024-01-13 RX ADMIN — POTASSIUM PHOSPHATE, MONOBASIC POTASSIUM PHOSPHATE, DIBASIC 83.33 MILLIMOLE(S): 236; 224 INJECTION, SOLUTION INTRAVENOUS at 14:22

## 2024-01-13 RX ADMIN — HEPARIN SODIUM 5000 UNIT(S): 5000 INJECTION INTRAVENOUS; SUBCUTANEOUS at 22:04

## 2024-01-13 RX ADMIN — HEPARIN SODIUM 5000 UNIT(S): 5000 INJECTION INTRAVENOUS; SUBCUTANEOUS at 14:22

## 2024-01-13 RX ADMIN — PREGABALIN 1000 MICROGRAM(S): 225 CAPSULE ORAL at 11:52

## 2024-01-13 RX ADMIN — QUETIAPINE FUMARATE 12.5 MILLIGRAM(S): 200 TABLET, FILM COATED ORAL at 11:52

## 2024-01-13 RX ADMIN — SERTRALINE 50 MILLIGRAM(S): 25 TABLET, FILM COATED ORAL at 11:53

## 2024-01-13 RX ADMIN — RISPERIDONE 0.25 MILLIGRAM(S): 4 TABLET ORAL at 11:53

## 2024-01-13 RX ADMIN — RISPERIDONE 0.5 MILLIGRAM(S): 4 TABLET ORAL at 22:03

## 2024-01-13 RX ADMIN — Medication 3 MILLILITER(S): at 22:21

## 2024-01-13 RX ADMIN — SODIUM CHLORIDE 75 MILLILITER(S): 9 INJECTION INTRAMUSCULAR; INTRAVENOUS; SUBCUTANEOUS at 11:51

## 2024-01-13 RX ADMIN — Medication 1 MILLIGRAM(S): at 11:52

## 2024-01-13 NOTE — PROGRESS NOTE ADULT - ASSESSMENT
This is a 78M with history of MM, HTN, and Dementia (AAO x 1-2 to self, to place, not to time at baseline per brother) who presents to the hospital from [facility] for hypotension and lethargy. Patient currently awake, alert, AAO x1 (to self,) but very poor historian, denies any acute complaints when asked. nephrology consulted for david    DAVID  admitted with scr 1.4  ? etiology prerenal vs ATN  pt with sepsis possible ATN  Now david improved.  Monitor BMP    hematuria  repeat ua   renal us with no mass, bladder diverticula--outpt urology    hyponatremia  ? etiology  check urine na and osmo--pending   improved sodium   monitor  free water restriction <1.2L    anemia  iron panel  transfusion and work up per team  Monitor Hb    hyper calcemia/ hypophosphatemna  replete k phos  Check VItamin D and PTH  Repeat gentle hydration today  2/2 increasing BUN  Give 30 millimole phos today   This is a 78M with history of MM, HTN, and Dementia (AAO x 1-2 to self, to place, not to time at baseline per brother) who presents to the hospital from [facility] for hypotension and lethargy. Patient currently awake, alert, AAO x1 (to self,) but very poor historian, denies any acute complaints when asked. nephrology consulted for david    DAVID  admitted with scr 1.4  ? etiology prerenal vs ATN  pt with sepsis possible ATN  Now david improved.  Monitor BMP    hematuria  repeat ua   renal us with no mass, bladder diverticula--outpt urology    hyponatremia  ? etiology  check urine na and osmo--pending   improved sodium   monitor  free water restriction <1.2L    anemia  iron panel  transfusion and work up per team  Monitor Hb    hyper calcemia/ hypophosphatemna  replete k phos  Check VItamin D and PTH  Repeat gentle hydration today  2/2 increasing BUN  Give 30 millimole phos today   This is a 78M with history of MM, HTN, and Dementia (AAO x 1-2 to self, to place, not to time at baseline per brother) who presents to the hospital from [facility] for hypotension and lethargy. Patient currently awake, alert, AAO x1 (to self,) but very poor historian, denies any acute complaints when asked. nephrology consulted for david    DAVID  admitted with scr 1.4  ? etiology prerenal vs ATN  pt with sepsis possible ATN  Now david improved.  Monitor BMP    hematuria  repeat ua   renal us with no mass, bladder diverticula--outpt urology    hyponatremia  ? etiology  check urine na and osmo--pending   improved sodium   monitor  free water restriction <1.2L    anemia  iron panel  transfusion and work up per team  Monitor Hb    hyper calcemia/ hypophosphatemna  PTH low-6  follow up VItamin D    Repeat gentle hydration today  2/2 increasing BUN  Give 30 millimole k-phos today   This is a 78M with history of MM, HTN, and Dementia (AAO x 1-2 to self, to place, not to time at baseline per brother) who presents to the hospital from [facility] for hypotension and lethargy. Patient currently awake, alert, AAO x1 (to self,) but very poor historian, denies any acute complaints when asked. nephrology consulted for david    DAVID  admitted with scr 1.4  ? etiology prerenal vs ATN  pt with sepsis possible ATN  Now david improved.  Monitor BMP    hematuria  repeat ua   renal us with no mass, bladder diverticula--outpt urology    hyponatremia  ? etiology  check urine na and osmo--pending   improved sodium   monitor  free water restriction <1.2L    anemia  iron panel  transfusion and work up per team  Monitor Hb    hyper calcemia/ hypophosphatemna  PTH low-6  follow up VItamin D    Repeat gentle hydration today  2/2 increasing BUN  Give 30 millimole k-phos today

## 2024-01-13 NOTE — PROGRESS NOTE ADULT - SUBJECTIVE AND OBJECTIVE BOX
DATE OF SERVICE: 01-13-24  no events overnight       Skin: [ ] dry skin. [ ] rashes.  Psychiatric: [ ] depression, [ ] anxiety.   Gastrointestinal: [ ] BRBPR, [ ] melena.   Neurological: [ ] confusion. [ ] seizures. [ ] shuffling gait.   Ears,Nose,Mouth and Throat: [ ] ear pain [ ] sore throat.   Eyes: [ ] diplopia.   Respiratory: [ ] hemoptysis. [ ] shortness of breath  Cardiovascular: See HPI above  Hematologic/Lymphatic: [ ] anemia. [ ] painful nodes. [ ] prolonged bleeding.   Genitourinary: [ ] hematuria. [ ] flank pain.   Endocrine: [ ] significant change in weight. [ ] intolerance to heat and cold.     Review of systems [x] otherwise negative, [ ] otherwise unable to obtain    FH: no family history of sudden cardiac death in first degree relatives    SH: [ ] tobacco, [ ] alcohol, [ ] drugs          acetaminophen     Tablet .. 650 milliGRAM(s) Oral every 6 hours PRN  albuterol/ipratropium for Nebulization 3 milliLiter(s) Nebulizer every 12 hours  aluminum hydroxide/magnesium hydroxide/simethicone Suspension 30 milliLiter(s) Oral every 4 hours PRN  cyanocobalamin 1000 MICROGram(s) Oral daily  donepezil 10 milliGRAM(s) Oral at bedtime  folic acid 1 milliGRAM(s) Oral daily  heparin   Injectable 5000 Unit(s) SubCutaneous every 8 hours  melatonin 3 milliGRAM(s) Oral at bedtime PRN  memantine 10 milliGRAM(s) Oral two times a day  ondansetron Injectable 4 milliGRAM(s) IV Push every 8 hours PRN  QUEtiapine 12.5 milliGRAM(s) Oral every 6 hours PRN  risperiDONE   Tablet 0.25 milliGRAM(s) Oral daily  risperiDONE   Tablet 0.5 milliGRAM(s) Oral at bedtime  sertraline 50 milliGRAM(s) Oral daily  sodium chloride 0.9%. 1000 milliLiter(s) IV Continuous <Continuous>                            7.3    4.79  )-----------( 286      ( 13 Jan 2024 06:10 )             22.7       Hemoglobin: 7.3 g/dL (01-13 @ 06:10)  Hemoglobin: 7.8 g/dL (01-12 @ 05:23)  Hemoglobin: 8.4 g/dL (01-09 @ 07:01)      01-13    137  |  107  |  27<H>  ----------------------------<  94  3.7   |  24  |  0.85    Ca    10.1      13 Jan 2024 06:10  Phos  2.3     01-13  Mg     1.90     01-13      Creatinine Trend: 0.85<--, 0.93<--, 0.79<--, 0.77<--, 0.88<--, 1.41<--    COAGS:           T(C): 36.4 (01-13-24 @ 06:47), Max: 36.7 (01-12-24 @ 15:00)  HR: 73 (01-13-24 @ 06:47) (71 - 99)  BP: 133/83 (01-13-24 @ 06:47) (99/62 - 133/83)  RR: 16 (01-13-24 @ 06:47) (16 - 18)  SpO2: 100% (01-13-24 @ 06:47) (97% - 100%)  Wt(kg): --    I&O's Summary    12 Jan 2024 07:01  -  13 Jan 2024 07:00  --------------------------------------------------------  IN: 1485 mL / OUT: 0 mL / NET: 1485 mL                General:  Alert and Oriented  Head: Normocephalic and atraumatic.   Neck: No JVD. No bruits. Supple. Does not appear to be enlarged.   Cardiovascular: + S1,S2 ; RRR Soft systolic murmur at the left lower sternal border. No rubs noted.    Lungs: CTA b/l. No rhonchi, rales or wheezes.   Abdomen: + BS, soft. Non tender. Non distended. No rebound. No guarding.   Extremities: No clubbing/cyanosis/edema.   Neurologic: Moves all four extremities. Full range of motion.   Skin: Warm and moist. The patient's skin has normal elasticity and good skin turgor.   Psychiatric: Appropriate mood and affect.  Musculoskeletal: Normal range of motion, normal strength       ECG:  	Sinus tachycardia    < from: Xray Chest 1 View AP/PA (01.02.24 @ 19:36) >  IMPRESSION:  No focal consolidations.  < end of copied text >    < from: Transthoracic Echocardiogram (07.10.23 @ 11:15) >  CONCLUSIONS:  1. Calcified trileaflet aortic valve with normal opening.  Minimal aortic regurgitation.  2. Endocardium not well visualized; grossly decreased  possibly mild left ventricular systolic dysfunction.  3. The right ventricle is not well visualized; grossly  normal right ventricular systolic function.  ------------------------------------------------------------------------  Confirmed on  7/10/2023 - 13:57:46 by Jolene Rowan M.D. RPVI  < end of copied text >      ASSESSMENT/PLAN: 	78M with history of MM, HTN, and Dementia (AAO x 1-2 to self, to place, not to time at baseline per brother) who presents to the hospital from [facility] for hypotension and lethargy found to have FLU A    -#Hypotension  -- BP stable  -- Norvasc and Lopressor on hold given acute illness--ok to resume at DC    #lethargy  --due to Flu, now resolved  --s/p tamiflu and Abx per medicine    DC planning     DATE OF SERVICE: 01-13-24  no events overnight       Skin: [ ] dry skin. [ ] rashes.  Psychiatric: [ ] depression, [ ] anxiety.   Gastrointestinal: [ ] BRBPR, [ ] melena.   Neurological: [ ] confusion. [ ] seizures. [ ] shuffling gait.   Ears,Nose,Mouth and Throat: [ ] ear pain [ ] sore throat.   Eyes: [ ] diplopia.   Respiratory: [ ] hemoptysis. [ ] shortness of breath  Cardiovascular: See HPI above  Hematologic/Lymphatic: [ ] anemia. [ ] painful nodes. [ ] prolonged bleeding.   Genitourinary: [ ] hematuria. [ ] flank pain.   Endocrine: [ ] significant change in weight. [ ] intolerance to heat and cold.     Review of systems [x] otherwise negative, [ ] otherwise unable to obtain    FH: no family history of sudden cardiac death in first degree relatives    SH: [ ] tobacco, [ ] alcohol, [ ] drugs          acetaminophen     Tablet .. 650 milliGRAM(s) Oral every 6 hours PRN  albuterol/ipratropium for Nebulization 3 milliLiter(s) Nebulizer every 12 hours  aluminum hydroxide/magnesium hydroxide/simethicone Suspension 30 milliLiter(s) Oral every 4 hours PRN  cyanocobalamin 1000 MICROGram(s) Oral daily  donepezil 10 milliGRAM(s) Oral at bedtime  folic acid 1 milliGRAM(s) Oral daily  heparin   Injectable 5000 Unit(s) SubCutaneous every 8 hours  melatonin 3 milliGRAM(s) Oral at bedtime PRN  memantine 10 milliGRAM(s) Oral two times a day  ondansetron Injectable 4 milliGRAM(s) IV Push every 8 hours PRN  QUEtiapine 12.5 milliGRAM(s) Oral every 6 hours PRN  risperiDONE   Tablet 0.25 milliGRAM(s) Oral daily  risperiDONE   Tablet 0.5 milliGRAM(s) Oral at bedtime  sertraline 50 milliGRAM(s) Oral daily  sodium chloride 0.9%. 1000 milliLiter(s) IV Continuous <Continuous>                            7.3    4.79  )-----------( 286      ( 13 Jan 2024 06:10 )             22.7       Hemoglobin: 7.3 g/dL (01-13 @ 06:10)  Hemoglobin: 7.8 g/dL (01-12 @ 05:23)  Hemoglobin: 8.4 g/dL (01-09 @ 07:01)      01-13    137  |  107  |  27<H>  ----------------------------<  94  3.7   |  24  |  0.85    Ca    10.1      13 Jan 2024 06:10  Phos  2.3     01-13  Mg     1.90     01-13      Creatinine Trend: 0.85<--, 0.93<--, 0.79<--, 0.77<--, 0.88<--, 1.41<--    COAGS:           T(C): 36.4 (01-13-24 @ 06:47), Max: 36.7 (01-12-24 @ 15:00)  HR: 73 (01-13-24 @ 06:47) (71 - 99)  BP: 133/83 (01-13-24 @ 06:47) (99/62 - 133/83)  RR: 16 (01-13-24 @ 06:47) (16 - 18)  SpO2: 100% (01-13-24 @ 06:47) (97% - 100%)  Wt(kg): --    I&O's Summary    12 Jan 2024 07:01  -  13 Jan 2024 07:00  --------------------------------------------------------  IN: 1485 mL / OUT: 0 mL / NET: 1485 mL                General:  Alert and Oriented  Head: Normocephalic and atraumatic.   Neck: No JVD. No bruits. Supple. Does not appear to be enlarged.   Cardiovascular: + S1,S2 ; RRR Soft systolic murmur at the left lower sternal border. No rubs noted.    Lungs: CTA b/l. No rhonchi, rales or wheezes.   Abdomen: + BS, soft. Non tender. Non distended. No rebound. No guarding.   Extremities: No clubbing/cyanosis/edema.   Neurologic: Moves all four extremities. Full range of motion.   Skin: Warm and moist. The patient's skin has normal elasticity and good skin turgor.   Psychiatric: Appropriate mood and affect.  Musculoskeletal: Normal range of motion, normal strength       ECG:  	Sinus tachycardia    < from: Xray Chest 1 View AP/PA (01.02.24 @ 19:36) >  IMPRESSION:  No focal consolidations.  < end of copied text >    < from: Transthoracic Echocardiogram (07.10.23 @ 11:15) >  CONCLUSIONS:  1. Calcified trileaflet aortic valve with normal opening.  Minimal aortic regurgitation.  2. Endocardium not well visualized; grossly decreased  possibly mild left ventricular systolic dysfunction.  3. The right ventricle is not well visualized; grossly  normal right ventricular systolic function.  ------------------------------------------------------------------------  Confirmed on  7/10/2023 - 13:57:46 by Jolene Rowan M.D. RPVI  < end of copied text >      ASSESSMENT/PLAN: 	78M with history of MM, HTN, and Dementia (AAO x 1-2 to self, to place, not to time at baseline per brother) who presents to the hospital from [facility] for hypotension and lethargy found to have FLU A    -#Hypotension  -- BP stable  -- Norvasc and Lopressor on hold given acute illness--ok to resume at DC    #lethargy  --due to Flu, now resolved  --s/p tamiflu and Abx per medicine    DC planning

## 2024-01-13 NOTE — PROGRESS NOTE ADULT - SUBJECTIVE AND OBJECTIVE BOX
[facility] [place] [facility]   MD ANEL FOLEY MD RUORU WONG, PA CHEN QIAN, JEFERSON    TEL:  OFFICE: [phone]      RENAL FOLLOW UP NOTE-- Date of Service ;; 01-13-24 @ 11:18  --------------------------------------------------------------------------------  HPI:  Pt seen and examined at bedside          PAST HISTORY  --------------------------------------------------------------------------------  No significant changes to PMH, PSH, FHx, SHx, unless otherwise noted    ALLERGIES & MEDICATIONS  --------------------------------------------------------------------------------  Allergies    No Known Allergies    Intolerances      Standing Inpatient Medications  albuterol/ipratropium for Nebulization 3 milliLiter(s) Nebulizer every 12 hours  cyanocobalamin 1000 MICROGram(s) Oral daily  donepezil 10 milliGRAM(s) Oral at bedtime  folic acid 1 milliGRAM(s) Oral daily  heparin   Injectable 5000 Unit(s) SubCutaneous every 8 hours  memantine 10 milliGRAM(s) Oral two times a day  potassium phosphate / sodium phosphate Powder (PHOS-NaK) 1 Packet(s) Oral three times a day  risperiDONE   Tablet 0.25 milliGRAM(s) Oral daily  risperiDONE   Tablet 0.5 milliGRAM(s) Oral at bedtime  sertraline 50 milliGRAM(s) Oral daily  sodium chloride 0.9%. 1000 milliLiter(s) IV Continuous <Continuous>    PRN Inpatient Medications  acetaminophen     Tablet .. 650 milliGRAM(s) Oral every 6 hours PRN  aluminum hydroxide/magnesium hydroxide/simethicone Suspension 30 milliLiter(s) Oral every 4 hours PRN  melatonin 3 milliGRAM(s) Oral at bedtime PRN  ondansetron Injectable 4 milliGRAM(s) IV Push every 8 hours PRN  QUEtiapine 12.5 milliGRAM(s) Oral every 6 hours PRN      REVIEW OF SYSTEMS  --------------------------------------------------------------------------------  General: no fever  CVS: no chest pain  RESP: no sob, no cough  ABD: no abdominal pain  MSK: no edema     VITALS/PHYSICAL EXAM  --------------------------------------------------------------------------------  T(C): 36.4 (01-13-24 @ 06:47), Max: 36.7 (01-12-24 @ 15:00)  HR: 72 (01-13-24 @ 10:08) (72 - 99)  BP: 133/83 (01-13-24 @ 06:47) (99/62 - 133/83)  RR: 16 (01-13-24 @ 06:47) (16 - 18)  SpO2: 100% (01-13-24 @ 06:47) (98% - 100%)  Wt(kg): --        01-12-24 @ 07:01  -  01-13-24 @ 07:00  --------------------------------------------------------  IN: 1485 mL / OUT: 0 mL / NET: 1485 mL      Physical Exam:  	Gen: NAD  	HEENT: MMM  	Pulm: CTA B/L  	CV: S1S2  	Abd: Soft, +BS  	Ext: No LE edema B/L                      Neuro: Awake  	Skin: Warm and Dry   	Vascular access: None            LABS/STUDIES  --------------------------------------------------------------------------------              7.3    4.79  >-----------<  286      [01-13-24 @ 06:10]              22.7     137  |  107  |  27  ----------------------------<  94      [01-13-24 @ 06:10]  3.7   |  24  |  0.85        Ca     10.1     [01-13-24 @ 06:10]      Mg     1.90     [01-13-24 @ 06:10]      Phos  2.3     [01-13-24 @ 06:10]            Creatinine Trend:  SCr 0.85 [01-13 @ 06:10]  SCr 0.93 [01-12 @ 05:23]  SCr 0.79 [01-09 @ 07:01]  SCr 0.77 [01-05 @ 05:21]  SCr 0.88 [01-04 @ 16:00]    Urinalysis - [01-13-24 @ 06:10]      Color  / Appearance  / SG  / pH       Gluc 94 / Ketone   / Bili  / Urobili        Blood  / Protein  / Leuk Est  / Nitrite       RBC  / WBC  / Hyaline  / Gran  / Sq Epi  / Non Sq Epi  / Bacteria       PTH -- (Ca --)      [01-13-24 @ 06:10]   6       [facility] [place] [facility]   MD ANEL FOLEY MD RUORU WONG, PA CHEN QIAN, JEFERSON    TEL:  OFFICE: [phone]      RENAL FOLLOW UP NOTE-- Date of Service ;; 01-13-24 @ 11:18  --------------------------------------------------------------------------------  HPI:  Pt seen and examined at bedside          PAST HISTORY  --------------------------------------------------------------------------------  No significant changes to PMH, PSH, FHx, SHx, unless otherwise noted    ALLERGIES & MEDICATIONS  --------------------------------------------------------------------------------  Allergies    No Known Allergies    Intolerances      Standing Inpatient Medications  albuterol/ipratropium for Nebulization 3 milliLiter(s) Nebulizer every 12 hours  cyanocobalamin 1000 MICROGram(s) Oral daily  donepezil 10 milliGRAM(s) Oral at bedtime  folic acid 1 milliGRAM(s) Oral daily  heparin   Injectable 5000 Unit(s) SubCutaneous every 8 hours  memantine 10 milliGRAM(s) Oral two times a day  potassium phosphate / sodium phosphate Powder (PHOS-NaK) 1 Packet(s) Oral three times a day  risperiDONE   Tablet 0.25 milliGRAM(s) Oral daily  risperiDONE   Tablet 0.5 milliGRAM(s) Oral at bedtime  sertraline 50 milliGRAM(s) Oral daily  sodium chloride 0.9%. 1000 milliLiter(s) IV Continuous <Continuous>    PRN Inpatient Medications  acetaminophen     Tablet .. 650 milliGRAM(s) Oral every 6 hours PRN  aluminum hydroxide/magnesium hydroxide/simethicone Suspension 30 milliLiter(s) Oral every 4 hours PRN  melatonin 3 milliGRAM(s) Oral at bedtime PRN  ondansetron Injectable 4 milliGRAM(s) IV Push every 8 hours PRN  QUEtiapine 12.5 milliGRAM(s) Oral every 6 hours PRN      REVIEW OF SYSTEMS  --------------------------------------------------------------------------------  General: no fever  CVS: no chest pain  RESP: no sob, no cough  ABD: no abdominal pain  MSK: no edema     VITALS/PHYSICAL EXAM  --------------------------------------------------------------------------------  T(C): 36.4 (01-13-24 @ 06:47), Max: 36.7 (01-12-24 @ 15:00)  HR: 72 (01-13-24 @ 10:08) (72 - 99)  BP: 133/83 (01-13-24 @ 06:47) (99/62 - 133/83)  RR: 16 (01-13-24 @ 06:47) (16 - 18)  SpO2: 100% (01-13-24 @ 06:47) (98% - 100%)  Wt(kg): --        01-12-24 @ 07:01  -  01-13-24 @ 07:00  --------------------------------------------------------  IN: 1485 mL / OUT: 0 mL / NET: 1485 mL      Physical Exam:  	Gen: NAD  	HEENT: MMM  	Pulm: CTA B/L  	CV: S1S2  	Abd: Soft, +BS  	Ext: No LE edema B/L                      Neuro: Awake  	Skin: Warm and Dry   	Vascular access: None            LABS/STUDIES  --------------------------------------------------------------------------------              7.3    4.79  >-----------<  286      [01-13-24 @ 06:10]              22.7     137  |  107  |  27  ----------------------------<  94      [01-13-24 @ 06:10]  3.7   |  24  |  0.85        Ca     10.1     [01-13-24 @ 06:10]      Mg     1.90     [01-13-24 @ 06:10]      Phos  2.3     [01-13-24 @ 06:10]            Creatinine Trend:  SCr 0.85 [01-13 @ 06:10]  SCr 0.93 [01-12 @ 05:23]  SCr 0.79 [01-09 @ 07:01]  SCr 0.77 [01-05 @ 05:21]  SCr 0.88 [01-04 @ 16:00]    Urinalysis - [01-13-24 @ 06:10]      Color  / Appearance  / SG  / pH       Gluc 94 / Ketone   / Bili  / Urobili        Blood  / Protein  / Leuk Est  / Nitrite       RBC  / WBC  / Hyaline  / Gran  / Sq Epi  / Non Sq Epi  / Bacteria       PTH -- (Ca --)      [01-13-24 @ 06:10]   6

## 2024-01-13 NOTE — PROGRESS NOTE ADULT - SUBJECTIVE AND OBJECTIVE BOX
Patient is a 78y old  Male who presents with a chief complaint of Hypotension, lethargy (13 Jan 2024 11:17)    Date of servie : 01-13-24 @ 17:30  INTERVAL HPI/OVERNIGHT EVENTS:  T(C): 37 (01-13-24 @ 14:00), Max: 37 (01-13-24 @ 14:00)  HR: 70 (01-13-24 @ 14:00) (70 - 81)  BP: 106/66 (01-13-24 @ 14:00) (106/66 - 133/83)  RR: 17 (01-13-24 @ 14:00) (16 - 18)  SpO2: 97% (01-13-24 @ 14:00) (97% - 100%)  Wt(kg): --  I&O's Summary    12 Jan 2024 07:01  -  13 Jan 2024 07:00  --------------------------------------------------------  IN: 1485 mL / OUT: 0 mL / NET: 1485 mL    13 Jan 2024 07:01  -  13 Jan 2024 17:30  --------------------------------------------------------  IN: 1445 mL / OUT: 0 mL / NET: 1445 mL        LABS:                        7.3    4.79  )-----------( 286      ( 13 Jan 2024 06:10 )             22.7     01-13    137  |  107  |  27<H>  ----------------------------<  94  3.7   |  24  |  0.85    Ca    10.1      13 Jan 2024 06:10  Phos  2.3     01-13  Mg     1.90     01-13        Urinalysis Basic - ( 13 Jan 2024 06:10 )    Color: x / Appearance: x / SG: x / pH: x  Gluc: 94 mg/dL / Ketone: x  / Bili: x / Urobili: x   Blood: x / Protein: x / Nitrite: x   Leuk Esterase: x / RBC: x / WBC x   Sq Epi: x / Non Sq Epi: x / Bacteria: x      CAPILLARY BLOOD GLUCOSE            Urinalysis Basic - ( 13 Jan 2024 06:10 )    Color: x / Appearance: x / SG: x / pH: x  Gluc: 94 mg/dL / Ketone: x  / Bili: x / Urobili: x   Blood: x / Protein: x / Nitrite: x   Leuk Esterase: x / RBC: x / WBC x   Sq Epi: x / Non Sq Epi: x / Bacteria: x        MEDICATIONS  (STANDING):  albuterol/ipratropium for Nebulization 3 milliLiter(s) Nebulizer every 12 hours  cyanocobalamin 1000 MICROGram(s) Oral daily  donepezil 10 milliGRAM(s) Oral at bedtime  folic acid 1 milliGRAM(s) Oral daily  heparin   Injectable 5000 Unit(s) SubCutaneous every 8 hours  memantine 10 milliGRAM(s) Oral two times a day  risperiDONE   Tablet 0.25 milliGRAM(s) Oral daily  risperiDONE   Tablet 0.5 milliGRAM(s) Oral at bedtime  sertraline 50 milliGRAM(s) Oral daily  sodium chloride 0.9%. 1000 milliLiter(s) (75 mL/Hr) IV Continuous <Continuous>  sodium chloride 0.9%. 1000 milliLiter(s) (75 mL/Hr) IV Continuous <Continuous>    MEDICATIONS  (PRN):  acetaminophen     Tablet .. 650 milliGRAM(s) Oral every 6 hours PRN Temp greater or equal to 38C (100.4F), Mild Pain (1 - 3)  aluminum hydroxide/magnesium hydroxide/simethicone Suspension 30 milliLiter(s) Oral every 4 hours PRN Dyspepsia  melatonin 3 milliGRAM(s) Oral at bedtime PRN Insomnia  ondansetron Injectable 4 milliGRAM(s) IV Push every 8 hours PRN Nausea and/or Vomiting  QUEtiapine 12.5 milliGRAM(s) Oral every 6 hours PRN for agitation          PHYSICAL EXAM:  GENERAL: NAD, well-groomed, well-developed  HEAD:  Atraumatic, Normocephalic  CHEST/LUNG: Clear to percussion bilaterally; No rales, rhonchi, wheezing, or rubs  HEART: Regular rate and rhythm; No murmurs, rubs, or gallops  ABDOMEN: Soft, Nontender, Nondistended; Bowel sounds present  EXTREMITIES:  2+ Peripheral Pulses, No clubbing, cyanosis, or edema  LYMPH: No lymphadenopathy noted  SKIN: No rashes or lesions    Care Discussed with Consultants/Other Providers [ ] YES  [ ] NO

## 2024-01-13 NOTE — PROGRESS NOTE ADULT - ASSESSMENT
CARDIOLOGY ATTENDING    Agree with above. Anti-hypertensive agents currently on hold. Will restart as he recovers. No further inpatient cardiac testing expected.

## 2024-01-14 LAB
ANION GAP SERPL CALC-SCNC: 7 MMOL/L — SIGNIFICANT CHANGE UP (ref 7–14)
ANION GAP SERPL CALC-SCNC: 7 MMOL/L — SIGNIFICANT CHANGE UP (ref 7–14)
BUN SERPL-MCNC: 21 MG/DL — SIGNIFICANT CHANGE UP (ref 7–23)
BUN SERPL-MCNC: 21 MG/DL — SIGNIFICANT CHANGE UP (ref 7–23)
CALCIUM SERPL-MCNC: 9.6 MG/DL — SIGNIFICANT CHANGE UP (ref 8.4–10.5)
CALCIUM SERPL-MCNC: 9.6 MG/DL — SIGNIFICANT CHANGE UP (ref 8.4–10.5)
CHLORIDE SERPL-SCNC: 104 MMOL/L — SIGNIFICANT CHANGE UP (ref 98–107)
CHLORIDE SERPL-SCNC: 104 MMOL/L — SIGNIFICANT CHANGE UP (ref 98–107)
CO2 SERPL-SCNC: 24 MMOL/L — SIGNIFICANT CHANGE UP (ref 22–31)
CO2 SERPL-SCNC: 24 MMOL/L — SIGNIFICANT CHANGE UP (ref 22–31)
CREAT SERPL-MCNC: 0.72 MG/DL — SIGNIFICANT CHANGE UP (ref 0.5–1.3)
CREAT SERPL-MCNC: 0.72 MG/DL — SIGNIFICANT CHANGE UP (ref 0.5–1.3)
EGFR: 94 ML/MIN/1.73M2 — SIGNIFICANT CHANGE UP
EGFR: 94 ML/MIN/1.73M2 — SIGNIFICANT CHANGE UP
GLUCOSE SERPL-MCNC: 84 MG/DL — SIGNIFICANT CHANGE UP (ref 70–99)
GLUCOSE SERPL-MCNC: 84 MG/DL — SIGNIFICANT CHANGE UP (ref 70–99)
HCT VFR BLD CALC: 21.3 % — LOW (ref 39–50)
HCT VFR BLD CALC: 21.3 % — LOW (ref 39–50)
HGB BLD-MCNC: 7.1 G/DL — LOW (ref 13–17)
HGB BLD-MCNC: 7.1 G/DL — LOW (ref 13–17)
MAGNESIUM SERPL-MCNC: 1.7 MG/DL — SIGNIFICANT CHANGE UP (ref 1.6–2.6)
MAGNESIUM SERPL-MCNC: 1.7 MG/DL — SIGNIFICANT CHANGE UP (ref 1.6–2.6)
MCHC RBC-ENTMCNC: 33.3 GM/DL — SIGNIFICANT CHANGE UP (ref 32–36)
MCHC RBC-ENTMCNC: 33.3 GM/DL — SIGNIFICANT CHANGE UP (ref 32–36)
MCHC RBC-ENTMCNC: 34.5 PG — HIGH (ref 27–34)
MCHC RBC-ENTMCNC: 34.5 PG — HIGH (ref 27–34)
MCV RBC AUTO: 103.4 FL — HIGH (ref 80–100)
MCV RBC AUTO: 103.4 FL — HIGH (ref 80–100)
NRBC # BLD: 0 /100 WBCS — SIGNIFICANT CHANGE UP (ref 0–0)
NRBC # BLD: 0 /100 WBCS — SIGNIFICANT CHANGE UP (ref 0–0)
NRBC # FLD: 0 K/UL — SIGNIFICANT CHANGE UP (ref 0–0)
NRBC # FLD: 0 K/UL — SIGNIFICANT CHANGE UP (ref 0–0)
PHOSPHATE SERPL-MCNC: 3 MG/DL — SIGNIFICANT CHANGE UP (ref 2.5–4.5)
PHOSPHATE SERPL-MCNC: 3 MG/DL — SIGNIFICANT CHANGE UP (ref 2.5–4.5)
PLATELET # BLD AUTO: 300 K/UL — SIGNIFICANT CHANGE UP (ref 150–400)
PLATELET # BLD AUTO: 300 K/UL — SIGNIFICANT CHANGE UP (ref 150–400)
POTASSIUM SERPL-MCNC: 3.9 MMOL/L — SIGNIFICANT CHANGE UP (ref 3.5–5.3)
POTASSIUM SERPL-MCNC: 3.9 MMOL/L — SIGNIFICANT CHANGE UP (ref 3.5–5.3)
POTASSIUM SERPL-SCNC: 3.9 MMOL/L — SIGNIFICANT CHANGE UP (ref 3.5–5.3)
POTASSIUM SERPL-SCNC: 3.9 MMOL/L — SIGNIFICANT CHANGE UP (ref 3.5–5.3)
PTH-INTACT FLD-MCNC: 5 PG/ML — LOW (ref 15–65)
PTH-INTACT FLD-MCNC: 5 PG/ML — LOW (ref 15–65)
RBC # BLD: 2.06 M/UL — LOW (ref 4.2–5.8)
RBC # BLD: 2.06 M/UL — LOW (ref 4.2–5.8)
RBC # FLD: 13.6 % — SIGNIFICANT CHANGE UP (ref 10.3–14.5)
RBC # FLD: 13.6 % — SIGNIFICANT CHANGE UP (ref 10.3–14.5)
SODIUM SERPL-SCNC: 135 MMOL/L — SIGNIFICANT CHANGE UP (ref 135–145)
SODIUM SERPL-SCNC: 135 MMOL/L — SIGNIFICANT CHANGE UP (ref 135–145)
WBC # BLD: 4.61 K/UL — SIGNIFICANT CHANGE UP (ref 3.8–10.5)
WBC # BLD: 4.61 K/UL — SIGNIFICANT CHANGE UP (ref 3.8–10.5)
WBC # FLD AUTO: 4.61 K/UL — SIGNIFICANT CHANGE UP (ref 3.8–10.5)
WBC # FLD AUTO: 4.61 K/UL — SIGNIFICANT CHANGE UP (ref 3.8–10.5)

## 2024-01-14 RX ADMIN — MEMANTINE HYDROCHLORIDE 10 MILLIGRAM(S): 10 TABLET ORAL at 05:26

## 2024-01-14 RX ADMIN — SERTRALINE 50 MILLIGRAM(S): 25 TABLET, FILM COATED ORAL at 12:58

## 2024-01-14 RX ADMIN — Medication 3 MILLILITER(S): at 22:06

## 2024-01-14 RX ADMIN — HEPARIN SODIUM 5000 UNIT(S): 5000 INJECTION INTRAVENOUS; SUBCUTANEOUS at 22:39

## 2024-01-14 RX ADMIN — PREGABALIN 1000 MICROGRAM(S): 225 CAPSULE ORAL at 12:58

## 2024-01-14 RX ADMIN — Medication 1 MILLIGRAM(S): at 12:58

## 2024-01-14 RX ADMIN — Medication 1000 UNIT(S): at 12:58

## 2024-01-14 RX ADMIN — RISPERIDONE 0.25 MILLIGRAM(S): 4 TABLET ORAL at 12:58

## 2024-01-14 RX ADMIN — HEPARIN SODIUM 5000 UNIT(S): 5000 INJECTION INTRAVENOUS; SUBCUTANEOUS at 15:03

## 2024-01-14 RX ADMIN — Medication 3 MILLILITER(S): at 08:38

## 2024-01-14 RX ADMIN — HEPARIN SODIUM 5000 UNIT(S): 5000 INJECTION INTRAVENOUS; SUBCUTANEOUS at 05:27

## 2024-01-14 RX ADMIN — DONEPEZIL HYDROCHLORIDE 10 MILLIGRAM(S): 10 TABLET, FILM COATED ORAL at 22:39

## 2024-01-14 RX ADMIN — RISPERIDONE 0.5 MILLIGRAM(S): 4 TABLET ORAL at 22:39

## 2024-01-14 RX ADMIN — MEMANTINE HYDROCHLORIDE 10 MILLIGRAM(S): 10 TABLET ORAL at 19:35

## 2024-01-14 NOTE — PROGRESS NOTE ADULT - SUBJECTIVE AND OBJECTIVE BOX
DATE OF SERVICE: 01-14-24    No overnight events, resting comfortably  Review of symptoms otherwise negative.    Review of Systems:   Constitutional: [ ] fevers, [ ] chills.   Skin: [ ] dry skin. [ ] rashes.  Psychiatric: [ ] depression, [ ] anxiety.   Gastrointestinal: [ ] BRBPR, [ ] melena.   Neurological: [ ] confusion. [ ] seizures. [ ] shuffling gait.   Ears,Nose,Mouth and Throat: [ ] ear pain [ ] sore throat.   Eyes: [ ] diplopia.   Respiratory: [ ] hemoptysis. [ ] shortness of breath  Cardiovascular: See HPI above  Hematologic/Lymphatic: [ ] anemia. [ ] painful nodes. [ ] prolonged bleeding.   Genitourinary: [ ] hematuria. [ ] flank pain.   Endocrine: [ ] significant change in weight. [ ] intolerance to heat and cold.     Review of systems [x ] otherwise negative, [ ] otherwise unable to obtain    FH: no family history of sudden cardiac death in first degree relatives    SH: [ ] tobacco, [ ] alcohol, [ ] drugs    acetaminophen     Tablet .. 650 milliGRAM(s) Oral every 6 hours PRN  albuterol/ipratropium for Nebulization 3 milliLiter(s) Nebulizer every 12 hours  aluminum hydroxide/magnesium hydroxide/simethicone Suspension 30 milliLiter(s) Oral every 4 hours PRN  cholecalciferol 1000 Unit(s) Oral daily  cyanocobalamin 1000 MICROGram(s) Oral daily  donepezil 10 milliGRAM(s) Oral at bedtime  folic acid 1 milliGRAM(s) Oral daily  heparin   Injectable 5000 Unit(s) SubCutaneous every 8 hours  melatonin 3 milliGRAM(s) Oral at bedtime PRN  memantine 10 milliGRAM(s) Oral two times a day  ondansetron Injectable 4 milliGRAM(s) IV Push every 8 hours PRN  QUEtiapine 12.5 milliGRAM(s) Oral every 6 hours PRN  risperiDONE   Tablet 0.5 milliGRAM(s) Oral at bedtime  risperiDONE   Tablet 0.25 milliGRAM(s) Oral daily  sertraline 50 milliGRAM(s) Oral daily  sodium chloride 0.9%. 1000 milliLiter(s) IV Continuous <Continuous>  sodium chloride 0.9%. 1000 milliLiter(s) IV Continuous <Continuous>                            7.1    4.61  )-----------( 300      ( 14 Jan 2024 06:50 )             21.3       01-14    135  |  104  |  21  ----------------------------<  84  3.9   |  24  |  0.72    Ca    9.6      14 Jan 2024 06:50  Phos  3.0     01-14  Mg     1.70     01-14      T(C): 36.6 (01-14-24 @ 11:06), Max: 36.7 (01-14-24 @ 05:34)  HR: 82 (01-14-24 @ 11:14) (69 - 87)  BP: 99/63 (01-14-24 @ 11:14) (93/62 - 109/67)  RR: 17 (01-14-24 @ 11:06) (17 - 17)  SpO2: 94% (01-14-24 @ 11:06) (94% - 99%)  Wt(kg): --    I&O's Summary    13 Jan 2024 07:01  -  14 Jan 2024 07:00  --------------------------------------------------------  IN: 2605 mL / OUT: 0 mL / NET: 2605 mL    14 Jan 2024 07:01  -  14 Jan 2024 15:46  --------------------------------------------------------  IN: 360 mL / OUT: 0 mL / NET: 360 mL      General:  pleasantly confused  Head: Normocephalic and atraumatic.   Neck: No JVD. No bruits. Supple. Does not appear to be enlarged.   Cardiovascular: + S1,S2 ; RRR Soft systolic murmur at the left lower sternal border. No rubs noted.    Lungs: CTA b/l. No rhonchi, rales or wheezes.   Abdomen: + BS, soft. Non tender. Non distended. No rebound. No guarding.   Extremities: No clubbing/cyanosis/edema.   Neurologic: Moves all four extremities  Skin: Warm and moist. The patient's skin has normal elasticity and good skin turgor.   Psychiatric: unable to eval  Musculoskeletal: Normal range of motion      ECG:  	Sinus tachycardia    < from: Xray Chest 1 View AP/PA (01.02.24 @ 19:36) >  IMPRESSION:  No focal consolidations.  < end of copied text >    < from: Transthoracic Echocardiogram (07.10.23 @ 11:15) >  CONCLUSIONS:  1. Calcified trileaflet aortic valve with normal opening.  Minimal aortic regurgitation.  2. Endocardium not well visualized; grossly decreased  possibly mild left ventricular systolic dysfunction.  3. The right ventricle is not well visualized; grossly  normal right ventricular systolic function.  ------------------------------------------------------------------------  Confirmed on  7/10/2023 - 13:57:46 by MARLA Vegas  < end of copied text >      ASSESSMENT/PLAN: 	78M with history of MM, HTN, and Dementia (AAO x 1-2 to self, to place, not to time at baseline per brother) who presents to the hospital from [facility] for hypotension and lethargy found to have FLU A    -#Hypotension  -- BP stable  -- Norvasc and Lopressor on hold given acute illness--ok to resume at DC    #lethargy  --due to Flu, now resolved  --s/p tamiflu and Abx per medicine    DC planning     DATE OF SERVICE: 01-14-24    No overnight events, resting comfortably  Review of symptoms otherwise negative.    Review of Systems:   Constitutional: [ ] fevers, [ ] chills.   Skin: [ ] dry skin. [ ] rashes.  Psychiatric: [ ] depression, [ ] anxiety.   Gastrointestinal: [ ] BRBPR, [ ] melena.   Neurological: [ ] confusion. [ ] seizures. [ ] shuffling gait.   Ears,Nose,Mouth and Throat: [ ] ear pain [ ] sore throat.   Eyes: [ ] diplopia.   Respiratory: [ ] hemoptysis. [ ] shortness of breath  Cardiovascular: See HPI above  Hematologic/Lymphatic: [ ] anemia. [ ] painful nodes. [ ] prolonged bleeding.   Genitourinary: [ ] hematuria. [ ] flank pain.   Endocrine: [ ] significant change in weight. [ ] intolerance to heat and cold.     Review of systems [x ] otherwise negative, [ ] otherwise unable to obtain    FH: no family history of sudden cardiac death in first degree relatives    SH: [ ] tobacco, [ ] alcohol, [ ] drugs    acetaminophen     Tablet .. 650 milliGRAM(s) Oral every 6 hours PRN  albuterol/ipratropium for Nebulization 3 milliLiter(s) Nebulizer every 12 hours  aluminum hydroxide/magnesium hydroxide/simethicone Suspension 30 milliLiter(s) Oral every 4 hours PRN  cholecalciferol 1000 Unit(s) Oral daily  cyanocobalamin 1000 MICROGram(s) Oral daily  donepezil 10 milliGRAM(s) Oral at bedtime  folic acid 1 milliGRAM(s) Oral daily  heparin   Injectable 5000 Unit(s) SubCutaneous every 8 hours  melatonin 3 milliGRAM(s) Oral at bedtime PRN  memantine 10 milliGRAM(s) Oral two times a day  ondansetron Injectable 4 milliGRAM(s) IV Push every 8 hours PRN  QUEtiapine 12.5 milliGRAM(s) Oral every 6 hours PRN  risperiDONE   Tablet 0.5 milliGRAM(s) Oral at bedtime  risperiDONE   Tablet 0.25 milliGRAM(s) Oral daily  sertraline 50 milliGRAM(s) Oral daily  sodium chloride 0.9%. 1000 milliLiter(s) IV Continuous <Continuous>  sodium chloride 0.9%. 1000 milliLiter(s) IV Continuous <Continuous>                            7.1    4.61  )-----------( 300      ( 14 Jan 2024 06:50 )             21.3       01-14    135  |  104  |  21  ----------------------------<  84  3.9   |  24  |  0.72    Ca    9.6      14 Jan 2024 06:50  Phos  3.0     01-14  Mg     1.70     01-14      T(C): 36.6 (01-14-24 @ 11:06), Max: 36.7 (01-14-24 @ 05:34)  HR: 82 (01-14-24 @ 11:14) (69 - 87)  BP: 99/63 (01-14-24 @ 11:14) (93/62 - 109/67)  RR: 17 (01-14-24 @ 11:06) (17 - 17)  SpO2: 94% (01-14-24 @ 11:06) (94% - 99%)  Wt(kg): --    I&O's Summary    13 Jan 2024 07:01  -  14 Jan 2024 07:00  --------------------------------------------------------  IN: 2605 mL / OUT: 0 mL / NET: 2605 mL    14 Jan 2024 07:01  -  14 Jan 2024 15:46  --------------------------------------------------------  IN: 360 mL / OUT: 0 mL / NET: 360 mL      General:  pleasantly confused  Head: Normocephalic and atraumatic.   Neck: No JVD. No bruits. Supple. Does not appear to be enlarged.   Cardiovascular: + S1,S2 ; RRR Soft systolic murmur at the left lower sternal border. No rubs noted.    Lungs: CTA b/l. No rhonchi, rales or wheezes.   Abdomen: + BS, soft. Non tender. Non distended. No rebound. No guarding.   Extremities: No clubbing/cyanosis/edema.   Neurologic: Moves all four extremities  Skin: Warm and moist. The patient's skin has normal elasticity and good skin turgor.   Psychiatric: unable to eval  Musculoskeletal: Normal range of motion      ECG:  	Sinus tachycardia    < from: Xray Chest 1 View AP/PA (01.02.24 @ 19:36) >  IMPRESSION:  No focal consolidations.  < end of copied text >    < from: Transthoracic Echocardiogram (07.10.23 @ 11:15) >  CONCLUSIONS:  1. Calcified trileaflet aortic valve with normal opening.  Minimal aortic regurgitation.  2. Endocardium not well visualized; grossly decreased  possibly mild left ventricular systolic dysfunction.  3. The right ventricle is not well visualized; grossly  normal right ventricular systolic function.  ------------------------------------------------------------------------  Confirmed on  7/10/2023 - 13:57:46 by MARLA Vegas  < end of copied text >      ASSESSMENT/PLAN: 	78M with history of MM, HTN, and Dementia (AAO x 1-2 to self, to place, not to time at baseline per brother) who presents to the hospital from [facility] for hypotension and lethargy found to have FLU A    -#Hypotension  -- BP stable  -- Norvasc and Lopressor on hold given acute illness--ok to resume at DC    #lethargy  --due to Flu, now resolved  --s/p tamiflu and Abx per medicine    DC planning

## 2024-01-14 NOTE — PROGRESS NOTE ADULT - ASSESSMENT
This is a 78M with history of MM, HTN, and Dementia (AAO x 1-2 to self, to place, not to time at baseline per brother) who presents to the hospital from [facility] for hypotension and lethargy. Patient currently awake, alert, AAO x2 (to self, to hospital but not name, not to time) but very poor historian, denies any acute complaints when asked. Spoke with brother Mookie who said that the patient was sent to the hospital for lethargy and cough though he states that the patient has had a cough for several months. Brother denies any other known acute complaints for the patient. Called [facility] [phone] but there was no staff available who knew the patient. As per NH paperwork and ED note, patient was sent to the hospital for hypotention to 81/55 and lethargy. He was noted to have a low grade temp of 99.6 and saturation of 93% at his NH. He was given tylenol 650mg x1 prior to being sent to the hospital.   On arrival to the hospital his vitals were T 99.4 -> 100.7 rectal, P 92, BP 88/55 -> 117/70, RR 16, O2 sat 96% RA. His lab work was significant for worsening macrocytic anemia, DAVID with mild hyponatremia, elevated protein gap, elevated lactate with improvement on repeat. His UA was positive for nitrite, leuk esterase but negative for bacteria. His respiratory swab was positive for influenza A. His imaging did not show acute abnormalities. He was given ofirmev 1g, NS 500cc x1, vanc 1g, cefepime 2g, and tamiflu 75mg PO x1. He was admitted to medicine.  (03 Jan 2024 06:06):  now pulm called:  he is from NH:  above history noted:  he seems to be responding to simple commands:  he say he has no underlying lung history  never smoked:  on room air:  adm with influenza:     Influenza:   Multiple Myeloma  HTN  Dementia      Influenza:   -low grade fever  -Influenza:  on tamilflu  -cxr is clear:  -he is not wheezing    1/4: no change in pulm status today  : remains on room air  1/5: doing ok : no sob:  no cough : no phlegm;   1/6: on Tamiflu  no sob:  on room air  1/7: seems to be doing ok : no sob:  no cough:   1/8: seems OK: no sob:  no cough : no phlegm  on room air  1/9: doing ok: no sob:   on cough :  1/10: seems OK: no sob:  on room air:  remains lethargic: Afebrile and is normal BC count  1/11: resolved  1/14: seems OK; no sob:  on room air:  responds to questions  Multiple Myeloma  -per primary team : FDG PET scan  noted:  rib lesions present likely secondary to MM   HTN  -controlled  Dementia  -supportive care:    karyn acp  : dc planning This is a 78M with history of MM, HTN, and Dementia (AAO x 1-2 to self, to place, not to time at baseline per brother) who presents to the hospital [place] for hypotension and lethargy. Patient currently awake, alert, AAO x2 (to self, to hospital but not name, not to time) but very poor historian, denies any acute complaints when asked. Spoke with brother Mookie who said that the patient was sent to the hospital for lethargy and cough though he states that the patient has had a cough for several months. Brother denies any other known acute complaints for the patient. Called [place] [phone] but there was no staff available who knew the patient. As per NH paperwork and ED note, patient was sent to the hospital for hypotention to 81/55 and lethargy. He was noted to have a low grade temp of 99.6 and saturation of 93% at his NH. He was given tylenol 650mg x1 prior to being sent to the hospital.   On arrival to the hospital his vitals were T 99.4 -> 100.7 rectal, P 92, BP 88/55 -> 117/70, RR 16, O2 sat 96% RA. His lab work was significant for worsening macrocytic anemia, DAVID with mild hyponatremia, elevated protein gap, elevated lactate with improvement on repeat. His UA was positive for nitrite, leuk esterase but negative for bacteria. His respiratory swab was positive for influenza A. His imaging did not show acute abnormalities. He was given ofirmev 1g, NS 500cc x1, vanc 1g, cefepime 2g, and tamiflu 75mg PO x1. He was admitted to medicine.  (03 Jan 2024 06:06):  now pulm called:  he is from NH:  above history noted:  he seems to be responding to simple commands:  he say he has no underlying lung history  never smoked:  on room air:  adm with influenza:     Influenza:   Multiple Myeloma  HTN  Dementia      Influenza:   -low grade fever  -Influenza:  on tamilflu  -cxr is clear:  -he is not wheezing    1/4: no change in pulm status today  : remains on room air  1/5: doing ok : no sob:  no cough : no phlegm;   1/6: on Tamiflu  no sob:  on room air  1/7: seems to be doing ok : no sob:  no cough:   1/8: seems OK: no sob:  no cough : no phlegm  on room air  1/9: doing ok: no sob:   on cough :  1/10: seems OK: no sob:  on room air:  remains lethargic: Afebrile and is normal BC count  1/11: resolved  1/14: seems OK; no sob:  on room air:  responds to questions  Multiple Myeloma  -per primary team : FDG PET scan  noted:  rib lesions present likely secondary to MM   HTN  -controlled  Dementia  -supportive care:    karyn acp  : dc planning

## 2024-01-14 NOTE — PROGRESS NOTE ADULT - SUBJECTIVE AND OBJECTIVE BOX
Date of Service: 01-14-24 @ 09:40    Patient is a 78y old  Male who presents with a chief complaint of Hypotension, lethargu (13 Jan 2024 17:30)      Any change in ROS:  seems to be doing ok : no sob:  no cough:     MEDICATIONS  (STANDING):  albuterol/ipratropium for Nebulization 3 milliLiter(s) Nebulizer every 12 hours  cholecalciferol 1000 Unit(s) Oral daily  cyanocobalamin 1000 MICROGram(s) Oral daily  donepezil 10 milliGRAM(s) Oral at bedtime  folic acid 1 milliGRAM(s) Oral daily  heparin   Injectable 5000 Unit(s) SubCutaneous every 8 hours  memantine 10 milliGRAM(s) Oral two times a day  risperiDONE   Tablet 0.5 milliGRAM(s) Oral at bedtime  risperiDONE   Tablet 0.25 milliGRAM(s) Oral daily  sertraline 50 milliGRAM(s) Oral daily  sodium chloride 0.9%. 1000 milliLiter(s) (75 mL/Hr) IV Continuous <Continuous>  sodium chloride 0.9%. 1000 milliLiter(s) (75 mL/Hr) IV Continuous <Continuous>    MEDICATIONS  (PRN):  acetaminophen     Tablet .. 650 milliGRAM(s) Oral every 6 hours PRN Temp greater or equal to 38C (100.4F), Mild Pain (1 - 3)  aluminum hydroxide/magnesium hydroxide/simethicone Suspension 30 milliLiter(s) Oral every 4 hours PRN Dyspepsia  melatonin 3 milliGRAM(s) Oral at bedtime PRN Insomnia  ondansetron Injectable 4 milliGRAM(s) IV Push every 8 hours PRN Nausea and/or Vomiting  QUEtiapine 12.5 milliGRAM(s) Oral every 6 hours PRN for agitation    Vital Signs Last 24 Hrs  T(C): 36.7 (14 Jan 2024 05:34), Max: 37 (13 Jan 2024 14:00)  T(F): 98 (14 Jan 2024 05:34), Max: 98.6 (13 Jan 2024 14:00)  HR: 86 (14 Jan 2024 08:40) (70 - 87)  BP: 109/67 (14 Jan 2024 05:34) (106/66 - 109/67)  BP(mean): --  RR: 17 (14 Jan 2024 05:34) (17 - 17)  SpO2: 94% (14 Jan 2024 08:40) (94% - 99%)    Parameters below as of 14 Jan 2024 08:40  Patient On (Oxygen Delivery Method): room air        I&O's Summary    13 Jan 2024 07:01  -  14 Jan 2024 07:00  --------------------------------------------------------  IN: 2605 mL / OUT: 0 mL / NET: 2605 mL          Physical Exam:   GENERAL: NAD, well-groomed, well-developed  HEENT: CHRISTINA/   Atraumatic, Normocephalic  ENMT: No tonsillar erythema, exudates, or enlargement; Moist mucous membranes, Good dentition, No lesions  NECK: Supple, No JVD, Normal thyroid  CHEST/LUNG: Clear to auscultaion  CVS: Regular rate and rhythm; No murmurs, rubs, or gallops  GI: : Soft, Nontender, Nondistended; Bowel sounds present  NERVOUS SYSTEM:  Alert & Oriented X3  EXTREMITIES:  - edema  LYMPH: No lymphadenopathy noted  SKIN: No rashes or lesions  ENDOCRINOLOGY: No Thyromegaly  PSYCH: Appropriate    Labs:                              7.1    4.61  )-----------( 300      ( 14 Jan 2024 06:50 )             21.3                         7.3    4.79  )-----------( 286      ( 13 Jan 2024 06:10 )             22.7                         7.8    3.74  )-----------( 296      ( 12 Jan 2024 05:23 )             23.5     01-14    135  |  104  |  21  ----------------------------<  84  3.9   |  24  |  0.72  01-13    135  |  105  |  25<H>  ----------------------------<  84  4.6   |  23  |  0.76  01-13    137  |  107  |  27<H>  ----------------------------<  94  3.7   |  24  |  0.85  01-12    139  |  108<H>  |  26<H>  ----------------------------<  93  3.6   |  25  |  0.93    Ca    9.6      14 Jan 2024 06:50  Ca    9.5      13 Jan 2024 22:53  Ca    10.1      13 Jan 2024 06:10  Phos  3.0     01-14  Phos  3.8     01-13  Phos  2.3     01-13  Mg     1.70     01-14  Mg     1.70     01-13  Mg     1.90     01-13      CAPILLARY BLOOD GLUCOSE      rad< from: CT Head No Cont (01.03.24 @ 02:00) >    ACC: [id number] EXAM:  CT BRAIN   ORDERED BY: MALOU CRISOSTOMO     PROCEDURE DATE:  01/03/2024          INTERPRETATION:  EXAMINATION: CT HEAD    DATE: 1/3/2024 2:00 AM    INDICATION: lethargy, altered mental status. History of falls.    COMPARISON: CT head from 7/7/2023.    TECHNIQUE: Thin section noncontrast axial images were obtained through   the head.  RAPID AI was utilized for preliminary assessment of   intracranial hemorrhage.    FINDINGS:  Intracranial Contents:    Moderate to severe cerebral volume loss.. Mild to moderate chronic   microvascular ischemic changes Gray-white differentiation is preserved.   No hydrocephalus. The basilar cisterns are clear. No focal edema or acute   mass effect. There is no intracranial fluid collectionor acute   hemorrhage.    Bones and Extracranial Soft Tissues:    There is no fracture identified. Scattered paranasal sinus mucosal   thickening. Mastoid air cells are clear. Scalp and imaged facial soft   tissues are within normal limits.      IMPRESSION:  1. No acute intracranial CT abnormality.    --- End of Report ---          MAIKEL MORENO MD; Resident Radiologist  This document has been electronically signed.  ELLEN CARTWRIGHT MD; Attending Radiologist  This document has been electronically signed. Josef  3 2024  2:17AM    < end of copied text >  < from: Xray Chest 1 View AP/PA (01.02.24 @ 19:36) >    ACC: [id number] EXAM:  XR CHEST AP OR PA 1V   ORDERED BY: CATHIE MULLER     PROCEDURE DATE:  01/02/2024          INTERPRETATION:  CLINICAL INDICATION: Sepsis.    EXAM: Chest x-ray 2 views.    COMPARISON: None available.    FINDINGS:  Surgical clips overlying the midline of the neck.  Bilateral lung fields partially obscured by patient positioning.   Visualized lung fields are clear.  There is no pleural effusion or pneumothorax.  The heart is not well evaluated in this position. Median sternotomy.  The visualized osseous structures demonstrate no acute pathology.    IMPRESSION:  No focal consolidations.    --- End of Report ---          MIR LAW MD; Resident Radiologist  This document has been electronically signed.  KOFI GARCIA MD; Attending Radiologist  This document has been electronically signed. Jan 2 2024  7:43PM    < end of copied text >          Urinalysis Basic - ( 14 Jan 2024 06:50 )    Color: x / Appearance: x / SG: x / pH: x  Gluc: 84 mg/dL / Ketone: x  / Bili: x / Urobili: x   Blood: x / Protein: x / Nitrite: x   Leuk Esterase: x / RBC: x / WBC x   Sq Epi: x / Non Sq Epi: x / Bacteria: x            RECENT CULTURES:        RESPIRATORY CULTURES:          Studies  Chest X-RAY  CT SCAN Chest   Venous Dopplers: LE:   CT Abdomen  Others               Date of Service: 01-14-24 @ 09:40    Patient is a 78y old  Male who presents with a chief complaint of Hypotension, lethargu (13 Jan 2024 17:30)      Any change in ROS:  seems to be doing ok : no sob:  no cough:     MEDICATIONS  (STANDING):  albuterol/ipratropium for Nebulization 3 milliLiter(s) Nebulizer every 12 hours  cholecalciferol 1000 Unit(s) Oral daily  cyanocobalamin 1000 MICROGram(s) Oral daily  donepezil 10 milliGRAM(s) Oral at bedtime  folic acid 1 milliGRAM(s) Oral daily  heparin   Injectable 5000 Unit(s) SubCutaneous every 8 hours  memantine 10 milliGRAM(s) Oral two times a day  risperiDONE   Tablet 0.5 milliGRAM(s) Oral at bedtime  risperiDONE   Tablet 0.25 milliGRAM(s) Oral daily  sertraline 50 milliGRAM(s) Oral daily  sodium chloride 0.9%. 1000 milliLiter(s) (75 mL/Hr) IV Continuous <Continuous>  sodium chloride 0.9%. 1000 milliLiter(s) (75 mL/Hr) IV Continuous <Continuous>    MEDICATIONS  (PRN):  acetaminophen     Tablet .. 650 milliGRAM(s) Oral every 6 hours PRN Temp greater or equal to 38C (100.4F), Mild Pain (1 - 3)  aluminum hydroxide/magnesium hydroxide/simethicone Suspension 30 milliLiter(s) Oral every 4 hours PRN Dyspepsia  melatonin 3 milliGRAM(s) Oral at bedtime PRN Insomnia  ondansetron Injectable 4 milliGRAM(s) IV Push every 8 hours PRN Nausea and/or Vomiting  QUEtiapine 12.5 milliGRAM(s) Oral every 6 hours PRN for agitation    Vital Signs Last 24 Hrs  T(C): 36.7 (14 Jan 2024 05:34), Max: 37 (13 Jan 2024 14:00)  T(F): 98 (14 Jan 2024 05:34), Max: 98.6 (13 Jan 2024 14:00)  HR: 86 (14 Jan 2024 08:40) (70 - 87)  BP: 109/67 (14 Jan 2024 05:34) (106/66 - 109/67)  BP(mean): --  RR: 17 (14 Jan 2024 05:34) (17 - 17)  SpO2: 94% (14 Jan 2024 08:40) (94% - 99%)    Parameters below as of 14 Jan 2024 08:40  Patient On (Oxygen Delivery Method): room air        I&O's Summary    13 Jan 2024 07:01  -  14 Jan 2024 07:00  --------------------------------------------------------  IN: 2605 mL / OUT: 0 mL / NET: 2605 mL          Physical Exam:   GENERAL: NAD, well-groomed, well-developed  HEENT: CHRISTINA/   Atraumatic, Normocephalic  ENMT: No tonsillar erythema, exudates, or enlargement; Moist mucous membranes, Good dentition, No lesions  NECK: Supple, No JVD, Normal thyroid  CHEST/LUNG: Clear to auscultaion  CVS: Regular rate and rhythm; No murmurs, rubs, or gallops  GI: : Soft, Nontender, Nondistended; Bowel sounds present  NERVOUS SYSTEM:  Alert & Oriented X3  EXTREMITIES:  - edema  LYMPH: No lymphadenopathy noted  SKIN: No rashes or lesions  ENDOCRINOLOGY: No Thyromegaly  PSYCH: Appropriate    Labs:                              7.1    4.61  )-----------( 300      ( 14 Jan 2024 06:50 )             21.3                         7.3    4.79  )-----------( 286      ( 13 Jan 2024 06:10 )             22.7                         7.8    3.74  )-----------( 296      ( 12 Jan 2024 05:23 )             23.5     01-14    135  |  104  |  21  ----------------------------<  84  3.9   |  24  |  0.72  01-13    135  |  105  |  25<H>  ----------------------------<  84  4.6   |  23  |  0.76  01-13    137  |  107  |  27<H>  ----------------------------<  94  3.7   |  24  |  0.85  01-12    139  |  108<H>  |  26<H>  ----------------------------<  93  3.6   |  25  |  0.93    Ca    9.6      14 Jan 2024 06:50  Ca    9.5      13 Jan 2024 22:53  Ca    10.1      13 Jan 2024 06:10  Phos  3.0     01-14  Phos  3.8     01-13  Phos  2.3     01-13  Mg     1.70     01-14  Mg     1.70     01-13  Mg     1.90     01-13      CAPILLARY BLOOD GLUCOSE      rad< from: CT Head No Cont (01.03.24 @ 02:00) >    ACC: [id number] EXAM:  CT BRAIN   ORDERED BY: MALOU CRISOSTOMO     PROCEDURE DATE:  01/03/2024          INTERPRETATION:  EXAMINATION: CT HEAD    DATE: 1/3/2024 2:00 AM    INDICATION: lethargy, altered mental status. History of falls.    COMPARISON: CT head from 7/7/2023.    TECHNIQUE: Thin section noncontrast axial images were obtained through   the head.  RAPID AI was utilized for preliminary assessment of   intracranial hemorrhage.    FINDINGS:  Intracranial Contents:    Moderate to severe cerebral volume loss.. Mild to moderate chronic   microvascular ischemic changes Gray-white differentiation is preserved.   No hydrocephalus. The basilar cisterns are clear. No focal edema or acute   mass effect. There is no intracranial fluid collectionor acute   hemorrhage.    Bones and Extracranial Soft Tissues:    There is no fracture identified. Scattered paranasal sinus mucosal   thickening. Mastoid air cells are clear. Scalp and imaged facial soft   tissues are within normal limits.      IMPRESSION:  1. No acute intracranial CT abnormality.    --- End of Report ---          MAIKEL MORENO MD; Resident Radiologist  This document has been electronically signed.  ELLEN CARTWRIGHT MD; Attending Radiologist  This document has been electronically signed. Josef  3 2024  2:17AM    < end of copied text >  < from: Xray Chest 1 View AP/PA (01.02.24 @ 19:36) >    ACC: [id number] EXAM:  XR CHEST AP OR PA 1V   ORDERED BY: CATHIE MULLER     PROCEDURE DATE:  01/02/2024          INTERPRETATION:  CLINICAL INDICATION: Sepsis.    EXAM: Chest x-ray 2 views.    COMPARISON: None available.    FINDINGS:  Surgical clips overlying the midline of the neck.  Bilateral lung fields partially obscured by patient positioning.   Visualized lung fields are clear.  There is no pleural effusion or pneumothorax.  The heart is not well evaluated in this position. Median sternotomy.  The visualized osseous structures demonstrate no acute pathology.    IMPRESSION:  No focal consolidations.    --- End of Report ---          MIR LAW MD; Resident Radiologist  This document has been electronically signed.  KOFI GARCIA MD; Attending Radiologist  This document has been electronically signed. Jan 2 2024  7:43PM    < end of copied text >          Urinalysis Basic - ( 14 Jan 2024 06:50 )    Color: x / Appearance: x / SG: x / pH: x  Gluc: 84 mg/dL / Ketone: x  / Bili: x / Urobili: x   Blood: x / Protein: x / Nitrite: x   Leuk Esterase: x / RBC: x / WBC x   Sq Epi: x / Non Sq Epi: x / Bacteria: x            RECENT CULTURES:        RESPIRATORY CULTURES:          Studies  Chest X-RAY  CT SCAN Chest   Venous Dopplers: LE:   CT Abdomen  Others

## 2024-01-14 NOTE — PROGRESS NOTE ADULT - ASSESSMENT
This is a 78M with history of MM, HTN, and Dementia (AAO x 1-2 to self, to place, not to time at baseline per brother) who presents to the hospital from [facility] for hypotension and lethargy. Patient currently awake, alert, AAO x1 (to self,) but very poor historian, denies any acute complaints when asked. nephrology consulted for david    DAVID  admitted with scr 1.4  ? etiology prerenal vs ATN  pt with sepsis possible ATN  DAVID improved.  Monitor BMP    hematuria  repeat ua   renal us with no mass, bladder diverticula--outpt urology  Monitor    hyponatremia  ? etiology  improved sodium   monitor  free water restriction <1.2L    anemia  iron panel  transfusion and work up per team  Monitor Hb    hyper calcemia/ hypophosphatemna  PTH low-5  VItamin D wnl    Continue vit D  Monitor PTH, PO4, Ca   This is a 78M with history of MM, HTN, and Dementia (AAO x 1-2 to self, to place, not to time at baseline per brother) who presents to the hospital from [facility] for hypotension and lethargy. Patient currently awake, alert, AAO x1 (to self,) but very poor historian, denies any acute complaints when asked. nephrology consulted for david    DAVID  admitted with scr 1.4  ? etiology prerenal vs ATN  pt with sepsis possible ATN  DAVID improved.  Monitor BMP    hematuria  repeat ua   renal us with no mass, bladder diverticula--outpt urology  Monitor    hyponatremia  ? etiology  improved sodium   monitor  free water restriction <1.2L    anemia  iron panel  transfusion and work up per team  Monitor Hb    hyper calcemia/ hypophosphatemna  PTH low-5  VItamin D wnl    Continue vit D  Monitor PTH, PO4, Ca   This is a 78M with history of MM, HTN, and Dementia (AAO x 1-2 to self, to place, not to time at baseline per brother) who presents to the hospital from [facility] for hypotension and lethargy. Patient currently awake, alert, AAO x1 (to self,) but very poor historian, denies any acute complaints when asked. nephrology consulted for david    DAVID  admitted with scr 1.4  pt with sepsis likely ATN  DAVID improved.  Monitor BMP    hematuria  repeat ua   renal us with no mass, bladder diverticula--outpt urology  Monitor    hyponatremia  ? etiology  improved sodium   monitor  free water restriction <1.2L    anemia  iron panel  transfusion and work up per team  Monitor Hb    hyper calcemia/ hypophosphatemna  PTH low-5  VItamin D wnl    Continue vit D  Monitor PTH, PO4, Ca   This is a 78M with history of MM, HTN, and Dementia (AAO x 1-2 to self, to place, not to time at baseline per brother) who presents to the hospital from [facility] for hypotension and lethargy. Patient currently awake, alert, AAO x1 (to self,) but very poor historian, denies any acute complaints when asked. nephrology consulted for david    DAVID  admitted with scr 1.4  pt with sepsis likely ATN  DAVID improved.  Monitor BMP    hematuria  repeat ua   renal us with no mass, bladder diverticula--outpt urology  Monitor    hyponatremia  ? etiology  improved sodium   monitor  free water restriction <1.2L    anemia  iron panel  transfusion and work up per team  Monitor Hb    hyper calcemia/ hypophosphatemna  PTH low-5  VItamin D wnl    Continue vit D  Monitor PTH, PO4, Ca

## 2024-01-14 NOTE — PROGRESS NOTE ADULT - SUBJECTIVE AND OBJECTIVE BOX
Patient is a 78y old  Male who presents with a chief complaint of Hypotension, lethargu (14 Jan 2024 12:49)    Date of servie : 01-14-24 @ 15:15  INTERVAL HPI/OVERNIGHT EVENTS:  T(C): 36.6 (01-14-24 @ 11:06), Max: 36.7 (01-14-24 @ 05:34)  HR: 82 (01-14-24 @ 11:14) (69 - 87)  BP: 99/63 (01-14-24 @ 11:14) (93/62 - 109/67)  RR: 17 (01-14-24 @ 11:06) (17 - 17)  SpO2: 94% (01-14-24 @ 11:06) (94% - 99%)  Wt(kg): --  I&O's Summary    13 Jan 2024 07:01  -  14 Jan 2024 07:00  --------------------------------------------------------  IN: 2605 mL / OUT: 0 mL / NET: 2605 mL    14 Jan 2024 07:01  -  14 Jan 2024 15:15  --------------------------------------------------------  IN: 360 mL / OUT: 0 mL / NET: 360 mL        LABS:                        7.1    4.61  )-----------( 300      ( 14 Jan 2024 06:50 )             21.3     01-14    135  |  104  |  21  ----------------------------<  84  3.9   |  24  |  0.72    Ca    9.6      14 Jan 2024 06:50  Phos  3.0     01-14  Mg     1.70     01-14        Urinalysis Basic - ( 14 Jan 2024 06:50 )    Color: x / Appearance: x / SG: x / pH: x  Gluc: 84 mg/dL / Ketone: x  / Bili: x / Urobili: x   Blood: x / Protein: x / Nitrite: x   Leuk Esterase: x / RBC: x / WBC x   Sq Epi: x / Non Sq Epi: x / Bacteria: x      CAPILLARY BLOOD GLUCOSE            Urinalysis Basic - ( 14 Jan 2024 06:50 )    Color: x / Appearance: x / SG: x / pH: x  Gluc: 84 mg/dL / Ketone: x  / Bili: x / Urobili: x   Blood: x / Protein: x / Nitrite: x   Leuk Esterase: x / RBC: x / WBC x   Sq Epi: x / Non Sq Epi: x / Bacteria: x        MEDICATIONS  (STANDING):  albuterol/ipratropium for Nebulization 3 milliLiter(s) Nebulizer every 12 hours  cholecalciferol 1000 Unit(s) Oral daily  cyanocobalamin 1000 MICROGram(s) Oral daily  donepezil 10 milliGRAM(s) Oral at bedtime  folic acid 1 milliGRAM(s) Oral daily  heparin   Injectable 5000 Unit(s) SubCutaneous every 8 hours  memantine 10 milliGRAM(s) Oral two times a day  risperiDONE   Tablet 0.25 milliGRAM(s) Oral daily  risperiDONE   Tablet 0.5 milliGRAM(s) Oral at bedtime  sertraline 50 milliGRAM(s) Oral daily  sodium chloride 0.9%. 1000 milliLiter(s) (75 mL/Hr) IV Continuous <Continuous>  sodium chloride 0.9%. 1000 milliLiter(s) (75 mL/Hr) IV Continuous <Continuous>    MEDICATIONS  (PRN):  acetaminophen     Tablet .. 650 milliGRAM(s) Oral every 6 hours PRN Temp greater or equal to 38C (100.4F), Mild Pain (1 - 3)  aluminum hydroxide/magnesium hydroxide/simethicone Suspension 30 milliLiter(s) Oral every 4 hours PRN Dyspepsia  melatonin 3 milliGRAM(s) Oral at bedtime PRN Insomnia  ondansetron Injectable 4 milliGRAM(s) IV Push every 8 hours PRN Nausea and/or Vomiting  QUEtiapine 12.5 milliGRAM(s) Oral every 6 hours PRN for agitation          PHYSICAL EXAM:  GENERAL: NAD, well-groomed, well-developed  HEAD:  Atraumatic, Normocephalic  CHEST/LUNG: Clear to percussion bilaterally; No rales, rhonchi, wheezing, or rubs  HEART: Regular rate and rhythm; No murmurs, rubs, or gallops  ABDOMEN: Soft, Nontender, Nondistended; Bowel sounds present  EXTREMITIES:  2+ Peripheral Pulses, No clubbing, cyanosis, or edema  LYMPH: No lymphadenopathy noted  SKIN: No rashes or lesions    Care Discussed with Consultants/Other Providers [x ] YES  [ ] NO

## 2024-01-14 NOTE — PROGRESS NOTE ADULT - SUBJECTIVE AND OBJECTIVE BOX
[facility] [place] [facility]   MD ANEL FOLEY MD RUORU WONG, PA CHEN QIAN, JEFERSON    TEL:  OFFICE: [phone]      RENAL FOLLOW UP NOTE-- Date of Service ;; 01-14-24 @ 12:49  --------------------------------------------------------------------------------  HPI:  Pt seen and examined at bedside          PAST HISTORY  --------------------------------------------------------------------------------  No significant changes to PMH, PSH, FHx, SHx, unless otherwise noted    ALLERGIES & MEDICATIONS  --------------------------------------------------------------------------------  Allergies    No Known Allergies    Intolerances      Standing Inpatient Medications  albuterol/ipratropium for Nebulization 3 milliLiter(s) Nebulizer every 12 hours  cholecalciferol 1000 Unit(s) Oral daily  cyanocobalamin 1000 MICROGram(s) Oral daily  donepezil 10 milliGRAM(s) Oral at bedtime  folic acid 1 milliGRAM(s) Oral daily  heparin   Injectable 5000 Unit(s) SubCutaneous every 8 hours  memantine 10 milliGRAM(s) Oral two times a day  risperiDONE   Tablet 0.5 milliGRAM(s) Oral at bedtime  risperiDONE   Tablet 0.25 milliGRAM(s) Oral daily  sertraline 50 milliGRAM(s) Oral daily  sodium chloride 0.9%. 1000 milliLiter(s) IV Continuous <Continuous>  sodium chloride 0.9%. 1000 milliLiter(s) IV Continuous <Continuous>    PRN Inpatient Medications  acetaminophen     Tablet .. 650 milliGRAM(s) Oral every 6 hours PRN  aluminum hydroxide/magnesium hydroxide/simethicone Suspension 30 milliLiter(s) Oral every 4 hours PRN  melatonin 3 milliGRAM(s) Oral at bedtime PRN  ondansetron Injectable 4 milliGRAM(s) IV Push every 8 hours PRN  QUEtiapine 12.5 milliGRAM(s) Oral every 6 hours PRN      REVIEW OF SYSTEMS  --------------------------------------------------------------------------------  General: no fever  CVS: no chest pain  RESP: no sob, no cough  ABD: no abdominal pain  MSK: no edema     VITALS/PHYSICAL EXAM  --------------------------------------------------------------------------------  T(C): 36.6 (01-14-24 @ 11:06), Max: 37 (01-13-24 @ 14:00)  HR: 82 (01-14-24 @ 11:14) (69 - 87)  BP: 99/63 (01-14-24 @ 11:14) (93/62 - 109/67)  RR: 17 (01-14-24 @ 11:06) (17 - 17)  SpO2: 94% (01-14-24 @ 11:06) (94% - 99%)  Wt(kg): --        01-13-24 @ 07:01  -  01-14-24 @ 07:00  --------------------------------------------------------  IN: 2605 mL / OUT: 0 mL / NET: 2605 mL    01-14-24 @ 07:01  -  01-14-24 @ 12:49  --------------------------------------------------------  IN: 360 mL / OUT: 0 mL / NET: 360 mL      Physical Exam:  	Gen: NAD  	HEENT: MMM  	Pulm: CTA B/L  	CV: S1S2  	Abd: Soft, +BS  	Ext: No LE edema B/L                      Neuro: Awake   	Skin: Warm and Dry   	Vascular access: None            LABS/STUDIES  --------------------------------------------------------------------------------              7.1    4.61  >-----------<  300      [01-14-24 @ 06:50]              21.3     135  |  104  |  21  ----------------------------<  84      [01-14-24 @ 06:50]  3.9   |  24  |  0.72        Ca     9.6     [01-14-24 @ 06:50]      Mg     1.70     [01-14-24 @ 06:50]      Phos  3.0     [01-14-24 @ 06:50]            Creatinine Trend:  SCr 0.72 [01-14 @ 06:50]  SCr 0.76 [01-13 @ 22:53]  SCr 0.85 [01-13 @ 06:10]  SCr 0.93 [01-12 @ 05:23]  SCr 0.79 [01-09 @ 07:01]    Urinalysis - [01-14-24 @ 06:50]      Color  / Appearance  / SG  / pH       Gluc 84 / Ketone   / Bili  / Urobili        Blood  / Protein  / Leuk Est  / Nitrite       RBC  / WBC  / Hyaline  / Gran  / Sq Epi  / Non Sq Epi  / Bacteria       PTH -- (Ca --)      [01-14-24 @ 06:50]   5  PTH -- (Ca --)      [01-13-24 @ 06:10]   6  Vitamin D (25OH) 36.9      [01-13-24 @ 06:10]       [facility] [place] [facility]   MD ANEL FOLEY MD RUORU WONG, PA CHEN QIAN, JEFERSON    TEL:  OFFICE: [phone]      RENAL FOLLOW UP NOTE-- Date of Service ;; 01-14-24 @ 12:49  --------------------------------------------------------------------------------  HPI:  Pt seen and examined at bedside          PAST HISTORY  --------------------------------------------------------------------------------  No significant changes to PMH, PSH, FHx, SHx, unless otherwise noted    ALLERGIES & MEDICATIONS  --------------------------------------------------------------------------------  Allergies    No Known Allergies    Intolerances      Standing Inpatient Medications  albuterol/ipratropium for Nebulization 3 milliLiter(s) Nebulizer every 12 hours  cholecalciferol 1000 Unit(s) Oral daily  cyanocobalamin 1000 MICROGram(s) Oral daily  donepezil 10 milliGRAM(s) Oral at bedtime  folic acid 1 milliGRAM(s) Oral daily  heparin   Injectable 5000 Unit(s) SubCutaneous every 8 hours  memantine 10 milliGRAM(s) Oral two times a day  risperiDONE   Tablet 0.5 milliGRAM(s) Oral at bedtime  risperiDONE   Tablet 0.25 milliGRAM(s) Oral daily  sertraline 50 milliGRAM(s) Oral daily  sodium chloride 0.9%. 1000 milliLiter(s) IV Continuous <Continuous>  sodium chloride 0.9%. 1000 milliLiter(s) IV Continuous <Continuous>    PRN Inpatient Medications  acetaminophen     Tablet .. 650 milliGRAM(s) Oral every 6 hours PRN  aluminum hydroxide/magnesium hydroxide/simethicone Suspension 30 milliLiter(s) Oral every 4 hours PRN  melatonin 3 milliGRAM(s) Oral at bedtime PRN  ondansetron Injectable 4 milliGRAM(s) IV Push every 8 hours PRN  QUEtiapine 12.5 milliGRAM(s) Oral every 6 hours PRN      REVIEW OF SYSTEMS  --------------------------------------------------------------------------------  General: no fever  CVS: no chest pain  RESP: no sob, no cough  ABD: no abdominal pain  MSK: no edema     VITALS/PHYSICAL EXAM  --------------------------------------------------------------------------------  T(C): 36.6 (01-14-24 @ 11:06), Max: 37 (01-13-24 @ 14:00)  HR: 82 (01-14-24 @ 11:14) (69 - 87)  BP: 99/63 (01-14-24 @ 11:14) (93/62 - 109/67)  RR: 17 (01-14-24 @ 11:06) (17 - 17)  SpO2: 94% (01-14-24 @ 11:06) (94% - 99%)  Wt(kg): --        01-13-24 @ 07:01  -  01-14-24 @ 07:00  --------------------------------------------------------  IN: 2605 mL / OUT: 0 mL / NET: 2605 mL    01-14-24 @ 07:01  -  01-14-24 @ 12:49  --------------------------------------------------------  IN: 360 mL / OUT: 0 mL / NET: 360 mL      Physical Exam:  	Gen: NAD  	HEENT: MMM  	Pulm: CTA B/L  	CV: S1S2  	Abd: Soft, +BS  	Ext: No LE edema B/L                      Neuro: Awake   	Skin: Warm and Dry   	Vascular access: None            LABS/STUDIES  --------------------------------------------------------------------------------              7.1    4.61  >-----------<  300      [01-14-24 @ 06:50]              21.3     135  |  104  |  21  ----------------------------<  84      [01-14-24 @ 06:50]  3.9   |  24  |  0.72        Ca     9.6     [01-14-24 @ 06:50]      Mg     1.70     [01-14-24 @ 06:50]      Phos  3.0     [01-14-24 @ 06:50]            Creatinine Trend:  SCr 0.72 [01-14 @ 06:50]  SCr 0.76 [01-13 @ 22:53]  SCr 0.85 [01-13 @ 06:10]  SCr 0.93 [01-12 @ 05:23]  SCr 0.79 [01-09 @ 07:01]    Urinalysis - [01-14-24 @ 06:50]      Color  / Appearance  / SG  / pH       Gluc 84 / Ketone   / Bili  / Urobili        Blood  / Protein  / Leuk Est  / Nitrite       RBC  / WBC  / Hyaline  / Gran  / Sq Epi  / Non Sq Epi  / Bacteria       PTH -- (Ca --)      [01-14-24 @ 06:50]   5  PTH -- (Ca --)      [01-13-24 @ 06:10]   6  Vitamin D (25OH) 36.9      [01-13-24 @ 06:10]

## 2024-01-15 LAB
ANION GAP SERPL CALC-SCNC: 5 MMOL/L — LOW (ref 7–14)
ANION GAP SERPL CALC-SCNC: 5 MMOL/L — LOW (ref 7–14)
APPEARANCE UR: ABNORMAL
BACTERIA # UR AUTO: NEGATIVE /HPF — SIGNIFICANT CHANGE UP
BILIRUB UR-MCNC: NEGATIVE — SIGNIFICANT CHANGE UP
BUN SERPL-MCNC: 17 MG/DL — SIGNIFICANT CHANGE UP (ref 7–23)
BUN SERPL-MCNC: 17 MG/DL — SIGNIFICANT CHANGE UP (ref 7–23)
CALCIUM SERPL-MCNC: 10.1 MG/DL — SIGNIFICANT CHANGE UP (ref 8.4–10.5)
CALCIUM SERPL-MCNC: 10.1 MG/DL — SIGNIFICANT CHANGE UP (ref 8.4–10.5)
CAST: 3 /LPF — SIGNIFICANT CHANGE UP (ref 0–4)
CHLORIDE SERPL-SCNC: 101 MMOL/L — SIGNIFICANT CHANGE UP (ref 98–107)
CHLORIDE SERPL-SCNC: 101 MMOL/L — SIGNIFICANT CHANGE UP (ref 98–107)
CO2 SERPL-SCNC: 26 MMOL/L — SIGNIFICANT CHANGE UP (ref 22–31)
CO2 SERPL-SCNC: 26 MMOL/L — SIGNIFICANT CHANGE UP (ref 22–31)
COLOR SPEC: YELLOW — SIGNIFICANT CHANGE UP
CREAT SERPL-MCNC: 0.78 MG/DL — SIGNIFICANT CHANGE UP (ref 0.5–1.3)
CREAT SERPL-MCNC: 0.78 MG/DL — SIGNIFICANT CHANGE UP (ref 0.5–1.3)
DIFF PNL FLD: NEGATIVE — SIGNIFICANT CHANGE UP
EGFR: 92 ML/MIN/1.73M2 — SIGNIFICANT CHANGE UP
EGFR: 92 ML/MIN/1.73M2 — SIGNIFICANT CHANGE UP
GLUCOSE SERPL-MCNC: 86 MG/DL — SIGNIFICANT CHANGE UP (ref 70–99)
GLUCOSE SERPL-MCNC: 86 MG/DL — SIGNIFICANT CHANGE UP (ref 70–99)
GLUCOSE UR QL: NEGATIVE MG/DL — SIGNIFICANT CHANGE UP
HCT VFR BLD CALC: 22.5 % — LOW (ref 39–50)
HCT VFR BLD CALC: 22.5 % — LOW (ref 39–50)
HGB BLD-MCNC: 7.5 G/DL — LOW (ref 13–17)
HGB BLD-MCNC: 7.5 G/DL — LOW (ref 13–17)
KETONES UR-MCNC: NEGATIVE MG/DL — SIGNIFICANT CHANGE UP
LEUKOCYTE ESTERASE UR-ACNC: NEGATIVE — SIGNIFICANT CHANGE UP
MAGNESIUM SERPL-MCNC: 1.9 MG/DL — SIGNIFICANT CHANGE UP (ref 1.6–2.6)
MAGNESIUM SERPL-MCNC: 1.9 MG/DL — SIGNIFICANT CHANGE UP (ref 1.6–2.6)
MCHC RBC-ENTMCNC: 33.3 GM/DL — SIGNIFICANT CHANGE UP (ref 32–36)
MCHC RBC-ENTMCNC: 33.3 GM/DL — SIGNIFICANT CHANGE UP (ref 32–36)
MCHC RBC-ENTMCNC: 34.9 PG — HIGH (ref 27–34)
MCHC RBC-ENTMCNC: 34.9 PG — HIGH (ref 27–34)
MCV RBC AUTO: 104.7 FL — HIGH (ref 80–100)
MCV RBC AUTO: 104.7 FL — HIGH (ref 80–100)
NITRITE UR-MCNC: NEGATIVE — SIGNIFICANT CHANGE UP
NRBC # BLD: 0 /100 WBCS — SIGNIFICANT CHANGE UP (ref 0–0)
NRBC # BLD: 0 /100 WBCS — SIGNIFICANT CHANGE UP (ref 0–0)
NRBC # FLD: 0 K/UL — SIGNIFICANT CHANGE UP (ref 0–0)
NRBC # FLD: 0 K/UL — SIGNIFICANT CHANGE UP (ref 0–0)
OSMOLALITY UR: 515 MOSM/KG — SIGNIFICANT CHANGE UP (ref 50–1200)
PH UR: 7 — SIGNIFICANT CHANGE UP (ref 5–8)
PHOSPHATE SERPL-MCNC: 2.7 MG/DL — SIGNIFICANT CHANGE UP (ref 2.5–4.5)
PHOSPHATE SERPL-MCNC: 2.7 MG/DL — SIGNIFICANT CHANGE UP (ref 2.5–4.5)
PLATELET # BLD AUTO: 286 K/UL — SIGNIFICANT CHANGE UP (ref 150–400)
PLATELET # BLD AUTO: 286 K/UL — SIGNIFICANT CHANGE UP (ref 150–400)
POTASSIUM SERPL-MCNC: 3.8 MMOL/L — SIGNIFICANT CHANGE UP (ref 3.5–5.3)
POTASSIUM SERPL-MCNC: 3.8 MMOL/L — SIGNIFICANT CHANGE UP (ref 3.5–5.3)
POTASSIUM SERPL-SCNC: 3.8 MMOL/L — SIGNIFICANT CHANGE UP (ref 3.5–5.3)
POTASSIUM SERPL-SCNC: 3.8 MMOL/L — SIGNIFICANT CHANGE UP (ref 3.5–5.3)
PROT UR-MCNC: SIGNIFICANT CHANGE UP MG/DL
RBC # BLD: 2.15 M/UL — LOW (ref 4.2–5.8)
RBC # BLD: 2.15 M/UL — LOW (ref 4.2–5.8)
RBC # FLD: 13.5 % — SIGNIFICANT CHANGE UP (ref 10.3–14.5)
RBC # FLD: 13.5 % — SIGNIFICANT CHANGE UP (ref 10.3–14.5)
RBC CASTS # UR COMP ASSIST: 1 /HPF — SIGNIFICANT CHANGE UP (ref 0–4)
REVIEW: SIGNIFICANT CHANGE UP
SODIUM SERPL-SCNC: 132 MMOL/L — LOW (ref 135–145)
SODIUM SERPL-SCNC: 132 MMOL/L — LOW (ref 135–145)
SODIUM UR-SCNC: 80 MMOL/L — SIGNIFICANT CHANGE UP
SP GR SPEC: 1.02 — SIGNIFICANT CHANGE UP (ref 1–1.03)
SQUAMOUS # UR AUTO: 0 /HPF — SIGNIFICANT CHANGE UP (ref 0–5)
UROBILINOGEN FLD QL: 1 MG/DL — SIGNIFICANT CHANGE UP (ref 0.2–1)
WBC # BLD: 3.7 K/UL — LOW (ref 3.8–10.5)
WBC # BLD: 3.7 K/UL — LOW (ref 3.8–10.5)
WBC # FLD AUTO: 3.7 K/UL — LOW (ref 3.8–10.5)
WBC # FLD AUTO: 3.7 K/UL — LOW (ref 3.8–10.5)
WBC UR QL: 1 /HPF — SIGNIFICANT CHANGE UP (ref 0–5)

## 2024-01-15 RX ADMIN — MEMANTINE HYDROCHLORIDE 10 MILLIGRAM(S): 10 TABLET ORAL at 17:07

## 2024-01-15 RX ADMIN — PREGABALIN 1000 MICROGRAM(S): 225 CAPSULE ORAL at 12:41

## 2024-01-15 RX ADMIN — Medication 1000 UNIT(S): at 12:03

## 2024-01-15 RX ADMIN — Medication 3 MILLILITER(S): at 21:04

## 2024-01-15 RX ADMIN — DONEPEZIL HYDROCHLORIDE 10 MILLIGRAM(S): 10 TABLET, FILM COATED ORAL at 23:18

## 2024-01-15 RX ADMIN — RISPERIDONE 0.5 MILLIGRAM(S): 4 TABLET ORAL at 23:17

## 2024-01-15 RX ADMIN — HEPARIN SODIUM 5000 UNIT(S): 5000 INJECTION INTRAVENOUS; SUBCUTANEOUS at 13:40

## 2024-01-15 RX ADMIN — Medication 1 MILLIGRAM(S): at 12:03

## 2024-01-15 RX ADMIN — MEMANTINE HYDROCHLORIDE 10 MILLIGRAM(S): 10 TABLET ORAL at 05:38

## 2024-01-15 RX ADMIN — HEPARIN SODIUM 5000 UNIT(S): 5000 INJECTION INTRAVENOUS; SUBCUTANEOUS at 05:38

## 2024-01-15 RX ADMIN — HEPARIN SODIUM 5000 UNIT(S): 5000 INJECTION INTRAVENOUS; SUBCUTANEOUS at 23:18

## 2024-01-15 RX ADMIN — SERTRALINE 50 MILLIGRAM(S): 25 TABLET, FILM COATED ORAL at 12:03

## 2024-01-15 RX ADMIN — RISPERIDONE 0.25 MILLIGRAM(S): 4 TABLET ORAL at 12:03

## 2024-01-15 NOTE — PROGRESS NOTE ADULT - SUBJECTIVE AND OBJECTIVE BOX
[facility], Division of Infectious Diseases  MIRTHA Bagley Y. Patel, S. Shah, G. Amos  [phone]  ([phone] - weekdays after 5pm and weekends)    Name: GUS DELUNA  Age/Gender: 78y Male  MRN: [id number]    Interval History:  Notes reviewed.   No concerning overnight events.  Afebrile.     Allergies: No Known Allergies      Objective:  Vitals:   T(F): 97.5 (01-15-24 @ 10:04), Max: 98.1 (01-14-24 @ 22:00)  HR: 87 (01-15-24 @ 10:04) (68 - 87)  BP: 101/56 (01-15-24 @ 10:04) (100/66 - 123/87)  RR: 18 (01-15-24 @ 10:04) (17 - 18)  SpO2: 97% (01-15-24 @ 10:04) (96% - 98%)  Physical Examination:  General: no acute distress  HEENT: anicteric  Cardio: normal rate  Resp: breathing comfortably on RA  Abd: soft, ND  Ext: no LE edema     Laboratory Studies:  CBC:                       7.5    3.70  )-----------( 286      ( 15 Josef 2024 07:24 )             22.5     WBC Trend:  3.70 01-15-24 @ 07:24  4.61 01-14-24 @ 06:50  4.79 01-13-24 @ 06:10  3.74 01-12-24 @ 05:23  4.17 01-09-24 @ 07:01    CMP: 01-15    132<L>  |  101  |  17  ----------------------------<  86  3.8   |  26  |  0.78    Ca    10.1      15 Josef 2024 07:24  Phos  2.7     01-15  Mg     1.90     01-15            Urinalysis Basic - ( 15 Josef 2024 07:24 )    Color: x / Appearance: x / SG: x / pH: x  Gluc: 86 mg/dL / Ketone: x  / Bili: x / Urobili: x   Blood: x / Protein: x / Nitrite: x   Leuk Esterase: x / RBC: x / WBC x   Sq Epi: x / Non Sq Epi: x / Bacteria: x      Microbiology: reviewed     Culture - Urine (collected 01-03-24 @ 00:20)  Source: Clean Catch Clean Catch (Midstream)  Final Report (01-04-24 @ 10:55):    No growth    Culture - Urine (collected 01-02-24 @ 22:39)  Source: Catheterized Catheterized  Final Report (01-03-24 @ 22:41):    No growth    Culture - Blood (collected 01-02-24 @ 19:49)  Source: .Blood Blood-Peripheral  Final Report (01-07-24 @ 22:00):    No growth at 5 days    Culture - Blood (collected 01-02-24 @ 19:49)  Source: .Blood Blood-Peripheral  Final Report (01-07-24 @ 22:00):    No growth at 5 days        Radiology: reviewed     Medications:  acetaminophen     Tablet .. 650 milliGRAM(s) Oral every 6 hours PRN  albuterol/ipratropium for Nebulization 3 milliLiter(s) Nebulizer every 12 hours  aluminum hydroxide/magnesium hydroxide/simethicone Suspension 30 milliLiter(s) Oral every 4 hours PRN  cholecalciferol 1000 Unit(s) Oral daily  cyanocobalamin 1000 MICROGram(s) Oral daily  donepezil 10 milliGRAM(s) Oral at bedtime  folic acid 1 milliGRAM(s) Oral daily  heparin   Injectable 5000 Unit(s) SubCutaneous every 8 hours  melatonin 3 milliGRAM(s) Oral at bedtime PRN  memantine 10 milliGRAM(s) Oral two times a day  ondansetron Injectable 4 milliGRAM(s) IV Push every 8 hours PRN  QUEtiapine 12.5 milliGRAM(s) Oral every 6 hours PRN  risperiDONE   Tablet 0.25 milliGRAM(s) Oral daily  risperiDONE   Tablet 0.5 milliGRAM(s) Oral at bedtime  sertraline 50 milliGRAM(s) Oral daily  sodium chloride 0.9%. 1000 milliLiter(s) IV Continuous <Continuous>  sodium chloride 0.9%. 1000 milliLiter(s) IV Continuous <Continuous>    Antimicrobials:   [facility], Division of Infectious Diseases  MIRTHA Bagley Y. Patel, S. Shah, G. Amos  [phone]  ([phone] - weekdays after 5pm and weekends)    Name: GUS DELUNA  Age/Gender: 78y Male  MRN: [id number]    Interval History:  Notes reviewed.   No concerning overnight events.  Afebrile.     Allergies: No Known Allergies      Objective:  Vitals:   T(F): 97.5 (01-15-24 @ 10:04), Max: 98.1 (01-14-24 @ 22:00)  HR: 87 (01-15-24 @ 10:04) (68 - 87)  BP: 101/56 (01-15-24 @ 10:04) (100/66 - 123/87)  RR: 18 (01-15-24 @ 10:04) (17 - 18)  SpO2: 97% (01-15-24 @ 10:04) (96% - 98%)  Physical Examination:  General: no acute distress  HEENT: anicteric  Cardio: normal rate  Resp: breathing comfortably on RA  Abd: soft, ND  Ext: no LE edema     Laboratory Studies:  CBC:                       7.5    3.70  )-----------( 286      ( 15 Josef 2024 07:24 )             22.5     WBC Trend:  3.70 01-15-24 @ 07:24  4.61 01-14-24 @ 06:50  4.79 01-13-24 @ 06:10  3.74 01-12-24 @ 05:23  4.17 01-09-24 @ 07:01    CMP: 01-15    132<L>  |  101  |  17  ----------------------------<  86  3.8   |  26  |  0.78    Ca    10.1      15 Josef 2024 07:24  Phos  2.7     01-15  Mg     1.90     01-15            Urinalysis Basic - ( 15 Josef 2024 07:24 )    Color: x / Appearance: x / SG: x / pH: x  Gluc: 86 mg/dL / Ketone: x  / Bili: x / Urobili: x   Blood: x / Protein: x / Nitrite: x   Leuk Esterase: x / RBC: x / WBC x   Sq Epi: x / Non Sq Epi: x / Bacteria: x      Microbiology: reviewed     Culture - Urine (collected 01-03-24 @ 00:20)  Source: Clean Catch Clean Catch (Midstream)  Final Report (01-04-24 @ 10:55):    No growth    Culture - Urine (collected 01-02-24 @ 22:39)  Source: Catheterized Catheterized  Final Report (01-03-24 @ 22:41):    No growth    Culture - Blood (collected 01-02-24 @ 19:49)  Source: .Blood Blood-Peripheral  Final Report (01-07-24 @ 22:00):    No growth at 5 days    Culture - Blood (collected 01-02-24 @ 19:49)  Source: .Blood Blood-Peripheral  Final Report (01-07-24 @ 22:00):    No growth at 5 days        Radiology: reviewed     Medications:  acetaminophen     Tablet .. 650 milliGRAM(s) Oral every 6 hours PRN  albuterol/ipratropium for Nebulization 3 milliLiter(s) Nebulizer every 12 hours  aluminum hydroxide/magnesium hydroxide/simethicone Suspension 30 milliLiter(s) Oral every 4 hours PRN  cholecalciferol 1000 Unit(s) Oral daily  cyanocobalamin 1000 MICROGram(s) Oral daily  donepezil 10 milliGRAM(s) Oral at bedtime  folic acid 1 milliGRAM(s) Oral daily  heparin   Injectable 5000 Unit(s) SubCutaneous every 8 hours  melatonin 3 milliGRAM(s) Oral at bedtime PRN  memantine 10 milliGRAM(s) Oral two times a day  ondansetron Injectable 4 milliGRAM(s) IV Push every 8 hours PRN  QUEtiapine 12.5 milliGRAM(s) Oral every 6 hours PRN  risperiDONE   Tablet 0.25 milliGRAM(s) Oral daily  risperiDONE   Tablet 0.5 milliGRAM(s) Oral at bedtime  sertraline 50 milliGRAM(s) Oral daily  sodium chloride 0.9%. 1000 milliLiter(s) IV Continuous <Continuous>  sodium chloride 0.9%. 1000 milliLiter(s) IV Continuous <Continuous>    Antimicrobials:   [facility], Division of Infectious Diseases  MIRTHA Bagley Y. Patel, S. Shah, G. Amos  [phone]  ([phone] - weekdays after 5pm and weekends)    Name: GUS DELUNA  Age/Gender: 78y Male  MRN: [id number]    Interval History:  Notes reviewed.   No concerning overnight events.  Afebrile.     Allergies: No Known Allergies      Objective:  Vitals:   T(F): 97.5 (01-15-24 @ 10:04), Max: 98.1 (01-14-24 @ 22:00)  HR: 87 (01-15-24 @ 10:04) (68 - 87)  BP: 101/56 (01-15-24 @ 10:04) (100/66 - 123/87)  RR: 18 (01-15-24 @ 10:04) (17 - 18)  SpO2: 97% (01-15-24 @ 10:04) (96% - 98%)  Physical Examination:  General: no acute distress  HEENT: anicteric  Cardio: normal rate  Resp: breathing comfortably on RA  Abd: soft, ND  Ext: no LE edema     Laboratory Studies:  CBC:                       7.5    3.70  )-----------( 286      ( 15 Josef 2024 07:24 )             22.5     WBC Trend:  3.70 01-15-24 @ 07:24  4.61 01-14-24 @ 06:50  4.79 01-13-24 @ 06:10  3.74 01-12-24 @ 05:23  4.17 01-09-24 @ 07:01    CMP: 01-15    132<L>  |  101  |  17  ----------------------------<  86  3.8   |  26  |  0.78    Ca    10.1      15 Josef 2024 07:24  Phos  2.7     01-15  Mg     1.90     01-15            Urinalysis Basic - ( 15 Josef 2024 07:24 )    Color: x / Appearance: x / SG: x / pH: x  Gluc: 86 mg/dL / Ketone: x  / Bili: x / Urobili: x   Blood: x / Protein: x / Nitrite: x   Leuk Esterase: x / RBC: x / WBC x   Sq Epi: x / Non Sq Epi: x / Bacteria: x      Microbiology: reviewed     Culture - Urine (collected 01-03-24 @ 00:20)  Source: Clean Catch Clean Catch (Midstream)  Final Report (01-04-24 @ 10:55):    No growth    Culture - Urine (collected 01-02-24 @ 22:39)  Source: Catheterized Catheterized  Final Report (01-03-24 @ 22:41):    No growth    Culture - Blood (collected 01-02-24 @ 19:49)  Source: .Blood Blood-Peripheral  Final Report (01-07-24 @ 22:00):    No growth at 5 days    Culture - Blood (collected 01-02-24 @ 19:49)  Source: .Blood Blood-Peripheral  Final Report (01-07-24 @ 22:00):    No growth at 5 days        Radiology: reviewed     Medications:  acetaminophen     Tablet .. 650 milliGRAM(s) Oral every 6 hours PRN  albuterol/ipratropium for Nebulization 3 milliLiter(s) Nebulizer every 12 hours  aluminum hydroxide/magnesium hydroxide/simethicone Suspension 30 milliLiter(s) Oral every 4 hours PRN  cholecalciferol 1000 Unit(s) Oral daily  cyanocobalamin 1000 MICROGram(s) Oral daily  donepezil 10 milliGRAM(s) Oral at bedtime  folic acid 1 milliGRAM(s) Oral daily  heparin   Injectable 5000 Unit(s) SubCutaneous every 8 hours  melatonin 3 milliGRAM(s) Oral at bedtime PRN  memantine 10 milliGRAM(s) Oral two times a day  ondansetron Injectable 4 milliGRAM(s) IV Push every 8 hours PRN  QUEtiapine 12.5 milliGRAM(s) Oral every 6 hours PRN  risperiDONE   Tablet 0.25 milliGRAM(s) Oral daily  risperiDONE   Tablet 0.5 milliGRAM(s) Oral at bedtime  sertraline 50 milliGRAM(s) Oral daily  sodium chloride 0.9%. 1000 milliLiter(s) IV Continuous <Continuous>  sodium chloride 0.9%. 1000 milliLiter(s) IV Continuous <Continuous>    Antimicrobials:

## 2024-01-15 NOTE — PROGRESS NOTE ADULT - SUBJECTIVE AND OBJECTIVE BOX
Date of Service: 01-15-24 @ 10:29    Patient is a 78y old  Male who presents with a chief complaint of Hypotension, lethargu (15 Josef 2024 09:38)      Any change in ROS: feels to be doing ok : sitting ion chair:  on chair:     MEDICATIONS  (STANDING):  albuterol/ipratropium for Nebulization 3 milliLiter(s) Nebulizer every 12 hours  cholecalciferol 1000 Unit(s) Oral daily  cyanocobalamin 1000 MICROGram(s) Oral daily  donepezil 10 milliGRAM(s) Oral at bedtime  folic acid 1 milliGRAM(s) Oral daily  heparin   Injectable 5000 Unit(s) SubCutaneous every 8 hours  memantine 10 milliGRAM(s) Oral two times a day  risperiDONE   Tablet 0.5 milliGRAM(s) Oral at bedtime  risperiDONE   Tablet 0.25 milliGRAM(s) Oral daily  sertraline 50 milliGRAM(s) Oral daily  sodium chloride 0.9%. 1000 milliLiter(s) (75 mL/Hr) IV Continuous <Continuous>  sodium chloride 0.9%. 1000 milliLiter(s) (75 mL/Hr) IV Continuous <Continuous>    MEDICATIONS  (PRN):  acetaminophen     Tablet .. 650 milliGRAM(s) Oral every 6 hours PRN Temp greater or equal to 38C (100.4F), Mild Pain (1 - 3)  aluminum hydroxide/magnesium hydroxide/simethicone Suspension 30 milliLiter(s) Oral every 4 hours PRN Dyspepsia  melatonin 3 milliGRAM(s) Oral at bedtime PRN Insomnia  ondansetron Injectable 4 milliGRAM(s) IV Push every 8 hours PRN Nausea and/or Vomiting  QUEtiapine 12.5 milliGRAM(s) Oral every 6 hours PRN for agitation    Vital Signs Last 24 Hrs  T(C): 36.4 (15 Josef 2024 10:04), Max: 36.7 (14 Jan 2024 18:00)  T(F): 97.5 (15 Josef 2024 10:04), Max: 98.1 (14 Jan 2024 22:00)  HR: 87 (15 Josef 2024 10:04) (68 - 87)  BP: 101/56 (15 Josef 2024 10:04) (93/62 - 123/87)  BP(mean): --  RR: 18 (15 Josef 2024 10:04) (17 - 18)  SpO2: 97% (15 Josef 2024 10:04) (94% - 98%)    Parameters below as of 15 Josef 2024 10:04  Patient On (Oxygen Delivery Method): room air        I&O's Summary    14 Jan 2024 07:01  -  15 Josef 2024 07:00  --------------------------------------------------------  IN: 1180 mL / OUT: 0 mL / NET: 1180 mL          Physical Exam:   GENERAL: NAD, well-groomed, well-developed  HEENT: CHRISTINA/   Atraumatic, Normocephalic  ENMT: No tonsillar erythema, exudates, or enlargement; Moist mucous membranes, Good dentition, No lesions  NECK: Supple, No JVD, Normal thyroid  CHEST/LUNG: Clear to auscultaion  CVS: Regular rate and rhythm; No murmurs, rubs, or gallops  GI: : Soft, Nontender, Nondistended; Bowel sounds present  NERVOUS SYSTEM:  Alert & Oriented X0  EXTREMITIES:  -edema  LYMPH: No lymphadenopathy noted  SKIN: No rashes or lesions  ENDOCRINOLOGY: No Thyromegaly  PSYCH: confused    Labs:                              7.5    3.70  )-----------( 286      ( 15 Josef 2024 07:24 )             22.5                         7.1    4.61  )-----------( 300      ( 14 Jan 2024 06:50 )             21.3                         7.3    4.79  )-----------( 286      ( 13 Jan 2024 06:10 )             22.7                         7.8    3.74  )-----------( 296      ( 12 Jan 2024 05:23 )             23.5     01-15    132<L>  |  101  |  17  ----------------------------<  86  3.8   |  26  |  0.78  01-14    135  |  104  |  21  ----------------------------<  84  3.9   |  24  |  0.72  01-13    135  |  105  |  25<H>  ----------------------------<  84  4.6   |  23  |  0.76  01-13    137  |  107  |  27<H>  ----------------------------<  94  3.7   |  24  |  0.85  01-12    139  |  108<H>  |  26<H>  ----------------------------<  93  3.6   |  25  |  0.93    Ca    10.1      15 Josef 2024 07:24  Ca    9.6      14 Jan 2024 06:50  Ca    9.5      13 Jan 2024 22:53  Phos  2.7     01-15  Phos  3.0     01-14  Phos  3.8     01-13  Mg     1.90     01-15  Mg     1.70     01-14  Mg     1.70     01-13      CAPILLARY BLOOD GLUCOSE              Urinalysis Basic - ( 15 Josef 2024 07:24 )    Color: x / Appearance: x / SG: x / pH: x  Gluc: 86 mg/dL / Ketone: x  / Bili: x / Urobili: x   Blood: x / Protein: x / Nitrite: x   Leuk Esterase: x / RBC: x / WBC x   Sq Epi: x / Non Sq Epi: x / Bacteria: x          < from: Xray Chest 1 View AP/PA (01.02.24 @ 19:36) >    ACC: [id number] EXAM:  XR CHEST AP OR PA 1V   ORDERED BY: CATHIE MULLER     PROCEDURE DATE:  01/02/2024          INTERPRETATION:  CLINICAL INDICATION: Sepsis.    EXAM: Chest x-ray 2 views.    COMPARISON: None available.    FINDINGS:  Surgical clips overlying the midline of the neck.  Bilateral lung fields partially obscured by patient positioning.   Visualized lung fields are clear.  There is no pleural effusion or pneumothorax.  The heart is not well evaluated in this position. Median sternotomy.  The visualized osseous structures demonstrate no acute pathology.    IMPRESSION:  No focal consolidations.    --- End of Report ---          MIR LAW MD; Resident Radiologist  This document has been electronically signed.  KOFI GARCIA MD; Attending Radiologist  This document has been electronically signed. Jan 2 2024  7:43PM    < end of copied text >    RECENT CULTURES:        RESPIRATORY CULTURES:          Studies  Chest X-RAY  CT SCAN Chest   Venous Dopplers: LE:   CT Abdomen  Others               Date of Service: 01-15-24 @ 10:29    Patient is a 78y old  Male who presents with a chief complaint of Hypotension, lethargu (15 Josef 2024 09:38)      Any change in ROS: feels to be doing ok : sitting ion chair:  on chair:     MEDICATIONS  (STANDING):  albuterol/ipratropium for Nebulization 3 milliLiter(s) Nebulizer every 12 hours  cholecalciferol 1000 Unit(s) Oral daily  cyanocobalamin 1000 MICROGram(s) Oral daily  donepezil 10 milliGRAM(s) Oral at bedtime  folic acid 1 milliGRAM(s) Oral daily  heparin   Injectable 5000 Unit(s) SubCutaneous every 8 hours  memantine 10 milliGRAM(s) Oral two times a day  risperiDONE   Tablet 0.5 milliGRAM(s) Oral at bedtime  risperiDONE   Tablet 0.25 milliGRAM(s) Oral daily  sertraline 50 milliGRAM(s) Oral daily  sodium chloride 0.9%. 1000 milliLiter(s) (75 mL/Hr) IV Continuous <Continuous>  sodium chloride 0.9%. 1000 milliLiter(s) (75 mL/Hr) IV Continuous <Continuous>    MEDICATIONS  (PRN):  acetaminophen     Tablet .. 650 milliGRAM(s) Oral every 6 hours PRN Temp greater or equal to 38C (100.4F), Mild Pain (1 - 3)  aluminum hydroxide/magnesium hydroxide/simethicone Suspension 30 milliLiter(s) Oral every 4 hours PRN Dyspepsia  melatonin 3 milliGRAM(s) Oral at bedtime PRN Insomnia  ondansetron Injectable 4 milliGRAM(s) IV Push every 8 hours PRN Nausea and/or Vomiting  QUEtiapine 12.5 milliGRAM(s) Oral every 6 hours PRN for agitation    Vital Signs Last 24 Hrs  T(C): 36.4 (15 Josef 2024 10:04), Max: 36.7 (14 Jan 2024 18:00)  T(F): 97.5 (15 Josef 2024 10:04), Max: 98.1 (14 Jan 2024 22:00)  HR: 87 (15 Josef 2024 10:04) (68 - 87)  BP: 101/56 (15 Josef 2024 10:04) (93/62 - 123/87)  BP(mean): --  RR: 18 (15 Josef 2024 10:04) (17 - 18)  SpO2: 97% (15 Josef 2024 10:04) (94% - 98%)    Parameters below as of 15 Josef 2024 10:04  Patient On (Oxygen Delivery Method): room air        I&O's Summary    14 Jan 2024 07:01  -  15 Josef 2024 07:00  --------------------------------------------------------  IN: 1180 mL / OUT: 0 mL / NET: 1180 mL          Physical Exam:   GENERAL: NAD, well-groomed, well-developed  HEENT: CHRISTINA/   Atraumatic, Normocephalic  ENMT: No tonsillar erythema, exudates, or enlargement; Moist mucous membranes, Good dentition, No lesions  NECK: Supple, No JVD, Normal thyroid  CHEST/LUNG: Clear to auscultaion  CVS: Regular rate and rhythm; No murmurs, rubs, or gallops  GI: : Soft, Nontender, Nondistended; Bowel sounds present  NERVOUS SYSTEM:  Alert & Oriented X0  EXTREMITIES:  -edema  LYMPH: No lymphadenopathy noted  SKIN: No rashes or lesions  ENDOCRINOLOGY: No Thyromegaly  PSYCH: confused    Labs:                              7.5    3.70  )-----------( 286      ( 15 Josef 2024 07:24 )             22.5                         7.1    4.61  )-----------( 300      ( 14 Jan 2024 06:50 )             21.3                         7.3    4.79  )-----------( 286      ( 13 Jan 2024 06:10 )             22.7                         7.8    3.74  )-----------( 296      ( 12 Jan 2024 05:23 )             23.5     01-15    132<L>  |  101  |  17  ----------------------------<  86  3.8   |  26  |  0.78  01-14    135  |  104  |  21  ----------------------------<  84  3.9   |  24  |  0.72  01-13    135  |  105  |  25<H>  ----------------------------<  84  4.6   |  23  |  0.76  01-13    137  |  107  |  27<H>  ----------------------------<  94  3.7   |  24  |  0.85  01-12    139  |  108<H>  |  26<H>  ----------------------------<  93  3.6   |  25  |  0.93    Ca    10.1      15 Josef 2024 07:24  Ca    9.6      14 Jan 2024 06:50  Ca    9.5      13 Jan 2024 22:53  Phos  2.7     01-15  Phos  3.0     01-14  Phos  3.8     01-13  Mg     1.90     01-15  Mg     1.70     01-14  Mg     1.70     01-13      CAPILLARY BLOOD GLUCOSE              Urinalysis Basic - ( 15 Josef 2024 07:24 )    Color: x / Appearance: x / SG: x / pH: x  Gluc: 86 mg/dL / Ketone: x  / Bili: x / Urobili: x   Blood: x / Protein: x / Nitrite: x   Leuk Esterase: x / RBC: x / WBC x   Sq Epi: x / Non Sq Epi: x / Bacteria: x          < from: Xray Chest 1 View AP/PA (01.02.24 @ 19:36) >    ACC: [id number] EXAM:  XR CHEST AP OR PA 1V   ORDERED BY: CATHIE MULLER     PROCEDURE DATE:  01/02/2024          INTERPRETATION:  CLINICAL INDICATION: Sepsis.    EXAM: Chest x-ray 2 views.    COMPARISON: None available.    FINDINGS:  Surgical clips overlying the midline of the neck.  Bilateral lung fields partially obscured by patient positioning.   Visualized lung fields are clear.  There is no pleural effusion or pneumothorax.  The heart is not well evaluated in this position. Median sternotomy.  The visualized osseous structures demonstrate no acute pathology.    IMPRESSION:  No focal consolidations.    --- End of Report ---          MIR LAW MD; Resident Radiologist  This document has been electronically signed.  KOFI GARCIA MD; Attending Radiologist  This document has been electronically signed. Jan 2 2024  7:43PM    < end of copied text >    RECENT CULTURES:        RESPIRATORY CULTURES:          Studies  Chest X-RAY  CT SCAN Chest   Venous Dopplers: LE:   CT Abdomen  Others               Date of Service: 01-15-24 @ 10:29    Patient is a 78y old  Male who presents with a chief complaint of Hypotension, lethargu (15 Josef 2024 09:38)      Any change in ROS: feels to be doing ok : sitting ion chair:  on chair:     MEDICATIONS  (STANDING):  albuterol/ipratropium for Nebulization 3 milliLiter(s) Nebulizer every 12 hours  cholecalciferol 1000 Unit(s) Oral daily  cyanocobalamin 1000 MICROGram(s) Oral daily  donepezil 10 milliGRAM(s) Oral at bedtime  folic acid 1 milliGRAM(s) Oral daily  heparin   Injectable 5000 Unit(s) SubCutaneous every 8 hours  memantine 10 milliGRAM(s) Oral two times a day  risperiDONE   Tablet 0.5 milliGRAM(s) Oral at bedtime  risperiDONE   Tablet 0.25 milliGRAM(s) Oral daily  sertraline 50 milliGRAM(s) Oral daily  sodium chloride 0.9%. 1000 milliLiter(s) (75 mL/Hr) IV Continuous <Continuous>  sodium chloride 0.9%. 1000 milliLiter(s) (75 mL/Hr) IV Continuous <Continuous>    MEDICATIONS  (PRN):  acetaminophen     Tablet .. 650 milliGRAM(s) Oral every 6 hours PRN Temp greater or equal to 38C (100.4F), Mild Pain (1 - 3)  aluminum hydroxide/magnesium hydroxide/simethicone Suspension 30 milliLiter(s) Oral every 4 hours PRN Dyspepsia  melatonin 3 milliGRAM(s) Oral at bedtime PRN Insomnia  ondansetron Injectable 4 milliGRAM(s) IV Push every 8 hours PRN Nausea and/or Vomiting  QUEtiapine 12.5 milliGRAM(s) Oral every 6 hours PRN for agitation    Vital Signs Last 24 Hrs  T(C): 36.4 (15 Josef 2024 10:04), Max: 36.7 (14 Jan 2024 18:00)  T(F): 97.5 (15 Josef 2024 10:04), Max: 98.1 (14 Jan 2024 22:00)  HR: 87 (15 Josef 2024 10:04) (68 - 87)  BP: 101/56 (15 Josef 2024 10:04) (93/62 - 123/87)  BP(mean): --  RR: 18 (15 Josef 2024 10:04) (17 - 18)  SpO2: 97% (15 Josef 2024 10:04) (94% - 98%)    Parameters below as of 15 Josfe 2024 10:04  Patient On (Oxygen Delivery Method): room air        I&O's Summary    14 Jan 2024 07:01  -  15 Josef 2024 07:00  --------------------------------------------------------  IN: 1180 mL / OUT: 0 mL / NET: 1180 mL          Physical Exam:   GENERAL: NAD, well-groomed, well-developed  HEENT: CHRISTINA/   Atraumatic, Normocephalic  ENMT: No tonsillar erythema, exudates, or enlargement; Moist mucous membranes, Good dentition, No lesions  NECK: Supple, No JVD, Normal thyroid  CHEST/LUNG: Clear to auscultaion  CVS: Regular rate and rhythm; No murmurs, rubs, or gallops  GI: : Soft, Nontender, Nondistended; Bowel sounds present  NERVOUS SYSTEM:  Alert & Oriented X0  EXTREMITIES:  -edema  LYMPH: No lymphadenopathy noted  SKIN: No rashes or lesions  ENDOCRINOLOGY: No Thyromegaly  PSYCH: confused    Labs:                              7.5    3.70  )-----------( 286      ( 15 Josef 2024 07:24 )             22.5                         7.1    4.61  )-----------( 300      ( 14 Jan 2024 06:50 )             21.3                         7.3    4.79  )-----------( 286      ( 13 Jan 2024 06:10 )             22.7                         7.8    3.74  )-----------( 296      ( 12 Jan 2024 05:23 )             23.5     01-15    132<L>  |  101  |  17  ----------------------------<  86  3.8   |  26  |  0.78  01-14    135  |  104  |  21  ----------------------------<  84  3.9   |  24  |  0.72  01-13    135  |  105  |  25<H>  ----------------------------<  84  4.6   |  23  |  0.76  01-13    137  |  107  |  27<H>  ----------------------------<  94  3.7   |  24  |  0.85  01-12    139  |  108<H>  |  26<H>  ----------------------------<  93  3.6   |  25  |  0.93    Ca    10.1      15 Josef 2024 07:24  Ca    9.6      14 Jan 2024 06:50  Ca    9.5      13 Jan 2024 22:53  Phos  2.7     01-15  Phos  3.0     01-14  Phos  3.8     01-13  Mg     1.90     01-15  Mg     1.70     01-14  Mg     1.70     01-13      CAPILLARY BLOOD GLUCOSE              Urinalysis Basic - ( 15 Josef 2024 07:24 )    Color: x / Appearance: x / SG: x / pH: x  Gluc: 86 mg/dL / Ketone: x  / Bili: x / Urobili: x   Blood: x / Protein: x / Nitrite: x   Leuk Esterase: x / RBC: x / WBC x   Sq Epi: x / Non Sq Epi: x / Bacteria: x          < from: Xray Chest 1 View AP/PA (01.02.24 @ 19:36) >    ACC: [id number] EXAM:  XR CHEST AP OR PA 1V   ORDERED BY: CATHIE MULLER     PROCEDURE DATE:  01/02/2024          INTERPRETATION:  CLINICAL INDICATION: Sepsis.    EXAM: Chest x-ray 2 views.    COMPARISON: None available.    FINDINGS:  Surgical clips overlying the midline of the neck.  Bilateral lung fields partially obscured by patient positioning.   Visualized lung fields are clear.  There is no pleural effusion or pneumothorax.  The heart is not well evaluated in this position. Median sternotomy.  The visualized osseous structures demonstrate no acute pathology.    IMPRESSION:  No focal consolidations.    --- End of Report ---          MIR LAW MD; Resident Radiologist  This document has been electronically signed.  KOFI GARCIA MD; Attending Radiologist  This document has been electronically signed. Jan 2 2024  7:43PM    < end of copied text >    RECENT CULTURES:        RESPIRATORY CULTURES:          Studies  Chest X-RAY  CT SCAN Chest   Venous Dopplers: LE:   CT Abdomen  Others

## 2024-01-15 NOTE — PROGRESS NOTE ADULT - SUBJECTIVE AND OBJECTIVE BOX
DATE OF SERVICE: 01-15-24    No overnight events, resting comfortably  Review of symptoms otherwise negative.    Review of Systems:   Constitutional: [ ] fevers, [ ] chills.   Skin: [ ] dry skin. [ ] rashes.  Psychiatric: [ ] depression, [ ] anxiety.   Gastrointestinal: [ ] BRBPR, [ ] melena.   Neurological: [ ] confusion. [ ] seizures. [ ] shuffling gait.   Ears,Nose,Mouth and Throat: [ ] ear pain [ ] sore throat.   Eyes: [ ] diplopia.   Respiratory: [ ] hemoptysis. [ ] shortness of breath  Cardiovascular: See HPI above  Hematologic/Lymphatic: [ ] anemia. [ ] painful nodes. [ ] prolonged bleeding.   Genitourinary: [ ] hematuria. [ ] flank pain.   Endocrine: [ ] significant change in weight. [ ] intolerance to heat and cold.     Review of systems [x ] otherwise negative, [ ] otherwise unable to obtain    FH: no family history of sudden cardiac death in first degree relatives    SH: [ ] tobacco, [ ] alcohol, [ ] drugs    acetaminophen     Tablet .. 650 milliGRAM(s) Oral every 6 hours PRN  albuterol/ipratropium for Nebulization 3 milliLiter(s) Nebulizer every 12 hours  aluminum hydroxide/magnesium hydroxide/simethicone Suspension 30 milliLiter(s) Oral every 4 hours PRN  cholecalciferol 1000 Unit(s) Oral daily  cyanocobalamin 1000 MICROGram(s) Oral daily  donepezil 10 milliGRAM(s) Oral at bedtime  folic acid 1 milliGRAM(s) Oral daily  heparin   Injectable 5000 Unit(s) SubCutaneous every 8 hours  melatonin 3 milliGRAM(s) Oral at bedtime PRN  memantine 10 milliGRAM(s) Oral two times a day  ondansetron Injectable 4 milliGRAM(s) IV Push every 8 hours PRN  QUEtiapine 12.5 milliGRAM(s) Oral every 6 hours PRN  risperiDONE   Tablet 0.25 milliGRAM(s) Oral daily  risperiDONE   Tablet 0.5 milliGRAM(s) Oral at bedtime  sertraline 50 milliGRAM(s) Oral daily  sodium chloride 0.9%. 1000 milliLiter(s) IV Continuous <Continuous>  sodium chloride 0.9%. 1000 milliLiter(s) IV Continuous <Continuous>                            7.5    3.70  )-----------( 286      ( 15 Josef 2024 07:24 )             22.5       132<L>  |  101  |  17  ----------------------------<  86  3.8   |  26  |  0.78    Ca    10.1      15 Josef 2024 07:24  Phos  2.7     01-15  Mg     1.90     01-15      T(C): 36.4 (01-15-24 @ 10:04), Max: 36.7 (01-14-24 @ 18:00)  HR: 87 (01-15-24 @ 10:04) (68 - 87)  BP: 101/56 (01-15-24 @ 10:04) (100/66 - 123/87)  RR: 18 (01-15-24 @ 10:04) (17 - 18)  SpO2: 97% (01-15-24 @ 10:04) (96% - 98%)  Wt(kg): --    I&O's Summary    14 Jan 2024 07:01  -  15 Josef 2024 07:00  --------------------------------------------------------  IN: 1180 mL / OUT: 0 mL / NET: 1180 mL      General:  pleasantly confused  Head: Normocephalic and atraumatic.   Neck: No JVD. No bruits. Supple. Does not appear to be enlarged.   Cardiovascular: + S1,S2 ; RRR Soft systolic murmur at the left lower sternal border. No rubs noted.    Lungs: CTA b/l. No rhonchi, rales or wheezes.   Abdomen: + BS, soft. Non tender. Non distended. No rebound. No guarding.   Extremities: No clubbing/cyanosis/edema.   Neurologic: Moves all four extremities  Skin: Warm and moist. The patient's skin has normal elasticity and good skin turgor.   Psychiatric: unable to eval  Musculoskeletal: Normal range of motion      ECG:  	Sinus tachycardia    < from: Xray Chest 1 View AP/PA (01.02.24 @ 19:36) >  IMPRESSION:  No focal consolidations.  < end of copied text >    < from: Transthoracic Echocardiogram (07.10.23 @ 11:15) >  CONCLUSIONS:  1. Calcified trileaflet aortic valve with normal opening.  Minimal aortic regurgitation.  2. Endocardium not well visualized; grossly decreased  possibly mild left ventricular systolic dysfunction.  3. The right ventricle is not well visualized; grossly  normal right ventricular systolic function.  ------------------------------------------------------------------------  Confirmed on  7/10/2023 - 13:57:46 by MARLA Vegas  < end of copied text >      ASSESSMENT/PLAN: 	78M with history of MM, HTN, and Dementia (AAO x 1-2 to self, to place, not to time at baseline per brother) who presents to the hospital from [facility] for hypotension and lethargy found to have FLU A    -#Hypotension  -- BP stable  -- Norvasc and Lopressor on hold given acute illness  --would cont to hold indefinitely    #lethargy  --due to Flu, now resolved  --s/p tamiflu and Abx per medicine    DC planning     DATE OF SERVICE: 01-15-24    No overnight events, resting comfortably  Review of symptoms otherwise negative.    Review of Systems:   Constitutional: [ ] fevers, [ ] chills.   Skin: [ ] dry skin. [ ] rashes.  Psychiatric: [ ] depression, [ ] anxiety.   Gastrointestinal: [ ] BRBPR, [ ] melena.   Neurological: [ ] confusion. [ ] seizures. [ ] shuffling gait.   Ears,Nose,Mouth and Throat: [ ] ear pain [ ] sore throat.   Eyes: [ ] diplopia.   Respiratory: [ ] hemoptysis. [ ] shortness of breath  Cardiovascular: See HPI above  Hematologic/Lymphatic: [ ] anemia. [ ] painful nodes. [ ] prolonged bleeding.   Genitourinary: [ ] hematuria. [ ] flank pain.   Endocrine: [ ] significant change in weight. [ ] intolerance to heat and cold.     Review of systems [x ] otherwise negative, [ ] otherwise unable to obtain    FH: no family history of sudden cardiac death in first degree relatives    SH: [ ] tobacco, [ ] alcohol, [ ] drugs    acetaminophen     Tablet .. 650 milliGRAM(s) Oral every 6 hours PRN  albuterol/ipratropium for Nebulization 3 milliLiter(s) Nebulizer every 12 hours  aluminum hydroxide/magnesium hydroxide/simethicone Suspension 30 milliLiter(s) Oral every 4 hours PRN  cholecalciferol 1000 Unit(s) Oral daily  cyanocobalamin 1000 MICROGram(s) Oral daily  donepezil 10 milliGRAM(s) Oral at bedtime  folic acid 1 milliGRAM(s) Oral daily  heparin   Injectable 5000 Unit(s) SubCutaneous every 8 hours  melatonin 3 milliGRAM(s) Oral at bedtime PRN  memantine 10 milliGRAM(s) Oral two times a day  ondansetron Injectable 4 milliGRAM(s) IV Push every 8 hours PRN  QUEtiapine 12.5 milliGRAM(s) Oral every 6 hours PRN  risperiDONE   Tablet 0.25 milliGRAM(s) Oral daily  risperiDONE   Tablet 0.5 milliGRAM(s) Oral at bedtime  sertraline 50 milliGRAM(s) Oral daily  sodium chloride 0.9%. 1000 milliLiter(s) IV Continuous <Continuous>  sodium chloride 0.9%. 1000 milliLiter(s) IV Continuous <Continuous>                            7.5    3.70  )-----------( 286      ( 15 Josef 2024 07:24 )             22.5       132<L>  |  101  |  17  ----------------------------<  86  3.8   |  26  |  0.78    Ca    10.1      15 Josef 2024 07:24  Phos  2.7     01-15  Mg     1.90     01-15      T(C): 36.4 (01-15-24 @ 10:04), Max: 36.7 (01-14-24 @ 18:00)  HR: 87 (01-15-24 @ 10:04) (68 - 87)  BP: 101/56 (01-15-24 @ 10:04) (100/66 - 123/87)  RR: 18 (01-15-24 @ 10:04) (17 - 18)  SpO2: 97% (01-15-24 @ 10:04) (96% - 98%)  Wt(kg): --    I&O's Summary    14 Jan 2024 07:01  -  15 Josef 2024 07:00  --------------------------------------------------------  IN: 1180 mL / OUT: 0 mL / NET: 1180 mL      General:  pleasantly confused  Head: Normocephalic and atraumatic.   Neck: No JVD. No bruits. Supple. Does not appear to be enlarged.   Cardiovascular: + S1,S2 ; RRR Soft systolic murmur at the left lower sternal border. No rubs noted.    Lungs: CTA b/l. No rhonchi, rales or wheezes.   Abdomen: + BS, soft. Non tender. Non distended. No rebound. No guarding.   Extremities: No clubbing/cyanosis/edema.   Neurologic: Moves all four extremities  Skin: Warm and moist. The patient's skin has normal elasticity and good skin turgor.   Psychiatric: unable to eval  Musculoskeletal: Normal range of motion      ECG:  	Sinus tachycardia    < from: Xray Chest 1 View AP/PA (01.02.24 @ 19:36) >  IMPRESSION:  No focal consolidations.  < end of copied text >    < from: Transthoracic Echocardiogram (07.10.23 @ 11:15) >  CONCLUSIONS:  1. Calcified trileaflet aortic valve with normal opening.  Minimal aortic regurgitation.  2. Endocardium not well visualized; grossly decreased  possibly mild left ventricular systolic dysfunction.  3. The right ventricle is not well visualized; grossly  normal right ventricular systolic function.  ------------------------------------------------------------------------  Confirmed on  7/10/2023 - 13:57:46 by MARLA Vegas  < end of copied text >      ASSESSMENT/PLAN: 	78M with history of MM, HTN, and Dementia (AAO x 1-2 to self, to place, not to time at baseline per brother) who presents to the hospital from [facility] for hypotension and lethargy found to have FLU A    -#Hypotension  -- BP stable  -- Norvasc and Lopressor on hold given acute illness  --would cont to hold indefinitely    #lethargy  --due to Flu, now resolved  --s/p tamiflu and Abx per medicine    DC planning     DATE OF SERVICE: 01-15-24    No overnight events, resting comfortably  Review of symptoms otherwise negative.    Review of Systems:   Constitutional: [ ] fevers, [ ] chills.   Skin: [ ] dry skin. [ ] rashes.  Psychiatric: [ ] depression, [ ] anxiety.   Gastrointestinal: [ ] BRBPR, [ ] melena.   Neurological: [ ] confusion. [ ] seizures. [ ] shuffling gait.   Ears,Nose,Mouth and Throat: [ ] ear pain [ ] sore throat.   Eyes: [ ] diplopia.   Respiratory: [ ] hemoptysis. [ ] shortness of breath  Cardiovascular: See HPI above  Hematologic/Lymphatic: [ ] anemia. [ ] painful nodes. [ ] prolonged bleeding.   Genitourinary: [ ] hematuria. [ ] flank pain.   Endocrine: [ ] significant change in weight. [ ] intolerance to heat and cold.     Review of systems [x ] otherwise negative, [ ] otherwise unable to obtain    FH: no family history of sudden cardiac death in first degree relatives    SH: [ ] tobacco, [ ] alcohol, [ ] drugs    acetaminophen     Tablet .. 650 milliGRAM(s) Oral every 6 hours PRN  albuterol/ipratropium for Nebulization 3 milliLiter(s) Nebulizer every 12 hours  aluminum hydroxide/magnesium hydroxide/simethicone Suspension 30 milliLiter(s) Oral every 4 hours PRN  cholecalciferol 1000 Unit(s) Oral daily  cyanocobalamin 1000 MICROGram(s) Oral daily  donepezil 10 milliGRAM(s) Oral at bedtime  folic acid 1 milliGRAM(s) Oral daily  heparin   Injectable 5000 Unit(s) SubCutaneous every 8 hours  melatonin 3 milliGRAM(s) Oral at bedtime PRN  memantine 10 milliGRAM(s) Oral two times a day  ondansetron Injectable 4 milliGRAM(s) IV Push every 8 hours PRN  QUEtiapine 12.5 milliGRAM(s) Oral every 6 hours PRN  risperiDONE   Tablet 0.25 milliGRAM(s) Oral daily  risperiDONE   Tablet 0.5 milliGRAM(s) Oral at bedtime  sertraline 50 milliGRAM(s) Oral daily  sodium chloride 0.9%. 1000 milliLiter(s) IV Continuous <Continuous>  sodium chloride 0.9%. 1000 milliLiter(s) IV Continuous <Continuous>                            7.5    3.70  )-----------( 286      ( 15 Josef 2024 07:24 )             22.5       132<L>  |  101  |  17  ----------------------------<  86  3.8   |  26  |  0.78    Ca    10.1      15 Josef 2024 07:24  Phos  2.7     01-15  Mg     1.90     01-15      T(C): 36.4 (01-15-24 @ 10:04), Max: 36.7 (01-14-24 @ 18:00)  HR: 87 (01-15-24 @ 10:04) (68 - 87)  BP: 101/56 (01-15-24 @ 10:04) (100/66 - 123/87)  RR: 18 (01-15-24 @ 10:04) (17 - 18)  SpO2: 97% (01-15-24 @ 10:04) (96% - 98%)  Wt(kg): --    I&O's Summary    14 Jan 2024 07:01  -  15 Josef 2024 07:00  --------------------------------------------------------  IN: 1180 mL / OUT: 0 mL / NET: 1180 mL      General:  pleasantly confused  Head: Normocephalic and atraumatic.   Neck: No JVD. No bruits. Supple. Does not appear to be enlarged.   Cardiovascular: + S1,S2 ; RRR Soft systolic murmur at the left lower sternal border. No rubs noted.    Lungs: CTA b/l. No rhonchi, rales or wheezes.   Abdomen: + BS, soft. Non tender. Non distended. No rebound. No guarding.   Extremities: No clubbing/cyanosis/edema.   Neurologic: Moves all four extremities  Skin: Warm and moist. The patient's skin has normal elasticity and good skin turgor.   Psychiatric: unable to eval  Musculoskeletal: Normal range of motion      ECG:  	Sinus tachycardia    < from: Xray Chest 1 View AP/PA (01.02.24 @ 19:36) >  IMPRESSION:  No focal consolidations.  < end of copied text >    < from: Transthoracic Echocardiogram (07.10.23 @ 11:15) >  CONCLUSIONS:  1. Calcified trileaflet aortic valve with normal opening.  Minimal aortic regurgitation.  2. Endocardium not well visualized; grossly decreased  possibly mild left ventricular systolic dysfunction.  3. The right ventricle is not well visualized; grossly  normal right ventricular systolic function.  ------------------------------------------------------------------------  Confirmed on  7/10/2023 - 13:57:46 by MARLA Vegas  < end of copied text >      ASSESSMENT/PLAN: 	78M with history of MM, HTN, and Dementia (AAO x 1-2 to self, to place, not to time at baseline per brother) who presents to the hospital from [facility] for hypotension and lethargy found to have FLU A    -#Hypotension  -- BP stable  -- Norvasc and Lopressor on hold given acute illness  --would cont to hold indefinitely    #lethargy  --due to Flu, now resolved  --s/p tamiflu and Abx per medicine    DC planning

## 2024-01-15 NOTE — PROGRESS NOTE ADULT - NS ATTEND AMEND GEN_ALL_CORE FT
Patient seen and examined. Agree with plan as detailed in PA/NP Note.    Discontinue BP meds once back at facility can restart based on BP trend    Staci Muñiz MD  Pager: [phone] Patient seen and examined. Agree with plan as detailed in PA/NP Note.    Discontinue BP meds once back at facility can restart based on BP trend    Staci Muñiz MD  Pager: [phone] Patient seen and examined. Agree with plan as detailed in PA/NP Note.    Discontinue BP meds once back at facility can restart based on BP trend    Staci Muñiz MD  Pager: [phone]

## 2024-01-15 NOTE — PROGRESS NOTE ADULT - ASSESSMENT
This is a 78M with history of MM, HTN, and Dementia (AAO x 1-2 to self, to place, not to time at baseline per brother) who presents to the hospital from [facility] for hypotension and lethargy. Patient currently awake, alert, AAO x1 (to self,) but very poor historian, denies any acute complaints when asked. nephrology consulted for david    DAVID  admitted with scr 1.4  pt with sepsis likely ATN  DAVID improved.  Monitor BMP    hematuria  repeat ua   renal us with no mass, bladder diverticula--outpt urology  Monitor    hyponatremia  ? etiology  clinically euvolemic  monitor  free water restriction <1L  Get urine Na, osmolality    anemia  iron panel  transfusion and work up per team  Monitor Hb    hypercalcemia/ hypophosphatemna  PTH low-5  VItamin D wnl   Ca better   Continue vit D  Monitor PTH, PO4, Ca   This is a 78M with history of MM, HTN, and Dementia (AAO x 1-2 to self, to place, not to time at baseline per brother) who presents to the hospital from [facility] for hypotension and lethargy. Patient currently awake, alert, AAO x1 (to self,) but very poor historian, denies any acute complaints when asked. nephrology consulted for david    DAVID  admitted with scr 1.4  pt with sepsis likely ATN  DAVID improved.  Monitor BMP    hematuria  repeat ua   renal us with no mass, bladder diverticula--outpt urology  Monitor    hyponatremia  ? etiology  clinically euvolemic  monitor  free water restriction <1L  Get urine Na, osmolality    anemia  iron panel  transfusion and work up per team  Monitor Hb    hypercalcemia/ hypophosphatemna  PTH low-5  VItamin D wnl   Ca better   Continue vit D  Monitor PTH, PO4, Ca   This is a 78M with history of MM, HTN, and Dementia (AAO x 1-2 to self, to place, not to time at baseline per brother) who presents to the hospital from [facility] for hypotension and lethargy. Patient currently awake, alert, AAO x1 (to self,) but very poor historian, denies any acute complaints when asked. nephrology consulted for david    DAVID  admitted with scr 1.4  pt with sepsis likely ATN  DAVID improved.  Monitor BMP    hematuria  repeat ua   renal us with no mass, bladder diverticula--outpt urology  Monitor    hyponatremia  ? etiology  clinically euvolemic  monitor  free water restriction <1L  Get urine Na, osmolality    anemia  iron panel  transfusion and work up per team  Monitor Hb    hypercalcemia/ hypophosphatemna  PTH low-5  VItamin D wnl   Ca better   Continue vit D  Monitor PTH, PO4, Ca

## 2024-01-15 NOTE — PROGRESS NOTE ADULT - SUBJECTIVE AND OBJECTIVE BOX
Dr. Blanco  Office [phone] (9 am to 5 pm)  Service: [phone] (5pm to 9am)  Rosario RODRIGES      RENAL PROGRESS NOTE: DATE OF SERVICE 01-15-24 @ 16:38    Patient is a 78y old  Male who presents with a chief complaint of Hypotension, lethargu (15 Josef 2024 15:14)      Patient seen and examined at bedside. No chest pain/sob    VITALS:  T(F): 97.5 (01-15-24 @ 10:04), Max: 98.1 (01-14-24 @ 22:00)  HR: 87 (01-15-24 @ 10:04)  BP: 101/56 (01-15-24 @ 10:04)  RR: 18 (01-15-24 @ 10:04)  SpO2: 97% (01-15-24 @ 10:04)  Wt(kg): --    01-14 @ 07:01  -  01-15 @ 07:00  --------------------------------------------------------  IN: 1180 mL / OUT: 0 mL / NET: 1180 mL          PHYSICAL EXAM:  Constitutional: NAD  Neck: No JVD  Respiratory: CTAB, no wheezes, rales or rhonchi  Cardiovascular: S1, S2, RRR  Gastrointestinal: BS+, soft, NT/ND  Extremities: No peripheral edema    Hospital Medications:   MEDICATIONS  (STANDING):  albuterol/ipratropium for Nebulization 3 milliLiter(s) Nebulizer every 12 hours  cholecalciferol 1000 Unit(s) Oral daily  cyanocobalamin 1000 MICROGram(s) Oral daily  donepezil 10 milliGRAM(s) Oral at bedtime  folic acid 1 milliGRAM(s) Oral daily  heparin   Injectable 5000 Unit(s) SubCutaneous every 8 hours  memantine 10 milliGRAM(s) Oral two times a day  risperiDONE   Tablet 0.25 milliGRAM(s) Oral daily  risperiDONE   Tablet 0.5 milliGRAM(s) Oral at bedtime  sertraline 50 milliGRAM(s) Oral daily  sodium chloride 0.9%. 1000 milliLiter(s) (75 mL/Hr) IV Continuous <Continuous>  sodium chloride 0.9%. 1000 milliLiter(s) (75 mL/Hr) IV Continuous <Continuous>      LABS:  01-15    132<L>  |  101  |  17  ----------------------------<  86  3.8   |  26  |  0.78    Ca    10.1      15 Josef 2024 07:24  Phos  2.7     01-15  Mg     1.90     01-15      Creatinine Trend: 0.78 <--, 0.72 <--, 0.76 <--, 0.85 <--, 0.93 <--, 0.79 <--    Phosphorus: 2.7 mg/dL (01-15 @ 07:24)                              7.5    3.70  )-----------( 286      ( 15 Josef 2024 07:24 )             22.5     Urine Studies:  Urinalysis - [01-15-24 @ 07:24]      Color  / Appearance  / SG  / pH       Gluc 86 / Ketone   / Bili  / Urobili        Blood  / Protein  / Leuk Est  / Nitrite       RBC  / WBC  / Hyaline  / Gran  / Sq Epi  / Non Sq Epi  / Bacteria       PTH -- (Ca --)      [01-14-24 @ 06:50]   5  PTH -- (Ca --)      [01-13-24 @ 06:10]   6  Vitamin D (25OH) 36.9      [01-13-24 @ 06:10]        RADIOLOGY & ADDITIONAL STUDIES:     Dr. Blanco  Office [phone] (9 am to 5 pm)  Service: [phone] (5pm to 9am)  Rosario RODRIGES      RENAL PROGRESS NOTE: DATE OF SERVICE 01-15-24 @ 16:38    Patient is a 78y old  Male who presents with a chief complaint of Hypotension, lethargu (15 Josef 2024 15:14)      Patient seen and examined at bedside. No chest pain/sob    VITALS:  T(F): 97.5 (01-15-24 @ 10:04), Max: 98.1 (01-14-24 @ 22:00)  HR: 87 (01-15-24 @ 10:04)  BP: 101/56 (01-15-24 @ 10:04)  RR: 18 (01-15-24 @ 10:04)  SpO2: 97% (01-15-24 @ 10:04)  Wt(kg): --    01-14 @ 07:01  -  01-15 @ 07:00  --------------------------------------------------------  IN: 1180 mL / OUT: 0 mL / NET: 1180 mL          PHYSICAL EXAM:  Constitutional: NAD  Neck: No JVD  Respiratory: CTAB, no wheezes, rales or rhonchi  Cardiovascular: S1, S2, RRR  Gastrointestinal: BS+, soft, NT/ND  Extremities: No peripheral edema    Hospital Medications:   MEDICATIONS  (STANDING):  albuterol/ipratropium for Nebulization 3 milliLiter(s) Nebulizer every 12 hours  cholecalciferol 1000 Unit(s) Oral daily  cyanocobalamin 1000 MICROGram(s) Oral daily  donepezil 10 milliGRAM(s) Oral at bedtime  folic acid 1 milliGRAM(s) Oral daily  heparin   Injectable 5000 Unit(s) SubCutaneous every 8 hours  memantine 10 milliGRAM(s) Oral two times a day  risperiDONE   Tablet 0.25 milliGRAM(s) Oral daily  risperiDONE   Tablet 0.5 milliGRAM(s) Oral at bedtime  sertraline 50 milliGRAM(s) Oral daily  sodium chloride 0.9%. 1000 milliLiter(s) (75 mL/Hr) IV Continuous <Continuous>  sodium chloride 0.9%. 1000 milliLiter(s) (75 mL/Hr) IV Continuous <Continuous>      LABS:  01-15    132<L>  |  101  |  17  ----------------------------<  86  3.8   |  26  |  0.78    Ca    10.1      15 Josef 2024 07:24  Phos  2.7     01-15  Mg     1.90     01-15      Creatinine Trend: 0.78 <--, 0.72 <--, 0.76 <--, 0.85 <--, 0.93 <--, 0.79 <--    Phosphorus: 2.7 mg/dL (01-15 @ 07:24)                              7.5    3.70  )-----------( 286      ( 15 Josef 2024 07:24 )             22.5     Urine Studies:  Urinalysis - [01-15-24 @ 07:24]      Color  / Appearance  / SG  / pH       Gluc 86 / Ketone   / Bili  / Urobili        Blood  / Protein  / Leuk Est  / Nitrite       RBC  / WBC  / Hyaline  / Gran  / Sq Epi  / Non Sq Epi  / Bacteria       PTH -- (Ca --)      [01-14-24 @ 06:50]   5  PTH -- (Ca --)      [01-13-24 @ 06:10]   6  Vitamin D (25OH) 36.9      [01-13-24 @ 06:10]        RADIOLOGY & ADDITIONAL STUDIES:     Dr. Blanco  Office [phone] (9 am to 5 pm)  Service: [phone] (5pm to 9am)  Rosario RODRIGES      RENAL PROGRESS NOTE: DATE OF SERVICE 01-15-24 @ 16:38    Patient is a 78y old  Male who presents with a chief complaint of Hypotension, lethargu (15 Josef 2024 15:14)      Patient seen and examined at bedside. No chest pain/sob    VITALS:  T(F): 97.5 (01-15-24 @ 10:04), Max: 98.1 (01-14-24 @ 22:00)  HR: 87 (01-15-24 @ 10:04)  BP: 101/56 (01-15-24 @ 10:04)  RR: 18 (01-15-24 @ 10:04)  SpO2: 97% (01-15-24 @ 10:04)  Wt(kg): --    01-14 @ 07:01  -  01-15 @ 07:00  --------------------------------------------------------  IN: 1180 mL / OUT: 0 mL / NET: 1180 mL          PHYSICAL EXAM:  Constitutional: NAD  Neck: No JVD  Respiratory: CTAB, no wheezes, rales or rhonchi  Cardiovascular: S1, S2, RRR  Gastrointestinal: BS+, soft, NT/ND  Extremities: No peripheral edema    Hospital Medications:   MEDICATIONS  (STANDING):  albuterol/ipratropium for Nebulization 3 milliLiter(s) Nebulizer every 12 hours  cholecalciferol 1000 Unit(s) Oral daily  cyanocobalamin 1000 MICROGram(s) Oral daily  donepezil 10 milliGRAM(s) Oral at bedtime  folic acid 1 milliGRAM(s) Oral daily  heparin   Injectable 5000 Unit(s) SubCutaneous every 8 hours  memantine 10 milliGRAM(s) Oral two times a day  risperiDONE   Tablet 0.25 milliGRAM(s) Oral daily  risperiDONE   Tablet 0.5 milliGRAM(s) Oral at bedtime  sertraline 50 milliGRAM(s) Oral daily  sodium chloride 0.9%. 1000 milliLiter(s) (75 mL/Hr) IV Continuous <Continuous>  sodium chloride 0.9%. 1000 milliLiter(s) (75 mL/Hr) IV Continuous <Continuous>      LABS:  01-15    132<L>  |  101  |  17  ----------------------------<  86  3.8   |  26  |  0.78    Ca    10.1      15 Josef 2024 07:24  Phos  2.7     01-15  Mg     1.90     01-15      Creatinine Trend: 0.78 <--, 0.72 <--, 0.76 <--, 0.85 <--, 0.93 <--, 0.79 <--    Phosphorus: 2.7 mg/dL (01-15 @ 07:24)                              7.5    3.70  )-----------( 286      ( 15 Josef 2024 07:24 )             22.5     Urine Studies:  Urinalysis - [01-15-24 @ 07:24]      Color  / Appearance  / SG  / pH       Gluc 86 / Ketone   / Bili  / Urobili        Blood  / Protein  / Leuk Est  / Nitrite       RBC  / WBC  / Hyaline  / Gran  / Sq Epi  / Non Sq Epi  / Bacteria       PTH -- (Ca --)      [01-14-24 @ 06:50]   5  PTH -- (Ca --)      [01-13-24 @ 06:10]   6  Vitamin D (25OH) 36.9      [01-13-24 @ 06:10]        RADIOLOGY & ADDITIONAL STUDIES:

## 2024-01-15 NOTE — PROGRESS NOTE ADULT - ASSESSMENT
77 y/o M PMhx MM, HTN, and Dementia who presented from [facility] for hypotension and lethargy found to have flu A    influenza A- treated  fever resolved  CXR clear  s/p tamiflu 30mg bid, completed 1/7    UA not consistent w/ UTI  urine cx negative    Recommendations  continue off antibiotics  discharge planning- awaiting rehab    Sylvester Trinidad M.D.  [facility], Division of Infectious Diseases  [phone]  After 5pm on weekdays and all day on weekends - please call [phone]  77 y/o M PMhx MM, HTN, and Dementia who presented from [facility] for hypotension and lethargy found to have flu A    influenza A- treated  fever resolved  CXR clear  s/p tamiflu 30mg bid, completed 1/7    UA not consistent w/ UTI  urine cx negative    Recommendations  continue off antibiotics  discharge planning- awaiting rehab    Sylvester Trinidad M.D.  [facility], Division of Infectious Diseases  [phone]  After 5pm on weekdays and all day on weekends - please call [phone]  77 y/o M PMhx MM, HTN, and Dementia who presented from [facility] for hypotension and lethargy found to have flu A    influenza A- treated  fever resolved  CXR clear  s/p tamiflu 30mg bid, completed 1/7    UA not consistent w/ UTI  urine cx negative    Recommendations  continue off antibiotics  discharge planning- awaiting rehab    Sylvester Trinidad M.D.  [facility], Division of Infectious Diseases  [phone]  After 5pm on weekdays and all day on weekends - please call [phone]

## 2024-01-15 NOTE — PROGRESS NOTE ADULT - ASSESSMENT
This is a 78M with history of MM, HTN, and Dementia (AAO x 1-2 to self, to place, not to time at baseline per brother) who presents to the hospital from [facility] for hypotension and lethargy. Patient currently awake, alert, AAO x2 (to self, to hospital but not name, not to time) but very poor historian, denies any acute complaints when asked. Spoke with brother Mookie who said that the patient was sent to the hospital for lethargy and cough though he states that the patient has had a cough for several months. Brother denies any other known acute complaints for the patient. Called [facility] [phone] but there was no staff available who knew the patient. As per NH paperwork and ED note, patient was sent to the hospital for hypotention to 81/55 and lethargy. He was noted to have a low grade temp of 99.6 and saturation of 93% at his NH. He was given tylenol 650mg x1 prior to being sent to the hospital.   On arrival to the hospital his vitals were T 99.4 -> 100.7 rectal, P 92, BP 88/55 -> 117/70, RR 16, O2 sat 96% RA. His lab work was significant for worsening macrocytic anemia, DAVID with mild hyponatremia, elevated protein gap, elevated lactate with improvement on repeat. His UA was positive for nitrite, leuk esterase but negative for bacteria. His respiratory swab was positive for influenza A. His imaging did not show acute abnormalities. He was given ofirmev 1g, NS 500cc x1, vanc 1g, cefepime 2g, and tamiflu 75mg PO x1. He was admitted to medicine.  (03 Jan 2024 06:06):  now pulm called:  he is from NH:  above history noted:  he seems to be responding to simple commands:  he say he has no underlying lung history  never smoked:  on room air:  adm with influenza:     Influenza:   Multiple Myeloma  HTN  Dementia      Influenza:   -low grade fever  -Influenza:  on tamilflu  -cxr is clear:  -he is not wheezing    1/4: no change in pulm status today  : remains on room air  1/5: doing ok : no sob:  no cough : no phlegm;   1/6: on Tamiflu  no sob:  on room air  1/7: seems to be doing ok : no sob:  no cough:   1/8: seems OK: no sob:  no cough : no phlegm  on room air  1/9: doing ok: no sob:   on cough :  1/10: seems OK: no sob:  on room air:  remains lethargic: Afebrile and is normal BC count  1/11: resolved  1/14: seems OK; no sob:  on room air:  responds to questions  1/15: seems to be doing ok : no sob: no cough : no phlegm  : no wheezing  Multiple Myeloma  -per primary team : FDG PET scan  noted:  rib lesions present likely secondary to MM   HTN  -controlled  Dementia  -supportive care:    karyn acp  : dc planning This is a 78M with history of MM, HTN, and Dementia (AAO x 1-2 to self, to place, not to time at baseline per brother) who presents to the hospital from [facility] for hypotension and lethargy. Patient currently awake, alert, AAO x2 (to self, to hospital but not name, not to time) but very poor historian, denies any acute complaints when asked. Spoke with brother Mookie who said that the patient was sent to the hospital for lethargy and cough though he states that the patient has had a cough for several months. Brother denies any other known acute complaints for the patient. Called [facility] [phone] but there was no staff available who knew the patient. As per NH paperwork and ED note, patient was sent to the hospital for hypotention to 81/55 and lethargy. He was noted to have a low grade temp of 99.6 and saturation of 93% at his NH. He was given tylenol 650mg x1 prior to being sent to the hospital.   On arrival to the hospital his vitals were T 99.4 -> 100.7 rectal, P 92, BP 88/55 -> 117/70, RR 16, O2 sat 96% RA. His lab work was significant for worsening macrocytic anemia, DAVID with mild hyponatremia, elevated protein gap, elevated lactate with improvement on repeat. His UA was positive for nitrite, leuk esterase but negative for bacteria. His respiratory swab was positive for influenza A. His imaging did not show acute abnormalities. He was given ofirmev 1g, NS 500cc x1, vanc 1g, cefepime 2g, and tamiflu 75mg PO x1. He was admitted to medicine.  (03 Jan 2024 06:06):  now pulm called:  he is from NH:  above history noted:  he seems to be responding to simple commands:  he say he has no underlying lung history  never smoked:  on room air:  adm with influenza:     Influenza:   Multiple Myeloma  HTN  Dementia      Influenza:   -low grade fever  -Influenza:  on tamilflu  -cxr is clear:  -he is not wheezing    1/4: no change in pulm status today  : remains on room air  1/5: doing ok : no sob:  no cough : no phlegm;   1/6: on Tamiflu  no sob:  on room air  1/7: seems to be doing ok : no sob:  no cough:   1/8: seems OK: no sob:  no cough : no phlegm  on room air  1/9: doing ok: no sob:   on cough :  1/10: seems OK: no sob:  on room air:  remains lethargic: Afebrile and is normal BC count  1/11: resolved  1/14: seems OK; no sob:  on room air:  responds to questions  1/15: seems to be doing ok : no sob: no cough : no phlegm  : no wheezing  Multiple Myeloma  -per primary team : FDG PET scan  noted:  rib lesions present likely secondary to MM   HTN  -controlled  Dementia  -supportive care:    karyn acp  : dc planning This is a 78M with history of MM, HTN, and Dementia (AAO x 1-2 to self, to place, not to time at baseline per brother) who presents to the hospital from [facility] for hypotension and lethargy. Patient currently awake, alert, AAO x2 (to self, to hospital but not name, not to time) but very poor historian, denies any acute complaints when asked. Spoke with brother Mookie who said that the patient was sent to the hospital for lethargy and cough though he states that the patient has had a cough for several months. Brother denies any other known acute complaints for the patient. Called [facility] [phone] but there was no staff available who knew the patient. As per NH paperwork and ED note, patient was sent to the hospital for hypotention to 81/55 and lethargy. He was noted to have a low grade temp of 99.6 and saturation of 93% at his NH. He was given tylenol 650mg x1 prior to being sent to the hospital.   On arrival to the hospital his vitals were T 99.4 -> 100.7 rectal, P 92, BP 88/55 -> 117/70, RR 16, O2 sat 96% RA. His lab work was significant for worsening macrocytic anemia, DAVID with mild hyponatremia, elevated protein gap, elevated lactate with improvement on repeat. His UA was positive for nitrite, leuk esterase but negative for bacteria. His respiratory swab was positive for influenza A. His imaging did not show acute abnormalities. He was given ofirmev 1g, NS 500cc x1, vanc 1g, cefepime 2g, and tamiflu 75mg PO x1. He was admitted to medicine.  (03 Jan 2024 06:06):  now pulm called:  he is from NH:  above history noted:  he seems to be responding to simple commands:  he say he has no underlying lung history  never smoked:  on room air:  adm with influenza:     Influenza:   Multiple Myeloma  HTN  Dementia      Influenza:   -low grade fever  -Influenza:  on tamilflu  -cxr is clear:  -he is not wheezing    1/4: no change in pulm status today  : remains on room air  1/5: doing ok : no sob:  no cough : no phlegm;   1/6: on Tamiflu  no sob:  on room air  1/7: seems to be doing ok : no sob:  no cough:   1/8: seems OK: no sob:  no cough : no phlegm  on room air  1/9: doing ok: no sob:   on cough :  1/10: seems OK: no sob:  on room air:  remains lethargic: Afebrile and is normal BC count  1/11: resolved  1/14: seems OK; no sob:  on room air:  responds to questions  1/15: seems to be doing ok : no sob: no cough : no phlegm  : no wheezing  Multiple Myeloma  -per primary team : FDG PET scan  noted:  rib lesions present likely secondary to MM   HTN  -controlled  Dementia  -supportive care:    karyn acp  : dc planning

## 2024-01-15 NOTE — PROGRESS NOTE ADULT - SUBJECTIVE AND OBJECTIVE BOX
Patient is a 78y old  Male who presents with a chief complaint of Hypotension, lethargu (15 Josef 2024 12:30)    Date of servie : 01-15-24 @ 15:15  INTERVAL HPI/OVERNIGHT EVENTS:  T(C): 36.4 (01-15-24 @ 10:04), Max: 36.7 (01-14-24 @ 18:00)  HR: 87 (01-15-24 @ 10:04) (68 - 87)  BP: 101/56 (01-15-24 @ 10:04) (100/66 - 123/87)  RR: 18 (01-15-24 @ 10:04) (17 - 18)  SpO2: 97% (01-15-24 @ 10:04) (96% - 98%)  Wt(kg): --  I&O's Summary    14 Jan 2024 07:01  -  15 Josef 2024 07:00  --------------------------------------------------------  IN: 1180 mL / OUT: 0 mL / NET: 1180 mL        LABS:                        7.5    3.70  )-----------( 286      ( 15 Josef 2024 07:24 )             22.5     01-15    132<L>  |  101  |  17  ----------------------------<  86  3.8   |  26  |  0.78    Ca    10.1      15 Josef 2024 07:24  Phos  2.7     01-15  Mg     1.90     01-15        Urinalysis Basic - ( 15 Josef 2024 07:24 )    Color: x / Appearance: x / SG: x / pH: x  Gluc: 86 mg/dL / Ketone: x  / Bili: x / Urobili: x   Blood: x / Protein: x / Nitrite: x   Leuk Esterase: x / RBC: x / WBC x   Sq Epi: x / Non Sq Epi: x / Bacteria: x      CAPILLARY BLOOD GLUCOSE            Urinalysis Basic - ( 15 Josef 2024 07:24 )    Color: x / Appearance: x / SG: x / pH: x  Gluc: 86 mg/dL / Ketone: x  / Bili: x / Urobili: x   Blood: x / Protein: x / Nitrite: x   Leuk Esterase: x / RBC: x / WBC x   Sq Epi: x / Non Sq Epi: x / Bacteria: x        MEDICATIONS  (STANDING):  albuterol/ipratropium for Nebulization 3 milliLiter(s) Nebulizer every 12 hours  cholecalciferol 1000 Unit(s) Oral daily  cyanocobalamin 1000 MICROGram(s) Oral daily  donepezil 10 milliGRAM(s) Oral at bedtime  folic acid 1 milliGRAM(s) Oral daily  heparin   Injectable 5000 Unit(s) SubCutaneous every 8 hours  memantine 10 milliGRAM(s) Oral two times a day  risperiDONE   Tablet 0.25 milliGRAM(s) Oral daily  risperiDONE   Tablet 0.5 milliGRAM(s) Oral at bedtime  sertraline 50 milliGRAM(s) Oral daily  sodium chloride 0.9%. 1000 milliLiter(s) (75 mL/Hr) IV Continuous <Continuous>  sodium chloride 0.9%. 1000 milliLiter(s) (75 mL/Hr) IV Continuous <Continuous>    MEDICATIONS  (PRN):  acetaminophen     Tablet .. 650 milliGRAM(s) Oral every 6 hours PRN Temp greater or equal to 38C (100.4F), Mild Pain (1 - 3)  aluminum hydroxide/magnesium hydroxide/simethicone Suspension 30 milliLiter(s) Oral every 4 hours PRN Dyspepsia  melatonin 3 milliGRAM(s) Oral at bedtime PRN Insomnia  ondansetron Injectable 4 milliGRAM(s) IV Push every 8 hours PRN Nausea and/or Vomiting  QUEtiapine 12.5 milliGRAM(s) Oral every 6 hours PRN for agitation          PHYSICAL EXAM:  GENERAL: NAD, well-groomed, well-developed  HEAD:  Atraumatic, Normocephalic  CHEST/LUNG: Clear to percussion bilaterally; No rales, rhonchi, wheezing, or rubs  HEART: Regular rate and rhythm; No murmurs, rubs, or gallops  ABDOMEN: Soft, Nontender, Nondistended; Bowel sounds present  EXTREMITIES:  2+ Peripheral Pulses, No clubbing, cyanosis, or edema  LYMPH: No lymphadenopathy noted  SKIN: No rashes or lesions    Care Discussed with Consultants/Other Providers [ ] YES  [ ] NO

## 2024-01-15 NOTE — PROGRESS NOTE ADULT - SUBJECTIVE AND OBJECTIVE BOX
Neurology Progress Note    S: Patient seen and examined. No new events overnight.      MEDICATIONS:    acetaminophen     Tablet .. 650 milliGRAM(s) Oral every 6 hours PRN  albuterol/ipratropium for Nebulization 3 milliLiter(s) Nebulizer every 12 hours  aluminum hydroxide/magnesium hydroxide/simethicone Suspension 30 milliLiter(s) Oral every 4 hours PRN  cholecalciferol 1000 Unit(s) Oral daily  cyanocobalamin 1000 MICROGram(s) Oral daily  donepezil 10 milliGRAM(s) Oral at bedtime  folic acid 1 milliGRAM(s) Oral daily  heparin   Injectable 5000 Unit(s) SubCutaneous every 8 hours  melatonin 3 milliGRAM(s) Oral at bedtime PRN  memantine 10 milliGRAM(s) Oral two times a day  ondansetron Injectable 4 milliGRAM(s) IV Push every 8 hours PRN  QUEtiapine 12.5 milliGRAM(s) Oral every 6 hours PRN  risperiDONE   Tablet 0.25 milliGRAM(s) Oral daily  risperiDONE   Tablet 0.5 milliGRAM(s) Oral at bedtime  sertraline 50 milliGRAM(s) Oral daily  sodium chloride 0.9%. 1000 milliLiter(s) IV Continuous <Continuous>  sodium chloride 0.9%. 1000 milliLiter(s) IV Continuous <Continuous>      LABS:                          7.5    3.70  )-----------( 286      ( 15 Josef 2024 07:24 )             22.5     01-15    132<L>  |  101  |  17  ----------------------------<  86  3.8   |  26  |  0.78    Ca    10.1      15 Josef 2024 07:24  Phos  2.7     01-15  Mg     1.90     01-15      CAPILLARY BLOOD GLUCOSE          Urinalysis Basic - ( 15 Josef 2024 07:24 )    Color: x / Appearance: x / SG: x / pH: x  Gluc: 86 mg/dL / Ketone: x  / Bili: x / Urobili: x   Blood: x / Protein: x / Nitrite: x   Leuk Esterase: x / RBC: x / WBC x   Sq Epi: x / Non Sq Epi: x / Bacteria: x      I&O's Summary    14 Jan 2024 07:01  -  15 Josef 2024 07:00  --------------------------------------------------------  IN: 1180 mL / OUT: 0 mL / NET: 1180 mL      Vital Signs Last 24 Hrs  T(C): 36.7 (15 Josef 2024 06:33), Max: 36.7 (14 Jan 2024 18:00)  T(F): 98 (15 Josef 2024 06:33), Max: 98.1 (14 Jan 2024 22:00)  HR: 77 (15 Josef 2024 06:33) (68 - 82)  BP: 107/62 (15 Josef 2024 06:33) (93/62 - 123/87)  BP(mean): --  RR: 18 (15 Josef 2024 06:33) (17 - 18)  SpO2: 98% (15 Josef 2024 06:33) (94% - 98%)    Parameters below as of 15 Josef 2024 06:33  Patient On (Oxygen Delivery Method): room air            General Exam:   General Appearance: Appropriately dressed and in no acute distress       Head: Normocephalic, atraumatic and no dysmorphic features  Ear, Nose, and Throat: Moist mucous membranes  CVS: S1S2+  Resp: No SOB, no wheeze or rhonchi  Abd: soft NTND  Extremities: No edema, no cyanosis  Skin: No bruises, no rashes     Neurological Exam:  Mental Status: Awake, alert and oriented x 1.  Able to follow simple verbal commands, with perseveration. Can name some objects, refuses to participate further. No dysarthria  Cranial Nerves: PERRL, EOMI, VFFC, sensation V1-V3 intact,  no obvious facial asymmetry, equal elevation of palate, scm/trap 5/5, tongue is midline on protrusion. . hearing is grossly intact.   Motor:  CHO at least 4/5   Sensation: Intact to light touch and pinprick throughout  Reflexes: 1+ throughout at biceps, brachioradialis, triceps, patellars and ankles bilaterally and equal. No clonus. R toe and L toe were both downgoing.  Coordination: unable   Gait: deferred       I personally reviewed the below data/images/labs:      CBC Full  -  ( 05 Jan 2024 05:21 )  WBC Count : 3.08 K/uL  RBC Count : 2.25 M/uL  Hemoglobin : 7.7 g/dL  Hematocrit : 23.1 %  Platelet Count - Automated : 194 K/uL  Mean Cell Volume : 102.7 fL  Mean Cell Hemoglobin : 34.2 pg  Mean Cell Hemoglobin Concentration : 33.3 gm/dL  Auto Neutrophil # : x  Auto Lymphocyte # : x  Auto Monocyte # : x  Auto Eosinophil # : x  Auto Basophil # : x  Auto Neutrophil % : x  Auto Lymphocyte % : x  Auto Monocyte % : x  Auto Eosinophil % : x  Auto Basophil % : x    01-05    130<L>  |  100  |  16  ----------------------------<  90  3.8   |  23  |  0.77    Ca    9.4      05 Jan 2024 05:21  Phos  2.3     01-05  Mg     1.70     01-05          Urinalysis Basic - ( 05 Jan 2024 05:21 )    Color: x / Appearance: x / SG: x / pH: x  Gluc: 90 mg/dL / Ketone: x  / Bili: x / Urobili: x   Blood: x / Protein: x / Nitrite: x   Leuk Esterase: x / RBC: x / WBC x   Sq Epi: x / Non Sq Epi: x / Bacteria: x        < from: CT Head No Cont (01.03.24 @ 02:00) >    ACC: [id number] EXAM:  CT BRAIN   ORDERED BY: MALOU CRISOSTOMO     PROCEDURE DATE:  01/03/2024          INTERPRETATION:  EXAMINATION: CT HEAD    DATE: 1/3/2024 2:00 AM    INDICATION: lethargy, altered mental status. History of falls.    COMPARISON: CT head from 7/7/2023.    TECHNIQUE: Thin section noncontrast axial images were obtained through   the head.  RAPID AI was utilized for preliminary assessment of   intracranial hemorrhage.    FINDINGS:  Intracranial Contents:    Moderate to severe cerebral volume loss.. Mild to moderate chronic   microvascular ischemic changes Gray-white differentiation is preserved.   No hydrocephalus. The basilar cisterns are clear. No focal edema or acute   mass effect. There is no intracranial fluid collectionor acute   hemorrhage.    Bones and Extracranial Soft Tissues:    There is no fracture identified. Scattered paranasal sinus mucosal   thickening. Mastoid air cells are clear. Scalp and imaged facial soft   tissues are within normal limits.      IMPRESSION:  1. No acute intracranial CT abnormality.    < end of copied text >         Neurology Progress Note    S: Patient seen and examined. No new events overnight.      MEDICATIONS:    acetaminophen     Tablet .. 650 milliGRAM(s) Oral every 6 hours PRN  albuterol/ipratropium for Nebulization 3 milliLiter(s) Nebulizer every 12 hours  aluminum hydroxide/magnesium hydroxide/simethicone Suspension 30 milliLiter(s) Oral every 4 hours PRN  cholecalciferol 1000 Unit(s) Oral daily  cyanocobalamin 1000 MICROGram(s) Oral daily  donepezil 10 milliGRAM(s) Oral at bedtime  folic acid 1 milliGRAM(s) Oral daily  heparin   Injectable 5000 Unit(s) SubCutaneous every 8 hours  melatonin 3 milliGRAM(s) Oral at bedtime PRN  memantine 10 milliGRAM(s) Oral two times a day  ondansetron Injectable 4 milliGRAM(s) IV Push every 8 hours PRN  QUEtiapine 12.5 milliGRAM(s) Oral every 6 hours PRN  risperiDONE   Tablet 0.25 milliGRAM(s) Oral daily  risperiDONE   Tablet 0.5 milliGRAM(s) Oral at bedtime  sertraline 50 milliGRAM(s) Oral daily  sodium chloride 0.9%. 1000 milliLiter(s) IV Continuous <Continuous>  sodium chloride 0.9%. 1000 milliLiter(s) IV Continuous <Continuous>      LABS:                          7.5    3.70  )-----------( 286      ( 15 Josef 2024 07:24 )             22.5     01-15    132<L>  |  101  |  17  ----------------------------<  86  3.8   |  26  |  0.78    Ca    10.1      15 Josef 2024 07:24  Phos  2.7     01-15  Mg     1.90     01-15      CAPILLARY BLOOD GLUCOSE          Urinalysis Basic - ( 15 Josef 2024 07:24 )    Color: x / Appearance: x / SG: x / pH: x  Gluc: 86 mg/dL / Ketone: x  / Bili: x / Urobili: x   Blood: x / Protein: x / Nitrite: x   Leuk Esterase: x / RBC: x / WBC x   Sq Epi: x / Non Sq Epi: x / Bacteria: x      I&O's Summary    14 Jan 2024 07:01  -  15 Josef 2024 07:00  --------------------------------------------------------  IN: 1180 mL / OUT: 0 mL / NET: 1180 mL      Vital Signs Last 24 Hrs  T(C): 36.7 (15 Josef 2024 06:33), Max: 36.7 (14 Jan 2024 18:00)  T(F): 98 (15 Josef 2024 06:33), Max: 98.1 (14 Jan 2024 22:00)  HR: 77 (15 Josef 2024 06:33) (68 - 82)  BP: 107/62 (15 Josef 2024 06:33) (93/62 - 123/87)  BP(mean): --  RR: 18 (15 Josef 2024 06:33) (17 - 18)  SpO2: 98% (15 Josef 2024 06:33) (94% - 98%)    Parameters below as of 15 Josef 2024 06:33  Patient On (Oxygen Delivery Method): room air            General Exam:   General Appearance: Appropriately dressed and in no acute distress       Head: Normocephalic, atraumatic and no dysmorphic features  Ear, Nose, and Throat: Moist mucous membranes  CVS: S1S2+  Resp: No SOB, no wheeze or rhonchi  Abd: soft NTND  Extremities: No edema, no cyanosis  Skin: No bruises, no rashes     Neurological Exam:  Mental Status: Awake, alert and oriented x 1.  Able to follow simple verbal commands, with perseveration. Can name some objects, refuses to participate further. No dysarthria  Cranial Nerves: PERRL, EOMI, VFFC, sensation V1-V3 intact,  no obvious facial asymmetry, equal elevation of palate, scm/trap 5/5, tongue is midline on protrusion. . hearing is grossly intact.   Motor:  CHO at least 4/5   Sensation: Intact to light touch and pinprick throughout  Reflexes: 1+ throughout at biceps, brachioradialis, triceps, patellars and ankles bilaterally and equal. No clonus. R toe and L toe were both downgoing.  Coordination: unable   Gait: deferred       I personally reviewed the below data/images/labs:      CBC Full  -  ( 05 Jan 2024 05:21 )  WBC Count : 3.08 K/uL  RBC Count : 2.25 M/uL  Hemoglobin : 7.7 g/dL  Hematocrit : 23.1 %  Platelet Count - Automated : 194 K/uL  Mean Cell Volume : 102.7 fL  Mean Cell Hemoglobin : 34.2 pg  Mean Cell Hemoglobin Concentration : 33.3 gm/dL  Auto Neutrophil # : x  Auto Lymphocyte # : x  Auto Monocyte # : x  Auto Eosinophil # : x  Auto Basophil # : x  Auto Neutrophil % : x  Auto Lymphocyte % : x  Auto Monocyte % : x  Auto Eosinophil % : x  Auto Basophil % : x    01-05    130<L>  |  100  |  16  ----------------------------<  90  3.8   |  23  |  0.77    Ca    9.4      05 Jan 2024 05:21  Phos  2.3     01-05  Mg     1.70     01-05          Urinalysis Basic - ( 05 Jan 2024 05:21 )    Color: x / Appearance: x / SG: x / pH: x  Gluc: 90 mg/dL / Ketone: x  / Bili: x / Urobili: x   Blood: x / Protein: x / Nitrite: x   Leuk Esterase: x / RBC: x / WBC x   Sq Epi: x / Non Sq Epi: x / Bacteria: x        < from: CT Head No Cont (01.03.24 @ 02:00) >    ACC: [id number] EXAM:  CT BRAIN   ORDERED BY: MALOU CRISOSTOMO     PROCEDURE DATE:  01/03/2024          INTERPRETATION:  EXAMINATION: CT HEAD    DATE: 1/3/2024 2:00 AM    INDICATION: lethargy, altered mental status. History of falls.    COMPARISON: CT head from 7/7/2023.    TECHNIQUE: Thin section noncontrast axial images were obtained through   the head.  RAPID AI was utilized for preliminary assessment of   intracranial hemorrhage.    FINDINGS:  Intracranial Contents:    Moderate to severe cerebral volume loss.. Mild to moderate chronic   microvascular ischemic changes Gray-white differentiation is preserved.   No hydrocephalus. The basilar cisterns are clear. No focal edema or acute   mass effect. There is no intracranial fluid collectionor acute   hemorrhage.    Bones and Extracranial Soft Tissues:    There is no fracture identified. Scattered paranasal sinus mucosal   thickening. Mastoid air cells are clear. Scalp and imaged facial soft   tissues are within normal limits.      IMPRESSION:  1. No acute intracranial CT abnormality.    < end of copied text >         Neurology Progress Note    S: Patient seen and examined. No new events overnight.      MEDICATIONS:    acetaminophen     Tablet .. 650 milliGRAM(s) Oral every 6 hours PRN  albuterol/ipratropium for Nebulization 3 milliLiter(s) Nebulizer every 12 hours  aluminum hydroxide/magnesium hydroxide/simethicone Suspension 30 milliLiter(s) Oral every 4 hours PRN  cholecalciferol 1000 Unit(s) Oral daily  cyanocobalamin 1000 MICROGram(s) Oral daily  donepezil 10 milliGRAM(s) Oral at bedtime  folic acid 1 milliGRAM(s) Oral daily  heparin   Injectable 5000 Unit(s) SubCutaneous every 8 hours  melatonin 3 milliGRAM(s) Oral at bedtime PRN  memantine 10 milliGRAM(s) Oral two times a day  ondansetron Injectable 4 milliGRAM(s) IV Push every 8 hours PRN  QUEtiapine 12.5 milliGRAM(s) Oral every 6 hours PRN  risperiDONE   Tablet 0.25 milliGRAM(s) Oral daily  risperiDONE   Tablet 0.5 milliGRAM(s) Oral at bedtime  sertraline 50 milliGRAM(s) Oral daily  sodium chloride 0.9%. 1000 milliLiter(s) IV Continuous <Continuous>  sodium chloride 0.9%. 1000 milliLiter(s) IV Continuous <Continuous>      LABS:                          7.5    3.70  )-----------( 286      ( 15 Josef 2024 07:24 )             22.5     01-15    132<L>  |  101  |  17  ----------------------------<  86  3.8   |  26  |  0.78    Ca    10.1      15 Josef 2024 07:24  Phos  2.7     01-15  Mg     1.90     01-15      CAPILLARY BLOOD GLUCOSE          Urinalysis Basic - ( 15 Josef 2024 07:24 )    Color: x / Appearance: x / SG: x / pH: x  Gluc: 86 mg/dL / Ketone: x  / Bili: x / Urobili: x   Blood: x / Protein: x / Nitrite: x   Leuk Esterase: x / RBC: x / WBC x   Sq Epi: x / Non Sq Epi: x / Bacteria: x      I&O's Summary    14 Jan 2024 07:01  -  15 Josef 2024 07:00  --------------------------------------------------------  IN: 1180 mL / OUT: 0 mL / NET: 1180 mL      Vital Signs Last 24 Hrs  T(C): 36.7 (15 Josef 2024 06:33), Max: 36.7 (14 Jan 2024 18:00)  T(F): 98 (15 Josef 2024 06:33), Max: 98.1 (14 Jan 2024 22:00)  HR: 77 (15 Josef 2024 06:33) (68 - 82)  BP: 107/62 (15 Josef 2024 06:33) (93/62 - 123/87)  BP(mean): --  RR: 18 (15 Josef 2024 06:33) (17 - 18)  SpO2: 98% (15 Josef 2024 06:33) (94% - 98%)    Parameters below as of 15 Josef 2024 06:33  Patient On (Oxygen Delivery Method): room air            General Exam:   General Appearance: Appropriately dressed and in no acute distress       Head: Normocephalic, atraumatic and no dysmorphic features  Ear, Nose, and Throat: Moist mucous membranes  CVS: S1S2+  Resp: No SOB, no wheeze or rhonchi  Abd: soft NTND  Extremities: No edema, no cyanosis  Skin: No bruises, no rashes     Neurological Exam:  Mental Status: Awake, alert and oriented x 1.  Able to follow simple verbal commands, with perseveration. Can name some objects, refuses to participate further. No dysarthria  Cranial Nerves: PERRL, EOMI, VFFC, sensation V1-V3 intact,  no obvious facial asymmetry, equal elevation of palate, scm/trap 5/5, tongue is midline on protrusion. . hearing is grossly intact.   Motor:  CHO at least 4/5   Sensation: Intact to light touch and pinprick throughout  Reflexes: 1+ throughout at biceps, brachioradialis, triceps, patellars and ankles bilaterally and equal. No clonus. R toe and L toe were both downgoing.  Coordination: unable   Gait: deferred       I personally reviewed the below data/images/labs:      CBC Full  -  ( 05 Jan 2024 05:21 )  WBC Count : 3.08 K/uL  RBC Count : 2.25 M/uL  Hemoglobin : 7.7 g/dL  Hematocrit : 23.1 %  Platelet Count - Automated : 194 K/uL  Mean Cell Volume : 102.7 fL  Mean Cell Hemoglobin : 34.2 pg  Mean Cell Hemoglobin Concentration : 33.3 gm/dL  Auto Neutrophil # : x  Auto Lymphocyte # : x  Auto Monocyte # : x  Auto Eosinophil # : x  Auto Basophil # : x  Auto Neutrophil % : x  Auto Lymphocyte % : x  Auto Monocyte % : x  Auto Eosinophil % : x  Auto Basophil % : x    01-05    130<L>  |  100  |  16  ----------------------------<  90  3.8   |  23  |  0.77    Ca    9.4      05 Jan 2024 05:21  Phos  2.3     01-05  Mg     1.70     01-05          Urinalysis Basic - ( 05 Jan 2024 05:21 )    Color: x / Appearance: x / SG: x / pH: x  Gluc: 90 mg/dL / Ketone: x  / Bili: x / Urobili: x   Blood: x / Protein: x / Nitrite: x   Leuk Esterase: x / RBC: x / WBC x   Sq Epi: x / Non Sq Epi: x / Bacteria: x        < from: CT Head No Cont (01.03.24 @ 02:00) >    ACC: [id number] EXAM:  CT BRAIN   ORDERED BY: MALOU CRISOSTOMO     PROCEDURE DATE:  01/03/2024          INTERPRETATION:  EXAMINATION: CT HEAD    DATE: 1/3/2024 2:00 AM    INDICATION: lethargy, altered mental status. History of falls.    COMPARISON: CT head from 7/7/2023.    TECHNIQUE: Thin section noncontrast axial images were obtained through   the head.  RAPID AI was utilized for preliminary assessment of   intracranial hemorrhage.    FINDINGS:  Intracranial Contents:    Moderate to severe cerebral volume loss.. Mild to moderate chronic   microvascular ischemic changes Gray-white differentiation is preserved.   No hydrocephalus. The basilar cisterns are clear. No focal edema or acute   mass effect. There is no intracranial fluid collectionor acute   hemorrhage.    Bones and Extracranial Soft Tissues:    There is no fracture identified. Scattered paranasal sinus mucosal   thickening. Mastoid air cells are clear. Scalp and imaged facial soft   tissues are within normal limits.      IMPRESSION:  1. No acute intracranial CT abnormality.    < end of copied text >

## 2024-01-16 LAB
ANION GAP SERPL CALC-SCNC: 4 MMOL/L — LOW (ref 7–14)
BUN SERPL-MCNC: 19 MG/DL — SIGNIFICANT CHANGE UP (ref 7–23)
CALCIUM SERPL-MCNC: 10.6 MG/DL — HIGH (ref 8.4–10.5)
CHLORIDE SERPL-SCNC: 103 MMOL/L — SIGNIFICANT CHANGE UP (ref 98–107)
CO2 SERPL-SCNC: 26 MMOL/L — SIGNIFICANT CHANGE UP (ref 22–31)
CREAT SERPL-MCNC: 0.87 MG/DL — SIGNIFICANT CHANGE UP (ref 0.5–1.3)
EGFR: 89 ML/MIN/1.73M2 — SIGNIFICANT CHANGE UP
GLUCOSE SERPL-MCNC: 93 MG/DL — SIGNIFICANT CHANGE UP (ref 70–99)
HCT VFR BLD CALC: 24.5 % — LOW (ref 39–50)
HGB BLD-MCNC: 8.1 G/DL — LOW (ref 13–17)
MAGNESIUM SERPL-MCNC: 2 MG/DL — SIGNIFICANT CHANGE UP (ref 1.6–2.6)
MCHC RBC-ENTMCNC: 33.1 GM/DL — SIGNIFICANT CHANGE UP (ref 32–36)
MCHC RBC-ENTMCNC: 34.2 PG — HIGH (ref 27–34)
MCV RBC AUTO: 103.4 FL — HIGH (ref 80–100)
NRBC # BLD: 0 /100 WBCS — SIGNIFICANT CHANGE UP (ref 0–0)
NRBC # FLD: 0 K/UL — SIGNIFICANT CHANGE UP (ref 0–0)
PHOSPHATE SERPL-MCNC: 2.9 MG/DL — SIGNIFICANT CHANGE UP (ref 2.5–4.5)
PLATELET # BLD AUTO: 304 K/UL — SIGNIFICANT CHANGE UP (ref 150–400)
POTASSIUM SERPL-MCNC: 4.2 MMOL/L — SIGNIFICANT CHANGE UP (ref 3.5–5.3)
POTASSIUM SERPL-SCNC: 4.2 MMOL/L — SIGNIFICANT CHANGE UP (ref 3.5–5.3)
RBC # BLD: 2.37 M/UL — LOW (ref 4.2–5.8)
RBC # FLD: 13.7 % — SIGNIFICANT CHANGE UP (ref 10.3–14.5)
SODIUM SERPL-SCNC: 133 MMOL/L — LOW (ref 135–145)
WBC # BLD: 3.92 K/UL — SIGNIFICANT CHANGE UP (ref 3.8–10.5)
WBC # FLD AUTO: 3.92 K/UL — SIGNIFICANT CHANGE UP (ref 3.8–10.5)

## 2024-01-16 RX ADMIN — Medication 3 MILLILITER(S): at 08:03

## 2024-01-16 RX ADMIN — MEMANTINE HYDROCHLORIDE 10 MILLIGRAM(S): 10 TABLET ORAL at 06:20

## 2024-01-16 RX ADMIN — HEPARIN SODIUM 5000 UNIT(S): 5000 INJECTION INTRAVENOUS; SUBCUTANEOUS at 06:20

## 2024-01-16 NOTE — PROGRESS NOTE ADULT - SUBJECTIVE AND OBJECTIVE BOX
Neurology Progress Note    S: Patient seen and examined. No new events overnight.      MEDICATIONS:    acetaminophen     Tablet .. 650 milliGRAM(s) Oral every 6 hours PRN  albuterol/ipratropium for Nebulization 3 milliLiter(s) Nebulizer every 12 hours  aluminum hydroxide/magnesium hydroxide/simethicone Suspension 30 milliLiter(s) Oral every 4 hours PRN  cholecalciferol 1000 Unit(s) Oral daily  cyanocobalamin 1000 MICROGram(s) Oral daily  donepezil 10 milliGRAM(s) Oral at bedtime  folic acid 1 milliGRAM(s) Oral daily  heparin   Injectable 5000 Unit(s) SubCutaneous every 8 hours  melatonin 3 milliGRAM(s) Oral at bedtime PRN  memantine 10 milliGRAM(s) Oral two times a day  ondansetron Injectable 4 milliGRAM(s) IV Push every 8 hours PRN  QUEtiapine 12.5 milliGRAM(s) Oral every 6 hours PRN  risperiDONE   Tablet 0.25 milliGRAM(s) Oral daily  risperiDONE   Tablet 0.5 milliGRAM(s) Oral at bedtime  sertraline 50 milliGRAM(s) Oral daily  sodium chloride 0.9%. 1000 milliLiter(s) IV Continuous <Continuous>  sodium chloride 0.9%. 1000 milliLiter(s) IV Continuous <Continuous>      LABS:                          7.5    3.70  )-----------( 286      ( 15 Josef 2024 07:24 )             22.5     01-15    132<L>  |  101  |  17  ----------------------------<  86  3.8   |  26  |  0.78    Ca    10.1      15 Josef 2024 07:24  Phos  2.7     -15  Mg     1.90     -15      CAPILLARY BLOOD GLUCOSE          Urinalysis Basic - ( 15 Josef 2024 18:21 )    Color: Yellow / Appearance: Cloudy / S.017 / pH: x  Gluc: x / Ketone: Negative mg/dL  / Bili: Negative / Urobili: 1.0 mg/dL   Blood: x / Protein: Trace mg/dL / Nitrite: Negative   Leuk Esterase: Negative / RBC: 1 /HPF / WBC 1 /HPF   Sq Epi: x / Non Sq Epi: 0 /HPF / Bacteria: Negative /HPF      I&O's Summary    15 Josef 2024 07:01  -  2024 07:00  --------------------------------------------------------  IN: 150 mL / OUT: 0 mL / NET: 150 mL      Vital Signs Last 24 Hrs  T(C): 36.5 (2024 09:59), Max: 36.5 (15 Josef 2024 18:00)  T(F): 97.7 (2024 09:59), Max: 97.7 (15 Josef 2024 18:00)  HR: 75 (2024 09:59) (75 - 84)  BP: 106/52 (:59) (98/59 - 113/73)  BP(mean): --  RR: 18 (2024 09:59) (16 - 18)  SpO2: 95% (:59) (95% - 97%)    Parameters below as of 2024 09:59  Patient On (Oxygen Delivery Method): room air          General Exam:   General Appearance: Appropriately dressed and in no acute distress       Head: Normocephalic, atraumatic and no dysmorphic features  Ear, Nose, and Throat: Moist mucous membranes  CVS: S1S2+  Resp: No SOB, no wheeze or rhonchi  Abd: soft NTND  Extremities: No edema, no cyanosis  Skin: No bruises, no rashes     Neurological Exam:  Mental Status: Awake, alert and oriented x 1.  Able to follow simple verbal commands, with perseveration. Can name some objects, refuses to participate further. No dysarthria  Cranial Nerves: PERRL, EOMI, VFFC, sensation V1-V3 intact,  no obvious facial asymmetry, equal elevation of palate, scm/trap 5/5, tongue is midline on protrusion. . hearing is grossly intact.   Motor:  CHO at least 4/5   Sensation: Intact to light touch and pinprick throughout  Reflexes: 1+ throughout at biceps, brachioradialis, triceps, patellars and ankles bilaterally and equal. No clonus. R toe and L toe were both downgoing.  Coordination: unable   Gait: deferred       I personally reviewed the below data/images/labs:      CBC Full  -  ( 2024 05:21 )  WBC Count : 3.08 K/uL  RBC Count : 2.25 M/uL  Hemoglobin : 7.7 g/dL  Hematocrit : 23.1 %  Platelet Count - Automated : 194 K/uL  Mean Cell Volume : 102.7 fL  Mean Cell Hemoglobin : 34.2 pg  Mean Cell Hemoglobin Concentration : 33.3 gm/dL  Auto Neutrophil # : x  Auto Lymphocyte # : x  Auto Monocyte # : x  Auto Eosinophil # : x  Auto Basophil # : x  Auto Neutrophil % : x  Auto Lymphocyte % : x  Auto Monocyte % : x  Auto Eosinophil % : x  Auto Basophil % : x    -    130<L>  |  100  |  16  ----------------------------<  90  3.8   |  23  |  0.77    Ca    9.4      2024 05:21  Phos  2.3     -  Mg     1.70     -          Urinalysis Basic - ( 2024 05:21 )    Color: x / Appearance: x / SG: x / pH: x  Gluc: 90 mg/dL / Ketone: x  / Bili: x / Urobili: x   Blood: x / Protein: x / Nitrite: x   Leuk Esterase: x / RBC: x / WBC x   Sq Epi: x / Non Sq Epi: x / Bacteria: x        < from: CT Head No Cont (24 @ 02:00) >    ACC: [id number] EXAM:  CT BRAIN   ORDERED BY: MALOU CRISOSTOMO     PROCEDURE DATE:  2024          INTERPRETATION:  EXAMINATION: CT HEAD    DATE: 1/3/2024 2:00 AM    INDICATION: lethargy, altered mental status. History of falls.    COMPARISON: CT head from 2023.    TECHNIQUE: Thin section noncontrast axial images were obtained through   the head.  RAPID AI was utilized for preliminary assessment of   intracranial hemorrhage.    FINDINGS:  Intracranial Contents:    Moderate to severe cerebral volume loss.. Mild to moderate chronic   microvascular ischemic changes Gray-white differentiation is preserved.   No hydrocephalus. The basilar cisterns are clear. No focal edema or acute   mass effect. There is no intracranial fluid collectionor acute   hemorrhage.    Bones and Extracranial Soft Tissues:    There is no fracture identified. Scattered paranasal sinus mucosal   thickening. Mastoid air cells are clear. Scalp and imaged facial soft   tissues are within normal limits.      IMPRESSION:  1. No acute intracranial CT abnormality.    < end of copied text >         Neurology Progress Note    S: Patient seen and examined. No new events overnight.      MEDICATIONS:    acetaminophen     Tablet .. 650 milliGRAM(s) Oral every 6 hours PRN  albuterol/ipratropium for Nebulization 3 milliLiter(s) Nebulizer every 12 hours  aluminum hydroxide/magnesium hydroxide/simethicone Suspension 30 milliLiter(s) Oral every 4 hours PRN  cholecalciferol 1000 Unit(s) Oral daily  cyanocobalamin 1000 MICROGram(s) Oral daily  donepezil 10 milliGRAM(s) Oral at bedtime  folic acid 1 milliGRAM(s) Oral daily  heparin   Injectable 5000 Unit(s) SubCutaneous every 8 hours  melatonin 3 milliGRAM(s) Oral at bedtime PRN  memantine 10 milliGRAM(s) Oral two times a day  ondansetron Injectable 4 milliGRAM(s) IV Push every 8 hours PRN  QUEtiapine 12.5 milliGRAM(s) Oral every 6 hours PRN  risperiDONE   Tablet 0.25 milliGRAM(s) Oral daily  risperiDONE   Tablet 0.5 milliGRAM(s) Oral at bedtime  sertraline 50 milliGRAM(s) Oral daily  sodium chloride 0.9%. 1000 milliLiter(s) IV Continuous <Continuous>  sodium chloride 0.9%. 1000 milliLiter(s) IV Continuous <Continuous>      LABS:                          7.5    3.70  )-----------( 286      ( 15 Josef 2024 07:24 )             22.5     01-15    132<L>  |  101  |  17  ----------------------------<  86  3.8   |  26  |  0.78    Ca    10.1      15 Josef 2024 07:24  Phos  2.7     -15  Mg     1.90     -15      CAPILLARY BLOOD GLUCOSE          Urinalysis Basic - ( 15 Josef 2024 18:21 )    Color: Yellow / Appearance: Cloudy / S.017 / pH: x  Gluc: x / Ketone: Negative mg/dL  / Bili: Negative / Urobili: 1.0 mg/dL   Blood: x / Protein: Trace mg/dL / Nitrite: Negative   Leuk Esterase: Negative / RBC: 1 /HPF / WBC 1 /HPF   Sq Epi: x / Non Sq Epi: 0 /HPF / Bacteria: Negative /HPF      I&O's Summary    15 Josef 2024 07:01  -  2024 07:00  --------------------------------------------------------  IN: 150 mL / OUT: 0 mL / NET: 150 mL      Vital Signs Last 24 Hrs  T(C): 36.5 (2024 09:59), Max: 36.5 (15 Josef 2024 18:00)  T(F): 97.7 (2024 09:59), Max: 97.7 (15 Josef 2024 18:00)  HR: 75 (2024 09:59) (75 - 84)  BP: 106/52 (:59) (98/59 - 113/73)  BP(mean): --  RR: 18 (2024 09:59) (16 - 18)  SpO2: 95% (:59) (95% - 97%)    Parameters below as of 2024 09:59  Patient On (Oxygen Delivery Method): room air          General Exam:   General Appearance: Appropriately dressed and in no acute distress       Head: Normocephalic, atraumatic and no dysmorphic features  Ear, Nose, and Throat: Moist mucous membranes  CVS: S1S2+  Resp: No SOB, no wheeze or rhonchi  Abd: soft NTND  Extremities: No edema, no cyanosis  Skin: No bruises, no rashes     Neurological Exam:  Mental Status: Awake, alert and oriented x 1.  Able to follow simple verbal commands, with perseveration. Can name some objects, refuses to participate further. No dysarthria  Cranial Nerves: PERRL, EOMI, VFFC, sensation V1-V3 intact,  no obvious facial asymmetry, equal elevation of palate, scm/trap 5/5, tongue is midline on protrusion. . hearing is grossly intact.   Motor:  CHO at least 4/5   Sensation: Intact to light touch and pinprick throughout  Reflexes: 1+ throughout at biceps, brachioradialis, triceps, patellars and ankles bilaterally and equal. No clonus. R toe and L toe were both downgoing.  Coordination: unable   Gait: deferred       I personally reviewed the below data/images/labs:      CBC Full  -  ( 2024 05:21 )  WBC Count : 3.08 K/uL  RBC Count : 2.25 M/uL  Hemoglobin : 7.7 g/dL  Hematocrit : 23.1 %  Platelet Count - Automated : 194 K/uL  Mean Cell Volume : 102.7 fL  Mean Cell Hemoglobin : 34.2 pg  Mean Cell Hemoglobin Concentration : 33.3 gm/dL  Auto Neutrophil # : x  Auto Lymphocyte # : x  Auto Monocyte # : x  Auto Eosinophil # : x  Auto Basophil # : x  Auto Neutrophil % : x  Auto Lymphocyte % : x  Auto Monocyte % : x  Auto Eosinophil % : x  Auto Basophil % : x    -    130<L>  |  100  |  16  ----------------------------<  90  3.8   |  23  |  0.77    Ca    9.4      2024 05:21  Phos  2.3     -  Mg     1.70     -          Urinalysis Basic - ( 2024 05:21 )    Color: x / Appearance: x / SG: x / pH: x  Gluc: 90 mg/dL / Ketone: x  / Bili: x / Urobili: x   Blood: x / Protein: x / Nitrite: x   Leuk Esterase: x / RBC: x / WBC x   Sq Epi: x / Non Sq Epi: x / Bacteria: x        < from: CT Head No Cont (24 @ 02:00) >    ACC: [id number] EXAM:  CT BRAIN   ORDERED BY: MALOU CRISOSTOMO     PROCEDURE DATE:  2024          INTERPRETATION:  EXAMINATION: CT HEAD    DATE: 1/3/2024 2:00 AM    INDICATION: lethargy, altered mental status. History of falls.    COMPARISON: CT head from 2023.    TECHNIQUE: Thin section noncontrast axial images were obtained through   the head.  RAPID AI was utilized for preliminary assessment of   intracranial hemorrhage.    FINDINGS:  Intracranial Contents:    Moderate to severe cerebral volume loss.. Mild to moderate chronic   microvascular ischemic changes Gray-white differentiation is preserved.   No hydrocephalus. The basilar cisterns are clear. No focal edema or acute   mass effect. There is no intracranial fluid collectionor acute   hemorrhage.    Bones and Extracranial Soft Tissues:    There is no fracture identified. Scattered paranasal sinus mucosal   thickening. Mastoid air cells are clear. Scalp and imaged facial soft   tissues are within normal limits.      IMPRESSION:  1. No acute intracranial CT abnormality.    < end of copied text >

## 2024-01-16 NOTE — PROGRESS NOTE ADULT - ASSESSMENT
77 y/o M PMhx MM, HTN, and Dementia who presented from [facility] for hypotension and lethargy found to have flu A    influenza A- treated  fever resolved  CXR clear  s/p tamiflu 30mg bid, completed 1/7    UA not consistent w/ UTI  urine cx negative    Recommendations  continue off antibiotics  discharge planning- awaiting rehab    Sylvester Trinidad M.D.  [facility], Division of Infectious Diseases  [phone]  After 5pm on weekdays and all day on weekends - please call [phone]  79 y/o M PMhx MM, HTN, and Dementia who presented from [facility] for hypotension and lethargy found to have flu A    influenza A- treated  fever resolved  CXR clear  s/p tamiflu 30mg bid, completed 1/7    UA not consistent w/ UTI  urine cx negative    Recommendations  continue off antibiotics  discharge planning- awaiting rehab    Sylvester Trinidad M.D.  [facility], Division of Infectious Diseases  [phone]  After 5pm on weekdays and all day on weekends - please call [phone]

## 2024-01-16 NOTE — PROGRESS NOTE ADULT - ASSESSMENT
This is a 78M with history of MM, HTN, and Dementia (AAO x 1-2 to self, to place, not to time at baseline per brother) who presents to the hospital from [facility] for hypotension and lethargy. Patient currently awake, alert, AAO x1 (to self,) but very poor historian, denies any acute complaints when asked. nephrology consulted for david    DAVID  admitted with scr 1.4  pt with sepsis likely ATN  DAVID improved.  Monitor BMP    hematuria  no blood on repeat ua 1/15   renal us with no mass, bladder diverticula--outpt urology  Monitor    hyponatremia  ? 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 for hypotension and lethargy. Patient currently awake, alert, AAO x1 (to self,) but very poor historian, denies any acute complaints when asked. nephrology consulted for david    DAVID  admitted with scr 1.4  pt with sepsis likely ATN  DAVID improved.  Monitor BMP    hematuria  no blood on repeat ua 1/15   renal us with no mass, bladder diverticula--outpt urology  Monitor    hyponatremia  ? 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

## 2024-01-16 NOTE — PROGRESS NOTE ADULT - SUBJECTIVE AND OBJECTIVE BOX
Patient is a 78y old  Male who presents with a chief complaint of Hypotension, lethargu (2024 12:48)    Date of servie : 24 @ 13:33  INTERVAL HPI/OVERNIGHT EVENTS:  T(C): 36.5 (24 @ 09:59), Max: 36.5 (01-15-24 @ 18:00)  HR: 75 (24 @ 09:59) (75 - 84)  BP: 106/52 (24 @ 09:59) (98/59 - 113/73)  RR: 18 (24 @ 09:59) (16 - 18)  SpO2: 95% (24 @ 09:59) (95% - 97%)  Wt(kg): --  I&O's Summary    15 Josef 2024 07:01  -  2024 07:00  --------------------------------------------------------  IN: 150 mL / OUT: 0 mL / NET: 150 mL        LABS:                        7.5    3.70  )-----------( 286      ( 15 Josef 2024 07:24 )             22.5     -15    132<L>  |  101  |  17  ----------------------------<  86  3.8   |  26  |  0.78    Ca    10.1      15 Josef 2024 07:24  Phos  2.7     01-15  Mg     1.90     -15        Urinalysis Basic - ( 15 Josef 2024 18:21 )    Color: Yellow / Appearance: Cloudy / S.017 / pH: x  Gluc: x / Ketone: Negative mg/dL  / Bili: Negative / Urobili: 1.0 mg/dL   Blood: x / Protein: Trace mg/dL / Nitrite: Negative   Leuk Esterase: Negative / RBC: 1 /HPF / WBC 1 /HPF   Sq Epi: x / Non Sq Epi: 0 /HPF / Bacteria: Negative /HPF      CAPILLARY BLOOD GLUCOSE            Urinalysis Basic - ( 15 Josef 2024 18:21 )    Color: Yellow / Appearance: Cloudy / S.017 / pH: x  Gluc: x / Ketone: Negative mg/dL  / Bili: Negative / Urobili: 1.0 mg/dL   Blood: x / Protein: Trace mg/dL / Nitrite: Negative   Leuk Esterase: Negative / RBC: 1 /HPF / WBC 1 /HPF   Sq Epi: x / Non Sq Epi: 0 /HPF / Bacteria: Negative /HPF        MEDICATIONS  (STANDING):  albuterol/ipratropium for Nebulization 3 milliLiter(s) Nebulizer every 12 hours  cholecalciferol 1000 Unit(s) Oral daily  cyanocobalamin 1000 MICROGram(s) Oral daily  donepezil 10 milliGRAM(s) Oral at bedtime  folic acid 1 milliGRAM(s) Oral daily  heparin   Injectable 5000 Unit(s) SubCutaneous every 8 hours  memantine 10 milliGRAM(s) Oral two times a day  risperiDONE   Tablet 0.25 milliGRAM(s) Oral daily  risperiDONE   Tablet 0.5 milliGRAM(s) Oral at bedtime  sertraline 50 milliGRAM(s) Oral daily  sodium chloride 0.9%. 1000 milliLiter(s) (75 mL/Hr) IV Continuous <Continuous>  sodium chloride 0.9%. 1000 milliLiter(s) (75 mL/Hr) IV Continuous <Continuous>    MEDICATIONS  (PRN):  acetaminophen     Tablet .. 650 milliGRAM(s) Oral every 6 hours PRN Temp greater or equal to 38C (100.4F), Mild Pain (1 - 3)  aluminum hydroxide/magnesium hydroxide/simethicone Suspension 30 milliLiter(s) Oral every 4 hours PRN Dyspepsia  melatonin 3 milliGRAM(s) Oral at bedtime PRN Insomnia  ondansetron Injectable 4 milliGRAM(s) IV Push every 8 hours PRN Nausea and/or Vomiting  QUEtiapine 12.5 milliGRAM(s) Oral every 6 hours PRN for agitation          PHYSICAL EXAM:  GENERAL: NAD, well-groomed, well-developed  HEAD:  Atraumatic, Normocephalic  CHEST/LUNG: Clear to percussion bilaterally; No rales, rhonchi, wheezing, or rubs  HEART: Regular rate and rhythm; No murmurs, rubs, or gallops  ABDOMEN: Soft, Nontender, Nondistended; Bowel sounds present  EXTREMITIES:  2+ Peripheral Pulses, No clubbing, cyanosis, or edema  LYMPH: No lymphadenopathy noted  SKIN: No rashes or lesions    Care Discussed with Consultants/Other Providers [ ] YES  [ ] NO

## 2024-01-16 NOTE — PROGRESS NOTE ADULT - NS ATTEND AMEND GEN_ALL_CORE FT
Patient seen and examined. Agree with plan as detailed in PA/NP Note.     Bp controlled, continue to holld lopressor and amlodipine      Staci Muñiz MD  Pager: [phone] Patient seen and examined. Agree with plan as detailed in PA/NP Note.     Bp controlled, continue to holld lopressor and amlodipine      Staci Muñiz MD  Pager: [phone]

## 2024-01-16 NOTE — PROGRESS NOTE ADULT - SUBJECTIVE AND OBJECTIVE BOX
DATE OF SERVICE: 01-16-24    No overnight events, resting comfortably  Review of symptoms otherwise negative.    Review of Systems:   Constitutional: [ ] fevers, [ ] chills.   Skin: [ ] dry skin. [ ] rashes.  Psychiatric: [ ] depression, [ ] anxiety.   Gastrointestinal: [ ] BRBPR, [ ] melena.   Neurological: [ ] confusion. [ ] seizures. [ ] shuffling gait.   Ears,Nose,Mouth and Throat: [ ] ear pain [ ] sore throat.   Eyes: [ ] diplopia.   Respiratory: [ ] hemoptysis. [ ] shortness of breath  Cardiovascular: See HPI above  Hematologic/Lymphatic: [ ] anemia. [ ] painful nodes. [ ] prolonged bleeding.   Genitourinary: [ ] hematuria. [ ] flank pain.   Endocrine: [ ] significant change in weight. [ ] intolerance to heat and cold.     Review of systems [ x] otherwise negative, [ ] otherwise unable to obtain    FH: no family history of sudden cardiac death in first degree relatives    SH: [ ] tobacco, [ ] alcohol, [ ] drugs    acetaminophen     Tablet .. 650 milliGRAM(s) Oral every 6 hours PRN  albuterol/ipratropium for Nebulization 3 milliLiter(s) Nebulizer every 12 hours  aluminum hydroxide/magnesium hydroxide/simethicone Suspension 30 milliLiter(s) Oral every 4 hours PRN  cholecalciferol 1000 Unit(s) Oral daily  cyanocobalamin 1000 MICROGram(s) Oral daily  donepezil 10 milliGRAM(s) Oral at bedtime  folic acid 1 milliGRAM(s) Oral daily  heparin   Injectable 5000 Unit(s) SubCutaneous every 8 hours  melatonin 3 milliGRAM(s) Oral at bedtime PRN  memantine 10 milliGRAM(s) Oral two times a day  ondansetron Injectable 4 milliGRAM(s) IV Push every 8 hours PRN  QUEtiapine 12.5 milliGRAM(s) Oral every 6 hours PRN  risperiDONE   Tablet 0.25 milliGRAM(s) Oral daily  risperiDONE   Tablet 0.5 milliGRAM(s) Oral at bedtime  sertraline 50 milliGRAM(s) Oral daily  sodium chloride 0.9%. 1000 milliLiter(s) IV Continuous <Continuous>  sodium chloride 0.9%. 1000 milliLiter(s) IV Continuous <Continuous>                            7.5    3.70  )-----------( 286      ( 15 Josef 2024 07:24 )             22.5     132<L>  |  101  |  17  ----------------------------<  86  3.8   |  26  |  0.78    Ca    10.1      15 Josef 2024 07:24  Phos  2.7     01-15  Mg     1.90     01-15      T(C): 36.5 (01-16-24 @ 09:59), Max: 36.5 (01-15-24 @ 18:00)  HR: 75 (01-16-24 @ 09:59) (75 - 84)  BP: 106/52 (01-16-24 @ 09:59) (98/59 - 113/73)  RR: 18 (01-16-24 @ 09:59) (16 - 18)  SpO2: 95% (01-16-24 @ 09:59) (95% - 97%)  Wt(kg): --    I&O's Summary    15 Josef 2024 07:01  -  16 Jan 2024 07:00  --------------------------------------------------------  IN: 150 mL / OUT: 0 mL / NET: 150 mL    General:  pleasantly confused  Head: Normocephalic and atraumatic.   Neck: No JVD. No bruits. Supple. Does not appear to be enlarged.   Cardiovascular: + S1,S2 ; RRR Soft systolic murmur at the left lower sternal border. No rubs noted.    Lungs: CTA b/l. No rhonchi, rales or wheezes.   Abdomen: + BS, soft. Non tender. Non distended. No rebound. No guarding.   Extremities: No clubbing/cyanosis/edema.   Neurologic: Moves all four extremities  Skin: Warm and moist. The patient's skin has normal elasticity and good skin turgor.   Psychiatric: unable to eval  Musculoskeletal: Normal range of motion      ECG:  	Sinus tachycardia    < from: Xray Chest 1 View AP/PA (01.02.24 @ 19:36) >  IMPRESSION:  No focal consolidations.  < end of copied text >    < from: Transthoracic Echocardiogram (07.10.23 @ 11:15) >  CONCLUSIONS:  1. Calcified trileaflet aortic valve with normal opening.  Minimal aortic regurgitation.  2. Endocardium not well visualized; grossly decreased  possibly mild left ventricular systolic dysfunction.  3. The right ventricle is not well visualized; grossly  normal right ventricular systolic function.  ------------------------------------------------------------------------  Confirmed on  7/10/2023 - 13:57:46 by Jolene Rowan M.D. RPVI  < end of copied text >      ASSESSMENT/PLAN: 	78M with history of MM, HTN, and Dementia (AAO x 1-2 to self, to place, not to time at baseline per brother) who presents to the hospital from [facility] for hypotension and lethargy found to have FLU A    -#Hypotension  -- BP stable  -- Norvasc and Lopressor on hold given acute illness  --would cont to hold indefinitely    #lethargy  --due to Flu, now resolved  --s/p tamiflu and Abx per medicine    DC planning     DATE OF SERVICE: 01-16-24    No overnight events, resting comfortably  Review of symptoms otherwise negative.    Review of Systems:   Constitutional: [ ] fevers, [ ] chills.   Skin: [ ] dry skin. [ ] rashes.  Psychiatric: [ ] depression, [ ] anxiety.   Gastrointestinal: [ ] BRBPR, [ ] melena.   Neurological: [ ] confusion. [ ] seizures. [ ] shuffling gait.   Ears,Nose,Mouth and Throat: [ ] ear pain [ ] sore throat.   Eyes: [ ] diplopia.   Respiratory: [ ] hemoptysis. [ ] shortness of breath  Cardiovascular: See HPI above  Hematologic/Lymphatic: [ ] anemia. [ ] painful nodes. [ ] prolonged bleeding.   Genitourinary: [ ] hematuria. [ ] flank pain.   Endocrine: [ ] significant change in weight. [ ] intolerance to heat and cold.     Review of systems [ x] otherwise negative, [ ] otherwise unable to obtain    FH: no family history of sudden cardiac death in first degree relatives    SH: [ ] tobacco, [ ] alcohol, [ ] drugs    acetaminophen     Tablet .. 650 milliGRAM(s) Oral every 6 hours PRN  albuterol/ipratropium for Nebulization 3 milliLiter(s) Nebulizer every 12 hours  aluminum hydroxide/magnesium hydroxide/simethicone Suspension 30 milliLiter(s) Oral every 4 hours PRN  cholecalciferol 1000 Unit(s) Oral daily  cyanocobalamin 1000 MICROGram(s) Oral daily  donepezil 10 milliGRAM(s) Oral at bedtime  folic acid 1 milliGRAM(s) Oral daily  heparin   Injectable 5000 Unit(s) SubCutaneous every 8 hours  melatonin 3 milliGRAM(s) Oral at bedtime PRN  memantine 10 milliGRAM(s) Oral two times a day  ondansetron Injectable 4 milliGRAM(s) IV Push every 8 hours PRN  QUEtiapine 12.5 milliGRAM(s) Oral every 6 hours PRN  risperiDONE   Tablet 0.25 milliGRAM(s) Oral daily  risperiDONE   Tablet 0.5 milliGRAM(s) Oral at bedtime  sertraline 50 milliGRAM(s) Oral daily  sodium chloride 0.9%. 1000 milliLiter(s) IV Continuous <Continuous>  sodium chloride 0.9%. 1000 milliLiter(s) IV Continuous <Continuous>                            7.5    3.70  )-----------( 286      ( 15 Josef 2024 07:24 )             22.5     132<L>  |  101  |  17  ----------------------------<  86  3.8   |  26  |  0.78    Ca    10.1      15 Josef 2024 07:24  Phos  2.7     01-15  Mg     1.90     01-15      T(C): 36.5 (01-16-24 @ 09:59), Max: 36.5 (01-15-24 @ 18:00)  HR: 75 (01-16-24 @ 09:59) (75 - 84)  BP: 106/52 (01-16-24 @ 09:59) (98/59 - 113/73)  RR: 18 (01-16-24 @ 09:59) (16 - 18)  SpO2: 95% (01-16-24 @ 09:59) (95% - 97%)  Wt(kg): --    I&O's Summary    15 Josef 2024 07:01  -  16 Jan 2024 07:00  --------------------------------------------------------  IN: 150 mL / OUT: 0 mL / NET: 150 mL    General:  pleasantly confused  Head: Normocephalic and atraumatic.   Neck: No JVD. No bruits. Supple. Does not appear to be enlarged.   Cardiovascular: + S1,S2 ; RRR Soft systolic murmur at the left lower sternal border. No rubs noted.    Lungs: CTA b/l. No rhonchi, rales or wheezes.   Abdomen: + BS, soft. Non tender. Non distended. No rebound. No guarding.   Extremities: No clubbing/cyanosis/edema.   Neurologic: Moves all four extremities  Skin: Warm and moist. The patient's skin has normal elasticity and good skin turgor.   Psychiatric: unable to eval  Musculoskeletal: Normal range of motion      ECG:  	Sinus tachycardia    < from: Xray Chest 1 View AP/PA (01.02.24 @ 19:36) >  IMPRESSION:  No focal consolidations.  < end of copied text >    < from: Transthoracic Echocardiogram (07.10.23 @ 11:15) >  CONCLUSIONS:  1. Calcified trileaflet aortic valve with normal opening.  Minimal aortic regurgitation.  2. Endocardium not well visualized; grossly decreased  possibly mild left ventricular systolic dysfunction.  3. The right ventricle is not well visualized; grossly  normal right ventricular systolic function.  ------------------------------------------------------------------------  Confirmed on  7/10/2023 - 13:57:46 by Jolene Rowan M.D. RPVI  < end of copied text >      ASSESSMENT/PLAN: 	78M with history of MM, HTN, and Dementia (AAO x 1-2 to self, to place, not to time at baseline per brother) who presents to the hospital from [facility] for hypotension and lethargy found to have FLU A    -#Hypotension  -- BP stable  -- Norvasc and Lopressor on hold given acute illness  --would cont to hold indefinitely    #lethargy  --due to Flu, now resolved  --s/p tamiflu and Abx per medicine    DC planning     DATE OF SERVICE: 01-16-24    No overnight events, resting comfortably  Review of symptoms otherwise negative.    Review of Systems:   Constitutional: [ ] fevers, [ ] chills.   Skin: [ ] dry skin. [ ] rashes.  Psychiatric: [ ] depression, [ ] anxiety.   Gastrointestinal: [ ] BRBPR, [ ] melena.   Neurological: [ ] confusion. [ ] seizures. [ ] shuffling gait.   Ears,Nose,Mouth and Throat: [ ] ear pain [ ] sore throat.   Eyes: [ ] diplopia.   Respiratory: [ ] hemoptysis. [ ] shortness of breath  Cardiovascular: See HPI above  Hematologic/Lymphatic: [ ] anemia. [ ] painful nodes. [ ] prolonged bleeding.   Genitourinary: [ ] hematuria. [ ] flank pain.   Endocrine: [ ] significant change in weight. [ ] intolerance to heat and cold.     Review of systems [ x] otherwise negative, [ ] otherwise unable to obtain    FH: no family history of sudden cardiac death in first degree relatives    SH: [ ] tobacco, [ ] alcohol, [ ] drugs    acetaminophen     Tablet .. 650 milliGRAM(s) Oral every 6 hours PRN  albuterol/ipratropium for Nebulization 3 milliLiter(s) Nebulizer every 12 hours  aluminum hydroxide/magnesium hydroxide/simethicone Suspension 30 milliLiter(s) Oral every 4 hours PRN  cholecalciferol 1000 Unit(s) Oral daily  cyanocobalamin 1000 MICROGram(s) Oral daily  donepezil 10 milliGRAM(s) Oral at bedtime  folic acid 1 milliGRAM(s) Oral daily  heparin   Injectable 5000 Unit(s) SubCutaneous every 8 hours  melatonin 3 milliGRAM(s) Oral at bedtime PRN  memantine 10 milliGRAM(s) Oral two times a day  ondansetron Injectable 4 milliGRAM(s) IV Push every 8 hours PRN  QUEtiapine 12.5 milliGRAM(s) Oral every 6 hours PRN  risperiDONE   Tablet 0.25 milliGRAM(s) Oral daily  risperiDONE   Tablet 0.5 milliGRAM(s) Oral at bedtime  sertraline 50 milliGRAM(s) Oral daily  sodium chloride 0.9%. 1000 milliLiter(s) IV Continuous <Continuous>  sodium chloride 0.9%. 1000 milliLiter(s) IV Continuous <Continuous>                            7.5    3.70  )-----------( 286      ( 15 Josef 2024 07:24 )             22.5     132<L>  |  101  |  17  ----------------------------<  86  3.8   |  26  |  0.78    Ca    10.1      15 Josef 2024 07:24  Phos  2.7     01-15  Mg     1.90     01-15      T(C): 36.5 (01-16-24 @ 09:59), Max: 36.5 (01-15-24 @ 18:00)  HR: 75 (01-16-24 @ 09:59) (75 - 84)  BP: 106/52 (01-16-24 @ 09:59) (98/59 - 113/73)  RR: 18 (01-16-24 @ 09:59) (16 - 18)  SpO2: 95% (01-16-24 @ 09:59) (95% - 97%)  Wt(kg): --    I&O's Summary    15 Josef 2024 07:01  -  16 Jan 2024 07:00  --------------------------------------------------------  IN: 150 mL / OUT: 0 mL / NET: 150 mL    General:  pleasantly confused  Head: Normocephalic and atraumatic.   Neck: No JVD. No bruits. Supple. Does not appear to be enlarged.   Cardiovascular: + S1,S2 ; RRR Soft systolic murmur at the left lower sternal border. No rubs noted.    Lungs: CTA b/l. No rhonchi, rales or wheezes.   Abdomen: + BS, soft. Non tender. Non distended. No rebound. No guarding.   Extremities: No clubbing/cyanosis/edema.   Neurologic: Moves all four extremities  Skin: Warm and moist. The patient's skin has normal elasticity and good skin turgor.   Psychiatric: unable to eval  Musculoskeletal: Normal range of motion      ECG:  	Sinus tachycardia    < from: Xray Chest 1 View AP/PA (01.02.24 @ 19:36) >  IMPRESSION:  No focal consolidations.  < end of copied text >    < from: Transthoracic Echocardiogram (07.10.23 @ 11:15) >  CONCLUSIONS:  1. Calcified trileaflet aortic valve with normal opening.  Minimal aortic regurgitation.  2. Endocardium not well visualized; grossly decreased  possibly mild left ventricular systolic dysfunction.  3. The right ventricle is not well visualized; grossly  normal right ventricular systolic function.  ------------------------------------------------------------------------  Confirmed on  7/10/2023 - 13:57:46 by Jolene Rowan M.D. RPVI  < end of copied text >      ASSESSMENT/PLAN: 	78M with history of MM, HTN, and Dementia (AAO x 1-2 to self, to place, not to time at baseline per brother) who presents to the hospital from [facility] for hypotension and lethargy found to have FLU A    -#Hypotension  -- BP stable  -- Norvasc and Lopressor on hold given acute illness  --  would cont to hold i    #lethargy  --due to Flu, now resolved  --s/p tamiflu and Abx per medicine    DC planning     DATE OF SERVICE: 01-16-24    No overnight events, resting comfortably  Review of symptoms otherwise negative.    Review of Systems:   Constitutional: [ ] fevers, [ ] chills.   Skin: [ ] dry skin. [ ] rashes.  Psychiatric: [ ] depression, [ ] anxiety.   Gastrointestinal: [ ] BRBPR, [ ] melena.   Neurological: [ ] confusion. [ ] seizures. [ ] shuffling gait.   Ears,Nose,Mouth and Throat: [ ] ear pain [ ] sore throat.   Eyes: [ ] diplopia.   Respiratory: [ ] hemoptysis. [ ] shortness of breath  Cardiovascular: See HPI above  Hematologic/Lymphatic: [ ] anemia. [ ] painful nodes. [ ] prolonged bleeding.   Genitourinary: [ ] hematuria. [ ] flank pain.   Endocrine: [ ] significant change in weight. [ ] intolerance to heat and cold.     Review of systems [ x] otherwise negative, [ ] otherwise unable to obtain    FH: no family history of sudden cardiac death in first degree relatives    SH: [ ] tobacco, [ ] alcohol, [ ] drugs    acetaminophen     Tablet .. 650 milliGRAM(s) Oral every 6 hours PRN  albuterol/ipratropium for Nebulization 3 milliLiter(s) Nebulizer every 12 hours  aluminum hydroxide/magnesium hydroxide/simethicone Suspension 30 milliLiter(s) Oral every 4 hours PRN  cholecalciferol 1000 Unit(s) Oral daily  cyanocobalamin 1000 MICROGram(s) Oral daily  donepezil 10 milliGRAM(s) Oral at bedtime  folic acid 1 milliGRAM(s) Oral daily  heparin   Injectable 5000 Unit(s) SubCutaneous every 8 hours  melatonin 3 milliGRAM(s) Oral at bedtime PRN  memantine 10 milliGRAM(s) Oral two times a day  ondansetron Injectable 4 milliGRAM(s) IV Push every 8 hours PRN  QUEtiapine 12.5 milliGRAM(s) Oral every 6 hours PRN  risperiDONE   Tablet 0.25 milliGRAM(s) Oral daily  risperiDONE   Tablet 0.5 milliGRAM(s) Oral at bedtime  sertraline 50 milliGRAM(s) Oral daily  sodium chloride 0.9%. 1000 milliLiter(s) IV Continuous <Continuous>  sodium chloride 0.9%. 1000 milliLiter(s) IV Continuous <Continuous>                            7.5    3.70  )-----------( 286      ( 15 Josef 2024 07:24 )             22.5     132<L>  |  101  |  17  ----------------------------<  86  3.8   |  26  |  0.78    Ca    10.1      15 Josef 2024 07:24  Phos  2.7     01-15  Mg     1.90     01-15      T(C): 36.5 (01-16-24 @ 09:59), Max: 36.5 (01-15-24 @ 18:00)  HR: 75 (01-16-24 @ 09:59) (75 - 84)  BP: 106/52 (01-16-24 @ 09:59) (98/59 - 113/73)  RR: 18 (01-16-24 @ 09:59) (16 - 18)  SpO2: 95% (01-16-24 @ 09:59) (95% - 97%)  Wt(kg): --    I&O's Summary    15 Josef 2024 07:01  -  16 Jan 2024 07:00  --------------------------------------------------------  IN: 150 mL / OUT: 0 mL / NET: 150 mL    General:  pleasantly confused  Head: Normocephalic and atraumatic.   Neck: No JVD. No bruits. Supple. Does not appear to be enlarged.   Cardiovascular: + S1,S2 ; RRR Soft systolic murmur at the left lower sternal border. No rubs noted.    Lungs: CTA b/l. No rhonchi, rales or wheezes.   Abdomen: + BS, soft. Non tender. Non distended. No rebound. No guarding.   Extremities: No clubbing/cyanosis/edema.   Neurologic: Moves all four extremities  Skin: Warm and moist. The patient's skin has normal elasticity and good skin turgor.   Psychiatric: unable to eval  Musculoskeletal: Normal range of motion      ECG:  	Sinus tachycardia    < from: Xray Chest 1 View AP/PA (01.02.24 @ 19:36) >  IMPRESSION:  No focal consolidations.  < end of copied text >    < from: Transthoracic Echocardiogram (07.10.23 @ 11:15) >  CONCLUSIONS:  1. Calcified trileaflet aortic valve with normal opening.  Minimal aortic regurgitation.  2. Endocardium not well visualized; grossly decreased  possibly mild left ventricular systolic dysfunction.  3. The right ventricle is not well visualized; grossly  normal right ventricular systolic function.  ------------------------------------------------------------------------  Confirmed on  7/10/2023 - 13:57:46 by Jolene Rowan M.D. RPVI  < end of copied text >      ASSESSMENT/PLAN: 	78M with history of MM, HTN, and Dementia (AAO x 1-2 to self, to place, not to time at baseline per brother) who presents to the hospital from [facility] for hypotension and lethargy found to have FLU A    -#Hypotension  -- BP stable  -- Norvasc and Lopressor on hold given acute illness  --  would cont to hold i    #lethargy  --due to Flu, now resolved  --s/p tamiflu and Abx per medicine    DC planning

## 2024-01-16 NOTE — PROGRESS NOTE ADULT - SUBJECTIVE AND OBJECTIVE BOX
Patient is a 78y old  Male who presents with a chief complaint of Hypotension, lethargu (2024 13:33)    Date of servie : 24 @ 13:51  INTERVAL HPI/OVERNIGHT EVENTS:  T(C): 36.5 (24 @ 09:59), Max: 36.5 (01-15-24 @ 18:00)  HR: 75 (24 @ 09:59) (75 - 84)  BP: 106/52 (24 @ 09:59) (98/59 - 113/73)  RR: 18 (24 @ 09:59) (16 - 18)  SpO2: 95% (24 @ 09:59) (95% - 97%)  Wt(kg): --  I&O's Summary    15 Josef 2024 07:01  -  2024 07:00  --------------------------------------------------------  IN: 150 mL / OUT: 0 mL / NET: 150 mL        LABS:                        7.5    3.70  )-----------( 286      ( 15 Josef 2024 07:24 )             22.5     -15    132<L>  |  101  |  17  ----------------------------<  86  3.8   |  26  |  0.78    Ca    10.1      15 Josef 2024 07:24  Phos  2.7     01-15  Mg     1.90     -15        Urinalysis Basic - ( 15 Josef 2024 18:21 )    Color: Yellow / Appearance: Cloudy / S.017 / pH: x  Gluc: x / Ketone: Negative mg/dL  / Bili: Negative / Urobili: 1.0 mg/dL   Blood: x / Protein: Trace mg/dL / Nitrite: Negative   Leuk Esterase: Negative / RBC: 1 /HPF / WBC 1 /HPF   Sq Epi: x / Non Sq Epi: 0 /HPF / Bacteria: Negative /HPF      CAPILLARY BLOOD GLUCOSE            Urinalysis Basic - ( 15 Josef 2024 18:21 )    Color: Yellow / Appearance: Cloudy / S.017 / pH: x  Gluc: x / Ketone: Negative mg/dL  / Bili: Negative / Urobili: 1.0 mg/dL   Blood: x / Protein: Trace mg/dL / Nitrite: Negative   Leuk Esterase: Negative / RBC: 1 /HPF / WBC 1 /HPF   Sq Epi: x / Non Sq Epi: 0 /HPF / Bacteria: Negative /HPF        MEDICATIONS  (STANDING):  albuterol/ipratropium for Nebulization 3 milliLiter(s) Nebulizer every 12 hours  cholecalciferol 1000 Unit(s) Oral daily  cyanocobalamin 1000 MICROGram(s) Oral daily  donepezil 10 milliGRAM(s) Oral at bedtime  folic acid 1 milliGRAM(s) Oral daily  heparin   Injectable 5000 Unit(s) SubCutaneous every 8 hours  memantine 10 milliGRAM(s) Oral two times a day  risperiDONE   Tablet 0.25 milliGRAM(s) Oral daily  risperiDONE   Tablet 0.5 milliGRAM(s) Oral at bedtime  sertraline 50 milliGRAM(s) Oral daily  sodium chloride 0.9%. 1000 milliLiter(s) (75 mL/Hr) IV Continuous <Continuous>  sodium chloride 0.9%. 1000 milliLiter(s) (75 mL/Hr) IV Continuous <Continuous>    MEDICATIONS  (PRN):  acetaminophen     Tablet .. 650 milliGRAM(s) Oral every 6 hours PRN Temp greater or equal to 38C (100.4F), Mild Pain (1 - 3)  aluminum hydroxide/magnesium hydroxide/simethicone Suspension 30 milliLiter(s) Oral every 4 hours PRN Dyspepsia  melatonin 3 milliGRAM(s) Oral at bedtime PRN Insomnia  ondansetron Injectable 4 milliGRAM(s) IV Push every 8 hours PRN Nausea and/or Vomiting  QUEtiapine 12.5 milliGRAM(s) Oral every 6 hours PRN for agitation          PHYSICAL EXAM:  GENERAL: NAD, well-groomed, well-developed  HEAD:  Atraumatic, Normocephalic  CHEST/LUNG: Clear to percussion bilaterally; No rales, rhonchi, wheezing, or rubs  HEART: Regular rate and rhythm; No murmurs, rubs, or gallops  ABDOMEN: Soft, Nontender, Nondistended; Bowel sounds present  EXTREMITIES:  2+ Peripheral Pulses, No clubbing, cyanosis, or edema  LYMPH: No lymphadenopathy noted  SKIN: No rashes or lesions    Care Discussed with Consultants/Other Providers [ ] YES  [ ] NO

## 2024-01-16 NOTE — PROGRESS NOTE ADULT - SUBJECTIVE AND OBJECTIVE BOX
[facility] [place]   MD ANEL FOLEY MD ANGELA WONG, PA Venitha Krishnan, NP    TEL:  OFFICE: [phone]  From 5pm-7am Answering Service [phone]    -- RENAL FOLLOW UP NOTE ---Date of Service 01-16-24 @ 15:05    Patient is a 78y old  Male who presents with a chief complaint of Hypotension, lethargy      Patient seen and examined at bedside. No chest pain/sob    VITALS:  T(F): 97.7 (01-16-24 @ 09:59), Max: 97.7 (01-15-24 @ 18:00)  HR: 75 (01-16-24 @ 09:59)  BP: 106/52 (01-16-24 @ 09:59)  RR: 18 (01-16-24 @ 09:59)  SpO2: 95% (01-16-24 @ 09:59)  Wt(kg): --    01-15 @ 07:01  -  01-16 @ 07:00  --------------------------------------------------------  IN: 150 mL / OUT: 0 mL / NET: 150 mL          PHYSICAL EXAM:  General: NAD  Neck: No JVD  Respiratory: CTAB, no wheezes, rales or rhonchi  Cardiovascular: S1, S2, RRR  Gastrointestinal: BS+, soft, NT/ND  Extremities: No peripheral edema    Hospital Medications:   MEDICATIONS  (STANDING):  albuterol/ipratropium for Nebulization 3 milliLiter(s) Nebulizer every 12 hours  cholecalciferol 1000 Unit(s) Oral daily  cyanocobalamin 1000 MICROGram(s) Oral daily  donepezil 10 milliGRAM(s) Oral at bedtime  folic acid 1 milliGRAM(s) Oral daily  heparin   Injectable 5000 Unit(s) SubCutaneous every 8 hours  memantine 10 milliGRAM(s) Oral two times a day  risperiDONE   Tablet 0.5 milliGRAM(s) Oral at bedtime  risperiDONE   Tablet 0.25 milliGRAM(s) Oral daily  sertraline 50 milliGRAM(s) Oral daily  sodium chloride 0.9%. 1000 milliLiter(s) (75 mL/Hr) IV Continuous <Continuous>  sodium chloride 0.9%. 1000 milliLiter(s) (75 mL/Hr) IV Continuous <Continuous>      LABS:  01-15    132<L>  |  101  |  17  ----------------------------<  86  3.8   |  26  |  0.78    Ca    10.1      15 Josef 2024 07:24  Phos  2.7     01-15  Mg     1.90     01-15      Creatinine Trend: 0.78 <--, 0.72 <--, 0.76 <--, 0.85 <--, 0.93 <--                                7.5    3.70  )-----------( 286      ( 15 Josef 2024 07:24 )             22.5     Urine Studies:  Urinalysis - [01-15-24 @ 18:21]      Color Yellow / Appearance Cloudy / SG 1.017 / pH 7.0      Gluc Negative / Ketone Negative  / Bili Negative / Urobili 1.0       Blood Negative / Protein Trace / Leuk Est Negative / Nitrite Negative      RBC 1 / WBC 1 / Hyaline  / Gran  / Sq Epi  / Non Sq Epi 0 / Bacteria Negative    Urine Sodium 80      [01-15-24 @ 18:21]  Urine Osmolality 515      [01-15-24 @ 18:21]    PTH -- (Ca --)      [01-14-24 @ 06:50]   5  PTH -- (Ca --)      [01-13-24 @ 06:10]   6  Vitamin D (25OH) 36.9      [01-13-24 @ 06:10]        RADIOLOGY & ADDITIONAL STUDIES:   [facility] NEPHROLOGY PRACTICE   MD ANEL FOLEY MD ANGELA WONG, PA Venitha Krishnan, NP    TEL:  OFFICE: [phone]  From 5pm-7am Answering Service [phone]    -- RENAL FOLLOW UP NOTE ---Date of Service 01-16-24 @ 15:05    Patient is a 78y old  Male who presents with a chief complaint of Hypotension, lethargy      Patient seen and examined at bedside. No chest pain/sob    VITALS:  T(F): 97.7 (01-16-24 @ 09:59), Max: 97.7 (01-15-24 @ 18:00)  HR: 75 (01-16-24 @ 09:59)  BP: 106/52 (01-16-24 @ 09:59)  RR: 18 (01-16-24 @ 09:59)  SpO2: 95% (01-16-24 @ 09:59)  Wt(kg): --    01-15 @ 07:01  -  01-16 @ 07:00  --------------------------------------------------------  IN: 150 mL / OUT: 0 mL / NET: 150 mL          PHYSICAL EXAM:  General: NAD  Neck: No JVD  Respiratory: CTAB, no wheezes, rales or rhonchi  Cardiovascular: S1, S2, RRR  Gastrointestinal: BS+, soft, NT/ND  Extremities: No peripheral edema    Hospital Medications:   MEDICATIONS  (STANDING):  albuterol/ipratropium for Nebulization 3 milliLiter(s) Nebulizer every 12 hours  cholecalciferol 1000 Unit(s) Oral daily  cyanocobalamin 1000 MICROGram(s) Oral daily  donepezil 10 milliGRAM(s) Oral at bedtime  folic acid 1 milliGRAM(s) Oral daily  heparin   Injectable 5000 Unit(s) SubCutaneous every 8 hours  memantine 10 milliGRAM(s) Oral two times a day  risperiDONE   Tablet 0.5 milliGRAM(s) Oral at bedtime  risperiDONE   Tablet 0.25 milliGRAM(s) Oral daily  sertraline 50 milliGRAM(s) Oral daily  sodium chloride 0.9%. 1000 milliLiter(s) (75 mL/Hr) IV Continuous <Continuous>  sodium chloride 0.9%. 1000 milliLiter(s) (75 mL/Hr) IV Continuous <Continuous>      LABS:  01-15    132<L>  |  101  |  17  ----------------------------<  86  3.8   |  26  |  0.78    Ca    10.1      15 Josef 2024 07:24  Phos  2.7     01-15  Mg     1.90     01-15      Creatinine Trend: 0.78 <--, 0.72 <--, 0.76 <--, 0.85 <--, 0.93 <--                                7.5    3.70  )-----------( 286      ( 15 Josef 2024 07:24 )             22.5     Urine Studies:  Urinalysis - [01-15-24 @ 18:21]      Color Yellow / Appearance Cloudy / SG 1.017 / pH 7.0      Gluc Negative / Ketone Negative  / Bili Negative / Urobili 1.0       Blood Negative / Protein Trace / Leuk Est Negative / Nitrite Negative      RBC 1 / WBC 1 / Hyaline  / Gran  / Sq Epi  / Non Sq Epi 0 / Bacteria Negative    Urine Sodium 80      [01-15-24 @ 18:21]  Urine Osmolality 515      [01-15-24 @ 18:21]    PTH -- (Ca --)      [01-14-24 @ 06:50]   5  PTH -- (Ca --)      [01-13-24 @ 06:10]   6  Vitamin D (25OH) 36.9      [01-13-24 @ 06:10]        RADIOLOGY & ADDITIONAL STUDIES:

## 2024-01-16 NOTE — PROGRESS NOTE ADULT - ASSESSMENT
78M w/ MM, HTN, and Dementia (AAO x 1-2 to self, to place, not to time at baseline per brother) admitted from nursing home for hypotension and lethargy. Found to be septic with UTI and + influenza.  CTH with mod to severe volume loss and moderate microvascular ischemic changes, no acute pathology  On CTX for UTI and Tamiflu  Labs notable for lactic acidosis, hyponatremia, DAVID  o/e is very confused, hard of hearing, perseverative    Encephalopathy from TME, superimposed on baseline dementia    Plan:  -supportive care   -correct metabolic derangements  - MRI not needed  -cont home donepezil 10mg qhs, memantine 10mg bid  -On risperidone, seroquel   -pt/ot  -dvt ppx

## 2024-01-16 NOTE — PROGRESS NOTE ADULT - SUBJECTIVE AND OBJECTIVE BOX
[facility], Division of Infectious Diseases  MIRTHA Bagley Y. Patel, S. Shah, G. Amos  [phone]  ([phone] - weekdays after 5pm and weekends)    Name: GUS DELUNA  Age/Gender: 78y Male  MRN: [id number]    Interval History:  Notes reviewed.   No concerning overnight events.  Afebrile.     Allergies: No Known Allergies      Objective:  Vitals:   T(F): 97.7 (24 @ 09:59), Max: 97.7 (01-15-24 @ 18:00)  HR: 75 (24 @ 09:59) (75 - 84)  BP: 106/52 (24 @ 09:59) (98/59 - 113/73)  RR: 18 (24 @ 09:59) (16 - 18)  SpO2: 95% (24 @ 09:59) (95% - 97%)  Physical Examination:  General: no acute distress  HEENT: anicteric  Cardio: normal rate  Resp: breathing comfortably on RA  Abd: soft, ND  Ext: no LE edema    Laboratory Studies:  CBC:                       7.5    3.70  )-----------( 286      ( 15 Josef 2024 07:24 )             22.5     WBC Trend:  3.70 01-15-24 @ 07:24  4.61 24 @ 06:50  4.79 24 @ 06:10  3.74 24 @ 05:23    CMP: 01-15    132<L>  |  101  |  17  ----------------------------<  86  3.8   |  26  |  0.78    Ca    10.1      15 Josef 2024 07:24  Phos  2.7     01-15  Mg     1.90     01-15            Urinalysis Basic - ( 15 Jsoef 2024 18:21 )    Color: Yellow / Appearance: Cloudy / S.017 / pH: x  Gluc: x / Ketone: Negative mg/dL  / Bili: Negative / Urobili: 1.0 mg/dL   Blood: x / Protein: Trace mg/dL / Nitrite: Negative   Leuk Esterase: Negative / RBC: 1 /HPF / WBC 1 /HPF   Sq Epi: x / Non Sq Epi: 0 /HPF / Bacteria: Negative /HPF      Microbiology: reviewed     Culture - Urine (collected 24 @ 00:20)  Source: Clean Catch Clean Catch (Midstream)  Final Report (24 @ 10:55):    No growth    Culture - Urine (collected 24 @ 22:39)  Source: Catheterized Catheterized  Final Report (24 @ 22:41):    No growth    Culture - Blood (collected 24 @ 19:49)  Source: .Blood Blood-Peripheral  Final Report (24 @ 22:00):    No growth at 5 days    Culture - Blood (collected 24 @ 19:49)  Source: .Blood Blood-Peripheral  Final Report (24 @ 22:00):    No growth at 5 days        Radiology: reviewed     Medications:  acetaminophen     Tablet .. 650 milliGRAM(s) Oral every 6 hours PRN  albuterol/ipratropium for Nebulization 3 milliLiter(s) Nebulizer every 12 hours  aluminum hydroxide/magnesium hydroxide/simethicone Suspension 30 milliLiter(s) Oral every 4 hours PRN  cholecalciferol 1000 Unit(s) Oral daily  cyanocobalamin 1000 MICROGram(s) Oral daily  donepezil 10 milliGRAM(s) Oral at bedtime  folic acid 1 milliGRAM(s) Oral daily  heparin   Injectable 5000 Unit(s) SubCutaneous every 8 hours  melatonin 3 milliGRAM(s) Oral at bedtime PRN  memantine 10 milliGRAM(s) Oral two times a day  ondansetron Injectable 4 milliGRAM(s) IV Push every 8 hours PRN  QUEtiapine 12.5 milliGRAM(s) Oral every 6 hours PRN  risperiDONE   Tablet 0.25 milliGRAM(s) Oral daily  risperiDONE   Tablet 0.5 milliGRAM(s) Oral at bedtime  sertraline 50 milliGRAM(s) Oral daily  sodium chloride 0.9%. 1000 milliLiter(s) IV Continuous <Continuous>  sodium chloride 0.9%. 1000 milliLiter(s) IV Continuous <Continuous>    Antimicrobials:   [facility], Division of Infectious Diseases  MIRTHA Bagley Y. Patel, S. Shah, G. Amos  [phone]  ([phone] - weekdays after 5pm and weekends)    Name: GUS DELUNA  Age/Gender: 78y Male  MRN: [id number]    Interval History:  Notes reviewed.   No concerning overnight events.  Afebrile.     Allergies: No Known Allergies      Objective:  Vitals:   T(F): 97.7 (24 @ 09:59), Max: 97.7 (01-15-24 @ 18:00)  HR: 75 (24 @ 09:59) (75 - 84)  BP: 106/52 (24 @ 09:59) (98/59 - 113/73)  RR: 18 (24 @ 09:59) (16 - 18)  SpO2: 95% (24 @ 09:59) (95% - 97%)  Physical Examination:  General: no acute distress  HEENT: anicteric  Cardio: normal rate  Resp: breathing comfortably on RA  Abd: soft, ND  Ext: no LE edema    Laboratory Studies:  CBC:                       7.5    3.70  )-----------( 286      ( 15 Josef 2024 07:24 )             22.5     WBC Trend:  3.70 01-15-24 @ 07:24  4.61 24 @ 06:50  4.79 24 @ 06:10  3.74 24 @ 05:23    CMP: 01-15    132<L>  |  101  |  17  ----------------------------<  86  3.8   |  26  |  0.78    Ca    10.1      15 Josef 2024 07:24  Phos  2.7     01-15  Mg     1.90     01-15            Urinalysis Basic - ( 15 Josef 2024 18:21 )    Color: Yellow / Appearance: Cloudy / S.017 / pH: x  Gluc: x / Ketone: Negative mg/dL  / Bili: Negative / Urobili: 1.0 mg/dL   Blood: x / Protein: Trace mg/dL / Nitrite: Negative   Leuk Esterase: Negative / RBC: 1 /HPF / WBC 1 /HPF   Sq Epi: x / Non Sq Epi: 0 /HPF / Bacteria: Negative /HPF      Microbiology: reviewed     Culture - Urine (collected 24 @ 00:20)  Source: Clean Catch Clean Catch (Midstream)  Final Report (24 @ 10:55):    No growth    Culture - Urine (collected 24 @ 22:39)  Source: Catheterized Catheterized  Final Report (24 @ 22:41):    No growth    Culture - Blood (collected 24 @ 19:49)  Source: .Blood Blood-Peripheral  Final Report (24 @ 22:00):    No growth at 5 days    Culture - Blood (collected 24 @ 19:49)  Source: .Blood Blood-Peripheral  Final Report (24 @ 22:00):    No growth at 5 days        Radiology: reviewed     Medications:  acetaminophen     Tablet .. 650 milliGRAM(s) Oral every 6 hours PRN  albuterol/ipratropium for Nebulization 3 milliLiter(s) Nebulizer every 12 hours  aluminum hydroxide/magnesium hydroxide/simethicone Suspension 30 milliLiter(s) Oral every 4 hours PRN  cholecalciferol 1000 Unit(s) Oral daily  cyanocobalamin 1000 MICROGram(s) Oral daily  donepezil 10 milliGRAM(s) Oral at bedtime  folic acid 1 milliGRAM(s) Oral daily  heparin   Injectable 5000 Unit(s) SubCutaneous every 8 hours  melatonin 3 milliGRAM(s) Oral at bedtime PRN  memantine 10 milliGRAM(s) Oral two times a day  ondansetron Injectable 4 milliGRAM(s) IV Push every 8 hours PRN  QUEtiapine 12.5 milliGRAM(s) Oral every 6 hours PRN  risperiDONE   Tablet 0.25 milliGRAM(s) Oral daily  risperiDONE   Tablet 0.5 milliGRAM(s) Oral at bedtime  sertraline 50 milliGRAM(s) Oral daily  sodium chloride 0.9%. 1000 milliLiter(s) IV Continuous <Continuous>  sodium chloride 0.9%. 1000 milliLiter(s) IV Continuous <Continuous>    Antimicrobials:

## 2024-01-17 RX ORDER — HALOPERIDOL DECANOATE 100 MG/ML
1 INJECTION INTRAMUSCULAR ONCE
Refills: 0 | Status: COMPLETED | OUTPATIENT
Start: 2024-01-17 | End: 2024-01-17

## 2024-01-17 RX ADMIN — HEPARIN SODIUM 5000 UNIT(S): 5000 INJECTION INTRAVENOUS; SUBCUTANEOUS at 13:27

## 2024-01-17 RX ADMIN — MEMANTINE HYDROCHLORIDE 10 MILLIGRAM(S): 10 TABLET ORAL at 15:40

## 2024-01-17 RX ADMIN — RISPERIDONE 0.5 MILLIGRAM(S): 4 TABLET ORAL at 21:12

## 2024-01-17 RX ADMIN — Medication 3 MILLILITER(S): at 21:45

## 2024-01-17 RX ADMIN — SERTRALINE 50 MILLIGRAM(S): 25 TABLET, FILM COATED ORAL at 13:43

## 2024-01-17 RX ADMIN — QUETIAPINE FUMARATE 12.5 MILLIGRAM(S): 200 TABLET, FILM COATED ORAL at 21:13

## 2024-01-17 RX ADMIN — HEPARIN SODIUM 5000 UNIT(S): 5000 INJECTION INTRAVENOUS; SUBCUTANEOUS at 21:12

## 2024-01-17 RX ADMIN — RISPERIDONE 0.25 MILLIGRAM(S): 4 TABLET ORAL at 13:44

## 2024-01-17 RX ADMIN — HALOPERIDOL DECANOATE 1 MILLIGRAM(S): 100 INJECTION INTRAMUSCULAR at 18:06

## 2024-01-17 RX ADMIN — Medication 3 MILLILITER(S): at 09:42

## 2024-01-17 RX ADMIN — DONEPEZIL HYDROCHLORIDE 10 MILLIGRAM(S): 10 TABLET, FILM COATED ORAL at 21:13

## 2024-01-17 RX ADMIN — QUETIAPINE FUMARATE 12.5 MILLIGRAM(S): 200 TABLET, FILM COATED ORAL at 13:37

## 2024-01-17 NOTE — PROVIDER CONTACT NOTE (OTHER) - BACKGROUND
78-year-old male dementia, multiple myeloma, hypertension presenting for lethargy and hypotension.  Patient unable to provide history.
78-year-old male dementia, multiple myeloma, hypertension presenting for lethargy and hypotension.  Patient unable to provide history.
Pt admission for Influenza A,
Pt admission for Influenza A,
Pt admission for Influenza A
dementia
Pt admission for Influenza A

## 2024-01-17 NOTE — PROGRESS NOTE ADULT - ASSESSMENT
This is a 78M with history of MM, HTN, and Dementia (AAO x 1-2 to self, to place, not to time at baseline per brother) who presents to the hospital from [facility] for hypotension and lethargy. Patient currently awake, alert, AAO x2 (to self, to hospital but not name, not to time) but very poor historian, denies any acute complaints when asked. Spoke with brother Mookie who said that the patient was sent to the hospital for lethargy and cough though he states that the patient has had a cough for several months. Brother denies any other known acute complaints for the patient. Called [facility] [phone] but there was no staff available who knew the patient. As per NH paperwork and ED note, patient was sent to the hospital for hypotention to 81/55 and lethargy. He was noted to have a low grade temp of 99.6 and saturation of 93% at his NH. He was given tylenol 650mg x1 prior to being sent to the hospital.   On arrival to the hospital his vitals were T 99.4 -> 100.7 rectal, P 92, BP 88/55 -> 117/70, RR 16, O2 sat 96% RA. His lab work was significant for worsening macrocytic anemia, DAVID with mild hyponatremia, elevated protein gap, elevated lactate with improvement on repeat. His UA was positive for nitrite, leuk esterase but negative for bacteria. His respiratory swab was positive for influenza A. His imaging did not show acute abnormalities. He was given ofirmev 1g, NS 500cc x1, vanc 1g, cefepime 2g, and tamiflu 75mg PO x1. He was admitted to medicine.  (03 Jan 2024 06:06):  now pulm called:  he is from NH:  above history noted:  he seems to be responding to simple commands:  he say he has no underlying lung history  never smoked:  on room air:  adm with influenza:     Influenza:   Multiple Myeloma  HTN  Dementia      Influenza:   -low grade fever  -Influenza:  on tamilflu  -cxr is clear:  -he is not wheezing    1/4: no change in pulm status today  : remains on room air  1/5: doing ok : no sob:  no cough : no phlegm;   1/6: on Tamiflu  no sob:  on room air  1/7: seems to be doing ok : no sob:  no cough:   1/8: seems OK: no sob:  no cough : no phlegm  on room air  1/9: doing ok: no sob:   on cough :  1/10: seems OK: no sob:  on room air:  remains lethargic: Afebrile and is normal BC count  1/11: resolved  1/14: seems OK; no sob:  on room air:  responds to questions  1/15: seems to be doing ok : no sob: no cough : no phlegm  : no wheezing  1/17: resolved:  doing great : no sob:   Multiple Myeloma  -per primary team : FDG PET scan  noted:  rib lesions present likely secondary to MM   HTN  -controlled  Dementia  -supportive care:    dw acp  : dc planning

## 2024-01-17 NOTE — PROVIDER CONTACT NOTE (OTHER) - SITUATION
pt continues to refuse blood draw from 6AM + refusing noon medications. RN tried 3x, pt got aggravated each time started cursing. pt states " I want to be left alone"
BP 93/62, recheck BP 99/63, HR 82
Pt is refusing medications. RN tried crushing medications and putting in applesauce but pt is refusing to open his mouth and states he doesn't want them.
Pt is refusing medications. RN tried crushing medications and putting in applesauce but pt is refusing to open his mouth and states he doesn't want them.
PT refusing 2200 medication and vital signs.
Pt removed IV and is refusing a new one.
patient refused bedtime medications. aox0. confused. agitated and spits out when given. says no when educated.

## 2024-01-17 NOTE — PROGRESS NOTE ADULT - SUBJECTIVE AND OBJECTIVE BOX
[facility], Division of Infectious Diseases  MIRTHA Bagley Y. Patel, S. Shah, G. Amos  [phone]  ([phone] - weekdays after 5pm and weekends)    Name: GUS DELUNA  Age/Gender: 78y Male  MRN: [id number]    Interval History:  Notes reviewed.   No concerning overnight events.  Afebrile.     Allergies: No Known Allergies      Objective:  Vitals:   T(F): 98.3 (01-17-24 @ 05:15), Max: 98.5 (01-16-24 @ 18:00)  HR: 80 (01-17-24 @ 05:15) (75 - 80)  BP: 105/68 (01-17-24 @ 05:15) (105/68 - 127/65)  RR: 18 (01-17-24 @ 05:15) (18 - 18)  SpO2: 96% (01-17-24 @ 05:15) (95% - 96%)  Physical Examination:  General: no acute distress  HEENT: anicteric  Cardio: normal rate  Resp: breathing comfortably on RA  Abd: soft, ND  Ext: no LE edema    Laboratory Studies:  CBC:                       8.1    3.92  )-----------( 304      ( 16 Jan 2024 14:53 )             24.5     WBC Trend:  3.92 01-16-24 @ 14:53  3.70 01-15-24 @ 07:24  4.61 01-14-24 @ 06:50  4.79 01-13-24 @ 06:10  3.74 01-12-24 @ 05:23    CMP: 01-16    133<L>  |  103  |  19  ----------------------------<  93  4.2   |  26  |  0.87    Ca    10.6<H>      16 Jan 2024 14:53  Phos  2.9     01-16  Mg     2.00     01-16            Urinalysis Basic - ( 16 Jan 2024 14:53 )    Color: x / Appearance: x / SG: x / pH: x  Gluc: 93 mg/dL / Ketone: x  / Bili: x / Urobili: x   Blood: x / Protein: x / Nitrite: x   Leuk Esterase: x / RBC: x / WBC x   Sq Epi: x / Non Sq Epi: x / Bacteria: x      Microbiology: reviewed       Radiology: reviewed     Medications:  acetaminophen     Tablet .. 650 milliGRAM(s) Oral every 6 hours PRN  albuterol/ipratropium for Nebulization 3 milliLiter(s) Nebulizer every 12 hours  aluminum hydroxide/magnesium hydroxide/simethicone Suspension 30 milliLiter(s) Oral every 4 hours PRN  cholecalciferol 1000 Unit(s) Oral daily  cyanocobalamin 1000 MICROGram(s) Oral daily  donepezil 10 milliGRAM(s) Oral at bedtime  folic acid 1 milliGRAM(s) Oral daily  heparin   Injectable 5000 Unit(s) SubCutaneous every 8 hours  melatonin 3 milliGRAM(s) Oral at bedtime PRN  memantine 10 milliGRAM(s) Oral two times a day  ondansetron Injectable 4 milliGRAM(s) IV Push every 8 hours PRN  QUEtiapine 12.5 milliGRAM(s) Oral every 6 hours PRN  risperiDONE   Tablet 0.5 milliGRAM(s) Oral at bedtime  risperiDONE   Tablet 0.25 milliGRAM(s) Oral daily  sertraline 50 milliGRAM(s) Oral daily  sodium chloride 0.9%. 1000 milliLiter(s) IV Continuous <Continuous>  sodium chloride 0.9%. 1000 milliLiter(s) IV Continuous <Continuous>    Antimicrobials:

## 2024-01-17 NOTE — PROGRESS NOTE ADULT - SUBJECTIVE AND OBJECTIVE BOX
Date of Service: 01-17-24 @ 13:26    Patient is a 78y old  Male who presents with a chief complaint of Hypotension, lethargu (17 Jan 2024 12:44)      Any change in ROS: seems  OK: no sob:  no cough : no phlegm      MEDICATIONS  (STANDING):  albuterol/ipratropium for Nebulization 3 milliLiter(s) Nebulizer every 12 hours  cholecalciferol 1000 Unit(s) Oral daily  cyanocobalamin 1000 MICROGram(s) Oral daily  donepezil 10 milliGRAM(s) Oral at bedtime  folic acid 1 milliGRAM(s) Oral daily  heparin   Injectable 5000 Unit(s) SubCutaneous every 8 hours  memantine 10 milliGRAM(s) Oral two times a day  risperiDONE   Tablet 0.25 milliGRAM(s) Oral daily  risperiDONE   Tablet 0.5 milliGRAM(s) Oral at bedtime  sertraline 50 milliGRAM(s) Oral daily  sodium chloride 0.9%. 1000 milliLiter(s) (75 mL/Hr) IV Continuous <Continuous>  sodium chloride 0.9%. 1000 milliLiter(s) (75 mL/Hr) IV Continuous <Continuous>    MEDICATIONS  (PRN):  acetaminophen     Tablet .. 650 milliGRAM(s) Oral every 6 hours PRN Temp greater or equal to 38C (100.4F), Mild Pain (1 - 3)  aluminum hydroxide/magnesium hydroxide/simethicone Suspension 30 milliLiter(s) Oral every 4 hours PRN Dyspepsia  melatonin 3 milliGRAM(s) Oral at bedtime PRN Insomnia  ondansetron Injectable 4 milliGRAM(s) IV Push every 8 hours PRN Nausea and/or Vomiting  QUEtiapine 12.5 milliGRAM(s) Oral every 6 hours PRN for agitation    Vital Signs Last 24 Hrs  T(C): 36.4 (17 Jan 2024 10:38), Max: 36.9 (16 Jan 2024 18:00)  T(F): 97.5 (17 Jan 2024 10:38), Max: 98.5 (16 Jan 2024 18:00)  HR: 89 (17 Jan 2024 10:38) (75 - 89)  BP: 118/74 (17 Jan 2024 10:38) (105/68 - 127/65)  BP(mean): --  RR: 19 (17 Jan 2024 10:38) (18 - 19)  SpO2: 96% (17 Jan 2024 09:44) (95% - 96%)    Parameters below as of 17 Jan 2024 09:44  Patient On (Oxygen Delivery Method): room air        I&O's Summary    16 Jan 2024 07:01  -  17 Jan 2024 07:00  --------------------------------------------------------  IN: 125 mL / OUT: 0 mL / NET: 125 mL    17 Jan 2024 07:01  -  17 Jan 2024 13:26  --------------------------------------------------------  IN: 275 mL / OUT: 0 mL / NET: 275 mL          Physical Exam:   GENERAL: NAD, well-groomed, well-developed  HEENT: CHRISTINA/   Atraumatic, Normocephalic  ENMT: No tonsillar erythema, exudates, or enlargement; Moist mucous membranes, Good dentition, No lesions  NECK: Supple, No JVD, Normal thyroid  CHEST/LUNG: Clear to auscultaion  CVS: Regular rate and rhythm; No murmurs, rubs, or gallops  GI: : Soft, Nontender, Nondistended; Bowel sounds present  NERVOUS SYSTEM:  Alert & Oriented X0  EXTREMITIES:  2+ Peripheral Pulses, No clubbing, cyanosis, or edema  LYMPH: No lymphadenopathy noted  SKIN: No rashes or lesions  ENDOCRINOLOGY: No Thyromegaly  PSYCH: demented    Labs:                              8.1    3.92  )-----------( 304      ( 16 Jan 2024 14:53 )             24.5                         7.5    3.70  )-----------( 286      ( 15 Josef 2024 07:24 )             22.5                         7.1    4.61  )-----------( 300      ( 14 Jan 2024 06:50 )             21.3     01-16    133<L>  |  103  |  19  ----------------------------<  93  4.2   |  26  |  0.87  01-15    132<L>  |  101  |  17  ----------------------------<  86  3.8   |  26  |  0.78  01-14    135  |  104  |  21  ----------------------------<  84  3.9   |  24  |  0.72  01-13    135  |  105  |  25<H>  ----------------------------<  84  4.6   |  23  |  0.76    Ca    10.6<H>      16 Jan 2024 14:53  Phos  2.9     01-16  Mg     2.00     01-16      CAPILLARY BLOOD GLUCOSE              Urinalysis Basic - ( 16 Jan 2024 14:53 )    Color: x / Appearance: x / SG: x / pH: x  Gluc: 93 mg/dL / Ketone: x  / Bili: x / Urobili: x   Blood: x / Protein: x / Nitrite: x   Leuk Esterase: x / RBC: x / WBC x   Sq Epi: x / Non Sq Epi: x / Bacteria: x      rad< from: Xray Chest 1 View AP/PA (01.02.24 @ 19:36) >    ACC: [id number] EXAM:  XR CHEST AP OR PA 1V   ORDERED BY: CATHIE MULLER     PROCEDURE DATE:  01/02/2024          INTERPRETATION:  CLINICAL INDICATION: Sepsis.    EXAM: Chest x-ray 2 views.    COMPARISON: None available.    FINDINGS:  Surgical clips overlying the midline of the neck.  Bilateral lung fields partially obscured by patient positioning.   Visualized lung fields are clear.  There is no pleural effusion or pneumothorax.  The heart is not well evaluated in this position. Median sternotomy.  The visualized osseous structures demonstrate no acute pathology.    IMPRESSION:  No focal consolidations.    --- End of Report ---          MIR LAW MD; Resident Radiologist  This document has been electronically signed.  KOFI GARCIA MD; Attending Radiologist  This document has been electronically signed. Jan 2 2024  7:43PM    < end of copied text >        RECENT CULTURES:        RESPIRATORY CULTURES:          Studies  Chest X-RAY  CT SCAN Chest   Venous Dopplers: LE:   CT Abdomen  Others

## 2024-01-17 NOTE — PROGRESS NOTE ADULT - SUBJECTIVE AND OBJECTIVE BOX
[facility] [place] [facility]   MD ANEL FOLEY MD RUORU WONG, PA    TEL:  FROM 9 AM to 5 PM ---OFFICE: [phone]  AVAILABLE ON TEAMS    FROM 5 PM - 9 AM PLEASE CALL ANSWERING SERVICE: [phone]    RENAL FOLLOW UP NOTE--Date of Service 01-17-24 @ 11:45  --------------------------------------------------------------------------------  HPI:      Pt seen and examined at bedside.       PAST HISTORY  --------------------------------------------------------------------------------  No significant changes to PMH, PSH, FHx, SHx, unless otherwise noted    ALLERGIES & MEDICATIONS  --------------------------------------------------------------------------------  Allergies    No Known Allergies    Intolerances      Standing Inpatient Medications  albuterol/ipratropium for Nebulization 3 milliLiter(s) Nebulizer every 12 hours  cholecalciferol 1000 Unit(s) Oral daily  cyanocobalamin 1000 MICROGram(s) Oral daily  donepezil 10 milliGRAM(s) Oral at bedtime  folic acid 1 milliGRAM(s) Oral daily  heparin   Injectable 5000 Unit(s) SubCutaneous every 8 hours  memantine 10 milliGRAM(s) Oral two times a day  risperiDONE   Tablet 0.25 milliGRAM(s) Oral daily  risperiDONE   Tablet 0.5 milliGRAM(s) Oral at bedtime  sertraline 50 milliGRAM(s) Oral daily  sodium chloride 0.9%. 1000 milliLiter(s) IV Continuous <Continuous>  sodium chloride 0.9%. 1000 milliLiter(s) IV Continuous <Continuous>    PRN Inpatient Medications  acetaminophen     Tablet .. 650 milliGRAM(s) Oral every 6 hours PRN  aluminum hydroxide/magnesium hydroxide/simethicone Suspension 30 milliLiter(s) Oral every 4 hours PRN  melatonin 3 milliGRAM(s) Oral at bedtime PRN  ondansetron Injectable 4 milliGRAM(s) IV Push every 8 hours PRN  QUEtiapine 12.5 milliGRAM(s) Oral every 6 hours PRN      REVIEW OF SYSTEMS  --------------------------------------------------------------------------------  General: no fever  MSK: no edema     VITALS/PHYSICAL EXAM  --------------------------------------------------------------------------------  T(C): 36.4 (01-17-24 @ 10:38), Max: 36.9 (01-16-24 @ 18:00)  HR: 89 (01-17-24 @ 10:38) (75 - 89)  BP: 118/74 (01-17-24 @ 10:38) (105/68 - 127/65)  RR: 19 (01-17-24 @ 10:38) (18 - 19)  SpO2: 96% (01-17-24 @ 09:44) (95% - 96%)  Wt(kg): --        01-16-24 @ 07:01  -  01-17-24 @ 07:00  --------------------------------------------------------  IN: 125 mL / OUT: 0 mL / NET: 125 mL    01-17-24 @ 07:01  -  01-17-24 @ 11:45  --------------------------------------------------------  IN: 150 mL / OUT: 0 mL / NET: 150 mL      Physical Exam:  	General: NAD  Neck: No JVD  Respiratory: CTAB, no wheezes, rales or rhonchi  Cardiovascular: S1, S2, RRR  Gastrointestinal: BS+, soft, NT/ND  Extremities: No peripheral edema  LABS/STUDIES  --------------------------------------------------------------------------------              8.1    3.92  >-----------<  304      [01-16-24 @ 14:53]              24.5     133  |  103  |  19  ----------------------------<  93      [01-16-24 @ 14:53]  4.2   |  26  |  0.87        Ca     10.6     [01-16-24 @ 14:53]      Mg     2.00     [01-16-24 @ 14:53]      Phos  2.9     [01-16-24 @ 14:53]            Creatinine Trend:  SCr 0.87 [01-16 @ 14:53]  SCr 0.78 [01-15 @ 07:24]  SCr 0.72 [01-14 @ 06:50]  SCr 0.76 [01-13 @ 22:53]  SCr 0.85 [01-13 @ 06:10]    Urinalysis - [01-16-24 @ 14:53]      Color  / Appearance  / SG  / pH       Gluc 93 / Ketone   / Bili  / Urobili        Blood  / Protein  / Leuk Est  / Nitrite       RBC  / WBC  / Hyaline  / Gran  / Sq Epi  / Non Sq Epi  / Bacteria     Urine Sodium 80      [01-15-24 @ 18:21]  Urine Osmolality 515      [01-15-24 @ 18:21]    PTH -- (Ca --)      [01-14-24 @ 06:50]   5  PTH -- (Ca --)      [01-13-24 @ 06:10]   6  Vitamin D (25OH) 36.9      [01-13-24 @ 06:10]

## 2024-01-17 NOTE — PROGRESS NOTE ADULT - SUBJECTIVE AND OBJECTIVE BOX
Patient is a 78y old  Male who presents with a chief complaint of Hypotension, lethargu (17 Jan 2024 13:25)    Date of servie : 01-17-24 @ 16:01  INTERVAL HPI/OVERNIGHT EVENTS:  T(C): 36.4 (01-17-24 @ 10:38), Max: 36.9 (01-16-24 @ 18:00)  HR: 89 (01-17-24 @ 10:38) (75 - 89)  BP: 118/74 (01-17-24 @ 10:38) (105/68 - 127/65)  RR: 19 (01-17-24 @ 10:38) (18 - 19)  SpO2: 96% (01-17-24 @ 09:44) (95% - 96%)  Wt(kg): --  I&O's Summary    16 Jan 2024 07:01  -  17 Jan 2024 07:00  --------------------------------------------------------  IN: 125 mL / OUT: 0 mL / NET: 125 mL    17 Jan 2024 07:01  -  17 Jan 2024 16:01  --------------------------------------------------------  IN: 275 mL / OUT: 0 mL / NET: 275 mL        LABS:                        8.1    3.92  )-----------( 304      ( 16 Jan 2024 14:53 )             24.5     01-16    133<L>  |  103  |  19  ----------------------------<  93  4.2   |  26  |  0.87    Ca    10.6<H>      16 Jan 2024 14:53  Phos  2.9     01-16  Mg     2.00     01-16        Urinalysis Basic - ( 16 Jan 2024 14:53 )    Color: x / Appearance: x / SG: x / pH: x  Gluc: 93 mg/dL / Ketone: x  / Bili: x / Urobili: x   Blood: x / Protein: x / Nitrite: x   Leuk Esterase: x / RBC: x / WBC x   Sq Epi: x / Non Sq Epi: x / Bacteria: x      CAPILLARY BLOOD GLUCOSE            Urinalysis Basic - ( 16 Jan 2024 14:53 )    Color: x / Appearance: x / SG: x / pH: x  Gluc: 93 mg/dL / Ketone: x  / Bili: x / Urobili: x   Blood: x / Protein: x / Nitrite: x   Leuk Esterase: x / RBC: x / WBC x   Sq Epi: x / Non Sq Epi: x / Bacteria: x        MEDICATIONS  (STANDING):  albuterol/ipratropium for Nebulization 3 milliLiter(s) Nebulizer every 12 hours  cholecalciferol 1000 Unit(s) Oral daily  cyanocobalamin 1000 MICROGram(s) Oral daily  donepezil 10 milliGRAM(s) Oral at bedtime  folic acid 1 milliGRAM(s) Oral daily  heparin   Injectable 5000 Unit(s) SubCutaneous every 8 hours  memantine 10 milliGRAM(s) Oral two times a day  risperiDONE   Tablet 0.25 milliGRAM(s) Oral daily  risperiDONE   Tablet 0.5 milliGRAM(s) Oral at bedtime  sertraline 50 milliGRAM(s) Oral daily  sodium chloride 0.9%. 1000 milliLiter(s) (75 mL/Hr) IV Continuous <Continuous>  sodium chloride 0.9%. 1000 milliLiter(s) (75 mL/Hr) IV Continuous <Continuous>    MEDICATIONS  (PRN):  acetaminophen     Tablet .. 650 milliGRAM(s) Oral every 6 hours PRN Temp greater or equal to 38C (100.4F), Mild Pain (1 - 3)  aluminum hydroxide/magnesium hydroxide/simethicone Suspension 30 milliLiter(s) Oral every 4 hours PRN Dyspepsia  melatonin 3 milliGRAM(s) Oral at bedtime PRN Insomnia  ondansetron Injectable 4 milliGRAM(s) IV Push every 8 hours PRN Nausea and/or Vomiting  QUEtiapine 12.5 milliGRAM(s) Oral every 6 hours PRN for agitation          PHYSICAL EXAM:  GENERAL: NAD, well-groomed, well-developed  HEAD:  Atraumatic, Normocephalic  CHEST/LUNG: Clear to percussion bilaterally; No rales, rhonchi, wheezing, or rubs  HEART: Regular rate and rhythm; No murmurs, rubs, or gallops  ABDOMEN: Soft, Nontender, Nondistended; Bowel sounds present  EXTREMITIES:  2+ Peripheral Pulses, No clubbing, cyanosis, or edema  LYMPH: No lymphadenopathy noted  SKIN: No rashes or lesions    Care Discussed with Consultants/Other Providers [ ] YES  [ ] NO

## 2024-01-17 NOTE — PROGRESS NOTE ADULT - SUBJECTIVE AND OBJECTIVE BOX
Neurology Progress Note    S: Patient seen and examined. No new events overnight.    MEDICATIONS:    acetaminophen     Tablet .. 650 milliGRAM(s) Oral every 6 hours PRN  albuterol/ipratropium for Nebulization 3 milliLiter(s) Nebulizer every 12 hours  aluminum hydroxide/magnesium hydroxide/simethicone Suspension 30 milliLiter(s) Oral every 4 hours PRN  cholecalciferol 1000 Unit(s) Oral daily  cyanocobalamin 1000 MICROGram(s) Oral daily  donepezil 10 milliGRAM(s) Oral at bedtime  folic acid 1 milliGRAM(s) Oral daily  heparin   Injectable 5000 Unit(s) SubCutaneous every 8 hours  melatonin 3 milliGRAM(s) Oral at bedtime PRN  memantine 10 milliGRAM(s) Oral two times a day  ondansetron Injectable 4 milliGRAM(s) IV Push every 8 hours PRN  QUEtiapine 12.5 milliGRAM(s) Oral every 6 hours PRN  risperiDONE   Tablet 0.25 milliGRAM(s) Oral daily  risperiDONE   Tablet 0.5 milliGRAM(s) Oral at bedtime  sertraline 50 milliGRAM(s) Oral daily  sodium chloride 0.9%. 1000 milliLiter(s) IV Continuous <Continuous>  sodium chloride 0.9%. 1000 milliLiter(s) IV Continuous <Continuous>      LABS:                          8.1    3.92  )-----------( 304      ( 16 Jan 2024 14:53 )             24.5     01-16    133<L>  |  103  |  19  ----------------------------<  93  4.2   |  26  |  0.87    Ca    10.6<H>      16 Jan 2024 14:53  Phos  2.9     01-16  Mg     2.00     01-16      CAPILLARY BLOOD GLUCOSE          Urinalysis Basic - ( 16 Jan 2024 14:53 )    Color: x / Appearance: x / SG: x / pH: x  Gluc: 93 mg/dL / Ketone: x  / Bili: x / Urobili: x   Blood: x / Protein: x / Nitrite: x   Leuk Esterase: x / RBC: x / WBC x   Sq Epi: x / Non Sq Epi: x / Bacteria: x      I&O's Summary    16 Jan 2024 07:01  -  17 Jan 2024 07:00  --------------------------------------------------------  IN: 125 mL / OUT: 0 mL / NET: 125 mL    17 Jan 2024 07:01  -  17 Jan 2024 11:41  --------------------------------------------------------  IN: 150 mL / OUT: 0 mL / NET: 150 mL      Vital Signs Last 24 Hrs  T(C): 36.4 (17 Jan 2024 10:38), Max: 36.9 (16 Jan 2024 18:00)  T(F): 97.5 (17 Jan 2024 10:38), Max: 98.5 (16 Jan 2024 18:00)  HR: 89 (17 Jan 2024 10:38) (75 - 89)  BP: 118/74 (17 Jan 2024 10:38) (105/68 - 127/65)  BP(mean): --  RR: 19 (17 Jan 2024 10:38) (18 - 19)  SpO2: 96% (17 Jan 2024 09:44) (95% - 96%)    Parameters below as of 17 Jan 2024 09:44  Patient On (Oxygen Delivery Method): room air          General Exam:   General Appearance: Appropriately dressed and in no acute distress       Head: Normocephalic, atraumatic and no dysmorphic features  Ear, Nose, and Throat: Moist mucous membranes  CVS: S1S2+  Resp: No SOB, no wheeze or rhonchi  Abd: soft NTND  Extremities: No edema, no cyanosis  Skin: No bruises, no rashes     Neurological Exam:  Mental Status: Awake, alert and oriented x 1.  Able to follow simple verbal commands, with perseveration. Can name some objects, refuses to participate further. No dysarthria  Cranial Nerves: PERRL, EOMI, VFFC, sensation V1-V3 intact,  no obvious facial asymmetry, equal elevation of palate, scm/trap 5/5, tongue is midline on protrusion. . hearing is grossly intact.   Motor:  CHO at least 4/5   Sensation: Intact to light touch and pinprick throughout  Reflexes: 1+ throughout at biceps, brachioradialis, triceps, patellars and ankles bilaterally and equal. No clonus. R toe and L toe were both downgoing.  Coordination: unable   Gait: deferred       I personally reviewed the below data/images/labs:  INTERPRETATION:  EXAMINATION: CT HEAD    DATE: 1/3/2024 2:00 AM    INDICATION: lethargy, altered mental status. History of falls.    COMPARISON: CT head from 7/7/2023.    TECHNIQUE: Thin section noncontrast axial images were obtained through   the head.  RAPID AI was utilized for preliminary assessment of   intracranial hemorrhage.    FINDINGS:  Intracranial Contents:    Moderate to severe cerebral volume loss.. Mild to moderate chronic   microvascular ischemic changes Gray-white differentiation is preserved.   No hydrocephalus. The basilar cisterns are clear. No focal edema or acute   mass effect. There is no intracranial fluid collectionor acute   hemorrhage.    Bones and Extracranial Soft Tissues:    There is no fracture identified. Scattered paranasal sinus mucosal   thickening. Mastoid air cells are clear. Scalp and imaged facial soft   tissues are within normal limits.      IMPRESSION:  1. No acute intracranial CT abnormality.    < end of copied text >

## 2024-01-17 NOTE — PROVIDER CONTACT NOTE (OTHER) - ACTION/TREATMENT ORDERED:
Provider made aware.  gave okay for pt to be without an access.
provider made aware. no new orders.
retry again in an hour, if not honor pts refusal
Provider Kirsten Medina made aware. Provider stated to "helena medications as not done since pt is refusing." Safety measures in place.
provider notified. plan of care ongoing.
Provider Kirsten Medina made aware. Medications marked as not done since pt is refusing them. Safety measures in place.
md notified

## 2024-01-17 NOTE — PROVIDER CONTACT NOTE (OTHER) - ASSESSMENT
BP 93/62, recheck BP 99/63, HR 82
Pt A&Ox1 and confused. Pt is refusing to open his mouth for medications and states he doesn't want them.
Pt A&Ox1 and confused. Pt is refusing to open his mouth for medications and states he doesn't want them.
pt aggravated does not want to be awaken
Pt confused with Hx of dementia, Alert arouses to voice. refusing to take medications or vitals
patient refused bedtime medications. aox0. confused. agitated and spits out when given. says no when educated.
Pt removed IV and is refusing a new one. Becomes agitated when attempting to put in new IV stating he does not need it and will no let nurse try. Pt is currently not receiving any meds or IV fluids via  IV.

## 2024-01-17 NOTE — PROVIDER CONTACT NOTE (OTHER) - RECOMMENDATIONS
Notify provider of pt's refusal.
Notify provider of pt's refusal.
notify provider
Provider to evaluate
Make provider aware
notify md
notify provider

## 2024-01-17 NOTE — PROGRESS NOTE ADULT - NS ATTEND AMEND GEN_ALL_CORE FT
Patient seen and examined. Agree with plan as detailed in PA/NP Note.     Bp controlled, can continue to hold antihypertensive.    Staci Muñiz MD  Pager: [phone]

## 2024-01-17 NOTE — PROGRESS NOTE ADULT - ASSESSMENT
78M with history as above who presents to the hospital with hypotension and lethargy at his NH here found to have sepsis 2/2 influenza A and UTI.         Problem/Plan - 1:·  Problem: Sepsis, unspecified organism.   ·  Plan: - Meets sepsis criteria with fever to 100.7 and HR > 90, influenza A positive and possible UTI  - finished tamiflu     Problem/Plan - 2:  ·  Problem: Influenza A.   ·  Plan: - Tamiflu as above (renally dosed)  - CXR poor study but with grossly clear lungs in the visualized portion  - Lungs grossly clear to auscultation  - Monitor cough/sputum    Problem/Plan - 3:  ·  Problem: Acute UTI.   ·  Plan: - UA positive for leuk esterase and nitrites, negative for bacteria  - off antibiotics     Problem/Plan - 4:·  Problem: DAVID (acute kidney injury).   ·  Plan: - DAVID likely 2/2 hypotension and sepsis, unclear if patient had decreased PO intake at the NH  - monitor cr    Problem/Plan - 5:  ·  Problem: Macrocytic anemia. ·  Plan: - Worsening macrocytic anemia, no known bleeding issues as per Brother  - monitor cbc     Problem/Plan - 6:  ·  Problem: Dementia.   ·  Plan: - Lethargic at NH, currently awake and alert, oriented x2 to self and place (hospital, not to name of hospital, not to time) which is his baseline as per brother- c/w home donezepil, namenda, risperidone, seroquel  Problem/Plan - 7:  ·  Problem: Essential hypertension.   ·  Plan: - cw current meds     dc planning awaiting rehab bed

## 2024-01-17 NOTE — PROVIDER CONTACT NOTE (OTHER) - REASON
No IV access
patient refused bedtime medications. aox0. confused. agitated and spits out when given. says no when educated.
pt refusing blood draw + medications
Pt refusing medications.
Pt refusing medications.
BP 93/62, recheck BP 99/63, HR 82
Pt refused PM Medication and Vital signs

## 2024-01-17 NOTE — PROGRESS NOTE ADULT - ASSESSMENT
This is a 78M with history of MM, HTN, and Dementia (AAO x 1-2 to self, to place, not to time at baseline per brother) who presents to the hospital from [facility] for hypotension and lethargy. Patient currently awake, alert, AAO x1 (to self,) but very poor historian, denies any acute complaints when asked. nephrology consulted for david    DAVID  admitted with scr 1.4  pt with sepsis likely ATN  DAVID improved.  Monitor BMP    hematuria  no blood on repeat ua 1/15   renal us with no mass, bladder diverticula--outpt urology  Monitor    hyponatremia  ? 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

## 2024-01-17 NOTE — PROGRESS NOTE ADULT - SUBJECTIVE AND OBJECTIVE BOX
DATE OF SERVICE: 01-17-24    No overnight events, resting comfortably  Review of symptoms otherwise negative.    Review of Systems:   Constitutional: [ ] fevers, [ ] chills.   Skin: [ ] dry skin. [ ] rashes.  Psychiatric: [ ] depression, [ ] anxiety.   Gastrointestinal: [ ] BRBPR, [ ] melena.   Neurological: [ ] confusion. [ ] seizures. [ ] shuffling gait.   Ears,Nose,Mouth and Throat: [ ] ear pain [ ] sore throat.   Eyes: [ ] diplopia.   Respiratory: [ ] hemoptysis. [ ] shortness of breath  Cardiovascular: See HPI above  Hematologic/Lymphatic: [ ] anemia. [ ] painful nodes. [ ] prolonged bleeding.   Genitourinary: [ ] hematuria. [ ] flank pain.   Endocrine: [ ] significant change in weight. [ ] intolerance to heat and cold.     Review of systems [ ] otherwise negative, [x ] otherwise unable to obtain    FH: no family history of sudden cardiac death in first degree relatives    SH: [ ] tobacco, [ ] alcohol, [ ] drugs    acetaminophen     Tablet .. 650 milliGRAM(s) Oral every 6 hours PRN  albuterol/ipratropium for Nebulization 3 milliLiter(s) Nebulizer every 12 hours  aluminum hydroxide/magnesium hydroxide/simethicone Suspension 30 milliLiter(s) Oral every 4 hours PRN  cholecalciferol 1000 Unit(s) Oral daily  cyanocobalamin 1000 MICROGram(s) Oral daily  donepezil 10 milliGRAM(s) Oral at bedtime  folic acid 1 milliGRAM(s) Oral daily  heparin   Injectable 5000 Unit(s) SubCutaneous every 8 hours  melatonin 3 milliGRAM(s) Oral at bedtime PRN  memantine 10 milliGRAM(s) Oral two times a day  ondansetron Injectable 4 milliGRAM(s) IV Push every 8 hours PRN  QUEtiapine 12.5 milliGRAM(s) Oral every 6 hours PRN  risperiDONE   Tablet 0.25 milliGRAM(s) Oral daily  risperiDONE   Tablet 0.5 milliGRAM(s) Oral at bedtime  sertraline 50 milliGRAM(s) Oral daily  sodium chloride 0.9%. 1000 milliLiter(s) IV Continuous <Continuous>  sodium chloride 0.9%. 1000 milliLiter(s) IV Continuous <Continuous>                            8.1    3.92  )-----------( 304      ( 16 Jan 2024 14:53 )             24.5       01-16    133<L>  |  103  |  19  ----------------------------<  93  4.2   |  26  |  0.87    Ca    10.6<H>      16 Jan 2024 14:53  Phos  2.9     01-16  Mg     2.00     01-16        T(C): 36.4 (01-17-24 @ 10:38), Max: 36.9 (01-16-24 @ 18:00)  HR: 89 (01-17-24 @ 10:38) (75 - 89)  BP: 118/74 (01-17-24 @ 10:38) (105/68 - 127/65)  RR: 19 (01-17-24 @ 10:38) (18 - 19)  SpO2: 96% (01-17-24 @ 09:44) (95% - 96%)  Wt(kg): --    I&O's Summary    16 Jan 2024 07:01  -  17 Jan 2024 07:00  --------------------------------------------------------  IN: 125 mL / OUT: 0 mL / NET: 125 mL    17 Jan 2024 07:01  -  17 Jan 2024 12:45  --------------------------------------------------------  IN: 150 mL / OUT: 0 mL / NET: 150 mL    General:  pleasantly confused  Head: Normocephalic and atraumatic.   Neck: No JVD. No bruits. Supple. Does not appear to be enlarged.   Cardiovascular: + S1,S2 ; RRR Soft systolic murmur at the left lower sternal border. No rubs noted.    Lungs: CTA b/l. No rhonchi, rales or wheezes.   Abdomen: + BS, soft. Non tender. Non distended. No rebound. No guarding.   Extremities: No clubbing/cyanosis/edema.   Neurologic: Moves all four extremities  Skin: Warm and moist. The patient's skin has normal elasticity and good skin turgor.   Psychiatric: unable to eval  Musculoskeletal: Normal range of motion      ECG:  	Sinus tachycardia    < from: Xray Chest 1 View AP/PA (01.02.24 @ 19:36) >  IMPRESSION:  No focal consolidations.  < end of copied text >    < from: Transthoracic Echocardiogram (07.10.23 @ 11:15) >  CONCLUSIONS:  1. Calcified trileaflet aortic valve with normal opening.  Minimal aortic regurgitation.  2. Endocardium not well visualized; grossly decreased  possibly mild left ventricular systolic dysfunction.  3. The right ventricle is not well visualized; grossly  normal right ventricular systolic function.  ------------------------------------------------------------------------  Confirmed on  7/10/2023 - 13:57:46 by MARLA Vegas  < end of copied text >      ASSESSMENT/PLAN: 	78M with history of MM, HTN, and Dementia (AAO x 1-2 to self, to place, not to time at baseline per brother) who presents to the hospital from [facility] for hypotension and lethargy found to have FLU A    -#Hypotension  -- BP stable  -- Norvasc and Lopressor on hold given acute illness  --  would cont to hold given stable BP    #lethargy  --due to Flu, now resolved  --s/p tamiflu and Abx per medicine    DC planning

## 2024-01-17 NOTE — PROVIDER CONTACT NOTE (OTHER) - NAME OF MD/NP/PA/DO NOTIFIED:
ACP Anitra Hernadez MD
Kirsten Medina ([facility] #[id number])
Kirsten Medina ([facility] #[id number])
Sofi Horn
krishna hernandez
FIFI Santiago
London Perez

## 2024-01-17 NOTE — PROGRESS NOTE ADULT - ASSESSMENT
79 y/o M PMhx MM, HTN, and Dementia who presented from [facility] for hypotension and lethargy found to have flu A    influenza A- treated  fever resolved  CXR clear  s/p tamiflu 30mg bid, completed 1/7    UA not consistent w/ UTI  urine cx negative    Recommendations  continue off antibiotics  discharge planning- awaiting rehab    Sylvester Trinidad M.D.  [facility], Division of Infectious Diseases  [phone]  After 5pm on weekdays and all day on weekends - please call [phone]

## 2024-01-18 RX ORDER — CHLORHEXIDINE GLUCONATE 213 G/1000ML
1 SOLUTION TOPICAL DAILY
Refills: 0 | Status: DISCONTINUED | OUTPATIENT
Start: 2024-01-18 | End: 2024-01-24

## 2024-01-18 RX ADMIN — QUETIAPINE FUMARATE 12.5 MILLIGRAM(S): 200 TABLET, FILM COATED ORAL at 05:58

## 2024-01-18 RX ADMIN — MEMANTINE HYDROCHLORIDE 10 MILLIGRAM(S): 10 TABLET ORAL at 18:40

## 2024-01-18 RX ADMIN — DONEPEZIL HYDROCHLORIDE 10 MILLIGRAM(S): 10 TABLET, FILM COATED ORAL at 21:55

## 2024-01-18 RX ADMIN — HEPARIN SODIUM 5000 UNIT(S): 5000 INJECTION INTRAVENOUS; SUBCUTANEOUS at 05:19

## 2024-01-18 RX ADMIN — QUETIAPINE FUMARATE 12.5 MILLIGRAM(S): 200 TABLET, FILM COATED ORAL at 21:55

## 2024-01-18 RX ADMIN — HEPARIN SODIUM 5000 UNIT(S): 5000 INJECTION INTRAVENOUS; SUBCUTANEOUS at 14:25

## 2024-01-18 RX ADMIN — RISPERIDONE 0.25 MILLIGRAM(S): 4 TABLET ORAL at 12:25

## 2024-01-18 RX ADMIN — PREGABALIN 1000 MICROGRAM(S): 225 CAPSULE ORAL at 12:25

## 2024-01-18 RX ADMIN — HEPARIN SODIUM 5000 UNIT(S): 5000 INJECTION INTRAVENOUS; SUBCUTANEOUS at 21:56

## 2024-01-18 RX ADMIN — SERTRALINE 50 MILLIGRAM(S): 25 TABLET, FILM COATED ORAL at 12:25

## 2024-01-18 RX ADMIN — MEMANTINE HYDROCHLORIDE 10 MILLIGRAM(S): 10 TABLET ORAL at 05:19

## 2024-01-18 RX ADMIN — Medication 1 MILLIGRAM(S): at 12:25

## 2024-01-18 RX ADMIN — RISPERIDONE 0.5 MILLIGRAM(S): 4 TABLET ORAL at 21:55

## 2024-01-18 RX ADMIN — Medication 3 MILLILITER(S): at 22:25

## 2024-01-18 RX ADMIN — Medication 1000 UNIT(S): at 12:25

## 2024-01-18 RX ADMIN — Medication 3 MILLILITER(S): at 08:51

## 2024-01-18 NOTE — PROGRESS NOTE ADULT - ASSESSMENT
This is a 78M with history of MM, HTN, and Dementia (AAO x 1-2 to self, to place, not to time at baseline per brother) who presents to the hospital from [facility] for hypotension and lethargy. Patient currently awake, alert, AAO x1 (to self,) but very poor historian, denies any acute complaints when asked. nephrology consulted for david    DAVID  admitted with scr 1.4  pt with sepsis likely ATN  DAVID improved.  Monitor BMP    hematuria  no blood on repeat ua 1/15   renal us with no mass, bladder diverticula--outpt urology  Monitor    hyponatremia  ? 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

## 2024-01-18 NOTE — PROGRESS NOTE ADULT - ASSESSMENT
This is a 78M with history of MM, HTN, and Dementia (AAO x 1-2 to self, to place, not to time at baseline per brother) who presents to the hospital from [facility] for hypotension and lethargy. Patient currently awake, alert, AAO x2 (to self, to hospital but not name, not to time) but very poor historian, denies any acute complaints when asked. Spoke with brother Mookie who said that the patient was sent to the hospital for lethargy and cough though he states that the patient has had a cough for several months. Brother denies any other known acute complaints for the patient. Called [facility] [phone] but there was no staff available who knew the patient. As per NH paperwork and ED note, patient was sent to the hospital for hypotention to 81/55 and lethargy. He was noted to have a low grade temp of 99.6 and saturation of 93% at his NH. He was given tylenol 650mg x1 prior to being sent to the hospital.   On arrival to the hospital his vitals were T 99.4 -> 100.7 rectal, P 92, BP 88/55 -> 117/70, RR 16, O2 sat 96% RA. His lab work was significant for worsening macrocytic anemia, DAVID with mild hyponatremia, elevated protein gap, elevated lactate with improvement on repeat. His UA was positive for nitrite, leuk esterase but negative for bacteria. His respiratory swab was positive for influenza A. His imaging did not show acute abnormalities. He was given ofirmev 1g, NS 500cc x1, vanc 1g, cefepime 2g, and tamiflu 75mg PO x1. He was admitted to medicine.  (03 Jan 2024 06:06):  now pulm called:  he is from NH:  above history noted:  he seems to be responding to simple commands:  he say he has no underlying lung history  never smoked:  on room air:  adm with influenza:     Influenza:   Multiple Myeloma  HTN  Dementia      Influenza:   -low grade fever  -Influenza:  on tamilflu  -cxr is clear:  -he is not wheezing    1/4: no change in pulm status today  : remains on room air  1/5: doing ok : no sob:  no cough : no phlegm;   1/6: on Tamiflu  no sob:  on room air  1/7: seems to be doing ok : no sob:  no cough:   1/8: seems OK: no sob:  no cough : no phlegm  on room air  1/9: doing ok: no sob:   on cough :  1/10: seems OK: no sob:  on room air:  remains lethargic: Afebrile and is normal BC count  1/11: resolved  1/14: seems OK; no sob:  on room air:  responds to questions  1/15: seems to be doing ok : no sob: no cough : no phlegm  : no wheezing  1/17: resolved:  doing great : no sob:   1/18: seems stable: no sob:    Multiple Myeloma  -per primary team : FDG PET scan  noted:  rib lesions present likely secondary to MM   1/18: no new evetns  HTN  -controlled  Dementia  -supportive care:    dw acp  : [facility]

## 2024-01-18 NOTE — CHART NOTE - NSCHARTNOTEFT_GEN_A_CORE
Source: Patient A&Ox [1 ]    other [x ] Chart Review     Medical Course: 78M with history as above who presents to the hospital with hypotension and lethargy at his NH here found to have sepsis 2/2 influenza A and UTI.       Nutrition Course: Patient seen at bedside with decreased cognition status. Comprehensive chart review completed. Pt noted with poor PO intake in hospital, PO varies from 25-50% as per RN flow sheet. Pt currently on DASH/TLC pureed diet. Diet has been supplementing with Ensure Plus x 2 times daily (700kcal, 26gm pro).  Recommend consider liberalizing diet w/o any therapeutic diet restrictions. Pt currently on Vitamin D, B12 and folic acid for micronutrient coverage. Labs notable with low Na 133L, Sodium chloride ordered for repletion. Pt's weight from RN flow sheet 165.5lbs (1/17). Pt' s adm weight 166lbs (1/3). Pt noted with stable weight x 14days. Recommend cont. monitor PO intake and weight trend. RD to remain available for further nutritional interventions as indicated.        Diet, Regular:   DASH/TLC {Sodium & Cholesterol Restricted} (DASH)  Pureed (PUREED)  Supplement Feeding Modality:  Oral  Ensure Plus High Protein Cans or Servings Per Day:  2       Frequency:  Daily (01-05-24 @ 14:53)      GI: WDL. Last BM 1/17 as per RN flow sheet     PO intake:  < =50% [ x]     Anthropometrics: Height (cm): 188 (01-03)  Weight (kg): 75.296 (01-03)  BMI (kg/m2): 21.3 (01-03)    Edema: None reported at present.   Pressure Injuries: None reported at present.     __________________ Pertinent Medications__________________   MEDICATIONS  (STANDING):  albuterol/ipratropium for Nebulization 3 milliLiter(s) Nebulizer every 12 hours  cholecalciferol 1000 Unit(s) Oral daily  cyanocobalamin 1000 MICROGram(s) Oral daily  donepezil 10 milliGRAM(s) Oral at bedtime  folic acid 1 milliGRAM(s) Oral daily  heparin   Injectable 5000 Unit(s) SubCutaneous every 8 hours  memantine 10 milliGRAM(s) Oral two times a day  risperiDONE   Tablet 0.25 milliGRAM(s) Oral daily  risperiDONE   Tablet 0.5 milliGRAM(s) Oral at bedtime  sertraline 50 milliGRAM(s) Oral daily  sodium chloride 0.9%. 1000 milliLiter(s) (75 mL/Hr) IV Continuous <Continuous>  sodium chloride 0.9%. 1000 milliLiter(s) (75 mL/Hr) IV Continuous <Continuous>    MEDICATIONS  (PRN):  acetaminophen     Tablet .. 650 milliGRAM(s) Oral every 6 hours PRN Temp greater or equal to 38C (100.4F), Mild Pain (1 - 3)  aluminum hydroxide/magnesium hydroxide/simethicone Suspension 30 milliLiter(s) Oral every 4 hours PRN Dyspepsia  melatonin 3 milliGRAM(s) Oral at bedtime PRN Insomnia  ondansetron Injectable 4 milliGRAM(s) IV Push every 8 hours PRN Nausea and/or Vomiting  QUEtiapine 12.5 milliGRAM(s) Oral every 6 hours PRN for agitation      __________________ Pertinent Labs__________________   01-16 Na133 mmol/L<L> Glu 93 mg/dL K+ 4.2 mmol/L Cr  0.87 mg/dL BUN 19 mg/dL 01-16 Phos 2.9 mg/dL        Estimated Needs:   [x ] no change since previous assessment        Previous Nutrition Diagnosis: Severe protein calorie malnutrition     Nutrition Diagnosis is [x] ongoing        Recommendations:  1) Recommend continue with current diet, which remains appropriate at this time.   2) Encourage PO intake and honor food preferences as able. Cont. to provide Ensure Plus HP 2x daily (350cal, 20gm pro each). Will additionally provide Mighty Shake 1x daily (220kcal, 6gm pro per each serving).   3) Monitor PO intake, Labs, weights, BMs, and skin integrity.   4) RD to remain available for further nutritional interventions as indicated.       Monitoring and Evaluation:      [ x] Tolerance to diet prescription [x ] weights [x ] follow up per protocol  [ ] other: Source: Patient A&Ox [1 ]    other [x ] Chart Review     Medical Course: 78M with history as above who presents to the hospital with hypotension and lethargy at his NH here found to have sepsis 2/2 influenza A and UTI.       Nutrition Course: Patient seen at bedside with decreased cognition status. Comprehensive chart review completed. Pt noted with poor PO intake in hospital, PO varies from 25-50% as per RN flow sheet. Pt currently on DASH/TLC pureed diet. Diet has been supplementing with Ensure Plus x 2 times daily (700kcal, 26gm pro).  Recommend consider liberalizing diet w/o any therapeutic diet restrictions. Pt currently on Vitamin D, B12 and folic acid for micronutrient coverage. Labs notable with low Na 133L, Sodium chloride ordered for repletion. Pt's weight from RN flow sheet 165.5lbs (1/17). Pt' s adm weight 166lbs (1/3). Pt noted with stable weight x 14days. Recommend cont. monitor PO intake and weight trend. RD to remain available for further nutritional interventions as indicated.        Diet, Regular:   DASH/TLC {Sodium & Cholesterol Restricted} (DASH)  Pureed (PUREED)  Supplement Feeding Modality:  Oral  Ensure Plus High Protein Cans or Servings Per Day:  2       Frequency:  Daily (01-05-24 @ 14:53)      GI: WDL. Last BM 1/17 as per RN flow sheet     PO intake:  < =50% [ x]     Anthropometrics: Height (cm): 188 (01-03)  Weight (kg): 75.296 (01-03)  BMI (kg/m2): 21.3 (01-03)    Edema: None reported at present.   Pressure Injuries: None reported at present.     __________________ Pertinent Medications__________________   MEDICATIONS  (STANDING):  albuterol/ipratropium for Nebulization 3 milliLiter(s) Nebulizer every 12 hours  cholecalciferol 1000 Unit(s) Oral daily  cyanocobalamin 1000 MICROGram(s) Oral daily  donepezil 10 milliGRAM(s) Oral at bedtime  folic acid 1 milliGRAM(s) Oral daily  heparin   Injectable 5000 Unit(s) SubCutaneous every 8 hours  memantine 10 milliGRAM(s) Oral two times a day  risperiDONE   Tablet 0.25 milliGRAM(s) Oral daily  risperiDONE   Tablet 0.5 milliGRAM(s) Oral at bedtime  sertraline 50 milliGRAM(s) Oral daily  sodium chloride 0.9%. 1000 milliLiter(s) (75 mL/Hr) IV Continuous <Continuous>  sodium chloride 0.9%. 1000 milliLiter(s) (75 mL/Hr) IV Continuous <Continuous>    MEDICATIONS  (PRN):  acetaminophen     Tablet .. 650 milliGRAM(s) Oral every 6 hours PRN Temp greater or equal to 38C (100.4F), Mild Pain (1 - 3)  aluminum hydroxide/magnesium hydroxide/simethicone Suspension 30 milliLiter(s) Oral every 4 hours PRN Dyspepsia  melatonin 3 milliGRAM(s) Oral at bedtime PRN Insomnia  ondansetron Injectable 4 milliGRAM(s) IV Push every 8 hours PRN Nausea and/or Vomiting  QUEtiapine 12.5 milliGRAM(s) Oral every 6 hours PRN for agitation      __________________ Pertinent Labs__________________   01-16 Na133 mmol/L<L> Glu 93 mg/dL K+ 4.2 mmol/L Cr  0.87 mg/dL BUN 19 mg/dL 01-16 Phos 2.9 mg/dL        Estimated Needs:   [x ] no change since previous assessment        Previous Nutrition Diagnosis: Severe protein calorie malnutrition     Nutrition Diagnosis is [x] ongoing        Recommendations:  1) Recommend liberalizing diet w/o any therapeutic diet restrictions.   2) Encourage PO intake and honor food preferences as able. Cont. to provide Ensure Plus HP 2x daily (350cal, 20gm pro each). Will additionally provide Mighty Shake 1x daily (220kcal, 6gm pro per each serving).   3) Monitor PO intake, Labs, weights, BMs, and skin integrity.   4) RD to remain available for further nutritional interventions as indicated.       Monitoring and Evaluation:      [ x] Tolerance to diet prescription [x ] weights [x ] follow up per protocol  [ ] other:

## 2024-01-18 NOTE — PROGRESS NOTE ADULT - SUBJECTIVE AND OBJECTIVE BOX
[facility], Division of Infectious Diseases  MIRTHA Bagley Y. Patel, S. Shah, G. Amos  [phone]  ([phone] - weekdays after 5pm and weekends)    Name: GUS DELUNA  Age/Gender: 78y Male  MRN: [id number]    Interval History:  Notes reviewed.   No concerning overnight events.  Afebrile.     Allergies: No Known Allergies      Objective:  Vitals:   T(F): 97.6 (01-18-24 @ 10:19), Max: 98.1 (01-17-24 @ 17:17)  HR: 99 (01-18-24 @ 10:19) (72 - 100)  BP: 109/63 (01-18-24 @ 10:19) (100/68 - 109/63)  RR: 18 (01-18-24 @ 10:19) (18 - 18)  SpO2: 98% (01-18-24 @ 10:19) (97% - 99%)  Physical Examination:  General: no acute distress  HEENT: anicteric  Cardio: normal rate  Resp: clear to auscultation anteriorly   Abd: soft, ND  Ext: no LE edema    Laboratory Studies:  CBC:                       8.1    3.92  )-----------( 304      ( 16 Jan 2024 14:53 )             24.5     WBC Trend:  3.92 01-16-24 @ 14:53  3.70 01-15-24 @ 07:24  4.61 01-14-24 @ 06:50  4.79 01-13-24 @ 06:10  3.74 01-12-24 @ 05:23    CMP: 01-16    133<L>  |  103  |  19  ----------------------------<  93  4.2   |  26  |  0.87    Ca    10.6<H>      16 Jan 2024 14:53  Phos  2.9     01-16  Mg     2.00     01-16            Urinalysis Basic - ( 16 Jan 2024 14:53 )    Color: x / Appearance: x / SG: x / pH: x  Gluc: 93 mg/dL / Ketone: x  / Bili: x / Urobili: x   Blood: x / Protein: x / Nitrite: x   Leuk Esterase: x / RBC: x / WBC x   Sq Epi: x / Non Sq Epi: x / Bacteria: x      Microbiology: reviewed       Radiology: reviewed     Medications:  acetaminophen     Tablet .. 650 milliGRAM(s) Oral every 6 hours PRN  albuterol/ipratropium for Nebulization 3 milliLiter(s) Nebulizer every 12 hours  aluminum hydroxide/magnesium hydroxide/simethicone Suspension 30 milliLiter(s) Oral every 4 hours PRN  cholecalciferol 1000 Unit(s) Oral daily  cyanocobalamin 1000 MICROGram(s) Oral daily  donepezil 10 milliGRAM(s) Oral at bedtime  folic acid 1 milliGRAM(s) Oral daily  heparin   Injectable 5000 Unit(s) SubCutaneous every 8 hours  melatonin 3 milliGRAM(s) Oral at bedtime PRN  memantine 10 milliGRAM(s) Oral two times a day  ondansetron Injectable 4 milliGRAM(s) IV Push every 8 hours PRN  QUEtiapine 12.5 milliGRAM(s) Oral every 6 hours PRN  risperiDONE   Tablet 0.25 milliGRAM(s) Oral daily  risperiDONE   Tablet 0.5 milliGRAM(s) Oral at bedtime  sertraline 50 milliGRAM(s) Oral daily  sodium chloride 0.9%. 1000 milliLiter(s) IV Continuous <Continuous>  sodium chloride 0.9%. 1000 milliLiter(s) IV Continuous <Continuous>    Antimicrobials:

## 2024-01-18 NOTE — PROGRESS NOTE ADULT - SUBJECTIVE AND OBJECTIVE BOX
[facility] [place] [facility]   MD ANEL FOLEY MD RUORU WONG, PA    TEL:  FROM 9 AM to 5 PM ---OFFICE: [phone]  AVAILABLE ON TEAMS    FROM 5 PM - 9 AM PLEASE CALL ANSWERING SERVICE: [phone]    RENAL FOLLOW UP NOTE--Date of Service 01-18-24 @ 12:48  --------------------------------------------------------------------------------  HPI:      Pt seen and examined at bedside.       PAST HISTORY  --------------------------------------------------------------------------------  No significant changes to PMH, PSH, FHx, SHx, unless otherwise noted    ALLERGIES & MEDICATIONS  --------------------------------------------------------------------------------  Allergies    No Known Allergies    Intolerances      Standing Inpatient Medications  albuterol/ipratropium for Nebulization 3 milliLiter(s) Nebulizer every 12 hours  cholecalciferol 1000 Unit(s) Oral daily  cyanocobalamin 1000 MICROGram(s) Oral daily  donepezil 10 milliGRAM(s) Oral at bedtime  folic acid 1 milliGRAM(s) Oral daily  heparin   Injectable 5000 Unit(s) SubCutaneous every 8 hours  memantine 10 milliGRAM(s) Oral two times a day  risperiDONE   Tablet 0.5 milliGRAM(s) Oral at bedtime  risperiDONE   Tablet 0.25 milliGRAM(s) Oral daily  sertraline 50 milliGRAM(s) Oral daily  sodium chloride 0.9%. 1000 milliLiter(s) IV Continuous <Continuous>  sodium chloride 0.9%. 1000 milliLiter(s) IV Continuous <Continuous>    PRN Inpatient Medications  acetaminophen     Tablet .. 650 milliGRAM(s) Oral every 6 hours PRN  aluminum hydroxide/magnesium hydroxide/simethicone Suspension 30 milliLiter(s) Oral every 4 hours PRN  melatonin 3 milliGRAM(s) Oral at bedtime PRN  ondansetron Injectable 4 milliGRAM(s) IV Push every 8 hours PRN  QUEtiapine 12.5 milliGRAM(s) Oral every 6 hours PRN      REVIEW OF SYSTEMS  --------------------------------------------------------------------------------  General: no fever  MSK: no edema     VITALS/PHYSICAL EXAM  --------------------------------------------------------------------------------  T(C): 36.4 (01-18-24 @ 10:19), Max: 36.7 (01-17-24 @ 17:17)  HR: 99 (01-18-24 @ 10:19) (72 - 100)  BP: 109/63 (01-18-24 @ 10:19) (100/68 - 109/63)  RR: 18 (01-18-24 @ 10:19) (18 - 18)  SpO2: 98% (01-18-24 @ 10:19) (97% - 99%)  Wt(kg): --        01-17-24 @ 07:01  -  01-18-24 @ 07:00  --------------------------------------------------------  IN: 900 mL / OUT: 0 mL / NET: 900 mL      Physical Exam:  	Gen: NAD  	HEENT: MMM  	Pulm: CTA B/L  	CV: S1S2  	Abd: Soft, +BS  	Ext: No LE edema B/L                      Neuro: Awake   	Skin: Warm and Dry   	Vascular access: NO HD catheter            no  calero  LABS/STUDIES  --------------------------------------------------------------------------------              8.1    3.92  >-----------<  304      [01-16-24 @ 14:53]              24.5     133  |  103  |  19  ----------------------------<  93      [01-16-24 @ 14:53]  4.2   |  26  |  0.87        Ca     10.6     [01-16-24 @ 14:53]      Mg     2.00     [01-16-24 @ 14:53]      Phos  2.9     [01-16-24 @ 14:53]            Creatinine Trend:  SCr 0.87 [01-16 @ 14:53]  SCr 0.78 [01-15 @ 07:24]  SCr 0.72 [01-14 @ 06:50]  SCr 0.76 [01-13 @ 22:53]  SCr 0.85 [01-13 @ 06:10]    Urinalysis - [01-16-24 @ 14:53]      Color  / Appearance  / SG  / pH       Gluc 93 / Ketone   / Bili  / Urobili        Blood  / Protein  / Leuk Est  / Nitrite       RBC  / WBC  / Hyaline  / Gran  / Sq Epi  / Non Sq Epi  / Bacteria     Urine Sodium 80      [01-15-24 @ 18:21]  Urine Osmolality 515      [01-15-24 @ 18:21]    PTH -- (Ca --)      [01-14-24 @ 06:50]   5  PTH -- (Ca --)      [01-13-24 @ 06:10]   6  Vitamin D (25OH) 36.9      [01-13-24 @ 06:10]

## 2024-01-18 NOTE — PROGRESS NOTE ADULT - ASSESSMENT
77 y/o M PMhx MM, HTN, and Dementia who presented from [facility] for hypotension and lethargy found to have flu A    influenza A- treated  fever resolved  CXR clear  s/p tamiflu 30mg bid, completed 1/7    Recommendations  continue off antibiotics  discharge planning- awaiting rehab    Sylvester Trinidad M.D.  [facility], Division of Infectious Diseases  [phone]  After 5pm on weekdays and all day on weekends - please call [phone]

## 2024-01-18 NOTE — PROGRESS NOTE ADULT - SUBJECTIVE AND OBJECTIVE BOX
DATE OF SERVICE: 01-18-24    No events  Review of symptoms otherwise negative.    MEDICATIONS:  acetaminophen     Tablet .. 650 milliGRAM(s) Oral every 6 hours PRN  albuterol/ipratropium for Nebulization 3 milliLiter(s) Nebulizer every 12 hours  aluminum hydroxide/magnesium hydroxide/simethicone Suspension 30 milliLiter(s) Oral every 4 hours PRN  cholecalciferol 1000 Unit(s) Oral daily  cyanocobalamin 1000 MICROGram(s) Oral daily  donepezil 10 milliGRAM(s) Oral at bedtime  folic acid 1 milliGRAM(s) Oral daily  heparin   Injectable 5000 Unit(s) SubCutaneous every 8 hours  melatonin 3 milliGRAM(s) Oral at bedtime PRN  memantine 10 milliGRAM(s) Oral two times a day  ondansetron Injectable 4 milliGRAM(s) IV Push every 8 hours PRN  QUEtiapine 12.5 milliGRAM(s) Oral every 6 hours PRN  risperiDONE   Tablet 0.25 milliGRAM(s) Oral daily  risperiDONE   Tablet 0.5 milliGRAM(s) Oral at bedtime  sertraline 50 milliGRAM(s) Oral daily  sodium chloride 0.9%. 1000 milliLiter(s) IV Continuous <Continuous>  sodium chloride 0.9%. 1000 milliLiter(s) IV Continuous <Continuous>      LABS:                        8.1    3.92  )-----------( 304      ( 16 Jan 2024 14:53 )             24.5       Hemoglobin: 8.1 g/dL (01-16 @ 14:53)  Hemoglobin: 7.5 g/dL (01-15 @ 07:24)  Hemoglobin: 7.1 g/dL (01-14 @ 06:50)      01-16    133<L>  |  103  |  19  ----------------------------<  93  4.2   |  26  |  0.87    Ca    10.6<H>      16 Jan 2024 14:53  Phos  2.9     01-16  Mg     2.00     01-16      Creatinine Trend: 0.87<--, 0.78<--, 0.72<--, 0.76<--, 0.85<--, 0.93<--    COAGS:           PHYSICAL EXAM:  T(C): 36.4 (01-18-24 @ 10:19), Max: 36.7 (01-17-24 @ 17:17)  HR: 99 (01-18-24 @ 10:19) (72 - 100)  BP: 109/63 (01-18-24 @ 10:19) (100/68 - 109/63)  RR: 18 (01-18-24 @ 10:19) (18 - 18)  SpO2: 98% (01-18-24 @ 10:19) (97% - 99%)  Wt(kg): --    I&O's Summary    17 Jan 2024 07:01  -  18 Jan 2024 07:00  --------------------------------------------------------  IN: 900 mL / OUT: 0 mL / NET: 900 mL      General:  NAD  Head: Normocephalic and atraumatic.   Neck: No JVD. No bruits. Supple. Does not appear to be enlarged.   Cardiovascular: + S1,S2 ; RRR Soft systolic murmur at the left lower sternal border. No rubs noted.    Lungs: CTA b/l. No rhonchi, rales or wheezes.   Abdomen: + BS, soft. Non tender. Non distended. No rebound. No guarding.   Extremities: No clubbing/cyanosis/edema.   Neurologic: Moves all four extremities  Skin: Warm and moist. The patient's skin has normal elasticity and good skin turgor.   Psychiatric: unable to eval  Musculoskeletal: Normal range of motion      ECG:  	Sinus tachycardia    < from: Xray Chest 1 View AP/PA (01.02.24 @ 19:36) >  IMPRESSION:  No focal consolidations.  < end of copied text >    < from: Transthoracic Echocardiogram (07.10.23 @ 11:15) >  CONCLUSIONS:  1. Calcified trileaflet aortic valve with normal opening.  Minimal aortic regurgitation.  2. Endocardium not well visualized; grossly decreased  possibly mild left ventricular systolic dysfunction.  3. The right ventricle is not well visualized; grossly  normal right ventricular systolic function.  ------------------------------------------------------------------------  Confirmed on  7/10/2023 - 13:57:46 by Jolene Rowan M.D. RPVI  < end of copied text >      ASSESSMENT/PLAN: 	78M with history of MM, HTN, and Dementia (AAO x 1-2 to self, to place, not to time at baseline per brother) who presents to the hospital from [facility] for hypotension and lethargy found to have FLU A    -#Hypotension  -- BP stable  -- Norvasc and Lopressor on hold given acute illness  -- would cont to hold given stable BP    #lethargy  --due to Flu, now resolved  --s/p tamiflu and Abx per medicine    DC planning    Staci Muñiz MD  Pager: [phone]

## 2024-01-18 NOTE — PROGRESS NOTE ADULT - SUBJECTIVE AND OBJECTIVE BOX
Patient is a 78y old  Male who presents with a chief complaint of Hypotension, lethargu (18 Jan 2024 12:48)    Date of servie : 01-18-24 @ 14:29  INTERVAL HPI/OVERNIGHT EVENTS:  T(C): 36.4 (01-18-24 @ 10:19), Max: 36.7 (01-17-24 @ 17:17)  HR: 99 (01-18-24 @ 10:19) (72 - 100)  BP: 109/63 (01-18-24 @ 10:19) (100/68 - 109/63)  RR: 18 (01-18-24 @ 10:19) (18 - 18)  SpO2: 98% (01-18-24 @ 10:19) (97% - 99%)  Wt(kg): --  I&O's Summary    17 Jan 2024 07:01  -  18 Jan 2024 07:00  --------------------------------------------------------  IN: 900 mL / OUT: 0 mL / NET: 900 mL        LABS:                        8.1    3.92  )-----------( 304      ( 16 Jan 2024 14:53 )             24.5     01-16    133<L>  |  103  |  19  ----------------------------<  93  4.2   |  26  |  0.87    Ca    10.6<H>      16 Jan 2024 14:53  Phos  2.9     01-16  Mg     2.00     01-16        Urinalysis Basic - ( 16 Jan 2024 14:53 )    Color: x / Appearance: x / SG: x / pH: x  Gluc: 93 mg/dL / Ketone: x  / Bili: x / Urobili: x   Blood: x / Protein: x / Nitrite: x   Leuk Esterase: x / RBC: x / WBC x   Sq Epi: x / Non Sq Epi: x / Bacteria: x      CAPILLARY BLOOD GLUCOSE            Urinalysis Basic - ( 16 Jan 2024 14:53 )    Color: x / Appearance: x / SG: x / pH: x  Gluc: 93 mg/dL / Ketone: x  / Bili: x / Urobili: x   Blood: x / Protein: x / Nitrite: x   Leuk Esterase: x / RBC: x / WBC x   Sq Epi: x / Non Sq Epi: x / Bacteria: x        MEDICATIONS  (STANDING):  albuterol/ipratropium for Nebulization 3 milliLiter(s) Nebulizer every 12 hours  cholecalciferol 1000 Unit(s) Oral daily  cyanocobalamin 1000 MICROGram(s) Oral daily  donepezil 10 milliGRAM(s) Oral at bedtime  folic acid 1 milliGRAM(s) Oral daily  heparin   Injectable 5000 Unit(s) SubCutaneous every 8 hours  memantine 10 milliGRAM(s) Oral two times a day  risperiDONE   Tablet 0.25 milliGRAM(s) Oral daily  risperiDONE   Tablet 0.5 milliGRAM(s) Oral at bedtime  sertraline 50 milliGRAM(s) Oral daily  sodium chloride 0.9%. 1000 milliLiter(s) (75 mL/Hr) IV Continuous <Continuous>  sodium chloride 0.9%. 1000 milliLiter(s) (75 mL/Hr) IV Continuous <Continuous>    MEDICATIONS  (PRN):  acetaminophen     Tablet .. 650 milliGRAM(s) Oral every 6 hours PRN Temp greater or equal to 38C (100.4F), Mild Pain (1 - 3)  aluminum hydroxide/magnesium hydroxide/simethicone Suspension 30 milliLiter(s) Oral every 4 hours PRN Dyspepsia  melatonin 3 milliGRAM(s) Oral at bedtime PRN Insomnia  ondansetron Injectable 4 milliGRAM(s) IV Push every 8 hours PRN Nausea and/or Vomiting  QUEtiapine 12.5 milliGRAM(s) Oral every 6 hours PRN for agitation          PHYSICAL EXAM:  GENERAL: NAD, well-groomed, well-developed  HEAD:  Atraumatic, Normocephalic  CHEST/LUNG: Clear to percussion bilaterally; No rales, rhonchi, wheezing, or rubs  HEART: Regular rate and rhythm; No murmurs, rubs, or gallops  ABDOMEN: Soft, Nontender, Nondistended; Bowel sounds present  EXTREMITIES:  2+ Peripheral Pulses, No clubbing, cyanosis, or edema  LYMPH: No lymphadenopathy noted  SKIN: No rashes or lesions    Care Discussed with Consultants/Other Providers [ ] YES  [ ] NO

## 2024-01-19 RX ADMIN — Medication 1 MILLIGRAM(S): at 10:01

## 2024-01-19 RX ADMIN — Medication 3 MILLILITER(S): at 10:45

## 2024-01-19 RX ADMIN — QUETIAPINE FUMARATE 12.5 MILLIGRAM(S): 200 TABLET, FILM COATED ORAL at 22:13

## 2024-01-19 RX ADMIN — SERTRALINE 50 MILLIGRAM(S): 25 TABLET, FILM COATED ORAL at 10:01

## 2024-01-19 RX ADMIN — QUETIAPINE FUMARATE 12.5 MILLIGRAM(S): 200 TABLET, FILM COATED ORAL at 09:58

## 2024-01-19 RX ADMIN — CHLORHEXIDINE GLUCONATE 1 APPLICATION(S): 213 SOLUTION TOPICAL at 11:18

## 2024-01-19 RX ADMIN — DONEPEZIL HYDROCHLORIDE 10 MILLIGRAM(S): 10 TABLET, FILM COATED ORAL at 22:12

## 2024-01-19 RX ADMIN — MEMANTINE HYDROCHLORIDE 10 MILLIGRAM(S): 10 TABLET ORAL at 22:13

## 2024-01-19 RX ADMIN — HEPARIN SODIUM 5000 UNIT(S): 5000 INJECTION INTRAVENOUS; SUBCUTANEOUS at 14:17

## 2024-01-19 RX ADMIN — RISPERIDONE 0.25 MILLIGRAM(S): 4 TABLET ORAL at 10:00

## 2024-01-19 RX ADMIN — PREGABALIN 1000 MICROGRAM(S): 225 CAPSULE ORAL at 10:01

## 2024-01-19 RX ADMIN — MEMANTINE HYDROCHLORIDE 10 MILLIGRAM(S): 10 TABLET ORAL at 10:00

## 2024-01-19 RX ADMIN — HEPARIN SODIUM 5000 UNIT(S): 5000 INJECTION INTRAVENOUS; SUBCUTANEOUS at 22:12

## 2024-01-19 RX ADMIN — HEPARIN SODIUM 5000 UNIT(S): 5000 INJECTION INTRAVENOUS; SUBCUTANEOUS at 07:18

## 2024-01-19 RX ADMIN — Medication 1000 UNIT(S): at 10:00

## 2024-01-19 RX ADMIN — RISPERIDONE 0.5 MILLIGRAM(S): 4 TABLET ORAL at 22:12

## 2024-01-19 NOTE — PROGRESS NOTE ADULT - ASSESSMENT
79 y/o M PMhx MM, HTN, and Dementia who presented from [facility] for hypotension and lethargy found to have flu A    influenza A- treated  fever resolved  CXR clear  s/p tamiflu 30mg bid, completed 1/7    Recommendations  continue off antibiotics  discharge planning- awaiting rehab    We will sign off. Thank you for allowing us to participate in the care of Mr. Wen. Please feel free to call with any questions or concerns.     Sylvester Trinidad M.D.  [facility], Division of Infectious Diseases  [phone]  After 5pm on weekdays and all day on weekends - please call [phone]

## 2024-01-19 NOTE — PROGRESS NOTE ADULT - SUBJECTIVE AND OBJECTIVE BOX
[facility]   MD ANEL FOLEY MD RUORU WONG, PA    TEL:  FROM 9 AM to 5 PM ---OFFICE: [phone]  AVAILABLE ON TEAMS    FROM 5 PM - 9 AM PLEASE CALL ANSWERING SERVICE: [phone]    RENAL FOLLOW UP NOTE--Date of Service 01-19-24 @ 16:08  --------------------------------------------------------------------------------  HPI:      Pt seen and examined at bedside.        PAST HISTORY  --------------------------------------------------------------------------------  No significant changes to PMH, PSH, FHx, SHx, unless otherwise noted    ALLERGIES & MEDICATIONS  --------------------------------------------------------------------------------  Allergies    No Known Allergies    Intolerances      Standing Inpatient Medications  albuterol/ipratropium for Nebulization 3 milliLiter(s) Nebulizer every 12 hours  chlorhexidine 2% Cloths 1 Application(s) Topical daily  cholecalciferol 1000 Unit(s) Oral daily  cyanocobalamin 1000 MICROGram(s) Oral daily  donepezil 10 milliGRAM(s) Oral at bedtime  folic acid 1 milliGRAM(s) Oral daily  heparin   Injectable 5000 Unit(s) SubCutaneous every 8 hours  memantine 10 milliGRAM(s) Oral two times a day  risperiDONE   Tablet 0.25 milliGRAM(s) Oral daily  risperiDONE   Tablet 0.5 milliGRAM(s) Oral at bedtime  sertraline 50 milliGRAM(s) Oral daily  sodium chloride 0.9%. 1000 milliLiter(s) IV Continuous <Continuous>  sodium chloride 0.9%. 1000 milliLiter(s) IV Continuous <Continuous>    PRN Inpatient Medications  acetaminophen     Tablet .. 650 milliGRAM(s) Oral every 6 hours PRN  aluminum hydroxide/magnesium hydroxide/simethicone Suspension 30 milliLiter(s) Oral every 4 hours PRN  melatonin 3 milliGRAM(s) Oral at bedtime PRN  ondansetron Injectable 4 milliGRAM(s) IV Push every 8 hours PRN  QUEtiapine 12.5 milliGRAM(s) Oral every 6 hours PRN      REVIEW OF SYSTEMS  --------------------------------------------------------------------------------  General: no fever  MSK: no edema     VITALS/PHYSICAL EXAM  --------------------------------------------------------------------------------  T(C): 36.3 (01-19-24 @ 10:30), Max: 36.6 (01-18-24 @ 17:26)  HR: 97 (01-19-24 @ 10:48) (78 - 98)  BP: 121/67 (01-19-24 @ 10:30) (116/72 - 132/78)  RR: 18 (01-19-24 @ 10:30) (18 - 18)  SpO2: 97% (01-19-24 @ 10:48) (96% - 100%)  Wt(kg): --        01-18-24 @ 07:01  -  01-19-24 @ 07:00  --------------------------------------------------------  IN: 340 mL / OUT: 0 mL / NET: 340 mL    01-19-24 @ 07:01  -  01-19-24 @ 16:08  --------------------------------------------------------  IN: 350 mL / OUT: 0 mL / NET: 350 mL      Physical Exam:  	Gen: NAD  	HEENT: MMM  	Pulm: CTA B/L  	CV: S1S2  	Abd: Soft, +BS  	Ext: No LE edema B/L                      Neuro: Awake   	Skin: Warm and Dry   	Vascular access: NO HD catheter            no galilea  LABS/STUDIES  --------------------------------------------------------------------------------                Creatinine Trend:  SCr 0.87 [01-16 @ 14:53]  SCr 0.78 [01-15 @ 07:24]  SCr 0.72 [01-14 @ 06:50]  SCr 0.76 [01-13 @ 22:53]  SCr 0.85 [01-13 @ 06:10]    Urinalysis - [01-16-24 @ 14:53]      Color  / Appearance  / SG  / pH       Gluc 93 / Ketone   / Bili  / Urobili        Blood  / Protein  / Leuk Est  / Nitrite       RBC  / WBC  / Hyaline  / Gran  / Sq Epi  / Non Sq Epi  / Bacteria     Urine Sodium 80      [01-15-24 @ 18:21]  Urine Osmolality 515      [01-15-24 @ 18:21]    PTH -- (Ca --)      [01-14-24 @ 06:50]   5  PTH -- (Ca --)      [01-13-24 @ 06:10]   6  Vitamin D (25OH) 36.9      [01-13-24 @ 06:10]

## 2024-01-19 NOTE — PROGRESS NOTE ADULT - SUBJECTIVE AND OBJECTIVE BOX
DATE OF SERVICE: 01-19-24    No overnight events  Review of symptoms otherwise negative.    MEDICATIONS:  acetaminophen     Tablet .. 650 milliGRAM(s) Oral every 6 hours PRN  albuterol/ipratropium for Nebulization 3 milliLiter(s) Nebulizer every 12 hours  aluminum hydroxide/magnesium hydroxide/simethicone Suspension 30 milliLiter(s) Oral every 4 hours PRN  chlorhexidine 2% Cloths 1 Application(s) Topical daily  cholecalciferol 1000 Unit(s) Oral daily  cyanocobalamin 1000 MICROGram(s) Oral daily  donepezil 10 milliGRAM(s) Oral at bedtime  folic acid 1 milliGRAM(s) Oral daily  heparin   Injectable 5000 Unit(s) SubCutaneous every 8 hours  melatonin 3 milliGRAM(s) Oral at bedtime PRN  memantine 10 milliGRAM(s) Oral two times a day  ondansetron Injectable 4 milliGRAM(s) IV Push every 8 hours PRN  QUEtiapine 12.5 milliGRAM(s) Oral every 6 hours PRN  risperiDONE   Tablet 0.25 milliGRAM(s) Oral daily  risperiDONE   Tablet 0.5 milliGRAM(s) Oral at bedtime  sertraline 50 milliGRAM(s) Oral daily  sodium chloride 0.9%. 1000 milliLiter(s) IV Continuous <Continuous>  sodium chloride 0.9%. 1000 milliLiter(s) IV Continuous <Continuous>      LABS:      Hemoglobin: 8.1 g/dL (01-16 @ 14:53)  Hemoglobin: 7.5 g/dL (01-15 @ 07:24)            Creatinine Trend: 0.87<--, 0.78<--, 0.72<--, 0.76<--, 0.85<--, 0.93<--    COAGS:           PHYSICAL EXAM:  T(C): 36.3 (01-19-24 @ 10:30), Max: 36.6 (01-18-24 @ 17:26)  HR: 97 (01-19-24 @ 10:48) (78 - 98)  BP: 121/67 (01-19-24 @ 10:30) (116/72 - 132/78)  RR: 18 (01-19-24 @ 10:30) (18 - 18)  SpO2: 97% (01-19-24 @ 10:48) (96% - 100%)  Wt(kg): --    I&O's Summary    18 Jan 2024 07:01  -  19 Jan 2024 07:00  --------------------------------------------------------  IN: 340 mL / OUT: 0 mL / NET: 340 mL    19 Jan 2024 07:01  -  19 Jan 2024 11:17  --------------------------------------------------------  IN: 150 mL / OUT: 0 mL / NET: 150 mL        General:  NAD  Head: Normocephalic and atraumatic.   Neck: No JVD. No bruits. Supple. Does not appear to be enlarged.   Cardiovascular: + S1,S2 ; RRR Soft systolic murmur at the left lower sternal border. No rubs noted.    Lungs: CTA b/l. No rhonchi, rales or wheezes.   Abdomen: + BS, soft. Non tender. Non distended. No rebound. No guarding.   Extremities: No clubbing/cyanosis/edema.   Neurologic: Moves all four extremities  Skin: Warm and moist. The patient's skin has normal elasticity and good skin turgor.   Psychiatric: unable to eval  Musculoskeletal: Normal range of motion      ECG:  	Sinus tachycardia    < from: Xray Chest 1 View AP/PA (01.02.24 @ 19:36) >  IMPRESSION:  No focal consolidations.  < end of copied text >    < from: Transthoracic Echocardiogram (07.10.23 @ 11:15) >  CONCLUSIONS:  1. Calcified trileaflet aortic valve with normal opening.  Minimal aortic regurgitation.  2. Endocardium not well visualized; grossly decreased  possibly mild left ventricular systolic dysfunction.  3. The right ventricle is not well visualized; grossly  normal right ventricular systolic function.  ------------------------------------------------------------------------  Confirmed on  7/10/2023 - 13:57:46 by Jolene Rowan M.D. RPVI  < end of copied text >      ASSESSMENT/PLAN: 	78M with history of MM, HTN, and Dementia (AAO x 1-2 to self, to place, not to time at baseline per brother) who presents to the hospital from [facility] for hypotension and lethargy found to have FLU A    -#Hypotension  -- BP stable  -- Norvasc and Lopressor on hold given acute illness  -- would cont to hold given stable BP    #lethargy  --due to Flu, now resolved  --s/p tamiflu and Abx per medicine    DC planning    Staci Muñiz MD  Pager: [phone]

## 2024-01-19 NOTE — PROGRESS NOTE ADULT - ASSESSMENT
This is a 78M with history of MM, HTN, and Dementia (AAO x 1-2 to self, to place, not to time at baseline per brother) who presents to the hospital from [facility] for hypotension and lethargy. Patient currently awake, alert, AAO x1 (to self,) but very poor historian, denies any acute complaints when asked. nephrology consulted for david    DAVID  admitted with scr 1.4  pt with sepsis likely ATN  DAVID improved.  Monitor BMP    hematuria  no blood on repeat ua 1/15   renal us with no mass, bladder diverticula--outpt urology  Monitor    hyponatremia  ? 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

## 2024-01-19 NOTE — PROGRESS NOTE ADULT - SUBJECTIVE AND OBJECTIVE BOX
Patient is a 78y old  Male who presents with a chief complaint of Hypotension, lethargu (19 Jan 2024 16:08)    Date of servie : 01-19-24 @ 16:35  INTERVAL HPI/OVERNIGHT EVENTS:  T(C): 36.3 (01-19-24 @ 10:30), Max: 36.6 (01-18-24 @ 17:26)  HR: 97 (01-19-24 @ 10:48) (78 - 98)  BP: 121/67 (01-19-24 @ 10:30) (116/72 - 132/78)  RR: 18 (01-19-24 @ 10:30) (18 - 18)  SpO2: 97% (01-19-24 @ 10:48) (96% - 100%)  Wt(kg): --  I&O's Summary    18 Jan 2024 07:01  -  19 Jan 2024 07:00  --------------------------------------------------------  IN: 340 mL / OUT: 0 mL / NET: 340 mL    19 Jan 2024 07:01  -  19 Jan 2024 16:35  --------------------------------------------------------  IN: 350 mL / OUT: 0 mL / NET: 350 mL        LABS:              CAPILLARY BLOOD GLUCOSE                MEDICATIONS  (STANDING):  albuterol/ipratropium for Nebulization 3 milliLiter(s) Nebulizer every 12 hours  chlorhexidine 2% Cloths 1 Application(s) Topical daily  cholecalciferol 1000 Unit(s) Oral daily  cyanocobalamin 1000 MICROGram(s) Oral daily  donepezil 10 milliGRAM(s) Oral at bedtime  folic acid 1 milliGRAM(s) Oral daily  heparin   Injectable 5000 Unit(s) SubCutaneous every 8 hours  memantine 10 milliGRAM(s) Oral two times a day  risperiDONE   Tablet 0.25 milliGRAM(s) Oral daily  risperiDONE   Tablet 0.5 milliGRAM(s) Oral at bedtime  sertraline 50 milliGRAM(s) Oral daily  sodium chloride 0.9%. 1000 milliLiter(s) (75 mL/Hr) IV Continuous <Continuous>  sodium chloride 0.9%. 1000 milliLiter(s) (75 mL/Hr) IV Continuous <Continuous>    MEDICATIONS  (PRN):  acetaminophen     Tablet .. 650 milliGRAM(s) Oral every 6 hours PRN Temp greater or equal to 38C (100.4F), Mild Pain (1 - 3)  aluminum hydroxide/magnesium hydroxide/simethicone Suspension 30 milliLiter(s) Oral every 4 hours PRN Dyspepsia  melatonin 3 milliGRAM(s) Oral at bedtime PRN Insomnia  ondansetron Injectable 4 milliGRAM(s) IV Push every 8 hours PRN Nausea and/or Vomiting  QUEtiapine 12.5 milliGRAM(s) Oral every 6 hours PRN for agitation          PHYSICAL EXAM:  GENERAL: NAD, well-groomed, well-developed  HEAD:  Atraumatic, Normocephalic  CHEST/LUNG: Clear to percussion bilaterally; No rales, rhonchi, wheezing, or rubs  HEART: Regular rate and rhythm; No murmurs, rubs, or gallops  ABDOMEN: Soft, Nontender, Nondistended; Bowel sounds present  EXTREMITIES:  2+ Peripheral Pulses, No clubbing, cyanosis, or edema  LYMPH: No lymphadenopathy noted  SKIN: No rashes or lesions    Care Discussed with Consultants/Other Providers [ ] YES  [ ] NO

## 2024-01-19 NOTE — PROGRESS NOTE ADULT - SUBJECTIVE AND OBJECTIVE BOX
Neurology Progress Note    S: Patient seen and examined. No new events overnight.  MEDICATIONS:    acetaminophen     Tablet .. 650 milliGRAM(s) Oral every 6 hours PRN  albuterol/ipratropium for Nebulization 3 milliLiter(s) Nebulizer every 12 hours  aluminum hydroxide/magnesium hydroxide/simethicone Suspension 30 milliLiter(s) Oral every 4 hours PRN  chlorhexidine 2% Cloths 1 Application(s) Topical daily  cholecalciferol 1000 Unit(s) Oral daily  cyanocobalamin 1000 MICROGram(s) Oral daily  donepezil 10 milliGRAM(s) Oral at bedtime  folic acid 1 milliGRAM(s) Oral daily  heparin   Injectable 5000 Unit(s) SubCutaneous every 8 hours  melatonin 3 milliGRAM(s) Oral at bedtime PRN  memantine 10 milliGRAM(s) Oral two times a day  ondansetron Injectable 4 milliGRAM(s) IV Push every 8 hours PRN  QUEtiapine 12.5 milliGRAM(s) Oral every 6 hours PRN  risperiDONE   Tablet 0.5 milliGRAM(s) Oral at bedtime  risperiDONE   Tablet 0.25 milliGRAM(s) Oral daily  sertraline 50 milliGRAM(s) Oral daily  sodium chloride 0.9%. 1000 milliLiter(s) IV Continuous <Continuous>  sodium chloride 0.9%. 1000 milliLiter(s) IV Continuous <Continuous>      LABS:            CAPILLARY BLOOD GLUCOSE            I&O's Summary    18 Jan 2024 07:01  -  19 Jan 2024 07:00  --------------------------------------------------------  IN: 340 mL / OUT: 0 mL / NET: 340 mL    19 Jan 2024 07:01  -  19 Jan 2024 10:02  --------------------------------------------------------  IN: 150 mL / OUT: 0 mL / NET: 150 mL      Vital Signs Last 24 Hrs  T(C): 36.4 (19 Jan 2024 07:15), Max: 36.6 (18 Jan 2024 17:26)  T(F): 97.5 (19 Jan 2024 07:15), Max: 97.9 (18 Jan 2024 17:26)  HR: 79 (19 Jan 2024 07:15) (79 - 99)  BP: 116/72 (19 Jan 2024 07:15) (109/63 - 132/78)  BP(mean): --  RR: 18 (19 Jan 2024 07:15) (18 - 18)  SpO2: 97% (19 Jan 2024 07:15) (97% - 100%)    Parameters below as of 19 Jan 2024 07:15  Patient On (Oxygen Delivery Method): room air          General Exam:   General Appearance: Appropriately dressed and in no acute distress       Head: Normocephalic, atraumatic and no dysmorphic features  Ear, Nose, and Throat: Moist mucous membranes  CVS: S1S2+  Resp: No SOB, no wheeze or rhonchi  Abd: soft NTND  Extremities: No edema, no cyanosis  Skin: No bruises, no rashes     Neurological Exam:  Mental Status: Awake, alert and oriented x 1.  Able to follow simple verbal commands, with perseveration. Can name some objects, refuses to participate further. No dysarthria  Cranial Nerves: PERRL, EOMI, VFFC, sensation V1-V3 intact,  no obvious facial asymmetry, equal elevation of palate, scm/trap 5/5, tongue is midline on protrusion. . hearing is grossly intact.   Motor:  CHO at least 4/5   Sensation: Intact to light touch and pinprick throughout  Reflexes: 1+ throughout at biceps, brachioradialis, triceps, patellars and ankles bilaterally and equal. No clonus. R toe and L toe were both downgoing.  Coordination: unable   Gait: deferred       I personally reviewed the below data/images/labs:  INTERPRETATION:  EXAMINATION: CT HEAD    DATE: 1/3/2024 2:00 AM    INDICATION: lethargy, altered mental status. History of falls.    COMPARISON: CT head from 7/7/2023.    TECHNIQUE: Thin section noncontrast axial images were obtained through   the head.  RAPID AI was utilized for preliminary assessment of   intracranial hemorrhage.    FINDINGS:  Intracranial Contents:    Moderate to severe cerebral volume loss.. Mild to moderate chronic   microvascular ischemic changes Gray-white differentiation is preserved.   No hydrocephalus. The basilar cisterns are clear. No focal edema or acute   mass effect. There is no intracranial fluid collectionor acute   hemorrhage.    Bones and Extracranial Soft Tissues:    There is no fracture identified. Scattered paranasal sinus mucosal   thickening. Mastoid air cells are clear. Scalp and imaged facial soft   tissues are within normal limits.      IMPRESSION:  1. No acute intracranial CT abnormality.    < end of copied text >

## 2024-01-19 NOTE — PROGRESS NOTE ADULT - SUBJECTIVE AND OBJECTIVE BOX
[facility], Division of Infectious Diseases  MIRTHA Bagley Y. Patel, S. Shah, G. Amos  [phone]  ([phone] - weekdays after 5pm and weekends)    Name: GUS DELUNA  Age/Gender: 78y Male  MRN: [id number]    Interval History:  Notes reviewed.   No concerning overnight events.  Afebrile.     Allergies: No Known Allergies      Objective:  Vitals:   T(F): 97.5 (01-19-24 @ 07:15), Max: 97.9 (01-18-24 @ 17:26)  HR: 79 (01-19-24 @ 07:15) (72 - 99)  BP: 116/72 (01-19-24 @ 07:15) (109/63 - 132/78)  RR: 18 (01-19-24 @ 07:15) (18 - 18)  SpO2: 97% (01-19-24 @ 07:15) (97% - 100%)  Physical Examination:  General: no acute distress  HEENT: anicteric  Cardio: normal rate  Resp: clear to auscultation anteriorly   Abd: soft, ND  Ext: no LE edema    Laboratory Studies:  CBC:   WBC Trend:  3.92 01-16-24 @ 14:53  3.70 01-15-24 @ 07:24  4.61 01-14-24 @ 06:50  4.79 01-13-24 @ 06:10    CMP:               Microbiology: reviewed       Radiology: reviewed     Medications:  acetaminophen     Tablet .. 650 milliGRAM(s) Oral every 6 hours PRN  albuterol/ipratropium for Nebulization 3 milliLiter(s) Nebulizer every 12 hours  aluminum hydroxide/magnesium hydroxide/simethicone Suspension 30 milliLiter(s) Oral every 4 hours PRN  chlorhexidine 2% Cloths 1 Application(s) Topical daily  cholecalciferol 1000 Unit(s) Oral daily  cyanocobalamin 1000 MICROGram(s) Oral daily  donepezil 10 milliGRAM(s) Oral at bedtime  folic acid 1 milliGRAM(s) Oral daily  heparin   Injectable 5000 Unit(s) SubCutaneous every 8 hours  melatonin 3 milliGRAM(s) Oral at bedtime PRN  memantine 10 milliGRAM(s) Oral two times a day  ondansetron Injectable 4 milliGRAM(s) IV Push every 8 hours PRN  QUEtiapine 12.5 milliGRAM(s) Oral every 6 hours PRN  risperiDONE   Tablet 0.5 milliGRAM(s) Oral at bedtime  risperiDONE   Tablet 0.25 milliGRAM(s) Oral daily  sertraline 50 milliGRAM(s) Oral daily  sodium chloride 0.9%. 1000 milliLiter(s) IV Continuous <Continuous>  sodium chloride 0.9%. 1000 milliLiter(s) IV Continuous <Continuous>    Antimicrobials:

## 2024-01-19 NOTE — PROGRESS NOTE ADULT - ASSESSMENT
78M with history as above who presents to the hospital with hypotension and lethargy at his NH here found to have sepsis 2/2 influenza A and UTI.         Problem/Plan - 1:·  Problem: Sepsis, unspecified organism.   ·  Plan: - Meets sepsis criteria with fever to 100.7 and HR > 90, influenza A positive and possible UTI  - finished tamiflu     Problem/Plan - 2:  ·  Problem: Influenza A.   ·  Plan: - resolved   - Lungs grossly clear to auscultation  - Monitor cough/sputum    Problem/Plan - 3:  ·  Problem: Acute UTI.   ·  Plan: - UA positive for leuk esterase and nitrites, negative for bacteria  - off antibiotics   Problem/Plan - 4:·  Problem: DAVID (acute kidney injury).   ·  Plan: - DAVID likely 2/2 hypotension and sepsis, unclear if patient had decreased PO intake at the NH- monitor cr    Problem/Plan - 5:  ·  Problem: Macrocytic anemia. ·  Plan: - Worsening macrocytic anemia, no known bleeding issues as per Brother  - monitor cbc     Problem/Plan - 6:  ·  Problem: Dementia.   ·  Plan: - Lethargic at NH, currently awake and alert, oriented x2 to self and place (hospital, not to name of hospital, not to time) which is his baseline as per brother- c/w home donezepil, namenda, risperidone, seroquel  Problem/Plan - 7:  ·  Problem: Essential hypertension.   ·  Plan: - cw current meds     dc planning awaiting rehab bed

## 2024-01-20 RX ORDER — METOPROLOL TARTRATE 50 MG
0.5 TABLET ORAL
Refills: 0 | DISCHARGE

## 2024-01-20 RX ORDER — CHOLECALCIFEROL (VITAMIN D3) 125 MCG
1000 CAPSULE ORAL
Qty: 0 | Refills: 0 | DISCHARGE
Start: 2024-01-20

## 2024-01-20 RX ORDER — ACETAMINOPHEN 500 MG
2 TABLET ORAL
Qty: 0 | Refills: 0 | DISCHARGE
Start: 2024-01-20

## 2024-01-20 RX ORDER — ACETAMINOPHEN 500 MG
2 TABLET ORAL
Refills: 0 | DISCHARGE

## 2024-01-20 RX ADMIN — Medication 1 MILLIGRAM(S): at 12:27

## 2024-01-20 RX ADMIN — RISPERIDONE 0.25 MILLIGRAM(S): 4 TABLET ORAL at 12:23

## 2024-01-20 RX ADMIN — Medication 3 MILLILITER(S): at 10:40

## 2024-01-20 RX ADMIN — CHLORHEXIDINE GLUCONATE 1 APPLICATION(S): 213 SOLUTION TOPICAL at 12:33

## 2024-01-20 RX ADMIN — HEPARIN SODIUM 5000 UNIT(S): 5000 INJECTION INTRAVENOUS; SUBCUTANEOUS at 15:07

## 2024-01-20 RX ADMIN — MEMANTINE HYDROCHLORIDE 10 MILLIGRAM(S): 10 TABLET ORAL at 12:27

## 2024-01-20 RX ADMIN — Medication 3 MILLIGRAM(S): at 23:29

## 2024-01-20 RX ADMIN — HEPARIN SODIUM 5000 UNIT(S): 5000 INJECTION INTRAVENOUS; SUBCUTANEOUS at 07:26

## 2024-01-20 RX ADMIN — Medication 1000 UNIT(S): at 12:27

## 2024-01-20 RX ADMIN — QUETIAPINE FUMARATE 12.5 MILLIGRAM(S): 200 TABLET, FILM COATED ORAL at 12:24

## 2024-01-20 RX ADMIN — PREGABALIN 1000 MICROGRAM(S): 225 CAPSULE ORAL at 12:26

## 2024-01-20 RX ADMIN — MEMANTINE HYDROCHLORIDE 10 MILLIGRAM(S): 10 TABLET ORAL at 17:58

## 2024-01-20 RX ADMIN — SERTRALINE 50 MILLIGRAM(S): 25 TABLET, FILM COATED ORAL at 12:23

## 2024-01-20 RX ADMIN — QUETIAPINE FUMARATE 12.5 MILLIGRAM(S): 200 TABLET, FILM COATED ORAL at 19:04

## 2024-01-20 RX ADMIN — RISPERIDONE 0.5 MILLIGRAM(S): 4 TABLET ORAL at 22:17

## 2024-01-20 RX ADMIN — DONEPEZIL HYDROCHLORIDE 10 MILLIGRAM(S): 10 TABLET, FILM COATED ORAL at 22:17

## 2024-01-20 RX ADMIN — HEPARIN SODIUM 5000 UNIT(S): 5000 INJECTION INTRAVENOUS; SUBCUTANEOUS at 22:17

## 2024-01-20 NOTE — PROGRESS NOTE ADULT - ASSESSMENT
This is a 78M with history of MM, HTN, and Dementia (AAO x 1-2 to self, to place, not to time at baseline per brother) who presents to the hospital from [facility] for hypotension and lethargy. Patient currently awake, alert, AAO x1 (to self,) but very poor historian, denies any acute complaints when asked. nephrology consulted for david    DAVID  admitted with scr 1.4  He had sepsis so was likely ATN  DAVID improved.  Monitor BMP    Hematuria  He had blood on repeat ua 1/15   renal us with no mass.      Hyponatremia  ? 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

## 2024-01-20 NOTE — PROGRESS NOTE ADULT - SUBJECTIVE AND OBJECTIVE BOX
DATE OF SERVICE: 01-20-24      no events overnight       acetaminophen     Tablet .. 650 milliGRAM(s) Oral every 6 hours PRN  albuterol/ipratropium for Nebulization 3 milliLiter(s) Nebulizer every 12 hours  aluminum hydroxide/magnesium hydroxide/simethicone Suspension 30 milliLiter(s) Oral every 4 hours PRN  chlorhexidine 2% Cloths 1 Application(s) Topical daily  cholecalciferol 1000 Unit(s) Oral daily  cyanocobalamin 1000 MICROGram(s) Oral daily  donepezil 10 milliGRAM(s) Oral at bedtime  folic acid 1 milliGRAM(s) Oral daily  heparin   Injectable 5000 Unit(s) SubCutaneous every 8 hours  melatonin 3 milliGRAM(s) Oral at bedtime PRN  memantine 10 milliGRAM(s) Oral two times a day  ondansetron Injectable 4 milliGRAM(s) IV Push every 8 hours PRN  QUEtiapine 12.5 milliGRAM(s) Oral every 6 hours PRN  risperiDONE   Tablet 0.25 milliGRAM(s) Oral daily  risperiDONE   Tablet 0.5 milliGRAM(s) Oral at bedtime  sertraline 50 milliGRAM(s) Oral daily  sodium chloride 0.9%. 1000 milliLiter(s) IV Continuous <Continuous>  sodium chloride 0.9%. 1000 milliLiter(s) IV Continuous <Continuous>          Hemoglobin: 8.1 g/dL (01-16 @ 14:53)            Creatinine Trend: 0.87<--, 0.78<--, 0.72<--, 0.76<--, 0.85<--, 0.93<--    COAGS:           T(C): 37.1 (01-20-24 @ 07:28), Max: 37.1 (01-20-24 @ 07:28)  HR: 78 (01-20-24 @ 07:28) (78 - 98)  BP: 113/78 (01-20-24 @ 07:28) (103/66 - 121/67)  RR: 18 (01-20-24 @ 07:28) (18 - 18)  SpO2: 100% (01-20-24 @ 07:28) (96% - 100%)  Wt(kg): --    I&O's Summary    19 Jan 2024 07:01  -  20 Jan 2024 07:00  --------------------------------------------------------  IN: 600 mL / OUT: 0 mL / NET: 600 mL      General:  NAD  Head: Normocephalic and atraumatic.   Neck: No JVD. No bruits. Supple. Does not appear to be enlarged.   Cardiovascular: + S1,S2 ; RRR Soft systolic murmur at the left lower sternal border. No rubs noted.    Lungs: CTA b/l. No rhonchi, rales or wheezes.   Abdomen: + BS, soft. Non tender. Non distended. No rebound. No guarding.   Extremities: No clubbing/cyanosis/edema.   Neurologic: Moves all four extremities  Skin: Warm and moist. The patient's skin has normal elasticity and good skin turgor.   Psychiatric: unable to eval  Musculoskeletal: Normal range of motion      ECG:  	Sinus tachycardia    < from: Xray Chest 1 View AP/PA (01.02.24 @ 19:36) >  IMPRESSION:  No focal consolidations.  < end of copied text >    < from: Transthoracic Echocardiogram (07.10.23 @ 11:15) >  CONCLUSIONS:  1. Calcified trileaflet aortic valve with normal opening.  Minimal aortic regurgitation.  2. Endocardium not well visualized; grossly decreased  possibly mild left ventricular systolic dysfunction.  3. The right ventricle is not well visualized; grossly  normal right ventricular systolic function.  ------------------------------------------------------------------------  Confirmed on  7/10/2023 - 13:57:46 by MARLA Vegas  < end of copied text >      ASSESSMENT/PLAN: 	78M with history of MM, HTN, and Dementia (AAO x 1-2 to self, to place, not to time at baseline per brother) who presents to the hospital from [facility] for hypotension and lethargy found to have FLU A    -#Hypotension  -- BP stable  -- Norvasc and Lopressor on hold given acute illness  -- would cont to hold given stable BP    #lethargy  --due to Flu, now resolved  --s/p tamiflu and Abx per medicine    DC planning

## 2024-01-20 NOTE — PROGRESS NOTE ADULT - SUBJECTIVE AND OBJECTIVE BOX
Date of Service: 01-20-24 @ 11:34    Patient is a 78y old  Male who presents with a chief complaint of Hypotension, lethargu (20 Jan 2024 09:19)      Any change in ROS: seems OK:no sob: on room air     MEDICATIONS  (STANDING):  albuterol/ipratropium for Nebulization 3 milliLiter(s) Nebulizer every 12 hours  chlorhexidine 2% Cloths 1 Application(s) Topical daily  cholecalciferol 1000 Unit(s) Oral daily  cyanocobalamin 1000 MICROGram(s) Oral daily  donepezil 10 milliGRAM(s) Oral at bedtime  folic acid 1 milliGRAM(s) Oral daily  heparin   Injectable 5000 Unit(s) SubCutaneous every 8 hours  memantine 10 milliGRAM(s) Oral two times a day  risperiDONE   Tablet 0.25 milliGRAM(s) Oral daily  risperiDONE   Tablet 0.5 milliGRAM(s) Oral at bedtime  sertraline 50 milliGRAM(s) Oral daily  sodium chloride 0.9%. 1000 milliLiter(s) (75 mL/Hr) IV Continuous <Continuous>  sodium chloride 0.9%. 1000 milliLiter(s) (75 mL/Hr) IV Continuous <Continuous>    MEDICATIONS  (PRN):  acetaminophen     Tablet .. 650 milliGRAM(s) Oral every 6 hours PRN Temp greater or equal to 38C (100.4F), Mild Pain (1 - 3)  aluminum hydroxide/magnesium hydroxide/simethicone Suspension 30 milliLiter(s) Oral every 4 hours PRN Dyspepsia  melatonin 3 milliGRAM(s) Oral at bedtime PRN Insomnia  ondansetron Injectable 4 milliGRAM(s) IV Push every 8 hours PRN Nausea and/or Vomiting  QUEtiapine 12.5 milliGRAM(s) Oral every 6 hours PRN for agitation    Vital Signs Last 24 Hrs  T(C): 36.9 (20 Jan 2024 09:23), Max: 37.1 (20 Jan 2024 07:28)  T(F): 98.5 (20 Jan 2024 09:23), Max: 98.8 (20 Jan 2024 07:28)  HR: 72 (20 Jan 2024 09:23) (72 - 89)  BP: 98/72 (20 Jan 2024 09:23) (98/72 - 113/78)  BP(mean): --  RR: 19 (20 Jan 2024 09:23) (18 - 19)  SpO2: 100% (20 Jan 2024 09:23) (100% - 100%)    Parameters below as of 20 Jan 2024 09:23  Patient On (Oxygen Delivery Method): room air        I&O's Summary    19 Jan 2024 07:01  -  20 Jan 2024 07:00  --------------------------------------------------------  IN: 600 mL / OUT: 0 mL / NET: 600 mL          Physical Exam:   GENERAL: NAD, well-groomed, well-developed  HEENT: CHRISTINA/   Atraumatic, Normocephalic  ENMT: No tonsillar erythema, exudates, or enlargement; Moist mucous membranes, Good dentition, No lesions  NECK: Supple, No JVD, Normal thyroid  CHEST/LUNG: Clear to auscultaion  CVS: Regular rate and rhythm; No murmurs, rubs, or gallops  GI: : Soft, Nontender, Nondistended; Bowel sounds present  NERVOUS SYSTEM:  Alert & Oriented X1  EXTREMITIES:  2+ Peripheral Pulses, No clubbing, cyanosis, or edema  LYMPH: No lymphadenopathy noted  SKIN: No rashes or lesions  ENDOCRINOLOGY: No Thyromegaly  PSYCH: demented    Labs:                              8.1    3.92  )-----------( 304      ( 16 Jan 2024 14:53 )             24.5     01-16    133<L>  |  103  |  19  ----------------------------<  93  4.2   |  26  |  0.87        CAPILLARY BLOOD GLUCOSE            < from: Xray Chest 1 View AP/PA (01.02.24 @ 19:36) >    ACC: [id number] EXAM:  XR CHEST AP OR PA 1V   ORDERED BY: CATHIE MULLER     PROCEDURE DATE:  01/02/2024          INTERPRETATION:  CLINICAL INDICATION: Sepsis.    EXAM: Chest x-ray 2 views.    COMPARISON: None available.    FINDINGS:  Surgical clips overlying the midline of the neck.  Bilateral lung fields partially obscured by patient positioning.   Visualized lung fields are clear.  There is no pleural effusion or pneumothorax.  The heart is not well evaluated in this position. Median sternotomy.  The visualized osseous structures demonstrate no acute pathology.    IMPRESSION:  No focal consolidations.    --- End of Report ---          MIR LAW MD; Resident Radiologist  This document has been electronically signed.  KOFI GARCIA MD; Attending Radiologist  This document has been electronically signed. Jan 2 2024  7:43PM    < end of copied text >  < from: CT Head No Cont (01.03.24 @ 02:00) >    Moderate to severe cerebral volume loss.. Mild to moderate chronic   microvascular ischemic changes Gray-white differentiation is preserved.   No hydrocephalus. The basilar cisterns are clear. No focal edema or acute   mass effect. There is no intracranial fluid collectionor acute   hemorrhage.    Bones and Extracranial Soft Tissues:    There is no fracture identified. Scattered paranasal sinus mucosal   thickening. Mastoid air cells are clear. Scalp and imaged facial soft   tissues are within normal limits.      IMPRESSION:  1. No acute intracranial CT abnormality.    --- End of Report ---          MAIKEL MORENO MD; Resident Radiologist  This document has been electronically signed.  ELLEN CARTWRIGHT MD; Attending Radiologist  This document has been electronically signed. Josef  3 2024  2:17AM    < end of copied text >            RECENT CULTURES:        RESPIRATORY CULTURES:          Studies  Chest X-RAY  CT SCAN Chest   Venous Dopplers: LE:   CT Abdomen  Others

## 2024-01-20 NOTE — PROGRESS NOTE ADULT - SUBJECTIVE AND OBJECTIVE BOX
Patient is a 78y old  Male who presents with a chief complaint of Hypotension, lethargu (20 Jan 2024 09:19)    Date of servie : 01-20-24 @ 11:35  INTERVAL HPI/OVERNIGHT EVENTS:  T(C): 36.9 (01-20-24 @ 09:23), Max: 37.1 (01-20-24 @ 07:28)  HR: 72 (01-20-24 @ 09:23) (72 - 89)  BP: 98/72 (01-20-24 @ 09:23) (98/72 - 113/78)  RR: 19 (01-20-24 @ 09:23) (18 - 19)  SpO2: 100% (01-20-24 @ 09:23) (100% - 100%)  Wt(kg): --  I&O's Summary    19 Jan 2024 07:01  -  20 Jan 2024 07:00  --------------------------------------------------------  IN: 600 mL / OUT: 0 mL / NET: 600 mL        LABS:              CAPILLARY BLOOD GLUCOSE                MEDICATIONS  (STANDING):  albuterol/ipratropium for Nebulization 3 milliLiter(s) Nebulizer every 12 hours  chlorhexidine 2% Cloths 1 Application(s) Topical daily  cholecalciferol 1000 Unit(s) Oral daily  cyanocobalamin 1000 MICROGram(s) Oral daily  donepezil 10 milliGRAM(s) Oral at bedtime  folic acid 1 milliGRAM(s) Oral daily  heparin   Injectable 5000 Unit(s) SubCutaneous every 8 hours  memantine 10 milliGRAM(s) Oral two times a day  risperiDONE   Tablet 0.5 milliGRAM(s) Oral at bedtime  risperiDONE   Tablet 0.25 milliGRAM(s) Oral daily  sertraline 50 milliGRAM(s) Oral daily  sodium chloride 0.9%. 1000 milliLiter(s) (75 mL/Hr) IV Continuous <Continuous>  sodium chloride 0.9%. 1000 milliLiter(s) (75 mL/Hr) IV Continuous <Continuous>    MEDICATIONS  (PRN):  acetaminophen     Tablet .. 650 milliGRAM(s) Oral every 6 hours PRN Temp greater or equal to 38C (100.4F), Mild Pain (1 - 3)  aluminum hydroxide/magnesium hydroxide/simethicone Suspension 30 milliLiter(s) Oral every 4 hours PRN Dyspepsia  melatonin 3 milliGRAM(s) Oral at bedtime PRN Insomnia  ondansetron Injectable 4 milliGRAM(s) IV Push every 8 hours PRN Nausea and/or Vomiting  QUEtiapine 12.5 milliGRAM(s) Oral every 6 hours PRN for agitation          PHYSICAL EXAM:  GENERAL: NAD, well-groomed, well-developed  HEAD:  Atraumatic, Normocephalic  CHEST/LUNG: Clear to percussion bilaterally; No rales, rhonchi, wheezing, or rubs  HEART: Regular rate and rhythm; No murmurs, rubs, or gallops  ABDOMEN: Soft, Nontender, Nondistended; Bowel sounds present  EXTREMITIES:  2+ Peripheral Pulses, No clubbing, cyanosis, or edema  LYMPH: No lymphadenopathy noted  SKIN: No rashes or lesions    Care Discussed with Consultants/Other Providers [ ] YES  [ ] NO

## 2024-01-20 NOTE — PROGRESS NOTE ADULT - SUBJECTIVE AND OBJECTIVE BOX
GUS DELUNA  78y  Patient is a 78y old  Male who presents with a chief complaint of Hypotension, lethargy. (20 Jan 2024 11:35)    HPI: Followed for DAVID and Hyponatremia.    HEALTH ISSUES - PROBLEM Dx:  Sepsis, unspecified organism    Influenza A    Acute UTI    Dementia    Essential hypertension    Multiple myeloma    Need for prophylactic measure    DAVID (acute kidney injury)    Macrocytic anemia          MEDICATIONS  (STANDING):  albuterol/ipratropium for Nebulization 3 milliLiter(s) Nebulizer every 12 hours  chlorhexidine 2% Cloths 1 Application(s) Topical daily  cholecalciferol 1000 Unit(s) Oral daily  cyanocobalamin 1000 MICROGram(s) Oral daily  donepezil 10 milliGRAM(s) Oral at bedtime  folic acid 1 milliGRAM(s) Oral daily  heparin   Injectable 5000 Unit(s) SubCutaneous every 8 hours  memantine 10 milliGRAM(s) Oral two times a day  risperiDONE   Tablet 0.25 milliGRAM(s) Oral daily  risperiDONE   Tablet 0.5 milliGRAM(s) Oral at bedtime  sertraline 50 milliGRAM(s) Oral daily  sodium chloride 0.9%. 1000 milliLiter(s) (75 mL/Hr) IV Continuous <Continuous>  sodium chloride 0.9%. 1000 milliLiter(s) (75 mL/Hr) IV Continuous <Continuous>    MEDICATIONS  (PRN):  acetaminophen     Tablet .. 650 milliGRAM(s) Oral every 6 hours PRN Temp greater or equal to 38C (100.4F), Mild Pain (1 - 3)  aluminum hydroxide/magnesium hydroxide/simethicone Suspension 30 milliLiter(s) Oral every 4 hours PRN Dyspepsia  melatonin 3 milliGRAM(s) Oral at bedtime PRN Insomnia  ondansetron Injectable 4 milliGRAM(s) IV Push every 8 hours PRN Nausea and/or Vomiting  QUEtiapine 12.5 milliGRAM(s) Oral every 6 hours PRN for agitation    Vital Signs Last 24 Hrs  T(C): 36.9 (20 Jan 2024 09:23), Max: 37.1 (20 Jan 2024 07:28)  T(F): 98.5 (20 Jan 2024 09:23), Max: 98.8 (20 Jan 2024 07:28)  HR: 72 (20 Jan 2024 09:23) (72 - 89)  BP: 98/72 (20 Jan 2024 09:23) (98/72 - 113/78)  BP(mean): --  RR: 19 (20 Jan 2024 09:23) (18 - 19)  SpO2: 100% (20 Jan 2024 09:23) (100% - 100%)    Parameters below as of 20 Jan 2024 09:23  Patient On (Oxygen Delivery Method): room air      Daily     Daily     PHYSICAL EXAM:  Constitutional:  He appears comfortable and not distressed. Not diaphoretic.    Neck:  The thyroid is normal. Trachea is midline.     Breasts: Normal examination.    Respiratory: The lungs are clear to auscultation. No dullness and expansion is normal.    Cardiovascular: S1 and S2 are normal. No mummurs, rubs or gallops are present.    Gastrointestinal: The abdomen is soft. No tenderness is present. No masses are present. Bowel sounds are normal.    Genitourinary: The bladder is not distended. No CVA tenderness is present.    Extremities: No edema is noted. No deformities are present.    Neurological: Confused. Tone, power and sensation are normal.     Skin: No leasions are seen  or palpated.    Lymph Nodes: No lymphadenopathy is present.

## 2024-01-20 NOTE — PROGRESS NOTE ADULT - ASSESSMENT
This is a 78M with history of MM, HTN, and Dementia (AAO x 1-2 to self, to place, not to time at baseline per brother) who presents to the hospital from [facility] for hypotension and lethargy. Patient currently awake, alert, AAO x2 (to self, to hospital but not name, not to time) but very poor historian, denies any acute complaints when asked. Spoke with brother Mookie who said that the patient was sent to the hospital for lethargy and cough though he states that the patient has had a cough for several months. Brother denies any other known acute complaints for the patient. Called [facility] [phone] but there was no staff available who knew the patient. As per NH paperwork and ED note, patient was sent to the hospital for hypotention to 81/55 and lethargy. He was noted to have a low grade temp of 99.6 and saturation of 93% at his NH. He was given tylenol 650mg x1 prior to being sent to the hospital.   On arrival to the hospital his vitals were T 99.4 -> 100.7 rectal, P 92, BP 88/55 -> 117/70, RR 16, O2 sat 96% RA. His lab work was significant for worsening macrocytic anemia, DAVID with mild hyponatremia, elevated protein gap, elevated lactate with improvement on repeat. His UA was positive for nitrite, leuk esterase but negative for bacteria. His respiratory swab was positive for influenza A. His imaging did not show acute abnormalities. He was given ofirmev 1g, NS 500cc x1, vanc 1g, cefepime 2g, and tamiflu 75mg PO x1. He was admitted to medicine.  (03 Jan 2024 06:06):  now pulm called:  he is from NH:  above history noted:  he seems to be responding to simple commands:  he say he has no underlying lung history  never smoked:  on room air:  adm with influenza:     Influenza:   Multiple Myeloma  HTN  Dementia      Influenza:   -low grade fever  -Influenza:  on tamilflu  -cxr is clear:  -he is not wheezing    1/4: no change in pulm status today  : remains on room air  1/5: doing ok : no sob:  no cough : no phlegm;   1/6: on Tamiflu  no sob:  on room air  1/7: seems to be doing ok : no sob:  no cough:   1/8: seems OK: no sob:  no cough : no phlegm  on room air  1/9: doing ok: no sob:   on cough :  1/10: seems OK: no sob:  on room air:  remains lethargic: Afebrile and is normal BC count  1/11: resolved  1/14: seems OK; no sob:  on room air:  responds to questions  1/15: seems to be doing ok : no sob: no cough : no phlegm  : no wheezing  1/17: resolved:  doing great : no sob:   1/18: seems stable: no sob:    1/20: no new evetns  : remains on room air;  no new pulm issues:  for dc on monday    Multiple Myeloma  -per primary team : FDG PET scan  noted:  rib lesions present likely secondary to MM   1/18: no new events  1/20: per primary team  HTN  -controlled  Dementia  -supportive care:    karyn acp  : dc planning

## 2024-01-20 NOTE — PROGRESS NOTE ADULT - ASSESSMENT
78M with history as above who presents to the hospital with hypotension and lethargy at his NH here found to have sepsis 2/2 influenza A and UTI.         Problem/Plan - 1:·  Problem: Sepsis, unspecified organism.   ·  Plan: - Meets sepsis criteria with fever to 100.7 and HR > 90, influenza A positive and possible UTI  - finished tamiflu     Problem/Plan - 2:  ·  Problem: Influenza A.   ·  Plan: - resolved   - Lungs grossly clear to auscultation  - Monitor cough/sputum    Problem/Plan - 3:  ·  Problem: Acute UTI.   ·  Plan: - UA positive for leuk esterase and nitrites, negative for bacteria  - off antibiotics   Problem/Plan - 4:·  Problem: DAVID (acute kidney injury).   ·  Plan: - DAVID likely 2/2 hypotension and sepsis, unclear if patient had decreased PO intake at the NH- monitor cr    Problem/Plan - 5:  ·  Problem: Macrocytic anemia. ·  Plan: - Worsening macrocytic anemia, no known bleeding issues as per Brother  - monitor cbc     Problem/Plan - 6:  ·  Problem: Dementia.   ·  Plan: - Lethargic at NH, currently awake and alert, oriented x2 to self and place (hospital, not to name of hospital, not to time) which is his baseline as per brother- c/w home donezepil, namenda, risperidone, seroquel  Problem/Plan - 7:  ·  Problem: Essential hypertension.   ·  Plan: - cw current meds     dc planning awaiting rehab bed   Medically cleared for dc

## 2024-01-21 RX ADMIN — RISPERIDONE 0.25 MILLIGRAM(S): 4 TABLET ORAL at 11:15

## 2024-01-21 RX ADMIN — Medication 3 MILLILITER(S): at 21:47

## 2024-01-21 RX ADMIN — Medication 1 MILLIGRAM(S): at 11:15

## 2024-01-21 RX ADMIN — PREGABALIN 1000 MICROGRAM(S): 225 CAPSULE ORAL at 11:16

## 2024-01-21 RX ADMIN — HEPARIN SODIUM 5000 UNIT(S): 5000 INJECTION INTRAVENOUS; SUBCUTANEOUS at 22:14

## 2024-01-21 RX ADMIN — SERTRALINE 50 MILLIGRAM(S): 25 TABLET, FILM COATED ORAL at 11:16

## 2024-01-21 RX ADMIN — Medication 1000 UNIT(S): at 11:15

## 2024-01-21 RX ADMIN — CHLORHEXIDINE GLUCONATE 1 APPLICATION(S): 213 SOLUTION TOPICAL at 11:14

## 2024-01-21 RX ADMIN — MEMANTINE HYDROCHLORIDE 10 MILLIGRAM(S): 10 TABLET ORAL at 06:19

## 2024-01-21 RX ADMIN — HEPARIN SODIUM 5000 UNIT(S): 5000 INJECTION INTRAVENOUS; SUBCUTANEOUS at 14:01

## 2024-01-21 RX ADMIN — QUETIAPINE FUMARATE 12.5 MILLIGRAM(S): 200 TABLET, FILM COATED ORAL at 06:17

## 2024-01-21 RX ADMIN — Medication 3 MILLIGRAM(S): at 22:13

## 2024-01-21 RX ADMIN — DONEPEZIL HYDROCHLORIDE 10 MILLIGRAM(S): 10 TABLET, FILM COATED ORAL at 22:13

## 2024-01-21 RX ADMIN — MEMANTINE HYDROCHLORIDE 10 MILLIGRAM(S): 10 TABLET ORAL at 18:26

## 2024-01-21 RX ADMIN — RISPERIDONE 0.5 MILLIGRAM(S): 4 TABLET ORAL at 22:13

## 2024-01-21 RX ADMIN — Medication 3 MILLILITER(S): at 10:15

## 2024-01-21 RX ADMIN — HEPARIN SODIUM 5000 UNIT(S): 5000 INJECTION INTRAVENOUS; SUBCUTANEOUS at 06:16

## 2024-01-21 NOTE — PROGRESS NOTE ADULT - ASSESSMENT
This is a 78M with history of MM, HTN, and Dementia (AAO x 1-2 to self, to place, not to time at baseline per brother) who presents to the hospital from [facility] for hypotension and lethargy. Patient currently awake, alert, AAO x1 (to self,) but very poor historian, denies any acute complaints when asked. nephrology consulted for david    DAVID  admitted with scr 1.4  He had sepsis so was likely ATN  DAVID has improved.  - Monitor BMP    Hematuria  He had blood on repeat UA on 1/15   renal us with no mass.  - Will monitor.      Hyponatremia  ? 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    anemia  recommend iron panel   transfusion and work up per team  Monitor Hb    hypercalcemia/ hypophosphatemna  PTH low-5  VItamin D wnl   Continue vit D  Monitor PTH, PO4, Ca

## 2024-01-21 NOTE — PROGRESS NOTE ADULT - SUBJECTIVE AND OBJECTIVE BOX
GUS DELUNA  78y  Patient is a 78y old  Male who presents with a chief complaint of Hypotension, lethargu (21 Jan 2024 15:40)    HPI: Seen and examined. He remains agitated.      HEALTH ISSUES - PROBLEM Dx:  Sepsis, unspecified organism    Influenza A    Acute UTI    Dementia    Essential hypertension    Multiple myeloma    Need for prophylactic measure    DAVID (acute kidney injury)    Macrocytic anemia          MEDICATIONS  (STANDING):  albuterol/ipratropium for Nebulization 3 milliLiter(s) Nebulizer every 12 hours  chlorhexidine 2% Cloths 1 Application(s) Topical daily  cholecalciferol 1000 Unit(s) Oral daily  cyanocobalamin 1000 MICROGram(s) Oral daily  donepezil 10 milliGRAM(s) Oral at bedtime  folic acid 1 milliGRAM(s) Oral daily  heparin   Injectable 5000 Unit(s) SubCutaneous every 8 hours  memantine 10 milliGRAM(s) Oral two times a day  risperiDONE   Tablet 0.25 milliGRAM(s) Oral daily  risperiDONE   Tablet 0.5 milliGRAM(s) Oral at bedtime  sertraline 50 milliGRAM(s) Oral daily  sodium chloride 0.9%. 1000 milliLiter(s) (75 mL/Hr) IV Continuous <Continuous>  sodium chloride 0.9%. 1000 milliLiter(s) (75 mL/Hr) IV Continuous <Continuous>    MEDICATIONS  (PRN):  acetaminophen     Tablet .. 650 milliGRAM(s) Oral every 6 hours PRN Temp greater or equal to 38C (100.4F), Mild Pain (1 - 3)  aluminum hydroxide/magnesium hydroxide/simethicone Suspension 30 milliLiter(s) Oral every 4 hours PRN Dyspepsia  melatonin 3 milliGRAM(s) Oral at bedtime PRN Insomnia  ondansetron Injectable 4 milliGRAM(s) IV Push every 8 hours PRN Nausea and/or Vomiting  QUEtiapine 12.5 milliGRAM(s) Oral every 6 hours PRN for agitation    Vital Signs Last 24 Hrs  T(C): 36.8 (21 Jan 2024 10:49), Max: 36.8 (21 Jan 2024 10:49)  T(F): 98.2 (21 Jan 2024 10:49), Max: 98.2 (21 Jan 2024 10:49)  HR: 88 (21 Jan 2024 10:49) (88 - 94)  BP: 120/68 (21 Jan 2024 10:49) (120/68 - 122/78)  BP(mean): --  RR: 18 (21 Jan 2024 10:49) (16 - 18)  SpO2: 100% (21 Jan 2024 10:49) (98% - 100%)    Parameters below as of 21 Jan 2024 10:49  Patient On (Oxygen Delivery Method): room air      Daily     Daily     PHYSICAL EXAM:  Constitutional:  He appears comfortable and not distressed. Not diaphoretic.    Neck:  The thyroid is normal. Trachea is midline.     Breasts: Normal examination.    Respiratory: The lungs are clear to auscultation. No dullness and expansion is normal.    Cardiovascular: S1 and S2 are normal. No mummurs, rubs or gallops are present.    Gastrointestinal: The abdomen is soft. No tenderness is present. No masses are present. Bowel sounds are normal.    Genitourinary: The bladder is not distended. No CVA tenderness is present.    Extremities: No edema is noted. No deformities are present.    Neurological: Confused and agitated. Tone, power and sensation are normal. Gait is steady.    Skin: No leasions are seen  or palpated.    Lymph Nodes: No lymphadenopathy is present.

## 2024-01-21 NOTE — PROGRESS NOTE ADULT - SUBJECTIVE AND OBJECTIVE BOX
DATE OF SERVICE: 01-21-24       No complaints this am       acetaminophen     Tablet .. 650 milliGRAM(s) Oral every 6 hours PRN  albuterol/ipratropium for Nebulization 3 milliLiter(s) Nebulizer every 12 hours  aluminum hydroxide/magnesium hydroxide/simethicone Suspension 30 milliLiter(s) Oral every 4 hours PRN  chlorhexidine 2% Cloths 1 Application(s) Topical daily  cholecalciferol 1000 Unit(s) Oral daily  cyanocobalamin 1000 MICROGram(s) Oral daily  donepezil 10 milliGRAM(s) Oral at bedtime  folic acid 1 milliGRAM(s) Oral daily  heparin   Injectable 5000 Unit(s) SubCutaneous every 8 hours  melatonin 3 milliGRAM(s) Oral at bedtime PRN  memantine 10 milliGRAM(s) Oral two times a day  ondansetron Injectable 4 milliGRAM(s) IV Push every 8 hours PRN  QUEtiapine 12.5 milliGRAM(s) Oral every 6 hours PRN  risperiDONE   Tablet 0.25 milliGRAM(s) Oral daily  risperiDONE   Tablet 0.5 milliGRAM(s) Oral at bedtime  sertraline 50 milliGRAM(s) Oral daily  sodium chloride 0.9%. 1000 milliLiter(s) IV Continuous <Continuous>  sodium chloride 0.9%. 1000 milliLiter(s) IV Continuous <Continuous>          Hemoglobin: 8.1 g/dL (01-16 @ 14:53)            Creatinine Trend: 0.87<--, 0.78<--, 0.72<--, 0.76<--, 0.85<--, 0.93<--    COAGS:           T(C): 36.6 (01-21-24 @ 06:15), Max: 36.7 (01-20-24 @ 21:42)  HR: 92 (01-21-24 @ 06:15) (74 - 94)  BP: 122/78 (01-21-24 @ 06:15) (108/66 - 122/78)  RR: 16 (01-21-24 @ 06:15) (16 - 18)  SpO2: 99% (01-21-24 @ 06:15) (98% - 100%)  Wt(kg): --    I&O's Summary      General:  NAD  Head: Normocephalic and atraumatic.   Neck: No JVD. No bruits. Supple. Does not appear to be enlarged.   Cardiovascular: + S1,S2 ; RRR Soft systolic murmur at the left lower sternal border. No rubs noted.    Lungs: CTA b/l. No rhonchi, rales or wheezes.   Abdomen: + BS, soft. Non tender. Non distended. No rebound. No guarding.   Extremities: No clubbing/cyanosis/edema.   Neurologic: Moves all four extremities  Skin: Warm and moist. The patient's skin has normal elasticity and good skin turgor.   Psychiatric: unable to eval  Musculoskeletal: Normal range of motion      ECG:  	Sinus tachycardia    < from: Xray Chest 1 View AP/PA (01.02.24 @ 19:36) >  IMPRESSION:  No focal consolidations.  < end of copied text >    < from: Transthoracic Echocardiogram (07.10.23 @ 11:15) >  CONCLUSIONS:  1. Calcified trileaflet aortic valve with normal opening.  Minimal aortic regurgitation.  2. Endocardium not well visualized; grossly decreased  possibly mild left ventricular systolic dysfunction.  3. The right ventricle is not well visualized; grossly  normal right ventricular systolic function.  ------------------------------------------------------------------------  Confirmed on  7/10/2023 - 13:57:46 by MARLA Vegas  < end of copied text >      ASSESSMENT/PLAN: 	78M with history of MM, HTN, and Dementia (AAO x 1-2 to self, to place, not to time at baseline per brother) who presents to the hospital from [facility] for hypotension and lethargy found to have FLU A    -#Hypotension  -- BP stable  -- Norvasc and Lopressor on hold given acute illness  -- would cont to hold given stable BP    #lethargy  --due to Flu, now resolved  --s/p tamiflu and Abx per medicine    DC planning

## 2024-01-21 NOTE — PROGRESS NOTE ADULT - SUBJECTIVE AND OBJECTIVE BOX
Patient is a 78y old  Male who presents with a chief complaint of Hypotension, lethargu (21 Jan 2024 09:47)    Date of servie : 01-21-24 @ 15:41  INTERVAL HPI/OVERNIGHT EVENTS:  T(C): 36.6 (01-21-24 @ 06:15), Max: 36.7 (01-20-24 @ 21:42)  HR: 92 (01-21-24 @ 06:15) (92 - 94)  BP: 122/78 (01-21-24 @ 06:15) (120/83 - 122/78)  RR: 16 (01-21-24 @ 06:15) (16 - 18)  SpO2: 99% (01-21-24 @ 06:15) (98% - 99%)  Wt(kg): --  I&O's Summary      LABS:              CAPILLARY BLOOD GLUCOSE                MEDICATIONS  (STANDING):  albuterol/ipratropium for Nebulization 3 milliLiter(s) Nebulizer every 12 hours  chlorhexidine 2% Cloths 1 Application(s) Topical daily  cholecalciferol 1000 Unit(s) Oral daily  cyanocobalamin 1000 MICROGram(s) Oral daily  donepezil 10 milliGRAM(s) Oral at bedtime  folic acid 1 milliGRAM(s) Oral daily  heparin   Injectable 5000 Unit(s) SubCutaneous every 8 hours  memantine 10 milliGRAM(s) Oral two times a day  risperiDONE   Tablet 0.25 milliGRAM(s) Oral daily  risperiDONE   Tablet 0.5 milliGRAM(s) Oral at bedtime  sertraline 50 milliGRAM(s) Oral daily  sodium chloride 0.9%. 1000 milliLiter(s) (75 mL/Hr) IV Continuous <Continuous>  sodium chloride 0.9%. 1000 milliLiter(s) (75 mL/Hr) IV Continuous <Continuous>    MEDICATIONS  (PRN):  acetaminophen     Tablet .. 650 milliGRAM(s) Oral every 6 hours PRN Temp greater or equal to 38C (100.4F), Mild Pain (1 - 3)  aluminum hydroxide/magnesium hydroxide/simethicone Suspension 30 milliLiter(s) Oral every 4 hours PRN Dyspepsia  melatonin 3 milliGRAM(s) Oral at bedtime PRN Insomnia  ondansetron Injectable 4 milliGRAM(s) IV Push every 8 hours PRN Nausea and/or Vomiting  QUEtiapine 12.5 milliGRAM(s) Oral every 6 hours PRN for agitation          PHYSICAL EXAM:  GENERAL: NAD, well-groomed, well-developed  HEAD:  Atraumatic, Normocephalic  CHEST/LUNG: Clear to percussion bilaterally; No rales, rhonchi, wheezing, or rubs  HEART: Regular rate and rhythm; No murmurs, rubs, or gallops  ABDOMEN: Soft, Nontender, Nondistended; Bowel sounds present  EXTREMITIES:  2+ Peripheral Pulses, No clubbing, cyanosis, or edema  LYMPH: No lymphadenopathy noted  SKIN: No rashes or lesions    Care Discussed with Consultants/Other Providers [ ] YES  [ ] NO

## 2024-01-21 NOTE — PROGRESS NOTE ADULT - ASSESSMENT
Attending addendum:   Agree with above  Antihypertensive medications on hold due to prior low BP   Restart as tolerated    Elgin Maynard MD

## 2024-01-22 RX ORDER — QUETIAPINE FUMARATE 200 MG/1
12.5 TABLET, FILM COATED ORAL ONCE
Refills: 0 | Status: COMPLETED | OUTPATIENT
Start: 2024-01-22 | End: 2024-01-22

## 2024-01-22 RX ADMIN — QUETIAPINE FUMARATE 12.5 MILLIGRAM(S): 200 TABLET, FILM COATED ORAL at 00:30

## 2024-01-22 RX ADMIN — QUETIAPINE FUMARATE 12.5 MILLIGRAM(S): 200 TABLET, FILM COATED ORAL at 10:19

## 2024-01-22 RX ADMIN — RISPERIDONE 0.5 MILLIGRAM(S): 4 TABLET ORAL at 22:29

## 2024-01-22 RX ADMIN — Medication 3 MILLILITER(S): at 21:45

## 2024-01-22 RX ADMIN — Medication 3 MILLIGRAM(S): at 22:28

## 2024-01-22 RX ADMIN — RISPERIDONE 0.25 MILLIGRAM(S): 4 TABLET ORAL at 13:32

## 2024-01-22 RX ADMIN — PREGABALIN 1000 MICROGRAM(S): 225 CAPSULE ORAL at 13:32

## 2024-01-22 RX ADMIN — HEPARIN SODIUM 5000 UNIT(S): 5000 INJECTION INTRAVENOUS; SUBCUTANEOUS at 07:37

## 2024-01-22 RX ADMIN — QUETIAPINE FUMARATE 12.5 MILLIGRAM(S): 200 TABLET, FILM COATED ORAL at 22:52

## 2024-01-22 RX ADMIN — CHLORHEXIDINE GLUCONATE 1 APPLICATION(S): 213 SOLUTION TOPICAL at 13:33

## 2024-01-22 RX ADMIN — Medication 1000 UNIT(S): at 13:31

## 2024-01-22 RX ADMIN — SERTRALINE 50 MILLIGRAM(S): 25 TABLET, FILM COATED ORAL at 13:32

## 2024-01-22 RX ADMIN — DONEPEZIL HYDROCHLORIDE 10 MILLIGRAM(S): 10 TABLET, FILM COATED ORAL at 22:29

## 2024-01-22 RX ADMIN — QUETIAPINE FUMARATE 12.5 MILLIGRAM(S): 200 TABLET, FILM COATED ORAL at 17:50

## 2024-01-22 RX ADMIN — MEMANTINE HYDROCHLORIDE 10 MILLIGRAM(S): 10 TABLET ORAL at 07:38

## 2024-01-22 RX ADMIN — Medication 1 MILLIGRAM(S): at 13:30

## 2024-01-22 RX ADMIN — HEPARIN SODIUM 5000 UNIT(S): 5000 INJECTION INTRAVENOUS; SUBCUTANEOUS at 13:33

## 2024-01-22 RX ADMIN — MEMANTINE HYDROCHLORIDE 10 MILLIGRAM(S): 10 TABLET ORAL at 17:50

## 2024-01-22 RX ADMIN — Medication 3 MILLILITER(S): at 09:36

## 2024-01-22 NOTE — PROGRESS NOTE ADULT - ASSESSMENT
This is a 78M with history of MM, HTN, and Dementia (AAO x 1-2 to self, to place, not to time at baseline per brother) who presents to the hospital from [facility] for hypotension and lethargy. Patient currently awake, alert, AAO x2 (to self, to hospital but not name, not to time) but very poor historian, denies any acute complaints when asked. Spoke with brother Mookie who said that the patient was sent to the hospital for lethargy and cough though he states that the patient has had a cough for several months. Brother denies any other known acute complaints for the patient. Called [facility] [phone] but there was no staff available who knew the patient. As per NH paperwork and ED note, patient was sent to the hospital for hypotention to 81/55 and lethargy. He was noted to have a low grade temp of 99.6 and saturation of 93% at his NH. He was given tylenol 650mg x1 prior to being sent to the hospital.   On arrival to the hospital his vitals were T 99.4 -> 100.7 rectal, P 92, BP 88/55 -> 117/70, RR 16, O2 sat 96% RA. His lab work was significant for worsening macrocytic anemia, DAVID with mild hyponatremia, elevated protein gap, elevated lactate with improvement on repeat. His UA was positive for nitrite, leuk esterase but negative for bacteria. His respiratory swab was positive for influenza A. His imaging did not show acute abnormalities. He was given ofirmev 1g, NS 500cc x1, vanc 1g, cefepime 2g, and tamiflu 75mg PO x1. He was admitted to medicine.  (03 Jan 2024 06:06):  now pulm called:  he is from NH:  above history noted:  he seems to be responding to simple commands:  he say he has no underlying lung history  never smoked:  on room air:  adm with influenza:     Influenza:   Multiple Myeloma  HTN  Dementia      Influenza:   -low grade fever  -Influenza:  on tamilflu  -cxr is clear:  -he is not wheezing    1/4: no change in pulm status today  : remains on room air  1/5: doing ok : no sob:  no cough : no phlegm;   1/6: on Tamiflu  no sob:  on room air  1/7: seems to be doing ok : no sob:  no cough:   1/8: seems OK: no sob:  no cough : no phlegm  on room air  1/9: doing ok: no sob:   on cough :  1/10: seems OK: no sob:  on room air:  remains lethargic: Afebrile and is normal BC count  1/11: resolved  1/14: seems OK; no sob:  on room air:  responds to questions  1/15: seems to be doing ok : no sob: no cough : no phlegm  : no wheezing  1/17: resolved:  doing great : no sob:   1/18: seems stable: no sob:    1/20: no new events  : remains on room air;  no new pulm issues:  for dc on monday 1/22; no events over the week end   Multiple Myeloma  -per primary team : FDG PET scan  noted:  rib lesions present likely secondary to MM   1/18: no new events  1/20: per primary team  HTN  -controlled  -anti hypertensives on old  Dementia  -supportive care:    karyn acp  : dc planning

## 2024-01-22 NOTE — PROGRESS NOTE ADULT - ASSESSMENT
This is a 78M with history of MM, HTN, and Dementia (AAO x 1-2 to self, to place, not to time at baseline per brother) who presents to the hospital from [facility] for hypotension and lethargy. Patient currently awake, alert, AAO x1 (to self,) but very poor historian, denies any acute complaints when asked. nephrology consulted for david    DAVID  admitted with scr 1.4  He had sepsis so was likely ATN  DAVID has improved.  - Monitor BMP    Hematuria  no blood on repeat UA on 1/15   renal us with no mass.  - Will monitor.      Hyponatremia  ? 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    anemia  recommend iron panel   transfusion and work up per team  Monitor Hb    hypercalcemia/ hypophosphatemna  PTH low-5  VItamin D wnl   Continue vit D  Monitor PTH, PO4, Ca

## 2024-01-22 NOTE — PROGRESS NOTE ADULT - NS ATTEND AMEND GEN_ALL_CORE FT
Patient seen and examined. Agree with plan as detailed in PA/NP Note.     BP controlled can continue to hold BP meds    Staci Muñiz MD  Pager: [phone]

## 2024-01-22 NOTE — PROGRESS NOTE ADULT - SUBJECTIVE AND OBJECTIVE BOX
Patient is a 78y old  Male who presents with a chief complaint of Hypotension, lethargu (22 Jan 2024 13:07)    Date of servie : 01-22-24 @ 14:17  INTERVAL HPI/OVERNIGHT EVENTS:  T(C): 36.6 (01-22-24 @ 07:30), Max: 36.8 (01-21-24 @ 18:16)  HR: 90 (01-22-24 @ 09:36) (80 - 90)  BP: 120/74 (01-22-24 @ 07:30) (102/61 - 120/74)  RR: 16 (01-22-24 @ 07:30) (16 - 18)  SpO2: 99% (01-22-24 @ 09:36) (98% - 99%)  Wt(kg): --  I&O's Summary      LABS:              CAPILLARY BLOOD GLUCOSE                MEDICATIONS  (STANDING):  albuterol/ipratropium for Nebulization 3 milliLiter(s) Nebulizer every 12 hours  chlorhexidine 2% Cloths 1 Application(s) Topical daily  cholecalciferol 1000 Unit(s) Oral daily  cyanocobalamin 1000 MICROGram(s) Oral daily  donepezil 10 milliGRAM(s) Oral at bedtime  folic acid 1 milliGRAM(s) Oral daily  heparin   Injectable 5000 Unit(s) SubCutaneous every 8 hours  memantine 10 milliGRAM(s) Oral two times a day  risperiDONE   Tablet 0.25 milliGRAM(s) Oral daily  risperiDONE   Tablet 0.5 milliGRAM(s) Oral at bedtime  sertraline 50 milliGRAM(s) Oral daily  sodium chloride 0.9%. 1000 milliLiter(s) (75 mL/Hr) IV Continuous <Continuous>  sodium chloride 0.9%. 1000 milliLiter(s) (75 mL/Hr) IV Continuous <Continuous>    MEDICATIONS  (PRN):  acetaminophen     Tablet .. 650 milliGRAM(s) Oral every 6 hours PRN Temp greater or equal to 38C (100.4F), Mild Pain (1 - 3)  aluminum hydroxide/magnesium hydroxide/simethicone Suspension 30 milliLiter(s) Oral every 4 hours PRN Dyspepsia  melatonin 3 milliGRAM(s) Oral at bedtime PRN Insomnia  ondansetron Injectable 4 milliGRAM(s) IV Push every 8 hours PRN Nausea and/or Vomiting  QUEtiapine 12.5 milliGRAM(s) Oral every 6 hours PRN for agitation          PHYSICAL EXAM:  GENERAL: NAD, well-groomed, well-developed  HEAD:  Atraumatic, Normocephalic  CHEST/LUNG: Clear to percussion bilaterally; No rales, rhonchi, wheezing, or rubs  HEART: Regular rate and rhythm; No murmurs, rubs, or gallops  ABDOMEN: Soft, Nontender, Nondistended; Bowel sounds present  EXTREMITIES:  2+ Peripheral Pulses, No clubbing, cyanosis, or edema  LYMPH: No lymphadenopathy noted  SKIN: No rashes or lesions    Care Discussed with Consultants/Other Providers [ ] YES  [ ] NO

## 2024-01-22 NOTE — PROGRESS NOTE ADULT - SUBJECTIVE AND OBJECTIVE BOX
DATE OF SERVICE: 01-22-24    No overnight events  Review of symptoms otherwise negative.    Review of Systems:   Constitutional: [ ] fevers, [ ] chills.   Skin: [ ] dry skin. [ ] rashes.  Psychiatric: [ ] depression, [ ] anxiety.   Gastrointestinal: [ ] BRBPR, [ ] melena.   Neurological: [ ] confusion. [ ] seizures. [ ] shuffling gait.   Ears,Nose,Mouth and Throat: [ ] ear pain [ ] sore throat.   Eyes: [ ] diplopia.   Respiratory: [ ] hemoptysis. [ ] shortness of breath  Cardiovascular: See HPI above  Hematologic/Lymphatic: [ ] anemia. [ ] painful nodes. [ ] prolonged bleeding.   Genitourinary: [ ] hematuria. [ ] flank pain.   Endocrine: [ ] significant change in weight. [ ] intolerance to heat and cold.     Review of systems [x ] otherwise negative, [ ] otherwise unable to obtain    FH: no family history of sudden cardiac death in first degree relatives    SH: [ ] tobacco, [ ] alcohol, [ ] drugs    acetaminophen     Tablet .. 650 milliGRAM(s) Oral every 6 hours PRN  albuterol/ipratropium for Nebulization 3 milliLiter(s) Nebulizer every 12 hours  aluminum hydroxide/magnesium hydroxide/simethicone Suspension 30 milliLiter(s) Oral every 4 hours PRN  chlorhexidine 2% Cloths 1 Application(s) Topical daily  cholecalciferol 1000 Unit(s) Oral daily  cyanocobalamin 1000 MICROGram(s) Oral daily  donepezil 10 milliGRAM(s) Oral at bedtime  folic acid 1 milliGRAM(s) Oral daily  heparin   Injectable 5000 Unit(s) SubCutaneous every 8 hours  melatonin 3 milliGRAM(s) Oral at bedtime PRN  memantine 10 milliGRAM(s) Oral two times a day  ondansetron Injectable 4 milliGRAM(s) IV Push every 8 hours PRN  QUEtiapine 12.5 milliGRAM(s) Oral every 6 hours PRN  risperiDONE   Tablet 0.5 milliGRAM(s) Oral at bedtime  risperiDONE   Tablet 0.25 milliGRAM(s) Oral daily  sertraline 50 milliGRAM(s) Oral daily  sodium chloride 0.9%. 1000 milliLiter(s) IV Continuous <Continuous>  sodium chloride 0.9%. 1000 milliLiter(s) IV Continuous <Continuous>      T(C): 36.6 (01-22-24 @ 07:30), Max: 36.8 (01-21-24 @ 18:16)  HR: 90 (01-22-24 @ 09:36) (80 - 90)  BP: 120/74 (01-22-24 @ 07:30) (102/61 - 120/74)  RR: 16 (01-22-24 @ 07:30) (16 - 18)  SpO2: 99% (01-22-24 @ 09:36) (98% - 99%)  Wt(kg): --    General:  NAD  Head: Normocephalic and atraumatic.   Neck: No JVD. No bruits. Supple. Does not appear to be enlarged.   Cardiovascular: + S1,S2 ; RRR Soft systolic murmur at the left lower sternal border. No rubs noted.    Lungs: CTA b/l. No rhonchi, rales or wheezes.   Abdomen: + BS, soft. Non tender. Non distended. No rebound. No guarding.   Extremities: No clubbing/cyanosis/edema.   Neurologic: Moves all four extremities  Skin: Warm and moist. The patient's skin has normal elasticity and good skin turgor.   Psychiatric: unable to eval  Musculoskeletal: Normal range of motion      ECG:  	Sinus tachycardia    < from: Xray Chest 1 View AP/PA (01.02.24 @ 19:36) >  IMPRESSION:  No focal consolidations.  < end of copied text >    < from: Transthoracic Echocardiogram (07.10.23 @ 11:15) >  CONCLUSIONS:  1. Calcified trileaflet aortic valve with normal opening.  Minimal aortic regurgitation.  2. Endocardium not well visualized; grossly decreased  possibly mild left ventricular systolic dysfunction.  3. The right ventricle is not well visualized; grossly  normal right ventricular systolic function.  ------------------------------------------------------------------------  Confirmed on  7/10/2023 - 13:57:46 by MARLA Vegas  < end of copied text >      ASSESSMENT/PLAN: 	78M with history of MM, HTN, and Dementia (AAO x 1-2 to self, to place, not to time at baseline per brother) who presents to the hospital from [facility] for hypotension and lethargy found to have FLU A    -#Hypotension  -- BP stable  -- Norvasc and Lopressor on hold given acute illness  -- would cont to hold given stable BP    #lethargy  --due to Flu, now resolved  --s/p tamiflu and Abx per medicine    DC planning   DATE OF SERVICE: 01-22-24    No overnight events  Review of symptoms otherwise negative.    Review of Systems:   Constitutional: [ ] fevers, [ ] chills.   Skin: [ ] dry skin. [ ] rashes.  Psychiatric: [ ] depression, [ ] anxiety.   Gastrointestinal: [ ] BRBPR, [ ] melena.   Neurological: [ ] confusion. [ ] seizures. [ ] shuffling gait.   Ears,Nose,Mouth and Throat: [ ] ear pain [ ] sore throat.   Eyes: [ ] diplopia.   Respiratory: [ ] hemoptysis. [ ] shortness of breath  Cardiovascular: See HPI above  Hematologic/Lymphatic: [ ] anemia. [ ] painful nodes. [ ] prolonged bleeding.   Genitourinary: [ ] hematuria. [ ] flank pain.   Endocrine: [ ] significant change in weight. [ ] intolerance to heat and cold.     Review of systems [x ] otherwise negative, [ ] otherwise unable to obtain    FH: no family history of sudden cardiac death in first degree relatives    SH: [ ] tobacco, [ ] alcohol, [ ] drugs    acetaminophen     Tablet .. 650 milliGRAM(s) Oral every 6 hours PRN  albuterol/ipratropium for Nebulization 3 milliLiter(s) Nebulizer every 12 hours  aluminum hydroxide/magnesium hydroxide/simethicone Suspension 30 milliLiter(s) Oral every 4 hours PRN  chlorhexidine 2% Cloths 1 Application(s) Topical daily  cholecalciferol 1000 Unit(s) Oral daily  cyanocobalamin 1000 MICROGram(s) Oral daily  donepezil 10 milliGRAM(s) Oral at bedtime  folic acid 1 milliGRAM(s) Oral daily  heparin   Injectable 5000 Unit(s) SubCutaneous every 8 hours  melatonin 3 milliGRAM(s) Oral at bedtime PRN  memantine 10 milliGRAM(s) Oral two times a day  ondansetron Injectable 4 milliGRAM(s) IV Push every 8 hours PRN  QUEtiapine 12.5 milliGRAM(s) Oral every 6 hours PRN  risperiDONE   Tablet 0.5 milliGRAM(s) Oral at bedtime  risperiDONE   Tablet 0.25 milliGRAM(s) Oral daily  sertraline 50 milliGRAM(s) Oral daily  sodium chloride 0.9%. 1000 milliLiter(s) IV Continuous <Continuous>  sodium chloride 0.9%. 1000 milliLiter(s) IV Continuous <Continuous>      T(C): 36.6 (01-22-24 @ 07:30), Max: 36.8 (01-21-24 @ 18:16)  HR: 90 (01-22-24 @ 09:36) (80 - 90)  BP: 120/74 (01-22-24 @ 07:30) (102/61 - 120/74)  RR: 16 (01-22-24 @ 07:30) (16 - 18)  SpO2: 99% (01-22-24 @ 09:36) (98% - 99%)  Wt(kg): --    General:  NAD  Head: Normocephalic and atraumatic.   Neck: No JVD. No bruits. Supple. Does not appear to be enlarged.   Cardiovascular: + S1,S2 ; RRR Soft systolic murmur at the left lower sternal border. No rubs noted.    Lungs: CTA b/l. No rhonchi, rales or wheezes.   Abdomen: + BS, soft. Non tender. Non distended. No rebound. No guarding.   Extremities: No clubbing/cyanosis/edema.   Neurologic: Moves all four extremities  Skin: Warm and moist. The patient's skin has normal elasticity and good skin turgor.   Psychiatric: unable to eval  Musculoskeletal: Normal range of motion      ECG:  	Sinus tachycardia    < from: Xray Chest 1 View AP/PA (01.02.24 @ 19:36) >  IMPRESSION:  No focal consolidations.  < end of copied text >    < from: Transthoracic Echocardiogram (07.10.23 @ 11:15) >  CONCLUSIONS:  1. Calcified trileaflet aortic valve with normal opening.  Minimal aortic regurgitation.  2. Endocardium not well visualized; grossly decreased  possibly mild left ventricular systolic dysfunction.  3. The right ventricle is not well visualized; grossly  normal right ventricular systolic function.  ------------------------------------------------------------------------  Confirmed on  7/10/2023 - 13:57:46 by MARLA Vegas  < end of copied text >      ASSESSMENT/PLAN: 	78M with history of MM, HTN, and Dementia (AAO x 1-2 to self, to place, not to time at baseline per brother) who presents to the hospital from [facility] for hypotension and lethargy found to have FLU A    -#Hypotension  -- BP stable  -- Norvasc and Lopressor on hold given acute illness  -- would cont to hold given stable BP     #lethargy  --due to Flu, now resolved  --s/p tamiflu and Abx per medicine    DC planning

## 2024-01-22 NOTE — PROGRESS NOTE ADULT - SUBJECTIVE AND OBJECTIVE BOX
Date of Service: 01-22-24 @ 10:51    Patient is a 78y old  Male who presents with a chief complaint of Hypotension, lethargu (21 Jan 2024 17:00)      Any change in ROS: seems to be doing ok : sleepy:  no overnight events: on room air     MEDICATIONS  (STANDING):  albuterol/ipratropium for Nebulization 3 milliLiter(s) Nebulizer every 12 hours  chlorhexidine 2% Cloths 1 Application(s) Topical daily  cholecalciferol 1000 Unit(s) Oral daily  cyanocobalamin 1000 MICROGram(s) Oral daily  donepezil 10 milliGRAM(s) Oral at bedtime  folic acid 1 milliGRAM(s) Oral daily  heparin   Injectable 5000 Unit(s) SubCutaneous every 8 hours  memantine 10 milliGRAM(s) Oral two times a day  risperiDONE   Tablet 0.25 milliGRAM(s) Oral daily  risperiDONE   Tablet 0.5 milliGRAM(s) Oral at bedtime  sertraline 50 milliGRAM(s) Oral daily  sodium chloride 0.9%. 1000 milliLiter(s) (75 mL/Hr) IV Continuous <Continuous>  sodium chloride 0.9%. 1000 milliLiter(s) (75 mL/Hr) IV Continuous <Continuous>    MEDICATIONS  (PRN):  acetaminophen     Tablet .. 650 milliGRAM(s) Oral every 6 hours PRN Temp greater or equal to 38C (100.4F), Mild Pain (1 - 3)  aluminum hydroxide/magnesium hydroxide/simethicone Suspension 30 milliLiter(s) Oral every 4 hours PRN Dyspepsia  melatonin 3 milliGRAM(s) Oral at bedtime PRN Insomnia  ondansetron Injectable 4 milliGRAM(s) IV Push every 8 hours PRN Nausea and/or Vomiting  QUEtiapine 12.5 milliGRAM(s) Oral every 6 hours PRN for agitation    Vital Signs Last 24 Hrs  T(C): 36.6 (22 Jan 2024 07:30), Max: 36.8 (21 Jan 2024 18:16)  T(F): 97.9 (22 Jan 2024 07:30), Max: 98.3 (21 Jan 2024 18:16)  HR: 90 (22 Jan 2024 09:36) (80 - 90)  BP: 120/74 (22 Jan 2024 07:30) (102/61 - 120/74)  BP(mean): --  RR: 16 (22 Jan 2024 07:30) (16 - 18)  SpO2: 99% (22 Jan 2024 09:36) (98% - 99%)    Parameters below as of 22 Jan 2024 07:30  Patient On (Oxygen Delivery Method): room air        I&O's Summary        Physical Exam:   GENERAL: NAD, well-groomed, well-developed  HEENT: CHRISTINA/   Atraumatic, Normocephalic  ENMT: No tonsillar erythema, exudates, or enlargement; Moist mucous membranes, Good dentition, No lesions  NECK: Supple, No JVD, Normal thyroid  CHEST/LUNG: Clear to auscultaion  CVS: Regular rate and rhythm; No murmurs, rubs, or gallops  GI: : Soft, Nontender, Nondistended; Bowel sounds present  NERVOUS SYSTEM:  Alert & awake  EXTREMITIES: - edema  LYMPH: No lymphadenopathy noted  SKIN: No rashes or lesions  ENDOCRINOLOGY: No Thyromegaly  PSYCH: dementia    Labs:                CAPILLARY BLOOD GLUCOSE                      RECENT CULTURES:        RESPIRATORY CULTURES:          Studies  Chest X-RAY  CT SCAN Chest   Venous Dopplers: LE:   CT Abdomen  Others        rad< from: Xray Chest 1 View AP/PA (01.02.24 @ 19:36) >    ACC: [id number] EXAM:  XR CHEST AP OR PA 1V   ORDERED BY: CATHIE MULLER     PROCEDURE DATE:  01/02/2024          INTERPRETATION:  CLINICAL INDICATION: Sepsis.    EXAM: Chest x-ray 2 views.    COMPARISON: None available.    FINDINGS:  Surgical clips overlying the midline of the neck.  Bilateral lung fields partially obscured by patient positioning.   Visualized lung fields are clear.  There is no pleural effusion or pneumothorax.  The heart is not well evaluated in this position. Median sternotomy.  The visualized osseous structures demonstrate no acute pathology.    IMPRESSION:  No focal consolidations.    --- End of Report ---          MIR LAW MD; Resident Radiologist  This document has been electronically signed.  KOFI GARCIA MD; Attending Radiologist  This document has been electronically signed. Jan 2 2024  7:43PM    < end of copied text >

## 2024-01-23 LAB
B PERT DNA SPEC QL NAA+PROBE: SIGNIFICANT CHANGE UP
B PERT+PARAPERT DNA PNL SPEC NAA+PROBE: SIGNIFICANT CHANGE UP
BORDETELLA PARAPERTUSSIS (RAPRVP): SIGNIFICANT CHANGE UP
C PNEUM DNA SPEC QL NAA+PROBE: SIGNIFICANT CHANGE UP
FLUAV SUBTYP SPEC NAA+PROBE: SIGNIFICANT CHANGE UP
FLUBV RNA SPEC QL NAA+PROBE: SIGNIFICANT CHANGE UP
GLUCOSE BLDC GLUCOMTR-MCNC: 122 MG/DL — HIGH (ref 70–99)
HADV DNA SPEC QL NAA+PROBE: SIGNIFICANT CHANGE UP
HCOV 229E RNA SPEC QL NAA+PROBE: SIGNIFICANT CHANGE UP
HCOV HKU1 RNA SPEC QL NAA+PROBE: SIGNIFICANT CHANGE UP
HCOV NL63 RNA SPEC QL NAA+PROBE: SIGNIFICANT CHANGE UP
HCOV OC43 RNA SPEC QL NAA+PROBE: SIGNIFICANT CHANGE UP
HMPV RNA SPEC QL NAA+PROBE: SIGNIFICANT CHANGE UP
HPIV1 RNA SPEC QL NAA+PROBE: SIGNIFICANT CHANGE UP
HPIV2 RNA SPEC QL NAA+PROBE: SIGNIFICANT CHANGE UP
HPIV3 RNA SPEC QL NAA+PROBE: SIGNIFICANT CHANGE UP
HPIV4 RNA SPEC QL NAA+PROBE: SIGNIFICANT CHANGE UP
M PNEUMO DNA SPEC QL NAA+PROBE: SIGNIFICANT CHANGE UP
RAPID RVP RESULT: SIGNIFICANT CHANGE UP
RSV RNA SPEC QL NAA+PROBE: SIGNIFICANT CHANGE UP
RV+EV RNA SPEC QL NAA+PROBE: SIGNIFICANT CHANGE UP
SARS-COV-2 RNA SPEC QL NAA+PROBE: SIGNIFICANT CHANGE UP

## 2024-01-23 RX ADMIN — Medication 1000 UNIT(S): at 14:08

## 2024-01-23 RX ADMIN — DONEPEZIL HYDROCHLORIDE 10 MILLIGRAM(S): 10 TABLET, FILM COATED ORAL at 23:09

## 2024-01-23 RX ADMIN — MEMANTINE HYDROCHLORIDE 10 MILLIGRAM(S): 10 TABLET ORAL at 06:37

## 2024-01-23 RX ADMIN — Medication 3 MILLILITER(S): at 09:40

## 2024-01-23 RX ADMIN — Medication 3 MILLILITER(S): at 21:31

## 2024-01-23 RX ADMIN — PREGABALIN 1000 MICROGRAM(S): 225 CAPSULE ORAL at 14:08

## 2024-01-23 RX ADMIN — QUETIAPINE FUMARATE 12.5 MILLIGRAM(S): 200 TABLET, FILM COATED ORAL at 23:10

## 2024-01-23 RX ADMIN — SERTRALINE 50 MILLIGRAM(S): 25 TABLET, FILM COATED ORAL at 14:08

## 2024-01-23 RX ADMIN — HEPARIN SODIUM 5000 UNIT(S): 5000 INJECTION INTRAVENOUS; SUBCUTANEOUS at 06:37

## 2024-01-23 RX ADMIN — CHLORHEXIDINE GLUCONATE 1 APPLICATION(S): 213 SOLUTION TOPICAL at 14:06

## 2024-01-23 RX ADMIN — RISPERIDONE 0.25 MILLIGRAM(S): 4 TABLET ORAL at 14:08

## 2024-01-23 RX ADMIN — MEMANTINE HYDROCHLORIDE 10 MILLIGRAM(S): 10 TABLET ORAL at 18:24

## 2024-01-23 RX ADMIN — QUETIAPINE FUMARATE 12.5 MILLIGRAM(S): 200 TABLET, FILM COATED ORAL at 06:36

## 2024-01-23 RX ADMIN — Medication 1 MILLIGRAM(S): at 14:08

## 2024-01-23 RX ADMIN — HEPARIN SODIUM 5000 UNIT(S): 5000 INJECTION INTRAVENOUS; SUBCUTANEOUS at 23:09

## 2024-01-23 RX ADMIN — HEPARIN SODIUM 5000 UNIT(S): 5000 INJECTION INTRAVENOUS; SUBCUTANEOUS at 14:09

## 2024-01-23 RX ADMIN — RISPERIDONE 0.5 MILLIGRAM(S): 4 TABLET ORAL at 23:08

## 2024-01-23 NOTE — PROGRESS NOTE ADULT - SUBJECTIVE AND OBJECTIVE BOX
Patient is a 78y old  Male who presents with a chief complaint of Hypotension, lethargu (23 Jan 2024 12:58)    Date of servie : 01-23-24 @ 13:52  INTERVAL HPI/OVERNIGHT EVENTS:  T(C): 36.9 (01-22-24 @ 21:05), Max: 36.9 (01-22-24 @ 21:05)  HR: 81 (01-23-24 @ 09:40) (81 - 92)  BP: 106/66 (01-22-24 @ 21:05) (106/66 - 118/76)  RR: 18 (01-22-24 @ 21:05) (18 - 18)  SpO2: 98% (01-23-24 @ 09:40) (96% - 98%)  Wt(kg): --  I&O's Summary    22 Jan 2024 07:01  -  23 Jan 2024 07:00  --------------------------------------------------------  IN: 480 mL / OUT: 0 mL / NET: 480 mL        LABS:              CAPILLARY BLOOD GLUCOSE                MEDICATIONS  (STANDING):  albuterol/ipratropium for Nebulization 3 milliLiter(s) Nebulizer every 12 hours  chlorhexidine 2% Cloths 1 Application(s) Topical daily  cholecalciferol 1000 Unit(s) Oral daily  cyanocobalamin 1000 MICROGram(s) Oral daily  donepezil 10 milliGRAM(s) Oral at bedtime  folic acid 1 milliGRAM(s) Oral daily  heparin   Injectable 5000 Unit(s) SubCutaneous every 8 hours  memantine 10 milliGRAM(s) Oral two times a day  risperiDONE   Tablet 0.25 milliGRAM(s) Oral daily  risperiDONE   Tablet 0.5 milliGRAM(s) Oral at bedtime  sertraline 50 milliGRAM(s) Oral daily  sodium chloride 0.9%. 1000 milliLiter(s) (75 mL/Hr) IV Continuous <Continuous>  sodium chloride 0.9%. 1000 milliLiter(s) (75 mL/Hr) IV Continuous <Continuous>    MEDICATIONS  (PRN):  acetaminophen     Tablet .. 650 milliGRAM(s) Oral every 6 hours PRN Temp greater or equal to 38C (100.4F), Mild Pain (1 - 3)  aluminum hydroxide/magnesium hydroxide/simethicone Suspension 30 milliLiter(s) Oral every 4 hours PRN Dyspepsia  melatonin 3 milliGRAM(s) Oral at bedtime PRN Insomnia  ondansetron Injectable 4 milliGRAM(s) IV Push every 8 hours PRN Nausea and/or Vomiting  QUEtiapine 12.5 milliGRAM(s) Oral every 6 hours PRN for agitation          PHYSICAL EXAM:  GENERAL: NAD, well-groomed, well-developed  HEAD:  Atraumatic, Normocephalic  CHEST/LUNG: Clear to percussion bilaterally; No rales, rhonchi, wheezing, or rubs  HEART: Regular rate and rhythm; No murmurs, rubs, or gallops  ABDOMEN: Soft, Nontender, Nondistended; Bowel sounds present  EXTREMITIES:  2+ Peripheral Pulses, No clubbing, cyanosis, or edema  LYMPH: No lymphadenopathy noted  SKIN: No rashes or lesions    Care Discussed with Consultants/Other Providers [ ] YES  [ ] NO

## 2024-01-23 NOTE — PROGRESS NOTE ADULT - NS ATTEND AMEND GEN_ALL_CORE FT
Patient seen and examined. Agree with plan as detailed in PA/NP Note.     Continue to hold lopressor and Norvasc    Staci Muñiz MD  Pager: [phone]

## 2024-01-23 NOTE — PROGRESS NOTE ADULT - SUBJECTIVE AND OBJECTIVE BOX
Date of Service: 01-23-24 @ 12:16    Patient is a 78y old  Male who presents with a chief complaint of Hypotension, lethargu (22 Jan 2024 17:12)      Any change in ROS: seems OK: no sob:  no cough : no phlegm      MEDICATIONS  (STANDING):  albuterol/ipratropium for Nebulization 3 milliLiter(s) Nebulizer every 12 hours  chlorhexidine 2% Cloths 1 Application(s) Topical daily  cholecalciferol 1000 Unit(s) Oral daily  cyanocobalamin 1000 MICROGram(s) Oral daily  donepezil 10 milliGRAM(s) Oral at bedtime  folic acid 1 milliGRAM(s) Oral daily  heparin   Injectable 5000 Unit(s) SubCutaneous every 8 hours  memantine 10 milliGRAM(s) Oral two times a day  risperiDONE   Tablet 0.25 milliGRAM(s) Oral daily  risperiDONE   Tablet 0.5 milliGRAM(s) Oral at bedtime  sertraline 50 milliGRAM(s) Oral daily  sodium chloride 0.9%. 1000 milliLiter(s) (75 mL/Hr) IV Continuous <Continuous>  sodium chloride 0.9%. 1000 milliLiter(s) (75 mL/Hr) IV Continuous <Continuous>    MEDICATIONS  (PRN):  acetaminophen     Tablet .. 650 milliGRAM(s) Oral every 6 hours PRN Temp greater or equal to 38C (100.4F), Mild Pain (1 - 3)  aluminum hydroxide/magnesium hydroxide/simethicone Suspension 30 milliLiter(s) Oral every 4 hours PRN Dyspepsia  melatonin 3 milliGRAM(s) Oral at bedtime PRN Insomnia  ondansetron Injectable 4 milliGRAM(s) IV Push every 8 hours PRN Nausea and/or Vomiting  QUEtiapine 12.5 milliGRAM(s) Oral every 6 hours PRN for agitation    Vital Signs Last 24 Hrs  T(C): 36.9 (22 Jan 2024 21:05), Max: 36.9 (22 Jan 2024 21:05)  T(F): 98.5 (22 Jan 2024 21:05), Max: 98.5 (22 Jan 2024 21:05)  HR: 81 (23 Jan 2024 09:40) (81 - 92)  BP: 106/66 (22 Jan 2024 21:05) (106/66 - 118/76)  BP(mean): --  RR: 18 (22 Jan 2024 21:05) (18 - 18)  SpO2: 98% (23 Jan 2024 09:40) (96% - 98%)    Parameters below as of 22 Jan 2024 21:05  Patient On (Oxygen Delivery Method): room air        I&O's Summary    22 Jan 2024 07:01  -  23 Jan 2024 07:00  --------------------------------------------------------  IN: 480 mL / OUT: 0 mL / NET: 480 mL          Physical Exam:   GENERAL: NAD, well-groomed, well-developed  HEENT: CHRISTINA/   Atraumatic, Normocephalic  ENMT: No tonsillar erythema, exudates, or enlargement; Moist mucous membranes, Good dentition, No lesions  NECK: Supple, No JVD, Normal thyroid  CHEST/LUNG: Clear to auscultaion- no wheezing  CVS: Regular rate and rhythm; No murmurs, rubs, or gallops  GI: : Soft, Nontender, Nondistended; Bowel sounds present  NERVOUS SYSTEM:  Alert & Oriented X0  EXTREMITIES: -edema  LYMPH: No lymphadenopathy noted  SKIN: No rashes or lesions  ENDOCRINOLOGY: No Thyromegaly  PSYCH: calm     Labs:                CAPILLARY BLOOD GLUCOSE    rad< from: Xray Chest 1 View AP/PA (01.02.24 @ 19:36) >    ACC: [id number] EXAM:  XR CHEST AP OR PA 1V   ORDERED BY: CATHIE MULLER     PROCEDURE DATE:  01/02/2024          INTERPRETATION:  CLINICAL INDICATION: Sepsis.    EXAM: Chest x-ray 2 views.    COMPARISON: None available.    FINDINGS:  Surgical clips overlying the midline of the neck.  Bilateral lung fields partially obscured by patient positioning.   Visualized lung fields are clear.  There is no pleural effusion or pneumothorax.  The heart is not well evaluated in this position. Median sternotomy.  The visualized osseous structures demonstrate no acute pathology.    IMPRESSION:  No focal consolidations.    --- End of Report ---          MIR LAW MD; Resident Radiologist  This document has been electronically signed.  KOFI GARCIA MD; Attending Radiologist  This document has been electronically signed. Jan 2 2024  7:43PM    < end of copied text >  < from: CT Head No Cont (01.03.24 @ 02:00) >  ACC: [id number] EXAM:  CT BRAIN   ORDERED BY: MALOU CRISOSTOMO     PROCEDURE DATE:  01/03/2024          INTERPRETATION:  EXAMINATION: CT HEAD    DATE: 1/3/2024 2:00 AM    INDICATION: lethargy, altered mental status. History of falls.    COMPARISON: CT head from 7/7/2023.    TECHNIQUE: Thin section noncontrast axial images were obtained through   the head.  RAPID AI was utilized for preliminary assessment of   intracranial hemorrhage.    FINDINGS:  Intracranial Contents:    Moderate to severe cerebral volume loss.. Mild to moderate chronic   microvascular ischemic changes Gray-white differentiation is preserved.   No hydrocephalus. The basilar cisterns are clear. No focal edema or acute   mass effect. There is no intracranial fluid collectionor acute   hemorrhage.    Bones and Extracranial Soft Tissues:    There is no fracture identified. Scattered paranasal sinus mucosal   thickening. Mastoid air cells are clear. Scalp and imaged facial soft   tissues are within normal limits.      IMPRESSION:  1. No acute intracranial CT abnormality.    --- End of Report ---          MAIKEL MORENO MD; Resident Radiologist  This document has been electronically signed.  ELLEN CARTWRIGHT MD; Attending Radiologist  This document has been electronically signed. Josef  3 2024  2:17AM    < end of copied text >                    RECENT CULTURES:        RESPIRATORY CULTURES:          Studies  Chest X-RAY  CT SCAN Chest   Venous Dopplers: LE:   CT Abdomen  Others

## 2024-01-23 NOTE — PROGRESS NOTE ADULT - ASSESSMENT
This is a 78M with history of MM, HTN, and Dementia (AAO x 1-2 to self, to place, not to time at baseline per brother) who presents to the hospital from [facility] for hypotension and lethargy. Patient currently awake, alert, AAO x1 (to self,) but very poor historian, denies any acute complaints when asked. nephrology consulted for david    DAVID  admitted with scr 1.4  He had sepsis so was likely ATN  DAVID has improved.  - Monitor BMP    Hematuria  no blood on repeat UA on 1/15   renal us with no mass.  - Will monitor.    Hyponatremia  ? 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    anemia  recommend iron panel   transfusion and work up per team  Monitor Hb    hypercalcemia/ hypophosphatemna  PTH low-5  VItamin D wnl   Continue vit D  Monitor PTH, PO4, Ca   This is a 78M with history of MM, HTN, and Dementia (AAO x 1-2 to self, to place, not to time at baseline per brother) who presents to the hospital from [facility] for hypotension and lethargy. Patient currently awake, alert, AAO x1 (to self,) but very poor historian, denies any acute complaints when asked. nephrology consulted for david    DAVID  admitted with scr 1.4  He had sepsis, DAVID likely sec to ATN  DAVID has improved.  - Monitor BMP    Hematuria  no blood on repeat UA on 1/15   renal us with no mass.  - Will monitor.    Hyponatremia  ? 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    anemia  recommend iron panel   transfusion and work up per team  Monitor Hb    hypercalcemia/ hypophosphatemna  PTH low-5  VItamin D wnl   Continue vit D  Monitor PTH, PO4, Ca

## 2024-01-23 NOTE — PROGRESS NOTE ADULT - ASSESSMENT
This is a 78M with history of MM, HTN, and Dementia (AAO x 1-2 to self, to place, not to time at baseline per brother) who presents to the hospital from [facility] for hypotension and lethargy. Patient currently awake, alert, AAO x2 (to self, to hospital but not name, not to time) but very poor historian, denies any acute complaints when asked. Spoke with brother Mookie who said that the patient was sent to the hospital for lethargy and cough though he states that the patient has had a cough for several months. Brother denies any other known acute complaints for the patient. Called [facility] [phone] but there was no staff available who knew the patient. As per NH paperwork and ED note, patient was sent to the hospital for hypotention to 81/55 and lethargy. He was noted to have a low grade temp of 99.6 and saturation of 93% at his NH. He was given tylenol 650mg x1 prior to being sent to the hospital.   On arrival to the hospital his vitals were T 99.4 -> 100.7 rectal, P 92, BP 88/55 -> 117/70, RR 16, O2 sat 96% RA. His lab work was significant for worsening macrocytic anemia, DAVID with mild hyponatremia, elevated protein gap, elevated lactate with improvement on repeat. His UA was positive for nitrite, leuk esterase but negative for bacteria. His respiratory swab was positive for influenza A. His imaging did not show acute abnormalities. He was given ofirmev 1g, NS 500cc x1, vanc 1g, cefepime 2g, and tamiflu 75mg PO x1. He was admitted to medicine.  (03 Jan 2024 06:06):  now pulm called:  he is from NH:  above history noted:  he seems to be responding to simple commands:  he say he has no underlying lung history  never smoked:  on room air:  adm with influenza:     Influenza:   Multiple Myeloma  HTN  Dementia      Influenza:   -low grade fever  -Influenza:  on tamilflu  -cxr is clear:  -he is not wheezing    1/4: no change in pulm status today  : remains on room air  1/5: doing ok : no sob:  no cough : no phlegm;   1/6: on Tamiflu  no sob:  on room air  1/7: seems to be doing ok : no sob:  no cough:   1/8: seems OK: no sob:  no cough : no phlegm  on room air  1/9: doing ok: no sob:   on cough :  1/10: seems OK: no sob:  on room air:  remains lethargic: Afebrile and is normal BC count  1/11: resolved  1/14: seems OK; no sob:  on room air:  responds to questions  1/15: seems to be doing ok : no sob: no cough : no phlegm  : no wheezing  1/17: resolved:  doing great : no sob:   1/18: seems stable: no sob:    1/20: no new events  : remains on room air;  no new pulm issues:  for dc on Monday 1/22; no events over the week end heis alert and awake; no sob:  on room air:   Multiple Myeloma  -per primary team : FDG PET scan  noted:  rib lesions present likely secondary to MM   1/18: no new events  1/20: per primary team  1/23: no pain   HTN  -controlled  -anti hypertensives on old  Dementia  -supportive care:    karyn acp  : dc planning

## 2024-01-23 NOTE — PROGRESS NOTE ADULT - SUBJECTIVE AND OBJECTIVE BOX
[facility] [place]   MD ANEL FOLEY MD ANGELA WONG, PA Venitha Krishnan, NP    TEL:  OFFICE: [phone]  From 5pm-7am Answering Service [phone]    -- RENAL FOLLOW UP NOTE ---Date of Service 01-23-24 @ 14:37    Patient is a 78y old  Male who presents with a chief complaint of Hypotension, lethargy (23 Jan 2024 13:52)      Patient seen and examined at bedside. No chest pain/sob    VITALS:  T(F): 98.5 (01-22-24 @ 21:05), Max: 98.5 (01-22-24 @ 21:05)  HR: 81 (01-23-24 @ 09:40)  BP: 106/66 (01-22-24 @ 21:05)  RR: 18 (01-22-24 @ 21:05)  SpO2: 98% (01-23-24 @ 09:40)  Wt(kg): --    01-22 @ 07:01  -  01-23 @ 07:00  --------------------------------------------------------  IN: 480 mL / OUT: 0 mL / NET: 480 mL          PHYSICAL EXAM:  General: NAD  Neck: No JVD  Respiratory: CTAB, no wheezes, rales or rhonchi  Cardiovascular: S1, S2, RRR  Gastrointestinal: BS+, soft, NT/ND  Extremities: No peripheral edema    Hospital Medications:   MEDICATIONS  (STANDING):  albuterol/ipratropium for Nebulization 3 milliLiter(s) Nebulizer every 12 hours  chlorhexidine 2% Cloths 1 Application(s) Topical daily  cholecalciferol 1000 Unit(s) Oral daily  cyanocobalamin 1000 MICROGram(s) Oral daily  donepezil 10 milliGRAM(s) Oral at bedtime  folic acid 1 milliGRAM(s) Oral daily  heparin   Injectable 5000 Unit(s) SubCutaneous every 8 hours  memantine 10 milliGRAM(s) Oral two times a day  risperiDONE   Tablet 0.25 milliGRAM(s) Oral daily  risperiDONE   Tablet 0.5 milliGRAM(s) Oral at bedtime  sertraline 50 milliGRAM(s) Oral daily  sodium chloride 0.9%. 1000 milliLiter(s) (75 mL/Hr) IV Continuous <Continuous>  sodium chloride 0.9%. 1000 milliLiter(s) (75 mL/Hr) IV Continuous <Continuous>      LABS: no new labs         Creatinine Trend: 0.87 <--            Urine Studies:  Urinalysis - [01-16-24 @ 14:53]      Color  / Appearance  / SG  / pH       Gluc 93 / Ketone   / Bili  / Urobili        Blood  / Protein  / Leuk Est  / Nitrite       RBC  / WBC  / Hyaline  / Gran  / Sq Epi  / Non Sq Epi  / Bacteria       PTH -- (Ca --)      [01-14-24 @ 06:50]   5  PTH -- (Ca --)      [01-13-24 @ 06:10]   6  Vitamin D (25OH) 36.9      [01-13-24 @ 06:10]        RADIOLOGY & ADDITIONAL STUDIES:

## 2024-01-23 NOTE — PROGRESS NOTE ADULT - SUBJECTIVE AND OBJECTIVE BOX
DATE OF SERVICE: 01-23-24    No overnight events  Review of symptoms otherwise negative.     Review of Systems:   Constitutional: [ ] fevers, [ ] chills.   Skin: [ ] dry skin. [ ] rashes.  Psychiatric: [ ] depression, [ ] anxiety.   Gastrointestinal: [ ] BRBPR, [ ] melena.   Neurological: [ ] confusion. [ ] seizures. [ ] shuffling gait.   Ears,Nose,Mouth and Throat: [ ] ear pain [ ] sore throat.   Eyes: [ ] diplopia.   Respiratory: [ ] hemoptysis. [ ] shortness of breath  Cardiovascular: See HPI above  Hematologic/Lymphatic: [ ] anemia. [ ] painful nodes. [ ] prolonged bleeding.   Genitourinary: [ ] hematuria. [ ] flank pain.   Endocrine: [ ] significant change in weight. [ ] intolerance to heat and cold.     Review of systems [ x] otherwise negative, [ ] otherwise unable to obtain    FH: no family history of sudden cardiac death in first degree relatives    SH: [ ] tobacco, [ ] alcohol, [ ] drugs    acetaminophen     Tablet .. 650 milliGRAM(s) Oral every 6 hours PRN  albuterol/ipratropium for Nebulization 3 milliLiter(s) Nebulizer every 12 hours  aluminum hydroxide/magnesium hydroxide/simethicone Suspension 30 milliLiter(s) Oral every 4 hours PRN  chlorhexidine 2% Cloths 1 Application(s) Topical daily  cholecalciferol 1000 Unit(s) Oral daily  cyanocobalamin 1000 MICROGram(s) Oral daily  donepezil 10 milliGRAM(s) Oral at bedtime  folic acid 1 milliGRAM(s) Oral daily  heparin   Injectable 5000 Unit(s) SubCutaneous every 8 hours  melatonin 3 milliGRAM(s) Oral at bedtime PRN  memantine 10 milliGRAM(s) Oral two times a day  ondansetron Injectable 4 milliGRAM(s) IV Push every 8 hours PRN  QUEtiapine 12.5 milliGRAM(s) Oral every 6 hours PRN  risperiDONE   Tablet 0.25 milliGRAM(s) Oral daily  risperiDONE   Tablet 0.5 milliGRAM(s) Oral at bedtime  sertraline 50 milliGRAM(s) Oral daily  sodium chloride 0.9%. 1000 milliLiter(s) IV Continuous <Continuous>  sodium chloride 0.9%. 1000 milliLiter(s) IV Continuous <Continuous>    T(C): 36.9 (01-22-24 @ 21:05), Max: 36.9 (01-22-24 @ 21:05)  HR: 81 (01-23-24 @ 09:40) (81 - 92)  BP: 106/66 (01-22-24 @ 21:05) (106/66 - 118/76)  RR: 18 (01-22-24 @ 21:05) (18 - 18)  SpO2: 98% (01-23-24 @ 09:40) (96% - 98%)      General:  NAD  Head: Normocephalic and atraumatic.   Neck: No JVD. No bruits. Supple. Does not appear to be enlarged.   Cardiovascular: + S1,S2 ; RRR Soft systolic murmur at the left lower sternal border. No rubs noted.    Lungs: CTA b/l. No rhonchi, rales or wheezes.   Abdomen: + BS, soft. Non tender. Non distended. No rebound. No guarding.   Extremities: No clubbing/cyanosis/edema.   Neurologic: Moves all four extremities  Skin: Warm and moist. The patient's skin has normal elasticity and good skin turgor.   Psychiatric: unable to eval  Musculoskeletal: Normal range of motion      ECG:  	Sinus tachycardia    < from: Xray Chest 1 View AP/PA (01.02.24 @ 19:36) >  IMPRESSION:  No focal consolidations.  < end of copied text >    < from: Transthoracic Echocardiogram (07.10.23 @ 11:15) >  CONCLUSIONS:  1. Calcified trileaflet aortic valve with normal opening.  Minimal aortic regurgitation.  2. Endocardium not well visualized; grossly decreased  possibly mild left ventricular systolic dysfunction.  3. The right ventricle is not well visualized; grossly  normal right ventricular systolic function.  ------------------------------------------------------------------------  Confirmed on  7/10/2023 - 13:57:46 by MARLA Vegas  < end of copied text >      ASSESSMENT/PLAN: 	78M with history of MM, HTN, and Dementia (AAO x 1-2 to self, to place, not to time at baseline per brother) who presents to the hospital from [facility] for hypotension and lethargy found to have FLU A    -#Hypotension  -- BP stable  -- Norvasc and Lopressor on hold given acute illness  -- would cont to hold given stable BP     #lethargy  --due to Flu, now resolved  --s/p tamiflu and Abx per medicine    DC planning

## 2024-01-24 VITALS
RESPIRATION RATE: 18 BRPM | OXYGEN SATURATION: 98 % | HEART RATE: 76 BPM | TEMPERATURE: 98 F | SYSTOLIC BLOOD PRESSURE: 127 MMHG | DIASTOLIC BLOOD PRESSURE: 56 MMHG

## 2024-01-24 RX ADMIN — Medication 1 MILLIGRAM(S): at 13:17

## 2024-01-24 RX ADMIN — SERTRALINE 50 MILLIGRAM(S): 25 TABLET, FILM COATED ORAL at 13:16

## 2024-01-24 RX ADMIN — Medication 1000 UNIT(S): at 13:17

## 2024-01-24 RX ADMIN — HEPARIN SODIUM 5000 UNIT(S): 5000 INJECTION INTRAVENOUS; SUBCUTANEOUS at 13:19

## 2024-01-24 RX ADMIN — HEPARIN SODIUM 5000 UNIT(S): 5000 INJECTION INTRAVENOUS; SUBCUTANEOUS at 07:29

## 2024-01-24 RX ADMIN — PREGABALIN 1000 MICROGRAM(S): 225 CAPSULE ORAL at 13:17

## 2024-01-24 RX ADMIN — RISPERIDONE 0.25 MILLIGRAM(S): 4 TABLET ORAL at 13:19

## 2024-01-24 RX ADMIN — Medication 3 MILLILITER(S): at 08:54

## 2024-01-24 RX ADMIN — CHLORHEXIDINE GLUCONATE 1 APPLICATION(S): 213 SOLUTION TOPICAL at 13:18

## 2024-01-24 RX ADMIN — MEMANTINE HYDROCHLORIDE 10 MILLIGRAM(S): 10 TABLET ORAL at 07:28

## 2024-01-24 NOTE — PROGRESS NOTE ADULT - NS ATTEND AMEND GEN_ALL_CORE FT
Patient seen and examined. Agree with plan as detailed in PA/NP Note.     Continue to hold lopressor and norvasc, can be restarted as outpatient    Ricardoet Antonino CHAVEZ  Pager: [phone]

## 2024-01-24 NOTE — PROGRESS NOTE ADULT - NS ATTEND OPT1 GEN_ALL_CORE

## 2024-01-24 NOTE — PROGRESS NOTE ADULT - PROVIDER SPECIALTY LIST ADULT
Cardiology
Hospitalist
Infectious Disease
Nephrology
Neurology
Pulmonology
Pulmonology
Cardiology
Cardiology
Hospitalist
Infectious Disease
Infectious Disease
Nephrology
Neurology
Pulmonology
Cardiology
Hospitalist
Infectious Disease
Nephrology
Nephrology
Neurology
Pulmonology
Hospitalist
Infectious Disease
Nephrology
Pulmonology
Cardiology
Cardiology
Hospitalist
Hospitalist
Infectious Disease
Nephrology
Neurology
Neurology
Pulmonology
Pulmonology
Hospitalist
Infectious Disease
Nephrology
Nephrology
Hospitalist
Infectious Disease
Nephrology
Neurology
Pulmonology
Pulmonology
Hospitalist
Neurology
Hospitalist
Hospitalist
Pulmonology

## 2024-01-24 NOTE — PROGRESS NOTE ADULT - REASON FOR ADMISSION
Hypotension, lethargu
Hypotension, lethargy
Hypotension, lethargu
Hypotension, lethargy
Hypotension, lethargu
Hypotension, lethargy
Hypotension, lethargu
Hypotension, lethargic
Hypotension, lethargu
Hypotension, lethargy
Hypotension, lethargy
Hypotension, lethargu

## 2024-01-24 NOTE — PROGRESS NOTE ADULT - SUBJECTIVE AND OBJECTIVE BOX
DATE OF SERVICE: 01-24-24    No overnight events  Review of symptoms otherwise negative.     Review of Systems:   Constitutional: [ ] fevers, [ ] chills.   Skin: [ ] dry skin. [ ] rashes.  Psychiatric: [ ] depression, [ ] anxiety.   Gastrointestinal: [ ] BRBPR, [ ] melena.   Neurological: [ ] confusion. [ ] seizures. [ ] shuffling gait.   Ears,Nose,Mouth and Throat: [ ] ear pain [ ] sore throat.   Eyes: [ ] diplopia.   Respiratory: [ ] hemoptysis. [ ] shortness of breath  Cardiovascular: See HPI above  Hematologic/Lymphatic: [ ] anemia. [ ] painful nodes. [ ] prolonged bleeding.   Genitourinary: [ ] hematuria. [ ] flank pain.   Endocrine: [ ] significant change in weight. [ ] intolerance to heat and cold.     Review of systems [x ] otherwise negative, [ ] otherwise unable to obtain    FH: no family history of sudden cardiac death in first degree relatives    SH: [ ] tobacco, [ ] alcohol, [ ] drugs    acetaminophen     Tablet .. 650 milliGRAM(s) Oral every 6 hours PRN  albuterol/ipratropium for Nebulization 3 milliLiter(s) Nebulizer every 12 hours  aluminum hydroxide/magnesium hydroxide/simethicone Suspension 30 milliLiter(s) Oral every 4 hours PRN  chlorhexidine 2% Cloths 1 Application(s) Topical daily  cholecalciferol 1000 Unit(s) Oral daily  cyanocobalamin 1000 MICROGram(s) Oral daily  donepezil 10 milliGRAM(s) Oral at bedtime  folic acid 1 milliGRAM(s) Oral daily  heparin   Injectable 5000 Unit(s) SubCutaneous every 8 hours  melatonin 3 milliGRAM(s) Oral at bedtime PRN  memantine 10 milliGRAM(s) Oral two times a day  ondansetron Injectable 4 milliGRAM(s) IV Push every 8 hours PRN  QUEtiapine 12.5 milliGRAM(s) Oral every 6 hours PRN  risperiDONE   Tablet 0.25 milliGRAM(s) Oral daily  risperiDONE   Tablet 0.5 milliGRAM(s) Oral at bedtime  sertraline 50 milliGRAM(s) Oral daily  sodium chloride 0.9%. 1000 milliLiter(s) IV Continuous <Continuous>  sodium chloride 0.9%. 1000 milliLiter(s) IV Continuous <Continuous>      T(C): 36.7 (01-24-24 @ 07:45), Max: 36.7 (01-24-24 @ 07:45)  HR: 83 (01-24-24 @ 08:54) (74 - 83)  BP: 111/71 (01-24-24 @ 07:45) (100/64 - 111/71)  RR: 20 (01-24-24 @ 07:45) (16 - 20)  SpO2: 95% (01-24-24 @ 08:54) (95% - 98%)  Wt(kg): -    General:  NAD  Head: Normocephalic and atraumatic.   Neck: No JVD. No bruits. Supple. Does not appear to be enlarged.   Cardiovascular: + S1,S2 ; RRR Soft systolic murmur at the left lower sternal border. No rubs noted.    Lungs: CTA b/l. No rhonchi, rales or wheezes.   Abdomen: + BS, soft. Non tender. Non distended. No rebound. No guarding.   Extremities: No clubbing/cyanosis/edema.   Neurologic: Moves all four extremities  Skin: Warm and moist. The patient's skin has normal elasticity and good skin turgor.   Psychiatric: unable to eval  Musculoskeletal: Normal range of motion      ECG:  	Sinus tachycardia    < from: Xray Chest 1 View AP/PA (01.02.24 @ 19:36) >  IMPRESSION:  No focal consolidations.  < end of copied text >    < from: Transthoracic Echocardiogram (07.10.23 @ 11:15) >  CONCLUSIONS:  1. Calcified trileaflet aortic valve with normal opening.  Minimal aortic regurgitation.  2. Endocardium not well visualized; grossly decreased  possibly mild left ventricular systolic dysfunction.  3. The right ventricle is not well visualized; grossly  normal right ventricular systolic function.  ------------------------------------------------------------------------  Confirmed on  7/10/2023 - 13:57:46 by MARLA Vegas  < end of copied text >      ASSESSMENT/PLAN: 	78M with history of MM, HTN, and Dementia (AAO x 1-2 to self, to place, not to time at baseline per brother) who presents to the hospital from [facility] for hypotension and lethargy found to have FLU A    -#Hypotension  -- BP stable  -- Norvasc and Lopressor on hold given acute illness  -- would cont to hold given stable BP     #lethargy  --due to Flu, now resolved  --s/p tamiflu and Abx per medicine    DC planning

## 2024-01-24 NOTE — PROGRESS NOTE ADULT - SUBJECTIVE AND OBJECTIVE BOX
[facility] [place] [facility]   MD ANEL FOLEY MD ANGELA WONG, PA Venitha Krishnan, NP    TEL:  OFFICE: [phone]  From 5pm-7am Answering Service [phone]    -- RENAL FOLLOW UP NOTE ---Date of Service 01-24-24 @ 13:44    Patient is a 78y old  Male who presents with a chief complaint of Hypotension, lethargy       Patient seen and examined at bedside. No chest pain/sob    VITALS:  T(F): 98.1 (01-24-24 @ 07:45), Max: 98.1 (01-24-24 @ 07:45)  HR: 83 (01-24-24 @ 08:54)  BP: 111/71 (01-24-24 @ 07:45)  RR: 20 (01-24-24 @ 07:45)  SpO2: 95% (01-24-24 @ 08:54)  Wt(kg): --        PHYSICAL EXAM:  General: NAD  Neck: No JVD  Respiratory: CTAB, no wheezes, rales or rhonchi  Cardiovascular: S1, S2, RRR  Gastrointestinal: BS+, soft, NT/ND  Extremities: No peripheral edema    Hospital Medications:   MEDICATIONS  (STANDING):  albuterol/ipratropium for Nebulization 3 milliLiter(s) Nebulizer every 12 hours  chlorhexidine 2% Cloths 1 Application(s) Topical daily  cholecalciferol 1000 Unit(s) Oral daily  cyanocobalamin 1000 MICROGram(s) Oral daily  donepezil 10 milliGRAM(s) Oral at bedtime  folic acid 1 milliGRAM(s) Oral daily  heparin   Injectable 5000 Unit(s) SubCutaneous every 8 hours  memantine 10 milliGRAM(s) Oral two times a day  risperiDONE   Tablet 0.25 milliGRAM(s) Oral daily  risperiDONE   Tablet 0.5 milliGRAM(s) Oral at bedtime  sertraline 50 milliGRAM(s) Oral daily  sodium chloride 0.9%. 1000 milliLiter(s) (75 mL/Hr) IV Continuous <Continuous>  sodium chloride 0.9%. 1000 milliLiter(s) (75 mL/Hr) IV Continuous <Continuous>      LABS: no new labs        Creatinine Trend:             Urine Studies:  Urinalysis - [01-16-24 @ 14:53]      Color  / Appearance  / SG  / pH       Gluc 93 / Ketone   / Bili  / Urobili        Blood  / Protein  / Leuk Est  / Nitrite       RBC  / WBC  / Hyaline  / Gran  / Sq Epi  / Non Sq Epi  / Bacteria       PTH -- (Ca --)      [01-14-24 @ 06:50]   5  PTH -- (Ca --)      [01-13-24 @ 06:10]   6  Vitamin D (25OH) 36.9      [01-13-24 @ 06:10]        RADIOLOGY & ADDITIONAL STUDIES:

## 2024-01-24 NOTE — PROGRESS NOTE ADULT - ASSESSMENT
This is a 78M with history of MM, HTN, and Dementia (AAO x 1-2 to self, to place, not to time at baseline per brother) who presents to the hospital from [facility] for hypotension and lethargy. Patient currently awake, alert, AAO x1 (to self,) but very poor historian, denies any acute complaints when asked. nephrology consulted for david    DAVID  admitted with scr 1.4  He had sepsis, DAVID likely sec to ATN  DAVID has improved.  - Monitor BMP    Hematuria  no blood on repeat UA on 1/15   renal us with no mass.  - Will monitor.    Hyponatremia  ? 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    anemia  recommend iron panel   transfusion and work up per team  Monitor Hb    hypercalcemia/ hypophosphatemna  PTH low-5  VItamin D wnl   Continue vit D  Monitor PTH, PO4, Ca

## 2024-01-24 NOTE — PROGRESS NOTE ADULT - NUTRITIONAL ASSESSMENT
This patient has been assessed with a concern for Malnutrition and has been determined to have a diagnosis/diagnoses of Severe protein-calorie malnutrition.    This patient is being managed with:   Diet Regular-  DASH/TLC {Sodium & Cholesterol Restricted} (DASH)  Pureed (PUREED)  Supplement Feeding Modality:  Oral  Ensure Plus High Protein Cans or Servings Per Day:  2       Frequency:  Daily  Entered: Jan 5 2024  2:54PM  
This patient has been assessed with a concern for Malnutrition and has been determined to have a diagnosis/diagnoses of Severe protein-calorie malnutrition.    This patient is being managed with:   Diet Regular-  DASH/TLC {Sodium & Cholesterol Restricted} (DASH)  Pureed (PUREED)  Entered: Josef  3 2024 10:03AM  
This patient has been assessed with a concern for Malnutrition and has been determined to have a diagnosis/diagnoses of Severe protein-calorie malnutrition.    This patient is being managed with:   Diet Regular-  DASH/TLC {Sodium & Cholesterol Restricted} (DASH)  Pureed (PUREED)  Supplement Feeding Modality:  Oral  Ensure Plus High Protein Cans or Servings Per Day:  2       Frequency:  Daily  Entered: Jan 5 2024  2:54PM  
This patient has been assessed with a concern for Malnutrition and has been determined to have a diagnosis/diagnoses of Severe protein-calorie malnutrition.    This patient is being managed with:   Diet Regular-  DASH/TLC {Sodium & Cholesterol Restricted} (DASH)  Pureed (PUREED)  Entered: Josef  3 2024 10:03AM  
This patient has been assessed with a concern for Malnutrition and has been determined to have a diagnosis/diagnoses of Severe protein-calorie malnutrition.    This patient is being managed with:   Diet Regular-  DASH/TLC {Sodium & Cholesterol Restricted} (DASH)  Pureed (PUREED)  Supplement Feeding Modality:  Oral  Ensure Plus High Protein Cans or Servings Per Day:  2       Frequency:  Daily  Entered: Jan 5 2024  2:54PM  

## 2024-01-26 ENCOUNTER — INPATIENT (INPATIENT)
Facility: HOSPITAL | Age: 79
LOS: 17 days | Discharge: NURSING, REHAB OR LONG-TERM CARE | End: 2024-02-13
Attending: INTERNAL MEDICINE | Admitting: INTERNAL MEDICINE
Payer: OTHER GOVERNMENT

## 2024-01-26 VITALS
HEART RATE: 86 BPM | RESPIRATION RATE: 18 BRPM | OXYGEN SATURATION: 94 % | DIASTOLIC BLOOD PRESSURE: 71 MMHG | SYSTOLIC BLOOD PRESSURE: 107 MMHG | TEMPERATURE: 98 F
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
## 2024-01-26 PROBLEM — C90.00 MULTIPLE MYELOMA NOT HAVING ACHIEVED REMISSION: Chronic | Status: ACTIVE | Noted: 2024-01-03

## 2024-01-26 PROBLEM — I10 ESSENTIAL (PRIMARY) HYPERTENSION: Chronic | Status: ACTIVE | Noted: 2024-01-03

## 2024-01-26 LAB
ALBUMIN SERPL ELPH-MCNC: 2.6 G/DL — LOW (ref 3.3–5)
ALP SERPL-CCNC: 81 U/L — SIGNIFICANT CHANGE UP (ref 40–120)
ALT FLD-CCNC: 16 U/L — SIGNIFICANT CHANGE UP (ref 4–41)
ANION GAP SERPL CALC-SCNC: 4 MMOL/L — LOW (ref 7–14)
AST SERPL-CCNC: 18 U/L — SIGNIFICANT CHANGE UP (ref 4–40)
BASOPHILS # BLD AUTO: 0.01 K/UL — SIGNIFICANT CHANGE UP (ref 0–0.2)
BASOPHILS NFR BLD AUTO: 0.1 % — SIGNIFICANT CHANGE UP (ref 0–2)
BILIRUB SERPL-MCNC: 0.6 MG/DL — SIGNIFICANT CHANGE UP (ref 0.2–1.2)
BUN SERPL-MCNC: 40 MG/DL — HIGH (ref 7–23)
CALCIUM SERPL-MCNC: 14 MG/DL — CRITICAL HIGH (ref 8.4–10.5)
CHLORIDE SERPL-SCNC: 105 MMOL/L — SIGNIFICANT CHANGE UP (ref 98–107)
CO2 SERPL-SCNC: 28 MMOL/L — SIGNIFICANT CHANGE UP (ref 22–31)
CREAT SERPL-MCNC: 1.39 MG/DL — HIGH (ref 0.5–1.3)
EGFR: 52 ML/MIN/1.73M2 — LOW
EOSINOPHIL # BLD AUTO: 0.03 K/UL — SIGNIFICANT CHANGE UP (ref 0–0.5)
EOSINOPHIL NFR BLD AUTO: 0.3 % — SIGNIFICANT CHANGE UP (ref 0–6)
GLUCOSE SERPL-MCNC: 107 MG/DL — HIGH (ref 70–99)
HCT VFR BLD CALC: 25.2 % — LOW (ref 39–50)
HGB BLD-MCNC: 8.4 G/DL — LOW (ref 13–17)
IANC: 8.09 K/UL — HIGH (ref 1.8–7.4)
IMM GRANULOCYTES NFR BLD AUTO: 0.6 % — SIGNIFICANT CHANGE UP (ref 0–0.9)
LYMPHOCYTES # BLD AUTO: 0.95 K/UL — LOW (ref 1–3.3)
LYMPHOCYTES # BLD AUTO: 9.5 % — LOW (ref 13–44)
MCHC RBC-ENTMCNC: 33.3 GM/DL — SIGNIFICANT CHANGE UP (ref 32–36)
MCHC RBC-ENTMCNC: 35 PG — HIGH (ref 27–34)
MCV RBC AUTO: 105 FL — HIGH (ref 80–100)
MONOCYTES # BLD AUTO: 0.88 K/UL — SIGNIFICANT CHANGE UP (ref 0–0.9)
MONOCYTES NFR BLD AUTO: 8.8 % — SIGNIFICANT CHANGE UP (ref 2–14)
NEUTROPHILS # BLD AUTO: 8.09 K/UL — HIGH (ref 1.8–7.4)
NEUTROPHILS NFR BLD AUTO: 80.7 % — HIGH (ref 43–77)
NRBC # BLD: 0 /100 WBCS — SIGNIFICANT CHANGE UP (ref 0–0)
NRBC # FLD: 0 K/UL — SIGNIFICANT CHANGE UP (ref 0–0)
PLATELET # BLD AUTO: 253 K/UL — SIGNIFICANT CHANGE UP (ref 150–400)
POTASSIUM SERPL-MCNC: 3.9 MMOL/L — SIGNIFICANT CHANGE UP (ref 3.5–5.3)
POTASSIUM SERPL-SCNC: 3.9 MMOL/L — SIGNIFICANT CHANGE UP (ref 3.5–5.3)
PROT SERPL-MCNC: 13.4 G/DL — HIGH (ref 6–8.3)
RBC # BLD: 2.4 M/UL — LOW (ref 4.2–5.8)
RBC # FLD: 14 % — SIGNIFICANT CHANGE UP (ref 10.3–14.5)
SODIUM SERPL-SCNC: 137 MMOL/L — SIGNIFICANT CHANGE UP (ref 135–145)
TROPONIN T, HIGH SENSITIVITY RESULT: 45 NG/L — SIGNIFICANT CHANGE UP
WBC # BLD: 10.02 K/UL — SIGNIFICANT CHANGE UP (ref 3.8–10.5)
WBC # FLD AUTO: 10.02 K/UL — SIGNIFICANT CHANGE UP (ref 3.8–10.5)

## 2024-01-26 PROCEDURE — 72170 X-RAY EXAM OF PELVIS: CPT | Mod: 26

## 2024-01-26 PROCEDURE — 70450 CT HEAD/BRAIN W/O DYE: CPT | Mod: 26,MA

## 2024-01-26 PROCEDURE — 71045 X-RAY EXAM CHEST 1 VIEW: CPT | Mod: 26

## 2024-01-26 PROCEDURE — 72125 CT NECK SPINE W/O DYE: CPT | Mod: 26,MA

## 2024-01-26 PROCEDURE — 99285 EMERGENCY DEPT VISIT HI MDM: CPT

## 2024-01-26 PROCEDURE — 99223 1ST HOSP IP/OBS HIGH 75: CPT

## 2024-01-26 RX ORDER — SODIUM CHLORIDE 9 MG/ML
1000 INJECTION INTRAMUSCULAR; INTRAVENOUS; SUBCUTANEOUS ONCE
Refills: 0 | Status: COMPLETED | OUTPATIENT
Start: 2024-01-26 | End: 2024-01-26

## 2024-01-26 RX ORDER — SODIUM CHLORIDE 9 MG/ML
500 INJECTION INTRAMUSCULAR; INTRAVENOUS; SUBCUTANEOUS ONCE
Refills: 0 | Status: COMPLETED | OUTPATIENT
Start: 2024-01-26 | End: 2024-01-26

## 2024-01-26 RX ADMIN — SODIUM CHLORIDE 1000 MILLILITER(S): 9 INJECTION INTRAMUSCULAR; INTRAVENOUS; SUBCUTANEOUS at 19:54

## 2024-01-26 RX ADMIN — SODIUM CHLORIDE 500 MILLILITER(S): 9 INJECTION INTRAMUSCULAR; INTRAVENOUS; SUBCUTANEOUS at 18:00

## 2024-01-26 NOTE — H&P ADULT - PROBLEM SELECTOR PLAN 3
- New DAVID likely 2/2 severe hypercalcemia -> c/w IVF as above  - Check UA, urine studies  - Monitor I/O  - Trend BUN/Cr  - Nephrology eval in AM

## 2024-01-26 NOTE — ED PROVIDER NOTE - CLINICAL SUMMARY MEDICAL DECISION MAKING FREE TEXT BOX
78-year-old male past medical history of dementia, multiple myeloma, hypertension sent in from [facility] due to fall with injury to occipital head earlier today.  Patient with a history of dementia, A&O x 0.  Spoke with patient's brother, requesting we call Alexa power of  at [phone] with test results.  On exam, patient A&O x 0.  No ecchymosis or bruising to head, no palpable step-offs or tenderness on palpation of spine/pelvis.  Full range of motion of lower extremities, negative logroll thong.  Will obtain EKG, basic labs, CT head/neck noncontrast to evaluate for intracranial pathology/cervical fx although low suspicion, x-ray chest/pelvis. Family requesting transfer to the [facility] instead of NH.

## 2024-01-26 NOTE — H&P ADULT - PROBLEM SELECTOR PLAN 9
DVT ppx - HSQ (avoiding lovenox given his DAVID)  Diet - DASH pureed diet as per NH notes and prior orders  Activity - OOB with assistance, increase as tolerated    Fall and aspiration precautions

## 2024-01-26 NOTE — ED PROVIDER NOTE - COVID-19 ORDERING FACILITY
Problem: Patient Care Overview (Adult)  Goal: Plan of Care Review  Outcome: Ongoing (interventions implemented as appropriate)    02/03/17 1793   Coping/Psychosocial Response Interventions   Plan Of Care Reviewed With patient;spouse   Outcome Evaluation   Outcome Summary/Follow up Plan Vitals stable. No SBT today. Needing Fentanyl PRN for pain control. Tolerating tube feeding. Wife updated on plan of care.         Problem: Fall Risk (Adult)  Goal: Absence of Falls  Outcome: Ongoing (interventions implemented as appropriate)    Problem: Pneumonia (Adult)  Goal: Signs and Symptoms of Listed Potential Problems Will be Absent or Manageable (Pneumonia)  Outcome: Ongoing (interventions implemented as appropriate)    Problem: Mobility, Physical Impaired (Adult)  Goal: Enhanced Mobility Skills  Outcome: Ongoing (interventions implemented as appropriate)  Goal: Enhanced Functionality Ability  Outcome: Ongoing (interventions implemented as appropriate)    Problem: Respiratory Insufficiency (Adult)  Goal: Acid/Base Balance  Outcome: Ongoing (interventions implemented as appropriate)  Goal: Effective Ventilation  Outcome: Ongoing (interventions implemented as appropriate)    Problem: SAFETY - NON-VIOLENT RESTRAINT  Goal: Remains free of injury from restraints (Non-Violent Restraint)  Outcome: Ongoing (interventions implemented as appropriate)  Goal: Free from restraint(s) (Non-Violent Restraint)  Outcome: Ongoing (interventions implemented as appropriate)    Problem: Mechanical Ventilation, Invasive (Adult)  Goal: Signs and Symptoms of Listed Potential Problems Will be Absent or Manageable (Mechanical Ventilation, Invasive)  Outcome: Ongoing (interventions implemented as appropriate)    Problem: Sepsis (Adult)  Goal: Signs and Symptoms of Listed Potential Problems Will be Absent or Manageable (Sepsis)  Outcome: Ongoing (interventions implemented as appropriate)       BRIGITTE/CHARLEY/Yolanda

## 2024-01-26 NOTE — H&P ADULT - PROBLEM SELECTOR PLAN 1
- Patient with significant and new hypercalcemia, corrected to 15.1  - Given history of MM likely 2/2 MM but will check for other causes of his hypercalcemia -> check PTH, PTHrP, Vitamin D 25/1,25 levels, SPEP, immunofixation  - s/p NS 1.5L in ED -> will place on 125 cc/hr standing IVF for now  - Dose calcitonin once patient's weight is available  - Likely heme or nephrology eval in AM

## 2024-01-26 NOTE — ED PROVIDER NOTE - ATTENDING APP SHARED VISIT CONTRIBUTION OF CARE
Gong: I have seen and examined the patient face to face, have reviewed and addended the HPI, PE and a/p as necessary.     79 yo M with dementia, multiple myeloma, hypertension a/w fall from  [facility].  As per notes, patient noted to hit occipital head earlier today.  Patient unable to provide further history given  history of dementia, A&O x 0.    GEN - AAOx0  HEAD: NCAT, no evidence of trauma, no brusing  NECK - Supple, no step offs.  CARD -s1s2, RRR, no M,G,R;   PULM - CTA b/l, symmetric breath sounds;   ABD -  +BS, ND, NT, soft, no guarding, no rebound, no masses;   BACK - no CVA tenderness, Normal  spine; no step offs  EXT - symmetric pulses, 2+ dp, capillary refill < 2 seconds, no cyanosis, no edema; No tenderness of pelvis  NEURO - MOVES 4 EXTREMITIES SPONTANEOUSLY    78-year-old male past medical history of dementia, multiple myeloma, hypertension sent in from [facility] due to fall with injury to occipital head earlier today.  Patient with a history of dementia, A&O x 0.    On exam, patient A&O x 0.  No evidence of trauma on exam, however, patient unable to participate in exam.  Will obtain EKG, CBC, CMP, CT head/neck noncontrast to evaluate for intracranial pathology/cervical fx although low suspicion, x-ray chest/pelvis.

## 2024-01-26 NOTE — ED PROVIDER NOTE - VASCULAR COMPROMISE
Can you let Preston Vargas know her vitamin B 12 came back normal her Vitamin D was very low at 9 9 we like this greater than 30 I will start her on Vitamin D 50,000U once weekly
no vascular compromise

## 2024-01-26 NOTE — ED ADULT NURSE REASSESSMENT NOTE - NSFALLRISK_ED_ALL_ED
"History Of Present Illness  Martín Mccormick is a 57 y.o. male presenting with: \"Sinus drainage\".  He is kindly referred by Vanessa Hammer DO.    He has postnasal discharge for at least a year.  Thick mucus sits in his throat.  Headaches.  Coughing at night (+)  He has tried flonase, but could not find relief. Helped about 15%.     No allergies  GERD (-?)  3-4 bottles of water daily  He sleeps his mouth open, snores at night.  URI in the beginning (+?)   Mother has DM  Dry mouth, dry eyes.    On examination, nasal septum deviated to right, dry tongue.  Crusting dryness in nose (+), over left middle turbinate, over nasal septum bilaterally. Inactive bleeding spot over nasal septum mucosa at left anteriorly.    On endoscopic examination, postnasal yellowish discharge running down.  There is verrucous lesion of posterior left vocal cord, needs to be biopsied.    Plan:  1- referral to laryngology  2- amoxicillin clavulanic acid  3- pantoprazole for possible silent GERD     Past Medical History  He has a past medical history of Generalized anxiety disorder (01/08/2021), Hypertension, Other chest pain (11/26/2013), Other specified anxiety disorders (09/10/2020), Pain in right leg (05/01/2019), Personal history of non-Hodgkin lymphomas, Personal history of other diseases of the circulatory system (11/19/2013), Personal history of other mental and behavioral disorders (10/23/2019), Personal history of other mental and behavioral disorders (05/22/2020), Personal history of other specified conditions (08/21/2019), and Social phobia, unspecified (01/08/2021).    Surgical History  He has a past surgical history that includes Other surgical history (07/11/2022); Other surgical history (07/11/2022); and Coronary artery bypass graft.     Social History  He reports that he has never smoked. He has never been exposed to tobacco smoke. His smokeless tobacco use includes chew. He reports current alcohol use of about 14.0 standard " drinks of alcohol per week. He reports that he does not use drugs.    Family History  Family History   Problem Relation Name Age of Onset    Other (Heart problem) Mother      Alcohol abuse Father's Brother      Alcohol abuse Paternal Grandmother      Alcohol abuse Paternal Grandfather      Heart attack Paternal Grandfather      Heart attack Paternal Great-Grandfather          Allergies  Fluconazole    Review of Systems   Loss of hearing  Sinus pressure  Postnasal drip  Hoarseness  Dry mouth/mouth breathing       Physical Exam    General appearance: Healthy-appearing, well-nourished, well groomed, in no acute distress.     Head and Face: Atraumatic with no masses, lesions, or scarring.      Salivary glands: No tenderness of the parotid glands or parotid masses.     No tenderness of the submandibular glands or submandibular masses.      Facial strength: Normal strength and symmetry, no synkinesis or facial tic.     Eyes: Conjunctivas look non-hyperemic bilaterally    Ears: Bilaterally ear canals look normal. Tympanic membranes look intact, no hyperemia, fluid or retraction. Hearing grossly normal.      Nose: Mucosa looks normal. No purulent discharge. Septum essentially straight.     Oral Cavity/Mouth: Lips and tongue look normal.     Throat: No postnasal discharge. No tonsil hypertrophy. No hyperemia.    Neck: Symmetrical, trachea midline.     Pulmonary: Normal respiratory effort.     Lymphatic: No palpable pathologic lymph nodes at neck.     Neurological/Psychiatric Orientation to person, place, and time: Normal.     Mood and affect: Normal.      Extremities: No clubbing.     Skin: No significant skin lesions were noted at face or neck        Procedure  FLEXIBLE LARYNGOSCOPY 01.26.2024  After application of topical lidocaine and decongestant, flexible endoscope was advanced through patient's nasal cavities. Nasal septum was deviated to right. Postnasal purulent discharge running down to larynx (+). No lesions were  "noted at nasopharynx. Base of tongue looked normal. Vocal cords and arytenoids were mobile bilaterally. (+) interarytenoid thickening. Verrucous lesion occupying 1/3 posterior part of left vocal cord and extending onto left ventricular band slightly.           Last Recorded Vitals  There were no vitals taken for this visit.    Relevant Results  Assessment and Plan:  Martín Mccormick is a 57 y.o. male presenting with: \"Sinus drainage\".  He is kindly referred by Vanessa Hammer DO.    He has postnasal discharge for at least a year.  Thick mucus sits in his throat.  Headaches.  Coughing at night (+)  He has tried flonase, but could not find relief. Helped about 15%.     No allergies  GERD (-?)  3-4 bottles of water daily  He sleeps his mouth open, snores at night.  URI in the beginning (+?)   Mother has DM  Dry mouth, dry eyes.    On examination, nasal septum deviated to right, dry tongue.  Crusting dryness in nose (+), over left middle turbinate, over nasal septum bilaterally. Inactive bleeding spot over nasal septum mucosa at left anteriorly.    On endoscopic examination, postnasal yellowish discharge running down.  There is verrucous lesion of posterior left vocal cord, needs to be biopsied.    Plan:  1- referral to laryngology  2- amoxicillin clavulanic acid  3- pantoprazole for possible silent GERD    Chelsea Montoya  Otolaryngology - Head & Neck Surgery  " Yes

## 2024-01-26 NOTE — H&P ADULT - NSHPADDITIONALINFOADULT_GEN_ALL_CORE
Patient seen and examined on 1/26/24, Dr Kuldeep Muñiz or his associates to assume care and responsibility for the patient in AM

## 2024-01-26 NOTE — H&P ADULT - HISTORY OF PRESENT ILLNESS
This is a 78M with history of MM, HTN, and Dementia (AAO x 1-2 to self, to place, not to time at baseline per brother) who presents to the hospital from [facility] after a fall. Patient is a poor historian 2/2 dementia, unable to state what happened. As per paperwork from the NH, patient had a fall and was noted to have a hematoma on the occipital head and was sent to the hospital for evaluation. Here imaging were negative for fractures but his work up showed him to have a significantly elevated calcium of 14 (corrected to 15.1) with DAVID and a significantly elevated protein gap. He was given NS 1.5L and repeat BMP showed persistence of the calcium elevation though improving. He was admitted to medicine on telemetry for further evaluation.

## 2024-01-26 NOTE — H&P ADULT - PROBLEM SELECTOR PLAN 4
- Baseline oriented to self and place, currently oriented to self but cannot state where he is, unable to state what the current month/year is, unable to give significant HPI or ROS (seems to be the baseline when compared to his prior admission)  - Likely has some encephalopathy 2/2 severe hypercalcemia -> c/w management as above  - c/w dementia management as below

## 2024-01-26 NOTE — ED ADULT NURSE NOTE - NSFALLRISKINTERV_ED_ALL_ED
Assistance OOB with selected safe patient handling equipment if applicable/Assistance with ambulation/Communicate fall risk and risk factors to all staff, patient, and family/Monitor gait and stability/Monitor for mental status changes and reorient to person, place, and time, as needed/Provide patient with walking aids/Provide visual cue: yellow wristband, yellow gown, etc/Reinforce activity limits and safety measures with patient and family/Toileting schedule using arm’s reach rule for commode and bathroom/Use of alarms - bed, stretcher, chair and/or video monitoring/Call bell, personal items and telephone in reach/Instruct patient to call for assistance before getting out of bed/chair/stretcher/Non-slip footwear applied when patient is off stretcher/Garita to call system/Physically safe environment - no spills, clutter or unnecessary equipment/Purposeful Proactive Rounding/Room/bathroom lighting operational, light cord in reach

## 2024-01-26 NOTE — ED ADULT NURSE REASSESSMENT NOTE - NS ED NURSE REASSESS COMMENT FT1
Admitted Pt received from previous RN on stretcher, A/Ox1 to person only. Pt denies chest pain and SOB during reassessment, breathing is even and unlabored on room air. Abdomen is soft and non-distended, non-tender to touch. Pt moves all four extremities on command, skin is intact. Pt resting comfortably at the bedside, no apparent visible acute distress noted on reassessment. Vital signs stable, NKA to medications. Pending bed assignment

## 2024-01-26 NOTE — ED ADULT TRIAGE NOTE - SPO2 (%)
· Keep well-hydrated  · Use a humidifier in the room at night  · Can try saline rinses or Dawson pot    · For local pain relief:  1. Tylenol or ibuprofen for severe discomfort or fever    2. Sips of warm liquids (soup broth or juice)  3. Cold liquids (ice cream, popsicles)  4. Suck on hard candy during the day or when awake but not at night (if over 6)  5. Gargle warm salt water if able to     · Call or be seen if worsening or not improving over the next several days or if new concerns arise       94

## 2024-01-26 NOTE — ED PROVIDER NOTE - OBJECTIVE STATEMENT
78-year-old male past medical history of dementia, multiple myeloma, hypertension sent in from [facility] due to fall with injury to occipital head earlier today.  Patient with a history of dementia, A&O x 0.  Spoke with patient's brother, requesting we call Alexa power of  at [phone] with test results.

## 2024-01-26 NOTE — ED PROVIDER NOTE - PROGRESS NOTE DETAILS
Spoke w/  Nereida regarding transfer to [facility], states pt can be transferred from his facility/Javid Stark Christina Coulter PGY2: I received sign out on this patient. I have reassessed patient and agree with the current plan. CT head/neck negative, images reviewwed. labs +hypercalcemia which was worked up for in past, has multiple myleoma. gave 2L but may need bisphos and/or steroids inpatient. communicated w dr. sky.

## 2024-01-26 NOTE — H&P ADULT - PROBLEM SELECTOR PLAN 8
- Patient with HCP form that notes Karla Chun ([phone]) is his HCP and that the patient is full code, has an eMOLST form in chart that notes his brother Mookie Wen is his HCP and that patient is DNR/DNI  - Please clarify his advanced care directives and HCP in AM, currently as per paperwork in chart patient's HCP is Karla Chun (or her daughters Christina Adiel Bright and Chrystal Eaton) and patient wants to be full code with no limitations in his care

## 2024-01-26 NOTE — H&P ADULT - ASSESSMENT
This is a 78M with history of MM, HTN, and Dementia who presents to the hospital after a fall at his NH here found to have significant hypercalcemia.

## 2024-01-26 NOTE — H&P ADULT - NSHPLABSRESULTS_GEN_ALL_CORE
LABS and ADDITIONAL STUDIES:                        8.4    10.02 )-----------( 253      ( 26 Jan 2024 17:35 )             25.2     137  |  105  |  40<H>  ----------------------------<  107<H>     01-26  3.9   |  28  |  1.39<H>    Ca    14.0<HH>      26 Jan 2024 17:35    TPro  13.4<H>  /  Alb  2.6<L>  /  TBili  0.6  /  DBili  x   /  AST  18  /  ALT  16  /  AlkPhos  81  01-26                hs Troponin, T - 45 ng/L (01-26-24 @ 17:35) LABS and ADDITIONAL STUDIES:                        8.4    10.02 )-----------( 253      ( 26 Jan 2024 17:35 )             25.2     137  |  105  |  40<H>  ----------------------------<  107<H>     01-26  3.9   |  28  |  1.39<H>    Ca    14.0<HH>      26 Jan 2024 17:35    TPro  13.4<H>  /  Alb  2.6<L>  /  TBili  0.6  /  DBili  x   /  AST  18  /  ALT  16  /  AlkPhos  81  01-26    hs Troponin, T - 45 ng/L (01-26-24 @ 17:35)    < from: Xray Chest 1 View- PORTABLE-Urgent (Xray Chest 1 View- PORTABLE-Urgent .) (01.26.24 @ 17:35) >    FINDINGS:  Sternotomy wires.  The heart size is not accurately assessed on this projection.  Hazy left lung base opacity with low lung volumes, likely represents   atelectasis.  There is no pneumothorax or pleural effusion.    IMPRESSION:  No focal consolidations.  No acute traumatic findings.    --- End of Report ---    < end of copied text >    < from: Xray Pelvis AP only (01.26.24 @ 17:35) >    FINDING: Two views of the pelvisdemonstrates severe bilateral hip   arthrosis with sclerosis and productive changes, worse on the left. There   is mild narrowing the pubic symphysis. Degenerative change of mild   levocurvature of the lumbar spine. No displaced fracture is seen though   limited by decreased bone mineralization. Surgical clips overlie the left   pelvis.    IMPRESSION:  No displaced fracture    --- End of Report ---    < end of copied text >    < from: CT Head No Cont (01.26.24 @ 20:21) >    IMPRESSION:    CT head: No acute intracranial hemorrhage or mass effect.    CT cervical spine:  No evidence for acute displaced fracture or malalignment of the cervical   spine.  Mild age-indeterminate compression deformities at the superior endplates   of T1 and T2, without bony retropulsion.    --- End of Report ---    < end of copied text >    EKG - Sinus rhythm with 1st degree AV block, rate 81, QTc 434, no significant ST-T wave changes

## 2024-01-26 NOTE — H&P ADULT - NSHPPHYSICALEXAM_GEN_ALL_CORE
Vital Signs Last 24 Hrs  T(C): 36.9 (27 Jan 2024 01:42), Max: 36.9 (26 Jan 2024 15:13)  T(F): 98.4 (27 Jan 2024 01:42), Max: 98.4 (26 Jan 2024 15:13)  HR: 81 (27 Jan 2024 01:42) (81 - 86)  BP: 128/86 (27 Jan 2024 01:42) (107/71 - 138/77)  BP(mean): --  RR: 16 (27 Jan 2024 01:42) (16 - 18)  SpO2: 98% (27 Jan 2024 01:42) (94% - 98%)    Parameters below as of 27 Jan 2024 01:42  Patient On (Oxygen Delivery Method): room air    GENERAL: No acute distress, Cachectic with temporal and thenar wasting  EYES: EOMI, PERRLA, conjunctiva and sclera clear  ENT: Neck supple, No JVD, moist mucosa  CHEST/LUNG: Clear to auscultation bilaterally; No wheeze, equal breath sounds bilaterally   BACK: No spinal tenderness  HEART: Regular rate and rhythm; No murmurs, rubs, or gallops  ABDOMEN: Soft, Nontender, Nondistended; Bowel sounds present  EXTREMITIES: +DP/PT/Radial pulses, No clubbing, cyanosis, or edema  PSYCH: Nl behavior, nl affect  NEUROLOGY: AAOx1 to self only currently, not to time or place, non-focal otherwise  SKIN: +Skin tenting, Normal color, No rashes or lesions

## 2024-01-26 NOTE — H&P ADULT - PROBLEM SELECTOR PLAN 2
- Unclear cause of his fall, unclear mechanism, patient unable to state what happened and the NH paperwork does not mention how the patient fell  - Will monitor on telemetry for now, trend troponin  - Consider cardiology eval in AM to assess for possible cardiogenic causes to his fall  - No focal neurological defecits noted so low suspicion for neurological causes to his fall - Unclear cause of his fall, unclear mechanism, patient unable to state what happened and the NH paperwork does not mention how the patient fell  - Will monitor on telemetry for now, trend troponin  - Consider cardiology eval in AM to assess for possible cardiogenic causes to his fall  - No focal neurological deficits noted so low suspicion for neurological causes to his fall

## 2024-01-26 NOTE — ED ADULT NURSE REASSESSMENT NOTE - NSFALLHARMRISKINTERV_ED_ALL_ED

## 2024-01-26 NOTE — ED PROVIDER NOTE - CARE PLAN
1 Principal Discharge DX:	Fall   Principal Discharge DX:	Hypercalcemia  Secondary Diagnosis:	DAVID (acute kidney injury)

## 2024-01-26 NOTE — ED PROVIDER NOTE - EXTREMITY EXAM
no deformity, pain or tenderness, no restriction of movement/left upper extremity findings/right upper extremity findings/left lower extremity findings/right lower extremity findings

## 2024-01-27 DIAGNOSIS — E83.52 HYPERCALCEMIA: ICD-10-CM

## 2024-01-27 DIAGNOSIS — Z98.890 OTHER SPECIFIED POSTPROCEDURAL STATES: Chronic | ICD-10-CM

## 2024-01-27 DIAGNOSIS — Z71.89 OTHER SPECIFIED COUNSELING: ICD-10-CM

## 2024-01-27 DIAGNOSIS — F03.90 UNSPECIFIED DEMENTIA, UNSPECIFIED SEVERITY, WITHOUT BEHAVIORAL DISTURBANCE, PSYCHOTIC DISTURBANCE, MOOD DISTURBANCE, AND ANXIETY: ICD-10-CM

## 2024-01-27 DIAGNOSIS — Z29.9 ENCOUNTER FOR PROPHYLACTIC MEASURES, UNSPECIFIED: ICD-10-CM

## 2024-01-27 DIAGNOSIS — I10 ESSENTIAL (PRIMARY) HYPERTENSION: ICD-10-CM

## 2024-01-27 DIAGNOSIS — C90.00 MULTIPLE MYELOMA NOT HAVING ACHIEVED REMISSION: ICD-10-CM

## 2024-01-27 DIAGNOSIS — G92.8 OTHER TOXIC ENCEPHALOPATHY: ICD-10-CM
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
## 2024-01-27 DIAGNOSIS — N17.9 ACUTE KIDNEY FAILURE, UNSPECIFIED: ICD-10-CM

## 2024-01-27 LAB
24R-OH-CALCIDIOL SERPL-MCNC: 44.8 NG/ML — SIGNIFICANT CHANGE UP (ref 30–80)
ALBUMIN SERPL ELPH-MCNC: 2.2 G/DL — LOW (ref 3.3–5)
ALBUMIN SERPL ELPH-MCNC: 2.4 G/DL — LOW (ref 3.3–5)
ALP SERPL-CCNC: 74 U/L — SIGNIFICANT CHANGE UP (ref 40–120)
ALP SERPL-CCNC: 78 U/L — SIGNIFICANT CHANGE UP (ref 40–120)
ALT FLD-CCNC: 13 U/L — SIGNIFICANT CHANGE UP (ref 4–41)
ALT FLD-CCNC: 14 U/L — SIGNIFICANT CHANGE UP (ref 4–41)
ANION GAP SERPL CALC-SCNC: 4 MMOL/L — LOW (ref 7–14)
ANION GAP SERPL CALC-SCNC: 6 MMOL/L — LOW (ref 7–14)
AST SERPL-CCNC: 15 U/L — SIGNIFICANT CHANGE UP (ref 4–40)
AST SERPL-CCNC: 17 U/L — SIGNIFICANT CHANGE UP (ref 4–40)
BILIRUB SERPL-MCNC: 0.5 MG/DL — SIGNIFICANT CHANGE UP (ref 0.2–1.2)
BILIRUB SERPL-MCNC: 0.5 MG/DL — SIGNIFICANT CHANGE UP (ref 0.2–1.2)
BUN SERPL-MCNC: 32 MG/DL — HIGH (ref 7–23)
BUN SERPL-MCNC: 37 MG/DL — HIGH (ref 7–23)
CA-I BLD-SCNC: 2.09 MMOL/L — HIGH (ref 1.15–1.29)
CALCIUM SERPL-MCNC: 13.1 MG/DL — CRITICAL HIGH (ref 8.4–10.5)
CALCIUM SERPL-MCNC: 13.4 MG/DL — CRITICAL HIGH (ref 8.4–10.5)
CHLORIDE SERPL-SCNC: 107 MMOL/L — SIGNIFICANT CHANGE UP (ref 98–107)
CHLORIDE SERPL-SCNC: 110 MMOL/L — HIGH (ref 98–107)
CK SERPL-CCNC: 64 U/L — SIGNIFICANT CHANGE UP (ref 30–200)
CO2 SERPL-SCNC: 24 MMOL/L — SIGNIFICANT CHANGE UP (ref 22–31)
CO2 SERPL-SCNC: 26 MMOL/L — SIGNIFICANT CHANGE UP (ref 22–31)
CREAT SERPL-MCNC: 1.19 MG/DL — SIGNIFICANT CHANGE UP (ref 0.5–1.3)
CREAT SERPL-MCNC: 1.33 MG/DL — HIGH (ref 0.5–1.3)
EGFR: 55 ML/MIN/1.73M2 — LOW
EGFR: 63 ML/MIN/1.73M2 — SIGNIFICANT CHANGE UP
GLUCOSE BLDC GLUCOMTR-MCNC: 110 MG/DL — HIGH (ref 70–99)
GLUCOSE SERPL-MCNC: 89 MG/DL — SIGNIFICANT CHANGE UP (ref 70–99)
GLUCOSE SERPL-MCNC: 97 MG/DL — SIGNIFICANT CHANGE UP (ref 70–99)
MAGNESIUM SERPL-MCNC: 1.7 MG/DL — SIGNIFICANT CHANGE UP (ref 1.6–2.6)
PHOSPHATE SERPL-MCNC: 2.7 MG/DL — SIGNIFICANT CHANGE UP (ref 2.5–4.5)
POTASSIUM SERPL-MCNC: 3.5 MMOL/L — SIGNIFICANT CHANGE UP (ref 3.5–5.3)
POTASSIUM SERPL-MCNC: 4.1 MMOL/L — SIGNIFICANT CHANGE UP (ref 3.5–5.3)
POTASSIUM SERPL-SCNC: 3.5 MMOL/L — SIGNIFICANT CHANGE UP (ref 3.5–5.3)
POTASSIUM SERPL-SCNC: 4.1 MMOL/L — SIGNIFICANT CHANGE UP (ref 3.5–5.3)
PROT SERPL-MCNC: 11.3 G/DL — HIGH (ref 6–8.3)
PROT SERPL-MCNC: 11.3 G/DL — HIGH (ref 6–8.3)
PROT SERPL-MCNC: 11.4 G/DL — HIGH (ref 6–8.3)
PTH-INTACT FLD-MCNC: 8 PG/ML — LOW (ref 15–65)
SODIUM SERPL-SCNC: 137 MMOL/L — SIGNIFICANT CHANGE UP (ref 135–145)
SODIUM SERPL-SCNC: 140 MMOL/L — SIGNIFICANT CHANGE UP (ref 135–145)
TROPONIN T, HIGH SENSITIVITY RESULT: 41 NG/L — SIGNIFICANT CHANGE UP
TROPONIN T, HIGH SENSITIVITY RESULT: 48 NG/L — SIGNIFICANT CHANGE UP

## 2024-01-27 PROCEDURE — 84165 PROTEIN E-PHORESIS SERUM: CPT | Mod: 26

## 2024-01-27 PROCEDURE — 86334 IMMUNOFIX E-PHORESIS SERUM: CPT | Mod: 26

## 2024-01-27 RX ORDER — RISPERIDONE 4 MG/1
0.25 TABLET ORAL DAILY
Refills: 0 | Status: DISCONTINUED | OUTPATIENT
Start: 2024-01-27 | End: 2024-02-13

## 2024-01-27 RX ORDER — ONDANSETRON 8 MG/1
4 TABLET, FILM COATED ORAL EVERY 8 HOURS
Refills: 0 | Status: DISCONTINUED | OUTPATIENT
Start: 2024-01-27 | End: 2024-02-13

## 2024-01-27 RX ORDER — MEMANTINE HYDROCHLORIDE 10 MG/1
10 TABLET ORAL
Refills: 0 | Status: DISCONTINUED | OUTPATIENT
Start: 2024-01-27 | End: 2024-02-13

## 2024-01-27 RX ORDER — SERTRALINE 25 MG/1
50 TABLET, FILM COATED ORAL DAILY
Refills: 0 | Status: DISCONTINUED | OUTPATIENT
Start: 2024-01-27 | End: 2024-02-13

## 2024-01-27 RX ORDER — ACETAMINOPHEN 500 MG
650 TABLET ORAL EVERY 6 HOURS
Refills: 0 | Status: DISCONTINUED | OUTPATIENT
Start: 2024-01-27 | End: 2024-02-13

## 2024-01-27 RX ORDER — ZOLEDRONIC ACID 5 MG/100ML
4 INJECTION, SOLUTION INTRAVENOUS ONCE
Refills: 0 | Status: COMPLETED | OUTPATIENT
Start: 2024-01-27 | End: 2024-01-27

## 2024-01-27 RX ORDER — QUETIAPINE FUMARATE 200 MG/1
12.5 TABLET, FILM COATED ORAL EVERY 6 HOURS
Refills: 0 | Status: DISCONTINUED | OUTPATIENT
Start: 2024-01-27 | End: 2024-02-13

## 2024-01-27 RX ORDER — HEPARIN SODIUM 5000 [USP'U]/ML
5000 INJECTION INTRAVENOUS; SUBCUTANEOUS EVERY 8 HOURS
Refills: 0 | Status: DISCONTINUED | OUTPATIENT
Start: 2024-01-27 | End: 2024-02-13

## 2024-01-27 RX ORDER — CHOLECALCIFEROL (VITAMIN D3) 125 MCG
1000 CAPSULE ORAL DAILY
Refills: 0 | Status: DISCONTINUED | OUTPATIENT
Start: 2024-01-27 | End: 2024-01-27

## 2024-01-27 RX ORDER — CALCITONIN SALMON 200 [IU]/ML
330 INJECTION, SOLUTION INTRAMUSCULAR EVERY 12 HOURS
Refills: 0 | Status: DISCONTINUED | OUTPATIENT
Start: 2024-01-27 | End: 2024-01-27

## 2024-01-27 RX ORDER — FOLIC ACID 0.8 MG
1 TABLET ORAL DAILY
Refills: 0 | Status: DISCONTINUED | OUTPATIENT
Start: 2024-01-27 | End: 2024-02-13

## 2024-01-27 RX ORDER — CALCITONIN SALMON 200 [IU]/ML
200 INJECTION, SOLUTION INTRAMUSCULAR EVERY 12 HOURS
Refills: 0 | Status: DISCONTINUED | OUTPATIENT
Start: 2024-01-27 | End: 2024-01-27

## 2024-01-27 RX ORDER — LANOLIN ALCOHOL/MO/W.PET/CERES
3 CREAM (GRAM) TOPICAL AT BEDTIME
Refills: 0 | Status: DISCONTINUED | OUTPATIENT
Start: 2024-01-27 | End: 2024-02-13

## 2024-01-27 RX ORDER — DONEPEZIL HYDROCHLORIDE 10 MG/1
10 TABLET, FILM COATED ORAL AT BEDTIME
Refills: 0 | Status: DISCONTINUED | OUTPATIENT
Start: 2024-01-27 | End: 2024-02-13

## 2024-01-27 RX ORDER — PREGABALIN 225 MG/1
1000 CAPSULE ORAL DAILY
Refills: 0 | Status: DISCONTINUED | OUTPATIENT
Start: 2024-01-27 | End: 2024-02-13

## 2024-01-27 RX ORDER — RISPERIDONE 4 MG/1
0.5 TABLET ORAL AT BEDTIME
Refills: 0 | Status: DISCONTINUED | OUTPATIENT
Start: 2024-01-27 | End: 2024-02-13

## 2024-01-27 RX ORDER — QUETIAPINE FUMARATE 200 MG/1
0.5 TABLET, FILM COATED ORAL
Refills: 0 | DISCHARGE

## 2024-01-27 RX ORDER — CALCITONIN SALMON 200 [IU]/ML
200 INJECTION, SOLUTION INTRAMUSCULAR EVERY 12 HOURS
Refills: 0 | Status: COMPLETED | OUTPATIENT
Start: 2024-01-27 | End: 2024-01-27

## 2024-01-27 RX ORDER — RISPERIDONE 4 MG/1
0.5 TABLET ORAL ONCE
Refills: 0 | Status: COMPLETED | OUTPATIENT
Start: 2024-01-27 | End: 2024-01-29

## 2024-01-27 RX ORDER — SODIUM CHLORIDE 9 MG/ML
1000 INJECTION INTRAMUSCULAR; INTRAVENOUS; SUBCUTANEOUS
Refills: 0 | Status: DISCONTINUED | OUTPATIENT
Start: 2024-01-27 | End: 2024-01-28

## 2024-01-27 RX ADMIN — HEPARIN SODIUM 5000 UNIT(S): 5000 INJECTION INTRAVENOUS; SUBCUTANEOUS at 06:58

## 2024-01-27 RX ADMIN — RISPERIDONE 0.5 MILLIGRAM(S): 4 TABLET ORAL at 22:55

## 2024-01-27 RX ADMIN — SERTRALINE 50 MILLIGRAM(S): 25 TABLET, FILM COATED ORAL at 11:24

## 2024-01-27 RX ADMIN — QUETIAPINE FUMARATE 12.5 MILLIGRAM(S): 200 TABLET, FILM COATED ORAL at 19:48

## 2024-01-27 RX ADMIN — Medication 1 MILLIGRAM(S): at 11:24

## 2024-01-27 RX ADMIN — ZOLEDRONIC ACID 400 MILLIGRAM(S): 5 INJECTION, SOLUTION INTRAVENOUS at 11:24

## 2024-01-27 RX ADMIN — DONEPEZIL HYDROCHLORIDE 10 MILLIGRAM(S): 10 TABLET, FILM COATED ORAL at 22:55

## 2024-01-27 RX ADMIN — Medication 0.25 MILLIGRAM(S): at 04:45

## 2024-01-27 RX ADMIN — RISPERIDONE 0.25 MILLIGRAM(S): 4 TABLET ORAL at 11:24

## 2024-01-27 RX ADMIN — CALCITONIN SALMON 200 INTERNATIONAL UNIT(S): 200 INJECTION, SOLUTION INTRAMUSCULAR at 22:55

## 2024-01-27 RX ADMIN — PREGABALIN 1000 MICROGRAM(S): 225 CAPSULE ORAL at 11:24

## 2024-01-27 RX ADMIN — HEPARIN SODIUM 5000 UNIT(S): 5000 INJECTION INTRAVENOUS; SUBCUTANEOUS at 22:56

## 2024-01-27 RX ADMIN — HEPARIN SODIUM 5000 UNIT(S): 5000 INJECTION INTRAVENOUS; SUBCUTANEOUS at 14:04

## 2024-01-27 RX ADMIN — MEMANTINE HYDROCHLORIDE 10 MILLIGRAM(S): 10 TABLET ORAL at 17:46

## 2024-01-27 RX ADMIN — CALCITONIN SALMON 200 INTERNATIONAL UNIT(S): 200 INJECTION, SOLUTION INTRAMUSCULAR at 11:23

## 2024-01-27 RX ADMIN — SODIUM CHLORIDE 125 MILLILITER(S): 9 INJECTION INTRAMUSCULAR; INTRAVENOUS; SUBCUTANEOUS at 06:58

## 2024-01-27 RX ADMIN — QUETIAPINE FUMARATE 12.5 MILLIGRAM(S): 200 TABLET, FILM COATED ORAL at 11:47

## 2024-01-27 NOTE — CONSULT NOTE ADULT - SUBJECTIVE AND OBJECTIVE BOX
[facility]ACT[facility]   MD ANEL FOLEY MD ANGELA WONG, PA QIAN CHEN, NP      TEL:  OFFICE: [phone]  From 5pm-7am answering service [phone]    --- INITIAL RENAL CONSULT NOTE ---date of service 01-27-24 @ 09:59    HPI:  78M with history of MM, HTN, and Dementia (AAO x 1-2 to self, to place, not to time at baseline per brother) who presents to the hospital from [facility] after a fall. Patient is a poor historian 2/2 dementia, unable to state what happened. As per paperwork from the NH, patient had a fall and was noted to have a hematoma on the occipital head and was sent to the hospital for evaluation. Here imaging were negative for fractures but his work up showed him to have a significantly elevated calcium of 14 (corrected to 15.1) with DAVID and a significantly elevated protein gap. He was given NS 1.5L and repeat BMP showed persistence of the calcium elevation though improving. He was admitted to medicine on telemetry for further evaluation.  (26 Jan 2024 23:03)        Allergies:  No Known Allergies      PAST MEDICAL & SURGICAL HISTORY:  Dementia      Multiple myeloma      Essential hypertension      S/P MVR (mitral valve repair)      H/O thoracic aortic aneurysm repair          Home Medications Reviewed    Hospital Medications:   MEDICATIONS  (STANDING):  calcitonin Injectable 200 International Unit(s) IntraMuscular every 12 hours  cyanocobalamin 1000 MICROGram(s) Oral daily  donepezil 10 milliGRAM(s) Oral at bedtime  folic acid 1 milliGRAM(s) Oral daily  heparin   Injectable 5000 Unit(s) SubCutaneous every 8 hours  memantine 10 milliGRAM(s) Oral two times a day  risperiDONE   Tablet 0.25 milliGRAM(s) Oral daily  risperiDONE   Tablet 0.5 milliGRAM(s) Oral at bedtime  risperiDONE  Disintegrating Tablet 0.5 milliGRAM(s) Oral once  sertraline 50 milliGRAM(s) Oral daily  sodium chloride 0.9%. 1000 milliLiter(s) (125 mL/Hr) IV Continuous <Continuous>  zoledronic acid IVPB (ZOMETA) (Non - oncologic) 4 milliGRAM(s) IV Intermittent once      SOCIAL HISTORY:  Denies ETOh, Smoking,     FAMILY HISTORY:  No pertinent family history in first degree relatives        REVIEW OF SYSTEMS:  CONSTITUTIONAL: No weakness, fevers or chills  EYES/ENT: No visual changes;  No vertigo or throat pain   NECK: No pain or stiffness  RESPIRATORY: No cough, wheezing, hemoptysis; No shortness of breath  CARDIOVASCULAR: No chest pain or palpitations.  GASTROINTESTINAL: No abdominal or epigastric pain. No nausea, vomiting, or hematemesis; No diarrhea or constipation. No melena or hematochezia.  GENITOURINARY: No dysuria, frequency, foamy urine, urinary urgency, incontinence or hematuria  NEUROLOGICAL: No numbness or weakness  SKIN: No itching, burning, rashes, or lesions   VASCULAR: No bilateral lower extremity edema.   All other review of systems is negative unless indicated above.    VITALS:  T(F): 98 (01-27-24 @ 06:46), Max: 98.4 (01-26-24 @ 15:13)  HR: 79 (01-27-24 @ 06:46)  BP: 145/79 (01-27-24 @ 06:46)  RR: 16 (01-27-24 @ 06:46)  SpO2: 95% (01-27-24 @ 06:46)  Wt(kg): --    Height (cm): 185.4 (01-27 @ 09:28)  Weight (kg): 81.647 (01-27 @ 09:28)  BMI (kg/m2): 23.8 (01-27 @ 09:28)  BSA (m2): 2.06 (01-27 @ 09:28)    PHYSICAL EXAM:  General: NAD  HEENT: anicteric sclera, oropharynx clear, MMM  Neck: No JVD  Respiratory: CTAB, no wheezes, rales or rhonchi  Cardiovascular: S1, S2, RRR  Gastrointestinal: BS+, soft, NT/ND  Extremities: No cyanosis or clubbing. No peripheral edema  Neurological: A/O x 3, no focal deficits  Psychiatric: Normal mood, normal affect  : No CVA tenderness. No calero.   Skin: No rashes  Vascular Access:    LABS:  01-26    137  |  107  |  37<H>  ----------------------------<  97  4.1   |  24  |  1.33<H>    Ca    13.4<HH>      26 Jan 2024 23:00    TPro  11.4<H>  /  Alb  2.4<L>  /  TBili  0.5  /  DBili      /  AST  15  /  ALT  13  /  AlkPhos  78  01-26    Creatinine Trend: 1.33 <--, 1.39 <--                        8.4    10.02 )-----------( 253      ( 26 Jan 2024 17:35 )             25.2     Urine Studies:  Urinalysis Basic - ( 26 Jan 2024 23:00 )    Color:  / Appearance:  / SG:  / pH:   Gluc: 97 mg/dL / Ketone:   / Bili:  / Urobili:    Blood:  / Protein:  / Nitrite:    Leuk Esterase:  / RBC:  / WBC    Sq Epi:  / Non Sq Epi:  / Bacteria:       RADIOLOGY & ADDITIONAL STUDIES:                 [facility]   MD ANEL FOLEY MD ANGELA WONG, PA QIAN CHEN, NP      TEL:  OFFICE: [phone]  From 5pm-7am answering service [phone]    --- INITIAL RENAL CONSULT NOTE ---date of service 01-27-24 @ 09:59    HPI:  78M with history of MM, HTN, and Dementia (AAO x 1-2 to self, to place, not to time at baseline per brother) who presents to the hospital from [facility] after a fall. Patient is a poor historian 2/2 dementia, unable to state what happened. As per paperwork from the NH, patient had a fall and was noted to have a hematoma on the occipital head and was sent to the hospital for evaluation. Here imaging were negative for fractures but his work up showed him to have a significantly elevated calcium of 14 (corrected to 15.1) with DAVID and a significantly elevated protein gap.       Allergies:  No Known Allergies      PAST MEDICAL & SURGICAL HISTORY:  Dementia    Multiple myeloma    Essential hypertension    S/P MVR (mitral valve repair)    H/O thoracic aortic aneurysm repair        Home Medications Reviewed    Hospital Medications:   MEDICATIONS  (STANDING):  calcitonin Injectable 200 International Unit(s) IntraMuscular every 12 hours  cyanocobalamin 1000 MICROGram(s) Oral daily  donepezil 10 milliGRAM(s) Oral at bedtime  folic acid 1 milliGRAM(s) Oral daily  heparin   Injectable 5000 Unit(s) SubCutaneous every 8 hours  memantine 10 milliGRAM(s) Oral two times a day  risperiDONE   Tablet 0.25 milliGRAM(s) Oral daily  risperiDONE   Tablet 0.5 milliGRAM(s) Oral at bedtime  risperiDONE  Disintegrating Tablet 0.5 milliGRAM(s) Oral once  sertraline 50 milliGRAM(s) Oral daily  sodium chloride 0.9%. 1000 milliLiter(s) (125 mL/Hr) IV Continuous <Continuous>  zoledronic acid IVPB (ZOMETA) (Non - oncologic) 4 milliGRAM(s) IV Intermittent once      SOCIAL HISTORY:  Denies ETOh, Smoking,     FAMILY HISTORY:  No pertinent family history in first degree relatives    REVIEW OF SYSTEMS:  Deferred as pt is lethargic and disoriented.    VITALS:  T(F): 98 (01-27-24 @ 06:46), Max: 98.4 (01-26-24 @ 15:13)  HR: 79 (01-27-24 @ 06:46)  BP: 145/79 (01-27-24 @ 06:46)  RR: 16 (01-27-24 @ 06:46)  SpO2: 95% (01-27-24 @ 06:46)  Wt(kg): --    Height (cm): 185.4 (01-27 @ 09:28)  Weight (kg): 81.647 (01-27 @ 09:28)  BMI (kg/m2): 23.8 (01-27 @ 09:28)  BSA (m2): 2.06 (01-27 @ 09:28)    PHYSICAL EXAM:  General: NAD  HEENT: anicteric sclera, oropharynx clear, mucous membranes are dry and cracked.  Neck: No JVD  Respiratory: CTAB, no wheezes, rales or rhonchi  Cardiovascular: S1, S2, RRR  Gastrointestinal: BS+, soft, NT/ND  Extremities: No cyanosis or clubbing. No peripheral edema  Neurological: A/O x1; lethargic.  : No CVA tenderness. No calero.   Skin: No rashes    LABS:  01-26    137  |  107  |  37<H>  ----------------------------<  97  4.1   |  24  |  1.33<H>    Ca    13.4<HH>      26 Jan 2024 23:00    TPro  11.4<H>  /  Alb  2.4<L>  /  TBili  0.5  /  DBili      /  AST  15  /  ALT  13  /  AlkPhos  78  01-26    Creatinine Trend: 1.33 <--, 1.39 <--                        8.4    10.02 )-----------( 253      ( 26 Jan 2024 17:35 )             25.2     Urine Studies:  Urinalysis Basic - ( 26 Jan 2024 23:00 )    Color:  / Appearance:  / SG:  / pH:   Gluc: 97 mg/dL / Ketone:   / Bili:  / Urobili:    Blood:  / Protein:  / Nitrite:    Leuk Esterase:  / RBC:  / WBC    Sq Epi:  / Non Sq Epi:  / Bacteria:       RADIOLOGY & ADDITIONAL STUDIES:

## 2024-01-27 NOTE — CONSULT NOTE ADULT - SUBJECTIVE AND OBJECTIVE BOX
[facility]  *********************    DATE OF SERVICE: 01-27-24    HISTORY OF PRESENT ILLNESS: HPI:  This is a 78M with history of MM, HTN, and Dementia (AAO x 1-2 to self, to place, not to time at baseline per brother) who presents to the hospital from [facility] after a fall.     Patient is a poor historian 2/2 dementia. As per paperwork from the NH, patient had a fall and was noted to have a hematoma on the occipital head and was sent to the hospital for evaluation. Here imaging were negative for fractures but his work up showed him to have a significantly elevated calcium of 14 (corrected to 15.1) with DAVID and a significantly elevated protein gap. He was given NS 1.5L and repeat BMP showed persistence of the calcium elevation though improving. He was admitted to medicine on telemetry for further evaluation.  Recent admission for influenza, where his lopressor and norvasc were discontinued.    PAST MEDICAL & SURGICAL HISTORY:  Dementia  Multiple myeloma  Essential hypertension  S/P MVR (mitral valve repai  H/O thoracic aortic aneurysm repair      MEDICATIONS:  MEDICATIONS  (STANDING):  calcitonin Injectable 200 International Unit(s) IntraMuscular every 12 hours  cyanocobalamin 1000 MICROGram(s) Oral daily  donepezil 10 milliGRAM(s) Oral at bedtime  folic acid 1 milliGRAM(s) Oral daily  heparin   Injectable 5000 Unit(s) SubCutaneous every 8 hours  memantine 10 milliGRAM(s) Oral two times a day  risperiDONE   Tablet 0.25 milliGRAM(s) Oral daily  risperiDONE   Tablet 0.5 milliGRAM(s) Oral at bedtime  risperiDONE  Disintegrating Tablet 0.5 milliGRAM(s) Oral once  sertraline 50 milliGRAM(s) Oral daily  sodium chloride 0.9%. 1000 milliLiter(s) (125 mL/Hr) IV Continuous <Continuous>      AllergiesNo Known Allergies      FAMILY HISTORY:  No pertinent family history in first degree relatives      Non-contributary for premature coronary disease or sudden cardiac death    SOCIAL HISTORY:    [X ] Non-smoker  [ ] Smoker  [ ] Alcohol    FLU VACCINE THIS YEAR STARTS IN AUGUST:  [ ] Yes    [ ] No    IF OVER 65 HAVE YOU EVER HAD A PNA VACCINE:  [ ] Yes    [ ] No       [ ] N/A      REVIEW OF SYSTEMS:  [ ]chest pain  [  ]shortness of breath  [  ]palpitations  [  ]syncope  [ ]near syncope [ ]upper extremity weakness   [ ] lower extremity weakness  [  ]diplopia  [  ]altered mental status   [  ]fevers  [ ]chills [ ]nausea  [ ]vomiting  [  ]dysphagia    [ ]abdominal pain  [ ]melena  [ ]BRBPR    [  ]epistaxis  [  ]rash    [ ]lower extremity edema    [X] All others negative	  [ ] Unable to obtain      LABS:	 	    CARDIAC MARKERS:  CARDIAC MARKERS ( 27 Jan 2024 10:56 )  x     / x     / 64 U/L / x     / x                                  8.4    10.02 )-----------( 253      ( 26 Jan 2024 17:35 )             25.2     Hb Trend: 8.4<--    01-27    140  |  110<H>  |  32<H>  ----------------------------<  89  3.5   |  26  |  1.19    Ca    13.1<HH>      27 Jan 2024 10:56  Phos  2.7     01-27  Mg     1.70     01-27    TPro  11.3<H>  /  Alb  2.2<L>  /  TBili  0.5  /  DBili  x   /  AST  17  /  ALT  14  /  AlkPhos  74  01-27    Creatinine Trend: 1.19<--, 1.33<--, 1.39<--, 0.87<--, 0.78<--, 0.72<--        PHYSICAL EXAM:  T(C): 36.8 (01-27-24 @ 11:17), Max: 36.9 (01-26-24 @ 23:51)  HR: 75 (01-27-24 @ 11:17) (75 - 86)  BP: 118/77 (01-27-24 @ 11:17) (118/77 - 145/79)  RR: 19 (01-27-24 @ 11:17) (16 - 19)  SpO2: 100% (01-27-24 @ 11:17) (94% - 100%)  Wt(kg): --   BMI (kg/m2): 23.8 (01-27-24 @ 09:28)  I&O's Summary      Gen: NAD  HEENT:  (-)icterus (-)pallor  CV: N S1 S2 1/6 HOLLAND (+)2 Pulses B/l  Resp:  Clear to auscultation B/L, normal effort  GI: (+) BS Soft, NT, ND  Lymph:  (-)Edema, (-)obvious lymphadenopathy  Skin: Warm to touch, Normal turgor  Psych: Appropriate mood and affect      TELEMETRY: 	  NSR    ECG:  	NSR    < from: Transthoracic Echocardiogram (07.10.23 @ 11:15) >  CONCLUSIONS:  1. Calcified trileaflet aortic valve with normal opening.  Minimal aortic regurgitation.  2. Endocardium not well visualized; grossly decreased  possibly mild left ventricular systolic dysfunction.  3. The right ventricle is not well visualized; grossly  normal right ventricular systolic function.  ------------------------------------------------------------------------  Confirmed on  7/10/2023 - 13:57:46 by MARLA Vegas  < end of copied text >        ASSESSMENT/PLAN: This is a 78M with history of MM, HTN, and Dementia (AAO x 1-2 to self, to place, not to time at baseline per brother) who presents to the hospital from [facility] after a fall.     Patient is a poor historian 2/2 dementia. As per paperwork from the NH, patient had a fall and was noted to have a hematoma on the occipital head and was sent to the hospital for evaluation. Here imaging were negative for fractures but his work up showed him to have a significantly elevated calcium of 14 (corrected to 15.1) with DAVID and a significantly elevated protein gap. He was given NS 1.5L and repeat BMP showed persistence of the calcium elevation though improving. He was admitted to medicine on telemetry for further evaluation.  Recent admission for influenza, where his lopressor and norvasc were discontinued.    1. HTN/h/o MVR  - continued to hold lopressor and amodipine  - agree with fluids for hypercalcemia-  - no EKG changes associated ith hypercalcemia  - TTE from last year with mildly decreased LVEF    Staci Muñiz MD  Pager: [phone]

## 2024-01-27 NOTE — CONSULT NOTE ADULT - SUBJECTIVE AND OBJECTIVE BOX
01-27-24 @ 11:42    Patient is a 78y old  Male who presents with a chief complaint of Fall, elevated calcium (27 Jan 2024 09:58)      HPI:  This is a 78M with history of MM, HTN, and Dementia (AAO x 1-2 to self, to place, not to time at baseline per brother) who presents to the hospital from [facility] after a fall. Patient is a poor historian 2/2 dementia, unable to state what happened. As per paperwork from the NH, patient had a fall and was noted to have a hematoma on the occipital head and was sent to the hospital for evaluation. Here imaging were negative for fractures but his work up showed him to have a significantly elevated calcium of 14 (corrected to 15.1) with DAVID and a significantly elevated protein gap. He was given NS 1.5L and repeat BMP showed persistence of the calcium elevation though improving. He was admitted to medicine on telemetry for further evaluation.  (26 Jan 2024 23:03)   he can not tell me why is he here and how is his breathing:    he is on room air:  slightly agitated: He was also recently admitted to hospital when he had influenza:   no now cough reported and has no fever:       ?FOLLOWING PRESENT  [ ]x Hx of PE/DVT, [ x] Hx COPD, [ x] Hx of Asthma, y[ ] Hx of Hospitalization, [ ]x  Hx of BiPAP/CPAP use, [ x] Hx of DMITRIY    Allergies    No Known Allergies    Intolerances        PAST MEDICAL & SURGICAL HISTORY:  Dementia      Multiple myeloma      Essential hypertension      S/P MVR (mitral valve repair)      H/O thoracic aortic aneurysm repair          FAMILY HISTORY:  No pertinent family history in first degree relatives        Social History: [ x ] TOBACCO                  [x  ] ETOH                                 [  x] IVDA/DRUGS    REVIEW OF SYSTEMS    cant obtain from pt   General:	    Skin/Breast:  	  Ophthalmologic:  	  ENMT:	    Respiratory and Thorax:  	  Cardiovascular:	    Gastrointestinal:	    Genitourinary:	    Musculoskeletal:	    Neurological:	    Psychiatric:	    Hematology/Lymphatics:	    Endocrine:	    Allergic/Immunologic:	    MEDICATIONS  (STANDING):  calcitonin Injectable 200 International Unit(s) IntraMuscular every 12 hours  cyanocobalamin 1000 MICROGram(s) Oral daily  donepezil 10 milliGRAM(s) Oral at bedtime  folic acid 1 milliGRAM(s) Oral daily  heparin   Injectable 5000 Unit(s) SubCutaneous every 8 hours  memantine 10 milliGRAM(s) Oral two times a day  risperiDONE   Tablet 0.25 milliGRAM(s) Oral daily  risperiDONE   Tablet 0.5 milliGRAM(s) Oral at bedtime  risperiDONE  Disintegrating Tablet 0.5 milliGRAM(s) Oral once  sertraline 50 milliGRAM(s) Oral daily  sodium chloride 0.9%. 1000 milliLiter(s) (125 mL/Hr) IV Continuous <Continuous>    MEDICATIONS  (PRN):  acetaminophen     Tablet .. 650 milliGRAM(s) Oral every 6 hours PRN Temp greater or equal to 38C (100.4F), Mild Pain (1 - 3)  aluminum hydroxide/magnesium hydroxide/simethicone Suspension 30 milliLiter(s) Oral every 4 hours PRN Dyspepsia  melatonin 3 milliGRAM(s) Oral at bedtime PRN Insomnia  ondansetron Injectable 4 milliGRAM(s) IV Push every 8 hours PRN Nausea and/or Vomiting  QUEtiapine 12.5 milliGRAM(s) Oral every 6 hours PRN for agitation       Vital Signs Last 24 Hrs  T(C): 36.8 (27 Jan 2024 11:17), Max: 36.9 (26 Jan 2024 15:13)  T(F): 98.2 (27 Jan 2024 11:17), Max: 98.4 (26 Jan 2024 15:13)  HR: 75 (27 Jan 2024 11:17) (75 - 86)  BP: 118/77 (27 Jan 2024 11:17) (107/71 - 145/79)  BP(mean): --  RR: 19 (27 Jan 2024 11:17) (16 - 19)  SpO2: 100% (27 Jan 2024 11:17) (94% - 100%)    Parameters below as of 27 Jan 2024 11:17  Patient On (Oxygen Delivery Method): room air    Orthostatic VS          I&O's Summary      Physical Exam:   GENERAL: NAD, well-groomed, well-developed  HEENT: CHRISTINA/   Atraumatic, Normocephalic  ENMT: No tonsillar erythema, exudates, or enlargement; Moist mucous membranes, Good dentition, No lesions  NECK: Supple, No JVD, Normal thyroid  CHEST/LUNG: Clear to auscultation bilaterally  CVS: Regular rate and rhythm; No murmurs, rubs, or gallops  GI: : Soft, Nontender, Nondistended; Bowel sounds present  NERVOUS SYSTEM:  Alert & awake  : demented:  slightly rest less:   EXTREMITIES:-edema  LYMPH: No lymphadenopathy noted  SKIN: No rashes or lesions  ENDOCRINOLOGY: No Thyromegaly  PSYCH: calm     Labs:  SARS-CoV-2: NotDetec (23 Jan 2024 15:13)  COVID-19 PCR: NotDetec (12 Jan 2024 07:52)    CARDIAC MARKERS ( 27 Jan 2024 10:56 )  x     / x     / 64 U/L / x     / x                                8.4    10.02 )-----------( 253      ( 26 Jan 2024 17:35 )             25.2     01-27    140  |  110<H>  |  32<H>  ----------------------------<  89  3.5   |  26  |  1.19  01-26    137  |  107  |  37<H>  ----------------------------<  97  4.1   |  24  |  1.33<H>  01-26    137  |  105  |  40<H>  ----------------------------<  107<H>  3.9   |  28  |  1.39<H>    Ca    13.1<HH>      27 Jan 2024 10:56  Ca    13.4<HH>      26 Jan 2024 23:00  Ca    14.0<HH>      26 Jan 2024 17:35  Phos  2.7     01-27  Mg     1.70     01-27    TPro  11.3<H>  /  Alb  2.2<L>  /  TBili  0.5  /  DBili  x   /  AST  17  /  ALT  14  /  AlkPhos  74  01-27  TPro  11.4<H>  /  Alb  2.4<L>  /  TBili  0.5  /  DBili  x   /  AST  15  /  ALT  13  /  AlkPhos  78  01-26  TPro  13.4<H>  /  Alb  2.6<L>  /  TBili  0.6  /  DBili  x   /  AST  18  /  ALT  16  /  AlkPhos  81  01-26    CAPILLARY BLOOD GLUCOSE        LIVER FUNCTIONS - ( 27 Jan 2024 10:56 )  Alb: 2.2 g/dL / Pro: 11.3 g/dL / ALK PHOS: 74 U/L / ALT: 14 U/L / AST: 17 U/L / GGT: x             Urinalysis Basic - ( 27 Jan 2024 10:56 )    Color: x / Appearance: x / SG: x / pH: x  Gluc: 89 mg/dL / Ketone: x  / Bili: x / Urobili: x   Blood: x / Protein: x / Nitrite: x   Leuk Esterase: x / RBC: x / WBC x   Sq Epi: x / Non Sq Epi: x / Bacteria: x      D DImer      Studies  Chest X-RAY  CT SCAN Chest   CT Abdomen  Venous Dopplers: LE:   Others      rad< from: CT Head No Cont (01.26.24 @ 20:21) >  There is no displaced calvarial fracture. The visualized orbits are   within normal limits. The visualized portions of the paranasal sinuses   are well aerated. The mastoid air cells are well aerated.      CT cervical spine:    Cervical alignment is maintained. Cervical vertebral body heights are   within normal limits. Mild age-indeterminate compression deformities at   the superior endplates of T1 and T2, without bony retropulsion.   Multilevel intervertebral disc height loss,disc osteophyte complexes,   and bilateral facet and uncovertebral arthropathy.    There is no prevertebral soft tissue swelling. The paraspinal soft   tissues are unremarkable. The lung apices are clear.      IMPRESSION:    CT head: No acute intracranial hemorrhage or mass effect.    CT cervical spine:  No evidence for acute displaced fracture or malalignment of the cervical   spine.  Mild age-indeterminate compression deformities at the superior endplates   of T1 and T2, without bony retropulsion.    --- End of Report ---      < end of copied text >  < from: Xray Chest 1 View- PORTABLE-Urgent (Xray Chest 1 View- PORTABLE-Urgent .) (01.26.24 @ 17:35) >    ACC: [id number] EXAM:  XR CHEST PORTABLE URGENT 1V   ORDERED BY: KEELY CAVAZOS     PROCEDURE DATE:  01/26/2024          INTERPRETATION:  EXAMINATION: XR CHEST URGENT    CLINICAL INDICATION: fall    TECHNIQUE: Single frontal portable view of the chest from 1/26/2024 5:35   PM    COMPARISON: Chest x-ray 1/2/2024.    FINDINGS:  Sternotomy wires.  The heart size is not accurately assessed on this projection.  Hazy left lung base opacity with low lung volumes, likely represents   atelectasis.  There is no pneumothorax or pleural effusion.    IMPRESSION:  No focal consolidations.  No acute traumatic findings.    --- End of Report ---          TOMAS MASSEY MD; Resident Radiologist  This document has been electronically signed.  KOFI GARCIA MD; Attending Radiologist  This document has been electronically signed. Jan 26 2024  6:15PM    < end of copied text >  < from: CT Head No Cont (01.26.24 @ 20:21) >  within normal limits. The visualized portions of the paranasal sinuses   are well aerated. The mastoid air cells are well aerated.      CT cervical spine:    Cervical alignment is maintained. Cervical vertebral body heights are   within normal limits. Mild age-indeterminate compression deformities at   the superior endplates of T1 and T2, without bony retropulsion.   Multilevel intervertebral disc height loss,disc osteophyte complexes,   and bilateral facet and uncovertebral arthropathy.    There is no prevertebral soft tissue swelling. The paraspinal soft   tissues are unremarkable. The lung apices are clear.      IMPRESSION:    CT head: No acute intracranial hemorrhage or mass effect.    CT cervical spine:  No evidence for acute displaced fracture or malalignment of the cervical   spine.  Mild age-indeterminate compression deformities at the superior endplates   of T1 and T2, without bony retropulsion.    < end of copied text >        rad

## 2024-01-27 NOTE — CONSULT NOTE ADULT - ASSESSMENT
78M with history of MM, HTN, and Dementia (AAO x 1-2 to self, to place, not to time at baseline per brother) who presents to the hospital from [facility] after a fall. Patient is a poor historian 2/2 dementia, unable to state what happened. As per paperwork from the NH, patient had a fall and was noted to have a hematoma on the occipital head and was sent to the hospital for evaluation. Here imaging were negative for fractures but his work up showed him to have a significantly elevated calcium of 14 (corrected to 15.1) with DAVID and a significantly elevated protein gap.  Nephrology consulted for DAVID and hypercalemia.    A/P:  DAVID:  Baseline S.Cr 0.7-0.9.  S.Cr 1.39 on admission.  Likely in setting of dehydration vs. hypercalcemia.  S/p NS 0.9% 1.5L and now receiving maintenance at 125mL/hr.  C/W IVF.  Check CK level in view of fall to rule out rhabdo.  Check urine sodium, urine creatinine, urinalysis, and bladder scan.  Monitor BMP and UO.    HTN:  Fluctuating.  Resume home meds.  Avoid hypotension.  Monitor BP.    Hypercalcemia:  Possibly sec to MM vs dehydration.  PTH low, suppressed by high Ca.  Check PTHrP, SPEP, S. immunofixation and free light chains.  Vit. D 25OH was 37 1/13.  C/w IVF.  Start Zometa 4mg x1 dose and calcitonin 200IU q12hrs x4 doses.  Heme evaluation appreciated.  Monitor Ca.    Anemia:  Likely sec to MM vs. other etiology.  Workup per primary team.  Heme/onc evaluation appreciated.  Monitor Hgb.  Transfuse for Hgb <8. 78M with history of MM, HTN, and Dementia (AAO x 1-2 to self, to place, not to time at baseline per brother) who presents to the hospital from [facility] after a fall. Patient is a poor historian 2/2 dementia, unable to state what happened. As per paperwork from the NH, patient had a fall and was noted to have a hematoma on the occipital head and was sent to the hospital for evaluation. Here imaging were negative for fractures but his work up showed him to have a significantly elevated calcium of 14 (corrected to 15.1) with DAVID and a significantly elevated protein gap.  Nephrology consulted for DAVID and hypercalemia.    A/P:  DAVID:  Baseline S.Cr 0.7-0.9.  S.Cr 1.39 on admission.  Likely pre-renal in setting of dehydration/hypercalcemia.  S/p NS 0.9% 1.5L and now receiving maintenance at 125mL/hr.  C/W IVF.  Check CK level in view of fall to rule out rhabdo.  Check urine sodium, urine creatinine, urinalysis, and bladder scan.  Monitor BMP and UO.    HTN:  Fluctuating.  Resume home meds.  Avoid hypotension.  Monitor BP.    Hypercalcemia:  Possibly sec to MM vs dehydration.  PTH low, suppressed by high Ca.  Check PTHrP, SPEP, S. immunofixation and free light chains.  Vit. D 25OH was 37 1/13.  C/w IVF.  Start Zometa 4mg x1 dose and calcitonin 200IU q12hrs x4 doses.  consider Heme evaluation  Monitor Ca.    Anemia:  Likely sec to MM vs. other etiology.  Workup per primary team.  Heme/onc evaluation    Monitor Hgb.  Transfuse for Hgb <8.

## 2024-01-27 NOTE — CONSULT NOTE ADULT - ASSESSMENT
This is a 78M with history of MM, HTN, and Dementia (AAO x 1-2 to self, to place, not to time at baseline per brother) who presents to the hospital from [facility] after a fall. Patient is a poor historian 2/2 dementia, unable to state what happened. As per paperwork from the NH, patient had a fall and was noted to have a hematoma on the occipital head and was sent to the hospital for evaluation. Here imaging were negative for fractures but his work up showed him to have a significantly elevated calcium of 14 (corrected to 15.1) with DAVID and a significantly elevated protein gap. He was given NS 1.5L and repeat BMP showed persistence of the calcium elevation though improving. He was admitted to medicine on telemetry for further evaluation.  (26 Jan 2024 23:03)   he can not tell me why is he here and how is his breathing:    he is on room air:  slightly agitated: He was also recently admitted to hospital when he had influenza:   no now cough reported and has no fever: :    s/p fall:  HTN  Multiple Myeloma  Dementia     s/p fall:  -he seems to be doing  ok :  -he is alert and awake but demented:   -on room air   -cxr is normal :"  -ct head with no bleeding noted:   -aspiration precautions  HTN  -controlled  Multiple Myeloma  -per primary team   Dementia   -supportive care    dvt prophylaxis    dwe staff

## 2024-01-27 NOTE — PROGRESS NOTE ADULT - SUBJECTIVE AND OBJECTIVE BOX
Patient is a 78y old  Male who presents with a chief complaint of Fall, elevated calcium (27 Jan 2024 16:03)    Date of servie : 01-27-24 @ 16:23  INTERVAL HPI/OVERNIGHT EVENTS:  T(C): 36.8 (01-27-24 @ 11:17), Max: 36.9 (01-26-24 @ 23:51)  HR: 75 (01-27-24 @ 11:17) (75 - 86)  BP: 118/77 (01-27-24 @ 11:17) (118/77 - 145/79)  RR: 19 (01-27-24 @ 11:17) (16 - 19)  SpO2: 100% (01-27-24 @ 11:17) (94% - 100%)  Wt(kg): --  I&O's Summary      LABS:                        8.4    10.02 )-----------( 253      ( 26 Jan 2024 17:35 )             25.2     01-27    140  |  110<H>  |  32<H>  ----------------------------<  89  3.5   |  26  |  1.19    Ca    13.1<HH>      27 Jan 2024 10:56  Phos  2.7     01-27  Mg     1.70     01-27    TPro  11.3<H>  /  Alb  2.2<L>  /  TBili  0.5  /  DBili  x   /  AST  17  /  ALT  14  /  AlkPhos  74  01-27      Urinalysis Basic - ( 27 Jan 2024 10:56 )    Color: x / Appearance: x / SG: x / pH: x  Gluc: 89 mg/dL / Ketone: x  / Bili: x / Urobili: x   Blood: x / Protein: x / Nitrite: x   Leuk Esterase: x / RBC: x / WBC x   Sq Epi: x / Non Sq Epi: x / Bacteria: x      CAPILLARY BLOOD GLUCOSE      POCT Blood Glucose.: 110 mg/dL (27 Jan 2024 12:35)        Urinalysis Basic - ( 27 Jan 2024 10:56 )    Color: x / Appearance: x / SG: x / pH: x  Gluc: 89 mg/dL / Ketone: x  / Bili: x / Urobili: x   Blood: x / Protein: x / Nitrite: x   Leuk Esterase: x / RBC: x / WBC x   Sq Epi: x / Non Sq Epi: x / Bacteria: x        MEDICATIONS  (STANDING):  calcitonin Injectable 200 International Unit(s) IntraMuscular every 12 hours  cyanocobalamin 1000 MICROGram(s) Oral daily  donepezil 10 milliGRAM(s) Oral at bedtime  folic acid 1 milliGRAM(s) Oral daily  heparin   Injectable 5000 Unit(s) SubCutaneous every 8 hours  memantine 10 milliGRAM(s) Oral two times a day  risperiDONE   Tablet 0.25 milliGRAM(s) Oral daily  risperiDONE   Tablet 0.5 milliGRAM(s) Oral at bedtime  risperiDONE  Disintegrating Tablet 0.5 milliGRAM(s) Oral once  sertraline 50 milliGRAM(s) Oral daily  sodium chloride 0.9%. 1000 milliLiter(s) (125 mL/Hr) IV Continuous <Continuous>    MEDICATIONS  (PRN):  acetaminophen     Tablet .. 650 milliGRAM(s) Oral every 6 hours PRN Temp greater or equal to 38C (100.4F), Mild Pain (1 - 3)  aluminum hydroxide/magnesium hydroxide/simethicone Suspension 30 milliLiter(s) Oral every 4 hours PRN Dyspepsia  melatonin 3 milliGRAM(s) Oral at bedtime PRN Insomnia  ondansetron Injectable 4 milliGRAM(s) IV Push every 8 hours PRN Nausea and/or Vomiting  QUEtiapine 12.5 milliGRAM(s) Oral every 6 hours PRN for agitation          PHYSICAL EXAM:  GENERAL: NAD, well-groomed, well-developed  HEAD:  Atraumatic, Normocephalic  CHEST/LUNG: Clear to percussion bilaterally; No rales, rhonchi, wheezing, or rubs  HEART: Regular rate and rhythm; No murmurs, rubs, or gallops  ABDOMEN: Soft, Nontender, Nondistended; Bowel sounds present  EXTREMITIES:  2+ Peripheral Pulses, No clubbing, cyanosis, or edema  LYMPH: No lymphadenopathy noted  SKIN: No rashes or lesions    Care Discussed with Consultants/Other Providers [ ] YES  [ ] NO

## 2024-01-27 NOTE — CONSULT NOTE ADULT - NS ATTEND AMEND GEN_ALL_CORE FT
DAVID-josep pre-renal-IVF-monitor I/O  Hypercalcemia-josep sec to MM-zometa/calcitonin/IVF-myeloma work up-aiden field

## 2024-01-27 NOTE — PROGRESS NOTE ADULT - ASSESSMENT
{\rtf1\pzygyo62419\ansi\kkxlbza0778\ftnbj\uc1\deff0  {\fonttbl{\f0 \fnil Segoe UI;}{\f1 \fnil \fcharset0 Segoe UI;}{\f2 \fnil Times New Eros;}}  {\colortbl ;\nle777\wvvue994\iiqr723 ;\red0\green0\blue0 ;\red0\green0\cmae194 ;\red0\green0\blue0 ;}  {\stylesheet{\f0\fs20 Normal;}{\cs1 Default Paragraph Font;}{\cs2\f0\fs16 Line Number;}{\cs3\f2\fs24\ul\cf3 Hyperlink;}}  {\*\revtbl{Unknown;}}  \mhbfop43083\rnzuwb70251\nmpwo3880\ihccc4151\boaxi8046\oqfdu1202\sxvivzx535\htphdve762\nogrowautofit\jnnjep337\formshade\nofeaturethrottle1\dntblnsbdb\fet4\aendnotes\aftnnrlc\pgbrdrhead\pgbrdrfoot  \sectd\nqkvik02155\aohwat87852\guttersxn0\ftjrtfjv8396\edkocvrr3487\zjunsfll4447\pstzbwho1516\xjqimdn996\jxptuvp711\sbkpage\pgncont\pgndec  \plain\plain\f0\fs24\ql\plain\f0\fs24\plain\f1\fs16\tzby9522\hich\f1\dbch\f1\loch\f1\cf2\fs16 78M with history of MM, HTN, and Dementia who presents to the hospital after a fall at his NH here found to have significant hypercalcemia. \par  \par  \par  \plain\f1\fs16\ugcb0010\hich\f1\dbch\f1\loch\f1\cf2\fs16\b\ul{\field{\*\fldinst HYPERLINK [id number] }{\fldrslt Problem/Plan - 1:}}\plain\f1\fs16\jrlh8677\hich\f1\dbch\f1\loch\f1\cf2\fs16\ql\par  \'b7  {\*\bkmkstart lq25785876259}{\*\bkmkend kd09133765620}Problem: {\*\bkmkstart uv86034500646}{\*\bkmkend pp14621069826}Hypercalcemia. \par  \'b7  {\*\bkmkstart yx49250432188}{\*\bkmkend qo66470569035}Plan: {\*\bkmkstart iw15095912162}{\*\bkmkend ah92238251185}- \par  - Dose calcitonin once patient's weight is available\par  - Likely heme or nephrology fu \par  \par  \plain\f1\fs16\jcke1583\hich\f1\dbch\f1\loch\f1\cf2\fs16\b\ul{\field{\*\fldinst HYPERLINK [id number] }{\fldrslt Problem/Plan - 2:}}\plain\f1\fs16\roni7737\hich\f1\dbch\f1\loch\f1\cf2\fs16\ql\par  \'b7  {\*\bkmkstart db17530787982}{\*\bkmkend kx86339269413}Problem: {\*\bkmkstart wr52107804116}{\*\bkmkend cn18256790026}Fall. \par  \'b7  {\*\bkmkstart hj02610767249}{\*\bkmkend lb09787438520}Plan: {\*\bkmkstart cr74021147637}{\*\bkmkend gc93404045382}- Unclear cause of his fall, unclear mechanism, patient unable to state what happened and the NH paperwork does not mention how the   patient fell\par  - Will monitor on telemetry for now, trend troponin\par  \plain\f1\fs16\gqde3192\hich\f1\dbch\f1\loch\f1\cf2\fs16\strike\plain\f1\fs16\arnh9575\hich\f1\dbch\f1\loch\f1\cf2\fs16 - cars fu \par  \par  \plain\f1\fs16\gzwy7380\hich\f1\dbch\f1\loch\f1\cf2\fs16\b\ul{\field{\*\fldinst HYPERLINK [id number] }{\fldrslt Problem/Plan - 3:}}\plain\f1\fs16\pcst7595\hich\f1\dbch\f1\loch\f1\cf2\fs16 \'b7  {\*\bkmkstart lt19329441723}{\*\bkmkend   yj08231444171}Problem: {\*\bkmkstart iq76245257937}{\*\bkmkend jw16455267603}DAVID (acute kidney injury). \ql\par  \'b7  {\*\bkmkstart ir02459188374}{\*\bkmkend ef06537447212}Plan: {\*\bkmkstart mi56868494531}{\*\bkmkend ki13838062569}- New DAVID likely 2/2 severe hypercalcemia -> c/w IVF as above\par  - Check UA, urine studies\par  - Monitor I/O\par  - Trend BUN/Cr\par  - Nephrology eval in AM.\par  \par  \plain\f1\fs16\tema9385\hich\f1\dbch\f1\loch\f1\cf2\fs16\b\ul{\field{\*\fldinst HYPERLINK [id number] }{\fldrslt Problem/Plan - 4:}}\plain\f1\fs16\xeit0934\hich\f1\dbch\f1\loch\f1\cf2\fs16\ql\par  \'b7  {\*\bkmkstart sh23244203830}{\*\bkmkend qp32164181734}Problem: {\*\bkmkstart cl26104535633}{\*\bkmkend ck12273197966}Toxic metabolic encephalopathy. \par  \'b7  {\*\bkmkstart fu38186889570}{\*\bkmkend bc73893591167}Plan: {\*\bkmkstart ie12210533484}{\*\bkmkend um13049820392}- Baseline oriented to self and place, currently oriented to self but cannot state where he is, unable to state what the current month/year   is, unable to give significant HPI or ROS (seems to be the baseline when compared to his prior admission)\par  - Likely has some encephalopathy 2/2 severe hypercalcemia -> c/w management as above\par  - c/w dementia management as below.\par  \par  \plain\f1\fs16\xplt2354\hich\f1\dbch\f1\loch\f1\cf2\fs16\b\ul{\field{\*\fldinst HYPERLINK [id number] }{\fldrslt Problem/Plan - 5:}}\plain\f1\fs16\jexs4927\hich\f1\dbch\f1\loch\f1\cf2\fs16\ql\par  \'b7  {\*\bkmkstart zu19912749493}{\*\bkmkend ng45028199883}Problem: {\*\bkmkstart dl44536091350}{\*\bkmkend hu62495286929}Multiple myeloma. \par  \'b7  {\*\bkmkstart ku62277237122}{\*\bkmkend yf16676601229}Plan: {\*\bkmkstart sx87830918054}{\*\bkmkend rt45249431329}- Heme eval in AM\par  - Check SPEP, immunofixation.\par  \par  \plain\f1\fs16\wzdx6236\hich\f1\dbch\f1\loch\f1\cf2\fs16\b\ul{\field{\*\fldinst HYPERLINK [id number] }{\fldrslt Problem/Plan - 6:}}\plain\f1\fs16\eqvl8726\hich\f1\dbch\f1\loch\f1\cf2\fs16\ql\par  \'b7  {\*\bkmkstart gp19969101308}{\*\bkmkend sv05916587763}Problem: {\*\bkmkstart lt59559375657}{\*\bkmkend vh35880840621}Essential hypertension. \par  \'b7  {\*\bkmkstart gw25666125396}{\*\bkmkend ck95149769486}Plan: {\*\bkmkstart vw01417013230}{\*\bkmkend pu29149887943}- Not on medications, BPs currently well controlled\par  - Monitor BPs.\par  \par  \plain\f1\fs16\asts4642\hich\f1\dbch\f1\loch\f1\cf2\fs16\b\ul{\field{\*\fldinst HYPERLINK [id number] }{\fldrslt Problem/Plan - 7:}}\plain\f1\fs16\mpye6690\hich\f1\dbch\f1\loch\f1\cf2\fs16\ql\par  \'b7  {\*\bkmkstart xv69702303611}{\*\bkmkend uk64875221111}Problem: {\*\bkmkstart dd61708729841}{\*\bkmkend xy48426207489}Dementia. \par  \'b7  {\*\bkmkstart lv88691452866}{\*\bkmkend vz43876009177}Plan: {\*\bkmkstart xw98585871479}{\*\bkmkend jj97581323122}- c/w donepezil, memantine\par  - c/w risperdal, sertraline, and seroquel as per outpatient medications.\par  \plain\f0\fs20\xnzo1273\hich\f0\dbch\f0\loch\f0\fs20\par  }

## 2024-01-28 LAB
24R-OH-CALCIDIOL SERPL-MCNC: 46.4 NG/ML — SIGNIFICANT CHANGE UP (ref 30–80)
ALBUMIN SERPL ELPH-MCNC: 2.2 G/DL — LOW (ref 3.3–5)
ALP SERPL-CCNC: 68 U/L — SIGNIFICANT CHANGE UP (ref 40–120)
ALT FLD-CCNC: 12 U/L — SIGNIFICANT CHANGE UP (ref 4–41)
ANION GAP SERPL CALC-SCNC: 7 MMOL/L — SIGNIFICANT CHANGE UP (ref 7–14)
APPEARANCE UR: CLEAR — SIGNIFICANT CHANGE UP
AST SERPL-CCNC: 20 U/L — SIGNIFICANT CHANGE UP (ref 4–40)
BACTERIA # UR AUTO: NEGATIVE /HPF — SIGNIFICANT CHANGE UP
BASOPHILS # BLD AUTO: 0.02 K/UL — SIGNIFICANT CHANGE UP (ref 0–0.2)
BASOPHILS NFR BLD AUTO: 0.3 % — SIGNIFICANT CHANGE UP (ref 0–2)
BILIRUB SERPL-MCNC: 0.5 MG/DL — SIGNIFICANT CHANGE UP (ref 0.2–1.2)
BILIRUB UR-MCNC: NEGATIVE — SIGNIFICANT CHANGE UP
BLD GP AB SCN SERPL QL: NEGATIVE — SIGNIFICANT CHANGE UP
BUN SERPL-MCNC: 25 MG/DL — HIGH (ref 7–23)
CALCIUM SERPL-MCNC: 10.3 MG/DL — SIGNIFICANT CHANGE UP (ref 8.4–10.5)
CAST: 6 /LPF — HIGH (ref 0–4)
CHLORIDE SERPL-SCNC: 111 MMOL/L — HIGH (ref 98–107)
CO2 SERPL-SCNC: 21 MMOL/L — LOW (ref 22–31)
COLOR SPEC: YELLOW — SIGNIFICANT CHANGE UP
CREAT ?TM UR-MCNC: 57 MG/DL — SIGNIFICANT CHANGE UP
CREAT SERPL-MCNC: 1 MG/DL — SIGNIFICANT CHANGE UP (ref 0.5–1.3)
DIFF PNL FLD: NEGATIVE — SIGNIFICANT CHANGE UP
EGFR: 78 ML/MIN/1.73M2 — SIGNIFICANT CHANGE UP
EOSINOPHIL # BLD AUTO: 0.01 K/UL — SIGNIFICANT CHANGE UP (ref 0–0.5)
EOSINOPHIL NFR BLD AUTO: 0.1 % — SIGNIFICANT CHANGE UP (ref 0–6)
GLUCOSE SERPL-MCNC: 121 MG/DL — HIGH (ref 70–99)
GLUCOSE UR QL: NEGATIVE MG/DL — SIGNIFICANT CHANGE UP
HCT VFR BLD CALC: 22.8 % — LOW (ref 39–50)
HGB BLD-MCNC: 7.5 G/DL — LOW (ref 13–17)
IANC: 6.94 K/UL — SIGNIFICANT CHANGE UP (ref 1.8–7.4)
IMM GRANULOCYTES NFR BLD AUTO: 0.4 % — SIGNIFICANT CHANGE UP (ref 0–0.9)
KETONES UR-MCNC: NEGATIVE MG/DL — SIGNIFICANT CHANGE UP
LEUKOCYTE ESTERASE UR-ACNC: NEGATIVE — SIGNIFICANT CHANGE UP
LYMPHOCYTES # BLD AUTO: 0.33 K/UL — LOW (ref 1–3.3)
LYMPHOCYTES # BLD AUTO: 4.3 % — LOW (ref 13–44)
MAGNESIUM SERPL-MCNC: 1.4 MG/DL — LOW (ref 1.6–2.6)
MCHC RBC-ENTMCNC: 32.9 GM/DL — SIGNIFICANT CHANGE UP (ref 32–36)
MCHC RBC-ENTMCNC: 34.1 PG — HIGH (ref 27–34)
MCV RBC AUTO: 103.6 FL — HIGH (ref 80–100)
MONOCYTES # BLD AUTO: 0.41 K/UL — SIGNIFICANT CHANGE UP (ref 0–0.9)
MONOCYTES NFR BLD AUTO: 5.3 % — SIGNIFICANT CHANGE UP (ref 2–14)
NEUTROPHILS # BLD AUTO: 6.94 K/UL — SIGNIFICANT CHANGE UP (ref 1.8–7.4)
NEUTROPHILS NFR BLD AUTO: 89.6 % — HIGH (ref 43–77)
NITRITE UR-MCNC: NEGATIVE — SIGNIFICANT CHANGE UP
NRBC # BLD: 0 /100 WBCS — SIGNIFICANT CHANGE UP (ref 0–0)
NRBC # FLD: 0 K/UL — SIGNIFICANT CHANGE UP (ref 0–0)
PH UR: 6 — SIGNIFICANT CHANGE UP (ref 5–8)
PHOSPHATE SERPL-MCNC: 1.3 MG/DL — LOW (ref 2.5–4.5)
PLATELET # BLD AUTO: 234 K/UL — SIGNIFICANT CHANGE UP (ref 150–400)
POTASSIUM SERPL-MCNC: 3.5 MMOL/L — SIGNIFICANT CHANGE UP (ref 3.5–5.3)
POTASSIUM SERPL-SCNC: 3.5 MMOL/L — SIGNIFICANT CHANGE UP (ref 3.5–5.3)
PROT SERPL-MCNC: 10.6 G/DL — HIGH (ref 6–8.3)
PROT UR-MCNC: 30 MG/DL
RBC # BLD: 2.2 M/UL — LOW (ref 4.2–5.8)
RBC # FLD: 14.2 % — SIGNIFICANT CHANGE UP (ref 10.3–14.5)
RBC CASTS # UR COMP ASSIST: 3 /HPF — SIGNIFICANT CHANGE UP (ref 0–4)
RH IG SCN BLD-IMP: POSITIVE — SIGNIFICANT CHANGE UP
SODIUM SERPL-SCNC: 139 MMOL/L — SIGNIFICANT CHANGE UP (ref 135–145)
SODIUM UR-SCNC: 87 MMOL/L — SIGNIFICANT CHANGE UP
SP GR SPEC: 1.01 — SIGNIFICANT CHANGE UP (ref 1–1.03)
SQUAMOUS # UR AUTO: 1 /HPF — SIGNIFICANT CHANGE UP (ref 0–5)
UROBILINOGEN FLD QL: 0.2 MG/DL — SIGNIFICANT CHANGE UP (ref 0.2–1)
VIT D25+D1,25 OH+D1,25 PNL SERPL-MCNC: 14.3 PG/ML — LOW (ref 19.9–79.3)
WBC # BLD: 7.74 K/UL — SIGNIFICANT CHANGE UP (ref 3.8–10.5)
WBC # FLD AUTO: 7.74 K/UL — SIGNIFICANT CHANGE UP (ref 3.8–10.5)
WBC UR QL: 2 /HPF — SIGNIFICANT CHANGE UP (ref 0–5)

## 2024-01-28 RX ORDER — MAGNESIUM SULFATE 500 MG/ML
2 VIAL (ML) INJECTION ONCE
Refills: 0 | Status: COMPLETED | OUTPATIENT
Start: 2024-01-28 | End: 2024-01-28

## 2024-01-28 RX ORDER — POTASSIUM PHOSPHATE, MONOBASIC POTASSIUM PHOSPHATE, DIBASIC 236; 224 MG/ML; MG/ML
30 INJECTION, SOLUTION INTRAVENOUS ONCE
Refills: 0 | Status: COMPLETED | OUTPATIENT
Start: 2024-01-28 | End: 2024-01-28

## 2024-01-28 RX ORDER — SODIUM BICARBONATE 1 MEQ/ML
0.07 SYRINGE (ML) INTRAVENOUS
Qty: 75 | Refills: 0 | Status: DISCONTINUED | OUTPATIENT
Start: 2024-01-28 | End: 2024-01-30

## 2024-01-28 RX ORDER — LANOLIN ALCOHOL/MO/W.PET/CERES
6 CREAM (GRAM) TOPICAL ONCE
Refills: 0 | Status: COMPLETED | OUTPATIENT
Start: 2024-01-28 | End: 2024-01-28

## 2024-01-28 RX ADMIN — DONEPEZIL HYDROCHLORIDE 10 MILLIGRAM(S): 10 TABLET, FILM COATED ORAL at 22:36

## 2024-01-28 RX ADMIN — Medication 6 MILLIGRAM(S): at 01:04

## 2024-01-28 RX ADMIN — SERTRALINE 50 MILLIGRAM(S): 25 TABLET, FILM COATED ORAL at 10:38

## 2024-01-28 RX ADMIN — PREGABALIN 1000 MICROGRAM(S): 225 CAPSULE ORAL at 10:38

## 2024-01-28 RX ADMIN — POTASSIUM PHOSPHATE, MONOBASIC POTASSIUM PHOSPHATE, DIBASIC 83.33 MILLIMOLE(S): 236; 224 INJECTION, SOLUTION INTRAVENOUS at 19:01

## 2024-01-28 RX ADMIN — HEPARIN SODIUM 5000 UNIT(S): 5000 INJECTION INTRAVENOUS; SUBCUTANEOUS at 22:36

## 2024-01-28 RX ADMIN — Medication 100 MEQ/KG/HR: at 12:40

## 2024-01-28 RX ADMIN — MEMANTINE HYDROCHLORIDE 10 MILLIGRAM(S): 10 TABLET ORAL at 18:46

## 2024-01-28 RX ADMIN — SODIUM CHLORIDE 125 MILLILITER(S): 9 INJECTION INTRAMUSCULAR; INTRAVENOUS; SUBCUTANEOUS at 00:36

## 2024-01-28 RX ADMIN — Medication 1 MILLIGRAM(S): at 10:38

## 2024-01-28 RX ADMIN — QUETIAPINE FUMARATE 12.5 MILLIGRAM(S): 200 TABLET, FILM COATED ORAL at 10:38

## 2024-01-28 RX ADMIN — MEMANTINE HYDROCHLORIDE 10 MILLIGRAM(S): 10 TABLET ORAL at 06:59

## 2024-01-28 RX ADMIN — Medication 100 MEQ/KG/HR: at 16:24

## 2024-01-28 RX ADMIN — Medication 3 MILLIGRAM(S): at 22:36

## 2024-01-28 RX ADMIN — RISPERIDONE 0.25 MILLIGRAM(S): 4 TABLET ORAL at 10:38

## 2024-01-28 RX ADMIN — Medication 25 GRAM(S): at 12:39

## 2024-01-28 RX ADMIN — RISPERIDONE 0.5 MILLIGRAM(S): 4 TABLET ORAL at 22:36

## 2024-01-28 RX ADMIN — HEPARIN SODIUM 5000 UNIT(S): 5000 INJECTION INTRAVENOUS; SUBCUTANEOUS at 06:59

## 2024-01-28 NOTE — PROGRESS NOTE ADULT - SUBJECTIVE AND OBJECTIVE BOX
Patient is a 78y old  Male who presents with a chief complaint of Fall, elevated calcium (28 Jan 2024 13:51)    Date of servie : 01-28-24 @ 15:07  INTERVAL HPI/OVERNIGHT EVENTS:  T(C): 36.8 (01-28-24 @ 06:54), Max: 37.2 (01-27-24 @ 23:00)  HR: 99 (01-28-24 @ 10:30) (75 - 99)  BP: 106/64 (01-28-24 @ 10:30) (106/64 - 125/81)  RR: 20 (01-28-24 @ 10:30) (17 - 20)  SpO2: 97% (01-28-24 @ 10:30) (97% - 100%)  Wt(kg): --  I&O's Summary      LABS:                        7.5    7.74  )-----------( 234      ( 28 Jan 2024 06:54 )             22.8     01-28    139  |  111<H>  |  25<H>  ----------------------------<  121<H>  3.5   |  21<L>  |  1.00    Ca    10.3      28 Jan 2024 06:54  Phos  1.3     01-28  Mg     1.40     01-28    TPro  10.6<H>  /  Alb  2.2<L>  /  TBili  0.5  /  DBili  x   /  AST  20  /  ALT  12  /  AlkPhos  68  01-28      Urinalysis Basic - ( 28 Jan 2024 06:54 )    Color: x / Appearance: x / SG: x / pH: x  Gluc: 121 mg/dL / Ketone: x  / Bili: x / Urobili: x   Blood: x / Protein: x / Nitrite: x   Leuk Esterase: x / RBC: x / WBC x   Sq Epi: x / Non Sq Epi: x / Bacteria: x      CAPILLARY BLOOD GLUCOSE            Urinalysis Basic - ( 28 Jan 2024 06:54 )    Color: x / Appearance: x / SG: x / pH: x  Gluc: 121 mg/dL / Ketone: x  / Bili: x / Urobili: x   Blood: x / Protein: x / Nitrite: x   Leuk Esterase: x / RBC: x / WBC x   Sq Epi: x / Non Sq Epi: x / Bacteria: x        MEDICATIONS  (STANDING):  cyanocobalamin 1000 MICROGram(s) Oral daily  donepezil 10 milliGRAM(s) Oral at bedtime  folic acid 1 milliGRAM(s) Oral daily  heparin   Injectable 5000 Unit(s) SubCutaneous every 8 hours  memantine 10 milliGRAM(s) Oral two times a day  risperiDONE   Tablet 0.25 milliGRAM(s) Oral daily  risperiDONE   Tablet 0.5 milliGRAM(s) Oral at bedtime  risperiDONE  Disintegrating Tablet 0.5 milliGRAM(s) Oral once  sertraline 50 milliGRAM(s) Oral daily  sodium bicarbonate  Infusion 0.092 mEq/kG/Hr (100 mL/Hr) IV Continuous <Continuous>    MEDICATIONS  (PRN):  acetaminophen     Tablet .. 650 milliGRAM(s) Oral every 6 hours PRN Temp greater or equal to 38C (100.4F), Mild Pain (1 - 3)  aluminum hydroxide/magnesium hydroxide/simethicone Suspension 30 milliLiter(s) Oral every 4 hours PRN Dyspepsia  melatonin 3 milliGRAM(s) Oral at bedtime PRN Insomnia  ondansetron Injectable 4 milliGRAM(s) IV Push every 8 hours PRN Nausea and/or Vomiting  QUEtiapine 12.5 milliGRAM(s) Oral every 6 hours PRN for agitation          PHYSICAL EXAM:  GENERAL: NAD, well-groomed, well-developed  HEAD:  Atraumatic, Normocephalic  CHEST/LUNG: Clear to percussion bilaterally; No rales, rhonchi, wheezing, or rubs  HEART: Regular rate and rhythm; No murmurs, rubs, or gallops  ABDOMEN: Soft, Nontender, Nondistended; Bowel sounds present  EXTREMITIES:  2+ Peripheral Pulses, No clubbing, cyanosis, or edema  LYMPH: No lymphadenopathy noted  SKIN: No rashes or lesions    Care Discussed with Consultants/Other Providers [ ] YES  [ ] NO

## 2024-01-28 NOTE — PROGRESS NOTE ADULT - ASSESSMENT
78M with history of MM, HTN, and Dementia (AAO x 1-2 to self, to place, not to time at baseline per brother) who presents to the hospital from [facility] after a fall. Patient is a poor historian 2/2 dementia, unable to state what happened. As per paperwork from the NH, patient had a fall and was noted to have a hematoma on the occipital head and was sent to the hospital for evaluation. Here imaging were negative for fractures but his work up showed him to have a significantly elevated calcium of 14 (corrected to 15.1) with DAVID and a significantly elevated protein gap.  Nephrology consulted for DAVID and hypercalemia.    A/P:  DAVID:  Baseline S.Cr 0.7-0.9.  S.Cr 1.39 on admission.  Likely pre-renal in setting of dehydration/hypercalcemia.  S/p NS 0.9% 1.5L.  Maintenance IVF adjusted to 1/2NS + 75mEq NaHCO3 @ 100ml/hr.  CK 64 - DAVID unlikely sec. to rhabdo.  UA + proteinuria; no blood.  FeNa 1.1% - suggests ATN.  S.Cr improving towards baseline.  Monitor BMP and UO.    HTN:  Controlled.  Not on antihypertensives.  Avoid hypotension.  Monitor BP.    Hypercalcemia:  Likely sec to MM.  PTH low, suppressed by high Ca.  Total protein high.  Vit. D 25OH was 37 1/13.  S/p Zometa 4mg x1 dose and calcitonin 200IU q12hrs x2 doses.  Ca improving.  Monitor Ca.    Acidosis:  Hyperchloremic acidosis.  C/w 1/2NS + 75mEq NaHCO3 @ 100ml/hr.  Monitor CO2.    Anemia:  Likely sec to MM vs. other etiology.  Workup per primary team.  Consider Heme/onc evaluation    Monitor Hgb.  Transfuse for Hgb <8.    Hypophosphatemia:  Likely sec. to poor PO intake.  Repleted w/ Kphos 30mmol.  Replete as needed.  Monitor PO4 daily.    Hypomagnesemia:  Likely sec to poor PO intake.  Repleted w/ mag sulf 2gm.  Monitor Mg.    Proteinuria:  Possibly sec to MM.  Monitor. 78M with history of MM, HTN, and Dementia (AAO x 1-2 to self, to place, not to time at baseline per brother) who presents to the hospital from [facility] after a fall. Patient is a poor historian 2/2 dementia, unable to state what happened. As per paperwork from the NH, patient had a fall and was noted to have a hematoma on the occipital head and was sent to the hospital for evaluation. Here imaging were negative for fractures but his work up showed him to have a significantly elevated calcium of 14 (corrected to 15.1) with DAVID and a significantly elevated protein gap.  Nephrology consulted for DAVID and hypercalemia.    A/P:  DAVID:  Baseline S.Cr 0.7-0.9.  S.Cr 1.39 on admission.  Likely pre-renal in setting of dehydration/hypercalcemia.  S/p NS 0.9% 1.5L.  Maintenance IVF adjusted to 1/2NS + 75mEq NaHCO3 @ 100ml/hr.  CK 64 - DAVID unlikely sec. to rhabdo.  UA + proteinuria; no blood.  FeNa 1.1% - suggests ATN.  S.Cr improving towards baseline.  Monitor BMP and UO.    HTN:  Controlled.  Not on antihypertensives.  Avoid hypotension.  Monitor BP.    Hypercalcemia:  Likely sec to MM.  PTH low, suppressed by high Ca.  Total protein high.  Vit. D 25OH 46.4.  S/p Zometa 4mg x1 dose and calcitonin 200IU q12hrs x2 doses.  Ca improving.  Monitor Ca.    Acidosis:  Hyperchloremic acidosis.  C/w 1/2NS + 75mEq NaHCO3 @ 100ml/hr.  Monitor CO2.    Anemia:  Likely sec to MM vs. other etiology.  Workup per primary team.  Consider Heme/onc evaluation    Monitor Hgb.  Transfuse for Hgb <8.    Hypophosphatemia:  Likely sec. to poor PO intake.  Repleted w/ Kphos 30mmol.  Replete as needed.  Monitor PO4 daily.    Hypomagnesemia:  Likely sec to poor PO intake.  Repleted w/ mag sulf 2gm.  Monitor Mg.    Proteinuria:  Possibly sec to MM.  Monitor.

## 2024-01-28 NOTE — PROGRESS NOTE ADULT - ASSESSMENT
78M with history of MM, HTN, and Dementia who presents to the hospital after a fall at his NH here found to have significant hypercalcemia.       Problem/Plan - 1:  ·  Problem: Hypercalcemia.   ·  Plan: -   - Dose calcitonin once patient's weight is available  - Likely heme or nephrology fu     Problem/Plan - 2:  ·  Problem: Fall.   ·  Plan: - Unclear cause of his fall, unclear mechanism, patient unable to state what happened and the NH paperwork does not mention how the patient fell  - Will monitor on telemetry for now, trend troponin  - cars fu     Problem/Plan - 3:·  Problem: DAVID (acute kidney injury).   ·  Plan: - New DAVID likely 2/2 severe hypercalcemia -> c/w IVF as above  - Check UA, urine studies  - Monitor I/O  - Trend BUN/Cr  Problem/Plan - 4:  ·  Problem: Toxic metabolic encephalopathy.   ·  Plan: - Baseline oriented to self and place, currently oriented to self but cannot state where he is, unable to state what the current month/year is, unable to give significant HPI or ROS (seems to be the baseline when compared to his prior admission)  - Likely has some encephalopathy 2/2 severe hypercalcemia -> c/w management as above  - c/w dementia management as below.    Problem/Plan - 5:  ·  Problem: Multiple myeloma.   ·  Plan: - Heme eval in AM  - Check SPEP, immunofixation.    Problem/Plan - 6:  ·  Problem: Essential hypertension.   ·  Plan: - Not on medications, BPs currently well controlled  - Monitor BPs.    Problem/Plan - 7:  ·  Problem: Dementia.   ·  Plan: - c/w donepezil, memantine  - c/w risperdal, sertraline, and seroquel as per outpatient medications.

## 2024-01-28 NOTE — PROGRESS NOTE ADULT - SUBJECTIVE AND OBJECTIVE BOX
[facility]   MD ANEL FOLEY MD ANGELA WONG, PA QIAN CHEN, NP    TEL:  OFFICE: [phone]  From 5pm-7am Answering Service [phone]    -- RENAL FOLLOW UP NOTE ---Date of Service 01-28-24 @ 16:08    Patient is a 78y old  Male who presents with a chief complaint of Fall, elevated calcium.    Patient seen and examined at bedside. No chest pain/sob    VITALS:  T(F): 98.2 (01-28-24 @ 06:54), Max: 98.9 (01-27-24 @ 23:00)  HR: 99 (01-28-24 @ 10:30)  BP: 106/64 (01-28-24 @ 10:30)  RR: 20 (01-28-24 @ 10:30)  SpO2: 97% (01-28-24 @ 10:30)  Wt(kg): --      PHYSICAL EXAM:  General: NAD  Neck: No JVD  Respiratory: CTAB, no wheezes, rales or rhonchi  Cardiovascular: S1, S2, RRR  Gastrointestinal: BS+, soft, NT/ND  Extremities: No peripheral edema    Hospital Medications:   MEDICATIONS  (STANDING):  cyanocobalamin 1000 MICROGram(s) Oral daily  donepezil 10 milliGRAM(s) Oral at bedtime  folic acid 1 milliGRAM(s) Oral daily  heparin   Injectable 5000 Unit(s) SubCutaneous every 8 hours  memantine 10 milliGRAM(s) Oral two times a day  risperiDONE   Tablet 0.25 milliGRAM(s) Oral daily  risperiDONE   Tablet 0.5 milliGRAM(s) Oral at bedtime  risperiDONE  Disintegrating Tablet 0.5 milliGRAM(s) Oral once  sertraline 50 milliGRAM(s) Oral daily  sodium bicarbonate  Infusion 0.092 mEq/kG/Hr (100 mL/Hr) IV Continuous <Continuous>      LABS:  01-28    139  |  111<H>  |  25<H>  ----------------------------<  121<H>  3.5   |  21<L>  |  1.00    Ca    10.3      28 Jan 2024 06:54  Phos  1.3     01-28  Mg     1.40     01-28    TPro  10.6<H>  /  Alb  2.2<L>  /  TBili  0.5  /  DBili      /  AST  20  /  ALT  12  /  AlkPhos  68  01-28    Creatinine Trend: 1.00 <--, 1.19 <--, 1.33 <--, 1.39 <--    Albumin: 2.2 g/dL (01-28 @ 06:54)  Phosphorus: 1.3 mg/dL (01-28 @ 06:54)               7.5    7.74  )-----------( 234      ( 28 Jan 2024 06:54 )             22.8     Urine Studies:  Urinalysis - [01-28-24 @ 06:54]      Color  / Appearance  / SG  / pH       Gluc 121 / Ketone   / Bili  / Urobili        Blood  / Protein  / Leuk Est  / Nitrite       RBC  / WBC  / Hyaline  / Gran  / Sq Epi  / Non Sq Epi  / Bacteria     Urine Creatinine 57      [01-28-24 @ 05:38]  Urine Sodium 87      [01-28-24 @ 05:38]    PTH -- (Ca --)      [01-27-24 @ 10:56]   8  PTH -- (Ca --)      [01-14-24 @ 06:50]   5  PTH -- (Ca --)      [01-13-24 @ 06:10]   6  Vitamin D (25OH) 46.4      [01-27-24 @ 10:56]        RADIOLOGY & ADDITIONAL STUDIES:

## 2024-01-28 NOTE — PROGRESS NOTE ADULT - SUBJECTIVE AND OBJECTIVE BOX
Date of Service: 01-28-24 @ 09:17    Patient is a 78y old  Male who presents with a chief complaint of Fall, elevated calcium (27 Jan 2024 16:23)      Any change in ROS: seems same:  on room air:   no events overnight : resp wise     MEDICATIONS  (STANDING):  cyanocobalamin 1000 MICROGram(s) Oral daily  donepezil 10 milliGRAM(s) Oral at bedtime  folic acid 1 milliGRAM(s) Oral daily  heparin   Injectable 5000 Unit(s) SubCutaneous every 8 hours  memantine 10 milliGRAM(s) Oral two times a day  risperiDONE   Tablet 0.5 milliGRAM(s) Oral at bedtime  risperiDONE   Tablet 0.25 milliGRAM(s) Oral daily  risperiDONE  Disintegrating Tablet 0.5 milliGRAM(s) Oral once  sertraline 50 milliGRAM(s) Oral daily  sodium chloride 0.9%. 1000 milliLiter(s) (125 mL/Hr) IV Continuous <Continuous>    MEDICATIONS  (PRN):  acetaminophen     Tablet .. 650 milliGRAM(s) Oral every 6 hours PRN Temp greater or equal to 38C (100.4F), Mild Pain (1 - 3)  aluminum hydroxide/magnesium hydroxide/simethicone Suspension 30 milliLiter(s) Oral every 4 hours PRN Dyspepsia  melatonin 3 milliGRAM(s) Oral at bedtime PRN Insomnia  ondansetron Injectable 4 milliGRAM(s) IV Push every 8 hours PRN Nausea and/or Vomiting  QUEtiapine 12.5 milliGRAM(s) Oral every 6 hours PRN for agitation    Vital Signs Last 24 Hrs  T(C): 36.8 (28 Jan 2024 06:54), Max: 37.2 (27 Jan 2024 23:00)  T(F): 98.2 (28 Jan 2024 06:54), Max: 98.9 (27 Jan 2024 23:00)  HR: 91 (28 Jan 2024 06:54) (75 - 91)  BP: 111/83 (28 Jan 2024 06:54) (111/83 - 125/81)  BP(mean): --  RR: 17 (28 Jan 2024 06:54) (17 - 19)  SpO2: 97% (28 Jan 2024 06:54) (97% - 100%)    Parameters below as of 28 Jan 2024 06:54  Patient On (Oxygen Delivery Method): room air        I&O's Summary        Physical Exam:   GENERAL: NAD, well-groomed, well-developed  HEENT: CHRISTINA/   Atraumatic, Normocephalic  ENMT: No tonsillar erythema, exudates, or enlargement; Moist mucous membranes, Good dentition, No lesions  NECK: Supple, No JVD, Normal thyroid  CHEST/LUNG: Clear to auscultaion  CVS: Regular rate and rhythm; No murmurs, rubs, or gallops  GI: : Soft, Nontender, Nondistended; Bowel sounds present  NERVOUS SYSTEM:  Alert & awake and demented  EXTREMITIES:  -edema  LYMPH: No lymphadenopathy noted  SKIN: No rashes or lesions  ENDOCRINOLOGY: No Thyromegaly  PSYCH: demented    Labs:    CARDIAC MARKERS ( 27 Jan 2024 10:56 )  x     / x     / 64 U/L / x     / x                                7.5    7.74  )-----------( 234      ( 28 Jan 2024 06:54 )             22.8                         8.4    10.02 )-----------( 253      ( 26 Jan 2024 17:35 )             25.2     01-28    139  |  111<H>  |  25<H>  ----------------------------<  121<H>  3.5   |  21<L>  |  1.00  01-27    140  |  110<H>  |  32<H>  ----------------------------<  89  3.5   |  26  |  1.19  01-26    137  |  107  |  37<H>  ----------------------------<  97  4.1   |  24  |  1.33<H>  01-26    137  |  105  |  40<H>  ----------------------------<  107<H>  3.9   |  28  |  1.39<H>    Ca    10.3      28 Jan 2024 06:54  Ca    13.1<HH>      27 Jan 2024 10:56  Ca    13.4<HH>      26 Jan 2024 23:00  Ca    14.0<HH>      26 Jan 2024 17:35  Phos  1.3     01-28  Phos  2.7     01-27  Mg     1.40     01-28  Mg     1.70     01-27    TPro  10.6<H>  /  Alb  2.2<L>  /  TBili  0.5  /  DBili  x   /  AST  20  /  ALT  12  /  AlkPhos  68  01-28  TPro  11.3<H>  /  Alb  2.2<L>  /  TBili  0.5  /  DBili  x   /  AST  17  /  ALT  14  /  AlkPhos  74  01-27  TPro  11.4<H>  /  Alb  2.4<L>  /  TBili  0.5  /  DBili  x   /  AST  15  /  ALT  13  /  AlkPhos  78  01-26  TPro  13.4<H>  /  Alb  2.6<L>  /  TBili  0.6  /  DBili  x   /  AST  18  /  ALT  16  /  AlkPhos  81  01-26    CAPILLARY BLOOD GLUCOSE      POCT Blood Glucose.: 110 mg/dL (27 Jan 2024 12:35)      LIVER FUNCTIONS - ( 28 Jan 2024 06:54 )  Alb: 2.2 g/dL / Pro: 10.6 g/dL / ALK PHOS: 68 U/L / ALT: 12 U/L / AST: 20 U/L / GGT: x             Urinalysis Basic - ( 28 Jan 2024 06:54 )    Color: x / Appearance: x / SG: x / pH: x  Gluc: 121 mg/dL / Ketone: x  / Bili: x / Urobili: x   Blood: x / Protein: x / Nitrite: x   Leuk Esterase: x / RBC: x / WBC x   Sq Epi: x / Non Sq Epi: x / Bacteria: x        < from: CT Head No Cont (01.26.24 @ 20:21) >      CT cervical spine:    Cervical alignment is maintained. Cervical vertebral body heights are   within normal limits. Mild age-indeterminate compression deformities at   the superior endplates of T1 and T2, without bony retropulsion.   Multilevel intervertebral disc height loss,disc osteophyte complexes,   and bilateral facet and uncovertebral arthropathy.    There is no prevertebral soft tissue swelling. The paraspinal soft   tissues are unremarkable. The lung apices are clear.      IMPRESSION:    CT head: No acute intracranial hemorrhage or mass effect.    CT cervical spine:  No evidence for acute displaced fracture or malalignment of the cervical   spine.  Mild age-indeterminate compression deformities at the superior endplates   of T1 and T2, without bony retropulsion.    < end of copied text >  < from: Xray Chest 1 View- PORTABLE-Urgent (Xray Chest 1 View- PORTABLE-Urgent .) (01.26.24 @ 17:35) >    INTERPRETATION:  EXAMINATION: XR CHEST URGENT    CLINICAL INDICATION: fall    TECHNIQUE: Single frontal portable view of the chest from 1/26/2024 5:35   PM    COMPARISON: Chest x-ray 1/2/2024.    FINDINGS:  Sternotomy wires.  The heart size is not accurately assessed on this projection.  Hazy left lung base opacity with low lung volumes, likely represents   atelectasis.  There is no pneumothorax or pleural effusion.    IMPRESSION:  No focal consolidations.  No acute traumatic findings.    --- End of Report ---          TOMAS MASSEY MD; Resident Radiologist  This document has been electronically signed.  KOFI GARCIA MD; Attending Radiologist  This document has been electronically signed. Jan 26 2024  6:15PM    < end of copied text >      RECENT CULTURES:        RESPIRATORY CULTURES:          Studies  Chest X-RAY  CT SCAN Chest   Venous Dopplers: LE:   CT Abdomen  Others

## 2024-01-28 NOTE — PROGRESS NOTE ADULT - SUBJECTIVE AND OBJECTIVE BOX
DATE OF SERVICE: 01-28-24    No events Review of symptoms otherwise negative.    MEDICATIONS:  acetaminophen     Tablet .. 650 milliGRAM(s) Oral every 6 hours PRN  aluminum hydroxide/magnesium hydroxide/simethicone Suspension 30 milliLiter(s) Oral every 4 hours PRN  cyanocobalamin 1000 MICROGram(s) Oral daily  donepezil 10 milliGRAM(s) Oral at bedtime  folic acid 1 milliGRAM(s) Oral daily  heparin   Injectable 5000 Unit(s) SubCutaneous every 8 hours  melatonin 3 milliGRAM(s) Oral at bedtime PRN  memantine 10 milliGRAM(s) Oral two times a day  ondansetron Injectable 4 milliGRAM(s) IV Push every 8 hours PRN  QUEtiapine 12.5 milliGRAM(s) Oral every 6 hours PRN  risperiDONE   Tablet 0.25 milliGRAM(s) Oral daily  risperiDONE   Tablet 0.5 milliGRAM(s) Oral at bedtime  risperiDONE  Disintegrating Tablet 0.5 milliGRAM(s) Oral once  sertraline 50 milliGRAM(s) Oral daily  sodium bicarbonate  Infusion 0.092 mEq/kG/Hr IV Continuous <Continuous>      LABS:                        7.5    7.74  )-----------( 234      ( 28 Jan 2024 06:54 )             22.8       Hemoglobin: 7.5 g/dL (01-28 @ 06:54)  Hemoglobin: 8.4 g/dL (01-26 @ 17:35)      01-28    139  |  111<H>  |  25<H>  ----------------------------<  121<H>  3.5   |  21<L>  |  1.00    Ca    10.3      28 Jan 2024 06:54  Phos  1.3     01-28  Mg     1.40     01-28    TPro  10.6<H>  /  Alb  2.2<L>  /  TBili  0.5  /  DBili  x   /  AST  20  /  ALT  12  /  AlkPhos  68  01-28    Creatinine Trend: 1.00<--, 1.19<--, 1.33<--, 1.39<--, 0.87<--, 0.78<--    COAGS:     CARDIAC MARKERS ( 27 Jan 2024 10:56 )  x     / x     / 64 U/L / x     / x            PHYSICAL EXAM:  T(C): 36.8 (01-28-24 @ 06:54), Max: 37.2 (01-27-24 @ 23:00)  HR: 99 (01-28-24 @ 10:30) (75 - 99)  BP: 106/64 (01-28-24 @ 10:30) (106/64 - 125/81)  RR: 20 (01-28-24 @ 10:30) (17 - 20)  SpO2: 97% (01-28-24 @ 10:30) (97% - 100%)  Wt(kg): --    I&O's Summary    General:  NAD  Head: Normocephalic and atraumatic.   Neck: No JVD. No bruits. Supple. Does not appear to be enlarged.   Cardiovascular: + S1,S2 ; RRR Soft systolic murmur at the left lower sternal border. No rubs noted.    Lungs: CTA b/l. No rhonchi, rales or wheezes.   Abdomen: + BS, soft. Non tender. Non distended. No rebound. No guarding.   Extremities: No clubbing/cyanosis/edema.   Neurologic: Moves all four extremities. Full range of motion.   Skin: Warm and moist. The patient's skin has normal elasticity and good skin turgor.   Psychiatric: Appropriate mood and affect.  Musculoskeletal: Normal range of motion, normal strength     TELEMETRY: 	  NSR    ECG:  	NSR    < from: Transthoracic Echocardiogram (07.10.23 @ 11:15) >  CONCLUSIONS:  1. Calcified trileaflet aortic valve with normal opening.  Minimal aortic regurgitation.  2. Endocardium not well visualized; grossly decreased  possibly mild left ventricular systolic dysfunction.  3. The right ventricle is not well visualized; grossly  normal right ventricular systolic function.  ------------------------------------------------------------------------  Confirmed on  7/10/2023 - 13:57:46 by MARLA Vegas  < end of copied text >        ASSESSMENT/PLAN: This is a 78M with history of MM, HTN, and Dementia (AAO x 1-2 to self, to place, not to time at baseline per brother) who presents to the hospital from [facility] after a fall.     Patient is a poor historian 2/2 dementia. As per paperwork from the NH, patient had a fall and was noted to have a hematoma on the occipital head and was sent to the hospital for evaluation. Here imaging were negative for fractures but his work up showed him to have a significantly elevated calcium of 14 (corrected to 15.1) with DAVID and a significantly elevated protein gap. He was given NS 1.5L and repeat BMP showed persistence of the calcium elevation though improving. He was admitted to medicine on telemetry for further evaluation.  Recent admission for influenza, where his lopressor and norvasc were discontinued.    1. HTN/h/o MVR  - continued to hold lopressor and amodipine  - agree with fluids for hypercalcemia-  - no EKG changes associated ith hypercalcemia  - TTE from last year with mildly decreased LVEF    Staci Muñiz MD  Pager: [phone]

## 2024-01-28 NOTE — PROGRESS NOTE ADULT - ASSESSMENT
This is a 78M with history of MM, HTN, and Dementia (AAO x 1-2 to self, to place, not to time at baseline per brother) who presents to the hospital from [facility] after a fall. Patient is a poor historian 2/2 dementia, unable to state what happened. As per paperwork from the NH, patient had a fall and was noted to have a hematoma on the occipital head and was sent to the hospital for evaluation. Here imaging were negative for fractures but his work up showed him to have a significantly elevated calcium of 14 (corrected to 15.1) with DAVID and a significantly elevated protein gap. He was given NS 1.5L and repeat BMP showed persistence of the calcium elevation though improving. He was admitted to medicine on telemetry for further evaluation.  (26 Jan 2024 23:03)   he can not tell me why is he here and how is his breathing:    he is on room air:  slightly agitated: He was also recently admitted to hospital when he had influenza:   no now cough reported and has no fever: :    s/p fall:  HTN  Multiple Myeloma  Dementia     s/p fall:  -he seems to be doing  ok :  -he is alert and awake but demented:   -on room air   -cxr is normal :"  -ct head with no bleeding noted:   -aspiration precautions  1/28: seems same:  no change in his resp symptoms not in any resp distress:  on room air:  cont to monitor  : ct head is with no ICB   HTN  -controlled  Multiple Myeloma  -per primary team   Dementia   -supportive care    dvt prophylaxis    dwe staff

## 2024-01-29 LAB
ALBUMIN SERPL ELPH-MCNC: 1.9 G/DL — LOW (ref 3.3–5)
ALBUMIN SERPL ELPH-MCNC: 2 G/DL — LOW (ref 3.3–5)
ALP SERPL-CCNC: 55 U/L — SIGNIFICANT CHANGE UP (ref 40–120)
ALP SERPL-CCNC: 57 U/L — SIGNIFICANT CHANGE UP (ref 40–120)
ALT FLD-CCNC: 10 U/L — SIGNIFICANT CHANGE UP (ref 4–41)
ALT FLD-CCNC: 9 U/L — SIGNIFICANT CHANGE UP (ref 4–41)
ANION GAP SERPL CALC-SCNC: 6 MMOL/L — LOW (ref 7–14)
ANION GAP SERPL CALC-SCNC: 7 MMOL/L — SIGNIFICANT CHANGE UP (ref 7–14)
ANION GAP SERPL CALC-SCNC: 9 MMOL/L — SIGNIFICANT CHANGE UP (ref 7–14)
AST SERPL-CCNC: 20 U/L — SIGNIFICANT CHANGE UP (ref 4–40)
AST SERPL-CCNC: 20 U/L — SIGNIFICANT CHANGE UP (ref 4–40)
BASOPHILS # BLD AUTO: 0.01 K/UL — SIGNIFICANT CHANGE UP (ref 0–0.2)
BASOPHILS NFR BLD AUTO: 0.2 % — SIGNIFICANT CHANGE UP (ref 0–2)
BILIRUB SERPL-MCNC: 0.4 MG/DL — SIGNIFICANT CHANGE UP (ref 0.2–1.2)
BILIRUB SERPL-MCNC: 0.4 MG/DL — SIGNIFICANT CHANGE UP (ref 0.2–1.2)
BLD GP AB SCN SERPL QL: NEGATIVE — SIGNIFICANT CHANGE UP
BUN SERPL-MCNC: 17 MG/DL — SIGNIFICANT CHANGE UP (ref 7–23)
BUN SERPL-MCNC: 18 MG/DL — SIGNIFICANT CHANGE UP (ref 7–23)
BUN SERPL-MCNC: 18 MG/DL — SIGNIFICANT CHANGE UP (ref 7–23)
CALCIUM SERPL-MCNC: 8.9 MG/DL — SIGNIFICANT CHANGE UP (ref 8.4–10.5)
CALCIUM SERPL-MCNC: 8.9 MG/DL — SIGNIFICANT CHANGE UP (ref 8.4–10.5)
CALCIUM SERPL-MCNC: 9 MG/DL — SIGNIFICANT CHANGE UP (ref 8.4–10.5)
CHLORIDE SERPL-SCNC: 112 MMOL/L — HIGH (ref 98–107)
CHLORIDE SERPL-SCNC: 112 MMOL/L — HIGH (ref 98–107)
CHLORIDE SERPL-SCNC: 114 MMOL/L — HIGH (ref 98–107)
CO2 SERPL-SCNC: 22 MMOL/L — SIGNIFICANT CHANGE UP (ref 22–31)
CO2 SERPL-SCNC: 22 MMOL/L — SIGNIFICANT CHANGE UP (ref 22–31)
CO2 SERPL-SCNC: 23 MMOL/L — SIGNIFICANT CHANGE UP (ref 22–31)
CREAT SERPL-MCNC: 0.9 MG/DL — SIGNIFICANT CHANGE UP (ref 0.5–1.3)
CREAT SERPL-MCNC: 0.91 MG/DL — SIGNIFICANT CHANGE UP (ref 0.5–1.3)
CREAT SERPL-MCNC: 0.93 MG/DL — SIGNIFICANT CHANGE UP (ref 0.5–1.3)
EGFR: 85 ML/MIN/1.73M2 — SIGNIFICANT CHANGE UP
EGFR: 87 ML/MIN/1.73M2 — SIGNIFICANT CHANGE UP
EGFR: 88 ML/MIN/1.73M2 — SIGNIFICANT CHANGE UP
EOSINOPHIL # BLD AUTO: 0.05 K/UL — SIGNIFICANT CHANGE UP (ref 0–0.5)
EOSINOPHIL NFR BLD AUTO: 0.9 % — SIGNIFICANT CHANGE UP (ref 0–6)
GLUCOSE SERPL-MCNC: 101 MG/DL — HIGH (ref 70–99)
GLUCOSE SERPL-MCNC: 122 MG/DL — HIGH (ref 70–99)
GLUCOSE SERPL-MCNC: 94 MG/DL — SIGNIFICANT CHANGE UP (ref 70–99)
HCT VFR BLD CALC: 19.9 % — CRITICAL LOW (ref 39–50)
HCT VFR BLD CALC: 20 % — CRITICAL LOW (ref 39–50)
HCT VFR BLD CALC: 24 % — LOW (ref 39–50)
HGB BLD-MCNC: 6.7 G/DL — CRITICAL LOW (ref 13–17)
HGB BLD-MCNC: 6.7 G/DL — CRITICAL LOW (ref 13–17)
HGB BLD-MCNC: 8 G/DL — LOW (ref 13–17)
IANC: 4.95 K/UL — SIGNIFICANT CHANGE UP (ref 1.8–7.4)
IMM GRANULOCYTES NFR BLD AUTO: 0.5 % — SIGNIFICANT CHANGE UP (ref 0–0.9)
LYMPHOCYTES # BLD AUTO: 0.31 K/UL — LOW (ref 1–3.3)
LYMPHOCYTES # BLD AUTO: 5.5 % — LOW (ref 13–44)
MAGNESIUM SERPL-MCNC: 1.4 MG/DL — LOW (ref 1.6–2.6)
MAGNESIUM SERPL-MCNC: 1.8 MG/DL — SIGNIFICANT CHANGE UP (ref 1.6–2.6)
MAGNESIUM SERPL-MCNC: 1.9 MG/DL — SIGNIFICANT CHANGE UP (ref 1.6–2.6)
MCHC RBC-ENTMCNC: 33.3 GM/DL — SIGNIFICANT CHANGE UP (ref 32–36)
MCHC RBC-ENTMCNC: 33.5 GM/DL — SIGNIFICANT CHANGE UP (ref 32–36)
MCHC RBC-ENTMCNC: 33.5 PG — SIGNIFICANT CHANGE UP (ref 27–34)
MCHC RBC-ENTMCNC: 33.7 GM/DL — SIGNIFICANT CHANGE UP (ref 32–36)
MCHC RBC-ENTMCNC: 34.9 PG — HIGH (ref 27–34)
MCHC RBC-ENTMCNC: 34.9 PG — HIGH (ref 27–34)
MCV RBC AUTO: 100.4 FL — HIGH (ref 80–100)
MCV RBC AUTO: 103.6 FL — HIGH (ref 80–100)
MCV RBC AUTO: 104.2 FL — HIGH (ref 80–100)
MONOCYTES # BLD AUTO: 0.24 K/UL — SIGNIFICANT CHANGE UP (ref 0–0.9)
MONOCYTES NFR BLD AUTO: 4.3 % — SIGNIFICANT CHANGE UP (ref 2–14)
NEUTROPHILS # BLD AUTO: 4.95 K/UL — SIGNIFICANT CHANGE UP (ref 1.8–7.4)
NEUTROPHILS NFR BLD AUTO: 88.6 % — HIGH (ref 43–77)
NRBC # BLD: 0 /100 WBCS — SIGNIFICANT CHANGE UP (ref 0–0)
NRBC # FLD: 0 K/UL — SIGNIFICANT CHANGE UP (ref 0–0)
PHOSPHATE SERPL-MCNC: 1.4 MG/DL — LOW (ref 2.5–4.5)
PHOSPHATE SERPL-MCNC: 1.5 MG/DL — LOW (ref 2.5–4.5)
PLATELET # BLD AUTO: 190 K/UL — SIGNIFICANT CHANGE UP (ref 150–400)
PLATELET # BLD AUTO: 193 K/UL — SIGNIFICANT CHANGE UP (ref 150–400)
PLATELET # BLD AUTO: 205 K/UL — SIGNIFICANT CHANGE UP (ref 150–400)
POTASSIUM SERPL-MCNC: 2.8 MMOL/L — CRITICAL LOW (ref 3.5–5.3)
POTASSIUM SERPL-MCNC: 3.2 MMOL/L — LOW (ref 3.5–5.3)
POTASSIUM SERPL-MCNC: 3.7 MMOL/L — SIGNIFICANT CHANGE UP (ref 3.5–5.3)
POTASSIUM SERPL-SCNC: 2.8 MMOL/L — CRITICAL LOW (ref 3.5–5.3)
POTASSIUM SERPL-SCNC: 3.2 MMOL/L — LOW (ref 3.5–5.3)
POTASSIUM SERPL-SCNC: 3.7 MMOL/L — SIGNIFICANT CHANGE UP (ref 3.5–5.3)
PROT SERPL-MCNC: 9.7 G/DL — HIGH (ref 6–8.3)
PROT SERPL-MCNC: 9.8 G/DL — HIGH (ref 6–8.3)
RBC # BLD: 1.92 M/UL — LOW (ref 4.2–5.8)
RBC # BLD: 1.92 M/UL — LOW (ref 4.2–5.8)
RBC # BLD: 2.39 M/UL — LOW (ref 4.2–5.8)
RBC # FLD: 14.2 % — SIGNIFICANT CHANGE UP (ref 10.3–14.5)
RBC # FLD: 14.5 % — SIGNIFICANT CHANGE UP (ref 10.3–14.5)
RBC # FLD: 16.6 % — HIGH (ref 10.3–14.5)
RH IG SCN BLD-IMP: POSITIVE — SIGNIFICANT CHANGE UP
SODIUM SERPL-SCNC: 141 MMOL/L — SIGNIFICANT CHANGE UP (ref 135–145)
SODIUM SERPL-SCNC: 143 MMOL/L — SIGNIFICANT CHANGE UP (ref 135–145)
SODIUM SERPL-SCNC: 143 MMOL/L — SIGNIFICANT CHANGE UP (ref 135–145)
WBC # BLD: 5.07 K/UL — SIGNIFICANT CHANGE UP (ref 3.8–10.5)
WBC # BLD: 5.12 K/UL — SIGNIFICANT CHANGE UP (ref 3.8–10.5)
WBC # BLD: 5.59 K/UL — SIGNIFICANT CHANGE UP (ref 3.8–10.5)
WBC # FLD AUTO: 5.07 K/UL — SIGNIFICANT CHANGE UP (ref 3.8–10.5)
WBC # FLD AUTO: 5.12 K/UL — SIGNIFICANT CHANGE UP (ref 3.8–10.5)
WBC # FLD AUTO: 5.59 K/UL — SIGNIFICANT CHANGE UP (ref 3.8–10.5)

## 2024-01-29 RX ORDER — POTASSIUM CHLORIDE 20 MEQ
40 PACKET (EA) ORAL EVERY 4 HOURS
Refills: 0 | Status: COMPLETED | OUTPATIENT
Start: 2024-01-29 | End: 2024-01-29

## 2024-01-29 RX ORDER — POTASSIUM CHLORIDE 20 MEQ
10 PACKET (EA) ORAL
Refills: 0 | Status: COMPLETED | OUTPATIENT
Start: 2024-01-29 | End: 2024-01-29

## 2024-01-29 RX ORDER — POTASSIUM CHLORIDE 20 MEQ
40 PACKET (EA) ORAL EVERY 4 HOURS
Refills: 0 | Status: DISCONTINUED | OUTPATIENT
Start: 2024-01-29 | End: 2024-01-29

## 2024-01-29 RX ORDER — MAGNESIUM SULFATE 500 MG/ML
2 VIAL (ML) INJECTION ONCE
Refills: 0 | Status: COMPLETED | OUTPATIENT
Start: 2024-01-29 | End: 2024-01-29

## 2024-01-29 RX ORDER — POTASSIUM PHOSPHATE, MONOBASIC POTASSIUM PHOSPHATE, DIBASIC 236; 224 MG/ML; MG/ML
30 INJECTION, SOLUTION INTRAVENOUS ONCE
Refills: 0 | Status: COMPLETED | OUTPATIENT
Start: 2024-01-29 | End: 2024-01-29

## 2024-01-29 RX ADMIN — Medication 100 MILLIEQUIVALENT(S): at 11:11

## 2024-01-29 RX ADMIN — HEPARIN SODIUM 5000 UNIT(S): 5000 INJECTION INTRAVENOUS; SUBCUTANEOUS at 05:16

## 2024-01-29 RX ADMIN — HEPARIN SODIUM 5000 UNIT(S): 5000 INJECTION INTRAVENOUS; SUBCUTANEOUS at 13:24

## 2024-01-29 RX ADMIN — RISPERIDONE 0.25 MILLIGRAM(S): 4 TABLET ORAL at 12:18

## 2024-01-29 RX ADMIN — Medication 75 MEQ/KG/HR: at 17:42

## 2024-01-29 RX ADMIN — Medication 100 MILLIEQUIVALENT(S): at 09:47

## 2024-01-29 RX ADMIN — Medication 100 MEQ/KG/HR: at 01:59

## 2024-01-29 RX ADMIN — POTASSIUM PHOSPHATE, MONOBASIC POTASSIUM PHOSPHATE, DIBASIC 83.33 MILLIMOLE(S): 236; 224 INJECTION, SOLUTION INTRAVENOUS at 17:02

## 2024-01-29 RX ADMIN — Medication 100 MEQ/KG/HR: at 11:11

## 2024-01-29 RX ADMIN — Medication 3 MILLIGRAM(S): at 20:21

## 2024-01-29 RX ADMIN — MEMANTINE HYDROCHLORIDE 10 MILLIGRAM(S): 10 TABLET ORAL at 05:16

## 2024-01-29 RX ADMIN — MEMANTINE HYDROCHLORIDE 10 MILLIGRAM(S): 10 TABLET ORAL at 17:05

## 2024-01-29 RX ADMIN — SERTRALINE 50 MILLIGRAM(S): 25 TABLET, FILM COATED ORAL at 12:17

## 2024-01-29 RX ADMIN — Medication 1 MILLIGRAM(S): at 12:17

## 2024-01-29 RX ADMIN — DONEPEZIL HYDROCHLORIDE 10 MILLIGRAM(S): 10 TABLET, FILM COATED ORAL at 20:20

## 2024-01-29 RX ADMIN — RISPERIDONE 0.5 MILLIGRAM(S): 4 TABLET ORAL at 12:18

## 2024-01-29 RX ADMIN — PREGABALIN 1000 MICROGRAM(S): 225 CAPSULE ORAL at 12:17

## 2024-01-29 RX ADMIN — Medication 40 MILLIEQUIVALENT(S): at 13:24

## 2024-01-29 RX ADMIN — Medication 40 MILLIEQUIVALENT(S): at 17:05

## 2024-01-29 RX ADMIN — RISPERIDONE 0.5 MILLIGRAM(S): 4 TABLET ORAL at 20:20

## 2024-01-29 RX ADMIN — Medication 100 MILLIEQUIVALENT(S): at 09:08

## 2024-01-29 RX ADMIN — Medication 25 GRAM(S): at 09:08

## 2024-01-29 RX ADMIN — HEPARIN SODIUM 5000 UNIT(S): 5000 INJECTION INTRAVENOUS; SUBCUTANEOUS at 20:22

## 2024-01-29 NOTE — PHYSICAL THERAPY INITIAL EVALUATION ADULT - ADDITIONAL COMMENTS
Pt is a poor historian; no updated care coordinator note at this time. As per Last Care coordinator note on 01/05/2024, pt is from [facility] term. As per last PT evaluation on 01/05/2024, pt was unable to follow command and resisting functional activity.    Pt left comfortable in bed, HOB elevated, NAD, all lines intact, all precautions maintained, with call bell in reach, bed alarm on, PCA in room, and RN aware of PT evaluation.

## 2024-01-29 NOTE — PROGRESS NOTE ADULT - ASSESSMENT
78M with history of MM, HTN, and Dementia who presents to the hospital after a fall at his NH here found to have significant hypercalcemia.       Problem/Plan - 1:  ·  Problem: Hypercalcemia.   ·  Plan: -   - Dose calcitonin once patient's weight is available  - Likely heme or nephrology fu     Problem/Plan - 2:  ·  Problem: Fall.   ·  Plan: - Unclear cause of his fall, unclear mechanism, patient unable to state what happened and the NH paperwork does not mention how the patient fell  - Will monitor on telemetry for now, trend troponin  - cars fu     Problem/Plan - 3:·  Problem: DAVID (acute kidney injury). ·  Plan: - New DAVID likely 2/2 severe hypercalcemia -> c/w IVF as above  - Check UA, urine studies  - Monitor I/O  - Trend BUN/Cr    Problem/Plan - 4:  ·  Problem: Toxic metabolic encephalopathy.   ·  Plan: - Baseline oriented to self and place, currently oriented to self but cannot state where he is, unable to state what the current month/year is, unable to give significant HPI or ROS (seems to be the baseline when compared to his prior admission)  - Likely has some encephalopathy 2/2 severe hypercalcemia -> c/w management as above  - c/w dementia management as below.    Problem/Plan - 5:  ·  Problem: Multiple myeloma.   ·  Plan: - Heme CONSULT  monitor cbc  - transfuse 1 unit today     Problem/Plan - 6:  ·  Problem: Essential hypertension.   ·  Plan: - Not on medications, BPs currently well controlled  - Monitor BPs.    Problem/Plan - 7:  ·  Problem: Dementia.   ·  Plan: - c/w donepezil, memantine  - c/w risperdal, sertraline, and seroquel as per outpatient medications.

## 2024-01-29 NOTE — PHYSICAL THERAPY INITIAL EVALUATION ADULT - PERTINENT HX OF CURRENT PROBLEM, REHAB EVAL
This is a 78M with history of MM, HTN, and Dementia who presents to the hospital after a fall at his NH here found to have significant hypercalcemia. CT head: No acute intracranial hemorrhage or mass effect. CT cervical spine: No evidence for acute displaced fracture or malalignment of the cervical spine. Mild age indeterminate compression deformities at the superior endplates of T1 and T2, without bony retropulsion. X-ray of Pelvis (-)fx

## 2024-01-29 NOTE — PHYSICAL THERAPY INITIAL EVALUATION ADULT - PATIENT PROFILE REVIEW, REHAB EVAL
ACTIVITY ORDER: OOB with Assistance; Spoke with RN Grace Yeostros prior to PT evaluation--> Pt OK for PT consult; Vitals taken; /75mmHg, heart rate 92bpm/yes

## 2024-01-29 NOTE — PROGRESS NOTE ADULT - SUBJECTIVE AND OBJECTIVE BOX
Patient is a 78y old  Male who presents with a chief complaint of Fall, elevated calcium (29 Jan 2024 11:31)    Date of servie : 01-29-24 @ 12:54  INTERVAL HPI/OVERNIGHT EVENTS:  T(C): 37.2 (01-29-24 @ 05:15), Max: 37.2 (01-28-24 @ 22:35)  HR: 98 (01-29-24 @ 05:15) (98 - 100)  BP: 148/86 (01-29-24 @ 05:15) (127/84 - 148/86)  RR: 18 (01-29-24 @ 05:15) (17 - 18)  SpO2: 99% (01-29-24 @ 05:15) (97% - 99%)  Wt(kg): --  I&O's Summary    29 Jan 2024 07:01  -  29 Jan 2024 12:54  --------------------------------------------------------  IN: 800 mL / OUT: 0 mL / NET: 800 mL        LABS:                        6.7    5.07  )-----------( 193      ( 29 Jan 2024 10:44 )             19.9     01-29    141  |  112<H>  |  17  ----------------------------<  101<H>  3.2<L>   |  23  |  0.93    Ca    8.9      29 Jan 2024 10:44  Phos  1.4     01-29  Mg     1.90     01-29    TPro  9.7<H>  /  Alb  1.9<L>  /  TBili  0.4  /  DBili  x   /  AST  20  /  ALT  10  /  AlkPhos  55  01-29      Urinalysis Basic - ( 29 Jan 2024 10:44 )    Color: x / Appearance: x / SG: x / pH: x  Gluc: 101 mg/dL / Ketone: x  / Bili: x / Urobili: x   Blood: x / Protein: x / Nitrite: x   Leuk Esterase: x / RBC: x / WBC x   Sq Epi: x / Non Sq Epi: x / Bacteria: x      CAPILLARY BLOOD GLUCOSE            Urinalysis Basic - ( 29 Jan 2024 10:44 )    Color: x / Appearance: x / SG: x / pH: x  Gluc: 101 mg/dL / Ketone: x  / Bili: x / Urobili: x   Blood: x / Protein: x / Nitrite: x   Leuk Esterase: x / RBC: x / WBC x   Sq Epi: x / Non Sq Epi: x / Bacteria: x        MEDICATIONS  (STANDING):  cyanocobalamin 1000 MICROGram(s) Oral daily  donepezil 10 milliGRAM(s) Oral at bedtime  folic acid 1 milliGRAM(s) Oral daily  heparin   Injectable 5000 Unit(s) SubCutaneous every 8 hours  memantine 10 milliGRAM(s) Oral two times a day  potassium chloride   Powder 40 milliEquivalent(s) Oral every 4 hours  potassium phosphate IVPB 30 milliMole(s) IV Intermittent once  risperiDONE   Tablet 0.25 milliGRAM(s) Oral daily  risperiDONE   Tablet 0.5 milliGRAM(s) Oral at bedtime  sertraline 50 milliGRAM(s) Oral daily  sodium bicarbonate  Infusion 0.092 mEq/kG/Hr (100 mL/Hr) IV Continuous <Continuous>    MEDICATIONS  (PRN):  acetaminophen     Tablet .. 650 milliGRAM(s) Oral every 6 hours PRN Temp greater or equal to 38C (100.4F), Mild Pain (1 - 3)  aluminum hydroxide/magnesium hydroxide/simethicone Suspension 30 milliLiter(s) Oral every 4 hours PRN Dyspepsia  melatonin 3 milliGRAM(s) Oral at bedtime PRN Insomnia  ondansetron Injectable 4 milliGRAM(s) IV Push every 8 hours PRN Nausea and/or Vomiting  QUEtiapine 12.5 milliGRAM(s) Oral every 6 hours PRN for agitation          PHYSICAL EXAM:  GENERAL: NAD, well-groomed, well-developed  HEAD:  Atraumatic, Normocephalic  CHEST/LUNG: Clear to percussion bilaterally; No rales, rhonchi, wheezing, or rubs  HEART: Regular rate and rhythm; No murmurs, rubs, or gallops  ABDOMEN: Soft, Nontender, Nondistended; Bowel sounds present  EXTREMITIES:  2+ Peripheral Pulses, No clubbing, cyanosis, or edema  LYMPH: No lymphadenopathy noted  SKIN: No rashes or lesions    Care Discussed with Consultants/Other Providers [ ] YES  [ ] NO

## 2024-01-29 NOTE — PHYSICAL THERAPY INITIAL EVALUATION ADULT - PASSIVE RANGE OF MOTION EXAMINATION, REHAB EVAL
Bilateral shoulders ~80 degrees, bilateral elbows/hands/wrist WFL; pt resistive @ times with ROM/bilateral lower extremity Passive ROM was WFL (within functional limits)

## 2024-01-29 NOTE — PROGRESS NOTE ADULT - ASSESSMENT
78M with history of MM, HTN, and Dementia (AAO x 1-2 to self, to place, not to time at baseline per brother) who presents to the hospital from [facility] after a fall. Patient is a poor historian 2/2 dementia, unable to state what happened. As per paperwork from the NH, patient had a fall and was noted to have a hematoma on the occipital head and was sent to the hospital for evaluation. Here imaging were negative for fractures but his work up showed him to have a significantly elevated calcium of 14 (corrected to 15.1) with DAVID and a significantly elevated protein gap.  Nephrology consulted for DAVID and hypercalemia.    A/P:  DAVID:  Baseline S.Cr 0.7-0.9.  S.Cr 1.39 on admission.  Likely pre-renal in setting of dehydration/hypercalcemia.  S/p NS 0.9% 1.5L.  adjust maintenance IVF to 1/2NS + 75mEq NaHCO3 @ 75ml/hr.  CK 64 - DAVID unlikely sec. to rhabdo.  UA + proteinuria; no blood.  FeNa 1.1% - suggests ATN.  S.Cr improving towards baseline.  Monitor BMP and UO.    HTN:  Controlled.  Not on antihypertensives.  Avoid hypotension.  Monitor BP.    Hypercalcemia:  Likely sec to MM.  PTH low, suppressed by high Ca.  Total protein high.  Vit. D 25OH 46.4.  S/p Zometa 4mg x1 dose and calcitonin 200IU q12hrs x2 doses.  Ca improving.  Monitor Ca.    Acidosis:  Hyperchloremic acidosis.  C/w 1/2NS + 75mEq NaHCO3 @ 100ml/hr.  Monitor CO2.    Anemia:  Likely sec to MM vs. other etiology.  Workup per primary team.  1 unit PRBC ordered for today  Consider Heme/onc evaluation    Monitor Hgb.  Transfuse for Hgb <8.    Hypophosphatemia:  Likely sec. to poor PO intake.  Replete w/ Kphos 30mmol   Replete as needed.  Monitor PO4 daily.    Hypokalemia:   Likely sec to poor PO intake  replete w/ KCL 10meq x 3 doses and KCl powder 40meq x 2 doses  monitor     Hypomagnesemia:  Likely sec to poor PO intake.  Repleted w/ mag sulf 2gm.  Monitor Mg.    Proteinuria:  Possibly sec to MM.  Monitor. 78M with history of MM, HTN, and Dementia (AAO x 1-2 to self, to place, not to time at baseline per brother) who presents to the hospital from [facility] after a fall. Patient is a poor historian 2/2 dementia, unable to state what happened. As per paperwork from the NH, patient had a fall and was noted to have a hematoma on the occipital head and was sent to the hospital for evaluation. Here imaging were negative for fractures but his work up showed him to have a significantly elevated calcium of 14 (corrected to 15.1) with DAVID and a significantly elevated protein gap.  Nephrology consulted for DAVID and hypercalemia.    A/P:  DAVID:  Baseline S.Cr 0.7-0.9.  S.Cr 1.39 on admission.  Likely pre-renal in setting of dehydration/hypercalcemia.  S/p NS 0.9% 1.5L.  adjust maintenance IVF to 1/2NS + 75mEq NaHCO3 @ 75ml/hr.  CK 64 - DAVID unlikely sec. to rhabdo.  UA + proteinuria; no blood.  FeNa 1.1% - suggests ATN.  S.Cr improving towards baseline.  Monitor BMP and UO.    HTN:  Controlled.  Not on antihypertensives.  Avoid hypotension.  Monitor BP.    Hypercalcemia:  Likely sec to MM.  PTH low, suppressed by high Ca.  Total protein high.  Vit. D 25OH 46.4.  S/p Zometa 4mg x1 dose and calcitonin 200IU q12hrs x2 doses.  Ca improving.  Monitor Ca.    Acidosis:  Hyperchloremic acidosis.  C/w 1/2NS + 75mEq NaHCO3 @ 75ml/hr.  Monitor CO2.    Anemia:  Likely sec to MM vs. other etiology.  Workup per primary team.  1 unit PRBC ordered for today  Consider Heme/onc evaluation    Monitor Hgb.  Transfuse for Hgb <8.    Hypophosphatemia:  Likely sec. to poor PO intake.  Replete w/ Kphos 30mmol today  Monitor PO4 daily.    Hypokalemia:   Likely sec to poor PO intake  replete w/ KCL 10meq x 3 doses and KCl powder 40meq x 2 doses  monitor     Hypomagnesemia:  Likely sec to poor PO intake.  Repleted w/ mag sulf 2gm.  Monitor Mg.    Proteinuria:  Possibly sec to MM.  Monitor.

## 2024-01-29 NOTE — PHYSICAL THERAPY INITIAL EVALUATION ADULT - NSPTDISCHREC_GEN_A_CORE
Return to Skilled Nursing Facility; pt appears to be at his baseline level; pt is not an appropriate candidate for skilled PT as pt does not follow commands; therefore pt will be taken off PT program.

## 2024-01-29 NOTE — PHYSICAL THERAPY INITIAL EVALUATION ADULT - GENERAL OBSERVATIONS, REHAB EVAL
Pt encountered in semisupine position, no distress, AxOx0, with +IV, +condom catheter, and PCA in room.

## 2024-01-29 NOTE — PROGRESS NOTE ADULT - ASSESSMENT
This is a 78M with history of MM, HTN, and Dementia (AAO x 1-2 to self, to place, not to time at baseline per brother) who presents to the hospital from [facility] after a fall. Patient is a poor historian 2/2 dementia, unable to state what happened. As per paperwork from the NH, patient had a fall and was noted to have a hematoma on the occipital head and was sent to the hospital for evaluation. Here imaging were negative for fractures but his work up showed him to have a significantly elevated calcium of 14 (corrected to 15.1) with DAVID and a significantly elevated protein gap. He was given NS 1.5L and repeat BMP showed persistence of the calcium elevation though improving. He was admitted to medicine on telemetry for further evaluation.  (26 Jan 2024 23:03)   he can not tell me why is he here and how is his breathing:    he is on room air:  slightly agitated: He was also recently admitted to hospital when he had influenza:   no now cough reported and has no fever: :    s/p fall:  HTN  Multiple Myeloma  Dementia     s/p fall:  -he seems to be doing  ok :  -he is alert and awake but demented:   -on room air   -cxr is normal :"  -ct head with no bleeding noted:   -aspiration precautions  1/28: seems same:  no change in his resp symptoms not in any resp distress:  on room air:  cont to monitor  : ct head is with no ICB   1/29: he seems OK: no sob:  no cough : no phlegm : o n room air  HTN  -controlled  Multiple Myeloma  -per primary team   1/29: hb is pretty low today  :? blood transfusion  today   Dementia   -supportive care    dvt prophylaxis    dw ACP

## 2024-01-29 NOTE — PROGRESS NOTE ADULT - SUBJECTIVE AND OBJECTIVE BOX
[facility]   MD ANEL FOLEY MD ANGELA WONG, PA Venitha Krishnan, NP    TEL:  OFFICE: [phone]  From 5pm-7am Answering Service [phone]    -- RENAL FOLLOW UP NOTE ---Date of Service 01-29-24 @ 14:35    Patient is a 78y old  Male who presents with a chief complaint of Fall, elevated calcium (29 Jan 2024 12:53)      Patient seen and examined at bedside. No chest pain/sob    VITALS:  T(F): 99 (01-29-24 @ 05:15), Max: 99 (01-29-24 @ 05:15)  HR: 98 (01-29-24 @ 05:15)  BP: 148/86 (01-29-24 @ 05:15)  RR: 18 (01-29-24 @ 05:15)  SpO2: 99% (01-29-24 @ 05:15)  Wt(kg): --    01-29 @ 07:01 - 01-29 @ 14:35  --------------------------------------------------------  IN: 800 mL / OUT: 0 mL / NET: 800 mL          PHYSICAL EXAM:  General: NAD  Neck: No JVD  Respiratory: CTAB, no wheezes, rales or rhonchi  Cardiovascular: S1, S2, RRR  Gastrointestinal: BS+, soft, NT/ND  Extremities: No peripheral edema    Hospital Medications:   MEDICATIONS  (STANDING):  cyanocobalamin 1000 MICROGram(s) Oral daily  donepezil 10 milliGRAM(s) Oral at bedtime  folic acid 1 milliGRAM(s) Oral daily  heparin   Injectable 5000 Unit(s) SubCutaneous every 8 hours  memantine 10 milliGRAM(s) Oral two times a day  potassium chloride   Powder 40 milliEquivalent(s) Oral every 4 hours  potassium phosphate IVPB 30 milliMole(s) IV Intermittent once  risperiDONE   Tablet 0.25 milliGRAM(s) Oral daily  risperiDONE   Tablet 0.5 milliGRAM(s) Oral at bedtime  sertraline 50 milliGRAM(s) Oral daily  sodium bicarbonate  Infusion 0.092 mEq/kG/Hr (100 mL/Hr) IV Continuous <Continuous>      LABS:  01-29    141  |  112<H>  |  17  ----------------------------<  101<H>  3.2<L>   |  23  |  0.93    Ca    8.9      29 Jan 2024 10:44  Phos  1.4     01-29  Mg     1.90     01-29    TPro  9.7<H>  /  Alb  1.9<L>  /  TBili  0.4  /  DBili      /  AST  20  /  ALT  10  /  AlkPhos  55  01-29    Creatinine Trend: 0.93 <--, 0.91 <--, 1.00 <--, 1.19 <--, 1.33 <--, 1.39 <--    Albumin: 1.9 g/dL (01-29 @ 10:44)  Albumin: 2.0 g/dL (01-29 @ 05:33)  Phosphorus: 1.4 mg/dL (01-29 @ 05:33)                              6.7    5.07  )-----------( 193      ( 29 Jan 2024 10:44 )             19.9     Urine Studies:  Urinalysis - [01-29-24 @ 10:44]      Color  / Appearance  / SG  / pH       Gluc 101 / Ketone   / Bili  / Urobili        Blood  / Protein  / Leuk Est  / Nitrite       RBC  / WBC  / Hyaline  / Gran  / Sq Epi  / Non Sq Epi  / Bacteria     Urine Creatinine 57      [01-28-24 @ 05:38]  Urine Sodium 87      [01-28-24 @ 05:38]    PTH -- (Ca --)      [01-27-24 @ 10:56]   8  PTH -- (Ca --)      [01-14-24 @ 06:50]   5  PTH -- (Ca --)      [01-13-24 @ 06:10]   6  Vitamin D (25OH) 46.4      [01-27-24 @ 10:56]        RADIOLOGY & ADDITIONAL STUDIES:

## 2024-01-29 NOTE — PROGRESS NOTE ADULT - SUBJECTIVE AND OBJECTIVE BOX
DATE OF SERVICE: 01-29-24    No events, Review of symptoms otherwise limited    Review of Systems:   Constitutional: [ ] fevers, [ ] chills.   Skin: [ ] dry skin. [ ] rashes.  Psychiatric: [ ] depression, [ ] anxiety.   Gastrointestinal: [ ] BRBPR, [ ] melena.   Neurological: [ ] confusion. [ ] seizures. [ ] shuffling gait.   Ears,Nose,Mouth and Throat: [ ] ear pain [ ] sore throat.   Eyes: [ ] diplopia.   Respiratory: [ ] hemoptysis. [ ] shortness of breath  Cardiovascular: See HPI above  Hematologic/Lymphatic: [ ] anemia. [ ] painful nodes. [ ] prolonged bleeding.   Genitourinary: [ ] hematuria. [ ] flank pain.   Endocrine: [ ] significant change in weight. [ ] intolerance to heat and cold.     Review of systems [x ] otherwise negative, [ ] otherwise unable to obtain    FH: no family history of sudden cardiac death in first degree relatives    SH: [ ] tobacco, [ ] alcohol, [ ] drugs    acetaminophen     Tablet .. 650 milliGRAM(s) Oral every 6 hours PRN  aluminum hydroxide/magnesium hydroxide/simethicone Suspension 30 milliLiter(s) Oral every 4 hours PRN  cyanocobalamin 1000 MICROGram(s) Oral daily  donepezil 10 milliGRAM(s) Oral at bedtime  folic acid 1 milliGRAM(s) Oral daily  heparin   Injectable 5000 Unit(s) SubCutaneous every 8 hours  melatonin 3 milliGRAM(s) Oral at bedtime PRN  memantine 10 milliGRAM(s) Oral two times a day  ondansetron Injectable 4 milliGRAM(s) IV Push every 8 hours PRN  potassium chloride    Tablet ER 40 milliEquivalent(s) Oral every 4 hours  potassium phosphate IVPB 30 milliMole(s) IV Intermittent once  QUEtiapine 12.5 milliGRAM(s) Oral every 6 hours PRN  risperiDONE   Tablet 0.25 milliGRAM(s) Oral daily  risperiDONE   Tablet 0.5 milliGRAM(s) Oral at bedtime  risperiDONE  Disintegrating Tablet 0.5 milliGRAM(s) Oral once  sertraline 50 milliGRAM(s) Oral daily  sodium bicarbonate  Infusion 0.092 mEq/kG/Hr IV Continuous <Continuous>                            6.7    5.07  )-----------( 193      ( 29 Jan 2024 10:44 )             19.9       141  |  112<H>  |  17  ----------------------------<  101<H>  3.2<L>   |  23  |  0.93    Ca    8.9      29 Jan 2024 10:44  Phos  1.4     01-29  Mg     1.90     01-29    TPro  9.7<H>  /  Alb  1.9<L>  /  TBili  0.4  /  DBili  x   /  AST  x   /  ALT  10  /  AlkPhos  55  01-29      CARDIAC MARKERS ( 27 Jan 2024 10:56 )  x     / x     / 64 U/L / x     / x          T(C): 37.2 (01-29-24 @ 05:15), Max: 37.2 (01-28-24 @ 22:35)  HR: 98 (01-29-24 @ 05:15) (98 - 100)  BP: 148/86 (01-29-24 @ 05:15) (127/84 - 148/86)  RR: 18 (01-29-24 @ 05:15) (17 - 18)  SpO2: 99% (01-29-24 @ 05:15) (97% - 99%)  Wt(kg): --    I&O's Summary    29 Jan 2024 07:01  -  29 Jan 2024 11:31  --------------------------------------------------------  IN: 800 mL / OUT: 0 mL / NET: 800 mL    General:  NAD  Head: Normocephalic and atraumatic.   Neck: No JVD. No bruits. Supple. Does not appear to be enlarged.   Cardiovascular: + S1,S2 ; RRR Soft systolic murmur at the left lower sternal border. No rubs noted.    Lungs: CTA b/l. No rhonchi, rales or wheezes.   Abdomen: + BS, soft. Non tender. Non distended. No rebound. No guarding.   Extremities: No clubbing/cyanosis/edema.   Neurologic: Moves all four extremities. Full range of motion.   Skin: Warm and moist. The patient's skin has normal elasticity and good skin turgor.   Psychiatric: Appropriate mood and affect.  Musculoskeletal: Normal range of motion, normal strength     TELEMETRY: 	  NSR    ECG:  	NSR    < from: Transthoracic Echocardiogram (07.10.23 @ 11:15) >  CONCLUSIONS:  1. Calcified trileaflet aortic valve with normal opening.  Minimal aortic regurgitation.  2. Endocardium not well visualized; grossly decreased  possibly mild left ventricular systolic dysfunction.  3. The right ventricle is not well visualized; grossly  normal right ventricular systolic function.  ------------------------------------------------------------------------  Confirmed on  7/10/2023 - 13:57:46 by Jolene Rowan M.D. RPVI  < end of copied text >      ASSESSMENT/PLAN: This is a 78M with history of MM, HTN, and Dementia (AAO x 1-2 to self, to place, not to time at baseline per brother) who presents to the hospital from [facility] after a fall.     Patient is a poor historian 2/2 dementia. As per paperwork from the NH, patient had a fall and was noted to have a hematoma on the occipital head and was sent to the hospital for evaluation. Here imaging were negative for fractures but his work up showed him to have a significantly elevated calcium of 14 (corrected to 15.1) with DAVID and a significantly elevated protein gap. He was given NS 1.5L and repeat BMP showed persistence of the calcium elevation though improving. He was admitted to medicine on telemetry for further evaluation.  Recent admission for influenza, where his lopressor and norvasc were discontinued.    1. HTN/h/o MVR  -  holding lopressor and amlodipine, BP stable  - agree with fluids for hypercalcemia  - no EKG changes associated with hypercalcemia  - TTE from last year with mildly decreased LVEF

## 2024-01-29 NOTE — PROGRESS NOTE ADULT - SUBJECTIVE AND OBJECTIVE BOX
Date of Service: 01-29-24 @ 10:13    Patient is a 78y old  Male who presents with a chief complaint of Fall, elevated calcium (28 Jan 2024 16:07)      Any change in ROS: seems OK: no sob:      MEDICATIONS  (STANDING):  cyanocobalamin 1000 MICROGram(s) Oral daily  donepezil 10 milliGRAM(s) Oral at bedtime  folic acid 1 milliGRAM(s) Oral daily  heparin   Injectable 5000 Unit(s) SubCutaneous every 8 hours  memantine 10 milliGRAM(s) Oral two times a day  potassium chloride    Tablet ER 40 milliEquivalent(s) Oral every 4 hours  potassium chloride  10 mEq/100 mL IVPB 10 milliEquivalent(s) IV Intermittent every 1 hour  risperiDONE   Tablet 0.25 milliGRAM(s) Oral daily  risperiDONE   Tablet 0.5 milliGRAM(s) Oral at bedtime  risperiDONE  Disintegrating Tablet 0.5 milliGRAM(s) Oral once  sertraline 50 milliGRAM(s) Oral daily  sodium bicarbonate  Infusion 0.092 mEq/kG/Hr (100 mL/Hr) IV Continuous <Continuous>    MEDICATIONS  (PRN):  acetaminophen     Tablet .. 650 milliGRAM(s) Oral every 6 hours PRN Temp greater or equal to 38C (100.4F), Mild Pain (1 - 3)  aluminum hydroxide/magnesium hydroxide/simethicone Suspension 30 milliLiter(s) Oral every 4 hours PRN Dyspepsia  melatonin 3 milliGRAM(s) Oral at bedtime PRN Insomnia  ondansetron Injectable 4 milliGRAM(s) IV Push every 8 hours PRN Nausea and/or Vomiting  QUEtiapine 12.5 milliGRAM(s) Oral every 6 hours PRN for agitation    Vital Signs Last 24 Hrs  T(C): 37.2 (29 Jan 2024 05:15), Max: 37.2 (28 Jan 2024 22:35)  T(F): 99 (29 Jan 2024 05:15), Max: 99 (29 Jan 2024 05:15)  HR: 98 (29 Jan 2024 05:15) (98 - 100)  BP: 148/86 (29 Jan 2024 05:15) (106/64 - 148/86)  BP(mean): --  RR: 18 (29 Jan 2024 05:15) (17 - 20)  SpO2: 99% (29 Jan 2024 05:15) (97% - 99%)    Parameters below as of 29 Jan 2024 05:15  Patient On (Oxygen Delivery Method): room air        I&O's Summary        Physical Exam:   GENERAL: NAD, well-groomed, well-developed  HEENT: CHRISTINA/   Atraumatic, Normocephalic  ENMT: No tonsillar erythema, exudates, or enlargement; Moist mucous membranes, Good dentition, No lesions  NECK: Supple, No JVD, Normal thyroid  CHEST/LUNG: Clear to auscultaion  CVS: Regular rate and rhythm; No murmurs, rubs, or gallops  GI: : Soft, Nontender, Nondistended; Bowel sounds present  NERVOUS SYSTEM:  Alert & awake and confused:  demented  EXTREMITIES:  2+ Peripheral Pulses, No clubbing, cyanosis, or edema  LYMPH: No lymphadenopathy noted  SKIN: No rashes or lesions  ENDOCRINOLOGY: No Thyromegaly  PSYCH: demented    Labs:    CARDIAC MARKERS ( 27 Jan 2024 10:56 )  x     / x     / 64 U/L / x     / x                                6.7    5.59  )-----------( 205      ( 29 Jan 2024 05:33 )             20.0                         7.5    7.74  )-----------( 234      ( 28 Jan 2024 06:54 )             22.8                         8.4    10.02 )-----------( 253      ( 26 Jan 2024 17:35 )             25.2     01-29    143  |  112<H>  |  18  ----------------------------<  94  2.8<LL>   |  22  |  0.91  01-28    139  |  111<H>  |  25<H>  ----------------------------<  121<H>  3.5   |  21<L>  |  1.00  01-27    140  |  110<H>  |  32<H>  ----------------------------<  89  3.5   |  26  |  1.19  01-26    137  |  107  |  37<H>  ----------------------------<  97  4.1   |  24  |  1.33<H>  01-26    137  |  105  |  40<H>  ----------------------------<  107<H>  3.9   |  28  |  1.39<H>    Ca    9.0      29 Jan 2024 05:33  Ca    10.3      28 Jan 2024 06:54  Ca    13.1<HH>      27 Jan 2024 10:56  Phos  1.4     01-29  Phos  1.3     01-28  Phos  2.7     01-27  Mg     1.40     01-29  Mg     1.40     01-28  Mg     1.70     01-27    TPro  9.8<H>  /  Alb  2.0<L>  /  TBili  0.4  /  DBili  x   /  AST  20  /  ALT  9   /  AlkPhos  57  01-29  TPro  10.6<H>  /  Alb  2.2<L>  /  TBili  0.5  /  DBili  x   /  AST  20  /  ALT  12  /  AlkPhos  68  01-28  TPro  11.3<H>  /  Alb  2.2<L>  /  TBili  0.5  /  DBili  x   /  AST  17  /  ALT  14  /  AlkPhos  74  01-27  TPro  11.4<H>  /  Alb  2.4<L>  /  TBili  0.5  /  DBili  x   /  AST  15  /  ALT  13  /  AlkPhos  78  01-26  TPro  13.4<H>  /  Alb  2.6<L>  /  TBili  0.6  /  DBili  x   /  AST  18  /  ALT  16  /  AlkPhos  81  01-26    CAPILLARY BLOOD GLUCOSE          LIVER FUNCTIONS - ( 29 Jan 2024 05:33 )  Alb: 2.0 g/dL / Pro: 9.8 g/dL / ALK PHOS: 57 U/L / ALT: 9 U/L / AST: 20 U/L / GGT: x             Urinalysis Basic - ( 29 Jan 2024 05:33 )    Color: x / Appearance: x / SG: x / pH: x  Gluc: 94 mg/dL / Ketone: x  / Bili: x / Urobili: x   Blood: x / Protein: x / Nitrite: x   Leuk Esterase: x / RBC: x / WBC x   Sq Epi: x / Non Sq Epi: x / Bacteria: x    rad< from: CT Head No Cont (01.26.24 @ 20:21) >  7/7/2023.    FINDINGS:    CT head:    Parts of the anterior head are excluded from field-of-view.    There is no acute intracranial hemorrhage or mass effect. There are areas   of hypodensity in the bilateral hemispheric white matter suggesting white   matter microvascular ischemic change. The ventricles and sulci are normal   in size for patient's age.    There is no extraaxial fluid collection.    There is no displaced calvarial fracture. The visualized orbits are   within normal limits. The visualized portions of the paranasal sinuses   are well aerated. The mastoid air cells are well aerated.      CT cervical spine:    Cervical alignment is maintained. Cervical vertebral body heights are   within normal limits. Mild age-indeterminate compression deformities at   the superior endplates of T1 and T2, without bony retropulsion.   Multilevel intervertebral disc height loss,disc osteophyte complexes,   and bilateral facet and uncovertebral arthropathy.    There is no prevertebral soft tissue swelling. The paraspinal soft   tissues are unremarkable. The lung apices are clear.      IMPRESSION:    CT head: No acute intracranial hemorrhage or mass effect.    CT cervical spine:  No evidence for acute displaced fracture or malalignment of the cervical   spine.  Mild age-indeterminate compression deformities at the superior endplates   of T1 and T2, without bony retropulsion.    --- End of Report ---            MADDY CANTU MD; Attending Radiologist  This document has been electronically signed. Jan 26 2024  8:56PM    < end of copied text >          RECENT CULTURES:        RESPIRATORY CULTURES:          Studies  Chest X-RAY  CT SCAN Chest   Venous Dopplers: LE:   CT Abdomen  Others

## 2024-01-30 LAB
ALBUMIN SERPL ELPH-MCNC: 2 G/DL — LOW (ref 3.3–5)
ALP SERPL-CCNC: 52 U/L — SIGNIFICANT CHANGE UP (ref 40–120)
ALT FLD-CCNC: 12 U/L — SIGNIFICANT CHANGE UP (ref 4–41)
ANION GAP SERPL CALC-SCNC: 4 MMOL/L — LOW (ref 7–14)
AST SERPL-CCNC: 18 U/L — SIGNIFICANT CHANGE UP (ref 4–40)
BASOPHILS # BLD AUTO: 0.01 K/UL — SIGNIFICANT CHANGE UP (ref 0–0.2)
BASOPHILS NFR BLD AUTO: 0.3 % — SIGNIFICANT CHANGE UP (ref 0–2)
BILIRUB SERPL-MCNC: 0.4 MG/DL — SIGNIFICANT CHANGE UP (ref 0.2–1.2)
BUN SERPL-MCNC: 14 MG/DL — SIGNIFICANT CHANGE UP (ref 7–23)
CALCIUM SERPL-MCNC: 8.3 MG/DL — LOW (ref 8.4–10.5)
CHLORIDE SERPL-SCNC: 115 MMOL/L — HIGH (ref 98–107)
CO2 SERPL-SCNC: 23 MMOL/L — SIGNIFICANT CHANGE UP (ref 22–31)
CREAT SERPL-MCNC: 0.83 MG/DL — SIGNIFICANT CHANGE UP (ref 0.5–1.3)
EGFR: 90 ML/MIN/1.73M2 — SIGNIFICANT CHANGE UP
EOSINOPHIL # BLD AUTO: 0.13 K/UL — SIGNIFICANT CHANGE UP (ref 0–0.5)
EOSINOPHIL NFR BLD AUTO: 3.6 % — SIGNIFICANT CHANGE UP (ref 0–6)
GLUCOSE SERPL-MCNC: 111 MG/DL — HIGH (ref 70–99)
HCT VFR BLD CALC: 23.4 % — LOW (ref 39–50)
HGB BLD-MCNC: 7.7 G/DL — LOW (ref 13–17)
IANC: 2.78 K/UL — SIGNIFICANT CHANGE UP (ref 1.8–7.4)
IMM GRANULOCYTES NFR BLD AUTO: 0.6 % — SIGNIFICANT CHANGE UP (ref 0–0.9)
LYMPHOCYTES # BLD AUTO: 0.4 K/UL — LOW (ref 1–3.3)
LYMPHOCYTES # BLD AUTO: 11 % — LOW (ref 13–44)
MAGNESIUM SERPL-MCNC: 1.7 MG/DL — SIGNIFICANT CHANGE UP (ref 1.6–2.6)
MCHC RBC-ENTMCNC: 32.9 GM/DL — SIGNIFICANT CHANGE UP (ref 32–36)
MCHC RBC-ENTMCNC: 33.6 PG — SIGNIFICANT CHANGE UP (ref 27–34)
MCV RBC AUTO: 102.2 FL — HIGH (ref 80–100)
MONOCYTES # BLD AUTO: 0.28 K/UL — SIGNIFICANT CHANGE UP (ref 0–0.9)
MONOCYTES NFR BLD AUTO: 7.7 % — SIGNIFICANT CHANGE UP (ref 2–14)
NEUTROPHILS # BLD AUTO: 2.78 K/UL — SIGNIFICANT CHANGE UP (ref 1.8–7.4)
NEUTROPHILS NFR BLD AUTO: 76.8 % — SIGNIFICANT CHANGE UP (ref 43–77)
NRBC # BLD: 0 /100 WBCS — SIGNIFICANT CHANGE UP (ref 0–0)
NRBC # FLD: 0 K/UL — SIGNIFICANT CHANGE UP (ref 0–0)
PHOSPHATE SERPL-MCNC: 1.3 MG/DL — LOW (ref 2.5–4.5)
PLATELET # BLD AUTO: 201 K/UL — SIGNIFICANT CHANGE UP (ref 150–400)
POTASSIUM SERPL-MCNC: 3.6 MMOL/L — SIGNIFICANT CHANGE UP (ref 3.5–5.3)
POTASSIUM SERPL-SCNC: 3.6 MMOL/L — SIGNIFICANT CHANGE UP (ref 3.5–5.3)
PROT SERPL-MCNC: 9.7 G/DL — HIGH (ref 6–8.3)
RBC # BLD: 2.29 M/UL — LOW (ref 4.2–5.8)
RBC # FLD: 17.4 % — HIGH (ref 10.3–14.5)
SODIUM SERPL-SCNC: 142 MMOL/L — SIGNIFICANT CHANGE UP (ref 135–145)
WBC # BLD: 3.62 K/UL — LOW (ref 3.8–10.5)
WBC # FLD AUTO: 3.62 K/UL — LOW (ref 3.8–10.5)

## 2024-01-30 RX ORDER — SODIUM CHLORIDE 9 MG/ML
1000 INJECTION INTRAMUSCULAR; INTRAVENOUS; SUBCUTANEOUS
Refills: 0 | Status: DISCONTINUED | OUTPATIENT
Start: 2024-01-30 | End: 2024-01-31

## 2024-01-30 RX ADMIN — MEMANTINE HYDROCHLORIDE 10 MILLIGRAM(S): 10 TABLET ORAL at 17:11

## 2024-01-30 RX ADMIN — MEMANTINE HYDROCHLORIDE 10 MILLIGRAM(S): 10 TABLET ORAL at 05:23

## 2024-01-30 RX ADMIN — HEPARIN SODIUM 5000 UNIT(S): 5000 INJECTION INTRAVENOUS; SUBCUTANEOUS at 05:23

## 2024-01-30 RX ADMIN — HEPARIN SODIUM 5000 UNIT(S): 5000 INJECTION INTRAVENOUS; SUBCUTANEOUS at 21:29

## 2024-01-30 RX ADMIN — SERTRALINE 50 MILLIGRAM(S): 25 TABLET, FILM COATED ORAL at 13:07

## 2024-01-30 RX ADMIN — RISPERIDONE 0.25 MILLIGRAM(S): 4 TABLET ORAL at 13:21

## 2024-01-30 RX ADMIN — Medication 1 MILLIGRAM(S): at 13:08

## 2024-01-30 RX ADMIN — QUETIAPINE FUMARATE 12.5 MILLIGRAM(S): 200 TABLET, FILM COATED ORAL at 22:12

## 2024-01-30 RX ADMIN — RISPERIDONE 0.5 MILLIGRAM(S): 4 TABLET ORAL at 21:25

## 2024-01-30 RX ADMIN — HEPARIN SODIUM 5000 UNIT(S): 5000 INJECTION INTRAVENOUS; SUBCUTANEOUS at 13:07

## 2024-01-30 RX ADMIN — DONEPEZIL HYDROCHLORIDE 10 MILLIGRAM(S): 10 TABLET, FILM COATED ORAL at 21:25

## 2024-01-30 RX ADMIN — SODIUM CHLORIDE 75 MILLILITER(S): 9 INJECTION INTRAMUSCULAR; INTRAVENOUS; SUBCUTANEOUS at 14:30

## 2024-01-30 RX ADMIN — PREGABALIN 1000 MICROGRAM(S): 225 CAPSULE ORAL at 13:08

## 2024-01-30 NOTE — PROGRESS NOTE ADULT - ASSESSMENT
This is a 78M with history of MM, HTN, and Dementia (AAO x 1-2 to self, to place, not to time at baseline per brother) who presents to the hospital from [facility] after a fall. Patient is a poor historian 2/2 dementia, unable to state what happened. As per paperwork from the NH, patient had a fall and was noted to have a hematoma on the occipital head and was sent to the hospital for evaluation. Here imaging were negative for fractures but his work up showed him to have a significantly elevated calcium of 14 (corrected to 15.1) with DAVID and a significantly elevated protein gap. He was given NS 1.5L and repeat BMP showed persistence of the calcium elevation though improving. He was admitted to medicine on telemetry for further evaluation.  (26 Jan 2024 23:03)   he can not tell me why is he here and how is his breathing:    he is on room air:  slightly agitated: He was also recently admitted to hospital when he had influenza:   no now cough reported and has no fever: :    s/p fall:  HTN  Multiple Myeloma  Dementia     s/p fall:  -he seems to be doing  ok :  -he is alert and awake but demented:   -on room air   -cxr is normal :"  -ct head with no bleeding noted:   -aspiration precautions  1/28: seems same:  no change in his resp symptoms not in any resp distress:  on room air:  cont to monitor  : ct head is with no ICB   1/29: he seems OK: no sob:  no cough : no phlegm : o n room air  1/30: seems OK: no sob:    HTN  -controlled  1/30: controlled  Multiple Myeloma  -per primary team   1/29: hb is pretty low today  :? blood transfusion  today   1/30 ; s/p o ne blood transfusion  yesterday : but h is hb is low:  still:     Dementia   -supportive care    dvt prophylaxis    dw ACP

## 2024-01-30 NOTE — PROGRESS NOTE ADULT - SUBJECTIVE AND OBJECTIVE BOX
DATE OF SERVICE: 01-30-24    No events, Review of symptoms otherwise limited    Review of Systems:   Constitutional: [ ] fevers, [ ] chills.   Skin: [ ] dry skin. [ ] rashes.  Psychiatric: [ ] depression, [ ] anxiety.   Gastrointestinal: [ ] BRBPR, [ ] melena.   Neurological: [ ] confusion. [ ] seizures. [ ] shuffling gait.   Ears,Nose,Mouth and Throat: [ ] ear pain [ ] sore throat.   Eyes: [ ] diplopia.   Respiratory: [ ] hemoptysis. [ ] shortness of breath  Cardiovascular: See HPI above  Hematologic/Lymphatic: [ ] anemia. [ ] painful nodes. [ ] prolonged bleeding.   Genitourinary: [ ] hematuria. [ ] flank pain.   Endocrine: [ ] significant change in weight. [ ] intolerance to heat and cold.     Review of systems [x ] otherwise negative, [ ] otherwise unable to obtain    FH: no family history of sudden cardiac death in first degree relatives    SH: [ ] tobacco, [ ] alcohol, [ ] drugs    acetaminophen     Tablet .. 650 milliGRAM(s) Oral every 6 hours PRN  aluminum hydroxide/magnesium hydroxide/simethicone Suspension 30 milliLiter(s) Oral every 4 hours PRN  cyanocobalamin 1000 MICROGram(s) Oral daily  donepezil 10 milliGRAM(s) Oral at bedtime  folic acid 1 milliGRAM(s) Oral daily  heparin   Injectable 5000 Unit(s) SubCutaneous every 8 hours  melatonin 3 milliGRAM(s) Oral at bedtime PRN  memantine 10 milliGRAM(s) Oral two times a day  ondansetron Injectable 4 milliGRAM(s) IV Push every 8 hours PRN  QUEtiapine 12.5 milliGRAM(s) Oral every 6 hours PRN  risperiDONE   Tablet 0.25 milliGRAM(s) Oral daily  risperiDONE   Tablet 0.5 milliGRAM(s) Oral at bedtime  sertraline 50 milliGRAM(s) Oral daily  sodium bicarbonate  Infusion 0.069 mEq/kG/Hr IV Continuous <Continuous>                            7.7    3.62  )-----------( 201      ( 30 Jan 2024 07:14 )             23.4       142  |  115<H>  |  14  ----------------------------<  111<H>  3.6   |  23  |  0.83    Ca    8.3<L>      30 Jan 2024 07:14  Phos  1.3     01-30  Mg     1.70     01-30    TPro  9.7<H>  /  Alb  2.0<L>  /  TBili  0.4  /  DBili  x   /  AST  18  /  ALT  12  /  AlkPhos  52  01-30      T(C): 37.1 (01-30-24 @ 11:48), Max: 37.1 (01-30-24 @ 11:48)  HR: 71 (01-30-24 @ 11:48) (71 - 84)  BP: 106/60 (01-30-24 @ 11:48) (106/60 - 138/71)  RR: 18 (01-30-24 @ 11:48) (17 - 18)  SpO2: 97% (01-30-24 @ 11:48) (96% - 97%)  Wt(kg): --    General:  NAD  Head: Normocephalic and atraumatic.   Neck: No JVD. No bruits. Supple. Does not appear to be enlarged.   Cardiovascular: + S1,S2 ; RRR Soft systolic murmur at the left lower sternal border. No rubs noted.    Lungs: CTA b/l. No rhonchi, rales or wheezes.   Abdomen: + BS, soft. Non tender. Non distended. No rebound. No guarding.   Extremities: No clubbing/cyanosis/edema.   Neurologic: Moves all four extremities. Full range of motion.   Skin: Warm and moist. The patient's skin has normal elasticity and good skin turgor.   Psychiatric: Appropriate mood and affect.  Musculoskeletal: Normal range of motion, normal strength     TELEMETRY: 	  NSR    ECG:  	NSR    < from: Transthoracic Echocardiogram (07.10.23 @ 11:15) >  CONCLUSIONS:  1. Calcified trileaflet aortic valve with normal opening.  Minimal aortic regurgitation.  2. Endocardium not well visualized; grossly decreased  possibly mild left ventricular systolic dysfunction.  3. The right ventricle is not well visualized; grossly  normal right ventricular systolic function.  ------------------------------------------------------------------------  Confirmed on  7/10/2023 - 13:57:46 by Jolene Rowan M.D. RPVI  < end of copied text >      ASSESSMENT/PLAN: This is a 78M with history of MM, HTN, and Dementia (AAO x 1-2 to self, to place, not to time at baseline per brother) who presents to the hospital from [facility] after a fall.     Patient is a poor historian 2/2 dementia. As per paperwork from the NH, patient had a fall and was noted to have a hematoma on the occipital head and was sent to the hospital for evaluation. Here imaging were negative for fractures but his work up showed him to have a significantly elevated calcium of 14 (corrected to 15.1) with DAVID and a significantly elevated protein gap. He was given NS 1.5L and repeat BMP showed persistence of the calcium elevation though improving. He was admitted to medicine on telemetry for further evaluation.  Recent admission for influenza, where his lopressor and norvasc were discontinued.    1. HTN/h/o MVR  - holding lopressor and amlodipine, BP stable  - agree with fluids for hypercalcemia  - no EKG changes associated with hypercalcemia  - TTE from last year with mildly decreased LVEF

## 2024-01-30 NOTE — PROGRESS NOTE ADULT - SUBJECTIVE AND OBJECTIVE BOX
Patient is a 78y old  Male who presents with a chief complaint of Fall, elevated calcium (30 Jan 2024 12:18)    Date of servie : 01-30-24 @ 13:30  INTERVAL HPI/OVERNIGHT EVENTS:  T(C): 37.1 (01-30-24 @ 11:48), Max: 37.1 (01-30-24 @ 11:48)  HR: 71 (01-30-24 @ 11:48) (71 - 84)  BP: 106/60 (01-30-24 @ 11:48) (106/60 - 138/71)  RR: 18 (01-30-24 @ 11:48) (17 - 18)  SpO2: 97% (01-30-24 @ 11:48) (96% - 97%)  Wt(kg): --  I&O's Summary    29 Jan 2024 07:01  -  30 Jan 2024 07:00  --------------------------------------------------------  IN: 1100 mL / OUT: 1550 mL / NET: -450 mL        LABS:                        7.7    3.62  )-----------( 201      ( 30 Jan 2024 07:14 )             23.4     01-30    142  |  115<H>  |  14  ----------------------------<  111<H>  3.6   |  23  |  0.83    Ca    8.3<L>      30 Jan 2024 07:14  Phos  1.3     01-30  Mg     1.70     01-30    TPro  9.7<H>  /  Alb  2.0<L>  /  TBili  0.4  /  DBili  x   /  AST  18  /  ALT  12  /  AlkPhos  52  01-30      Urinalysis Basic - ( 30 Jan 2024 07:14 )    Color: x / Appearance: x / SG: x / pH: x  Gluc: 111 mg/dL / Ketone: x  / Bili: x / Urobili: x   Blood: x / Protein: x / Nitrite: x   Leuk Esterase: x / RBC: x / WBC x   Sq Epi: x / Non Sq Epi: x / Bacteria: x      CAPILLARY BLOOD GLUCOSE            Urinalysis Basic - ( 30 Jan 2024 07:14 )    Color: x / Appearance: x / SG: x / pH: x  Gluc: 111 mg/dL / Ketone: x  / Bili: x / Urobili: x   Blood: x / Protein: x / Nitrite: x   Leuk Esterase: x / RBC: x / WBC x   Sq Epi: x / Non Sq Epi: x / Bacteria: x        MEDICATIONS  (STANDING):  cyanocobalamin 1000 MICROGram(s) Oral daily  donepezil 10 milliGRAM(s) Oral at bedtime  folic acid 1 milliGRAM(s) Oral daily  heparin   Injectable 5000 Unit(s) SubCutaneous every 8 hours  memantine 10 milliGRAM(s) Oral two times a day  risperiDONE   Tablet 0.25 milliGRAM(s) Oral daily  risperiDONE   Tablet 0.5 milliGRAM(s) Oral at bedtime  sertraline 50 milliGRAM(s) Oral daily  sodium bicarbonate  Infusion 0.069 mEq/kG/Hr (75 mL/Hr) IV Continuous <Continuous>    MEDICATIONS  (PRN):  acetaminophen     Tablet .. 650 milliGRAM(s) Oral every 6 hours PRN Temp greater or equal to 38C (100.4F), Mild Pain (1 - 3)  aluminum hydroxide/magnesium hydroxide/simethicone Suspension 30 milliLiter(s) Oral every 4 hours PRN Dyspepsia  melatonin 3 milliGRAM(s) Oral at bedtime PRN Insomnia  ondansetron Injectable 4 milliGRAM(s) IV Push every 8 hours PRN Nausea and/or Vomiting  QUEtiapine 12.5 milliGRAM(s) Oral every 6 hours PRN for agitation          PHYSICAL EXAM:  GENERAL: NAD, well-groomed, well-developed  HEAD:  Atraumatic, Normocephalic  CHEST/LUNG: Clear to percussion bilaterally; No rales, rhonchi, wheezing, or rubs  HEART: Regular rate and rhythm; No murmurs, rubs, or gallops  ABDOMEN: Soft, Nontender, Nondistended; Bowel sounds present  EXTREMITIES:  2+ Peripheral Pulses, No clubbing, cyanosis, or edema  LYMPH: No lymphadenopathy noted  SKIN: No rashes or lesions    Care Discussed with Consultants/Other Providers [ ] YES  [ ] NO

## 2024-01-30 NOTE — PROGRESS NOTE ADULT - SUBJECTIVE AND OBJECTIVE BOX
[facility]   MD ANEL FOLEY MD RUORU WONG, PA    TEL:  FROM 9 AM to 5 PM ---OFFICE: [phone]  AVAILABLE ON TEAMS    FROM 5 PM - 9 AM PLEASE CALL ANSWERING SERVICE: [phone]    RENAL FOLLOW UP NOTE--Date of Service 01-30-24 @ 14:24  --------------------------------------------------------------------------------  HPI:      Pt seen and examined at bedside.       PAST HISTORY  --------------------------------------------------------------------------------  No significant changes to PMH, PSH, FHx, SHx, unless otherwise noted    ALLERGIES & MEDICATIONS  --------------------------------------------------------------------------------  Allergies    No Known Allergies    Intolerances      Standing Inpatient Medications  cyanocobalamin 1000 MICROGram(s) Oral daily  donepezil 10 milliGRAM(s) Oral at bedtime  folic acid 1 milliGRAM(s) Oral daily  heparin   Injectable 5000 Unit(s) SubCutaneous every 8 hours  memantine 10 milliGRAM(s) Oral two times a day  risperiDONE   Tablet 0.25 milliGRAM(s) Oral daily  risperiDONE   Tablet 0.5 milliGRAM(s) Oral at bedtime  sertraline 50 milliGRAM(s) Oral daily  sodium chloride 0.9%. 1000 milliLiter(s) IV Continuous <Continuous>    PRN Inpatient Medications  acetaminophen     Tablet .. 650 milliGRAM(s) Oral every 6 hours PRN  aluminum hydroxide/magnesium hydroxide/simethicone Suspension 30 milliLiter(s) Oral every 4 hours PRN  melatonin 3 milliGRAM(s) Oral at bedtime PRN  ondansetron Injectable 4 milliGRAM(s) IV Push every 8 hours PRN  QUEtiapine 12.5 milliGRAM(s) Oral every 6 hours PRN      REVIEW OF SYSTEMS  --------------------------------------------------------------------------------  General: no fever  MSK: no edema     VITALS/PHYSICAL EXAM  --------------------------------------------------------------------------------  T(C): 37.1 (01-30-24 @ 11:48), Max: 37.1 (01-30-24 @ 11:48)  HR: 71 (01-30-24 @ 11:48) (71 - 84)  BP: 106/60 (01-30-24 @ 11:48) (106/60 - 138/71)  RR: 18 (01-30-24 @ 11:48) (17 - 18)  SpO2: 97% (01-30-24 @ 11:48) (96% - 97%)  Wt(kg): --        01-29-24 @ 07:01  -  01-30-24 @ 07:00  --------------------------------------------------------  IN: 1100 mL / OUT: 1550 mL / NET: -450 mL      Physical Exam:  	General: NAD  Neck: No JVD  Respiratory: CTAB, no wheezes, rales or rhonchi  Cardiovascular: S1, S2, RRR  Gastrointestinal: BS+, soft, NT/ND  Extremities: No peripheral edema  LABS/STUDIES  --------------------------------------------------------------------------------              7.7    3.62  >-----------<  201      [01-30-24 @ 07:14]              23.4     142  |  115  |  14  ----------------------------<  111      [01-30-24 @ 07:14]  3.6   |  23  |  0.83        Ca     8.3     [01-30-24 @ 07:14]      Mg     1.70     [01-30-24 @ 07:14]      Phos  1.3     [01-30-24 @ 07:14]    TPro  9.7  /  Alb  2.0  /  TBili  0.4  /  DBili  x   /  AST  18  /  ALT  12  /  AlkPhos  52  [01-30-24 @ 07:14]          Creatinine Trend:  SCr 0.83 [01-30 @ 07:14]  SCr 0.90 [01-29 @ 18:27]  SCr 0.93 [01-29 @ 10:44]  SCr 0.91 [01-29 @ 05:33]  SCr 1.00 [01-28 @ 06:54]    Urinalysis - [01-30-24 @ 07:14]      Color  / Appearance  / SG  / pH       Gluc 111 / Ketone   / Bili  / Urobili        Blood  / Protein  / Leuk Est  / Nitrite       RBC  / WBC  / Hyaline  / Gran  / Sq Epi  / Non Sq Epi  / Bacteria     Urine Creatinine 57      [01-28-24 @ 05:38]  Urine Sodium 87      [01-28-24 @ 05:38]    PTH -- (Ca --)      [01-27-24 @ 10:56]   8  PTH -- (Ca --)      [01-14-24 @ 06:50]   5  PTH -- (Ca --)      [01-13-24 @ 06:10]   6  Vitamin D (25OH) 46.4      [01-27-24 @ 10:56]

## 2024-01-30 NOTE — PROGRESS NOTE ADULT - ASSESSMENT
78M with history of MM, HTN, and Dementia who presents to the hospital after a fall at his NH here found to have significant hypercalcemia.       Problem/Plan - 1:  ·  Problem: Hypercalcemia.   ·  Plan: -   - Dose calcitonin once patient's weight is available  - heme fu   Problem/Plan - 2:  ·  Problem: Fall.   ·  Plan: - Unclear cause of his fall, unclear mechanism, patient unable to state what happened and the NH paperwork does not mention how the patient fell  - Will monitor on telemetry for now, trend troponin  - cars fu     Problem/Plan - 3:·  Problem: DAVID (acute kidney injury). ·  Plan: - New DAVID likely 2/2 severe hypercalcemia -> c/w IVF as above- Monitor I/O  - Trend BUN/Cr    Problem/Plan - 4:  ·  Problem: Toxic metabolic encephalopathy.   ·  Plan: - Baseline oriented to self and place, currently oriented to self but cannot state where he is, unable to state what the current month/year is, unable to give significant HPI or ROS (seems to be the baseline when compared to his prior admission)  - Likely has some encephalopathy 2/2 severe hypercalcemia -> c/w management as above  - c/w dementia management as below.    Problem/Plan - 5:  ·  Problem: Multiple myeloma.   ·  Plan: - Heme CONSULT  monitor cbc  - transfuse 1 unit today     Problem/Plan - 6:  ·  Problem: Essential hypertension.   ·  Plan: - Not on medications, BPs currently well controlled  - Monitor BPs.    Problem/Plan - 7:  ·  Problem: Dementia.   ·  Plan: - c/w donepezil, memantine  - c/w risperdal, sertraline, and seroquel as per outpatient medications.

## 2024-01-30 NOTE — PROGRESS NOTE ADULT - ASSESSMENT
78M with history of MM, HTN, and Dementia (AAO x 1-2 to self, to place, not to time at baseline per brother) who presents to the hospital from [facility] after a fall. Patient is a poor historian 2/2 dementia, unable to state what happened. As per paperwork from the NH, patient had a fall and was noted to have a hematoma on the occipital head and was sent to the hospital for evaluation. Here imaging were negative for fractures but his work up showed him to have a significantly elevated calcium of 14 (corrected to 15.1) with DAVID and a significantly elevated protein gap.  Nephrology consulted for DAVID and hypercalemia.    A/P:  DAVID:  Baseline S.Cr 0.7-0.9.  S.Cr 1.39 on admission.  Likely pre-renal in setting of dehydration/hypercalcemia.  Change iVF to NS at 75cc/hr   CK 64 - DAVID unlikely sec. to rhabdo.  UA + proteinuria; no blood.  FeNa 1.1% - suggests ATN.  S.Cr improving  Monitor BMP and UO.    HTN:  Controlled.  Avoid hypotension.  Monitor BP.    Hypercalcemia:  Likely sec to MM.  PTH low, suppressed by high Ca.  Total protein high.  Vit. D 25OH 46.4.  S/p Zometa 4mg x1 dose and calcitonin 200IU q12hrs x2 doses.  Monitor Ca.    Acidosis:  Hyperchloremic acidosis.  Improving  Monitor CO2.    Anemia:  Likely sec to MM vs. other etiology.  Workup per primary team.  Consider Heme/onc evaluation    Monitor Hgb.  Transfuse for Hgb <8.    Hypophosphatemia:  Likely sec. to poor PO intake.  Replete w/ Kphos 30mmol today  Monitor PO4 daily.    Hypokalemia:   Likely sec to poor PO intake  replete w/ KCL as needed  monitor     Hypomagnesemia:  Likely sec to poor PO intake.  Repleted as needed   Monitor Mg.    Proteinuria:  Possibly sec to MM.  Monitor.

## 2024-01-30 NOTE — PROGRESS NOTE ADULT - SUBJECTIVE AND OBJECTIVE BOX
Date of Service: 01-30-24 @ 11:28    Patient is a 78y old  Male who presents with a chief complaint of Fall, elevated calcium (29 Jan 2024 14:34)      Any change in ROS:  doing  ok ; no sob:  no cough : no phlegm      MEDICATIONS  (STANDING):  cyanocobalamin 1000 MICROGram(s) Oral daily  donepezil 10 milliGRAM(s) Oral at bedtime  folic acid 1 milliGRAM(s) Oral daily  heparin   Injectable 5000 Unit(s) SubCutaneous every 8 hours  memantine 10 milliGRAM(s) Oral two times a day  risperiDONE   Tablet 0.25 milliGRAM(s) Oral daily  risperiDONE   Tablet 0.5 milliGRAM(s) Oral at bedtime  sertraline 50 milliGRAM(s) Oral daily  sodium bicarbonate  Infusion 0.069 mEq/kG/Hr (75 mL/Hr) IV Continuous <Continuous>    MEDICATIONS  (PRN):  acetaminophen     Tablet .. 650 milliGRAM(s) Oral every 6 hours PRN Temp greater or equal to 38C (100.4F), Mild Pain (1 - 3)  aluminum hydroxide/magnesium hydroxide/simethicone Suspension 30 milliLiter(s) Oral every 4 hours PRN Dyspepsia  melatonin 3 milliGRAM(s) Oral at bedtime PRN Insomnia  ondansetron Injectable 4 milliGRAM(s) IV Push every 8 hours PRN Nausea and/or Vomiting  QUEtiapine 12.5 milliGRAM(s) Oral every 6 hours PRN for agitation    Vital Signs Last 24 Hrs  T(C): 36.8 (30 Jan 2024 05:23), Max: 36.8 (29 Jan 2024 20:01)  T(F): 98.2 (30 Jan 2024 05:23), Max: 98.2 (29 Jan 2024 20:01)  HR: 83 (30 Jan 2024 05:23) (83 - 84)  BP: 138/71 (30 Jan 2024 05:23) (120/69 - 138/71)  BP(mean): --  RR: 18 (30 Jan 2024 05:23) (17 - 18)  SpO2: 97% (30 Jan 2024 05:23) (96% - 97%)    Parameters below as of 30 Jan 2024 05:23  Patient On (Oxygen Delivery Method): room air        I&O's Summary    29 Jan 2024 07:01  -  30 Jan 2024 07:00  --------------------------------------------------------  IN: 1100 mL / OUT: 1550 mL / NET: -450 mL          Physical Exam:   GENERAL: NAD, well-groomed, well-developed  HEENT: CHRISTINA/   Atraumatic, Normocephalic  ENMT: No tonsillar erythema, exudates, or enlargement; Moist mucous membranes, Good dentition, No lesions  NECK: Supple, No JVD, Normal thyroid  CHEST/LUNG: Clear to auscultaion-no wheezing  CVS: Regular rate and rhythm; No murmurs, rubs, or gallops  GI: : Soft, Nontender, Nondistended; Bowel sounds present  NERVOUS SYSTEM:  Alert & Oriented X3  EXTREMITIES: - edema  LYMPH: No lymphadenopathy noted  SKIN: No rashes or lesions  ENDOCRINOLOGY: No Thyromegaly  PSYCH: Appropriate    Labs:                              7.7    3.62  )-----------( 201      ( 30 Jan 2024 07:14 )             23.4                         8.0    5.12  )-----------( 190      ( 29 Jan 2024 18:27 )             24.0                         6.7    5.07  )-----------( 193      ( 29 Jan 2024 10:44 )             19.9                         6.7    5.59  )-----------( 205      ( 29 Jan 2024 05:33 )             20.0                         7.5    7.74  )-----------( 234      ( 28 Jan 2024 06:54 )             22.8                         8.4    10.02 )-----------( 253      ( 26 Jan 2024 17:35 )             25.2     01-30    142  |  115<H>  |  14  ----------------------------<  111<H>  3.6   |  23  |  0.83  01-29    143  |  114<H>  |  18  ----------------------------<  122<H>  3.7   |  22  |  0.90  01-29    141  |  112<H>  |  17  ----------------------------<  101<H>  3.2<L>   |  23  |  0.93  01-29    143  |  112<H>  |  18  ----------------------------<  94  2.8<LL>   |  22  |  0.91  01-28    139  |  111<H>  |  25<H>  ----------------------------<  121<H>  3.5   |  21<L>  |  1.00  01-27    140  |  110<H>  |  32<H>  ----------------------------<  89  3.5   |  26  |  1.19  01-26    137  |  107  |  37<H>  ----------------------------<  97  4.1   |  24  |  1.33<H>  01-26    137  |  105  |  40<H>  ----------------------------<  107<H>  3.9   |  28  |  1.39<H>    Ca    8.3<L>      30 Jan 2024 07:14  Ca    8.9      29 Jan 2024 18:27  Ca    8.9      29 Jan 2024 10:44  Ca    9.0      29 Jan 2024 05:33  Phos  1.3     01-30  Phos  1.5     01-29  Phos  1.4     01-29  Mg     1.70     01-30  Mg     1.80     01-29  Mg     1.90     01-29  Mg     1.40     01-29    TPro  9.7<H>  /  Alb  2.0<L>  /  TBili  0.4  /  DBili  x   /  AST  18  /  ALT  12  /  AlkPhos  52  01-30  TPro  9.7<H>  /  Alb  1.9<L>  /  TBili  0.4  /  DBili  x   /  AST  20  /  ALT  10  /  AlkPhos  55  01-29  TPro  9.8<H>  /  Alb  2.0<L>  /  TBili  0.4  /  DBili  x   /  AST  20  /  ALT  9   /  AlkPhos  57  01-29  TPro  10.6<H>  /  Alb  2.2<L>  /  TBili  0.5  /  DBili  x   /  AST  20  /  ALT  12  /  AlkPhos  68  01-28  TPro  11.3<H>  /  Alb  2.2<L>  /  TBili  0.5  /  DBili  x   /  AST  17  /  ALT  14  /  AlkPhos  74  01-27  TPro  11.4<H>  /  Alb  2.4<L>  /  TBili  0.5  /  DBili  x   /  AST  15  /  ALT  13  /  AlkPhos  78  01-26    CAPILLARY BLOOD GLUCOSE          LIVER FUNCTIONS - ( 30 Jan 2024 07:14 )  Alb: 2.0 g/dL / Pro: 9.7 g/dL / ALK PHOS: 52 U/L / ALT: 12 U/L / AST: 18 U/L / GGT: x             Urinalysis Basic - ( 30 Jan 2024 07:14 )    Color: x / Appearance: x / SG: x / pH: x  Gluc: 111 mg/dL / Ketone: x  / Bili: x / Urobili: x   Blood: x / Protein: x / Nitrite: x   Leuk Esterase: x / RBC: x / WBC x   Sq Epi: x / Non Sq Epi: x / Bacteria: x      rad< from: CT Head No Cont (01.26.24 @ 20:21) >  There is no extraaxial fluid collection.    There is no displaced calvarial fracture. The visualized orbits are   within normal limits. The visualized portions of the paranasal sinuses   are well aerated. The mastoid air cells are well aerated.      CT cervical spine:    Cervical alignment is maintained. Cervical vertebral body heights are   within normal limits. Mild age-indeterminate compression deformities at   the superior endplates of T1 and T2, without bony retropulsion.   Multilevel intervertebral disc height loss,disc osteophyte complexes,   and bilateral facet and uncovertebral arthropathy.    There is no prevertebral soft tissue swelling. The paraspinal soft   tissues are unremarkable. The lung apices are clear.      IMPRESSION:    CT head: No acute intracranial hemorrhage or mass effect.    CT cervical spine:  No evidence for acute displaced fracture or malalignment of the cervical   spine.  Mild age-indeterminate compression deformities at the superior endplates   of T1 and T2, without bony retropulsion.    < end of copied text >  < from: Xray Chest 1 View- PORTABLE-Urgent (Xray Chest 1 View- PORTABLE-Urgent .) (01.26.24 @ 17:35) >    INTERPRETATION:  EXAMINATION: XR CHEST URGENT    CLINICAL INDICATION: fall    TECHNIQUE: Single frontal portable view of the chest from 1/26/2024 5:35   PM    COMPARISON: Chest x-ray 1/2/2024.    FINDINGS:  Sternotomy wires.  The heart size is not accurately assessed on this projection.  Hazy left lung base opacity with low lung volumes, likely represents   atelectasis.  There is no pneumothorax or pleural effusion.    IMPRESSION:  No focal consolidations.  No acute traumatic findings.    --- End of Report ---          TOMAS MASSEY MD; Resident Radiologist  This document has been electronically signed.  KOFI GARCIA MD; Attending Radiologist  This document has been electronically signed. Jan 26 2024  6:15PM    < end of copied text >        RECENT CULTURES:        RESPIRATORY CULTURES:          Studies  Chest X-RAY  CT SCAN Chest   Venous Dopplers: LE:   CT Abdomen  Others

## 2024-01-31 LAB
ALBUMIN SERPL ELPH-MCNC: 1.8 G/DL — LOW (ref 3.3–5)
ALP SERPL-CCNC: 51 U/L — SIGNIFICANT CHANGE UP (ref 40–120)
ALT FLD-CCNC: 8 U/L — SIGNIFICANT CHANGE UP (ref 4–41)
ANION GAP SERPL CALC-SCNC: 7 MMOL/L — SIGNIFICANT CHANGE UP (ref 7–14)
AST SERPL-CCNC: 13 U/L — SIGNIFICANT CHANGE UP (ref 4–40)
BASOPHILS # BLD AUTO: 0.02 K/UL — SIGNIFICANT CHANGE UP (ref 0–0.2)
BASOPHILS NFR BLD AUTO: 0.4 % — SIGNIFICANT CHANGE UP (ref 0–2)
BILIRUB SERPL-MCNC: 0.2 MG/DL — SIGNIFICANT CHANGE UP (ref 0.2–1.2)
BUN SERPL-MCNC: 14 MG/DL — SIGNIFICANT CHANGE UP (ref 7–23)
CALCIUM SERPL-MCNC: 7.8 MG/DL — LOW (ref 8.4–10.5)
CHLORIDE SERPL-SCNC: 116 MMOL/L — HIGH (ref 98–107)
CO2 SERPL-SCNC: 19 MMOL/L — LOW (ref 22–31)
CREAT SERPL-MCNC: 0.74 MG/DL — SIGNIFICANT CHANGE UP (ref 0.5–1.3)
EGFR: 93 ML/MIN/1.73M2 — SIGNIFICANT CHANGE UP
EOSINOPHIL # BLD AUTO: 0.19 K/UL — SIGNIFICANT CHANGE UP (ref 0–0.5)
EOSINOPHIL NFR BLD AUTO: 3.4 % — SIGNIFICANT CHANGE UP (ref 0–6)
GLUCOSE SERPL-MCNC: 95 MG/DL — SIGNIFICANT CHANGE UP (ref 70–99)
HCT VFR BLD CALC: 22.5 % — LOW (ref 39–50)
HGB BLD-MCNC: 7.5 G/DL — LOW (ref 13–17)
IANC: 4.02 K/UL — SIGNIFICANT CHANGE UP (ref 1.8–7.4)
IMM GRANULOCYTES NFR BLD AUTO: 0.2 % — SIGNIFICANT CHANGE UP (ref 0–0.9)
LYMPHOCYTES # BLD AUTO: 0.74 K/UL — LOW (ref 1–3.3)
LYMPHOCYTES # BLD AUTO: 13.2 % — SIGNIFICANT CHANGE UP (ref 13–44)
MCHC RBC-ENTMCNC: 33.3 GM/DL — SIGNIFICANT CHANGE UP (ref 32–36)
MCHC RBC-ENTMCNC: 33.3 PG — SIGNIFICANT CHANGE UP (ref 27–34)
MCV RBC AUTO: 100 FL — SIGNIFICANT CHANGE UP (ref 80–100)
MONOCYTES # BLD AUTO: 0.61 K/UL — SIGNIFICANT CHANGE UP (ref 0–0.9)
MONOCYTES NFR BLD AUTO: 10.9 % — SIGNIFICANT CHANGE UP (ref 2–14)
NEUTROPHILS # BLD AUTO: 4.02 K/UL — SIGNIFICANT CHANGE UP (ref 1.8–7.4)
NEUTROPHILS NFR BLD AUTO: 71.9 % — SIGNIFICANT CHANGE UP (ref 43–77)
NRBC # BLD: 0 /100 WBCS — SIGNIFICANT CHANGE UP (ref 0–0)
NRBC # FLD: 0 K/UL — SIGNIFICANT CHANGE UP (ref 0–0)
PLATELET # BLD AUTO: 193 K/UL — SIGNIFICANT CHANGE UP (ref 150–400)
POTASSIUM SERPL-MCNC: 3.1 MMOL/L — LOW (ref 3.5–5.3)
POTASSIUM SERPL-SCNC: 3.1 MMOL/L — LOW (ref 3.5–5.3)
PROT SERPL-MCNC: 9.5 G/DL — HIGH (ref 6–8.3)
RBC # BLD: 2.25 M/UL — LOW (ref 4.2–5.8)
RBC # FLD: 17.1 % — HIGH (ref 10.3–14.5)
SODIUM SERPL-SCNC: 142 MMOL/L — SIGNIFICANT CHANGE UP (ref 135–145)
WBC # BLD: 5.59 K/UL — SIGNIFICANT CHANGE UP (ref 3.8–10.5)
WBC # FLD AUTO: 5.59 K/UL — SIGNIFICANT CHANGE UP (ref 3.8–10.5)

## 2024-01-31 RX ORDER — SODIUM CHLORIDE 9 MG/ML
1000 INJECTION, SOLUTION INTRAVENOUS
Refills: 0 | Status: COMPLETED | OUTPATIENT
Start: 2024-01-31 | End: 2024-02-01

## 2024-01-31 RX ORDER — POTASSIUM PHOSPHATE, MONOBASIC POTASSIUM PHOSPHATE, DIBASIC 236; 224 MG/ML; MG/ML
30 INJECTION, SOLUTION INTRAVENOUS ONCE
Refills: 0 | Status: COMPLETED | OUTPATIENT
Start: 2024-01-31 | End: 2024-01-31

## 2024-01-31 RX ORDER — POTASSIUM CHLORIDE 20 MEQ
40 PACKET (EA) ORAL EVERY 4 HOURS
Refills: 0 | Status: COMPLETED | OUTPATIENT
Start: 2024-01-31 | End: 2024-01-31

## 2024-01-31 RX ADMIN — SERTRALINE 50 MILLIGRAM(S): 25 TABLET, FILM COATED ORAL at 13:08

## 2024-01-31 RX ADMIN — POTASSIUM PHOSPHATE, MONOBASIC POTASSIUM PHOSPHATE, DIBASIC 83.33 MILLIMOLE(S): 236; 224 INJECTION, SOLUTION INTRAVENOUS at 12:00

## 2024-01-31 RX ADMIN — DONEPEZIL HYDROCHLORIDE 10 MILLIGRAM(S): 10 TABLET, FILM COATED ORAL at 21:30

## 2024-01-31 RX ADMIN — Medication 40 MILLIEQUIVALENT(S): at 17:31

## 2024-01-31 RX ADMIN — MEMANTINE HYDROCHLORIDE 10 MILLIGRAM(S): 10 TABLET ORAL at 17:31

## 2024-01-31 RX ADMIN — HEPARIN SODIUM 5000 UNIT(S): 5000 INJECTION INTRAVENOUS; SUBCUTANEOUS at 13:08

## 2024-01-31 RX ADMIN — MEMANTINE HYDROCHLORIDE 10 MILLIGRAM(S): 10 TABLET ORAL at 04:49

## 2024-01-31 RX ADMIN — RISPERIDONE 0.25 MILLIGRAM(S): 4 TABLET ORAL at 13:08

## 2024-01-31 RX ADMIN — HEPARIN SODIUM 5000 UNIT(S): 5000 INJECTION INTRAVENOUS; SUBCUTANEOUS at 21:28

## 2024-01-31 RX ADMIN — PREGABALIN 1000 MICROGRAM(S): 225 CAPSULE ORAL at 13:08

## 2024-01-31 RX ADMIN — RISPERIDONE 0.5 MILLIGRAM(S): 4 TABLET ORAL at 21:31

## 2024-01-31 RX ADMIN — SODIUM CHLORIDE 50 MILLILITER(S): 9 INJECTION, SOLUTION INTRAVENOUS at 12:01

## 2024-01-31 RX ADMIN — HEPARIN SODIUM 5000 UNIT(S): 5000 INJECTION INTRAVENOUS; SUBCUTANEOUS at 04:49

## 2024-01-31 RX ADMIN — Medication 3 MILLIGRAM(S): at 21:32

## 2024-01-31 RX ADMIN — Medication 40 MILLIEQUIVALENT(S): at 13:08

## 2024-01-31 RX ADMIN — Medication 1 MILLIGRAM(S): at 13:08

## 2024-01-31 NOTE — CONSULT NOTE ADULT - ASSESSMENT
This is a 78M with history of MM, HTN, and Dementia (AAO x 1-2 to self, to place, not to time at baseline per brother) who presents to the hospital from [facility] after a fall.    MM  - history of MM   - from last note seen by heme onc in 7/2023 was noted to be on Revlemid   - attempted to reach patient brother for more information but did not respond   - please obtain immunofixation, Ig levels Free light chains   - hypocalcemia treated     rest of care per primary team     Gordy Ruiz MD  HematologyOncology   O: [phone]

## 2024-01-31 NOTE — PROGRESS NOTE ADULT - SUBJECTIVE AND OBJECTIVE BOX
DATE OF SERVICE: 01-31-24    Patient denies chest pain or shortness of breath.   Review of symptoms otherwise negative.    MEDICATIONS:  acetaminophen     Tablet .. 650 milliGRAM(s) Oral every 6 hours PRN  aluminum hydroxide/magnesium hydroxide/simethicone Suspension 30 milliLiter(s) Oral every 4 hours PRN  cyanocobalamin 1000 MICROGram(s) Oral daily  donepezil 10 milliGRAM(s) Oral at bedtime  folic acid 1 milliGRAM(s) Oral daily  heparin   Injectable 5000 Unit(s) SubCutaneous every 8 hours  melatonin 3 milliGRAM(s) Oral at bedtime PRN  memantine 10 milliGRAM(s) Oral two times a day  ondansetron Injectable 4 milliGRAM(s) IV Push every 8 hours PRN  QUEtiapine 12.5 milliGRAM(s) Oral every 6 hours PRN  risperiDONE   Tablet 0.5 milliGRAM(s) Oral at bedtime  risperiDONE   Tablet 0.25 milliGRAM(s) Oral daily  sertraline 50 milliGRAM(s) Oral daily  sodium chloride 0.9%. 1000 milliLiter(s) IV Continuous <Continuous>      LABS:                        7.5    5.59  )-----------( 193      ( 31 Jan 2024 07:00 )             22.5       Hemoglobin: 7.5 g/dL (01-31 @ 07:00)  Hemoglobin: 7.7 g/dL (01-30 @ 07:14)  Hemoglobin: 8.0 g/dL (01-29 @ 18:27)  Hemoglobin: 6.7 g/dL (01-29 @ 10:44)  Hemoglobin: 6.7 g/dL (01-29 @ 05:33)      01-31    142  |  116<H>  |  14  ----------------------------<  95  3.1<L>   |  19<L>  |  0.74    Ca    7.8<L>      31 Jan 2024 07:00  Phos  1.3     01-30  Mg     1.70     01-30    TPro  9.5<H>  /  Alb  1.8<L>  /  TBili  0.2  /  DBili  x   /  AST  13  /  ALT  8   /  AlkPhos  51  01-31  Creatinine Trend: 0.74<--, 0.83<--, 0.90<--, 0.93<--, 0.91<--, 1.00<--    PHYSICAL EXAM:  T(C): 37.4 (01-31-24 @ 04:48), Max: 37.4 (01-31-24 @ 04:48)  HR: 68 (01-31-24 @ 04:48) (68 - 78)  BP: 123/69 (01-31-24 @ 04:48) (106/60 - 138/71)  RR: 17 (01-31-24 @ 04:48) (17 - 18)  SpO2: 98% (01-31-24 @ 04:48) (97% - 98%)  Wt(kg): --    I&O's Summary    30 Jan 2024 07:01  -  31 Jan 2024 07:00  --------------------------------------------------------  IN: 500 mL / OUT: 0 mL / NET: 500 mL      General:  NAD  Head: Normocephalic and atraumatic.   Neck: No JVD. No bruits. Supple. Does not appear to be enlarged.   Cardiovascular: + S1,S2 ; RRR Soft systolic murmur at the left lower sternal border. No rubs noted.    Lungs: CTA b/l. No rhonchi, rales or wheezes.   Abdomen: + BS, soft. Non tender. Non distended. No rebound. No guarding.   Extremities: No clubbing/cyanosis/edema.   Neurologic: Moves all four extremities. Full range of motion.   Skin: Warm and moist. The patient's skin has normal elasticity and good skin turgor.   Psychiatric: Appropriate mood and affect.  Musculoskeletal: Normal range of motion, normal strength     TELEMETRY: 	  NSR    ECG:  	NSR    < from: Transthoracic Echocardiogram (07.10.23 @ 11:15) >  CONCLUSIONS:  1. Calcified trileaflet aortic valve with normal opening.  Minimal aortic regurgitation.  2. Endocardium not well visualized; grossly decreased  possibly mild left ventricular systolic dysfunction.  3. The right ventricle is not well visualized; grossly  normal right ventricular systolic function.  ------------------------------------------------------------------------  Confirmed on  7/10/2023 - 13:57:46 by Jolene Rowan M.D. RPVI  < end of copied text >      ASSESSMENT/PLAN: This is a 78M with history of MM, HTN, and Dementia (AAO x 1-2 to self, to place, not to time at baseline per brother) who presents to the hospital from [facility] after a fall.     Patient is a poor historian 2/2 dementia. As per paperwork from the NH, patient had a fall and was noted to have a hematoma on the occipital head and was sent to the hospital for evaluation. Here imaging were negative for fractures but his work up showed him to have a significantly elevated calcium of 14 (corrected to 15.1) with DAVID and a significantly elevated protein gap. He was given NS 1.5L and repeat BMP showed persistence of the calcium elevation though improving. He was admitted to medicine on telemetry for further evaluation.  Recent admission for influenza, where his lopressor and norvasc were discontinued.    1. HTN/h/o MVR  - holding lopressor and amlodipine, BP stable  - agree with fluids for hypercalcemia  - no EKG changes associated with hypercalcemia  - TTE from last year with mildly decreased LVEF  - s/p PRBC    Staci Muñiz MD  Pager: [phone]

## 2024-01-31 NOTE — CONSULT NOTE ADULT - SUBJECTIVE AND OBJECTIVE BOX
Reason for consult: MM    HPI:  This is a 78M with history of MM, HTN, and Dementia (AAO x 1-2 to self, to place, not to time at baseline per brother) who presents to the hospital from [facility] after a fall. Patient is a poor historian 2/2 dementia, unable to state what happened. As per paperwork from the NH, patient had a fall and was noted to have a hematoma on the occipital head and was sent to the hospital for evaluation. Here imaging were negative for fractures but his work up showed him to have a significantly elevated calcium of 14 (corrected to 15.1) with DAVID and a significantly elevated protein gap. He was given NS 1.5L and repeat BMP showed persistence of the calcium elevation though improving. He was admitted to medicine on telemetry for further evaluation.  (26 Jan 2024 23:03)    hx obtained from charts   PAST MEDICAL & SURGICAL HISTORY:  Dementia      Multiple myeloma      Essential hypertension      S/P MVR (mitral valve repair)      H/O thoracic aortic aneurysm repair          FAMILY HISTORY:  No pertinent family history in first degree relatives        Alochol: Denied  Smoking: Nonsmoker  Drug Use: Denied  Marital Status:         Allergies    No Known Allergies    Intolerances        MEDICATIONS  (STANDING):  cyanocobalamin 1000 MICROGram(s) Oral daily  donepezil 10 milliGRAM(s) Oral at bedtime  folic acid 1 milliGRAM(s) Oral daily  heparin   Injectable 5000 Unit(s) SubCutaneous every 8 hours  memantine 10 milliGRAM(s) Oral two times a day  potassium chloride   Powder 40 milliEquivalent(s) Oral every 4 hours  risperiDONE   Tablet 0.25 milliGRAM(s) Oral daily  risperiDONE   Tablet 0.5 milliGRAM(s) Oral at bedtime  sertraline 50 milliGRAM(s) Oral daily  sodium chloride 0.45% 1000 milliLiter(s) (50 mL/Hr) IV Continuous <Continuous>    MEDICATIONS  (PRN):  acetaminophen     Tablet .. 650 milliGRAM(s) Oral every 6 hours PRN Temp greater or equal to 38C (100.4F), Mild Pain (1 - 3)  aluminum hydroxide/magnesium hydroxide/simethicone Suspension 30 milliLiter(s) Oral every 4 hours PRN Dyspepsia  melatonin 3 milliGRAM(s) Oral at bedtime PRN Insomnia  ondansetron Injectable 4 milliGRAM(s) IV Push every 8 hours PRN Nausea and/or Vomiting  QUEtiapine 12.5 milliGRAM(s) Oral every 6 hours PRN for agitation      ROS:     Unable to respond appropriately     PHYSICAL EXAM:     GENERAL:  Appears stated age, well-groomed  ABDOMEN:  Soft, non-tender, non-distended  EXTEREMITIES:  no cyanosis,clubbing or edema  LN: no palpable Lymphadenopathy                           7.5    5.59  )-----------( 193      ( 31 Jan 2024 07:00 )             22.5       01-31    142  |  116<H>  |  14  ----------------------------<  95  3.1<L>   |  19<L>  |  0.74    Ca    7.8<L>      31 Jan 2024 07:00  Phos  1.3     01-30  Mg     1.70     01-30    TPro  9.5<H>  /  Alb  1.8<L>  /  TBili  0.2  /  DBili  x   /  AST  13  /  ALT  8   /  AlkPhos  51  01-31

## 2024-01-31 NOTE — PROGRESS NOTE ADULT - ASSESSMENT
78M with history of MM, HTN, and Dementia (AAO x 1-2 to self, to place, not to time at baseline per brother) who presents to the hospital from [facility] after a fall. Patient is a poor historian 2/2 dementia, unable to state what happened. As per paperwork from the NH, patient had a fall and was noted to have a hematoma on the occipital head and was sent to the hospital for evaluation. Here imaging were negative for fractures but his work up showed him to have a significantly elevated calcium of 14 (corrected to 15.1) with DAVID and a significantly elevated protein gap.  Nephrology consulted for DAVID and hypercalemia.    A/P:  DAVID:  Baseline S.Cr 0.7-0.9.  S.Cr 1.39 on admission.  Likely pre-renal in setting of dehydration/hypercalcemia.  Change iVF to 1/2 NS 1/2 NaHc03 20meq KCL at 50cc/hr   CK 64 - DAVID unlikely sec. to rhabdo.  UA + proteinuria; no blood.  FeNa 1.1% - suggests ATN.  S.Cr improving  Monitor BMP and UO.    HTN:  Controlled.  Avoid hypotension.  Monitor BP.    Hypercalcemia:  Likely sec to MM.  PTH low, suppressed by high Ca.  Total protein high.  Vit. D 25OH 46.4.  S/p Zometa 4mg x1 dose and calcitonin 200IU q12hrs x2 doses.  Monitor Ca.    Acidosis:  Hyperchloremic acidosis.  Improving  Monitor CO2.    Anemia:  Likely sec to MM vs. other etiology.  Workup per primary team.  Consider Heme/onc evaluation    Monitor Hgb.  Transfuse for Hgb <8.    Hypophosphatemia:  Likely sec. to poor PO intake.  Replete w/ Kphos 30mmol today  Monitor PO4 daily.    Hypokalemia:   Likely sec to poor PO intake  replete w/ KCL as needed  monitor     Hypomagnesemia:  Likely sec to poor PO intake.  Repleted as needed   Monitor Mg.    Proteinuria:  Possibly sec to MM.  Monitor.

## 2024-01-31 NOTE — PROGRESS NOTE ADULT - SUBJECTIVE AND OBJECTIVE BOX
[facility]   MD ANEL FOLEY MD ANGELA WONG, PA Venitha Krishnan, NP    TEL:  OFFICE: [phone]  From 5pm-7am Answering Service [phone]    -- RENAL FOLLOW UP NOTE ---Date of Service 01-31-24 @ 15:14    Patient is a 78y old  Male who presents with a chief complaint of Fall, elevated calcium (31 Jan 2024 14:44)      Patient seen and examined at bedside. in no apparent distress.     VITALS:  T(F): 98.6 (01-31-24 @ 13:00), Max: 99.4 (01-31-24 @ 04:48)  HR: 77 (01-31-24 @ 13:00)  BP: 106/76 (01-31-24 @ 13:00)  RR: 17 (01-31-24 @ 13:00)  SpO2: 98% (01-31-24 @ 13:00)  Wt(kg): --    01-30 @ 07:01  -  01-31 @ 07:00  --------------------------------------------------------  IN: 500 mL / OUT: 0 mL / NET: 500 mL          PHYSICAL EXAM:  General: NAD  Neck: No JVD  Respiratory: CTAB, no wheezes, rales or rhonchi  Cardiovascular: S1, S2, RRR  Gastrointestinal: BS+, soft, NT/ND  Extremities: No peripheral edema    Hospital Medications:   MEDICATIONS  (STANDING):  cyanocobalamin 1000 MICROGram(s) Oral daily  donepezil 10 milliGRAM(s) Oral at bedtime  folic acid 1 milliGRAM(s) Oral daily  heparin   Injectable 5000 Unit(s) SubCutaneous every 8 hours  memantine 10 milliGRAM(s) Oral two times a day  potassium chloride   Powder 40 milliEquivalent(s) Oral every 4 hours  risperiDONE   Tablet 0.25 milliGRAM(s) Oral daily  risperiDONE   Tablet 0.5 milliGRAM(s) Oral at bedtime  sertraline 50 milliGRAM(s) Oral daily  sodium chloride 0.45% 1000 milliLiter(s) (50 mL/Hr) IV Continuous <Continuous>      LABS:  01-31    142  |  116<H>  |  14  ----------------------------<  95  3.1<L>   |  19<L>  |  0.74    Ca    7.8<L>      31 Jan 2024 07:00  Phos  1.3     01-30  Mg     1.70     01-30    TPro  9.5<H>  /  Alb  1.8<L>  /  TBili  0.2  /  DBili      /  AST  13  /  ALT  8   /  AlkPhos  51  01-31    Creatinine Trend: 0.74 <--, 0.83 <--, 0.90 <--, 0.93 <--, 0.91 <--, 1.00 <--, 1.19 <--, 1.33 <--, 1.39 <--    Albumin: 1.8 g/dL (01-31 @ 07:00)                              7.5    5.59  )-----------( 193      ( 31 Jan 2024 07:00 )             22.5     Urine Studies:  Urinalysis - [01-31-24 @ 07:00]      Color  / Appearance  / SG  / pH       Gluc 95 / Ketone   / Bili  / Urobili        Blood  / Protein  / Leuk Est  / Nitrite       RBC  / WBC  / Hyaline  / Gran  / Sq Epi  / Non Sq Epi  / Bacteria     Urine Creatinine 57      [01-28-24 @ 05:38]  Urine Sodium 87      [01-28-24 @ 05:38]    PTH -- (Ca --)      [01-27-24 @ 10:56]   8  PTH -- (Ca --)      [01-14-24 @ 06:50]   5  PTH -- (Ca --)      [01-13-24 @ 06:10]   6  Vitamin D (25OH) 46.4      [01-27-24 @ 10:56]        RADIOLOGY & ADDITIONAL STUDIES:

## 2024-01-31 NOTE — PROGRESS NOTE ADULT - SUBJECTIVE AND OBJECTIVE BOX
Patient is a 78y old  Male who presents with a chief complaint of Fall, elevated calcium (31 Jan 2024 12:47)    Date of servie : 01-31-24 @ 14:45  INTERVAL HPI/OVERNIGHT EVENTS:  T(C): 37 (01-31-24 @ 13:00), Max: 37.4 (01-31-24 @ 04:48)  HR: 77 (01-31-24 @ 13:00) (68 - 78)  BP: 106/76 (01-31-24 @ 13:00) (106/76 - 138/71)  RR: 17 (01-31-24 @ 13:00) (17 - 17)  SpO2: 98% (01-31-24 @ 13:00) (98% - 98%)  Wt(kg): --  I&O's Summary    30 Jan 2024 07:01  -  31 Jan 2024 07:00  --------------------------------------------------------  IN: 500 mL / OUT: 0 mL / NET: 500 mL        LABS:                        7.5    5.59  )-----------( 193      ( 31 Jan 2024 07:00 )             22.5     01-31    142  |  116<H>  |  14  ----------------------------<  95  3.1<L>   |  19<L>  |  0.74    Ca    7.8<L>      31 Jan 2024 07:00  Phos  1.3     01-30  Mg     1.70     01-30    TPro  9.5<H>  /  Alb  1.8<L>  /  TBili  0.2  /  DBili  x   /  AST  13  /  ALT  8   /  AlkPhos  51  01-31      Urinalysis Basic - ( 31 Jan 2024 07:00 )    Color: x / Appearance: x / SG: x / pH: x  Gluc: 95 mg/dL / Ketone: x  / Bili: x / Urobili: x   Blood: x / Protein: x / Nitrite: x   Leuk Esterase: x / RBC: x / WBC x   Sq Epi: x / Non Sq Epi: x / Bacteria: x      CAPILLARY BLOOD GLUCOSE            Urinalysis Basic - ( 31 Jan 2024 07:00 )    Color: x / Appearance: x / SG: x / pH: x  Gluc: 95 mg/dL / Ketone: x  / Bili: x / Urobili: x   Blood: x / Protein: x / Nitrite: x   Leuk Esterase: x / RBC: x / WBC x   Sq Epi: x / Non Sq Epi: x / Bacteria: x        MEDICATIONS  (STANDING):  cyanocobalamin 1000 MICROGram(s) Oral daily  donepezil 10 milliGRAM(s) Oral at bedtime  folic acid 1 milliGRAM(s) Oral daily  heparin   Injectable 5000 Unit(s) SubCutaneous every 8 hours  memantine 10 milliGRAM(s) Oral two times a day  potassium chloride   Powder 40 milliEquivalent(s) Oral every 4 hours  risperiDONE   Tablet 0.25 milliGRAM(s) Oral daily  risperiDONE   Tablet 0.5 milliGRAM(s) Oral at bedtime  sertraline 50 milliGRAM(s) Oral daily  sodium chloride 0.45% 1000 milliLiter(s) (50 mL/Hr) IV Continuous <Continuous>    MEDICATIONS  (PRN):  acetaminophen     Tablet .. 650 milliGRAM(s) Oral every 6 hours PRN Temp greater or equal to 38C (100.4F), Mild Pain (1 - 3)  aluminum hydroxide/magnesium hydroxide/simethicone Suspension 30 milliLiter(s) Oral every 4 hours PRN Dyspepsia  melatonin 3 milliGRAM(s) Oral at bedtime PRN Insomnia  ondansetron Injectable 4 milliGRAM(s) IV Push every 8 hours PRN Nausea and/or Vomiting  QUEtiapine 12.5 milliGRAM(s) Oral every 6 hours PRN for agitation          PHYSICAL EXAM:  GENERAL: NAD, well-groomed, well-developed  HEAD:  Atraumatic, Normocephalic  CHEST/LUNG: Clear to percussion bilaterally; No rales, rhonchi, wheezing, or rubs  HEART: Regular rate and rhythm; No murmurs, rubs, or gallops  ABDOMEN: Soft, Nontender, Nondistended; Bowel sounds present  EXTREMITIES:  2+ Peripheral Pulses, No clubbing, cyanosis, or edema  LYMPH: No lymphadenopathy noted  SKIN: No rashes or lesions    Care Discussed with Consultants/Other Providers [ x] YES  [ ] NO

## 2024-01-31 NOTE — PROGRESS NOTE ADULT - ASSESSMENT
This is a 78M with history of MM, HTN, and Dementia (AAO x 1-2 to self, to place, not to time at baseline per brother) who presents to the hospital from [facility] after a fall. Patient is a poor historian 2/2 dementia, unable to state what happened. As per paperwork from the NH, patient had a fall and was noted to have a hematoma on the occipital head and was sent to the hospital for evaluation. Here imaging were negative for fractures but his work up showed him to have a significantly elevated calcium of 14 (corrected to 15.1) with DAVID and a significantly elevated protein gap. He was given NS 1.5L and repeat BMP showed persistence of the calcium elevation though improving. He was admitted to medicine on telemetry for further evaluation.  (26 Jan 2024 23:03)   he can not tell me why is he here and how is his breathing:    he is on room air:  slightly agitated: He was also recently admitted to hospital when he had influenza:   no now cough reported and has no fever: :    s/p fall:  HTN  Multiple Myeloma  Dementia     s/p fall:  -he seems to be doing  ok :  -he is alert and awake but demented:   -on room air   -cxr is normal :"  -ct head with no bleeding noted:   -aspiration precautions  1/28: seems same:  no change in his resp symptoms not in any resp distress:  on room air:  cont to monitor  : ct head is with no ICB   1/29: he seems OK: no sob:  no cough : no phlegm : o n room air  1/30: seems OK: no sob:    1/31: seems comfortable:  babbles words:  no sob:  no phlegm or wheezing:  on room air:   HTN  -controlled  Multiple Myeloma  -per primary team   1/29: hb is pretty low today  :? blood transfusion  today   1/30 ; s/p o ne blood transfusion  yesterday : but h is hb is low:  still:     1/31: still anemic:  defer to primary team  Dementia   -supportive care    dvt prophylaxis    dw ACP

## 2024-01-31 NOTE — PROGRESS NOTE ADULT - SUBJECTIVE AND OBJECTIVE BOX
Patient is a 78y old  Male who presents with a chief complaint of Fall, elevated calcium (31 Jan 2024 15:13)    Date of servie : 01-31-24 @ 16:25  INTERVAL HPI/OVERNIGHT EVENTS:  T(C): 37 (01-31-24 @ 13:00), Max: 37.4 (01-31-24 @ 04:48)  HR: 77 (01-31-24 @ 13:00) (68 - 78)  BP: 106/76 (01-31-24 @ 13:00) (106/76 - 138/71)  RR: 17 (01-31-24 @ 13:00) (17 - 17)  SpO2: 98% (01-31-24 @ 13:00) (98% - 98%)  Wt(kg): --  I&O's Summary    30 Jan 2024 07:01  -  31 Jan 2024 07:00  --------------------------------------------------------  IN: 500 mL / OUT: 0 mL / NET: 500 mL        LABS:                        7.5    5.59  )-----------( 193      ( 31 Jan 2024 07:00 )             22.5     01-31    142  |  116<H>  |  14  ----------------------------<  95  3.1<L>   |  19<L>  |  0.74    Ca    7.8<L>      31 Jan 2024 07:00  Phos  1.3     01-30  Mg     1.70     01-30    TPro  9.5<H>  /  Alb  1.8<L>  /  TBili  0.2  /  DBili  x   /  AST  13  /  ALT  8   /  AlkPhos  51  01-31      Urinalysis Basic - ( 31 Jan 2024 07:00 )    Color: x / Appearance: x / SG: x / pH: x  Gluc: 95 mg/dL / Ketone: x  / Bili: x / Urobili: x   Blood: x / Protein: x / Nitrite: x   Leuk Esterase: x / RBC: x / WBC x   Sq Epi: x / Non Sq Epi: x / Bacteria: x      CAPILLARY BLOOD GLUCOSE            Urinalysis Basic - ( 31 Jan 2024 07:00 )    Color: x / Appearance: x / SG: x / pH: x  Gluc: 95 mg/dL / Ketone: x  / Bili: x / Urobili: x   Blood: x / Protein: x / Nitrite: x   Leuk Esterase: x / RBC: x / WBC x   Sq Epi: x / Non Sq Epi: x / Bacteria: x        MEDICATIONS  (STANDING):  cyanocobalamin 1000 MICROGram(s) Oral daily  donepezil 10 milliGRAM(s) Oral at bedtime  folic acid 1 milliGRAM(s) Oral daily  heparin   Injectable 5000 Unit(s) SubCutaneous every 8 hours  memantine 10 milliGRAM(s) Oral two times a day  potassium chloride   Powder 40 milliEquivalent(s) Oral every 4 hours  risperiDONE   Tablet 0.25 milliGRAM(s) Oral daily  risperiDONE   Tablet 0.5 milliGRAM(s) Oral at bedtime  sertraline 50 milliGRAM(s) Oral daily  sodium chloride 0.45% 1000 milliLiter(s) (50 mL/Hr) IV Continuous <Continuous>    MEDICATIONS  (PRN):  acetaminophen     Tablet .. 650 milliGRAM(s) Oral every 6 hours PRN Temp greater or equal to 38C (100.4F), Mild Pain (1 - 3)  aluminum hydroxide/magnesium hydroxide/simethicone Suspension 30 milliLiter(s) Oral every 4 hours PRN Dyspepsia  melatonin 3 milliGRAM(s) Oral at bedtime PRN Insomnia  ondansetron Injectable 4 milliGRAM(s) IV Push every 8 hours PRN Nausea and/or Vomiting  QUEtiapine 12.5 milliGRAM(s) Oral every 6 hours PRN for agitation          PHYSICAL EXAM:  GENERAL: NAD, well-groomed, well-developed  HEAD:  Atraumatic, Normocephalic  CHEST/LUNG: Clear to percussion bilaterally; No rales, rhonchi, wheezing, or rubs  HEART: Regular rate and rhythm; No murmurs, rubs, or gallops  ABDOMEN: Soft, Nontender, Nondistended; Bowel sounds present  EXTREMITIES:  2+ Peripheral Pulses, No clubbing, cyanosis, or edema  LYMPH: No lymphadenopathy noted  SKIN: No rashes or lesions    Care Discussed with Consultants/Other Providers [ ] YES  [ ] NO

## 2024-01-31 NOTE — PROGRESS NOTE ADULT - SUBJECTIVE AND OBJECTIVE BOX
Date of Service: 01-31-24 @ 12:28    Patient is a 78y old  Male who presents with a chief complaint of Fall, elevated calcium (31 Jan 2024 10:21)      Any change in ROS: seems OK:  no sob:  no cough : no phlegm      MEDICATIONS  (STANDING):  cyanocobalamin 1000 MICROGram(s) Oral daily  donepezil 10 milliGRAM(s) Oral at bedtime  folic acid 1 milliGRAM(s) Oral daily  heparin   Injectable 5000 Unit(s) SubCutaneous every 8 hours  memantine 10 milliGRAM(s) Oral two times a day  potassium chloride   Powder 40 milliEquivalent(s) Oral every 4 hours  risperiDONE   Tablet 0.25 milliGRAM(s) Oral daily  risperiDONE   Tablet 0.5 milliGRAM(s) Oral at bedtime  sertraline 50 milliGRAM(s) Oral daily  sodium chloride 0.45% 1000 milliLiter(s) (50 mL/Hr) IV Continuous <Continuous>    MEDICATIONS  (PRN):  acetaminophen     Tablet .. 650 milliGRAM(s) Oral every 6 hours PRN Temp greater or equal to 38C (100.4F), Mild Pain (1 - 3)  aluminum hydroxide/magnesium hydroxide/simethicone Suspension 30 milliLiter(s) Oral every 4 hours PRN Dyspepsia  melatonin 3 milliGRAM(s) Oral at bedtime PRN Insomnia  ondansetron Injectable 4 milliGRAM(s) IV Push every 8 hours PRN Nausea and/or Vomiting  QUEtiapine 12.5 milliGRAM(s) Oral every 6 hours PRN for agitation    Vital Signs Last 24 Hrs  T(C): 37.4 (31 Jan 2024 04:48), Max: 37.4 (31 Jan 2024 04:48)  T(F): 99.4 (31 Jan 2024 04:48), Max: 99.4 (31 Jan 2024 04:48)  HR: 68 (31 Jan 2024 04:48) (68 - 78)  BP: 123/69 (31 Jan 2024 04:48) (123/69 - 138/71)  BP(mean): --  RR: 17 (31 Jan 2024 04:48) (17 - 17)  SpO2: 98% (31 Jan 2024 04:48) (98% - 98%)    Parameters below as of 31 Jan 2024 04:48  Patient On (Oxygen Delivery Method): room air        I&O's Summary    30 Jan 2024 07:01  -  31 Jan 2024 07:00  --------------------------------------------------------  IN: 500 mL / OUT: 0 mL / NET: 500 mL          Physical Exam:   GENERAL: NAD, well-groomed, well-developed  HEENT: CHRISTINA/   Atraumatic, Normocephalic  ENMT: No tonsillar erythema, exudates, or enlargement; Moist mucous membranes, Good dentition, No lesions  NECK: Supple, No JVD, Normal thyroid  CHEST/LUNG: Clear to auscultaion- no wheezing  CVS: Regular rate and rhythm; No murmurs, rubs, or gallops  GI: : Soft, Nontender, Nondistended; Bowel sounds present  NERVOUS SYSTEM:  Alert & awake:  demented  on room air  EXTREMITIES:  2+ Peripheral Pulses, No clubbing, cyanosis, or edema  LYMPH: No lymphadenopathy noted  SKIN: No rashes or lesions  ENDOCRINOLOGY: No Thyromegaly  PSYCH: demented    Labs:                              7.5    5.59  )-----------( 193      ( 31 Jan 2024 07:00 )             22.5                         7.7    3.62  )-----------( 201      ( 30 Jan 2024 07:14 )             23.4                         8.0    5.12  )-----------( 190      ( 29 Jan 2024 18:27 )             24.0                         6.7    5.07  )-----------( 193      ( 29 Jan 2024 10:44 )             19.9                         6.7    5.59  )-----------( 205      ( 29 Jan 2024 05:33 )             20.0                         7.5    7.74  )-----------( 234      ( 28 Jan 2024 06:54 )             22.8     01-31    142  |  116<H>  |  14  ----------------------------<  95  3.1<L>   |  19<L>  |  0.74  01-30    142  |  115<H>  |  14  ----------------------------<  111<H>  3.6   |  23  |  0.83  01-29    143  |  114<H>  |  18  ----------------------------<  122<H>  3.7   |  22  |  0.90  01-29    141  |  112<H>  |  17  ----------------------------<  101<H>  3.2<L>   |  23  |  0.93  01-29    143  |  112<H>  |  18  ----------------------------<  94  2.8<LL>   |  22  |  0.91  01-28    139  |  111<H>  |  25<H>  ----------------------------<  121<H>  3.5   |  21<L>  |  1.00    Ca    7.8<L>      31 Jan 2024 07:00  Ca    8.3<L>      30 Jan 2024 07:14  Ca    8.9      29 Jan 2024 18:27  Phos  1.3     01-30  Phos  1.5     01-29  Mg     1.70     01-30  Mg     1.80     01-29    TPro  9.5<H>  /  Alb  1.8<L>  /  TBili  0.2  /  DBili  x   /  AST  13  /  ALT  8   /  AlkPhos  51  01-31  TPro  9.7<H>  /  Alb  2.0<L>  /  TBili  0.4  /  DBili  x   /  AST  18  /  ALT  12  /  AlkPhos  52  01-30  TPro  9.7<H>  /  Alb  1.9<L>  /  TBili  0.4  /  DBili  x   /  AST  20  /  ALT  10  /  AlkPhos  55  01-29  TPro  9.8<H>  /  Alb  2.0<L>  /  TBili  0.4  /  DBili  x   /  AST  20  /  ALT  9   /  AlkPhos  57  01-29  TPro  10.6<H>  /  Alb  2.2<L>  /  TBili  0.5  /  DBili  x   /  AST  20  /  ALT  12  /  AlkPhos  68  01-28    CAPILLARY BLOOD GLUCOSE          LIVER FUNCTIONS - ( 31 Jan 2024 07:00 )  Alb: 1.8 g/dL / Pro: 9.5 g/dL / ALK PHOS: 51 U/L / ALT: 8 U/L / AST: 13 U/L / GGT: x             Urinalysis Basic - ( 31 Jan 2024 07:00 )    Color: x / Appearance: x / SG: x / pH: x  Gluc: 95 mg/dL / Ketone: x  / Bili: x / Urobili: x   Blood: x / Protein: x / Nitrite: x   Leuk Esterase: x / RBC: x / WBC x   Sq Epi: x / Non Sq Epi: x / Bacteria: x      rad< from: CT Head No Cont (01.26.24 @ 20:21) >  INTERPRETATION:  CLINICAL INFORMATION: fall.    TECHNIQUE: CT head: Sequential axial images were obtained from the vertex   to the skull base without intravenous contrast. Coronal and sagittal   reformations were obtained.  CT cervical spine: Noncontrast CT scan of the cervical spine was   performed. Thin section axial imageswith sagittal and coronal   reformations were obtained.    COMPARISON: CT head 1/3/2024.  CT head and cervical spine 7/7/2023.    FINDINGS:    CT head:    Parts of the anterior head are excluded from field-of-view.    There is no acute intracranial hemorrhage or mass effect. There are areas   of hypodensity in the bilateral hemispheric white matter suggesting white   matter microvascular ischemic change. The ventricles and sulci are normal   in size for patient's age.    There is no extraaxial fluid collection.    There is no displaced calvarial fracture. The visualized orbits are   within normal limits. The visualized portions of the paranasal sinuses   are well aerated. The mastoid air cells are well aerated.      CT cervical spine:    Cervical alignment is maintained. Cervical vertebral body heights are   within normal limits. Mild age-indeterminate compression deformities at   the superior endplates of T1 and T2, without bony retropulsion.   Multilevel intervertebral disc height loss,disc osteophyte complexes,   and bilateral facet and uncovertebral arthropathy.    There is no prevertebral soft tissue swelling. The paraspinal soft   tissues are unremarkable. The lung apices are clear.      IMPRESSION:    CT head: No acute intracranial hemorrhage or mass effect.    CT cervical spine:  No evidence for acute displaced fracture or malalignment of the cervical   spine.  Mild age-indeterminate compression deformities at the superior endplates   of T1 and T2, without bony retropulsion.    --- End of Report ---            MADDY CANTU MD; Attending Radiologist  This document has been electronically signed. Jan 26 2024  8:56PM    < end of copied text >  < from: Xray Chest 1 View- PORTABLE-Urgent (Xray Chest 1 View- PORTABLE-Urgent .) (01.26.24 @ 17:35) >    ACC: [id number] EXAM:  XR CHEST PORTABLE URGENT 1V   ORDERED BY: KEELY CAVAZOS     PROCEDURE DATE:  01/26/2024          INTERPRETATION:  EXAMINATION: XR CHEST URGENT    CLINICAL INDICATION: fall    TECHNIQUE: Single frontal portable view of the chest from 1/26/2024 5:35   PM    COMPARISON: Chest x-ray 1/2/2024.    FINDINGS:  Sternotomy wires.  The heart size is not accurately assessed on this projection.  Hazy left lung base opacity with low lung volumes, likely represents   atelectasis.  There is no pneumothorax or pleural effusion.    IMPRESSION:  No focal consolidations.  No acute traumatic findings.    --- End of Report ---          TOMAS MASSEY MD; Resident Radiologist  This document has been electronically signed.  KOFI GARCIA MD; Attending Radiologist  This document has been electronically signed. Jan 26 2024  6:15PM    < end of copied text >        RECENT CULTURES:        RESPIRATORY CULTURES:          Studies  Chest X-RAY  CT SCAN Chest   Venous Dopplers: LE:   CT Abdomen  Others

## 2024-02-01 LAB
ALBUMIN SERPL ELPH-MCNC: 1.8 G/DL — LOW (ref 3.3–5)
ALP SERPL-CCNC: 53 U/L — SIGNIFICANT CHANGE UP (ref 40–120)
ALT FLD-CCNC: 13 U/L — SIGNIFICANT CHANGE UP (ref 4–41)
ANION GAP SERPL CALC-SCNC: 11 MMOL/L — SIGNIFICANT CHANGE UP (ref 7–14)
AST SERPL-CCNC: 14 U/L — SIGNIFICANT CHANGE UP (ref 4–40)
BASOPHILS # BLD AUTO: 0.02 K/UL — SIGNIFICANT CHANGE UP (ref 0–0.2)
BASOPHILS NFR BLD AUTO: 0.3 % — SIGNIFICANT CHANGE UP (ref 0–2)
BILIRUB SERPL-MCNC: 0.2 MG/DL — SIGNIFICANT CHANGE UP (ref 0.2–1.2)
BUN SERPL-MCNC: 13 MG/DL — SIGNIFICANT CHANGE UP (ref 7–23)
CALCIUM SERPL-MCNC: 7.2 MG/DL — LOW (ref 8.4–10.5)
CHLORIDE SERPL-SCNC: 115 MMOL/L — HIGH (ref 98–107)
CO2 SERPL-SCNC: 17 MMOL/L — LOW (ref 22–31)
CREAT SERPL-MCNC: 0.7 MG/DL — SIGNIFICANT CHANGE UP (ref 0.5–1.3)
EGFR: 95 ML/MIN/1.73M2 — SIGNIFICANT CHANGE UP
EOSINOPHIL # BLD AUTO: 0.1 K/UL — SIGNIFICANT CHANGE UP (ref 0–0.5)
EOSINOPHIL NFR BLD AUTO: 1.5 % — SIGNIFICANT CHANGE UP (ref 0–6)
GLUCOSE SERPL-MCNC: 98 MG/DL — SIGNIFICANT CHANGE UP (ref 70–99)
HCT VFR BLD CALC: 21.8 % — LOW (ref 39–50)
HGB BLD-MCNC: 7.3 G/DL — LOW (ref 13–17)
IANC: 5 K/UL — SIGNIFICANT CHANGE UP (ref 1.8–7.4)
IGA FLD-MCNC: 12 MG/DL — LOW (ref 70–400)
IGG FLD-MCNC: 5562 MG/DL — HIGH (ref 700–1600)
IGM SERPL-MCNC: 15 MG/DL — LOW (ref 40–230)
IMM GRANULOCYTES NFR BLD AUTO: 0.5 % — SIGNIFICANT CHANGE UP (ref 0–0.9)
LYMPHOCYTES # BLD AUTO: 0.77 K/UL — LOW (ref 1–3.3)
LYMPHOCYTES # BLD AUTO: 11.8 % — LOW (ref 13–44)
MCHC RBC-ENTMCNC: 33.5 GM/DL — SIGNIFICANT CHANGE UP (ref 32–36)
MCHC RBC-ENTMCNC: 34 PG — SIGNIFICANT CHANGE UP (ref 27–34)
MCV RBC AUTO: 101.4 FL — HIGH (ref 80–100)
MONOCYTES # BLD AUTO: 0.61 K/UL — SIGNIFICANT CHANGE UP (ref 0–0.9)
MONOCYTES NFR BLD AUTO: 9.3 % — SIGNIFICANT CHANGE UP (ref 2–14)
NEUTROPHILS # BLD AUTO: 5 K/UL — SIGNIFICANT CHANGE UP (ref 1.8–7.4)
NEUTROPHILS NFR BLD AUTO: 76.6 % — SIGNIFICANT CHANGE UP (ref 43–77)
NRBC # BLD: 0 /100 WBCS — SIGNIFICANT CHANGE UP (ref 0–0)
NRBC # FLD: 0 K/UL — SIGNIFICANT CHANGE UP (ref 0–0)
PLATELET # BLD AUTO: 198 K/UL — SIGNIFICANT CHANGE UP (ref 150–400)
POTASSIUM SERPL-MCNC: 3.5 MMOL/L — SIGNIFICANT CHANGE UP (ref 3.5–5.3)
POTASSIUM SERPL-SCNC: 3.5 MMOL/L — SIGNIFICANT CHANGE UP (ref 3.5–5.3)
PROT SERPL-MCNC: 9.5 G/DL — HIGH (ref 6–8.3)
RBC # BLD: 2.15 M/UL — LOW (ref 4.2–5.8)
RBC # FLD: 16.5 % — HIGH (ref 10.3–14.5)
SODIUM SERPL-SCNC: 143 MMOL/L — SIGNIFICANT CHANGE UP (ref 135–145)
WBC # BLD: 6.53 K/UL — SIGNIFICANT CHANGE UP (ref 3.8–10.5)
WBC # FLD AUTO: 6.53 K/UL — SIGNIFICANT CHANGE UP (ref 3.8–10.5)

## 2024-02-01 RX ORDER — METOPROLOL TARTRATE 50 MG
25 TABLET ORAL
Refills: 0 | Status: DISCONTINUED | OUTPATIENT
Start: 2024-02-01 | End: 2024-02-13

## 2024-02-01 RX ORDER — SODIUM CHLORIDE 9 MG/ML
1000 INJECTION, SOLUTION INTRAVENOUS
Refills: 0 | Status: DISCONTINUED | OUTPATIENT
Start: 2024-02-01 | End: 2024-02-02

## 2024-02-01 RX ORDER — MAGNESIUM SULFATE 500 MG/ML
2 VIAL (ML) INJECTION ONCE
Refills: 0 | Status: COMPLETED | OUTPATIENT
Start: 2024-02-01 | End: 2024-02-01

## 2024-02-01 RX ADMIN — PREGABALIN 1000 MICROGRAM(S): 225 CAPSULE ORAL at 13:21

## 2024-02-01 RX ADMIN — RISPERIDONE 0.5 MILLIGRAM(S): 4 TABLET ORAL at 21:24

## 2024-02-01 RX ADMIN — HEPARIN SODIUM 5000 UNIT(S): 5000 INJECTION INTRAVENOUS; SUBCUTANEOUS at 05:31

## 2024-02-01 RX ADMIN — SODIUM CHLORIDE 50 MILLILITER(S): 9 INJECTION, SOLUTION INTRAVENOUS at 13:20

## 2024-02-01 RX ADMIN — MEMANTINE HYDROCHLORIDE 10 MILLIGRAM(S): 10 TABLET ORAL at 17:50

## 2024-02-01 RX ADMIN — MEMANTINE HYDROCHLORIDE 10 MILLIGRAM(S): 10 TABLET ORAL at 05:30

## 2024-02-01 RX ADMIN — HEPARIN SODIUM 5000 UNIT(S): 5000 INJECTION INTRAVENOUS; SUBCUTANEOUS at 21:24

## 2024-02-01 RX ADMIN — HEPARIN SODIUM 5000 UNIT(S): 5000 INJECTION INTRAVENOUS; SUBCUTANEOUS at 13:21

## 2024-02-01 RX ADMIN — Medication 25 MILLIGRAM(S): at 17:52

## 2024-02-01 RX ADMIN — Medication 25 GRAM(S): at 22:50

## 2024-02-01 RX ADMIN — DONEPEZIL HYDROCHLORIDE 10 MILLIGRAM(S): 10 TABLET, FILM COATED ORAL at 21:24

## 2024-02-01 RX ADMIN — Medication 1 MILLIGRAM(S): at 13:21

## 2024-02-01 RX ADMIN — RISPERIDONE 0.25 MILLIGRAM(S): 4 TABLET ORAL at 13:20

## 2024-02-01 RX ADMIN — SERTRALINE 50 MILLIGRAM(S): 25 TABLET, FILM COATED ORAL at 13:21

## 2024-02-01 RX ADMIN — Medication 3 MILLIGRAM(S): at 21:24

## 2024-02-01 RX ADMIN — SODIUM CHLORIDE 50 MILLILITER(S): 9 INJECTION, SOLUTION INTRAVENOUS at 13:50

## 2024-02-01 NOTE — PROGRESS NOTE ADULT - SUBJECTIVE AND OBJECTIVE BOX
DATE OF SERVICE: 02-01-24    Patient denies chest pain or shortness of breath.   Review of symptoms otherwise negative.    MEDICATIONS:  acetaminophen     Tablet .. 650 milliGRAM(s) Oral every 6 hours PRN  aluminum hydroxide/magnesium hydroxide/simethicone Suspension 30 milliLiter(s) Oral every 4 hours PRN  cyanocobalamin 1000 MICROGram(s) Oral daily  donepezil 10 milliGRAM(s) Oral at bedtime  folic acid 1 milliGRAM(s) Oral daily  heparin   Injectable 5000 Unit(s) SubCutaneous every 8 hours  melatonin 3 milliGRAM(s) Oral at bedtime PRN  memantine 10 milliGRAM(s) Oral two times a day  ondansetron Injectable 4 milliGRAM(s) IV Push every 8 hours PRN  QUEtiapine 12.5 milliGRAM(s) Oral every 6 hours PRN  risperiDONE   Tablet 0.25 milliGRAM(s) Oral daily  risperiDONE   Tablet 0.5 milliGRAM(s) Oral at bedtime  sertraline 50 milliGRAM(s) Oral daily  sodium chloride 0.45% 1000 milliLiter(s) IV Continuous <Continuous>      LABS:                        7.3    6.53  )-----------( 198      ( 01 Feb 2024 06:35 )             21.8       Hemoglobin: 7.3 g/dL (02-01 @ 06:35)  Hemoglobin: 7.5 g/dL (01-31 @ 07:00)  Hemoglobin: 7.7 g/dL (01-30 @ 07:14)  Hemoglobin: 8.0 g/dL (01-29 @ 18:27)  Hemoglobin: 6.7 g/dL (01-29 @ 10:44)      02-01    143  |  115<H>  |  13  ----------------------------<  98  3.5   |  17<L>  |  0.70    Ca    7.2<L>      01 Feb 2024 06:35    TPro  9.5<H>  /  Alb  1.8<L>  /  TBili  0.2  /  DBili  x   /  AST  14  /  ALT  13  /  AlkPhos  53  02-01    Creatinine Trend: 0.70<--, 0.74<--, 0.83<--, 0.90<--, 0.93<--, 0.91<--    PHYSICAL EXAM:  T(C): 36.8 (02-01-24 @ 05:00), Max: 37 (01-31-24 @ 13:00)  HR: 70 (02-01-24 @ 05:00) (70 - 77)  BP: 125/75 (02-01-24 @ 05:00) (106/76 - 127/77)  RR: 17 (02-01-24 @ 05:00) (17 - 17)  SpO2: 99% (02-01-24 @ 05:00) (98% - 99%)  Wt(kg): --    I&O's Summary        General:  NAD  Head: Normocephalic and atraumatic.   Neck: No JVD. No bruits. Supple. Does not appear to be enlarged.   Cardiovascular: + S1,S2 ; RRR Soft systolic murmur at the left lower sternal border. No rubs noted.    Lungs: CTA b/l. No rhonchi, rales or wheezes.   Abdomen: + BS, soft. Non tender. Non distended. No rebound. No guarding.   Extremities: No clubbing/cyanosis/edema.   Neurologic: Moves all four extremities. Full range of motion.   Skin: Warm and moist. The patient's skin has normal elasticity and good skin turgor.   Psychiatric: Appropriate mood and affect.  Musculoskeletal: Normal range of motion, normal strength     TELEMETRY: 	 Sinus with PAT overnight, 5 beats of NSVT    ECG:  	NSR    < from: Transthoracic Echocardiogram (07.10.23 @ 11:15) >  CONCLUSIONS:  1. Calcified trileaflet aortic valve with normal opening.  Minimal aortic regurgitation.  2. Endocardium not well visualized; grossly decreased  possibly mild left ventricular systolic dysfunction.  3. The right ventricle is not well visualized; grossly  normal right ventricular systolic function.  ------------------------------------------------------------------------  Confirmed on  7/10/2023 - 13:57:46 by MARLA Vegas  < end of copied text >      ASSESSMENT/PLAN: This is a 78M with history of MM, HTN, and Dementia (AAO x 1-2 to self, to place, not to time at baseline per brother) who presents to the hospital from [facility] after a fall.     Patient is a poor historian 2/2 dementia. As per paperwork from the NH, patient had a fall and was noted to have a hematoma on the occipital head and was sent to the hospital for evaluation. Here imaging were negative for fractures but his work up showed him to have a significantly elevated calcium of 14 (corrected to 15.1) with DAVID and a significantly elevated protein gap. He was given NS 1.5L and repeat BMP showed persistence of the calcium elevation though improving. He was admitted to medicine on telemetry for further evaluation.  Recent admission for influenza, where his lopressor and norvasc were discontinued.    1. HTN/h/o MVR  -  BP stable  - agree with fluids for hypercalcemia  - no EKG changes associated with hypercalcemia  - TTE from last year with mildly decreased LVEF  - s/p PRBC  - Tachycardic, likely PAT over night now in sinus and rate controlled  - can start low dose lopressor 25 mg BID    Staci Muñiz MD  Pager: [phone]

## 2024-02-01 NOTE — PROGRESS NOTE ADULT - ASSESSMENT
78M with history of MM, HTN, and Dementia (AAO x 1-2 to self, to place, not to time at baseline per brother) who presents to the hospital from [facility] after a fall. Patient is a poor historian 2/2 dementia, unable to state what happened. As per paperwork from the NH, patient had a fall and was noted to have a hematoma on the occipital head and was sent to the hospital for evaluation. Here imaging were negative for fractures but his work up showed him to have a significantly elevated calcium of 14 (corrected to 15.1) with DAVID and a significantly elevated protein gap.  Nephrology consulted for DAVID and hypercalemia.    A/P:  DAVID:  Baseline S.Cr 0.7-0.9.  S.Cr 1.39 on admission.  Likely pre-renal in setting of dehydration/hypercalcemia.  Continue  iVF to 1/2 NS 1/2 NaHc03 20meq KCL at 50cc/hr   CK 64 - DAVID unlikely sec. to rhabdo.  UA + proteinuria; no blood.  FeNa 1.1% - suggests ATN.  Monitor BMP and UO.    HTN:  Controlled.  Avoid hypotension.  Monitor BP.    Hypercalcemia:  Likely sec to MM.  PTH low, suppressed by high Ca.  Total protein high.  Vit. D 25OH 46.4.  S/p Zometa 4mg x1 dose and calcitonin 200IU q12hrs x2 doses.  Monitor Ca.    Acidosis:  Hyperchloremic acidosis.  IVF as above  Monitor CO2.    Anemia:  Likely sec to MM vs. other etiology.  Workup per primary team.   Heme/onc on board  Monitor Hgb.  Transfuse for Hgb <8.    Hypophosphatemia:  Likely sec. to poor PO intake.  Replete w/ Kphos as needed  Monitor PO4 daily.    Hypokalemia:   Likely sec to poor PO intake  replete w/ KCL in IVF  monitor     Hypomagnesemia:  Likely sec to poor PO intake.  Repleted as needed   Monitor Mg.    Proteinuria:  Possibly sec to MM.  Monitor.

## 2024-02-01 NOTE — PROGRESS NOTE ADULT - ASSESSMENT
This is a 78M with history of MM, HTN, and Dementia (AAO x 1-2 to self, to place, not to time at baseline per brother) who presents to the hospital from [facility] after a fall. Patient is a poor historian 2/2 dementia, unable to state what happened. As per paperwork from the NH, patient had a fall and was noted to have a hematoma on the occipital head and was sent to the hospital for evaluation. Here imaging were negative for fractures but his work up showed him to have a significantly elevated calcium of 14 (corrected to 15.1) with DAVID and a significantly elevated protein gap. He was given NS 1.5L and repeat BMP showed persistence of the calcium elevation though improving. He was admitted to medicine on telemetry for further evaluation.  (26 Jan 2024 23:03)   he can not tell me why is he here and how is his breathing:    he is on room air:  slightly agitated: He was also recently admitted to hospital when he had influenza:   no now cough reported and has no fever: :    s/p fall:  HTN  Multiple Myeloma  Dementia     s/p fall:  -he seems to be doing  ok :  -he is alert and awake but demented:   -on room air   -cxr is normal :"  -ct head with no bleeding noted:   -aspiration precautions  1/28: seems same:  no change in his resp symptoms not in any resp distress:  on room air:  cont to monitor  : ct head is with no ICB   1/29: he seems OK: no sob:  no cough : no phlegm : o n room air  1/30: seems OK: no sob:    1/31: seems comfortable:  babbles words:  no sob:  no phlegm or wheezing:  on room air:   2/1: seems OK: no sob: on room air:   HTN  -controlled  Multiple Myeloma  -per primary team   1/29: hb is pretty low today  :? blood transfusion  today   1/30 ; s/p o ne blood transfusion  yesterday : but h is hb is low:  still:     1/31: still anemic:  defer to primary team  2/1 : hb is stable:    Dementia   -supportive care    dvt prophylaxis    dw ACP

## 2024-02-01 NOTE — PROGRESS NOTE ADULT - SUBJECTIVE AND OBJECTIVE BOX
[facility]   MD ANEL FOLEY MD RUORU WONG, PA    TEL:  FROM 9 AM to 5 PM ---OFFICE: [phone]  AVAILABLE ON TEAMS    FROM 5 PM - 9 AM PLEASE CALL ANSWERING SERVICE: [phone]    RENAL FOLLOW UP NOTE--Date of Service 02-01-24 @ 11:47  --------------------------------------------------------------------------------  HPI:      Pt seen and examined at bedside.     PAST HISTORY  --------------------------------------------------------------------------------  No significant changes to PMH, PSH, FHx, SHx, unless otherwise noted    ALLERGIES & MEDICATIONS  --------------------------------------------------------------------------------  Allergies    No Known Allergies    Intolerances      Standing Inpatient Medications  cyanocobalamin 1000 MICROGram(s) Oral daily  donepezil 10 milliGRAM(s) Oral at bedtime  folic acid 1 milliGRAM(s) Oral daily  heparin   Injectable 5000 Unit(s) SubCutaneous every 8 hours  memantine 10 milliGRAM(s) Oral two times a day  risperiDONE   Tablet 0.25 milliGRAM(s) Oral daily  risperiDONE   Tablet 0.5 milliGRAM(s) Oral at bedtime  sertraline 50 milliGRAM(s) Oral daily  sodium chloride 0.45% 1000 milliLiter(s) IV Continuous <Continuous>  sodium chloride 0.45% 1000 milliLiter(s) IV Continuous <Continuous>    PRN Inpatient Medications  acetaminophen     Tablet .. 650 milliGRAM(s) Oral every 6 hours PRN  aluminum hydroxide/magnesium hydroxide/simethicone Suspension 30 milliLiter(s) Oral every 4 hours PRN  melatonin 3 milliGRAM(s) Oral at bedtime PRN  ondansetron Injectable 4 milliGRAM(s) IV Push every 8 hours PRN  QUEtiapine 12.5 milliGRAM(s) Oral every 6 hours PRN      REVIEW OF SYSTEMS  --------------------------------------------------------------------------------  General: no fever  MSK: no edema     VITALS/PHYSICAL EXAM  --------------------------------------------------------------------------------  T(C): 36.8 (02-01-24 @ 05:00), Max: 37 (01-31-24 @ 13:00)  HR: 70 (02-01-24 @ 05:00) (70 - 77)  BP: 125/75 (02-01-24 @ 05:00) (106/76 - 127/77)  RR: 17 (02-01-24 @ 05:00) (17 - 17)  SpO2: 99% (02-01-24 @ 05:00) (98% - 99%)  Wt(kg): --        Physical Exam:  	General: NAD  Neck: No JVD  Respiratory: CTAB, no wheezes, rales or rhonchi  Cardiovascular: S1, S2, RRR  Gastrointestinal: BS+, soft, NT/ND  Extremities: No peripheral edema  LABS/STUDIES  --------------------------------------------------------------------------------              7.3    6.53  >-----------<  198      [02-01-24 @ 06:35]              21.8     143  |  115  |  13  ----------------------------<  98      [02-01-24 @ 06:35]  3.5   |  17  |  0.70        Ca     7.2     [02-01-24 @ 06:35]    TPro  9.5  /  Alb  1.8  /  TBili  0.2  /  DBili  x   /  AST  14  /  ALT  13  /  AlkPhos  53  [02-01-24 @ 06:35]          Creatinine Trend:  SCr 0.70 [02-01 @ 06:35]  SCr 0.74 [01-31 @ 07:00]  SCr 0.83 [01-30 @ 07:14]  SCr 0.90 [01-29 @ 18:27]  SCr 0.93 [01-29 @ 10:44]    Urinalysis - [02-01-24 @ 06:35]      Color  / Appearance  / SG  / pH       Gluc 98 / Ketone   / Bili  / Urobili        Blood  / Protein  / Leuk Est  / Nitrite       RBC  / WBC  / Hyaline  / Gran  / Sq Epi  / Non Sq Epi  / Bacteria     Urine Creatinine 57      [01-28-24 @ 05:38]  Urine Sodium 87      [01-28-24 @ 05:38]    PTH -- (Ca --)      [01-27-24 @ 10:56]   8  PTH -- (Ca --)      [01-14-24 @ 06:50]   5  PTH -- (Ca --)      [01-13-24 @ 06:10]   6  Vitamin D (25OH) 46.4      [01-27-24 @ 10:56]

## 2024-02-01 NOTE — PROGRESS NOTE ADULT - ASSESSMENT
78M with history of MM, HTN, and Dementia who presents to the hospital after a fall at his NH here found to have significant hypercalcemia.       Problem/Plan - 1:  ·  Problem: Hypercalcemia.   ·  Plan: -   - Dose calcitonin once patient's weight is available  - heme fu   Problem/Plan - 2:  ·  Problem: Fall.   ·  Plan: - Unclear cause of his fall, unclear mechanism, patient unable to state what happened and the NH paperwork does not mention how the patient fell  - Will monitor on telemetry for now, trend troponin  - cars fu     Problem/Plan - 3:·  Problem: DAVID (acute kidney injury). ·  Plan: - New DAVID likely 2/2 severe hypercalcemia -> c/w IVF as above- Monitor I/O  - Trend BUN/Cr    Problem/Plan - 4:  ·  Problem: Toxic metabolic encephalopathy.   ·  Plan: - Baseline oriented to self and place, currently oriented to self but cannot state where he is, unable to state what the current month/year is, unable to give significant HPI or ROS (seems to be the baseline when compared to his prior admission)  - Likely has some encephalopathy 2/2 severe hypercalcemia -> c/w management as above  - c/w dementia management as below.    Problem/Plan - 5:  ·  Problem: Multiple myeloma.   ·  Plan: - Heme CONSULT appreciated   monitor cbc  sp transfusion     Problem/Plan - 6:  ·  Problem: Essential hypertension.   ·  Plan: - Not on medications, BPs currently well controlled  - Monitor BPs.    Problem/Plan - 7:  ·  Problem: Dementia.   ·  Plan: - c/w donepezil, memantine  - c/w risperdal, sertraline, and seroquel as per outpatient medications.    dc planning awaiting rehab bed

## 2024-02-01 NOTE — PROGRESS NOTE ADULT - SUBJECTIVE AND OBJECTIVE BOX
Patient is a 78y old  Male who presents with a chief complaint of Fall, elevated calcium (01 Feb 2024 12:11)    Date of servie : 02-01-24 @ 14:51  INTERVAL HPI/OVERNIGHT EVENTS:  T(C): 37.7 (02-01-24 @ 12:07), Max: 37.7 (02-01-24 @ 12:07)  HR: 81 (02-01-24 @ 12:07) (70 - 81)  BP: 111/- (02-01-24 @ 12:07) (111/- - 127/77)  RR: 17 (02-01-24 @ 12:07) (17 - 17)  SpO2: 95% (02-01-24 @ 12:07) (95% - 99%)  Wt(kg): --  I&O's Summary      LABS:                        7.3    6.53  )-----------( 198      ( 01 Feb 2024 06:35 )             21.8     02-01    143  |  115<H>  |  13  ----------------------------<  98  3.5   |  17<L>  |  0.70    Ca    7.2<L>      01 Feb 2024 06:35    TPro  9.5<H>  /  Alb  1.8<L>  /  TBili  0.2  /  DBili  x   /  AST  14  /  ALT  13  /  AlkPhos  53  02-01      Urinalysis Basic - ( 01 Feb 2024 06:35 )    Color: x / Appearance: x / SG: x / pH: x  Gluc: 98 mg/dL / Ketone: x  / Bili: x / Urobili: x   Blood: x / Protein: x / Nitrite: x   Leuk Esterase: x / RBC: x / WBC x   Sq Epi: x / Non Sq Epi: x / Bacteria: x      CAPILLARY BLOOD GLUCOSE            Urinalysis Basic - ( 01 Feb 2024 06:35 )    Color: x / Appearance: x / SG: x / pH: x  Gluc: 98 mg/dL / Ketone: x  / Bili: x / Urobili: x   Blood: x / Protein: x / Nitrite: x   Leuk Esterase: x / RBC: x / WBC x   Sq Epi: x / Non Sq Epi: x / Bacteria: x        MEDICATIONS  (STANDING):  cyanocobalamin 1000 MICROGram(s) Oral daily  donepezil 10 milliGRAM(s) Oral at bedtime  folic acid 1 milliGRAM(s) Oral daily  heparin   Injectable 5000 Unit(s) SubCutaneous every 8 hours  memantine 10 milliGRAM(s) Oral two times a day  risperiDONE   Tablet 0.25 milliGRAM(s) Oral daily  risperiDONE   Tablet 0.5 milliGRAM(s) Oral at bedtime  sertraline 50 milliGRAM(s) Oral daily  sodium chloride 0.45% 1000 milliLiter(s) (50 mL/Hr) IV Continuous <Continuous>    MEDICATIONS  (PRN):  acetaminophen     Tablet .. 650 milliGRAM(s) Oral every 6 hours PRN Temp greater or equal to 38C (100.4F), Mild Pain (1 - 3)  aluminum hydroxide/magnesium hydroxide/simethicone Suspension 30 milliLiter(s) Oral every 4 hours PRN Dyspepsia  melatonin 3 milliGRAM(s) Oral at bedtime PRN Insomnia  ondansetron Injectable 4 milliGRAM(s) IV Push every 8 hours PRN Nausea and/or Vomiting  QUEtiapine 12.5 milliGRAM(s) Oral every 6 hours PRN for agitation          PHYSICAL EXAM:  GENERAL: NAD, well-groomed, well-developed  HEAD:  Atraumatic, Normocephalic  CHEST/LUNG: Clear to percussion bilaterally; No rales, rhonchi, wheezing, or rubs  HEART: Regular rate and rhythm; No murmurs, rubs, or gallops  ABDOMEN: Soft, Nontender, Nondistended; Bowel sounds present  EXTREMITIES:  2+ Peripheral Pulses, No clubbing, cyanosis, or edema  LYMPH: No lymphadenopathy noted  SKIN: No rashes or lesions    Care Discussed with Consultants/Other Providers [ ] YES  [ ] NO

## 2024-02-01 NOTE — PROGRESS NOTE ADULT - SUBJECTIVE AND OBJECTIVE BOX
Date of Service: 02-01-24 @ 12:11    Patient is a 78y old  Male who presents with a chief complaint of Fall, elevated calcium (01 Feb 2024 11:47)      Any change in ROS: seems OK: no pulm events overnight     MEDICATIONS  (STANDING):  cyanocobalamin 1000 MICROGram(s) Oral daily  donepezil 10 milliGRAM(s) Oral at bedtime  folic acid 1 milliGRAM(s) Oral daily  heparin   Injectable 5000 Unit(s) SubCutaneous every 8 hours  memantine 10 milliGRAM(s) Oral two times a day  risperiDONE   Tablet 0.25 milliGRAM(s) Oral daily  risperiDONE   Tablet 0.5 milliGRAM(s) Oral at bedtime  sertraline 50 milliGRAM(s) Oral daily  sodium chloride 0.45% 1000 milliLiter(s) (50 mL/Hr) IV Continuous <Continuous>  sodium chloride 0.45% 1000 milliLiter(s) (50 mL/Hr) IV Continuous <Continuous>    MEDICATIONS  (PRN):  acetaminophen     Tablet .. 650 milliGRAM(s) Oral every 6 hours PRN Temp greater or equal to 38C (100.4F), Mild Pain (1 - 3)  aluminum hydroxide/magnesium hydroxide/simethicone Suspension 30 milliLiter(s) Oral every 4 hours PRN Dyspepsia  melatonin 3 milliGRAM(s) Oral at bedtime PRN Insomnia  ondansetron Injectable 4 milliGRAM(s) IV Push every 8 hours PRN Nausea and/or Vomiting  QUEtiapine 12.5 milliGRAM(s) Oral every 6 hours PRN for agitation    Vital Signs Last 24 Hrs  T(C): 37.7 (01 Feb 2024 12:07), Max: 37.7 (01 Feb 2024 12:07)  T(F): 99.9 (01 Feb 2024 12:07), Max: 99.9 (01 Feb 2024 12:07)  HR: 81 (01 Feb 2024 12:07) (70 - 81)  BP: 111/- (01 Feb 2024 12:07) (106/76 - 127/77)  BP(mean): --  RR: 17 (01 Feb 2024 12:07) (17 - 17)  SpO2: 95% (01 Feb 2024 12:07) (95% - 99%)    Parameters below as of 01 Feb 2024 12:07  Patient On (Oxygen Delivery Method): room air        I&O's Summary        Physical Exam:   GENERAL: NAD, well-groomed, well-developed  HEENT: CHRISTINA/   Atraumatic, Normocephalic  ENMT: No tonsillar erythema, exudates, or enlargement; Moist mucous membranes, Good dentition, No lesions  NECK: Supple, No JVD, Normal thyroid  CHEST/LUNG: Clear to auscultaion- no wheezing  CVS: Regular rate and rhythm; No murmurs, rubs, or gallops  GI: : Soft, Nontender, Nondistended; Bowel sounds present  NERVOUS SYSTEM:  Alert & awake and demented:  on room air  EXTREMITIES: - edema  LYMPH: No lymphadenopathy noted  SKIN: No rashes or lesions  ENDOCRINOLOGY: No Thyromegaly  PSYCH: demented    Labs:                              7.3    6.53  )-----------( 198      ( 01 Feb 2024 06:35 )             21.8                         7.5    5.59  )-----------( 193      ( 31 Jan 2024 07:00 )             22.5                         7.7    3.62  )-----------( 201      ( 30 Jan 2024 07:14 )             23.4                         8.0    5.12  )-----------( 190      ( 29 Jan 2024 18:27 )             24.0                         6.7    5.07  )-----------( 193      ( 29 Jan 2024 10:44 )             19.9                         6.7    5.59  )-----------( 205      ( 29 Jan 2024 05:33 )             20.0     02-01    143  |  115<H>  |  13  ----------------------------<  98  3.5   |  17<L>  |  0.70  01-31    142  |  116<H>  |  14  ----------------------------<  95  3.1<L>   |  19<L>  |  0.74  01-30    142  |  115<H>  |  14  ----------------------------<  111<H>  3.6   |  23  |  0.83  01-29    143  |  114<H>  |  18  ----------------------------<  122<H>  3.7   |  22  |  0.90  01-29    141  |  112<H>  |  17  ----------------------------<  101<H>  3.2<L>   |  23  |  0.93  01-29    143  |  112<H>  |  18  ----------------------------<  94  2.8<LL>   |  22  |  0.91    Ca    7.2<L>      01 Feb 2024 06:35  Ca    7.8<L>      31 Jan 2024 07:00    TPro  9.5<H>  /  Alb  1.8<L>  /  TBili  0.2  /  DBili  x   /  AST  14  /  ALT  13  /  AlkPhos  53  02-01  TPro  9.5<H>  /  Alb  1.8<L>  /  TBili  0.2  /  DBili  x   /  AST  13  /  ALT  8   /  AlkPhos  51  01-31  TPro  9.7<H>  /  Alb  2.0<L>  /  TBili  0.4  /  DBili  x   /  AST  18  /  ALT  12  /  AlkPhos  52  01-30  TPro  9.7<H>  /  Alb  1.9<L>  /  TBili  0.4  /  DBili  x   /  AST  20  /  ALT  10  /  AlkPhos  55  01-29  TPro  9.8<H>  /  Alb  2.0<L>  /  TBili  0.4  /  DBili  x   /  AST  20  /  ALT  9   /  AlkPhos  57  01-29    CAPILLARY BLOOD GLUCOSE          LIVER FUNCTIONS - ( 01 Feb 2024 06:35 )  Alb: 1.8 g/dL / Pro: 9.5 g/dL / ALK PHOS: 53 U/L / ALT: 13 U/L / AST: 14 U/L / GGT: x             Urinalysis Basic - ( 01 Feb 2024 06:35 )    Color: x / Appearance: x / SG: x / pH: x  Gluc: 98 mg/dL / Ketone: x  / Bili: x / Urobili: x   Blood: x / Protein: x / Nitrite: x   Leuk Esterase: x / RBC: x / WBC x   Sq Epi: x / Non Sq Epi: x / Bacteria: x      rad< from: CT Head No Cont (01.26.24 @ 20:21) >   no displaced calvarial fracture. The visualized orbits are   within normal limits. The visualized portions of the paranasal sinuses   are well aerated. The mastoid air cells are well aerated.      CT cervical spine:    Cervical alignment is maintained. Cervical vertebral body heights are   within normal limits. Mild age-indeterminate compression deformities at   the superior endplates of T1 and T2, without bony retropulsion.   Multilevel intervertebral disc height loss,disc osteophyte complexes,   and bilateral facet and uncovertebral arthropathy.    There is no prevertebral soft tissue swelling. The paraspinal soft   tissues are unremarkable. The lung apices are clear.      IMPRESSION:    CT head: No acute intracranial hemorrhage or mass effect.    CT cervical spine:  No evidence for acute displaced fracture or malalignment of the cervical   spine.  Mild age-indeterminate compression deformities at the superior endplates   of T1 and T2, without bony retropulsion.    --- End of Report ---            MADDY CANTU MD; Attending Radiologist  This document has been electronically signed. Jan 26 2024  8:56PM    < end of copied text >  < from: Xray Chest 1 View- PORTABLE-Urgent (Xray Chest 1 View- PORTABLE-Urgent .) (01.26.24 @ 17:35) >    ACC: [id number] EXAM:  XR CHEST PORTABLE URGENT 1V   ORDERED BY: KEELY CAVAZOS     PROCEDURE DATE:  01/26/2024          INTERPRETATION:  EXAMINATION: XR CHEST URGENT    CLINICAL INDICATION: fall    TECHNIQUE: Single frontal portable view of the chest from 1/26/2024 5:35   PM    COMPARISON: Chest x-ray 1/2/2024.    FINDINGS:  Sternotomy wires.  The heart size is not accurately assessed on this projection.  Hazy left lung base opacity with low lung volumes, likely represents   atelectasis.  There is no pneumothorax or pleural effusion.    IMPRESSION:  No focal consolidations.  No acute traumatic findings.    --- End of Report ---          TOMAS MASSEY MD; Resident Radiologist  This document has been electronically signed.  KOFI GARCIA MD; Attending Radiologist  This document has been electronically signed. Jan 26 2024  6:15PM    < end of copied text >        RECENT CULTURES:        RESPIRATORY CULTURES:          Studies  Chest X-RAY  CT SCAN Chest   Venous Dopplers: LE:   CT Abdomen  Others

## 2024-02-02 ENCOUNTER — TRANSCRIPTION ENCOUNTER (OUTPATIENT)
Age: 79
End: 2024-02-02

## 2024-02-02 LAB
% ALBUMIN: 26.2 % — SIGNIFICANT CHANGE UP
% ALPHA 1: 3.3 % — SIGNIFICANT CHANGE UP
% ALPHA 2: 7.7 % — SIGNIFICANT CHANGE UP
% BETA: 7.8 % — SIGNIFICANT CHANGE UP
% GAMMA: 55 % — SIGNIFICANT CHANGE UP
% M SPIKE: 51.9 % — SIGNIFICANT CHANGE UP
ALBUMIN SERPL ELPH-MCNC: 1.9 G/DL — LOW (ref 3.3–5)
ALBUMIN SERPL ELPH-MCNC: 2.96 G/DL — LOW (ref 3.3–4.4)
ALBUMIN/GLOB SERPL ELPH: 0.4 RATIO — SIGNIFICANT CHANGE UP
ALP SERPL-CCNC: 51 U/L — SIGNIFICANT CHANGE UP (ref 40–120)
ALPHA1 GLOB SERPL ELPH-MCNC: 0.37 G/DL — HIGH (ref 0.1–0.3)
ALPHA2 GLOB SERPL ELPH-MCNC: 0.9 G/DL — SIGNIFICANT CHANGE UP (ref 0.6–1)
ALT FLD-CCNC: 13 U/L — SIGNIFICANT CHANGE UP (ref 4–41)
ANION GAP SERPL CALC-SCNC: 8 MMOL/L — SIGNIFICANT CHANGE UP (ref 7–14)
AST SERPL-CCNC: 12 U/L — SIGNIFICANT CHANGE UP (ref 4–40)
B-GLOBULIN SERPL ELPH-MCNC: 0.88 G/DL — SIGNIFICANT CHANGE UP (ref 0.6–1.1)
BASOPHILS # BLD AUTO: 0.02 K/UL — SIGNIFICANT CHANGE UP (ref 0–0.2)
BASOPHILS NFR BLD AUTO: 0.3 % — SIGNIFICANT CHANGE UP (ref 0–2)
BILIRUB SERPL-MCNC: 0.4 MG/DL — SIGNIFICANT CHANGE UP (ref 0.2–1.2)
BUN SERPL-MCNC: 13 MG/DL — SIGNIFICANT CHANGE UP (ref 7–23)
CALCIUM SERPL-MCNC: 6.8 MG/DL — LOW (ref 8.4–10.5)
CHLORIDE SERPL-SCNC: 116 MMOL/L — HIGH (ref 98–107)
CO2 SERPL-SCNC: 19 MMOL/L — LOW (ref 22–31)
CREAT SERPL-MCNC: 0.71 MG/DL — SIGNIFICANT CHANGE UP (ref 0.5–1.3)
EGFR: 94 ML/MIN/1.73M2 — SIGNIFICANT CHANGE UP
EOSINOPHIL # BLD AUTO: 0.11 K/UL — SIGNIFICANT CHANGE UP (ref 0–0.5)
EOSINOPHIL NFR BLD AUTO: 1.8 % — SIGNIFICANT CHANGE UP (ref 0–6)
GAMMA GLOBULIN: 6.22 G/DL — HIGH (ref 0.7–1.7)
GLUCOSE SERPL-MCNC: 97 MG/DL — SIGNIFICANT CHANGE UP (ref 70–99)
HCT VFR BLD CALC: 21.1 % — LOW (ref 39–50)
HGB BLD-MCNC: 7.2 G/DL — LOW (ref 13–17)
IANC: 4.44 K/UL — SIGNIFICANT CHANGE UP (ref 1.8–7.4)
IMM GRANULOCYTES NFR BLD AUTO: 0.3 % — SIGNIFICANT CHANGE UP (ref 0–0.9)
INTERPRETATION SERPL IFE-IMP: SIGNIFICANT CHANGE UP
LYMPHOCYTES # BLD AUTO: 0.94 K/UL — LOW (ref 1–3.3)
LYMPHOCYTES # BLD AUTO: 15.5 % — SIGNIFICANT CHANGE UP (ref 13–44)
M-SPIKE: 5.9 G/DL — SIGNIFICANT CHANGE UP
MCHC RBC-ENTMCNC: 34.1 GM/DL — SIGNIFICANT CHANGE UP (ref 32–36)
MCHC RBC-ENTMCNC: 34.3 PG — HIGH (ref 27–34)
MCV RBC AUTO: 100.5 FL — HIGH (ref 80–100)
MONOCYTES # BLD AUTO: 0.54 K/UL — SIGNIFICANT CHANGE UP (ref 0–0.9)
MONOCYTES NFR BLD AUTO: 8.9 % — SIGNIFICANT CHANGE UP (ref 2–14)
NEUTROPHILS # BLD AUTO: 4.44 K/UL — SIGNIFICANT CHANGE UP (ref 1.8–7.4)
NEUTROPHILS NFR BLD AUTO: 73.2 % — SIGNIFICANT CHANGE UP (ref 43–77)
NRBC # BLD: 0 /100 WBCS — SIGNIFICANT CHANGE UP (ref 0–0)
NRBC # FLD: 0 K/UL — SIGNIFICANT CHANGE UP (ref 0–0)
PLATELET # BLD AUTO: 194 K/UL — SIGNIFICANT CHANGE UP (ref 150–400)
POTASSIUM SERPL-MCNC: 3.3 MMOL/L — LOW (ref 3.5–5.3)
POTASSIUM SERPL-SCNC: 3.3 MMOL/L — LOW (ref 3.5–5.3)
PROT PATTERN SERPL ELPH-IMP: SIGNIFICANT CHANGE UP
PROT SERPL-MCNC: 10 G/DL — HIGH (ref 6–8.3)
PROT SERPL-MCNC: 11.3 G/DL — SIGNIFICANT CHANGE UP
PTH RELATED PROT SERPL-MCNC: <2 PMOL/L — SIGNIFICANT CHANGE UP
PTH RELATED PROT SERPL-MCNC: <2 PMOL/L — SIGNIFICANT CHANGE UP
RBC # BLD: 2.1 M/UL — LOW (ref 4.2–5.8)
RBC # FLD: 16.2 % — HIGH (ref 10.3–14.5)
SODIUM SERPL-SCNC: 143 MMOL/L — SIGNIFICANT CHANGE UP (ref 135–145)
WBC # BLD: 6.07 K/UL — SIGNIFICANT CHANGE UP (ref 3.8–10.5)
WBC # FLD AUTO: 6.07 K/UL — SIGNIFICANT CHANGE UP (ref 3.8–10.5)

## 2024-02-02 PROCEDURE — 93010 ELECTROCARDIOGRAM REPORT: CPT

## 2024-02-02 RX ORDER — SODIUM CHLORIDE 9 MG/ML
1000 INJECTION, SOLUTION INTRAVENOUS
Refills: 0 | Status: DISCONTINUED | OUTPATIENT
Start: 2024-02-02 | End: 2024-02-05

## 2024-02-02 RX ORDER — POTASSIUM CHLORIDE 20 MEQ
40 PACKET (EA) ORAL ONCE
Refills: 0 | Status: COMPLETED | OUTPATIENT
Start: 2024-02-02 | End: 2024-02-02

## 2024-02-02 RX ORDER — CHOLECALCIFEROL (VITAMIN D3) 125 MCG
400 CAPSULE ORAL DAILY
Refills: 0 | Status: DISCONTINUED | OUTPATIENT
Start: 2024-02-02 | End: 2024-02-13

## 2024-02-02 RX ADMIN — RISPERIDONE 0.25 MILLIGRAM(S): 4 TABLET ORAL at 12:28

## 2024-02-02 RX ADMIN — PREGABALIN 1000 MICROGRAM(S): 225 CAPSULE ORAL at 12:29

## 2024-02-02 RX ADMIN — Medication 25 MILLIGRAM(S): at 17:14

## 2024-02-02 RX ADMIN — MEMANTINE HYDROCHLORIDE 10 MILLIGRAM(S): 10 TABLET ORAL at 05:27

## 2024-02-02 RX ADMIN — SODIUM CHLORIDE 1000 MILLILITER(S): 9 INJECTION, SOLUTION INTRAVENOUS at 13:31

## 2024-02-02 RX ADMIN — MEMANTINE HYDROCHLORIDE 10 MILLIGRAM(S): 10 TABLET ORAL at 17:14

## 2024-02-02 RX ADMIN — HEPARIN SODIUM 5000 UNIT(S): 5000 INJECTION INTRAVENOUS; SUBCUTANEOUS at 05:27

## 2024-02-02 RX ADMIN — RISPERIDONE 0.5 MILLIGRAM(S): 4 TABLET ORAL at 21:18

## 2024-02-02 RX ADMIN — Medication 1 MILLIGRAM(S): at 12:27

## 2024-02-02 RX ADMIN — DONEPEZIL HYDROCHLORIDE 10 MILLIGRAM(S): 10 TABLET, FILM COATED ORAL at 21:18

## 2024-02-02 RX ADMIN — Medication 40 MILLIEQUIVALENT(S): at 12:27

## 2024-02-02 RX ADMIN — HEPARIN SODIUM 5000 UNIT(S): 5000 INJECTION INTRAVENOUS; SUBCUTANEOUS at 13:31

## 2024-02-02 RX ADMIN — Medication 25 MILLIGRAM(S): at 05:27

## 2024-02-02 RX ADMIN — HEPARIN SODIUM 5000 UNIT(S): 5000 INJECTION INTRAVENOUS; SUBCUTANEOUS at 21:23

## 2024-02-02 RX ADMIN — SERTRALINE 50 MILLIGRAM(S): 25 TABLET, FILM COATED ORAL at 12:29

## 2024-02-02 NOTE — DIETITIAN INITIAL EVALUATION ADULT - NS FNS DIET ORDER
Diet, Regular:   DASH/TLC {Sodium & Cholesterol Restricted} (DASH)  Pureed (PUREED) (01-27-24 @ 02:24)

## 2024-02-02 NOTE — PROGRESS NOTE ADULT - SUBJECTIVE AND OBJECTIVE BOX
Patient is a 78y old  Male who presents with a chief complaint of Fall, elevated calcium (01 Feb 2024 14:51)      MEDICATIONS  (STANDING):  cyanocobalamin 1000 MICROGram(s) Oral daily  donepezil 10 milliGRAM(s) Oral at bedtime  folic acid 1 milliGRAM(s) Oral daily  heparin   Injectable 5000 Unit(s) SubCutaneous every 8 hours  memantine 10 milliGRAM(s) Oral two times a day  metoprolol tartrate 25 milliGRAM(s) Oral two times a day  risperiDONE   Tablet 0.25 milliGRAM(s) Oral daily  risperiDONE   Tablet 0.5 milliGRAM(s) Oral at bedtime  sertraline 50 milliGRAM(s) Oral daily  sodium chloride 0.45% 1000 milliLiter(s) (50 mL/Hr) IV Continuous <Continuous>    MEDICATIONS  (PRN):  acetaminophen     Tablet .. 650 milliGRAM(s) Oral every 6 hours PRN Temp greater or equal to 38C (100.4F), Mild Pain (1 - 3)  aluminum hydroxide/magnesium hydroxide/simethicone Suspension 30 milliLiter(s) Oral every 4 hours PRN Dyspepsia  melatonin 3 milliGRAM(s) Oral at bedtime PRN Insomnia  ondansetron Injectable 4 milliGRAM(s) IV Push every 8 hours PRN Nausea and/or Vomiting  QUEtiapine 12.5 milliGRAM(s) Oral every 6 hours PRN for agitation      ROS  No fever, sweats, chills  No epistaxis, HA, sore throat  No CP, SOB, cough, sputum  No n/v/d, abd pain, melena, hematochezia  No edema  No rash  No anxiety  No back pain, joint pain  No bleeding, bruising  No dysuria, hematuria    Vital Signs Last 24 Hrs  T(C): 36.6 (02 Feb 2024 05:15), Max: 37.7 (01 Feb 2024 12:07)  T(F): 97.9 (02 Feb 2024 05:15), Max: 99.9 (01 Feb 2024 12:07)  HR: 79 (02 Feb 2024 05:15) (71 - 82)  BP: 123/63 (02 Feb 2024 05:15) (111/61 - 140/61)  BP(mean): --  RR: 17 (02 Feb 2024 05:15) (17 - 18)  SpO2: 99% (02 Feb 2024 05:15) (95% - 100%)    Parameters below as of 02 Feb 2024 05:15  Patient On (Oxygen Delivery Method): room air        PE  NAD  awake with baseline confusion  Anicteric, MMM  RRR  CTAB  Abd soft, NT, ND  No c/c/e  No rash grossly                            7.3    6.53  )-----------( 198      ( 01 Feb 2024 06:35 )             21.8       02-01    143  |  115<H>  |  13  ----------------------------<  98  3.5   |  17<L>  |  0.70    Ca    7.2<L>      01 Feb 2024 06:35    TPro  9.5<H>  /  Alb  1.8<L>  /  TBili  0.2  /  DBili  x   /  AST  14  /  ALT  13  /  AlkPhos  53  02-01

## 2024-02-02 NOTE — DIETITIAN INITIAL EVALUATION ADULT - ADD RECOMMEND
1. Nutrition department to provide Hormel shake 8.45oz 2x/day (1040kcal, 44gm pro) for nutrient support.   2. Consider adding vitamin d, for micronutrient support.   3. Monitor and replete electrolytes (phos , K).   4. Continue to provide assistance in feeding.   5. Document BM on RN flow sheet.   6. Monitor weight, labs, po intake and tolerance, bowel movement, skin integrity.   7. Encourage PO intake and honor food preferences as able.

## 2024-02-02 NOTE — DISCHARGE NOTE PROVIDER - HOSPITAL COURSE
77 y/o Male, from [facility], with a PmHx of Multiple Myeloma, HTN and Dementia, p/w a fall. Found to have significant hypercalcemia.     Hypercalcemia.   - Patient with significant and new hypercalcemia, corrected to 15.1  - PTH low, suppressed by high Ca.  - Total protein high.  - Vit. D 25OH 46.4.  - s/p Zometa 4mg x1 dose and calcitonin 200IU q12hrs x2 doses.  - Renal c/s Dr. Blanco following  - s/p IVF, IMPROVED  - started to have hypocalcemia but corrected was normal    Fall  - Unclear cause of his fall, unclear mechanism, patient unable to state what happened and the NH paperwork does not mention how the patient fell  - CTH negative for bleed  - monitor on telemetry, troponin neg x3  - Cardio c/s Dr. Muñiz following  - No focal neurological deficits noted so low suspicion for neurological causes to his fall.    DAVID  - Baseline S.Cr 0.7-0.9.  - S.Cr 1.39 on admission.  - Renal c/s Dr. Blanco following  - Likely pre-renal in setting of dehydration/hypercalcemia.  - IVF changed to 1/2 NS 1/2 NaHc03 20meq KCL at 50cc/hr   - CK 64 - DAVID unlikely sec. to rhabdo.  - UA + proteinuria; no blood.  - FeNa 1.1% - suggests ATN.  - S.Cr improving  - Monitor BMP and UO    Toxic metabolic encephalopathy.   - Baseline oriented to self and place, currently oriented to self but cannot state where he is, unable to state what the current month/year is, unable to give significant HPI or ROS (seems to be the baseline when compared to his prior admission)  - Likely has some encephalopathy 2/2 severe hypercalcemia -> c/w management as above  - c/w dementia management as below.    Multiple myeloma.   - heme/onc consulted  - Immunofixation with IgG Kappa multiple myeloma    Anemia   - Hgb 6.7 1/29 ---> s/p 1u PRBC- improved    Essential hypertension.   - Not on medications, BPs currently well controlled  - Monitor BPs.    Tachycardic, likely PAT over night 1/31, now in sinus and rate controlled  - started low dose lopressor 25 mg BID for tachy    Dementia  - c/w donepezil, memantine  - c/w risperdal, sertraline, and seroquel as per outpatient medications    On 2/2/2024, discussed with Dr. Muñiz, patient is medically cleared for discharge today to NH. All medications were reviewed with attending. 79 y/o Male, from [facility], with a PmHx of Multiple Myeloma, HTN and Dementia, p/w a fall. Found to have significant hypercalcemia.     Hypercalcemia.   - Patient with significant and new hypercalcemia, corrected to 15.1  - PTH low, suppressed by high Ca.  - Total protein high.  - Vit. D 25OH 46.4.  - s/p Zometa 4mg x1 dose and calcitonin 200IU q12hrs x2 doses.  - Renal c/s Dr. Blacno following  - s/p IVF, IMPROVED  - started to have hypocalcemia but corrected was normal    Fall  - Unclear cause of his fall, unclear mechanism, patient unable to state what happened and the NH paperwork does not mention how the patient fell  - CTH negative for bleed  - monitor on telemetry, troponin neg x3  - Cardio c/s Dr. Muñiz following  - No focal neurological deficits noted so low suspicion for neurological causes to his fall.    DAVID  - Baseline S.Cr 0.7-0.9.  - S.Cr 1.39 on admission.  - Renal c/s Dr. Blanco following  - Likely pre-renal in setting of dehydration/hypercalcemia.  - IVF changed to 1/2 NS 1/2 NaHc03 20meq KCL at 50cc/hr   - CK 64 - DAVID unlikely sec. to rhabdo.  - UA + proteinuria; no blood.  - FeNa 1.1% - suggests ATN.  - S.Cr improving  - Monitor BMP and UO    Toxic metabolic encephalopathy.   - Baseline oriented to self and place, currently oriented to self but cannot state where he is, unable to state what the current month/year is, unable to give significant HPI or ROS (seems to be the baseline when compared to his prior admission)  - Likely has some encephalopathy 2/2 severe hypercalcemia -> c/w management as above  - c/w dementia management as below.    Multiple myeloma.   - heme/onc consulted  - Immunofixation with IgG Kappa multiple myeloma    Anemia   - Hgb 6.7 1/29 ---> s/p 1u PRBC- improved    Essential hypertension.   - Not on medications, BPs currently well controlled  - Monitor BPs.    Tachycardic, likely PAT over night 1/31, now in sinus and rate controlled  - started low dose lopressor 25 mg BID for tachy    Dementia  - c/w donepezil, memantine  - c/w risperdal, sertraline, and seroquel as per outpatient medications    On 2/ __ /2024, discussed with Dr. Muñiz, patient is medically cleared for discharge today to NH. All medications were reviewed with attending. 77 y/o Male, from [facility], with a PmHx of Multiple Myeloma, HTN and Dementia, p/w a fall. Found to have significant hypercalcemia.     Hypercalcemia.   - Patient with significant and new hypercalcemia, corrected to 15.1  - PTH low, suppressed by high Ca.  - Total protein high.  - Vit. D 25OH 46.4.  - s/p Zometa 4mg x1 dose and calcitonin 200IU q12hrs x2 doses.  - Renal c/s Dr. Blanco following  - s/p IVF, IMPROVED  - started to have hypocalcemia but corrected was normal    Fall  - Unclear cause of his fall, unclear mechanism, patient unable to state what happened and the NH paperwork does not mention how the patient fell  - CTH negative for bleed  - monitor on telemetry, troponin neg x3  - Cardio c/s Dr. Muñiz following  - No focal neurological deficits noted so low suspicion for neurological causes to his fall.    DAVID  - Baseline S.Cr 0.7-0.9.  - S.Cr 1.39 on admission.  - Renal c/s Dr. Blanco following  - Likely pre-renal in setting of dehydration/hypercalcemia.  - IVF changed to 1/2 NS 1/2 NaHc03 20meq KCL at 50cc/hr   - CK 64 - DAVID unlikely sec. to rhabdo.  - UA + proteinuria; no blood.  - FeNa 1.1% - suggests ATN.  - S.Cr improving  - Monitor BMP and UO    Toxic metabolic encephalopathy.   - Baseline oriented to self and place, currently oriented to self but cannot state where he is, unable to state what the current month/year is, unable to give significant HPI or ROS (seems to be the baseline when compared to his prior admission)  - Likely has some encephalopathy 2/2 severe hypercalcemia -> c/w management as above  - c/w dementia management as below.    Multiple myeloma.   - heme/onc consulted  - Immunofixation with IgG Kappa multiple myeloma    Anemia   - Hgb 6.7 1/29 ---> s/p 1u PRBC- improved    Essential hypertension.   - Not on medications, BPs currently well controlled  - Monitor BPs.    Tachycardic, likely PAT over night 1/31, now in sinus and rate controlled  - started low dose lopressor 25 mg BID for tachy    Dementia  - c/w donepezil, memantine  - c/w risperdal, sertraline, and seroquel as per outpatient medications    On 2/12/2024, discussed with Dr. Muñiz, patient is medically cleared for discharge today to NH. All medications were reviewed with attending.

## 2024-02-02 NOTE — DISCHARGE NOTE PROVIDER - CARE PROVIDERS DIRECT ADDRESSES
,[email or web address],[email or web address] ,DirectAddress_Unknown,DirectAddress_Unknown,[email or web address]

## 2024-02-02 NOTE — DISCHARGE NOTE PROVIDER - NSDCCPCAREPLAN_GEN_ALL_CORE_FT
PRINCIPAL DISCHARGE DIAGNOSIS  Diagnosis: Hypercalcemia  Assessment and Plan of Treatment: You were found to have elevated calcium levels, likely due to your multiple myeloma. You were treated with medication to lower it and now calcium levels are normal. Please follow up with your primary care physician regarding your hospitalization and for further monitoring/management.        SECONDARY DISCHARGE DIAGNOSES  Diagnosis: DAVID (acute kidney injury)  Assessment and Plan of Treatment: Your kidney function was elevated    Diagnosis: Multiple myeloma  Assessment and Plan of Treatment: Please follow up with your primary care physician regarding your hospitalization and for further monitoring/management.      Diagnosis: Fall  Assessment and Plan of Treatment: Your CT head was negative. Please follow up with your primary care physician regarding your hospitalization and for further monitoring/management.      Diagnosis: Essential hypertension  Assessment and Plan of Treatment:     Diagnosis: Dementia  Assessment and Plan of Treatment:      PRINCIPAL DISCHARGE DIAGNOSIS  Diagnosis: Hypercalcemia  Assessment and Plan of Treatment: You were found to have elevated calcium levels, likely due to your multiple myeloma. You were treated with medication to lower it and now calcium levels are normal. Please follow up with your primary care physician regarding your hospitalization and for further monitoring/management.        SECONDARY DISCHARGE DIAGNOSES  Diagnosis: DAVID (acute kidney injury)  Assessment and Plan of Treatment: Your kidney function was elevated, this is now resolved.    Diagnosis: Fall  Assessment and Plan of Treatment: Your CT head was negative. Please follow up with your primary care physician regarding your hospitalization and for further monitoring/management.      Diagnosis: Multiple myeloma  Assessment and Plan of Treatment: Please follow up with your primary care physician regarding your hospitalization and for further monitoring/management.      Diagnosis: Dementia  Assessment and Plan of Treatment: cont    Diagnosis: Essential hypertension  Assessment and Plan of Treatment: Continue blood pressure medication regimen as directed. Monitor for any visual changes, headaches or dizziness.  Monitor blood pressure regularly.  Follow up with your primary care provider for further management for high blood pressure.

## 2024-02-02 NOTE — DIETITIAN INITIAL EVALUATION ADULT - PERTINENT MEDS FT
MEDICATIONS  (STANDING):  cyanocobalamin 1000 MICROGram(s) Oral daily  donepezil 10 milliGRAM(s) Oral at bedtime  folic acid 1 milliGRAM(s) Oral daily  heparin   Injectable 5000 Unit(s) SubCutaneous every 8 hours  memantine 10 milliGRAM(s) Oral two times a day  metoprolol tartrate 25 milliGRAM(s) Oral two times a day  risperiDONE   Tablet 0.25 milliGRAM(s) Oral daily  risperiDONE   Tablet 0.5 milliGRAM(s) Oral at bedtime  sertraline 50 milliGRAM(s) Oral daily  sodium chloride 0.45% 1000 milliLiter(s) (50 mL/Hr) IV Continuous <Continuous>    MEDICATIONS  (PRN):  acetaminophen     Tablet .. 650 milliGRAM(s) Oral every 6 hours PRN Temp greater or equal to 38C (100.4F), Mild Pain (1 - 3)  aluminum hydroxide/magnesium hydroxide/simethicone Suspension 30 milliLiter(s) Oral every 4 hours PRN Dyspepsia  melatonin 3 milliGRAM(s) Oral at bedtime PRN Insomnia  ondansetron Injectable 4 milliGRAM(s) IV Push every 8 hours PRN Nausea and/or Vomiting  QUEtiapine 12.5 milliGRAM(s) Oral every 6 hours PRN for agitation

## 2024-02-02 NOTE — DIETITIAN INITIAL EVALUATION ADULT - ORAL INTAKE PTA/DIET HISTORY
Patient is from a nursing home. From NH documentation, patient was receiving a no added salt, pureed diet in the NH. Patient has no known food allergies, per chart.

## 2024-02-02 NOTE — PROGRESS NOTE ADULT - SUBJECTIVE AND OBJECTIVE BOX
DATE OF SERVICE: 02-02-24    Patient denies chest pain or shortness of breath.   Review of symptoms otherwise limited    Review of Systems:   Constitutional: [ ] fevers, [ ] chills.   Skin: [ ] dry skin. [ ] rashes.  Psychiatric: [ ] depression, [ ] anxiety.   Gastrointestinal: [ ] BRBPR, [ ] melena.   Neurological: [ ] confusion. [ ] seizures. [ ] shuffling gait.   Ears,Nose,Mouth and Throat: [ ] ear pain [ ] sore throat.   Eyes: [ ] diplopia.   Respiratory: [ ] hemoptysis. [ ] shortness of breath  Cardiovascular: See HPI above  Hematologic/Lymphatic: [ ] anemia. [ ] painful nodes. [ ] prolonged bleeding.   Genitourinary: [ ] hematuria. [ ] flank pain.   Endocrine: [ ] significant change in weight. [ ] intolerance to heat and cold.     Review of systems [ x] otherwise negative, [ ] otherwise unable to obtain    FH: no family history of sudden cardiac death in first degree relatives    SH: [ ] tobacco, [ ] alcohol, [ ] drugs    acetaminophen     Tablet .. 650 milliGRAM(s) Oral every 6 hours PRN  aluminum hydroxide/magnesium hydroxide/simethicone Suspension 30 milliLiter(s) Oral every 4 hours PRN  cyanocobalamin 1000 MICROGram(s) Oral daily  donepezil 10 milliGRAM(s) Oral at bedtime  folic acid 1 milliGRAM(s) Oral daily  heparin   Injectable 5000 Unit(s) SubCutaneous every 8 hours  melatonin 3 milliGRAM(s) Oral at bedtime PRN  memantine 10 milliGRAM(s) Oral two times a day  metoprolol tartrate 25 milliGRAM(s) Oral two times a day  ondansetron Injectable 4 milliGRAM(s) IV Push every 8 hours PRN  potassium chloride   Powder 40 milliEquivalent(s) Oral once  QUEtiapine 12.5 milliGRAM(s) Oral every 6 hours PRN  risperiDONE   Tablet 0.25 milliGRAM(s) Oral daily  risperiDONE   Tablet 0.5 milliGRAM(s) Oral at bedtime  sertraline 50 milliGRAM(s) Oral daily  sodium chloride 0.45% 1000 milliLiter(s) IV Continuous <Continuous>                            7.2    6.07  )-----------( 194      ( 02 Feb 2024 07:00 )             21.1       02-02    143  |  116<H>  |  13  ----------------------------<  97  3.3<L>   |  19<L>  |  0.71    Ca    6.8<L>      02 Feb 2024 07:00    TPro  10.0<H>  /  Alb  1.9<L>  /  TBili  0.4  /  DBili  x   /  AST  12  /  ALT  13  /  AlkPhos  51  02-02        T(C): 36.6 (02-02-24 @ 05:15), Max: 37.7 (02-01-24 @ 12:07)  HR: 79 (02-02-24 @ 05:15) (71 - 82)  BP: 123/63 (02-02-24 @ 05:15) (111/61 - 140/61)  RR: 17 (02-02-24 @ 05:15) (17 - 18)  SpO2: 99% (02-02-24 @ 05:15) (95% - 100%)  Wt(kg): --    General:  NAD  Head: Normocephalic and atraumatic.   Neck: No JVD. No bruits. Supple. Does not appear to be enlarged.   Cardiovascular: + S1,S2 ; RRR Soft systolic murmur at the left lower sternal border. No rubs noted.    Lungs: CTA b/l. No rhonchi, rales or wheezes.   Abdomen: + BS, soft. Non tender. Non distended. No rebound. No guarding.   Extremities: No clubbing/cyanosis/edema.   Neurologic: Moves all four extremities. Full range of motion.   Skin: Warm and moist. The patient's skin has normal elasticity and good skin turgor.   Psychiatric: unable to fully assess  Musculoskeletal: Normal range of motion, normal strength     TELEMETRY: 	 Sinus with PAT overnight 160x 2 min    ECG:  	NSR    < from: Transthoracic Echocardiogram (07.10.23 @ 11:15) >  CONCLUSIONS:  1. Calcified trileaflet aortic valve with normal opening.  Minimal aortic regurgitation.  2. Endocardium not well visualized; grossly decreased  possibly mild left ventricular systolic dysfunction.  3. The right ventricle is not well visualized; grossly  normal right ventricular systolic function.  ------------------------------------------------------------------------  Confirmed on  7/10/2023 - 13:57:46 by Jolene Rowan M.D. RPVI  < end of copied text >      ASSESSMENT/PLAN: This is a 78M with history of MM, HTN, and Dementia (AAO x 1-2 to self, to place, not to time at baseline per brother) who presents to the hospital from [facility] after a fall.     Patient is a poor historian 2/2 dementia. As per paperwork from the NH, patient had a fall and was noted to have a hematoma on the occipital head and was sent to the hospital for evaluation. Here imaging were negative for fractures but his work up showed him to have a significantly elevated calcium of 14 (corrected to 15.1) with DAVID and a significantly elevated protein gap. He was given NS 1.5L and repeat BMP showed persistence of the calcium elevation though improving. He was admitted to medicine on telemetry for further evaluation.  Recent admission for influenza, where his lopressor and norvasc were discontinued.    1. HTN/h/o MVR  -  BP stable  - agree with fluids for hypercalcemia  - no EKG changes associated with hypercalcemia  - TTE from last year with mildly decreased LVEF  - s/p PRBC  - Tachycardic, likely PAT over night now in sinus and rate controlled  - continue lopressor 25 mg BID  -DC planning         DATE OF SERVICE: 02-02-24    Patient denies chest pain or shortness of breath.   Review of symptoms otherwise limited    Review of Systems:   Constitutional: [ ] fevers, [ ] chills.   Skin: [ ] dry skin. [ ] rashes.  Psychiatric: [ ] depression, [ ] anxiety.   Gastrointestinal: [ ] BRBPR, [ ] melena.   Neurological: [ ] confusion. [ ] seizures. [ ] shuffling gait.   Ears,Nose,Mouth and Throat: [ ] ear pain [ ] sore throat.   Eyes: [ ] diplopia.   Respiratory: [ ] hemoptysis. [ ] shortness of breath  Cardiovascular: See HPI above  Hematologic/Lymphatic: [ ] anemia. [ ] painful nodes. [ ] prolonged bleeding.   Genitourinary: [ ] hematuria. [ ] flank pain.   Endocrine: [ ] significant change in weight. [ ] intolerance to heat and cold.     Review of systems [ x] otherwise negative, [ ] otherwise unable to obtain    FH: no family history of sudden cardiac death in first degree relatives    SH: [ ] tobacco, [ ] alcohol, [ ] drugs    acetaminophen     Tablet .. 650 milliGRAM(s) Oral every 6 hours PRN  aluminum hydroxide/magnesium hydroxide/simethicone Suspension 30 milliLiter(s) Oral every 4 hours PRN  cyanocobalamin 1000 MICROGram(s) Oral daily  donepezil 10 milliGRAM(s) Oral at bedtime  folic acid 1 milliGRAM(s) Oral daily  heparin   Injectable 5000 Unit(s) SubCutaneous every 8 hours  melatonin 3 milliGRAM(s) Oral at bedtime PRN  memantine 10 milliGRAM(s) Oral two times a day  metoprolol tartrate 25 milliGRAM(s) Oral two times a day  ondansetron Injectable 4 milliGRAM(s) IV Push every 8 hours PRN  potassium chloride   Powder 40 milliEquivalent(s) Oral once  QUEtiapine 12.5 milliGRAM(s) Oral every 6 hours PRN  risperiDONE   Tablet 0.25 milliGRAM(s) Oral daily  risperiDONE   Tablet 0.5 milliGRAM(s) Oral at bedtime  sertraline 50 milliGRAM(s) Oral daily  sodium chloride 0.45% 1000 milliLiter(s) IV Continuous <Continuous>                            7.2    6.07  )-----------( 194      ( 02 Feb 2024 07:00 )             21.1       02-02    143  |  116<H>  |  13  ----------------------------<  97  3.3<L>   |  19<L>  |  0.71    Ca    6.8<L>      02 Feb 2024 07:00    TPro  10.0<H>  /  Alb  1.9<L>  /  TBili  0.4  /  DBili  x   /  AST  12  /  ALT  13  /  AlkPhos  51  02-02        T(C): 36.6 (02-02-24 @ 05:15), Max: 37.7 (02-01-24 @ 12:07)  HR: 79 (02-02-24 @ 05:15) (71 - 82)  BP: 123/63 (02-02-24 @ 05:15) (111/61 - 140/61)  RR: 17 (02-02-24 @ 05:15) (17 - 18)  SpO2: 99% (02-02-24 @ 05:15) (95% - 100%)  Wt(kg): --    General:  NAD  Head: Normocephalic and atraumatic.   Neck: No JVD. No bruits. Supple. Does not appear to be enlarged.   Cardiovascular: + S1,S2 ; RRR Soft systolic murmur at the left lower sternal border. No rubs noted.    Lungs: CTA b/l. No rhonchi, rales or wheezes.   Abdomen: + BS, soft. Non tender. Non distended. No rebound. No guarding.   Extremities: No clubbing/cyanosis/edema.   Neurologic: Moves all four extremities. Full range of motion.   Skin: Warm and moist. The patient's skin has normal elasticity and good skin turgor.   Psychiatric: unable to fully assess  Musculoskeletal: Normal range of motion, normal strength     TELEMETRY: 	 Sinus with PAT overnight 160x 2 min    ECG:  	NSR    < from: Transthoracic Echocardiogram (07.10.23 @ 11:15) >  CONCLUSIONS:  1. Calcified trileaflet aortic valve with normal opening.  Minimal aortic regurgitation.  2. Endocardium not well visualized; grossly decreased  possibly mild left ventricular systolic dysfunction.  3. The right ventricle is not well visualized; grossly  normal right ventricular systolic function.  ------------------------------------------------------------------------  Confirmed on  7/10/2023 - 13:57:46 by Jolene Rowan M.D. RPVI  < end of copied text >      ASSESSMENT/PLAN: This is a 78M with history of MM, HTN, and Dementia (AAO x 1-2 to self, to place, not to time at baseline per brother) who presents to the hospital from [facility] after a fall.     Patient is a poor historian 2/2 dementia. As per paperwork from the NH, patient had a fall and was noted to have a hematoma on the occipital head and was sent to the hospital for evaluation. Here imaging were negative for fractures but his work up showed him to have a significantly elevated calcium of 14 (corrected to 15.1) with DAVID and a significantly elevated protein gap. He was given NS 1.5L and repeat BMP showed persistence of the calcium elevation though improving. He was admitted to medicine on telemetry for further evaluation.  Recent admission for influenza, where his lopressor and norvasc were discontinued.    1. HTN/h/o MVR  -  BP stable  - agree with fluids for hypercalcemia  - no EKG changes associated with hypercalcemia  - TTE from last year with mildly decreased LVEF  - s/p PRBC  - Tachycardic, likely PAT over night now in sinus and rate controlled  - continue lopressor 25 mg BID  - DC planning

## 2024-02-02 NOTE — DIETITIAN INITIAL EVALUATION ADULT - PERTINENT LABORATORY DATA
02-02    143  |  116<H>  |  13  ----------------------------<  97  3.3<L>   |  19<L>  |  0.71    Ca    6.8<L>      02 Feb 2024 07:00    TPro  10.0<H>  /  Alb  1.9<L>  /  TBili  0.4  /  DBili  x   /  AST  12  /  ALT  13  /  AlkPhos  51  02-02

## 2024-02-02 NOTE — PROGRESS NOTE ADULT - SUBJECTIVE AND OBJECTIVE BOX
Patient is a 78y old  Male who presents with a chief complaint of Fall, elevated calcium (02 Feb 2024 14:47)    Date of servie : 02-02-24 @ 15:27  INTERVAL HPI/OVERNIGHT EVENTS:  T(C): 36.6 (02-02-24 @ 15:03), Max: 37.3 (02-01-24 @ 15:30)  HR: 61 (02-02-24 @ 15:03) (61 - 82)  BP: 139/73 (02-02-24 @ 15:03) (112/68 - 140/61)  RR: 18 (02-02-24 @ 15:03) (17 - 18)  SpO2: 99% (02-02-24 @ 15:03) (96% - 100%)  Wt(kg): --  I&O's Summary      LABS:                        7.2    6.07  )-----------( 194      ( 02 Feb 2024 07:00 )             21.1     02-02    143  |  116<H>  |  13  ----------------------------<  97  3.3<L>   |  19<L>  |  0.71    Ca    6.8<L>      02 Feb 2024 07:00    TPro  10.0<H>  /  Alb  1.9<L>  /  TBili  0.4  /  DBili  x   /  AST  12  /  ALT  13  /  AlkPhos  51  02-02      Urinalysis Basic - ( 02 Feb 2024 07:00 )    Color: x / Appearance: x / SG: x / pH: x  Gluc: 97 mg/dL / Ketone: x  / Bili: x / Urobili: x   Blood: x / Protein: x / Nitrite: x   Leuk Esterase: x / RBC: x / WBC x   Sq Epi: x / Non Sq Epi: x / Bacteria: x      CAPILLARY BLOOD GLUCOSE            Urinalysis Basic - ( 02 Feb 2024 07:00 )    Color: x / Appearance: x / SG: x / pH: x  Gluc: 97 mg/dL / Ketone: x  / Bili: x / Urobili: x   Blood: x / Protein: x / Nitrite: x   Leuk Esterase: x / RBC: x / WBC x   Sq Epi: x / Non Sq Epi: x / Bacteria: x        MEDICATIONS  (STANDING):  cyanocobalamin 1000 MICROGram(s) Oral daily  donepezil 10 milliGRAM(s) Oral at bedtime  folic acid 1 milliGRAM(s) Oral daily  heparin   Injectable 5000 Unit(s) SubCutaneous every 8 hours  memantine 10 milliGRAM(s) Oral two times a day  metoprolol tartrate 25 milliGRAM(s) Oral two times a day  risperiDONE   Tablet 0.25 milliGRAM(s) Oral daily  risperiDONE   Tablet 0.5 milliGRAM(s) Oral at bedtime  sertraline 50 milliGRAM(s) Oral daily  sodium chloride 0.45% 1000 milliLiter(s) (50 mL/Hr) IV Continuous <Continuous>    MEDICATIONS  (PRN):  acetaminophen     Tablet .. 650 milliGRAM(s) Oral every 6 hours PRN Temp greater or equal to 38C (100.4F), Mild Pain (1 - 3)  aluminum hydroxide/magnesium hydroxide/simethicone Suspension 30 milliLiter(s) Oral every 4 hours PRN Dyspepsia  melatonin 3 milliGRAM(s) Oral at bedtime PRN Insomnia  ondansetron Injectable 4 milliGRAM(s) IV Push every 8 hours PRN Nausea and/or Vomiting  QUEtiapine 12.5 milliGRAM(s) Oral every 6 hours PRN for agitation          PHYSICAL EXAM:  GENERAL: NAD, well-groomed, well-developed  HEAD:  Atraumatic, Normocephalic  CHEST/LUNG: Clear to percussion bilaterally; No rales, rhonchi, wheezing, or rubs  HEART: Regular rate and rhythm; No murmurs, rubs, or gallops  ABDOMEN: Soft, Nontender, Nondistended; Bowel sounds present  EXTREMITIES:  2+ Peripheral Pulses, No clubbing, cyanosis, or edema  LYMPH: No lymphadenopathy noted  SKIN: No rashes or lesions    Care Discussed with Consultants/Other Providers [ ] YES  [ ] NO

## 2024-02-02 NOTE — DISCHARGE NOTE PROVIDER - PROVIDER TOKENS
PROVIDER:[TOKEN:[[id number]],FOLLOWUP:[Routine]] PROVIDER:[TOKEN:[[id number]],FOLLOWUP:[Routine]],PROVIDER:[TOKEN:[[id number]],FOLLOWUP:[Routine]] PROVIDER:[TOKEN:[[id number]]],FREE:[LAST:[Your],FIRST:[PCP],PHONE:[(   )    -],FAX:[(   )    -],FOLLOWUP:[2 weeks]],PROVIDER:[TOKEN:[[id number]]]

## 2024-02-02 NOTE — PROGRESS NOTE ADULT - ASSESSMENT
78M with history of MM, HTN, and Dementia (AAO x 1-2 to self, to place, not to time at baseline per brother) who presents to the hospital from [facility] after a fall. Patient is a poor historian 2/2 dementia, unable to state what happened. As per paperwork from the NH, patient had a fall and was noted to have a hematoma on the occipital head and was sent to the hospital for evaluation. Here imaging were negative for fractures but his work up showed him to have a significantly elevated calcium of 14 (corrected to 15.1) with DAVID and a significantly elevated protein gap.  Nephrology consulted for DAVID and hypercalemia.    A/P:  DAVID:  Baseline S.Cr 0.7-0.9.  S.Cr 1.39 on admission.  Likely pre-renal in setting of dehydration/hypercalcemia.  Continue IVF - 1/2 NS + 1/2 NaHCO3 + 20meq KCL at 50cc/hr   CK 64 - DAVID unlikely sec. to rhabdo.  UA + proteinuria; no blood.  FeNa 1.1% - suggests ATN.  Renal function remains stable.  Monitor BMP and UO.    HTN:  Controlled.  Avoid hypotension.  Monitor BP.    Hypercalcemia:  Likely sec to MM.  PTH low, suppressed by high Ca.  Total protein high.  Vit. D 25OH 46.4.  S/p Zometa 4mg x1 dose and calcitonin 200IU q12hrs x2 doses.  Hypocalcemia sec. to hypoalbuminemia.  Corrected Ca WNL.  Optimize albumin.  Monitor Ca.    Acidosis:  Hyperchloremic acidosis.  IVF as above  Monitor CO2.    Anemia:  Likely sec to MM vs. other etiology.  Workup per primary team.  Heme/onc on board  Monitor Hgb.  Transfuse for Hgb <8.    Hypophosphatemia:  Likely sec. to poor PO intake.  S/P Kphos 30mmol on 1/31.  Replete w/ Kphos as needed  Monitor PO4 daily.    Hypokalemia:   Likely sec to poor PO intake  replete w/ KCL in IVF + 40mEq of PO KCl today.  monitor K.    Hypomagnesemia:  Likely sec to poor PO intake.  Repleted as needed   Monitor Mg.    Proteinuria:  Possibly sec to MM.  Monitor.

## 2024-02-02 NOTE — PROGRESS NOTE ADULT - ASSESSMENT
This is a 78M with history of MM, HTN, and Dementia (AAO x 1-2 to self, to place, not to time at baseline per brother) who presents to the hospital from [facility] after a fall. Patient is a poor historian 2/2 dementia, unable to state what happened. As per paperwork from the NH, patient had a fall and was noted to have a hematoma on the occipital head and was sent to the hospital for evaluation. Here imaging were negative for fractures but his work up showed him to have a significantly elevated calcium of 14 (corrected to 15.1) with DAVID and a significantly elevated protein gap. He was given NS 1.5L and repeat BMP showed persistence of the calcium elevation though improving. He was admitted to medicine on telemetry for further evaluation.  (26 Jan 2024 23:03)   he can not tell me why is he here and how is his breathing:    he is on room air:  slightly agitated: He was also recently admitted to hospital when he had influenza:   no now cough reported and has no fever: :    s/p fall:  HTN  Multiple Myeloma  Dementia     s/p fall:  -he seems to be doing  ok :  -he is alert and awake but demented:   -on room air   -cxr is normal :"  -ct head with no bleeding noted:   -aspiration precautions  1/28: seems same:  no change in his resp symptoms not in any resp distress:  on room air:  cont to monitor  : ct head is with no ICB   1/29: he seems OK: no sob:  no cough : no phlegm : o n room air  1/30: seems OK: no sob:    1/31: seems comfortable:  babbles words:  no sob:  no phlegm or wheezing:  on room air:   2/1: seems OK: no sob: on room air:   2/2: no issues pulm wise: no sob:   HTN  -controlled  2/2 : controlled  Multiple Myeloma  -per primary team   1/29: hb is pretty low today  :? blood transfusion  today   1/30 ; s/p o ne blood transfusion  yesterday : but h is hb is low:  still:     1/31: still anemic:  defer to primary team  2/1 : hb is stable:    2/2: cont to monitor  : transfuse prn  Dementia   -supportive care    dvt prophylaxis    dw ACP

## 2024-02-02 NOTE — PROGRESS NOTE ADULT - ASSESSMENT
This is a 78M with history of MM, HTN, and Dementia (AAO x 1-2 to self, to place, not to time at baseline per brother) who presents to the hospital from [facility] after a fall.    MM  - history of MM   - from last note seen by heme onc in 7/2023 was noted to be on Revlimid   - will continue to try and reach pt family  -Immunofixation with IgG Kappa multiple myeloma  - hypocalcemia 7.2 s/p Zometa, please replace, renal recs appreciated   -Last H&H 7.3/21.8  -Transfuse for Hgb < 7.0      Camryn Navas NP  Hematology/Oncology  [facility]  [phone] (Office)  [phone] (Alt office)  Evenings and weekends please call MD on call or office

## 2024-02-02 NOTE — PROGRESS NOTE ADULT - ASSESSMENT
78M with history of MM, HTN, and Dementia who presents to the hospital after a fall at his NH here found to have significant hypercalcemia.       Problem/Plan - 1:  ·  Problem: Hypercalcemia.   ·  Plan: -   - Dose calcitonin once patient's weight is available  - heme fu     Problem/Plan - 2:  ·  Problem: Fall.   ·  Plan: - Unclear cause of his fall, unclear mechanism, patient unable to state what happened and the NH paperwork does not mention how the patient fell  - Will monitor on telemetry for now, trend troponin- cars fu     Problem/Plan - 3:·  Problem: DAVID (acute kidney injury). ·  Plan: - New DAVID likely 2/2 severe hypercalcemia -> c/w IVF as above- Monitor I/O  - Trend BUN/Cr    Problem/Plan - 4:  ·  Problem: Toxic metabolic encephalopathy.   ·  Plan: - Baseline oriented to self and place, currently oriented to self but cannot state where he is, unable to state what the current month/year is, unable to give significant HPI or ROS (seems to be the baseline when compared to his prior admission)  - Likely has some encephalopathy 2/2 severe hypercalcemia -> c/w management as above  - c/w dementia management as below.    Problem/Plan - 5:  ·  Problem: Multiple myeloma.   ·  Plan: - Heme CONSULT appreciated   monitor cbc  sp transfusion     Problem/Plan - 6:  ·  Problem: Essential hypertension.   ·  Plan: - Not on medications, BPs currently well controlled  - Monitor BPs.    Problem/Plan - 7:  ·  Problem: Dementia.   ·  Plan: - c/w donepezil, memantine  - c/w risperdal, sertraline, and seroquel as per outpatient medications.    dc planning awaiting rehab bed   dc notice given to patients brother

## 2024-02-02 NOTE — DISCHARGE NOTE PROVIDER - NSDCMRMEDTOKEN_GEN_ALL_CORE_FT
acetaminophen 325 mg oral tablet: 2 tab(s) orally every 6 hours As needed Temp greater or equal to 38C (100.4F), Mild Pain (1 - 3)  cholecalciferol oral tablet: 1000 unit(s) orally once a day  cyanocobalamin 1000 mcg oral tablet: 1 orally once a day  donepezil 10 mg oral tablet: 1 orally once a day (at bedtime)  folic acid 1 mg oral tablet: 1 tab(s) orally once a day  memantine 10 mg oral tablet: 1 orally 2 times a day  RisperDAL 0.25 mg oral tablet: 1 tab(s) orally once a day  RisperDAL 0.5 mg oral tablet: 1 tab(s) orally once a day (at bedtime)  Seroquel 25 mg oral tablet: 0.5 tab(s) orally every 6 hours as needed for  agitation  sertraline 50 mg oral tablet: 1 tab(s) orally once a day   cholecalciferol oral tablet: 400 unit(s) orally once a day  cyanocobalamin 1000 mcg oral tablet: 1 tab(s) orally once a day  donepezil 10 mg oral tablet: 1 tab(s) orally once a day (at bedtime)  folic acid 1 mg oral tablet: 1 tab(s) orally once a day  memantine 10 mg oral tablet: 1 tab(s) orally 2 times a day  metoprolol tartrate 25 mg oral tablet: 1 tab(s) orally 2 times a day  risperiDONE 0.25 mg oral tablet: 1 tab(s) orally once a day (in the morning)  risperiDONE 0.5 mg oral tablet: 1 tab(s) orally once a day (at bedtime)  Seroquel 25 mg oral tablet: 0.5 tab(s) orally every 6 hours as needed for  agitation  sertraline 50 mg oral tablet: 1 tab(s) orally once a day

## 2024-02-02 NOTE — PROGRESS NOTE ADULT - SUBJECTIVE AND OBJECTIVE BOX
Date of Service: 02-02-24 @ 12:28    Patient is a 78y old  Male who presents with a chief complaint of Fall, elevated calcium (02 Feb 2024 11:05)      Any change in ROS:  doing  ok : nom sob:  no cough :     MEDICATIONS  (STANDING):  cyanocobalamin 1000 MICROGram(s) Oral daily  donepezil 10 milliGRAM(s) Oral at bedtime  folic acid 1 milliGRAM(s) Oral daily  heparin   Injectable 5000 Unit(s) SubCutaneous every 8 hours  memantine 10 milliGRAM(s) Oral two times a day  metoprolol tartrate 25 milliGRAM(s) Oral two times a day  potassium chloride   Powder 40 milliEquivalent(s) Oral once  risperiDONE   Tablet 0.25 milliGRAM(s) Oral daily  risperiDONE   Tablet 0.5 milliGRAM(s) Oral at bedtime  sertraline 50 milliGRAM(s) Oral daily  sodium chloride 0.45% 1000 milliLiter(s) (50 mL/Hr) IV Continuous <Continuous>    MEDICATIONS  (PRN):  acetaminophen     Tablet .. 650 milliGRAM(s) Oral every 6 hours PRN Temp greater or equal to 38C (100.4F), Mild Pain (1 - 3)  aluminum hydroxide/magnesium hydroxide/simethicone Suspension 30 milliLiter(s) Oral every 4 hours PRN Dyspepsia  melatonin 3 milliGRAM(s) Oral at bedtime PRN Insomnia  ondansetron Injectable 4 milliGRAM(s) IV Push every 8 hours PRN Nausea and/or Vomiting  QUEtiapine 12.5 milliGRAM(s) Oral every 6 hours PRN for agitation    Vital Signs Last 24 Hrs  T(C): 36.6 (02 Feb 2024 05:15), Max: 37.3 (01 Feb 2024 15:30)  T(F): 97.9 (02 Feb 2024 05:15), Max: 99.1 (01 Feb 2024 15:30)  HR: 79 (02 Feb 2024 05:15) (71 - 82)  BP: 123/63 (02 Feb 2024 05:15) (112/68 - 140/61)  BP(mean): --  RR: 17 (02 Feb 2024 05:15) (17 - 18)  SpO2: 99% (02 Feb 2024 05:15) (96% - 100%)    Parameters below as of 02 Feb 2024 05:15  Patient On (Oxygen Delivery Method): room air        I&O's Summary        Physical Exam:   GENERAL: NAD, well-groomed, well-developed  HEENT: CHRISTINA/   Atraumatic, Normocephalic  ENMT: No tonsillar erythema, exudates, or enlargement; Moist mucous membranes, Good dentition, No lesions  NECK: Supple, No JVD, Normal thyroid  CHEST/LUNG: Clear to auscultaion  CVS: Regular rate and rhythm; No murmurs, rubs, or gallops  GI: : Soft, Nontender, Nondistended; Bowel sounds present  NERVOUS SYSTEM:  Alert & awake:  x 0  EXTREMITIES:- edema  LYMPH: No lymphadenopathy noted  SKIN: No rashes or lesions  ENDOCRINOLOGY: No Thyromegaly  PSYCH: demented    Labs:                              7.2    6.07  )-----------( 194      ( 02 Feb 2024 07:00 )             21.1                         7.3    6.53  )-----------( 198      ( 01 Feb 2024 06:35 )             21.8                         7.5    5.59  )-----------( 193      ( 31 Jan 2024 07:00 )             22.5                         7.7    3.62  )-----------( 201      ( 30 Jan 2024 07:14 )             23.4                         8.0    5.12  )-----------( 190      ( 29 Jan 2024 18:27 )             24.0     02-02    143  |  116<H>  |  13  ----------------------------<  97  3.3<L>   |  19<L>  |  0.71  02-01    143  |  115<H>  |  13  ----------------------------<  98  3.5   |  17<L>  |  0.70  01-31    142  |  116<H>  |  14  ----------------------------<  95  3.1<L>   |  19<L>  |  0.74  01-30    142  |  115<H>  |  14  ----------------------------<  111<H>  3.6   |  23  |  0.83  01-29    143  |  114<H>  |  18  ----------------------------<  122<H>  3.7   |  22  |  0.90    Ca    6.8<L>      02 Feb 2024 07:00  Ca    7.2<L>      01 Feb 2024 06:35    TPro  10.0<H>  /  Alb  1.9<L>  /  TBili  0.4  /  DBili  x   /  AST  12  /  ALT  13  /  AlkPhos  51  02-02  TPro  9.5<H>  /  Alb  1.8<L>  /  TBili  0.2  /  DBili  x   /  AST  14  /  ALT  13  /  AlkPhos  53  02-01  TPro  9.5<H>  /  Alb  1.8<L>  /  TBili  0.2  /  DBili  x   /  AST  13  /  ALT  8   /  AlkPhos  51  01-31  TPro  9.7<H>  /  Alb  2.0<L>  /  TBili  0.4  /  DBili  x   /  AST  18  /  ALT  12  /  AlkPhos  52  01-30    CAPILLARY BLOOD GLUCOSE          LIVER FUNCTIONS - ( 02 Feb 2024 07:00 )  Alb: 1.9 g/dL / Pro: 10.0 g/dL / ALK PHOS: 51 U/L / ALT: 13 U/L / AST: 12 U/L / GGT: x             Urinalysis Basic - ( 02 Feb 2024 07:00 )    Color: x / Appearance: x / SG: x / pH: x  Gluc: 97 mg/dL / Ketone: x  / Bili: x / Urobili: x   Blood: x / Protein: x / Nitrite: x   Leuk Esterase: x / RBC: x / WBC x   Sq Epi: x / Non Sq Epi: x / Bacteria: x      rd< from: CT Head No Cont (01.26.24 @ 20:21) >      CT cervical spine:    Cervical alignment is maintained. Cervical vertebral body heights are   within normal limits. Mild age-indeterminate compression deformities at   the superior endplates of T1 and T2, without bony retropulsion.   Multilevel intervertebral disc height loss,disc osteophyte complexes,   and bilateral facet and uncovertebral arthropathy.    There is no prevertebral soft tissue swelling. The paraspinal soft   tissues are unremarkable. The lung apices are clear.      IMPRESSION:    CT head: No acute intracranial hemorrhage or mass effect.    CT cervical spine:  No evidence for acute displaced fracture or malalignment of the cervical   spine.  Mild age-indeterminate compression deformities at the superior endplates   of T1 and T2, without bony retropulsion.    < end of copied text >  < from: Xray Chest 1 View- PORTABLE-Urgent (Xray Chest 1 View- PORTABLE-Urgent .) (01.26.24 @ 17:35) >  AIDEEOV     PROCEDURE DATE:  01/26/2024          INTERPRETATION:  EXAMINATION: XR CHEST URGENT    CLINICAL INDICATION: fall    TECHNIQUE: Single frontal portable view of the chest from 1/26/2024 5:35   PM    COMPARISON: Chest x-ray 1/2/2024.    FINDINGS:  Sternotomy wires.  The heart size is not accurately assessed on this projection.  Hazy left lung base opacity with low lung volumes, likely represents   atelectasis.  There is no pneumothorax or pleural effusion.    IMPRESSION:  No focal consolidations.  No acute traumatic findings.    --- End of Report ---          TOMAS MASSEY MD; Resident Radiologist  This document has been electronically signed.  KOFI GARCIA MD; Attending Radiologist  This document has been electronically signed. Jan 26 2024  6:15PM    < end of copied text >        RECENT CULTURES:        RESPIRATORY CULTURES:          Studies  Chest X-RAY  CT SCAN Chest   Venous Dopplers: LE:   CT Abdomen  Others

## 2024-02-02 NOTE — DIETITIAN INITIAL EVALUATION ADULT - OTHER INFO
78M with history of MM, HTN, and Dementia who presents to the hospital after a fall at his NH here found to have significant hypercalcemia, per chart.     Patient is alert, confused during visit, unable to conduct nutrition interview. Per RN by bedside, patient consumed ~75% of breakfast. Patient requires assistance in feeding, noted with one po intake of 26-50% and 1x 51-75% documented on RN flow sheet. No reports of any difficulty chewing or swallowing current diet consistency noted at this time. No recent episodes of any nausea, vomiting, diarrhea, constipation reported at this time. No bowel movement documented on RN flow sheet. Patient is on cyanocobalamin, folic acid. Noted low vitamin d lab- 14.3(1/27), K- 3.3(2/2), phos-1.3(1/30). Consider adding vitamin d for micronutrient support. Monitor and replete electrolytes (K, phos). Current weight: 81.6kg/180lbs (1/27, per RN flow sheet). Per Tristan PERRY, weight history: 85.7kg (7/11/2023). Noted patient has weight loss of -4.1kg/-4.8%BW x 6months. Continue to monitor weight trend.

## 2024-02-02 NOTE — DISCHARGE NOTE PROVIDER - CARE PROVIDER_API CALL
[facility]  Internal Medicine  [address] [place] [address]  Phone: [phone]  Fax: [phone]  Follow Up Time: Routine   Kuldeep Muñiz  Internal Medicine  [address] [place] [address]  Phone: [phone]  Fax: [phone]  Follow Up Time: Routine    Staci Muñiz  Interventional Cardiology  [address] [place] [address], [address]  [place] [address]  Phone: [phone]  Fax: [phone]  Follow Up Time: Routine   Staci Muñiz  Interventional Cardiology  [address] [facility], [address]  [place] [address]  Phone: [phone]  Fax: [phone]  Follow Up Time:     Your, PCP  Phone: (   )    -  Fax: (   )    -  Follow Up Time: 2 weeks    Matthew Blanco  Nephrology  [address]  [place] [address]  Phone: [phone]  Fax: [phone]  Follow Up Time:

## 2024-02-02 NOTE — PROGRESS NOTE ADULT - SUBJECTIVE AND OBJECTIVE BOX
[facility]   MD ANEL FOLEY MD ANGELA WONG, PA QIAN CHEN, NP    TEL:  OFFICE: [phone]  From 5pm-7am Answering Service [phone]    -- RENAL FOLLOW UP NOTE ---Date of Service 02-02-24 @ 14:47    Patient is a 78y old  Male who presents with a chief complaint of Fall, elevated calcium.    Patient seen and examined at bedside. No chest pain/SOB.      VITALS:  T(F): 97.9 (02-02-24 @ 05:15), Max: 99.1 (02-01-24 @ 15:30)  HR: 79 (02-02-24 @ 05:15)  BP: 123/63 (02-02-24 @ 05:15)  RR: 17 (02-02-24 @ 05:15)  SpO2: 99% (02-02-24 @ 05:15)  Wt(kg): --      PHYSICAL EXAM:  General: NAD  Neck: No JVD  Respiratory: CTAB, no wheezes, rales or rhonchi  Cardiovascular: S1, S2, RRR  Gastrointestinal: BS+, soft, NT/ND  Extremities: No peripheral edema    Hospital Medications:   MEDICATIONS  (STANDING):  cyanocobalamin 1000 MICROGram(s) Oral daily  donepezil 10 milliGRAM(s) Oral at bedtime  folic acid 1 milliGRAM(s) Oral daily  heparin   Injectable 5000 Unit(s) SubCutaneous every 8 hours  memantine 10 milliGRAM(s) Oral two times a day  metoprolol tartrate 25 milliGRAM(s) Oral two times a day  risperiDONE   Tablet 0.25 milliGRAM(s) Oral daily  risperiDONE   Tablet 0.5 milliGRAM(s) Oral at bedtime  sertraline 50 milliGRAM(s) Oral daily  sodium chloride 0.45% 1000 milliLiter(s) (50 mL/Hr) IV Continuous <Continuous>      LABS:  02-02    143  |  116<H>  |  13  ----------------------------<  97  3.3<L>   |  19<L>  |  0.71    Ca    6.8<L>      02 Feb 2024 07:00    TPro  10.0<H>  /  Alb  1.9<L>  /  TBili  0.4  /  DBili      /  AST  12  /  ALT  13  /  AlkPhos  51  02-02    Creatinine Trend: 0.71 <--, 0.70 <--, 0.74 <--, 0.83 <--, 0.90 <--, 0.93 <--, 0.91 <--, 1.00 <--, 1.19 <--, 1.33 <--, 1.39 <--    Albumin: 1.9 g/dL (02-02 @ 07:00)                          7.2    6.07  )-----------( 194      ( 02 Feb 2024 07:00 )             21.1     Urine Studies:  Urinalysis - [02-02-24 @ 07:00]      Color  / Appearance  / SG  / pH       Gluc 97 / Ketone   / Bili  / Urobili        Blood  / Protein  / Leuk Est  / Nitrite       RBC  / WBC  / Hyaline  / Gran  / Sq Epi  / Non Sq Epi  / Bacteria     Urine Creatinine 57      [01-28-24 @ 05:38]  Urine Sodium 87      [01-28-24 @ 05:38]    PTH -- (Ca --)      [01-27-24 @ 10:56]   8  PTH -- (Ca --)      [01-14-24 @ 06:50]   5  PTH -- (Ca --)      [01-13-24 @ 06:10]   6  Vitamin D (25OH) 46.4      [01-27-24 @ 10:56]      Immunofixation Serum:   Corresponding IgG and kappa bands consistent with monoclonal IgG kappa  protein. ADITYA Huggins M.D.  Reference Range: None Detected      [01-27-24 @ 10:56]  SPEP Interpretation: Distinct band in Gamma region with suppression of normal Gammaglobulin  suggestive of Monoclonal Gammopathy. Serum and Urine Immunofixation  Electrophoresis are recommended if not previously performed.  Clara Covington M.D.      [01-27-24 @ 10:56]

## 2024-02-03 LAB
ALBUMIN SERPL ELPH-MCNC: 1.7 G/DL — LOW (ref 3.3–5)
ALP SERPL-CCNC: 51 U/L — SIGNIFICANT CHANGE UP (ref 40–120)
ALT FLD-CCNC: 13 U/L — SIGNIFICANT CHANGE UP (ref 4–41)
ANION GAP SERPL CALC-SCNC: 6 MMOL/L — LOW (ref 7–14)
AST SERPL-CCNC: 12 U/L — SIGNIFICANT CHANGE UP (ref 4–40)
BASOPHILS # BLD AUTO: 0.02 K/UL — SIGNIFICANT CHANGE UP (ref 0–0.2)
BASOPHILS NFR BLD AUTO: 0.4 % — SIGNIFICANT CHANGE UP (ref 0–2)
BILIRUB SERPL-MCNC: 0.3 MG/DL — SIGNIFICANT CHANGE UP (ref 0.2–1.2)
BUN SERPL-MCNC: 13 MG/DL — SIGNIFICANT CHANGE UP (ref 7–23)
CALCIUM SERPL-MCNC: 6.6 MG/DL — LOW (ref 8.4–10.5)
CHLORIDE SERPL-SCNC: 116 MMOL/L — HIGH (ref 98–107)
CO2 SERPL-SCNC: 19 MMOL/L — LOW (ref 22–31)
CREAT SERPL-MCNC: 0.66 MG/DL — SIGNIFICANT CHANGE UP (ref 0.5–1.3)
EGFR: 97 ML/MIN/1.73M2 — SIGNIFICANT CHANGE UP
EOSINOPHIL # BLD AUTO: 0.19 K/UL — SIGNIFICANT CHANGE UP (ref 0–0.5)
EOSINOPHIL NFR BLD AUTO: 4 % — SIGNIFICANT CHANGE UP (ref 0–6)
GLUCOSE SERPL-MCNC: 93 MG/DL — SIGNIFICANT CHANGE UP (ref 70–99)
HCT VFR BLD CALC: 21.6 % — LOW (ref 39–50)
HGB BLD-MCNC: 7.1 G/DL — LOW (ref 13–17)
IANC: 3.2 K/UL — SIGNIFICANT CHANGE UP (ref 1.8–7.4)
IMM GRANULOCYTES NFR BLD AUTO: 0.6 % — SIGNIFICANT CHANGE UP (ref 0–0.9)
LYMPHOCYTES # BLD AUTO: 0.84 K/UL — LOW (ref 1–3.3)
LYMPHOCYTES # BLD AUTO: 17.8 % — SIGNIFICANT CHANGE UP (ref 13–44)
MAGNESIUM SERPL-MCNC: 1.8 MG/DL — SIGNIFICANT CHANGE UP (ref 1.6–2.6)
MCHC RBC-ENTMCNC: 32.9 GM/DL — SIGNIFICANT CHANGE UP (ref 32–36)
MCHC RBC-ENTMCNC: 33 PG — SIGNIFICANT CHANGE UP (ref 27–34)
MCV RBC AUTO: 100.5 FL — HIGH (ref 80–100)
MONOCYTES # BLD AUTO: 0.45 K/UL — SIGNIFICANT CHANGE UP (ref 0–0.9)
MONOCYTES NFR BLD AUTO: 9.5 % — SIGNIFICANT CHANGE UP (ref 2–14)
NEUTROPHILS # BLD AUTO: 3.2 K/UL — SIGNIFICANT CHANGE UP (ref 1.8–7.4)
NEUTROPHILS NFR BLD AUTO: 67.7 % — SIGNIFICANT CHANGE UP (ref 43–77)
NRBC # BLD: 0 /100 WBCS — SIGNIFICANT CHANGE UP (ref 0–0)
NRBC # FLD: 0 K/UL — SIGNIFICANT CHANGE UP (ref 0–0)
PHOSPHATE SERPL-MCNC: 1.1 MG/DL — LOW (ref 2.5–4.5)
PLATELET # BLD AUTO: 211 K/UL — SIGNIFICANT CHANGE UP (ref 150–400)
POTASSIUM SERPL-MCNC: 3.7 MMOL/L — SIGNIFICANT CHANGE UP (ref 3.5–5.3)
POTASSIUM SERPL-SCNC: 3.7 MMOL/L — SIGNIFICANT CHANGE UP (ref 3.5–5.3)
PROT SERPL-MCNC: 9.9 G/DL — HIGH (ref 6–8.3)
RBC # BLD: 2.15 M/UL — LOW (ref 4.2–5.8)
RBC # FLD: 15.9 % — HIGH (ref 10.3–14.5)
SODIUM SERPL-SCNC: 141 MMOL/L — SIGNIFICANT CHANGE UP (ref 135–145)
WBC # BLD: 4.73 K/UL — SIGNIFICANT CHANGE UP (ref 3.8–10.5)
WBC # FLD AUTO: 4.73 K/UL — SIGNIFICANT CHANGE UP (ref 3.8–10.5)

## 2024-02-03 RX ORDER — POTASSIUM PHOSPHATE, MONOBASIC POTASSIUM PHOSPHATE, DIBASIC 236; 224 MG/ML; MG/ML
30 INJECTION, SOLUTION INTRAVENOUS ONCE
Refills: 0 | Status: COMPLETED | OUTPATIENT
Start: 2024-02-03 | End: 2024-02-03

## 2024-02-03 RX ADMIN — SERTRALINE 50 MILLIGRAM(S): 25 TABLET, FILM COATED ORAL at 13:15

## 2024-02-03 RX ADMIN — RISPERIDONE 0.5 MILLIGRAM(S): 4 TABLET ORAL at 21:21

## 2024-02-03 RX ADMIN — DONEPEZIL HYDROCHLORIDE 10 MILLIGRAM(S): 10 TABLET, FILM COATED ORAL at 21:21

## 2024-02-03 RX ADMIN — Medication 400 UNIT(S): at 13:14

## 2024-02-03 RX ADMIN — Medication 25 MILLIGRAM(S): at 18:22

## 2024-02-03 RX ADMIN — MEMANTINE HYDROCHLORIDE 10 MILLIGRAM(S): 10 TABLET ORAL at 04:39

## 2024-02-03 RX ADMIN — POTASSIUM PHOSPHATE, MONOBASIC POTASSIUM PHOSPHATE, DIBASIC 83.33 MILLIMOLE(S): 236; 224 INJECTION, SOLUTION INTRAVENOUS at 13:17

## 2024-02-03 RX ADMIN — PREGABALIN 1000 MICROGRAM(S): 225 CAPSULE ORAL at 13:12

## 2024-02-03 RX ADMIN — Medication 25 MILLIGRAM(S): at 04:39

## 2024-02-03 RX ADMIN — RISPERIDONE 0.25 MILLIGRAM(S): 4 TABLET ORAL at 13:14

## 2024-02-03 RX ADMIN — Medication 1 MILLIGRAM(S): at 13:13

## 2024-02-03 RX ADMIN — HEPARIN SODIUM 5000 UNIT(S): 5000 INJECTION INTRAVENOUS; SUBCUTANEOUS at 04:39

## 2024-02-03 RX ADMIN — MEMANTINE HYDROCHLORIDE 10 MILLIGRAM(S): 10 TABLET ORAL at 18:22

## 2024-02-03 NOTE — PROGRESS NOTE ADULT - SUBJECTIVE AND OBJECTIVE BOX
DATE OF SERVICE: 02-03-24    Patient denies chest pain or shortness of breath. Still with runs of PAT on tele.  Review of symptoms otherwise limited    Review of Systems:   Constitutional: [ ] fevers, [ ] chills.   Skin: [ ] dry skin. [ ] rashes.  Psychiatric: [ ] depression, [ ] anxiety.   Gastrointestinal: [ ] BRBPR, [ ] melena.   Neurological: [ ] confusion. [ ] seizures. [ ] shuffling gait.   Ears,Nose,Mouth and Throat: [ ] ear pain [ ] sore throat.   Eyes: [ ] diplopia.   Respiratory: [ ] hemoptysis. [ ] shortness of breath  Cardiovascular: See HPI above  Hematologic/Lymphatic: [ ] anemia. [ ] painful nodes. [ ] prolonged bleeding.   Genitourinary: [ ] hematuria. [ ] flank pain.   Endocrine: [ ] significant change in weight. [ ] intolerance to heat and cold.     Review of systems [ x] otherwise negative, [ ] otherwise unable to obtain    FH: no family history of sudden cardiac death in first degree relatives    SH: [ ] tobacco, [ ] alcohol, [ ] drugs    acetaminophen     Tablet .. 650 milliGRAM(s) Oral every 6 hours PRN  aluminum hydroxide/magnesium hydroxide/simethicone Suspension 30 milliLiter(s) Oral every 4 hours PRN  cholecalciferol 400 Unit(s) Oral daily  cyanocobalamin 1000 MICROGram(s) Oral daily  donepezil 10 milliGRAM(s) Oral at bedtime  folic acid 1 milliGRAM(s) Oral daily  heparin   Injectable 5000 Unit(s) SubCutaneous every 8 hours  melatonin 3 milliGRAM(s) Oral at bedtime PRN  memantine 10 milliGRAM(s) Oral two times a day  metoprolol tartrate 25 milliGRAM(s) Oral two times a day  ondansetron Injectable 4 milliGRAM(s) IV Push every 8 hours PRN  QUEtiapine 12.5 milliGRAM(s) Oral every 6 hours PRN  risperiDONE   Tablet 0.25 milliGRAM(s) Oral daily  risperiDONE   Tablet 0.5 milliGRAM(s) Oral at bedtime  sertraline 50 milliGRAM(s) Oral daily  sodium chloride 0.45% 1000 milliLiter(s) IV Continuous <Continuous>                            7.1    4.73  )-----------( 211      ( 03 Feb 2024 06:00 )             21.6         141  |  116<H>  |  13  ----------------------------<  93  3.7   |  19<L>  |  0.66    Ca    6.6<L>      03 Feb 2024 06:00  Phos  1.1     02-03  Mg     1.80     02-03    TPro  9.9<H>  /  Alb  1.7<L>  /  TBili  0.3  /  DBili  x   /  AST  12  /  ALT  13  /  AlkPhos  51  02-03      T(C): 37.1 (02-03-24 @ 12:38), Max: 37.1 (02-03-24 @ 12:38)  HR: 78 (02-03-24 @ 12:38) (61 - 78)  BP: 104/50 (02-03-24 @ 12:38) (104/50 - 139/73)  RR: 18 (02-03-24 @ 12:38) (17 - 18)  SpO2: 97% (02-03-24 @ 12:38) (97% - 99%)  Wt(kg): --    I&O's Summary    02 Feb 2024 07:01  -  03 Feb 2024 07:00  --------------------------------------------------------  IN: 800 mL / OUT: 0 mL / NET: 800 mL      General:  NAD  Head: Normocephalic and atraumatic.   Neck: No JVD. No bruits. Supple. Does not appear to be enlarged.   Cardiovascular: + S1,S2 ; RRR Soft systolic murmur at the left lower sternal border. No rubs noted.    Lungs: CTA b/l. No rhonchi, rales or wheezes.   Abdomen: + BS, soft. Non tender. Non distended. No rebound. No guarding.   Extremities: No clubbing/cyanosis/edema.   Neurologic: Moves all four extremities. Full range of motion.   Skin: Warm and moist. The patient's skin has normal elasticity and good skin turgor.   Psychiatric: unable to fully assess  Musculoskeletal: Normal range of motion, normal strength     TELEMETRY: 	 Sinus with PAT overnight 160x 2 min    ECG:  	NSR    < from: Transthoracic Echocardiogram (07.10.23 @ 11:15) >  CONCLUSIONS:  1. Calcified trileaflet aortic valve with normal opening.  Minimal aortic regurgitation.  2. Endocardium not well visualized; grossly decreased  possibly mild left ventricular systolic dysfunction.  3. The right ventricle is not well visualized; grossly  normal right ventricular systolic function.  ------------------------------------------------------------------------  Confirmed on  7/10/2023 - 13:57:46 by MARLA Vegas  < end of copied text >      ASSESSMENT/PLAN: This is a 78M with history of MM, HTN, and Dementia (AAO x 1-2 to self, to place, not to time at baseline per brother) who presents to the hospital from [facility] after a fall.     Patient is a poor historian 2/2 dementia. As per paperwork from the NH, patient had a fall and was noted to have a hematoma on the occipital head and was sent to the hospital for evaluation. Here imaging were negative for fractures but his work up showed him to have a significantly elevated calcium of 14 (corrected to 15.1) with DAVID and a significantly elevated protein gap. He was given NS 1.5L and repeat BMP showed persistence of the calcium elevation though improving. He was admitted to medicine on telemetry for further evaluation.  Recent admission for influenza, where his lopressor and norvasc were discontinued.    1. HTN/h/o MVR  -  BP stable  - agree with fluids for hypercalcemia  - no EKG changes associated with hypercalcemia  - TTE from last year with mildly decreased LVEF  - s/p PRBC, H/H downtrending again, F/U medicine  - continue lopressor 25 mg BID for PAT.  BP soft today, and given downtrending H/H would not increase

## 2024-02-03 NOTE — PROGRESS NOTE ADULT - ASSESSMENT
This is a 78M with history of MM, HTN, and Dementia (AAO x 1-2 to self, to place, not to time at baseline per brother) who presents to the hospital from [facility] after a fall.    MM  - history of MM   - from last note seen by heme onc in 7/2023 was noted to be on Revlimid   - will continue to try and reach pt family  -Immunofixation with IgG Kappa multiple myeloma  - hypocalcemia 6.6 today. s/p Zometa, please replace, renal recs appreciated   -Last H&H 7.1/21.  Trending down slowly.  Cont daily CBC.  -Transfuse for Hgb < 7.0

## 2024-02-03 NOTE — PROGRESS NOTE ADULT - SUBJECTIVE AND OBJECTIVE BOX
GUS DELUNA  78y  Patient is a 78y old  Male who presents with a chief complaint of Fall, elevated calcium (2024 16:11)    HPI:  Followed for DAVID and Hypercalcemia.      HEALTH ISSUES - PROBLEM Dx:  Hypercalcemia    Fall    DAVID (acute kidney injury)    Toxic metabolic encephalopathy    Multiple myeloma    Essential hypertension    Dementia    Advanced care planning/counseling discussion    Need for prophylactic measure          MEDICATIONS  (STANDING):  cholecalciferol 400 Unit(s) Oral daily  cyanocobalamin 1000 MICROGram(s) Oral daily  donepezil 10 milliGRAM(s) Oral at bedtime  folic acid 1 milliGRAM(s) Oral daily  heparin   Injectable 5000 Unit(s) SubCutaneous every 8 hours  memantine 10 milliGRAM(s) Oral two times a day  metoprolol tartrate 25 milliGRAM(s) Oral two times a day  risperiDONE   Tablet 0.5 milliGRAM(s) Oral at bedtime  risperiDONE   Tablet 0.25 milliGRAM(s) Oral daily  sertraline 50 milliGRAM(s) Oral daily  sodium chloride 0.45% 1000 milliLiter(s) (50 mL/Hr) IV Continuous <Continuous>    MEDICATIONS  (PRN):  acetaminophen     Tablet .. 650 milliGRAM(s) Oral every 6 hours PRN Temp greater or equal to 38C (100.4F), Mild Pain (1 - 3)  aluminum hydroxide/magnesium hydroxide/simethicone Suspension 30 milliLiter(s) Oral every 4 hours PRN Dyspepsia  melatonin 3 milliGRAM(s) Oral at bedtime PRN Insomnia  ondansetron Injectable 4 milliGRAM(s) IV Push every 8 hours PRN Nausea and/or Vomiting  QUEtiapine 12.5 milliGRAM(s) Oral every 6 hours PRN for agitation    Vital Signs Last 24 Hrs  T(C): 37 (2024 18:15), Max: 37.1 (2024 12:38)  T(F): 98.6 (2024 18:15), Max: 98.7 (2024 12:38)  HR: 75 (2024 18:15) (69 - 78)  BP: 116/69 (2024 18:15) (104/50 - 132/69)  BP(mean): --  RR: 18 (2024 18:15) (17 - 18)  SpO2: 98% (2024 18:15) (97% - 98%)    Parameters below as of 2024 18:15  Patient On (Oxygen Delivery Method): room air      Daily     Daily     PHYSICAL EXAM:  Constitutional:   appears comfortable and not distressed. Not diaphoretic.    Neck:  The thyroid is normal. Trachea is midline.     Breasts: Normal examination.    Respiratory: The lungs are clear to auscultation. No dullness and expansion is normal.    Cardiovascular: S1 and S2 are normal. No mummurs, rubs or gallops are present.    Gastrointestinal: The abdomen is soft. No tenderness is present. No masses are present. Bowel sounds are normal.    Genitourinary: The bladder is not distended. No CVA tenderness is present.    Extremities: No edema is noted. No deformities are present.    Neurological:  Tone, power and sensation are normal.     Skin: No lesions are seen  or palpated.    Lymph Nodes: No lymphadenopathy is present.    Psychiatric: Mood is appropriate. No hallucinations or flight of ideas are noted.                              7.1    4.73  )-----------( 211      ( 2024 06:00 )             21.6     02-03    141  |  116<H>  |  13  ----------------------------<  93  3.7   |  19<L>  |  0.66  Urinalysis (24 @ 05:38)   Glucose Qualitative, Urine: Negative mg/dL  Blood, Urine: Negative  pH Urine: 6.0  Color: Yellow  Urine Appearance: Clear  Bilirubin: Negative  Ketone - Urine: Negative mg/dL  Specific Gravity: 1.015  Protein, Urine: 30 mg/dL  Urobilinogen: 0.2 mg/dL  Nitrite: Negative  Leukocyte Esterase Concentration: Negative    Ca    6.6<L>      2024 06:00  Phos  1.1     02-03  Mg     1.80     02-03    TPro  9.9<H>  /  Alb  1.7<L>  /  TBili  0.3  /  DBili  x   /  AST  12  /  ALT  13  /  AlkPhos  51  02-03    Immunoglobulins (IgG, IgA, IgM), Serum (24 @ 06:35)   Quantitative Ig mg/dL  Quantitative IgA: 12 mg/dL  Quantitative IgM: 15 mg/dLImmunofixation, Serum (24 @ 10:56)   Immunofixation, Serum:   Corresponding IgG and kappa bands consistent with monoclonal IgG kappa   Serum Protein Electrophoresis Interp: Distinct band in Gamma region with suppression of normal Gammaglobulin   suggestive of Monoclonal Gammopathy. Serum and Urine Immunofixation

## 2024-02-03 NOTE — PROGRESS NOTE ADULT - ASSESSMENT
Agree with above assessment and plan as outlined above.    - cont lopressor for JASPREET Hair MD, [facility]  [place]EEPER [phone]

## 2024-02-03 NOTE — PROGRESS NOTE ADULT - ASSESSMENT
This is a 78M with history of MM, HTN, and Dementia (AAO x 1-2 to self, to place, not to time at baseline per brother) who presents to the hospital from [facility] after a fall. Patient is a poor historian 2/2 dementia, unable to state what happened. As per paperwork from the NH, patient had a fall and was noted to have a hematoma on the occipital head and was sent to the hospital for evaluation. Here imaging were negative for fractures but his work up showed him to have a significantly elevated calcium of 14 (corrected to 15.1) with DAVID and a significantly elevated protein gap. He was given NS 1.5L and repeat BMP showed persistence of the calcium elevation though improving. He was admitted to medicine on telemetry for further evaluation.  (26 Jan 2024 23:03)   he can not tell me why is he here and how is his breathing:    he is on room air:  slightly agitated: He was also recently admitted to hospital when he had influenza:   no now cough reported and has no fever: :    s/p fall:  HTN  Multiple Myeloma  Dementia     s/p fall:  -he seems to be doing  ok :  -he is alert and awake but demented:   -on room air   -cxr is normal :"  -ct head with no bleeding noted:   -aspiration precautions  1/28: seems same:  no change in his resp symptoms not in any resp distress:  on room air:  cont to monitor  : ct head is with no ICB   1/29: he seems OK: no sob:  no cough : no phlegm : o n room air  1/30: seems OK: no sob:    1/31: seems comfortable:  babbles words:  no sob:  no phlegm or wheezing:  on room air:   2/1: seems OK: no sob: on room air:   2/2: no issues pulm wise: no sob:   2/3: no new issues  ; cont aspiration precautions:   HTN  -controlled  2/2 : controlled  2/3; stable  Multiple Myeloma  -per primary team   1/29: hb is pretty low today  :? blood transfusion  today   1/30 ; s/p o ne blood transfusion  yesterday : but h is hb is low:  still:     1/31: still anemic:  defer to primary team  2/1 : hb is stable:    2/2: cont to monitor  : transfuse prn  Dementia   -supportive care    dvt prophylaxis    dw ACP

## 2024-02-03 NOTE — PROGRESS NOTE ADULT - ASSESSMENT
78M with history of MM, HTN, and Dementia (AAO x 1-2 to self, to place, not to time at baseline per brother) who presents to the hospital from [facility] after a fall. Patient is a poor historian 2/2 dementia, unable to state what happened. As per paperwork from the NH, patient had a fall and was noted to have a hematoma on the occipital head and was sent to the hospital for evaluation. Here imaging were negative for fractures but his work up showed him to have a significantly elevated calcium of 14 (corrected to 15.1) with DAVID and a significantly elevated protein gap.  Nephrology consulted for DAVID and hypercalemia.    A/P:  DAVID:  Baseline S.Cr 0.7-0.9.  S.Cr 1.39 on admission.  Likely pre-renal in setting of dehydration/hypercalcemia.  UA + proteinuria; no blood.  FeNa 1.1% - suggests ATN.  Renal function remains stable.  Monitor BMP and UO.    HTN:  Controlled.  Avoid hypotension.  Monitor BP.    Hypercalcemia:  Likely due to Monoclonal Gammopathy.  PTH low, suppressed by high Ca.  Total protein high.  Vit. D 25OH 46.4.  S/p Zometa 4mg x1 dose and calcitonin 200IU q12hrs x2 doses.  Corrected Ca WNL.  Optimize albumin.  Monitor Ca.    Acidosis:  Hyperchloremic acidosis.  IVF as above  Monitor CO2.    Anemia:  Likely sec to MM vs. other etiology.  Workup per primary team.  Heme/onc on board  Monitor Hgb.  Transfuse for Hgb <8.    Hypophosphatemia:  Likely sec. to poor PO intake.  S/P Kphos 30mmol on 1/31.  Replete w/ Kphos as needed  Monitor PO4 daily.    Hypokalemia:   Likely sec to poor PO intake  replete w/ KCL in IVF + 40mEq of PO KCl today.  monitor K.    Hypomagnesemia:  Likely sec to poor PO intake.  Repleted as needed   Monitor Mg.    Proteinuria:  Possibly sec to MM.  Monitor.

## 2024-02-03 NOTE — PROGRESS NOTE ADULT - SUBJECTIVE AND OBJECTIVE BOX
Patient is a 78y old  Male who presents with a chief complaint of Fall, elevated calcium (03 Feb 2024 10:54)    Date of servie : 02-03-24 @ 13:55  INTERVAL HPI/OVERNIGHT EVENTS:  T(C): 37.1 (02-03-24 @ 12:38), Max: 37.1 (02-03-24 @ 12:38)  HR: 78 (02-03-24 @ 12:38) (61 - 78)  BP: 104/50 (02-03-24 @ 12:38) (104/50 - 139/73)  RR: 18 (02-03-24 @ 12:38) (17 - 18)  SpO2: 97% (02-03-24 @ 12:38) (97% - 99%)  Wt(kg): --  I&O's Summary    02 Feb 2024 07:01  -  03 Feb 2024 07:00  --------------------------------------------------------  IN: 800 mL / OUT: 0 mL / NET: 800 mL        LABS:                        7.1    4.73  )-----------( 211      ( 03 Feb 2024 06:00 )             21.6     02-03    141  |  116<H>  |  13  ----------------------------<  93  3.7   |  19<L>  |  0.66    Ca    6.6<L>      03 Feb 2024 06:00  Phos  1.1     02-03  Mg     1.80     02-03    TPro  9.9<H>  /  Alb  1.7<L>  /  TBili  0.3  /  DBili  x   /  AST  12  /  ALT  13  /  AlkPhos  51  02-03      Urinalysis Basic - ( 03 Feb 2024 06:00 )    Color: x / Appearance: x / SG: x / pH: x  Gluc: 93 mg/dL / Ketone: x  / Bili: x / Urobili: x   Blood: x / Protein: x / Nitrite: x   Leuk Esterase: x / RBC: x / WBC x   Sq Epi: x / Non Sq Epi: x / Bacteria: x      CAPILLARY BLOOD GLUCOSE            Urinalysis Basic - ( 03 Feb 2024 06:00 )    Color: x / Appearance: x / SG: x / pH: x  Gluc: 93 mg/dL / Ketone: x  / Bili: x / Urobili: x   Blood: x / Protein: x / Nitrite: x   Leuk Esterase: x / RBC: x / WBC x   Sq Epi: x / Non Sq Epi: x / Bacteria: x        MEDICATIONS  (STANDING):  cholecalciferol 400 Unit(s) Oral daily  cyanocobalamin 1000 MICROGram(s) Oral daily  donepezil 10 milliGRAM(s) Oral at bedtime  folic acid 1 milliGRAM(s) Oral daily  heparin   Injectable 5000 Unit(s) SubCutaneous every 8 hours  memantine 10 milliGRAM(s) Oral two times a day  metoprolol tartrate 25 milliGRAM(s) Oral two times a day  risperiDONE   Tablet 0.25 milliGRAM(s) Oral daily  risperiDONE   Tablet 0.5 milliGRAM(s) Oral at bedtime  sertraline 50 milliGRAM(s) Oral daily  sodium chloride 0.45% 1000 milliLiter(s) (50 mL/Hr) IV Continuous <Continuous>    MEDICATIONS  (PRN):  acetaminophen     Tablet .. 650 milliGRAM(s) Oral every 6 hours PRN Temp greater or equal to 38C (100.4F), Mild Pain (1 - 3)  aluminum hydroxide/magnesium hydroxide/simethicone Suspension 30 milliLiter(s) Oral every 4 hours PRN Dyspepsia  melatonin 3 milliGRAM(s) Oral at bedtime PRN Insomnia  ondansetron Injectable 4 milliGRAM(s) IV Push every 8 hours PRN Nausea and/or Vomiting  QUEtiapine 12.5 milliGRAM(s) Oral every 6 hours PRN for agitation          PHYSICAL EXAM:  GENERAL: NAD, well-groomed, well-developed  HEAD:  Atraumatic, Normocephalic  CHEST/LUNG: Clear to percussion bilaterally; No rales, rhonchi, wheezing, or rubs  HEART: Regular rate and rhythm; No murmurs, rubs, or gallops  ABDOMEN: Soft, Nontender, Nondistended; Bowel sounds present  EXTREMITIES:  2+ Peripheral Pulses, No clubbing, cyanosis, or edema  LYMPH: No lymphadenopathy noted  SKIN: No rashes or lesions    Care Discussed with Consultants/Other Providers [ ] YES  [ ] NO

## 2024-02-03 NOTE — PROGRESS NOTE ADULT - SUBJECTIVE AND OBJECTIVE BOX
Patient is a 78y old  Male who presents with a chief complaint of Fall, elevated calcium (01 Feb 2024 14:51)    Subjective:  Patient seen at bedside.  NO overnight events.     MEDICATIONS  (STANDING):  cyanocobalamin 1000 MICROGram(s) Oral daily  donepezil 10 milliGRAM(s) Oral at bedtime  folic acid 1 milliGRAM(s) Oral daily  heparin   Injectable 5000 Unit(s) SubCutaneous every 8 hours  memantine 10 milliGRAM(s) Oral two times a day  metoprolol tartrate 25 milliGRAM(s) Oral two times a day  risperiDONE   Tablet 0.25 milliGRAM(s) Oral daily  risperiDONE   Tablet 0.5 milliGRAM(s) Oral at bedtime  sertraline 50 milliGRAM(s) Oral daily  sodium chloride 0.45% 1000 milliLiter(s) (50 mL/Hr) IV Continuous <Continuous>    MEDICATIONS  (PRN):  acetaminophen     Tablet .. 650 milliGRAM(s) Oral every 6 hours PRN Temp greater or equal to 38C (100.4F), Mild Pain (1 - 3)  aluminum hydroxide/magnesium hydroxide/simethicone Suspension 30 milliLiter(s) Oral every 4 hours PRN Dyspepsia  melatonin 3 milliGRAM(s) Oral at bedtime PRN Insomnia  ondansetron Injectable 4 milliGRAM(s) IV Push every 8 hours PRN Nausea and/or Vomiting  QUEtiapine 12.5 milliGRAM(s) Oral every 6 hours PRN for agitation    Allergies    No Known Allergies    Intolerances      ROS  No fever, sweats, chills  No epistaxis, HA, sore throat  No CP, SOB, cough, sputum  No n/v/d, abd pain, melena, hematochezia  No edema  No rash  No anxiety  No back pain, joint pain  No bleeding, bruising  No dysuria, hematuria      Vital Signs Last 24 Hrs  T(C): 37.1 (03 Feb 2024 12:38), Max: 37.1 (03 Feb 2024 12:38)  T(F): 98.7 (03 Feb 2024 12:38), Max: 98.7 (03 Feb 2024 12:38)  HR: 78 (03 Feb 2024 12:38) (63 - 78)  BP: 104/50 (03 Feb 2024 12:38) (104/50 - 135/82)  BP(mean): --  RR: 18 (03 Feb 2024 12:38) (17 - 18)  SpO2: 97% (03 Feb 2024 12:38) (97% - 99%)    Parameters below as of 03 Feb 2024 12:38  Patient On (Oxygen Delivery Method): room air    PE  NAD  awake with baseline confusion  Anicteric, MMM  RRR  CTAB  Abd soft, NT, ND  No c/c/e  No rash grossly                                    7.1    4.73  )-----------( 211      ( 03 Feb 2024 06:00 )             21.6   02-03    141  |  116<H>  |  13  ----------------------------<  93  3.7   |  19<L>  |  0.66    Ca    6.6<L>      03 Feb 2024 06:00  Phos  1.1     02-03  Mg     1.80     02-03    TPro  9.9<H>  /  Alb  1.7<L>  /  TBili  0.3  /  DBili  x   /  AST  12  /  ALT  13  /  AlkPhos  51  02-03

## 2024-02-03 NOTE — PROGRESS NOTE ADULT - SUBJECTIVE AND OBJECTIVE BOX
Date of Service: 02-03-24 @ 10:54    Patient is a 78y old  Male who presents with a chief complaint of Fall, elevated calcium (02 Feb 2024 15:26)      Any change in ROS: seems OK: no sob:  no cough : no phlegm     MEDICATIONS  (STANDING):  cholecalciferol 400 Unit(s) Oral daily  cyanocobalamin 1000 MICROGram(s) Oral daily  donepezil 10 milliGRAM(s) Oral at bedtime  folic acid 1 milliGRAM(s) Oral daily  heparin   Injectable 5000 Unit(s) SubCutaneous every 8 hours  memantine 10 milliGRAM(s) Oral two times a day  metoprolol tartrate 25 milliGRAM(s) Oral two times a day  potassium phosphate IVPB 30 milliMole(s) IV Intermittent once  risperiDONE   Tablet 0.25 milliGRAM(s) Oral daily  risperiDONE   Tablet 0.5 milliGRAM(s) Oral at bedtime  sertraline 50 milliGRAM(s) Oral daily  sodium chloride 0.45% 1000 milliLiter(s) (50 mL/Hr) IV Continuous <Continuous>    MEDICATIONS  (PRN):  acetaminophen     Tablet .. 650 milliGRAM(s) Oral every 6 hours PRN Temp greater or equal to 38C (100.4F), Mild Pain (1 - 3)  aluminum hydroxide/magnesium hydroxide/simethicone Suspension 30 milliLiter(s) Oral every 4 hours PRN Dyspepsia  melatonin 3 milliGRAM(s) Oral at bedtime PRN Insomnia  ondansetron Injectable 4 milliGRAM(s) IV Push every 8 hours PRN Nausea and/or Vomiting  QUEtiapine 12.5 milliGRAM(s) Oral every 6 hours PRN for agitation    Vital Signs Last 24 Hrs  T(C): 37 (03 Feb 2024 04:38), Max: 37 (03 Feb 2024 04:38)  T(F): 98.6 (03 Feb 2024 04:38), Max: 98.6 (03 Feb 2024 04:38)  HR: 69 (03 Feb 2024 04:38) (61 - 73)  BP: 132/69 (03 Feb 2024 04:38) (117/73 - 139/73)  BP(mean): --  RR: 17 (03 Feb 2024 04:38) (17 - 18)  SpO2: 98% (03 Feb 2024 04:38) (98% - 99%)    Parameters below as of 03 Feb 2024 04:38  Patient On (Oxygen Delivery Method): room air        I&O's Summary    02 Feb 2024 07:01  -  03 Feb 2024 07:00  --------------------------------------------------------  IN: 800 mL / OUT: 0 mL / NET: 800 mL          Physical Exam:   GENERAL: NAD, well-groomed, well-developed  HEENT: CHRISTINA/   Atraumatic, Normocephalic  ENMT: No tonsillar erythema, exudates, or enlargement; Moist mucous membranes, Good dentition, No lesions  NECK: Supple, No JVD, Normal thyroid  CHEST/LUNG: Clear to auscultaion  CVS: Regular rate and rhythm; No murmurs, rubs, or gallops  GI: : Soft, Nontender, Nondistended; Bowel sounds present  NERVOUS SYSTEM:  Alert & awake:  demented  EXTREMITIES:  2+ Peripheral Pulses, No clubbing, cyanosis, or edema  LYMPH: No lymphadenopathy noted  SKIN: No rashes or lesions  ENDOCRINOLOGY: No Thyromegaly  PSYCH: demented    Labs:                              7.1    4.73  )-----------( 211      ( 03 Feb 2024 06:00 )             21.6                         7.2    6.07  )-----------( 194      ( 02 Feb 2024 07:00 )             21.1                         7.3    6.53  )-----------( 198      ( 01 Feb 2024 06:35 )             21.8                         7.5    5.59  )-----------( 193      ( 31 Jan 2024 07:00 )             22.5     02-03    141  |  116<H>  |  13  ----------------------------<  93  3.7   |  19<L>  |  0.66  02-02    143  |  116<H>  |  13  ----------------------------<  97  3.3<L>   |  19<L>  |  0.71  02-01    143  |  115<H>  |  13  ----------------------------<  98  3.5   |  17<L>  |  0.70  01-31    142  |  116<H>  |  14  ----------------------------<  95  3.1<L>   |  19<L>  |  0.74    Ca    6.6<L>      03 Feb 2024 06:00  Ca    6.8<L>      02 Feb 2024 07:00  Phos  1.1     02-03  Mg     1.80     02-03    TPro  9.9<H>  /  Alb  1.7<L>  /  TBili  0.3  /  DBili  x   /  AST  12  /  ALT  13  /  AlkPhos  51  02-03  TPro  10.0<H>  /  Alb  1.9<L>  /  TBili  0.4  /  DBili  x   /  AST  12  /  ALT  13  /  AlkPhos  51  02-02  TPro  9.5<H>  /  Alb  1.8<L>  /  TBili  0.2  /  DBili  x   /  AST  14  /  ALT  13  /  AlkPhos  53  02-01  TPro  9.5<H>  /  Alb  1.8<L>  /  TBili  0.2  /  DBili  x   /  AST  13  /  ALT  8   /  AlkPhos  51  01-31    CAPILLARY BLOOD GLUCOSE          LIVER FUNCTIONS - ( 03 Feb 2024 06:00 )  Alb: 1.7 g/dL / Pro: 9.9 g/dL / ALK PHOS: 51 U/L / ALT: 13 U/L / AST: 12 U/L / GGT: x             Urinalysis Basic - ( 03 Feb 2024 06:00 )    Color: x / Appearance: x / SG: x / pH: x  Gluc: 93 mg/dL / Ketone: x  / Bili: x / Urobili: x   Blood: x / Protein: x / Nitrite: x   Leuk Esterase: x / RBC: x / WBC x   Sq Epi: x / Non Sq Epi: x / Bacteria: x      rad< from: CT Head No Cont (01.26.24 @ 20:21) >  in size for patient's age.    There is no extraaxial fluid collection.    There is no displaced calvarial fracture. The visualized orbits are   within normal limits. The visualized portions of the paranasal sinuses   are well aerated. The mastoid air cells are well aerated.      CT cervical spine:    Cervical alignment is maintained. Cervical vertebral body heights are   within normal limits. Mild age-indeterminate compression deformities at   the superior endplates of T1 and T2, without bony retropulsion.   Multilevel intervertebral disc height loss,disc osteophyte complexes,   and bilateral facet and uncovertebral arthropathy.    There is no prevertebral soft tissue swelling. The paraspinal soft   tissues are unremarkable. The lung apices are clear.      IMPRESSION:    CT head: No acute intracranial hemorrhage or mass effect.    CT cervical spine:  No evidence for acute displaced fracture or malalignment of the cervical   spine.  Mild age-indeterminate compression deformities at the superior endplates   of T1 and T2, without bony retropulsion.    --- End of Report ---    < end of copied text >  < from: Xray Chest 1 View- PORTABLE-Urgent (Xray Chest 1 View- PORTABLE-Urgent .) (01.26.24 @ 17:35) >  [facility]     PROCEDURE DATE:  01/26/2024          INTERPRETATION:  EXAMINATION: XR CHEST URGENT    CLINICAL INDICATION: fall    TECHNIQUE: Single frontal portable view of the chest from 1/26/2024 5:35   PM    COMPARISON: Chest x-ray 1/2/2024.    FINDINGS:  Sternotomy wires.  The heart size is not accurately assessed on this projection.  Hazy left lung base opacity with low lung volumes, likely represents   atelectasis.  There is no pneumothorax or pleural effusion.    IMPRESSION:  No focal consolidations.  No acute traumatic findings.    --- End of Report ---          TOMAS MASSEY MD; Resident Radiologist  This document has been electronically signed.  KOFI GARCIA MD; Attending Radiologist  This document has been electronically signed. Jan 26 2024  6:15PM    < end of copied text >        RECENT CULTURES:        RESPIRATORY CULTURES:          Studies  Chest X-RAY  CT SCAN Chest   Venous Dopplers: LE:   CT Abdomen  Others

## 2024-02-03 NOTE — PROGRESS NOTE ADULT - ASSESSMENT
78M with history of MM, HTN, and Dementia who presents to the hospital after a fall at his NH here found to have significant hypercalcemia.       Problem/Plan - 1:  ·  Problem: Hypercalcemia.   ·  Plan: -   - Dose calcitonin once patient's weight is available  - heme fu     Problem/Plan - 2:  ·  Problem: Fall.   ·  Plan: - Unclear cause of his fall, unclear mechanism, patient unable to state what happened and the NH paperwork does not mention how the patient fell  - Will monitor on telemetry for now, trend troponin- cars fu     Problem/Plan - 3:·  Problem: DAVID (acute kidney injury). ·  Plan: - New DAVID likely 2/2 severe hypercalcemia -> c/w IVF as above- Monitor I/O  - Trend BUN/Cr    Problem/Plan - 4:  ·  Problem: Toxic metabolic encephalopathy.   ·  Plan: - Baseline oriented to self and place, currently oriented to self but cannot state where he is, unable to state what the current month/year is, unable to give significant HPI or ROS (seems to be the baseline when compared to his prior admission)  - Likely has some encephalopathy 2/2 severe hypercalcemia -> c/w management as above  - c/w dementia management as below.    Problem/Plan - 5:  ·  Problem: Multiple myeloma.   ·  Plan: - Heme CONSULT appreciated   monitor cbc  sp transfusion     Problem/Plan - 6:  ·  Problem: Essential hypertension.   ·  Plan: - Not on medications, BPs currently well controlled  - Monitor BPs.    Problem/Plan - 7:  ·  Problem: Dementia.   ·  Plan: - c/w donepezil, memantine  - c/w risperdal, sertraline, and seroquel as per outpatient medications.    dc planning awaiting rehab bed

## 2024-02-04 LAB
ALBUMIN SERPL ELPH-MCNC: 1.8 G/DL — LOW (ref 3.3–5)
ALP SERPL-CCNC: 51 U/L — SIGNIFICANT CHANGE UP (ref 40–120)
ALT FLD-CCNC: 17 U/L — SIGNIFICANT CHANGE UP (ref 4–41)
ANION GAP SERPL CALC-SCNC: 7 MMOL/L — SIGNIFICANT CHANGE UP (ref 7–14)
AST SERPL-CCNC: 14 U/L — SIGNIFICANT CHANGE UP (ref 4–40)
BASOPHILS # BLD AUTO: 0.02 K/UL — SIGNIFICANT CHANGE UP (ref 0–0.2)
BASOPHILS NFR BLD AUTO: 0.4 % — SIGNIFICANT CHANGE UP (ref 0–2)
BILIRUB SERPL-MCNC: 0.2 MG/DL — SIGNIFICANT CHANGE UP (ref 0.2–1.2)
BUN SERPL-MCNC: 18 MG/DL — SIGNIFICANT CHANGE UP (ref 7–23)
CALCIUM SERPL-MCNC: 6.8 MG/DL — LOW (ref 8.4–10.5)
CHLORIDE SERPL-SCNC: 115 MMOL/L — HIGH (ref 98–107)
CO2 SERPL-SCNC: 21 MMOL/L — LOW (ref 22–31)
CREAT SERPL-MCNC: 0.7 MG/DL — SIGNIFICANT CHANGE UP (ref 0.5–1.3)
EGFR: 95 ML/MIN/1.73M2 — SIGNIFICANT CHANGE UP
EOSINOPHIL # BLD AUTO: 0.18 K/UL — SIGNIFICANT CHANGE UP (ref 0–0.5)
EOSINOPHIL NFR BLD AUTO: 3.6 % — SIGNIFICANT CHANGE UP (ref 0–6)
GLUCOSE SERPL-MCNC: 98 MG/DL — SIGNIFICANT CHANGE UP (ref 70–99)
HCT VFR BLD CALC: 21.7 % — LOW (ref 39–50)
HGB BLD-MCNC: 7.3 G/DL — LOW (ref 13–17)
IANC: 3.23 K/UL — SIGNIFICANT CHANGE UP (ref 1.8–7.4)
IMM GRANULOCYTES NFR BLD AUTO: 0.6 % — SIGNIFICANT CHANGE UP (ref 0–0.9)
LYMPHOCYTES # BLD AUTO: 0.99 K/UL — LOW (ref 1–3.3)
LYMPHOCYTES # BLD AUTO: 19.9 % — SIGNIFICANT CHANGE UP (ref 13–44)
MAGNESIUM SERPL-MCNC: 1.7 MG/DL — SIGNIFICANT CHANGE UP (ref 1.6–2.6)
MCHC RBC-ENTMCNC: 33.6 GM/DL — SIGNIFICANT CHANGE UP (ref 32–36)
MCHC RBC-ENTMCNC: 34 PG — SIGNIFICANT CHANGE UP (ref 27–34)
MCV RBC AUTO: 100.9 FL — HIGH (ref 80–100)
MONOCYTES # BLD AUTO: 0.53 K/UL — SIGNIFICANT CHANGE UP (ref 0–0.9)
MONOCYTES NFR BLD AUTO: 10.6 % — SIGNIFICANT CHANGE UP (ref 2–14)
NEUTROPHILS # BLD AUTO: 3.23 K/UL — SIGNIFICANT CHANGE UP (ref 1.8–7.4)
NEUTROPHILS NFR BLD AUTO: 64.9 % — SIGNIFICANT CHANGE UP (ref 43–77)
NRBC # BLD: 0 /100 WBCS — SIGNIFICANT CHANGE UP (ref 0–0)
NRBC # FLD: 0 K/UL — SIGNIFICANT CHANGE UP (ref 0–0)
PHOSPHATE SERPL-MCNC: 1.4 MG/DL — LOW (ref 2.5–4.5)
PLATELET # BLD AUTO: 232 K/UL — SIGNIFICANT CHANGE UP (ref 150–400)
POTASSIUM SERPL-MCNC: 3.8 MMOL/L — SIGNIFICANT CHANGE UP (ref 3.5–5.3)
POTASSIUM SERPL-SCNC: 3.8 MMOL/L — SIGNIFICANT CHANGE UP (ref 3.5–5.3)
PROT SERPL-MCNC: 9.6 G/DL — HIGH (ref 6–8.3)
RBC # BLD: 2.15 M/UL — LOW (ref 4.2–5.8)
RBC # FLD: 15.9 % — HIGH (ref 10.3–14.5)
SODIUM SERPL-SCNC: 143 MMOL/L — SIGNIFICANT CHANGE UP (ref 135–145)
WBC # BLD: 4.98 K/UL — SIGNIFICANT CHANGE UP (ref 3.8–10.5)
WBC # FLD AUTO: 4.98 K/UL — SIGNIFICANT CHANGE UP (ref 3.8–10.5)

## 2024-02-04 RX ORDER — POTASSIUM PHOSPHATE, MONOBASIC POTASSIUM PHOSPHATE, DIBASIC 236; 224 MG/ML; MG/ML
30 INJECTION, SOLUTION INTRAVENOUS ONCE
Refills: 0 | Status: COMPLETED | OUTPATIENT
Start: 2024-02-04 | End: 2024-02-04

## 2024-02-04 RX ADMIN — HEPARIN SODIUM 5000 UNIT(S): 5000 INJECTION INTRAVENOUS; SUBCUTANEOUS at 05:02

## 2024-02-04 RX ADMIN — MEMANTINE HYDROCHLORIDE 10 MILLIGRAM(S): 10 TABLET ORAL at 05:02

## 2024-02-04 RX ADMIN — POTASSIUM PHOSPHATE, MONOBASIC POTASSIUM PHOSPHATE, DIBASIC 83.33 MILLIMOLE(S): 236; 224 INJECTION, SOLUTION INTRAVENOUS at 18:27

## 2024-02-04 RX ADMIN — PREGABALIN 1000 MICROGRAM(S): 225 CAPSULE ORAL at 13:16

## 2024-02-04 RX ADMIN — Medication 400 UNIT(S): at 13:16

## 2024-02-04 RX ADMIN — SERTRALINE 50 MILLIGRAM(S): 25 TABLET, FILM COATED ORAL at 13:14

## 2024-02-04 RX ADMIN — Medication 25 MILLIGRAM(S): at 05:02

## 2024-02-04 RX ADMIN — RISPERIDONE 0.5 MILLIGRAM(S): 4 TABLET ORAL at 21:50

## 2024-02-04 RX ADMIN — Medication 1 MILLIGRAM(S): at 13:15

## 2024-02-04 RX ADMIN — HEPARIN SODIUM 5000 UNIT(S): 5000 INJECTION INTRAVENOUS; SUBCUTANEOUS at 13:16

## 2024-02-04 RX ADMIN — MEMANTINE HYDROCHLORIDE 10 MILLIGRAM(S): 10 TABLET ORAL at 18:27

## 2024-02-04 RX ADMIN — HEPARIN SODIUM 5000 UNIT(S): 5000 INJECTION INTRAVENOUS; SUBCUTANEOUS at 21:50

## 2024-02-04 RX ADMIN — DONEPEZIL HYDROCHLORIDE 10 MILLIGRAM(S): 10 TABLET, FILM COATED ORAL at 21:50

## 2024-02-04 RX ADMIN — RISPERIDONE 0.25 MILLIGRAM(S): 4 TABLET ORAL at 13:15

## 2024-02-04 RX ADMIN — Medication 25 MILLIGRAM(S): at 18:27

## 2024-02-04 NOTE — PROGRESS NOTE ADULT - SUBJECTIVE AND OBJECTIVE BOX
DATE OF SERVICE: 02-04-24    tele reviewed , Afib this am .     Review of Systems:   Constitutional: [ ] fevers, [ ] chills.   Skin: [ ] dry skin. [ ] rashes.  Psychiatric: [ ] depression, [ ] anxiety.   Gastrointestinal: [ ] BRBPR, [ ] melena.   Neurological: [ ] confusion. [ ] seizures. [ ] shuffling gait.   Ears,Nose,Mouth and Throat: [ ] ear pain [ ] sore throat.   Eyes: [ ] diplopia.   Respiratory: [ ] hemoptysis. [ ] shortness of breath  Cardiovascular: See HPI above  Hematologic/Lymphatic: [ ] anemia. [ ] painful nodes. [ ] prolonged bleeding.   Genitourinary: [ ] hematuria. [ ] flank pain.   Endocrine: [ ] significant change in weight. [ ] intolerance to heat and cold.     Review of systems [ x] otherwise negative, [ ] otherwise unable to obtain    FH: no family history of sudden cardiac death in first degree relatives    SH: [ ] tobacco, [ ] alcohol, [ ] drugs           acetaminophen     Tablet .. 650 milliGRAM(s) Oral every 6 hours PRN  aluminum hydroxide/magnesium hydroxide/simethicone Suspension 30 milliLiter(s) Oral every 4 hours PRN  cholecalciferol 400 Unit(s) Oral daily  cyanocobalamin 1000 MICROGram(s) Oral daily  donepezil 10 milliGRAM(s) Oral at bedtime  folic acid 1 milliGRAM(s) Oral daily  heparin   Injectable 5000 Unit(s) SubCutaneous every 8 hours  melatonin 3 milliGRAM(s) Oral at bedtime PRN  memantine 10 milliGRAM(s) Oral two times a day  metoprolol tartrate 25 milliGRAM(s) Oral two times a day  ondansetron Injectable 4 milliGRAM(s) IV Push every 8 hours PRN  QUEtiapine 12.5 milliGRAM(s) Oral every 6 hours PRN  risperiDONE   Tablet 0.25 milliGRAM(s) Oral daily  risperiDONE   Tablet 0.5 milliGRAM(s) Oral at bedtime  sertraline 50 milliGRAM(s) Oral daily  sodium chloride 0.45% 1000 milliLiter(s) IV Continuous <Continuous>                            7.3    4.98  )-----------( 232      ( 04 Feb 2024 06:00 )             21.7       Hemoglobin: 7.3 g/dL (02-04 @ 06:00)  Hemoglobin: 7.1 g/dL (02-03 @ 06:00)  Hemoglobin: 7.2 g/dL (02-02 @ 07:00)  Hemoglobin: 7.3 g/dL (02-01 @ 06:35)  Hemoglobin: 7.5 g/dL (01-31 @ 07:00)      02-03    141  |  116<H>  |  13  ----------------------------<  93  3.7   |  19<L>  |  0.66    Ca    6.6<L>      03 Feb 2024 06:00  Phos  1.1     02-03  Mg     1.80     02-03    TPro  9.9<H>  /  Alb  1.7<L>  /  TBili  0.3  /  DBili  x   /  AST  12  /  ALT  13  /  AlkPhos  51  02-03    Creatinine Trend: 0.66<--, 0.71<--, 0.70<--, 0.74<--, 0.83<--, 0.90<--    COAGS:           T(C): 37 (02-04-24 @ 05:00), Max: 37.1 (02-03-24 @ 12:38)  HR: 74 (02-04-24 @ 05:00) (70 - 78)  BP: 111/63 (02-04-24 @ 05:00) (104/50 - 125/66)  RR: 18 (02-04-24 @ 05:00) (18 - 18)  SpO2: 98% (02-04-24 @ 05:00) (97% - 98%)  Wt(kg): --    I&O's Summary    03 Feb 2024 07:01  -  04 Feb 2024 07:00  --------------------------------------------------------  IN: 200 mL / OUT: 0 mL / NET: 200 mL      General:  NAD  Head: Normocephalic and atraumatic.   Neck: No JVD. No bruits. Supple. Does not appear to be enlarged.   Cardiovascular: + S1,S2 ; RRR Soft systolic murmur at the left lower sternal border. No rubs noted.    Lungs: CTA b/l. No rhonchi, rales or wheezes.   Abdomen: + BS, soft. Non tender. Non distended. No rebound. No guarding.   Extremities: No clubbing/cyanosis/edema.   Neurologic: Moves all four extremities. Full range of motion.   Skin: Warm and moist. The patient's skin has normal elasticity and good skin turgor.   Psychiatric: unable to fully assess  Musculoskeletal: Normal range of motion, normal strength     TELEMETRY: 	 Sinus with PAT overnight 160x 2 min    ECG:  	NSR    < from: Transthoracic Echocardiogram (07.10.23 @ 11:15) >  CONCLUSIONS:  1. Calcified trileaflet aortic valve with normal opening.  Minimal aortic regurgitation.  2. Endocardium not well visualized; grossly decreased  possibly mild left ventricular systolic dysfunction.  3. The right ventricle is not well visualized; grossly  normal right ventricular systolic function.  ------------------------------------------------------------------------  Confirmed on  7/10/2023 - 13:57:46 by MARLA Vegas  < end of copied text >      ASSESSMENT/PLAN: This is a 78M with history of MM, HTN, and Dementia (AAO x 1-2 to self, to place, not to time at baseline per brother) who presents to the hospital from [facility] after a fall.     Patient is a poor historian 2/2 dementia. As per paperwork from the NH, patient had a fall and was noted to have a hematoma on the occipital head and was sent to the hospital for evaluation. Here imaging were negative for fractures but his work up showed him to have a significantly elevated calcium of 14 (corrected to 15.1) with DAVID and a significantly elevated protein gap. He was given NS 1.5L and repeat BMP showed persistence of the calcium elevation though improving. He was admitted to medicine on telemetry for further evaluation.  Recent admission for influenza, where his lopressor and norvasc were discontinued.    1. HTN/h/o MVR  -  BP stable  - agree with fluids for hypercalcemia  - no EKG changes associated with hypercalcemia  - TTE from last year with mildly decreased LVEF  - s/p PRBC, H/H downtrending again, F/U medicine  - continue lopressor 25 mg BID for PAT.     - Keep K>4 , Mag >2

## 2024-02-04 NOTE — PROGRESS NOTE ADULT - SUBJECTIVE AND OBJECTIVE BOX
Patient is a 78y old  Male who presents with a chief complaint of Fall, elevated calcium (04 Feb 2024 10:44)    Date of servie : 02-04-24 @ 11:29  INTERVAL HPI/OVERNIGHT EVENTS:  T(C): 37 (02-04-24 @ 05:00), Max: 37.1 (02-03-24 @ 12:38)  HR: 74 (02-04-24 @ 05:00) (70 - 78)  BP: 111/63 (02-04-24 @ 05:00) (104/50 - 125/66)  RR: 18 (02-04-24 @ 05:00) (18 - 18)  SpO2: 98% (02-04-24 @ 05:00) (97% - 98%)  Wt(kg): --  I&O's Summary    03 Feb 2024 07:01  -  04 Feb 2024 07:00  --------------------------------------------------------  IN: 200 mL / OUT: 0 mL / NET: 200 mL        LABS:                        7.3    4.98  )-----------( 232      ( 04 Feb 2024 06:00 )             21.7     02-04    143  |  115<H>  |  18  ----------------------------<  98  3.8   |  21<L>  |  0.70    Ca    6.8<L>      04 Feb 2024 08:06  Phos  1.4     02-04  Mg     1.70     02-04    TPro  9.6<H>  /  Alb  1.8<L>  /  TBili  0.2  /  DBili  x   /  AST  14  /  ALT  17  /  AlkPhos  51  02-04      Urinalysis Basic - ( 04 Feb 2024 08:06 )    Color: x / Appearance: x / SG: x / pH: x  Gluc: 98 mg/dL / Ketone: x  / Bili: x / Urobili: x   Blood: x / Protein: x / Nitrite: x   Leuk Esterase: x / RBC: x / WBC x   Sq Epi: x / Non Sq Epi: x / Bacteria: x      CAPILLARY BLOOD GLUCOSE            Urinalysis Basic - ( 04 Feb 2024 08:06 )    Color: x / Appearance: x / SG: x / pH: x  Gluc: 98 mg/dL / Ketone: x  / Bili: x / Urobili: x   Blood: x / Protein: x / Nitrite: x   Leuk Esterase: x / RBC: x / WBC x   Sq Epi: x / Non Sq Epi: x / Bacteria: x        MEDICATIONS  (STANDING):  cholecalciferol 400 Unit(s) Oral daily  cyanocobalamin 1000 MICROGram(s) Oral daily  donepezil 10 milliGRAM(s) Oral at bedtime  folic acid 1 milliGRAM(s) Oral daily  heparin   Injectable 5000 Unit(s) SubCutaneous every 8 hours  memantine 10 milliGRAM(s) Oral two times a day  metoprolol tartrate 25 milliGRAM(s) Oral two times a day  risperiDONE   Tablet 0.25 milliGRAM(s) Oral daily  risperiDONE   Tablet 0.5 milliGRAM(s) Oral at bedtime  sertraline 50 milliGRAM(s) Oral daily  sodium chloride 0.45% 1000 milliLiter(s) (50 mL/Hr) IV Continuous <Continuous>    MEDICATIONS  (PRN):  acetaminophen     Tablet .. 650 milliGRAM(s) Oral every 6 hours PRN Temp greater or equal to 38C (100.4F), Mild Pain (1 - 3)  aluminum hydroxide/magnesium hydroxide/simethicone Suspension 30 milliLiter(s) Oral every 4 hours PRN Dyspepsia  melatonin 3 milliGRAM(s) Oral at bedtime PRN Insomnia  ondansetron Injectable 4 milliGRAM(s) IV Push every 8 hours PRN Nausea and/or Vomiting  QUEtiapine 12.5 milliGRAM(s) Oral every 6 hours PRN for agitation          PHYSICAL EXAM:  GENERAL: NAD, well-groomed, well-developed  HEAD:  Atraumatic, Normocephalic  CHEST/LUNG: Clear to percussion bilaterally; No rales, rhonchi, wheezing, or rubs  HEART: Regular rate and rhythm; No murmurs, rubs, or gallops  ABDOMEN: Soft, Nontender, Nondistended; Bowel sounds present  EXTREMITIES:  2+ Peripheral Pulses, No clubbing, cyanosis, or edema  LYMPH: No lymphadenopathy noted  SKIN: No rashes or lesions    Care Discussed with Consultants/Other Providers [ ] YES  [ ] NO

## 2024-02-04 NOTE — PROGRESS NOTE ADULT - ASSESSMENT
78M with history of MM, HTN, and Dementia who presents to the hospital after a fall at his NH here found to have significant hypercalcemia.       Problem/Plan - 1:  ·  Problem: Hypercalcemia.   ·  Plan: -   - Dose calcitonin once patient's weight is available  - heme fu     Problem/Plan - 2:  ·  Problem: Fall.   ·  Plan: - Unclear cause of his fall, unclear mechanism, patient unable to state what happened and the NH paperwork does not mention how the patient fell  - Will monitor on telemetry for now, trend troponin- cars fu     Problem/Plan - 3:·  Problem: DAVID (acute kidney injury). ·  Plan: - New DAVID likely 2/2 severe hypercalcemia -> c/w IVF as above- Monitor I/O  - Trend BUN/Cr    Problem/Plan - 4:·  Problem: Toxic metabolic encephalopathy.   ·  Plan: - Baseline oriented to self and place, currently oriented to self but cannot state where he is, unable to state what the current month/year is, unable to give significant HPI or ROS (seems to be the baseline when compared to his prior admission)  - Likely has some encephalopathy 2/2 severe hypercalcemia -> c/w management as above  - c/w dementia management as below.    Problem/Plan - 5:  ·  Problem: Multiple myeloma.   ·  Plan: - Heme CONSULT appreciated   monitor cbc  sp transfusion     Problem/Plan - 6:  ·  Problem: Essential hypertension.   ·  Plan: - Not on medications, BPs currently well controlled  - Monitor BPs.    Problem/Plan - 7:  ·  Problem: Dementia.   ·  Plan: - c/w donepezil, memantine  - c/w risperdal, sertraline, and seroquel as per outpatient medications.    dc planning awaiting rehab bed

## 2024-02-04 NOTE — PROGRESS NOTE ADULT - ASSESSMENT
This is a 78M with history of MM, HTN, and Dementia (AAO x 1-2 to self, to place, not to time at baseline per brother) who presents to the hospital from [facility] after a fall. Patient is a poor historian 2/2 dementia, unable to state what happened. As per paperwork from the NH, patient had a fall and was noted to have a hematoma on the occipital head and was sent to the hospital for evaluation. Here imaging were negative for fractures but his work up showed him to have a significantly elevated calcium of 14 (corrected to 15.1) with DAVID and a significantly elevated protein gap. He was given NS 1.5L and repeat BMP showed persistence of the calcium elevation though improving. He was admitted to medicine on telemetry for further evaluation.  (26 Jan 2024 23:03)   he can not tell me why is he here and how is his breathing:    he is on room air:  slightly agitated: He was also recently admitted to hospital when he had influenza:   no now cough reported and has no fever: :    s/p fall:  HTN  Multiple Myeloma  Dementia     s/p fall:  -he seems to be doing  ok :  -he is alert and awake but demented:   -on room air   -cxr is normal :"  -ct head with no bleeding noted:   -aspiration precautions  1/28: seems same:  no change in his resp symptoms not in any resp distress:  on room air:  cont to monitor  : ct head is with no ICB   1/29: he seems OK: no sob:  no cough : no phlegm : o n room air  1/30: seems OK: no sob:    1/31: seems comfortable:  babbles words:  no sob:  no phlegm or wheezing:  on room air:   2/1: seems OK: no sob: on room air:   2/2: no issues pulm wise: no sob:   2/3: no new issues  ; cont aspiration precautions:   2/4: seems to be doing ok  HTN  -controlled  2/2 : controlled  2/3; stable  2/4 : controlled  Multiple Myeloma  -per primary team   1/29: hb is pretty low today  :? blood transfusion  today   1/30 ; s/p o ne blood transfusion  yesterday : but h is hb is low:  still:     1/31: still anemic:  defer to primary team  2/1 : hb is stable:    2/2: cont to monitor  : transfuse prn  Dementia   -supportive care    dvt prophylaxis    dw ACP : will sign off:  if needed again : please call me at [phone]

## 2024-02-04 NOTE — PROGRESS NOTE ADULT - ASSESSMENT
Patient seen and examined, agree with above assessment and plan as transcribed above.    - PAT cont bb    Reinaldo Hair MD, [facility]  BEEPER [phone]

## 2024-02-04 NOTE — PROGRESS NOTE ADULT - SUBJECTIVE AND OBJECTIVE BOX
Patient is a 78y old  Male who presents with a chief complaint of Fall, elevated calcium (01 Feb 2024 14:51)    Subjective:  Patient seen at bedside.  NO overnight events.     MEDICATIONS  (STANDING):  cyanocobalamin 1000 MICROGram(s) Oral daily  donepezil 10 milliGRAM(s) Oral at bedtime  folic acid 1 milliGRAM(s) Oral daily  heparin   Injectable 5000 Unit(s) SubCutaneous every 8 hours  memantine 10 milliGRAM(s) Oral two times a day  metoprolol tartrate 25 milliGRAM(s) Oral two times a day  risperiDONE   Tablet 0.25 milliGRAM(s) Oral daily  risperiDONE   Tablet 0.5 milliGRAM(s) Oral at bedtime  sertraline 50 milliGRAM(s) Oral daily  sodium chloride 0.45% 1000 milliLiter(s) (50 mL/Hr) IV Continuous <Continuous>    MEDICATIONS  (PRN):  acetaminophen     Tablet .. 650 milliGRAM(s) Oral every 6 hours PRN Temp greater or equal to 38C (100.4F), Mild Pain (1 - 3)  aluminum hydroxide/magnesium hydroxide/simethicone Suspension 30 milliLiter(s) Oral every 4 hours PRN Dyspepsia  melatonin 3 milliGRAM(s) Oral at bedtime PRN Insomnia  ondansetron Injectable 4 milliGRAM(s) IV Push every 8 hours PRN Nausea and/or Vomiting  QUEtiapine 12.5 milliGRAM(s) Oral every 6 hours PRN for agitation    Allergies    No Known Allergies    Intolerances      ROS  No fever, sweats, chills  No epistaxis, HA, sore throat  No CP, SOB, cough, sputum  No n/v/d, abd pain, melena, hematochezia  No edema  No rash  No anxiety  No back pain, joint pain  No bleeding, bruising  No dysuria, hematuria      Vital Signs Last 24 Hrs  T(C): 36.4 (04 Feb 2024 12:02), Max: 37 (03 Feb 2024 18:15)  T(F): 97.6 (04 Feb 2024 12:02), Max: 98.6 (03 Feb 2024 18:15)  HR: 77 (04 Feb 2024 12:02) (70 - 77)  BP: 149/79 (04 Feb 2024 12:02) (111/63 - 149/79)  BP(mean): --  RR: 18 (04 Feb 2024 12:02) (18 - 18)  SpO2: 96% (04 Feb 2024 12:02) (96% - 98%)    Parameters below as of 04 Feb 2024 12:02  Patient On (Oxygen Delivery Method): room air      PE  NAD  awake with baseline confusion  Anicteric, MMM  RRR  CTAB  Abd soft, NT, ND  No c/c/e  No rash grossly                                                      7.3    4.98  )-----------( 232      ( 04 Feb 2024 06:00 )             21.7   02-04    143  |  115<H>  |  18  ----------------------------<  98  3.8   |  21<L>  |  0.70    Ca    6.8<L>      04 Feb 2024 08:06  Phos  1.4     02-04  Mg     1.70     02-04    TPro  9.6<H>  /  Alb  1.8<L>  /  TBili  0.2  /  DBili  x   /  AST  14  /  ALT  17  /  AlkPhos  51  02-04

## 2024-02-04 NOTE — PROVIDER CONTACT NOTE (OTHER) - RECOMMENDATIONS
Patient already received AM dose of metoprolol
Continue telemetry monitoring and give metoprolol dose in AM
ACP notified.
Telemetry monitoring

## 2024-02-04 NOTE — PROGRESS NOTE ADULT - SUBJECTIVE AND OBJECTIVE BOX
Date of Service: 02-04-24 @ 10:45    Patient is a 78y old  Male who presents with a chief complaint of Fall, elevated calcium (04 Feb 2024 08:31)      Any change in ROS: seems OK: no sob: on room air    MEDICATIONS  (STANDING):  cholecalciferol 400 Unit(s) Oral daily  cyanocobalamin 1000 MICROGram(s) Oral daily  donepezil 10 milliGRAM(s) Oral at bedtime  folic acid 1 milliGRAM(s) Oral daily  heparin   Injectable 5000 Unit(s) SubCutaneous every 8 hours  memantine 10 milliGRAM(s) Oral two times a day  metoprolol tartrate 25 milliGRAM(s) Oral two times a day  risperiDONE   Tablet 0.5 milliGRAM(s) Oral at bedtime  risperiDONE   Tablet 0.25 milliGRAM(s) Oral daily  sertraline 50 milliGRAM(s) Oral daily  sodium chloride 0.45% 1000 milliLiter(s) (50 mL/Hr) IV Continuous <Continuous>    MEDICATIONS  (PRN):  acetaminophen     Tablet .. 650 milliGRAM(s) Oral every 6 hours PRN Temp greater or equal to 38C (100.4F), Mild Pain (1 - 3)  aluminum hydroxide/magnesium hydroxide/simethicone Suspension 30 milliLiter(s) Oral every 4 hours PRN Dyspepsia  melatonin 3 milliGRAM(s) Oral at bedtime PRN Insomnia  ondansetron Injectable 4 milliGRAM(s) IV Push every 8 hours PRN Nausea and/or Vomiting  QUEtiapine 12.5 milliGRAM(s) Oral every 6 hours PRN for agitation    Vital Signs Last 24 Hrs  T(C): 37 (04 Feb 2024 05:00), Max: 37.1 (03 Feb 2024 12:38)  T(F): 98.6 (04 Feb 2024 05:00), Max: 98.7 (03 Feb 2024 12:38)  HR: 74 (04 Feb 2024 05:00) (70 - 78)  BP: 111/63 (04 Feb 2024 05:00) (104/50 - 125/66)  BP(mean): --  RR: 18 (04 Feb 2024 05:00) (18 - 18)  SpO2: 98% (04 Feb 2024 05:00) (97% - 98%)    Parameters below as of 04 Feb 2024 05:00  Patient On (Oxygen Delivery Method): room air        I&O's Summary    03 Feb 2024 07:01  -  04 Feb 2024 07:00  --------------------------------------------------------  IN: 200 mL / OUT: 0 mL / NET: 200 mL          Physical Exam:   GENERAL: NAD, well-groomed, well-developed  HEENT: CHRISTINA/   Atraumatic, Normocephalic  ENMT: No tonsillar erythema, exudates, or enlargement; Moist mucous membranes, Good dentition, No lesions  NECK: Supple, No JVD, Normal thyroid  CHEST/LUNG: Clear to auscultaion  CVS: Regular rate and rhythm; No murmurs, rubs, or gallops  GI: : Soft, Nontender, Nondistended; Bowel sounds present  NERVOUS SYSTEM:  Alert & awake  EXTREMITIES: -edema  LYMPH: No lymphadenopathy noted  SKIN: No rashes or lesions  ENDOCRINOLOGY: No Thyromegaly  PSYCH: dementia  Labs:                              7.3    4.98  )-----------( 232      ( 04 Feb 2024 06:00 )             21.7                         7.1    4.73  )-----------( 211      ( 03 Feb 2024 06:00 )             21.6                         7.2    6.07  )-----------( 194      ( 02 Feb 2024 07:00 )             21.1                         7.3    6.53  )-----------( 198      ( 01 Feb 2024 06:35 )             21.8     02-04    143  |  115<H>  |  18  ----------------------------<  98  3.8   |  21<L>  |  0.70  02-03    141  |  116<H>  |  13  ----------------------------<  93  3.7   |  19<L>  |  0.66  02-02    143  |  116<H>  |  13  ----------------------------<  97  3.3<L>   |  19<L>  |  0.71  02-01    143  |  115<H>  |  13  ----------------------------<  98  3.5   |  17<L>  |  0.70    Ca    6.8<L>      04 Feb 2024 08:06  Ca    6.6<L>      03 Feb 2024 06:00  Phos  1.4     02-04  Phos  1.1     02-03  Mg     1.70     02-04  Mg     1.80     02-03    TPro  9.6<H>  /  Alb  1.8<L>  /  TBili  0.2  /  DBili  x   /  AST  14  /  ALT  17  /  AlkPhos  51  02-04  TPro  9.9<H>  /  Alb  1.7<L>  /  TBili  0.3  /  DBili  x   /  AST  12  /  ALT  13  /  AlkPhos  51  02-03  TPro  10.0<H>  /  Alb  1.9<L>  /  TBili  0.4  /  DBili  x   /  AST  12  /  ALT  13  /  AlkPhos  51  02-02  TPro  9.5<H>  /  Alb  1.8<L>  /  TBili  0.2  /  DBili  x   /  AST  14  /  ALT  13  /  AlkPhos  53  02-01    CAPILLARY BLOOD GLUCOSE          LIVER FUNCTIONS - ( 04 Feb 2024 08:06 )  Alb: 1.8 g/dL / Pro: 9.6 g/dL / ALK PHOS: 51 U/L / ALT: 17 U/L / AST: 14 U/L / GGT: x             Urinalysis Basic - ( 04 Feb 2024 08:06 )    Color: x / Appearance: x / SG: x / pH: x  Gluc: 98 mg/dL / Ketone: x  / Bili: x / Urobili: x   Blood: x / Protein: x / Nitrite: x   Leuk Esterase: x / RBC: x / WBC x   Sq Epi: x / Non Sq Epi: x / Bacteria: x        rad< from: Xray Chest 1 View- PORTABLE-Urgent (Xray Chest 1 View- PORTABLE-Urgent .) (01.26.24 @ 17:35) >  ACC: [id number] EXAM:  XR CHEST PORTABLE URGENT 1V   ORDERED BY: KEELY   CHRISTINAMILLIE     PROCEDURE DATE:  01/26/2024          INTERPRETATION:  EXAMINATION: XR CHEST URGENT    CLINICAL INDICATION: fall    TECHNIQUE: Single frontal portable view of the chest from 1/26/2024 5:35   PM    COMPARISON: Chest x-ray 1/2/2024.    FINDINGS:  Sternotomy wires.  The heart size is not accurately assessed on this projection.  Hazy left lung base opacity with low lung volumes, likely represents   atelectasis.  There is no pneumothorax or pleural effusion.    IMPRESSION:  No focal consolidations.  No acute traumatic findings.    --- End of Report ---          TOMAS MASSEY MD; Resident Radiologist  This document has been electronically signed.  KOFI GARCIA MD; Attending Radiologist  This document has been electronically signed. Jan 26 2024  6:15PM    < end of copied text >      RECENT CULTURES:        RESPIRATORY CULTURES:          Studies  Chest X-RAY  CT SCAN Chest   Venous Dopplers: LE:   CT Abdomen  Others

## 2024-02-04 NOTE — PROGRESS NOTE ADULT - ASSESSMENT
This is a 78M with history of MM, HTN, and Dementia (AAO x 1-2 to self, to place, not to time at baseline per brother) who presents to the hospital from [facility] after a fall.    MM  - history of MM   - from last note seen by heme onc in 7/2023 was noted to be on Revlimid   - will continue to try and reach pt family  -Immunofixation with IgG Kappa multiple myeloma  - hypocalcemia 6.8 today. s/p Zometa, please replace, renal recs appreciated   -Last H&H 7.3/21.  Overall stable.  Cont daily CBC.  -Transfuse for Hgb < 7.0    Will continue to follow

## 2024-02-05 LAB
ALBUMIN SERPL ELPH-MCNC: 1.7 G/DL — LOW (ref 3.3–5)
ALP SERPL-CCNC: 54 U/L — SIGNIFICANT CHANGE UP (ref 40–120)
ALT FLD-CCNC: 14 U/L — SIGNIFICANT CHANGE UP (ref 4–41)
ANION GAP SERPL CALC-SCNC: 6 MMOL/L — LOW (ref 7–14)
AST SERPL-CCNC: 13 U/L — SIGNIFICANT CHANGE UP (ref 4–40)
BASOPHILS # BLD AUTO: 0.02 K/UL — SIGNIFICANT CHANGE UP (ref 0–0.2)
BASOPHILS NFR BLD AUTO: 0.4 % — SIGNIFICANT CHANGE UP (ref 0–2)
BILIRUB SERPL-MCNC: 0.2 MG/DL — SIGNIFICANT CHANGE UP (ref 0.2–1.2)
BUN SERPL-MCNC: 14 MG/DL — SIGNIFICANT CHANGE UP (ref 7–23)
CALCIUM SERPL-MCNC: 6.6 MG/DL — LOW (ref 8.4–10.5)
CHLORIDE SERPL-SCNC: 117 MMOL/L — HIGH (ref 98–107)
CO2 SERPL-SCNC: 20 MMOL/L — LOW (ref 22–31)
CREAT SERPL-MCNC: 0.6 MG/DL — SIGNIFICANT CHANGE UP (ref 0.5–1.3)
EGFR: 99 ML/MIN/1.73M2 — SIGNIFICANT CHANGE UP
EOSINOPHIL # BLD AUTO: 0.19 K/UL — SIGNIFICANT CHANGE UP (ref 0–0.5)
EOSINOPHIL NFR BLD AUTO: 3.7 % — SIGNIFICANT CHANGE UP (ref 0–6)
GLUCOSE SERPL-MCNC: 90 MG/DL — SIGNIFICANT CHANGE UP (ref 70–99)
HCT VFR BLD CALC: 22.1 % — LOW (ref 39–50)
HGB BLD-MCNC: 7.1 G/DL — LOW (ref 13–17)
IANC: 3.38 K/UL — SIGNIFICANT CHANGE UP (ref 1.8–7.4)
IMM GRANULOCYTES NFR BLD AUTO: 0.6 % — SIGNIFICANT CHANGE UP (ref 0–0.9)
LYMPHOCYTES # BLD AUTO: 0.9 K/UL — LOW (ref 1–3.3)
LYMPHOCYTES # BLD AUTO: 17.8 % — SIGNIFICANT CHANGE UP (ref 13–44)
MAGNESIUM SERPL-MCNC: 1.7 MG/DL — SIGNIFICANT CHANGE UP (ref 1.6–2.6)
MCHC RBC-ENTMCNC: 32.1 GM/DL — SIGNIFICANT CHANGE UP (ref 32–36)
MCHC RBC-ENTMCNC: 33.2 PG — SIGNIFICANT CHANGE UP (ref 27–34)
MCV RBC AUTO: 103.3 FL — HIGH (ref 80–100)
MONOCYTES # BLD AUTO: 0.55 K/UL — SIGNIFICANT CHANGE UP (ref 0–0.9)
MONOCYTES NFR BLD AUTO: 10.8 % — SIGNIFICANT CHANGE UP (ref 2–14)
NEUTROPHILS # BLD AUTO: 3.38 K/UL — SIGNIFICANT CHANGE UP (ref 1.8–7.4)
NEUTROPHILS NFR BLD AUTO: 66.7 % — SIGNIFICANT CHANGE UP (ref 43–77)
NRBC # BLD: 0 /100 WBCS — SIGNIFICANT CHANGE UP (ref 0–0)
NRBC # FLD: 0 K/UL — SIGNIFICANT CHANGE UP (ref 0–0)
PHOSPHATE SERPL-MCNC: 1.9 MG/DL — LOW (ref 2.5–4.5)
PLATELET # BLD AUTO: 257 K/UL — SIGNIFICANT CHANGE UP (ref 150–400)
POTASSIUM SERPL-MCNC: 3.6 MMOL/L — SIGNIFICANT CHANGE UP (ref 3.5–5.3)
POTASSIUM SERPL-SCNC: 3.6 MMOL/L — SIGNIFICANT CHANGE UP (ref 3.5–5.3)
PROT SERPL-MCNC: 9.6 G/DL — HIGH (ref 6–8.3)
RBC # BLD: 2.14 M/UL — LOW (ref 4.2–5.8)
RBC # FLD: 15.8 % — HIGH (ref 10.3–14.5)
SODIUM SERPL-SCNC: 143 MMOL/L — SIGNIFICANT CHANGE UP (ref 135–145)
WBC # BLD: 5.07 K/UL — SIGNIFICANT CHANGE UP (ref 3.8–10.5)
WBC # FLD AUTO: 5.07 K/UL — SIGNIFICANT CHANGE UP (ref 3.8–10.5)

## 2024-02-05 RX ORDER — POTASSIUM PHOSPHATE, MONOBASIC POTASSIUM PHOSPHATE, DIBASIC 236; 224 MG/ML; MG/ML
30 INJECTION, SOLUTION INTRAVENOUS ONCE
Refills: 0 | Status: COMPLETED | OUTPATIENT
Start: 2024-02-05 | End: 2024-02-05

## 2024-02-05 RX ADMIN — Medication 25 MILLIGRAM(S): at 05:56

## 2024-02-05 RX ADMIN — Medication 1 MILLIGRAM(S): at 12:45

## 2024-02-05 RX ADMIN — PREGABALIN 1000 MICROGRAM(S): 225 CAPSULE ORAL at 12:47

## 2024-02-05 RX ADMIN — HEPARIN SODIUM 5000 UNIT(S): 5000 INJECTION INTRAVENOUS; SUBCUTANEOUS at 12:45

## 2024-02-05 RX ADMIN — Medication 400 UNIT(S): at 12:48

## 2024-02-05 RX ADMIN — RISPERIDONE 0.25 MILLIGRAM(S): 4 TABLET ORAL at 12:46

## 2024-02-05 RX ADMIN — DONEPEZIL HYDROCHLORIDE 10 MILLIGRAM(S): 10 TABLET, FILM COATED ORAL at 21:51

## 2024-02-05 RX ADMIN — RISPERIDONE 0.5 MILLIGRAM(S): 4 TABLET ORAL at 21:51

## 2024-02-05 RX ADMIN — HEPARIN SODIUM 5000 UNIT(S): 5000 INJECTION INTRAVENOUS; SUBCUTANEOUS at 21:51

## 2024-02-05 RX ADMIN — SERTRALINE 50 MILLIGRAM(S): 25 TABLET, FILM COATED ORAL at 12:47

## 2024-02-05 RX ADMIN — Medication 3 MILLIGRAM(S): at 00:00

## 2024-02-05 RX ADMIN — HEPARIN SODIUM 5000 UNIT(S): 5000 INJECTION INTRAVENOUS; SUBCUTANEOUS at 05:57

## 2024-02-05 RX ADMIN — MEMANTINE HYDROCHLORIDE 10 MILLIGRAM(S): 10 TABLET ORAL at 19:28

## 2024-02-05 RX ADMIN — MEMANTINE HYDROCHLORIDE 10 MILLIGRAM(S): 10 TABLET ORAL at 05:57

## 2024-02-05 RX ADMIN — Medication 25 MILLIGRAM(S): at 19:28

## 2024-02-05 RX ADMIN — POTASSIUM PHOSPHATE, MONOBASIC POTASSIUM PHOSPHATE, DIBASIC 83.33 MILLIMOLE(S): 236; 224 INJECTION, SOLUTION INTRAVENOUS at 12:40

## 2024-02-05 NOTE — PROGRESS NOTE ADULT - ASSESSMENT
78M with history of MM, HTN, and Dementia (AAO x 1-2 to self, to place, not to time at baseline per brother) who presents to the hospital from [facility] after a fall. Patient is a poor historian 2/2 dementia, unable to state what happened. As per paperwork from the NH, patient had a fall and was noted to have a hematoma on the occipital head and was sent to the hospital for evaluation. Here imaging were negative for fractures but his work up showed him to have a significantly elevated calcium of 14 (corrected to 15.1) with DAVID and a significantly elevated protein gap.  Nephrology consulted for DAVID and hypercalemia.    A/P:  DAVID:  Baseline S.Cr 0.7-0.9.  S.Cr 1.39 on admission.  Likely pre-renal in setting of dehydration/hypercalcemia.  UA + proteinuria; no blood.  FeNa 1.1% - suggests ATN.  Renal function remains stable.  Monitor BMP and UO.    HTN:  Controlled.  Avoid hypotension.  Monitor BP.    Hypercalcemia:  Likely due to Monoclonal Gammopathy.  PTH low, suppressed by high Ca.  Total protein high.  Vit. D 25OH 46.4.  S/p Zometa 4mg x1 dose and calcitonin 200IU q12hrs x2 doses.  Optimize albumin.  Monitor Ca.    Acidosis:  Hyperchloremic acidosis.  Monitor CO2.    Anemia:  Likely sec to MM vs. other etiology.  Workup per primary team.  Heme/onc on board  Monitor Hgb.  Transfuse for Hgb <8.    Hypophosphatemia:  Likely sec. to poor PO intake.  S/P Kphos 30mmol today  Replete w/ Kphos as needed  Monitor PO4 daily.    Hypokalemia:   Likely sec to poor PO intake  improved  monitor K.    Hypomagnesemia:  Likely sec to poor PO intake.  improved  Repleted as needed   Monitor Mg.    Proteinuria:  Possibly sec to MM.  Monitor. 78M with history of MM, HTN, and Dementia (AAO x 1-2 to self, to place, not to time at baseline per brother) who presents to the hospital from [facility] after a fall. Patient is a poor historian 2/2 dementia, unable to state what happened. As per paperwork from the NH, patient had a fall and was noted to have a hematoma on the occipital head and was sent to the hospital for evaluation. Here imaging were negative for fractures but his work up showed him to have a significantly elevated calcium of 14 (corrected to 15.1) with DAVID and a significantly elevated protein gap.  Nephrology consulted for DAVID and hypercalemia.    A/P:  DAVID:  Baseline S.Cr 0.7-0.9.  S.Cr 1.39 on admission.  Likely pre-renal in setting of dehydration/hypercalcemia.  UA + proteinuria; no blood.  FeNa 1.1% - suggests ATN.  Renal function remains stable.  Monitor BMP and UO.    HTN:  Controlled.  Avoid hypotension.  Monitor BP.    Hypercalcemia/hypocalcemia:  Likely due to Monoclonal Gammopathy.  PTH low, suppressed by high Ca.  Total protein high.  Vit. D 25OH 46.4.  S/p Zometa 4mg x1 dose and calcitonin 200IU q12hrs x2 doses.  corrected Ca 8 today  c.w cholecalciferol   Optimize albumin.  Monitor Ca.    Acidosis:  Hyperchloremic acidosis.  IVF d.c today  Monitor CO2.      Anemia:  Likely sec to MM vs. other etiology.  Workup per primary team.  Heme/onc on board  Monitor Hgb.  Transfuse for Hgb <8.    Hypophosphatemia:  Likely sec. to poor PO intake.  S/P Kphos 30mmol today  Replete w/ Kphos as needed  Monitor PO4 daily.    Hypokalemia:   Likely sec to poor PO intake  improved  monitor K.    Hypomagnesemia:  Likely sec to poor PO intake.  improved  Replete as needed   Monitor Mg.    Proteinuria:  Possibly sec to MM.  Monitor.

## 2024-02-05 NOTE — PROGRESS NOTE ADULT - ASSESSMENT
78M with history of MM, HTN, and Dementia who presents to the hospital after a fall at his NH here found to have significant hypercalcemia.       Problem/Plan - 1:  ·  Problem: Hypercalcemia.   ·  Plan: -   - Dose calcitonin once patient's weight is available  - heme fu     Problem/Plan - 2:·  Problem: Fall.   ·  Plan: - Unclear cause of his fall, unclear mechanism, patient unable to state what happened and the NH paperwork does not mention how the patient fell  - Will monitor on telemetry for now, trend troponin- cars fu     Problem/Plan - 3:·  Problem: DAVID (acute kidney injury). ·  Plan: - New DAVID likely 2/2 severe hypercalcemia -> c/w IVF as above- Monitor I/O  - Trend BUN/Cr    Problem/Plan - 4:·  Problem: Toxic metabolic encephalopathy.   ·  Plan: - Baseline oriented to self and place, currently oriented to self but cannot state where he is, unable to state what the current month/year is, unable to give significant HPI or ROS (seems to be the baseline when compared to his prior admission)  - Likely has some encephalopathy 2/2 severe hypercalcemia -> c/w management as above  - c/w dementia management as below.    Problem/Plan - 5:  ·  Problem: Multiple myeloma.   ·  Plan: - Heme CONSULT appreciated   monitor cbc  sp transfusion     Problem/Plan - 6:  ·  Problem: Essential hypertension.   ·  Plan: - Not on medications, BPs currently well controlled  - Monitor BPs.    Problem/Plan - 7:  ·  Problem: Dementia.   ·  Plan: - c/w donepezil, memantine  - c/w risperdal, sertraline, and seroquel as per outpatient medications.    dc planning awaiting rehab bed

## 2024-02-05 NOTE — PROGRESS NOTE ADULT - SUBJECTIVE AND OBJECTIVE BOX
[facility] [place] [facility]   MD ANEL FOLEY MD ANGELA WONG, PA Venitha Krishnan, NP    TEL:  OFFICE: [phone]  From 5pm-7am Answering Service [phone]    -- RENAL FOLLOW UP NOTE ---Date of Service 02-05-24 @ 15:51    Patient is a 78y old  Male who presents with a chief complaint of Fall, elevated calcium (05 Feb 2024 14:06)      Patient seen and examined at bedside. in no apparent distress    VITALS:  T(F): 97.4 (02-05-24 @ 12:22), Max: 98.7 (02-04-24 @ 18:27)  HR: 78 (02-05-24 @ 12:22)  BP: 120/70 (02-05-24 @ 12:22)  RR: 18 (02-05-24 @ 12:22)  SpO2: 97% (02-05-24 @ 12:22)  Wt(kg): --        PHYSICAL EXAM:  General: NAD  Neck: No JVD  Respiratory: CTAB, no wheezes, rales or rhonchi  Cardiovascular: S1, S2, RRR  Gastrointestinal: BS+, soft, NT/ND  Extremities: No peripheral edema    Hospital Medications:   MEDICATIONS  (STANDING):  cholecalciferol 400 Unit(s) Oral daily  cyanocobalamin 1000 MICROGram(s) Oral daily  donepezil 10 milliGRAM(s) Oral at bedtime  folic acid 1 milliGRAM(s) Oral daily  heparin   Injectable 5000 Unit(s) SubCutaneous every 8 hours  memantine 10 milliGRAM(s) Oral two times a day  metoprolol tartrate 25 milliGRAM(s) Oral two times a day  risperiDONE   Tablet 0.5 milliGRAM(s) Oral at bedtime  risperiDONE   Tablet 0.25 milliGRAM(s) Oral daily  sertraline 50 milliGRAM(s) Oral daily      LABS:  02-05    143  |  117<H>  |  14  ----------------------------<  90  3.6   |  20<L>  |  0.60    Ca    6.6<L>      05 Feb 2024 06:25  Phos  1.9     02-05  Mg     1.70     02-05    TPro  9.6<H>  /  Alb  1.7<L>  /  TBili  0.2  /  DBili      /  AST  13  /  ALT  14  /  AlkPhos  54  02-05    Creatinine Trend: 0.60 <--, 0.70 <--, 0.66 <--, 0.71 <--, 0.70 <--, 0.74 <--, 0.83 <--, 0.90 <--    Albumin: 1.7 g/dL (02-05 @ 06:25)  Phosphorus: 1.9 mg/dL (02-05 @ 06:25)                              7.1    5.07  )-----------( 257      ( 05 Feb 2024 06:25 )             22.1     Urine Studies:  Urinalysis - [02-05-24 @ 06:25]      Color  / Appearance  / SG  / pH       Gluc 90 / Ketone   / Bili  / Urobili        Blood  / Protein  / Leuk Est  / Nitrite       RBC  / WBC  / Hyaline  / Gran  / Sq Epi  / Non Sq Epi  / Bacteria       PTH -- (Ca --)      [01-27-24 @ 10:56]   8  PTH -- (Ca --)      [01-14-24 @ 06:50]   5  PTH -- (Ca --)      [01-13-24 @ 06:10]   6  Vitamin D (25OH) 46.4      [01-27-24 @ 10:56]      Immunofixation Serum:   Corresponding IgG and kappa bands consistent with monoclonal IgG kappa  protein. ADITYA Huggins M.D.  Reference Range: None Detected      [01-27-24 @ 10:56]  SPEP Interpretation: Distinct band in Gamma region with suppression of normal Gammaglobulin  suggestive of Monoclonal Gammopathy. Serum and Urine Immunofixation  Electrophoresis are recommended if not previously performed.  Clara Covington M.D.      [01-27-24 @ 10:56]    RADIOLOGY & ADDITIONAL STUDIES:

## 2024-02-05 NOTE — PROGRESS NOTE ADULT - SUBJECTIVE AND OBJECTIVE BOX
Patient is a 78y old  Male who presents with a chief complaint of Fall, elevated calcium (05 Feb 2024 09:29)    Date of servie : 02-05-24 @ 14:07  INTERVAL HPI/OVERNIGHT EVENTS:  T(C): 36.3 (02-05-24 @ 12:22), Max: 37.1 (02-04-24 @ 18:27)  HR: 78 (02-05-24 @ 12:22) (60 - 78)  BP: 120/70 (02-05-24 @ 12:22) (107/65 - 142/76)  RR: 18 (02-05-24 @ 12:22) (18 - 19)  SpO2: 97% (02-05-24 @ 12:22) (95% - 97%)  Wt(kg): --  I&O's Summary      LABS:                        7.1    5.07  )-----------( 257      ( 05 Feb 2024 06:25 )             22.1     02-05    143  |  117<H>  |  14  ----------------------------<  90  3.6   |  20<L>  |  0.60    Ca    6.6<L>      05 Feb 2024 06:25  Phos  1.9     02-05  Mg     1.70     02-05    TPro  9.6<H>  /  Alb  1.7<L>  /  TBili  0.2  /  DBili  x   /  AST  13  /  ALT  14  /  AlkPhos  54  02-05      Urinalysis Basic - ( 05 Feb 2024 06:25 )    Color: x / Appearance: x / SG: x / pH: x  Gluc: 90 mg/dL / Ketone: x  / Bili: x / Urobili: x   Blood: x / Protein: x / Nitrite: x   Leuk Esterase: x / RBC: x / WBC x   Sq Epi: x / Non Sq Epi: x / Bacteria: x      CAPILLARY BLOOD GLUCOSE            Urinalysis Basic - ( 05 Feb 2024 06:25 )    Color: x / Appearance: x / SG: x / pH: x  Gluc: 90 mg/dL / Ketone: x  / Bili: x / Urobili: x   Blood: x / Protein: x / Nitrite: x   Leuk Esterase: x / RBC: x / WBC x   Sq Epi: x / Non Sq Epi: x / Bacteria: x        MEDICATIONS  (STANDING):  cholecalciferol 400 Unit(s) Oral daily  cyanocobalamin 1000 MICROGram(s) Oral daily  donepezil 10 milliGRAM(s) Oral at bedtime  folic acid 1 milliGRAM(s) Oral daily  heparin   Injectable 5000 Unit(s) SubCutaneous every 8 hours  memantine 10 milliGRAM(s) Oral two times a day  metoprolol tartrate 25 milliGRAM(s) Oral two times a day  risperiDONE   Tablet 0.25 milliGRAM(s) Oral daily  risperiDONE   Tablet 0.5 milliGRAM(s) Oral at bedtime  sertraline 50 milliGRAM(s) Oral daily    MEDICATIONS  (PRN):  acetaminophen     Tablet .. 650 milliGRAM(s) Oral every 6 hours PRN Temp greater or equal to 38C (100.4F), Mild Pain (1 - 3)  aluminum hydroxide/magnesium hydroxide/simethicone Suspension 30 milliLiter(s) Oral every 4 hours PRN Dyspepsia  melatonin 3 milliGRAM(s) Oral at bedtime PRN Insomnia  ondansetron Injectable 4 milliGRAM(s) IV Push every 8 hours PRN Nausea and/or Vomiting  QUEtiapine 12.5 milliGRAM(s) Oral every 6 hours PRN for agitation          PHYSICAL EXAM:  GENERAL: NAD, well-groomed, well-developed  HEAD:  Atraumatic, Normocephalic  CHEST/LUNG: Clear to percussion bilaterally; No rales, rhonchi, wheezing, or rubs  HEART: Regular rate and rhythm; No murmurs, rubs, or gallops  ABDOMEN: Soft, Nontender, Nondistended; Bowel sounds present  EXTREMITIES:  2+ Peripheral Pulses, No clubbing, cyanosis, or edema  LYMPH: No lymphadenopathy noted  SKIN: No rashes or lesions    Care Discussed with Consultants/Other Providers [ ] YES  [ ] NO

## 2024-02-05 NOTE — PROGRESS NOTE ADULT - SUBJECTIVE AND OBJECTIVE BOX
DATE OF SERVICE: 02-05-24    Patient denies chest pain or shortness of breath.   Review of symptoms otherwise negative.    MEDICATIONS:  acetaminophen     Tablet .. 650 milliGRAM(s) Oral every 6 hours PRN  aluminum hydroxide/magnesium hydroxide/simethicone Suspension 30 milliLiter(s) Oral every 4 hours PRN  cholecalciferol 400 Unit(s) Oral daily  cyanocobalamin 1000 MICROGram(s) Oral daily  donepezil 10 milliGRAM(s) Oral at bedtime  folic acid 1 milliGRAM(s) Oral daily  heparin   Injectable 5000 Unit(s) SubCutaneous every 8 hours  melatonin 3 milliGRAM(s) Oral at bedtime PRN  memantine 10 milliGRAM(s) Oral two times a day  metoprolol tartrate 25 milliGRAM(s) Oral two times a day  ondansetron Injectable 4 milliGRAM(s) IV Push every 8 hours PRN  QUEtiapine 12.5 milliGRAM(s) Oral every 6 hours PRN  risperiDONE   Tablet 0.25 milliGRAM(s) Oral daily  risperiDONE   Tablet 0.5 milliGRAM(s) Oral at bedtime  sertraline 50 milliGRAM(s) Oral daily  sodium chloride 0.45% 1000 milliLiter(s) IV Continuous <Continuous>      LABS:                        7.1    5.07  )-----------( 257      ( 05 Feb 2024 06:25 )             22.1       Hemoglobin: 7.1 g/dL (02-05 @ 06:25)  Hemoglobin: 7.3 g/dL (02-04 @ 06:00)  Hemoglobin: 7.1 g/dL (02-03 @ 06:00)  Hemoglobin: 7.2 g/dL (02-02 @ 07:00)  Hemoglobin: 7.3 g/dL (02-01 @ 06:35)      02-05    143  |  117<H>  |  14  ----------------------------<  90  3.6   |  20<L>  |  0.60    Ca    6.6<L>      05 Feb 2024 06:25  Phos  1.9     02-05  Mg     1.70     02-05    TPro  9.6<H>  /  Alb  1.7<L>  /  TBili  0.2  /  DBili  x   /  AST  13  /  ALT  14  /  AlkPhos  54  02-05    Creatinine Trend: 0.60<--, 0.70<--, 0.66<--, 0.71<--, 0.70<--, 0.74<--    COAGS:           PHYSICAL EXAM:  T(C): 36.4 (02-05-24 @ 04:59), Max: 37.1 (02-04-24 @ 18:27)  HR: 72 (02-05-24 @ 04:59) (60 - 77)  BP: 142/76 (02-05-24 @ 04:59) (107/65 - 149/79)  RR: 18 (02-05-24 @ 04:59) (18 - 19)  SpO2: 95% (02-05-24 @ 04:59) (95% - 97%)  Wt(kg): --    I&O's Summary    General:  NAD  Head: Normocephalic and atraumatic.   Neck: No JVD. No bruits. Supple. Does not appear to be enlarged.   Cardiovascular: + S1,S2 ; RRR Soft systolic murmur at the left lower sternal border. No rubs noted.    Lungs: CTA b/l. No rhonchi, rales or wheezes.   Abdomen: + BS, soft. Non tender. Non distended. No rebound. No guarding.   Extremities: No clubbing/cyanosis/edema.   Neurologic: Moves all four extremities. Full range of motion.   Skin: Warm and moist. The patient's skin has normal elasticity and good skin turgor.   Psychiatric: unable to fully assess  Musculoskeletal: Normal range of motion, normal strength     TELEMETRY: 	 Sinus with PAT overnight 160x 2 min    ECG:  	NSR    < from: Transthoracic Echocardiogram (07.10.23 @ 11:15) >  CONCLUSIONS:  1. Calcified trileaflet aortic valve with normal opening.  Minimal aortic regurgitation.  2. Endocardium not well visualized; grossly decreased  possibly mild left ventricular systolic dysfunction.  3. The right ventricle is not well visualized; grossly  normal right ventricular systolic function.  ------------------------------------------------------------------------  Confirmed on  7/10/2023 - 13:57:46 by Jolene Rowan M.D. RPVI  < end of copied text >      ASSESSMENT/PLAN: This is a 78M with history of MM, HTN, and Dementia (AAO x 1-2 to self, to place, not to time at baseline per brother) who presents to the hospital from [facility] after a fall.     Patient is a poor historian 2/2 dementia. As per paperwork from the NH, patient had a fall and was noted to have a hematoma on the occipital head and was sent to the hospital for evaluation. Here imaging were negative for fractures but his work up showed him to have a significantly elevated calcium of 14 (corrected to 15.1) with DAVID and a significantly elevated protein gap. He was given NS 1.5L and repeat BMP showed persistence of the calcium elevation though improving. He was admitted to medicine on telemetry for further evaluation.  Recent admission for influenza, where his lopressor and norvasc were discontinued.    1. HTN/h/o MVR  - BP stable  - s/p fluids for hypercalcemia, no EKG changes associated with hypercalcemia  - TTE from last year with mildly decreased LVEF  - s/p PRBC  - continue lopressor 25 mg BID for PAT.     - Keep K>4 , Mag >2      Staci Muñiz MD  Pager: [phone]

## 2024-02-06 LAB
ANION GAP SERPL CALC-SCNC: 6 MMOL/L — LOW (ref 7–14)
ANION GAP SERPL CALC-SCNC: 7 MMOL/L — SIGNIFICANT CHANGE UP (ref 7–14)
BLD GP AB SCN SERPL QL: NEGATIVE — SIGNIFICANT CHANGE UP
BUN SERPL-MCNC: 12 MG/DL — SIGNIFICANT CHANGE UP (ref 7–23)
BUN SERPL-MCNC: 15 MG/DL — SIGNIFICANT CHANGE UP (ref 7–23)
CALCIUM SERPL-MCNC: 6.5 MG/DL — CRITICAL LOW (ref 8.4–10.5)
CALCIUM SERPL-MCNC: 6.6 MG/DL — LOW (ref 8.4–10.5)
CHLORIDE SERPL-SCNC: 113 MMOL/L — HIGH (ref 98–107)
CHLORIDE SERPL-SCNC: 115 MMOL/L — HIGH (ref 98–107)
CO2 SERPL-SCNC: 20 MMOL/L — LOW (ref 22–31)
CO2 SERPL-SCNC: 21 MMOL/L — LOW (ref 22–31)
CREAT SERPL-MCNC: 0.55 MG/DL — SIGNIFICANT CHANGE UP (ref 0.5–1.3)
CREAT SERPL-MCNC: 0.57 MG/DL — SIGNIFICANT CHANGE UP (ref 0.5–1.3)
EGFR: 101 ML/MIN/1.73M2 — SIGNIFICANT CHANGE UP
EGFR: 102 ML/MIN/1.73M2 — SIGNIFICANT CHANGE UP
GLUCOSE SERPL-MCNC: 91 MG/DL — SIGNIFICANT CHANGE UP (ref 70–99)
GLUCOSE SERPL-MCNC: 92 MG/DL — SIGNIFICANT CHANGE UP (ref 70–99)
HCT VFR BLD CALC: 21.2 % — LOW (ref 39–50)
HCT VFR BLD CALC: 24.3 % — LOW (ref 39–50)
HGB BLD-MCNC: 6.8 G/DL — CRITICAL LOW (ref 13–17)
HGB BLD-MCNC: 8.1 G/DL — LOW (ref 13–17)
MAGNESIUM SERPL-MCNC: 1.6 MG/DL — SIGNIFICANT CHANGE UP (ref 1.6–2.6)
MAGNESIUM SERPL-MCNC: 1.6 MG/DL — SIGNIFICANT CHANGE UP (ref 1.6–2.6)
MCHC RBC-ENTMCNC: 32.1 GM/DL — SIGNIFICANT CHANGE UP (ref 32–36)
MCHC RBC-ENTMCNC: 33.2 PG — SIGNIFICANT CHANGE UP (ref 27–34)
MCHC RBC-ENTMCNC: 33.3 GM/DL — SIGNIFICANT CHANGE UP (ref 32–36)
MCHC RBC-ENTMCNC: 34.3 PG — HIGH (ref 27–34)
MCV RBC AUTO: 103 FL — HIGH (ref 80–100)
MCV RBC AUTO: 103.4 FL — HIGH (ref 80–100)
NRBC # BLD: 0 /100 WBCS — SIGNIFICANT CHANGE UP (ref 0–0)
NRBC # BLD: 0 /100 WBCS — SIGNIFICANT CHANGE UP (ref 0–0)
NRBC # FLD: 0 K/UL — SIGNIFICANT CHANGE UP (ref 0–0)
NRBC # FLD: 0 K/UL — SIGNIFICANT CHANGE UP (ref 0–0)
PHOSPHATE SERPL-MCNC: 1.7 MG/DL — LOW (ref 2.5–4.5)
PHOSPHATE SERPL-MCNC: 3 MG/DL — SIGNIFICANT CHANGE UP (ref 2.5–4.5)
PLATELET # BLD AUTO: 257 K/UL — SIGNIFICANT CHANGE UP (ref 150–400)
PLATELET # BLD AUTO: 289 K/UL — SIGNIFICANT CHANGE UP (ref 150–400)
POTASSIUM SERPL-MCNC: 3.5 MMOL/L — SIGNIFICANT CHANGE UP (ref 3.5–5.3)
POTASSIUM SERPL-MCNC: 4.2 MMOL/L — SIGNIFICANT CHANGE UP (ref 3.5–5.3)
POTASSIUM SERPL-SCNC: 3.5 MMOL/L — SIGNIFICANT CHANGE UP (ref 3.5–5.3)
POTASSIUM SERPL-SCNC: 4.2 MMOL/L — SIGNIFICANT CHANGE UP (ref 3.5–5.3)
RBC # BLD: 2.05 M/UL — LOW (ref 4.2–5.8)
RBC # BLD: 2.36 M/UL — LOW (ref 4.2–5.8)
RBC # FLD: 15.5 % — HIGH (ref 10.3–14.5)
RBC # FLD: 15.6 % — HIGH (ref 10.3–14.5)
RH IG SCN BLD-IMP: POSITIVE — SIGNIFICANT CHANGE UP
SODIUM SERPL-SCNC: 140 MMOL/L — SIGNIFICANT CHANGE UP (ref 135–145)
SODIUM SERPL-SCNC: 142 MMOL/L — SIGNIFICANT CHANGE UP (ref 135–145)
WBC # BLD: 4.68 K/UL — SIGNIFICANT CHANGE UP (ref 3.8–10.5)
WBC # BLD: 6.17 K/UL — SIGNIFICANT CHANGE UP (ref 3.8–10.5)
WBC # FLD AUTO: 4.68 K/UL — SIGNIFICANT CHANGE UP (ref 3.8–10.5)
WBC # FLD AUTO: 6.17 K/UL — SIGNIFICANT CHANGE UP (ref 3.8–10.5)

## 2024-02-06 RX ORDER — CALCIUM GLUCONATE 100 MG/ML
2 VIAL (ML) INTRAVENOUS ONCE
Refills: 0 | Status: COMPLETED | OUTPATIENT
Start: 2024-02-06 | End: 2024-02-06

## 2024-02-06 RX ORDER — POTASSIUM PHOSPHATE, MONOBASIC POTASSIUM PHOSPHATE, DIBASIC 236; 224 MG/ML; MG/ML
30 INJECTION, SOLUTION INTRAVENOUS ONCE
Refills: 0 | Status: DISCONTINUED | OUTPATIENT
Start: 2024-02-06 | End: 2024-02-07

## 2024-02-06 RX ORDER — MAGNESIUM SULFATE 500 MG/ML
2 VIAL (ML) INJECTION ONCE
Refills: 0 | Status: COMPLETED | OUTPATIENT
Start: 2024-02-06 | End: 2024-02-06

## 2024-02-06 RX ORDER — POTASSIUM PHOSPHATE, MONOBASIC POTASSIUM PHOSPHATE, DIBASIC 236; 224 MG/ML; MG/ML
30 INJECTION, SOLUTION INTRAVENOUS ONCE
Refills: 0 | Status: COMPLETED | OUTPATIENT
Start: 2024-02-06 | End: 2024-02-06

## 2024-02-06 RX ADMIN — Medication 25 GRAM(S): at 21:43

## 2024-02-06 RX ADMIN — POTASSIUM PHOSPHATE, MONOBASIC POTASSIUM PHOSPHATE, DIBASIC 83.33 MILLIMOLE(S): 236; 224 INJECTION, SOLUTION INTRAVENOUS at 14:35

## 2024-02-06 RX ADMIN — Medication 1 MILLIGRAM(S): at 12:14

## 2024-02-06 RX ADMIN — PREGABALIN 1000 MICROGRAM(S): 225 CAPSULE ORAL at 12:14

## 2024-02-06 RX ADMIN — MEMANTINE HYDROCHLORIDE 10 MILLIGRAM(S): 10 TABLET ORAL at 17:57

## 2024-02-06 RX ADMIN — Medication 25 MILLIGRAM(S): at 05:42

## 2024-02-06 RX ADMIN — MEMANTINE HYDROCHLORIDE 10 MILLIGRAM(S): 10 TABLET ORAL at 05:42

## 2024-02-06 RX ADMIN — SERTRALINE 50 MILLIGRAM(S): 25 TABLET, FILM COATED ORAL at 12:13

## 2024-02-06 RX ADMIN — DONEPEZIL HYDROCHLORIDE 10 MILLIGRAM(S): 10 TABLET, FILM COATED ORAL at 21:44

## 2024-02-06 RX ADMIN — Medication 3 MILLIGRAM(S): at 00:59

## 2024-02-06 RX ADMIN — Medication 400 UNIT(S): at 12:13

## 2024-02-06 RX ADMIN — Medication 25 MILLIGRAM(S): at 17:57

## 2024-02-06 RX ADMIN — Medication 200 GRAM(S): at 13:31

## 2024-02-06 RX ADMIN — RISPERIDONE 0.25 MILLIGRAM(S): 4 TABLET ORAL at 12:14

## 2024-02-06 RX ADMIN — HEPARIN SODIUM 5000 UNIT(S): 5000 INJECTION INTRAVENOUS; SUBCUTANEOUS at 13:32

## 2024-02-06 RX ADMIN — HEPARIN SODIUM 5000 UNIT(S): 5000 INJECTION INTRAVENOUS; SUBCUTANEOUS at 05:42

## 2024-02-06 RX ADMIN — HEPARIN SODIUM 5000 UNIT(S): 5000 INJECTION INTRAVENOUS; SUBCUTANEOUS at 21:44

## 2024-02-06 NOTE — PROVIDER CONTACT NOTE (OTHER) - NAME OF MD/NP/PA/DO NOTIFIED:
FIFI Alexis
FIFI Montiel
Noelle Law
Violette Krishna
claudio brown
kevin snyder
claudio brown
Lei Koch
FIFI Montiel
kevin snyder
claudio brown

## 2024-02-06 NOTE — PROVIDER CONTACT NOTE (CRITICAL VALUE NOTIFICATION) - ASSESSMENT
hemoglobin 6.8
calcium 6.5
patient has no s/s of chest pain, shortness of breath, headache, dizziness, blurry vision; no change in mentation.
patient has no s/s of chest pain, shortness of breath, headache, dizziness, blurry vision; no change in mentation.

## 2024-02-06 NOTE — PROVIDER CONTACT NOTE (OTHER) - DATE AND TIME:
01-Feb-2024 21:50
02-Feb-2024 01:00
30-Jan-2024 04:24
01-Feb-2024 00:52
01-Feb-2024 01:08
06-Feb-2024 07:28
01-Feb-2024 22:30
04-Feb-2024 01:10
01-Feb-2024 00:26
27-Jan-2024 03:52
01-Feb-2024 23:20
02-Feb-2024 01:40
04-Feb-2024 06:00
03-Feb-2024 02:33

## 2024-02-06 NOTE — PROGRESS NOTE ADULT - SUBJECTIVE AND OBJECTIVE BOX
[facility] [place] [facility]   MD ANEL FOLEY MD ANGELA WONG, PA Venitha Krishnan, NP    TEL:  OFFICE: [phone]  From 5pm-7am Answering Service [phone]    -- RENAL FOLLOW UP NOTE ---Date of Service 02-06-24 @ 15:46    Patient is a 78y old  Male who presents with a chief complaint of Fall, elevated calcium     Patient seen and examined at bedside. in no apparent distress    VITALS:  T(F): 97.9 (02-06-24 @ 14:35), Max: 98.6 (02-05-24 @ 19:00)  HR: 64 (02-06-24 @ 14:35)  BP: 112/74 (02-06-24 @ 14:35)  RR: 18 (02-06-24 @ 14:35)  SpO2: 100% (02-06-24 @ 14:35)  Wt(kg): --    02-05 @ 07:01  -  02-06 @ 07:00  --------------------------------------------------------  IN: 480 mL / OUT: 0 mL / NET: 480 mL          PHYSICAL EXAM:  General: NAD  Neck: No JVD  Respiratory: CTAB, no wheezes, rales or rhonchi  Cardiovascular: S1, S2, RRR  Gastrointestinal: BS+, soft, NT/ND  Extremities: No peripheral edema    Hospital Medications:   MEDICATIONS  (STANDING):  cholecalciferol 400 Unit(s) Oral daily  cyanocobalamin 1000 MICROGram(s) Oral daily  donepezil 10 milliGRAM(s) Oral at bedtime  folic acid 1 milliGRAM(s) Oral daily  heparin   Injectable 5000 Unit(s) SubCutaneous every 8 hours  memantine 10 milliGRAM(s) Oral two times a day  metoprolol tartrate 25 milliGRAM(s) Oral two times a day  risperiDONE   Tablet 0.25 milliGRAM(s) Oral daily  risperiDONE   Tablet 0.5 milliGRAM(s) Oral at bedtime  sertraline 50 milliGRAM(s) Oral daily      LABS:  02-06    142  |  115<H>  |  12  ----------------------------<  92  3.5   |  20<L>  |  0.55    Ca    6.5<LL>      06 Feb 2024 07:55  Phos  1.7     02-06  Mg     1.60     02-06    TPro  9.6<H>  /  Alb  1.7<L>  /  TBili  0.2  /  DBili      /  AST  13  /  ALT  14  /  AlkPhos  54  02-05    Creatinine Trend: 0.55 <--, 0.60 <--, 0.70 <--, 0.66 <--, 0.71 <--, 0.70 <--, 0.74 <--    Phosphorus: 1.7 mg/dL (02-06 @ 07:55)                              8.1    6.17  )-----------( 289      ( 06 Feb 2024 10:11 )             24.3     Urine Studies:  Urinalysis - [02-06-24 @ 07:55]      Color  / Appearance  / SG  / pH       Gluc 92 / Ketone   / Bili  / Urobili        Blood  / Protein  / Leuk Est  / Nitrite       RBC  / WBC  / Hyaline  / Gran  / Sq Epi  / Non Sq Epi  / Bacteria       PTH -- (Ca --)      [01-27-24 @ 10:56]   8  PTH -- (Ca --)      [01-14-24 @ 06:50]   5  PTH -- (Ca --)      [01-13-24 @ 06:10]   6  Vitamin D (25OH) 46.4      [01-27-24 @ 10:56]      Immunofixation Serum:   Corresponding IgG and kappa bands consistent with monoclonal IgG kappa  protein. ADITYA Huggins M.D.  Reference Range: None Detected      [01-27-24 @ 10:56]  SPEP Interpretation: Distinct band in Gamma region with suppression of normal Gammaglobulin  suggestive of Monoclonal Gammopathy. Serum and Urine Immunofixation  Electrophoresis are recommended if not previously performed.  Clara Covington M.D.      [01-27-24 @ 10:56]    RADIOLOGY & ADDITIONAL STUDIES:

## 2024-02-06 NOTE — PROVIDER CONTACT NOTE (OTHER) - BACKGROUND
patient has history of dementia and hypercalcemia
(Admit Diagnosis) Unspecified fall, initial encounter  (PMH) Essential hypertension  (PMH) Multiple myeloma  (PMH) Dementia  (Principal DC/DX) Hypercalcemia
(Admit Diagnosis) Unspecified fall, initial encounter  (PMH) Essential hypertension  (PMH) Multiple myeloma  (PMH) Dementia  (Principal DC/DX) Hypercalcemia
patient has been going up to 160s-170s and back down to 70s last night and during the day. patient has first degree heart block
patient has been having multiple episodes of this today and last night. patient has type 1 heart block
(Admit Diagnosis) Unspecified fall, initial encounter  (PMH) Essential hypertension  (PMH) Multiple myeloma  (PMH) Dementia  (Principal DC/DX) Fall
(Admit Diagnosis) Unspecified fall, initial encounter  (PMH) Essential hypertension  (PMH) Multiple myeloma  (PMH) Dementia  (Principal DC/DX) Fall
Admit Diagnosis) Unspecified fall, initial encounter  (PMH) Essential hypertension  (PMH) Multiple myeloma  (PMH) Dementia
patient has been going up to 160s-170s and back down to 70s last night and during the day. patient has first degree heart block
patient has been having multiple episodes of this today and last night. patient has type 1 heart block
Patient admitted for unspecified Patient  fall. PMH of HTN, multiple myeloma, dementia.
patient has been going up to 160s-170s and back down to 70s last night and during the day. patient has first degree heart block

## 2024-02-06 NOTE — PROGRESS NOTE ADULT - SUBJECTIVE AND OBJECTIVE BOX
DATE OF SERVICE: 02-06-24    No events overnight, unable to obtain ROS    Review of Systems:   Constitutional: [ ] fevers, [ ] chills.   Skin: [ ] dry skin. [ ] rashes.  Psychiatric: [ ] depression, [ ] anxiety.   Gastrointestinal: [ ] BRBPR, [ ] melena.   Neurological: [ ] confusion. [ ] seizures. [ ] shuffling gait.   Ears,Nose,Mouth and Throat: [ ] ear pain [ ] sore throat.   Eyes: [ ] diplopia.   Respiratory: [ ] hemoptysis. [ ] shortness of breath  Cardiovascular: See HPI above  Hematologic/Lymphatic: [ ] anemia. [ ] painful nodes. [ ] prolonged bleeding.   Genitourinary: [ ] hematuria. [ ] flank pain.   Endocrine: [ ] significant change in weight. [ ] intolerance to heat and cold.     Review of systems [ x] otherwise negative, [ ] otherwise unable to obtain    FH: no family history of sudden cardiac death in first degree relatives    SH: [ ] tobacco, [ ] alcohol, [ ] drugs    acetaminophen     Tablet .. 650 milliGRAM(s) Oral every 6 hours PRN  aluminum hydroxide/magnesium hydroxide/simethicone Suspension 30 milliLiter(s) Oral every 4 hours PRN  cholecalciferol 400 Unit(s) Oral daily  cyanocobalamin 1000 MICROGram(s) Oral daily  donepezil 10 milliGRAM(s) Oral at bedtime  folic acid 1 milliGRAM(s) Oral daily  heparin   Injectable 5000 Unit(s) SubCutaneous every 8 hours  melatonin 3 milliGRAM(s) Oral at bedtime PRN  memantine 10 milliGRAM(s) Oral two times a day  metoprolol tartrate 25 milliGRAM(s) Oral two times a day  ondansetron Injectable 4 milliGRAM(s) IV Push every 8 hours PRN  QUEtiapine 12.5 milliGRAM(s) Oral every 6 hours PRN  risperiDONE   Tablet 0.25 milliGRAM(s) Oral daily  risperiDONE   Tablet 0.5 milliGRAM(s) Oral at bedtime  sertraline 50 milliGRAM(s) Oral daily                            8.1    6.17  )-----------( 289      ( 06 Feb 2024 10:11 )             24.3       02-06    142  |  115<H>  |  12  ----------------------------<  92  3.5   |  20<L>  |  0.55    Ca    6.5<LL>      06 Feb 2024 07:55  Phos  1.7     02-06  Mg     1.60     02-06    TPro  9.6<H>  /  Alb  1.7<L>  /  TBili  0.2  /  DBili  x   /  AST  13  /  ALT  14  /  AlkPhos  54  02-05      T(C): 36.7 (02-06-24 @ 07:24), Max: 37 (02-05-24 @ 19:00)  HR: 67 (02-06-24 @ 07:24) (67 - 80)  BP: 107/76 (02-06-24 @ 07:24) (107/76 - 142/72)  RR: 18 (02-06-24 @ 07:24) (17 - 18)  SpO2: 97% (02-06-24 @ 07:24) (96% - 100%)  Wt(kg): --    I&O's Summary    05 Feb 2024 07:01  -  06 Feb 2024 07:00  --------------------------------------------------------  IN: 480 mL / OUT: 0 mL / NET: 480 mL      General:  NAD  Head: Normocephalic and atraumatic.   Neck: No JVD. No bruits. Supple. Does not appear to be enlarged.   Cardiovascular: + S1,S2 ; RRR Soft systolic murmur at the left lower sternal border. No rubs noted.    Lungs: CTA b/l. No rhonchi, rales or wheezes.   Abdomen: + BS, soft. Non tender. Non distended. No rebound. No guarding.   Extremities: No clubbing/cyanosis/edema.   Neurologic: Moves all four extremities. Full range of motion.   Skin: Warm and moist. The patient's skin has normal elasticity and good skin turgor.   Psychiatric: unable to fully assess  Musculoskeletal: Normal range of motion, normal strength     TELEMETRY: 	 Sinus with PAT    ECG:  	NSR    < from: Transthoracic Echocardiogram (07.10.23 @ 11:15) >  CONCLUSIONS:  1. Calcified trileaflet aortic valve with normal opening.  Minimal aortic regurgitation.  2. Endocardium not well visualized; grossly decreased  possibly mild left ventricular systolic dysfunction.  3. The right ventricle is not well visualized; grossly  normal right ventricular systolic function.  ------------------------------------------------------------------------  Confirmed on  7/10/2023 - 13:57:46 by Jolene Rowan M.D. RPVI  < end of copied text >      ASSESSMENT/PLAN: This is a 78M with history of MM, HTN, and Dementia (AAO x 1-2 to self, to place, not to time at baseline per brother) who presents to the hospital from [facility] after a fall.     Patient is a poor historian 2/2 dementia. As per paperwork from the NH, patient had a fall and was noted to have a hematoma on the occipital head and was sent to the hospital for evaluation. Here imaging were negative for fractures but his work up showed him to have a significantly elevated calcium of 14 (corrected to 15.1) with DAVID and a significantly elevated protein gap. He was given NS 1.5L and repeat BMP showed persistence of the calcium elevation though improving. He was admitted to medicine on telemetry for further evaluation.  Recent admission for influenza, where his lopressor and norvasc were discontinued.    1. HTN/h/o MVR  - BP stable  - s/p fluids for hypercalcemia, no EKG changes associated with hypercalcemia  - TTE from last year with mildly decreased LVEF  - s/p PRBC  - continue lopressor 25 mg BID for PAT.     - Keep K>4 , Mag >2

## 2024-02-06 NOTE — PROVIDER CONTACT NOTE (OTHER) - ASSESSMENT
patient sleeping and asymtomatic
Patient A&Ox0, in no acute distress at this time.  No s/s of chest pain or shortness of breath noted at this time.  Patient resting comfortably in bed
patient asymptomatic and sleeping
patient asymptomatic vital signs stable.
Patient had 9 beats NSVT.  Patient A&Ox0, no s/s of chest pain or shortness of breath noted at this time.  Patient asymptomatic
Patient is AOx0. No signs of acute distress. Vital signs documented.
patient asymptomatic vital signs stable
patient sleeping and asymtomatic
patient sleeping and asymptomatic
Pt had 3 beats of v tach. Pt asymptomatic, resting in bed. Pt denies SOB/ chest pain, discomfort or pain.
Pt is alert and oriented to self, laying on a stretcher awake and speaking to self.
Patient A&Ox0, no s/s of chest pain noted at this time.  Patient asymptomatic, resting comfortably in bed at this time.

## 2024-02-06 NOTE — PROVIDER CONTACT NOTE (OTHER) - ACTION/TREATMENT ORDERED:
provider aware and wants to continue to monitor
ACP notified. No new orders at this time
FIFI Aguirre notified
ACP aware, will attempt again when pt is less restless.
provider aware and want to continue to monitor
provider aware and will continue to monitor no orders at this time
FIFI Banuelos notified, no new interventions ordered at this time
provider aware and will continue to monitor no orders at this time
provider aware and will continue to monitor no orders at this time
EKG preformed on patient and sent to provider.
provider ordered magnesium IVBP to give to patient
ACP Danielle made aware, no new interventions at this time.
provider aware and orders to continue to monitor
ACP Danielle made aware, no new interventions ordered at this time

## 2024-02-06 NOTE — PROGRESS NOTE ADULT - ASSESSMENT
78M with history of MM, HTN, and Dementia (AAO x 1-2 to self, to place, not to time at baseline per brother) who presents to the hospital from [facility] after a fall. Patient is a poor historian 2/2 dementia, unable to state what happened. As per paperwork from the NH, patient had a fall and was noted to have a hematoma on the occipital head and was sent to the hospital for evaluation. Here imaging were negative for fractures but his work up showed him to have a significantly elevated calcium of 14 (corrected to 15.1) with DAVID and a significantly elevated protein gap.  Nephrology consulted for DAVID and hypercalemia.    A/P:  DAVID:  Baseline S.Cr 0.7-0.9.  S.Cr 1.39 on admission.  Likely pre-renal in setting of dehydration/hypercalcemia.  UA + proteinuria; no blood.  FeNa 1.1% - suggests ATN.  Renal function remains stable.  Monitor BMP and UO.    HTN:  Controlled.  Avoid hypotension.  Monitor BP.    Hypercalcemia/hypocalcemia:  Likely due to Monoclonal Gammopathy.  PTH low, suppressed by high Ca.  Total protein high.  Vit. D 25OH 46.4.  S/p Zometa 4mg x1 dose and calcitonin 200IU q12hrs x2 doses.  corrected Ca 7.9 today  s/p 2g Calcium gluconate today  c.w cholecalciferol   Optimize albumin.  Monitor Ca.    Acidosis:  Hyperchloremic acidosis.  IVF d.c 2/5  Monitor CO2.    Anemia:  Likely sec to MM vs. other etiology.  Workup per primary team.  Heme/onc on board  Monitor Hgb.  Transfuse for Hgb <8.    Hypophosphatemia:  Likely sec. to poor PO intake.  S/P Kphos 30mmol today  Replete w/ Kphos as needed  Monitor PO4 daily.    Hypokalemia:   Likely sec to poor PO intake  improved  monitor K.    Hypomagnesemia:  Likely sec to poor PO intake.  improved  Replete as needed   Monitor Mg.    Proteinuria:  Possibly sec to MM.  Monitor.

## 2024-02-06 NOTE — PROGRESS NOTE ADULT - SUBJECTIVE AND OBJECTIVE BOX
Patient is a 78y old  Male who presents with a chief complaint of Fall, elevated calcium (06 Feb 2024 15:46)    Date of servie : 02-06-24 @ 15:53  INTERVAL HPI/OVERNIGHT EVENTS:  T(C): 36.6 (02-06-24 @ 14:35), Max: 37 (02-05-24 @ 19:00)  HR: 64 (02-06-24 @ 14:35) (64 - 80)  BP: 112/74 (02-06-24 @ 14:35) (107/76 - 142/72)  RR: 18 (02-06-24 @ 14:35) (17 - 18)  SpO2: 100% (02-06-24 @ 14:35) (96% - 100%)  Wt(kg): --  I&O's Summary    05 Feb 2024 07:01  -  06 Feb 2024 07:00  --------------------------------------------------------  IN: 480 mL / OUT: 0 mL / NET: 480 mL        LABS:                        8.1    6.17  )-----------( 289      ( 06 Feb 2024 10:11 )             24.3     02-06    142  |  115<H>  |  12  ----------------------------<  92  3.5   |  20<L>  |  0.55    Ca    6.5<LL>      06 Feb 2024 07:55  Phos  1.7     02-06  Mg     1.60     02-06    TPro  9.6<H>  /  Alb  1.7<L>  /  TBili  0.2  /  DBili  x   /  AST  13  /  ALT  14  /  AlkPhos  54  02-05      Urinalysis Basic - ( 06 Feb 2024 07:55 )    Color: x / Appearance: x / SG: x / pH: x  Gluc: 92 mg/dL / Ketone: x  / Bili: x / Urobili: x   Blood: x / Protein: x / Nitrite: x   Leuk Esterase: x / RBC: x / WBC x   Sq Epi: x / Non Sq Epi: x / Bacteria: x      CAPILLARY BLOOD GLUCOSE            Urinalysis Basic - ( 06 Feb 2024 07:55 )    Color: x / Appearance: x / SG: x / pH: x  Gluc: 92 mg/dL / Ketone: x  / Bili: x / Urobili: x   Blood: x / Protein: x / Nitrite: x   Leuk Esterase: x / RBC: x / WBC x   Sq Epi: x / Non Sq Epi: x / Bacteria: x        MEDICATIONS  (STANDING):  cholecalciferol 400 Unit(s) Oral daily  cyanocobalamin 1000 MICROGram(s) Oral daily  donepezil 10 milliGRAM(s) Oral at bedtime  folic acid 1 milliGRAM(s) Oral daily  heparin   Injectable 5000 Unit(s) SubCutaneous every 8 hours  memantine 10 milliGRAM(s) Oral two times a day  metoprolol tartrate 25 milliGRAM(s) Oral two times a day  risperiDONE   Tablet 0.25 milliGRAM(s) Oral daily  risperiDONE   Tablet 0.5 milliGRAM(s) Oral at bedtime  sertraline 50 milliGRAM(s) Oral daily    MEDICATIONS  (PRN):  acetaminophen     Tablet .. 650 milliGRAM(s) Oral every 6 hours PRN Temp greater or equal to 38C (100.4F), Mild Pain (1 - 3)  aluminum hydroxide/magnesium hydroxide/simethicone Suspension 30 milliLiter(s) Oral every 4 hours PRN Dyspepsia  melatonin 3 milliGRAM(s) Oral at bedtime PRN Insomnia  ondansetron Injectable 4 milliGRAM(s) IV Push every 8 hours PRN Nausea and/or Vomiting  QUEtiapine 12.5 milliGRAM(s) Oral every 6 hours PRN for agitation          PHYSICAL EXAM:  GENERAL: NAD, well-groomed, well-developed  HEAD:  Atraumatic, Normocephalic  CHEST/LUNG: Clear to percussion bilaterally; No rales, rhonchi, wheezing, or rubs  HEART: Regular rate and rhythm; No murmurs, rubs, or gallops  ABDOMEN: Soft, Nontender, Nondistended; Bowel sounds present  EXTREMITIES:  2+ Peripheral Pulses, No clubbing, cyanosis, or edema  LYMPH: No lymphadenopathy noted  SKIN: No rashes or lesions    Care Discussed with Consultants/Other Providers [ ] YES  [ ] NO

## 2024-02-06 NOTE — PROVIDER CONTACT NOTE (CRITICAL VALUE NOTIFICATION) - ACTION/TREATMENT ORDERED:
acp notified
acp aware, awaiting new orders
acp notified  as per acp repeat cbc and type and screen
acp aware, awaiting new orders

## 2024-02-06 NOTE — PROVIDER CONTACT NOTE (OTHER) - SITUATION
patient HR went to 160s and back down to 70s
patient HR went to 160s and lasted about 2 minutes and back down to 70s
Patient had PAT to 150s
Pt had 3 beats of v tach. Pt asymptomatic, resting in bed.
Pt is AOx1, very restless, and speaking to himself and removing telemetry leads every time it is placed. Telemetry leads placement on the chest/back, unsuccessful pt removes the leads
patient HR went to 160s and lasted about 2 minutes and back down to 70s
patient was going tachy  to 160s- 170s  then back down to 70s. Magnesium had been administered about 20 mins before this time.
HR 39 on tele
Patient had 9 beats NSVT
Pt had a PAT to 150s and then HR returned back to SR at 78
patient HR went to 170s and back down to 70s
patient was going tachy  to 160s- 170s  for few seconds then back down to 70s. patient had about 3-4 separate episode of this
patient had 6 beats of vtach
patient went tachy to 160s for few seconds then back down to 70s

## 2024-02-06 NOTE — PROVIDER CONTACT NOTE (OTHER) - REASON
9 beats vtach
patient HR when to 160s for about 2 minutes
patient went tachy to 160s for few seconds then back down to 70s
patient went tachy to 160s for few seconds then back down to 70s
Pt refusing telemetry
patient HR when to 160s for about 2 minutes
Pt had PAT to 150s
patient HR went to 170s
HR 39 on tele
Pt had PAT to 150s
patient HR went to 160s
patient had 6 beats of vtach
patient went tachy to 160s for few seconds then back down to 70s
Pt had 3 beats of v tach

## 2024-02-06 NOTE — PROVIDER CONTACT NOTE (CRITICAL VALUE NOTIFICATION) - BACKGROUND
patient admitted for outpatient fall
patient admitted for outpatient fall
admitted for falls
admitted for falls

## 2024-02-07 LAB
ANION GAP SERPL CALC-SCNC: 7 MMOL/L — SIGNIFICANT CHANGE UP (ref 7–14)
BUN SERPL-MCNC: 11 MG/DL — SIGNIFICANT CHANGE UP (ref 7–23)
CALCIUM SERPL-MCNC: 6.6 MG/DL — LOW (ref 8.4–10.5)
CHLORIDE SERPL-SCNC: 111 MMOL/L — HIGH (ref 98–107)
CO2 SERPL-SCNC: 19 MMOL/L — LOW (ref 22–31)
CREAT SERPL-MCNC: 0.55 MG/DL — SIGNIFICANT CHANGE UP (ref 0.5–1.3)
EGFR: 102 ML/MIN/1.73M2 — SIGNIFICANT CHANGE UP
GLUCOSE BLDC GLUCOMTR-MCNC: 95 MG/DL — SIGNIFICANT CHANGE UP (ref 70–99)
GLUCOSE SERPL-MCNC: 84 MG/DL — SIGNIFICANT CHANGE UP (ref 70–99)
MAGNESIUM SERPL-MCNC: 1.9 MG/DL — SIGNIFICANT CHANGE UP (ref 1.6–2.6)
PHOSPHATE SERPL-MCNC: 1.8 MG/DL — LOW (ref 2.5–4.5)
POTASSIUM SERPL-MCNC: 4 MMOL/L — SIGNIFICANT CHANGE UP (ref 3.5–5.3)
POTASSIUM SERPL-SCNC: 4 MMOL/L — SIGNIFICANT CHANGE UP (ref 3.5–5.3)
SODIUM SERPL-SCNC: 137 MMOL/L — SIGNIFICANT CHANGE UP (ref 135–145)

## 2024-02-07 RX ORDER — CALCIUM GLUCONATE 100 MG/ML
2 VIAL (ML) INTRAVENOUS ONCE
Refills: 0 | Status: COMPLETED | OUTPATIENT
Start: 2024-02-07 | End: 2024-02-07

## 2024-02-07 RX ORDER — POTASSIUM PHOSPHATE, MONOBASIC POTASSIUM PHOSPHATE, DIBASIC 236; 224 MG/ML; MG/ML
30 INJECTION, SOLUTION INTRAVENOUS ONCE
Refills: 0 | Status: COMPLETED | OUTPATIENT
Start: 2024-02-07 | End: 2024-02-07

## 2024-02-07 RX ADMIN — Medication 200 GRAM(S): at 11:34

## 2024-02-07 RX ADMIN — HEPARIN SODIUM 5000 UNIT(S): 5000 INJECTION INTRAVENOUS; SUBCUTANEOUS at 13:21

## 2024-02-07 RX ADMIN — MEMANTINE HYDROCHLORIDE 10 MILLIGRAM(S): 10 TABLET ORAL at 05:58

## 2024-02-07 RX ADMIN — PREGABALIN 1000 MICROGRAM(S): 225 CAPSULE ORAL at 11:41

## 2024-02-07 RX ADMIN — Medication 25 MILLIGRAM(S): at 05:58

## 2024-02-07 RX ADMIN — DONEPEZIL HYDROCHLORIDE 10 MILLIGRAM(S): 10 TABLET, FILM COATED ORAL at 21:57

## 2024-02-07 RX ADMIN — POTASSIUM PHOSPHATE, MONOBASIC POTASSIUM PHOSPHATE, DIBASIC 83.33 MILLIMOLE(S): 236; 224 INJECTION, SOLUTION INTRAVENOUS at 12:24

## 2024-02-07 RX ADMIN — SERTRALINE 50 MILLIGRAM(S): 25 TABLET, FILM COATED ORAL at 11:40

## 2024-02-07 RX ADMIN — Medication 25 MILLIGRAM(S): at 17:38

## 2024-02-07 RX ADMIN — RISPERIDONE 0.25 MILLIGRAM(S): 4 TABLET ORAL at 11:40

## 2024-02-07 RX ADMIN — Medication 1 MILLIGRAM(S): at 11:41

## 2024-02-07 RX ADMIN — RISPERIDONE 0.5 MILLIGRAM(S): 4 TABLET ORAL at 21:58

## 2024-02-07 RX ADMIN — Medication 400 UNIT(S): at 11:41

## 2024-02-07 RX ADMIN — HEPARIN SODIUM 5000 UNIT(S): 5000 INJECTION INTRAVENOUS; SUBCUTANEOUS at 21:57

## 2024-02-07 RX ADMIN — HEPARIN SODIUM 5000 UNIT(S): 5000 INJECTION INTRAVENOUS; SUBCUTANEOUS at 05:57

## 2024-02-07 RX ADMIN — MEMANTINE HYDROCHLORIDE 10 MILLIGRAM(S): 10 TABLET ORAL at 17:38

## 2024-02-07 NOTE — PROGRESS NOTE ADULT - SUBJECTIVE AND OBJECTIVE BOX
Patient is a 78y old  Male who presents with a chief complaint of Fall, elevated calcium (06 Feb 2024 15:53)    Date of servie : 02-07-24 @ 13:11  INTERVAL HPI/OVERNIGHT EVENTS:  T(C): 36.5 (02-07-24 @ 06:00), Max: 37.1 (02-06-24 @ 19:07)  HR: 74 (02-07-24 @ 06:00) (57 - 82)  BP: 148/84 (02-07-24 @ 06:00) (111/79 - 148/84)  RR: 18 (02-07-24 @ 06:00) (18 - 18)  SpO2: 100% (02-07-24 @ 06:00) (100% - 100%)  Wt(kg): --  I&O's Summary      LABS:                        8.1    6.17  )-----------( 289      ( 06 Feb 2024 10:11 )             24.3     02-07    137  |  111<H>  |  11  ----------------------------<  84  4.0   |  19<L>  |  0.55    Ca    6.6<L>      07 Feb 2024 06:30  Phos  1.8     02-07  Mg     1.90     02-07        Urinalysis Basic - ( 07 Feb 2024 06:30 )    Color: x / Appearance: x / SG: x / pH: x  Gluc: 84 mg/dL / Ketone: x  / Bili: x / Urobili: x   Blood: x / Protein: x / Nitrite: x   Leuk Esterase: x / RBC: x / WBC x   Sq Epi: x / Non Sq Epi: x / Bacteria: x      CAPILLARY BLOOD GLUCOSE      POCT Blood Glucose.: 95 mg/dL (07 Feb 2024 00:55)        Urinalysis Basic - ( 07 Feb 2024 06:30 )    Color: x / Appearance: x / SG: x / pH: x  Gluc: 84 mg/dL / Ketone: x  / Bili: x / Urobili: x   Blood: x / Protein: x / Nitrite: x   Leuk Esterase: x / RBC: x / WBC x   Sq Epi: x / Non Sq Epi: x / Bacteria: x        MEDICATIONS  (STANDING):  cholecalciferol 400 Unit(s) Oral daily  cyanocobalamin 1000 MICROGram(s) Oral daily  donepezil 10 milliGRAM(s) Oral at bedtime  folic acid 1 milliGRAM(s) Oral daily  heparin   Injectable 5000 Unit(s) SubCutaneous every 8 hours  memantine 10 milliGRAM(s) Oral two times a day  metoprolol tartrate 25 milliGRAM(s) Oral two times a day  risperiDONE   Tablet 0.25 milliGRAM(s) Oral daily  risperiDONE   Tablet 0.5 milliGRAM(s) Oral at bedtime  sertraline 50 milliGRAM(s) Oral daily    MEDICATIONS  (PRN):  acetaminophen     Tablet .. 650 milliGRAM(s) Oral every 6 hours PRN Temp greater or equal to 38C (100.4F), Mild Pain (1 - 3)  aluminum hydroxide/magnesium hydroxide/simethicone Suspension 30 milliLiter(s) Oral every 4 hours PRN Dyspepsia  melatonin 3 milliGRAM(s) Oral at bedtime PRN Insomnia  ondansetron Injectable 4 milliGRAM(s) IV Push every 8 hours PRN Nausea and/or Vomiting  QUEtiapine 12.5 milliGRAM(s) Oral every 6 hours PRN for agitation          PHYSICAL EXAM:  GENERAL: NAD, well-groomed, well-developed  HEAD:  Atraumatic, Normocephalic  CHEST/LUNG: Clear to percussion bilaterally; No rales, rhonchi, wheezing, or rubs  HEART: Regular rate and rhythm; No murmurs, rubs, or gallops  ABDOMEN: Soft, Nontender, Nondistended; Bowel sounds present  EXTREMITIES:  2+ Peripheral Pulses, No clubbing, cyanosis, or edema  LYMPH: No lymphadenopathy noted  SKIN: No rashes or lesions    Care Discussed with Consultants/Other Providers [ ] YES  [ ] NO

## 2024-02-07 NOTE — PROGRESS NOTE ADULT - SUBJECTIVE AND OBJECTIVE BOX
[facility] [place] [facility]   MD ANEL FOLEY MD ANGELA WONG, PA Venitha Krishnan, NP    TEL:  OFFICE: [phone]  From 5pm-7am Answering Service [phone]    -- RENAL FOLLOW UP NOTE ---Date of Service 02-07-24 @ 14:43    Patient is a 78y old  Male who presents with a chief complaint of Fall, elevated calcium       Patient seen and examined at bedside. in no apparent distress    VITALS:  T(F): 97.7 (02-07-24 @ 06:00), Max: 98.8 (02-06-24 @ 19:07)  HR: 74 (02-07-24 @ 06:00)  BP: 148/84 (02-07-24 @ 06:00)  RR: 18 (02-07-24 @ 06:00)  SpO2: 100% (02-07-24 @ 06:00)  Wt(kg): --        PHYSICAL EXAM:  General: NAD  Neck: No JVD  Respiratory: CTAB, no wheezes, rales or rhonchi  Cardiovascular: S1, S2, RRR  Gastrointestinal: BS+, soft, NT/ND  Extremities: No peripheral edema    Hospital Medications:   MEDICATIONS  (STANDING):  cholecalciferol 400 Unit(s) Oral daily  cyanocobalamin 1000 MICROGram(s) Oral daily  donepezil 10 milliGRAM(s) Oral at bedtime  folic acid 1 milliGRAM(s) Oral daily  heparin   Injectable 5000 Unit(s) SubCutaneous every 8 hours  memantine 10 milliGRAM(s) Oral two times a day  metoprolol tartrate 25 milliGRAM(s) Oral two times a day  risperiDONE   Tablet 0.25 milliGRAM(s) Oral daily  risperiDONE   Tablet 0.5 milliGRAM(s) Oral at bedtime  sertraline 50 milliGRAM(s) Oral daily      LABS:  02-07    137  |  111<H>  |  11  ----------------------------<  84  4.0   |  19<L>  |  0.55    Ca    6.6<L>      07 Feb 2024 06:30  Phos  1.8     02-07  Mg     1.90     02-07      Creatinine Trend: 0.55 <--, 0.57 <--, 0.55 <--, 0.60 <--, 0.70 <--, 0.66 <--, 0.71 <--, 0.70 <--    Phosphorus: 1.8 mg/dL (02-07 @ 06:30)  Phosphorus: 3.0 mg/dL (02-06 @ 21:50)                              8.1    6.17  )-----------( 289      ( 06 Feb 2024 10:11 )             24.3     Urine Studies:  Urinalysis - [02-07-24 @ 06:30]      Color  / Appearance  / SG  / pH       Gluc 84 / Ketone   / Bili  / Urobili        Blood  / Protein  / Leuk Est  / Nitrite       RBC  / WBC  / Hyaline  / Gran  / Sq Epi  / Non Sq Epi  / Bacteria       PTH -- (Ca --)      [01-27-24 @ 10:56]   8  PTH -- (Ca --)      [01-14-24 @ 06:50]   5  PTH -- (Ca --)      [01-13-24 @ 06:10]   6  Vitamin D (25OH) 46.4      [01-27-24 @ 10:56]      Immunofixation Serum:   Corresponding IgG and kappa bands consistent with monoclonal IgG kappa  protein. ADITYA Huggins M.D.  Reference Range: None Detected      [01-27-24 @ 10:56]  SPEP Interpretation: Distinct band in Gamma region with suppression of normal Gammaglobulin  suggestive of Monoclonal Gammopathy. Serum and Urine Immunofixation  Electrophoresis are recommended if not previously performed.  Clara Covington M.D.      [01-27-24 @ 10:56]    RADIOLOGY & ADDITIONAL STUDIES:

## 2024-02-07 NOTE — PROGRESS NOTE ADULT - ASSESSMENT
78M with history of MM, HTN, and Dementia (AAO x 1-2 to self, to place, not to time at baseline per brother) who presents to the hospital from [facility] after a fall. Patient is a poor historian 2/2 dementia, unable to state what happened. As per paperwork from the NH, patient had a fall and was noted to have a hematoma on the occipital head and was sent to the hospital for evaluation. Here imaging were negative for fractures but his work up showed him to have a significantly elevated calcium of 14 (corrected to 15.1) with DAVID and a significantly elevated protein gap.  Nephrology consulted for DAVID and hypercalemia.    A/P:  DAVID:  Baseline S.Cr 0.7-0.9.  S.Cr 1.39 on admission.  Likely pre-renal in setting of dehydration/hypercalcemia.  UA + proteinuria; no blood.  FeNa 1.1% - suggests ATN.  Renal function remains stable.  Monitor BMP and UO.    HTN:  Controlled.  Avoid hypotension.  Monitor BP.    Hypercalcemia/hypocalcemia:  Likely due to Monoclonal Gammopathy.  PTH low, suppressed by high Ca.  Total protein high.  Vit. D 25OH 46.4.  S/p Zometa 4mg x1 dose and calcitonin 200IU q12hrs x2 doses.  corrected Ca 8 today  s/p 2g Calcium gluconate again today  c.w cholecalciferol   Optimize albumin.  Monitor Ca.    Acidosis:  Hyperchloremic acidosis.  IVF d.c 2/5  Monitor CO2.    Anemia:  Likely sec to MM vs. other etiology.  Workup per primary team.  Heme/onc on board  Monitor Hgb.  Transfuse for Hgb <8.    Hypophosphatemia:  Likely sec. to poor PO intake.  S/P Kphos 30mmol again today  Replete w/ Kphos as needed  Monitor PO4 daily.    Hypokalemia:   Likely sec to poor PO intake  improved  monitor K.    Hypomagnesemia:  Likely sec to poor PO intake.  improved  Replete as needed   Monitor Mg.    Proteinuria:  Possibly sec to MM.  Monitor.

## 2024-02-07 NOTE — PROGRESS NOTE ADULT - SUBJECTIVE AND OBJECTIVE BOX
DATE OF SERVICE: 02-07-24    no events overnight, ROS limited    Review of Systems:   Constitutional: [ ] fevers, [ ] chills.   Skin: [ ] dry skin. [ ] rashes.  Psychiatric: [ ] depression, [ ] anxiety.   Gastrointestinal: [ ] BRBPR, [ ] melena.   Neurological: [ ] confusion. [ ] seizures. [ ] shuffling gait.   Ears,Nose,Mouth and Throat: [ ] ear pain [ ] sore throat.   Eyes: [ ] diplopia.   Respiratory: [ ] hemoptysis. [ ] shortness of breath  Cardiovascular: See HPI above  Hematologic/Lymphatic: [ ] anemia. [ ] painful nodes. [ ] prolonged bleeding.   Genitourinary: [ ] hematuria. [ ] flank pain.   Endocrine: [ ] significant change in weight. [ ] intolerance to heat and cold.     Review of systems [x ] otherwise negative, [ ] otherwise unable to obtain    FH: no family history of sudden cardiac death in first degree relatives    SH: [ ] tobacco, [ ] alcohol, [ ] drugs    acetaminophen     Tablet .. 650 milliGRAM(s) Oral every 6 hours PRN  aluminum hydroxide/magnesium hydroxide/simethicone Suspension 30 milliLiter(s) Oral every 4 hours PRN  cholecalciferol 400 Unit(s) Oral daily  cyanocobalamin 1000 MICROGram(s) Oral daily  donepezil 10 milliGRAM(s) Oral at bedtime  folic acid 1 milliGRAM(s) Oral daily  heparin   Injectable 5000 Unit(s) SubCutaneous every 8 hours  melatonin 3 milliGRAM(s) Oral at bedtime PRN  memantine 10 milliGRAM(s) Oral two times a day  metoprolol tartrate 25 milliGRAM(s) Oral two times a day  ondansetron Injectable 4 milliGRAM(s) IV Push every 8 hours PRN  QUEtiapine 12.5 milliGRAM(s) Oral every 6 hours PRN  risperiDONE   Tablet 0.25 milliGRAM(s) Oral daily  risperiDONE   Tablet 0.5 milliGRAM(s) Oral at bedtime  sertraline 50 milliGRAM(s) Oral daily                            8.1    6.17  )-----------( 289      ( 06 Feb 2024 10:11 )             24.3       02-07    137  |  111<H>  |  11  ----------------------------<  84  4.0   |  19<L>  |  0.55    Ca    6.6<L>      07 Feb 2024 06:30  Phos  1.8     02-07  Mg     1.90     02-07      T(C): 36.5 (02-07-24 @ 06:00), Max: 37.1 (02-06-24 @ 19:07)  HR: 74 (02-07-24 @ 06:00) (57 - 82)  BP: 148/84 (02-07-24 @ 06:00) (111/79 - 148/84)  RR: 18 (02-07-24 @ 06:00) (18 - 18)  SpO2: 100% (02-07-24 @ 06:00) (100% - 100%)  Wt(kg): --    General:  NAD  Head: Normocephalic and atraumatic.   Neck: No JVD. No bruits. Supple. Does not appear to be enlarged.   Cardiovascular: + S1,S2 ; RRR Soft systolic murmur at the left lower sternal border. No rubs noted.    Lungs: CTA b/l. No rhonchi, rales or wheezes.   Abdomen: + BS, soft. Non tender. Non distended. No rebound. No guarding.   Extremities: No clubbing/cyanosis/edema.   Neurologic: Moves all four extremities. Full range of motion.   Skin: Warm and moist. The patient's skin has normal elasticity and good skin turgor.   Psychiatric: unable to fully assess  Musculoskeletal: Normal range of motion, normal strength     TELEMETRY: 	 Sinus with PAT    ECG:  	NSR    < from: Transthoracic Echocardiogram (07.10.23 @ 11:15) >  CONCLUSIONS:  1. Calcified trileaflet aortic valve with normal opening.  Minimal aortic regurgitation.  2. Endocardium not well visualized; grossly decreased  possibly mild left ventricular systolic dysfunction.  3. The right ventricle is not well visualized; grossly  normal right ventricular systolic function.  ------------------------------------------------------------------------  Confirmed on  7/10/2023 - 13:57:46 by Jolene Rowan M.D. RPVI  < end of copied text >      ASSESSMENT/PLAN: This is a 78M with history of MM, HTN, and Dementia (AAO x 1-2 to self, to place, not to time at baseline per brother) who presents to the hospital [place] a [place].     Patient is a poor historian 2/2 dementia. As per paperwork from the NH, patient had a fall and was noted to have a hematoma on the occipital head and was sent to the hospital for evaluation. Here imaging were negative for fractures but his work up showed him to have a significantly elevated calcium of 14 (corrected to 15.1) with DAVID and a significantly elevated protein gap. He was given NS 1.5L and repeat BMP showed persistence of the calcium elevation though improving. He was admitted to medicine on telemetry for further evaluation.  Recent admission for influenza, where his lopressor and norvasc were discontinued.    1. HTN/h/o MVR  - BP stable  - s/p fluids for hypercalcemia, no EKG changes associated with hypercalcemia  - TTE from last year with mildly decreased LVEF, cont medical management with Lopressor  - s/p PRBC 1/29, monitor h/h  - continue lopressor 25 mg BID for PAT and CM     - Keep K>4 , Mag >2

## 2024-02-08 RX ADMIN — DONEPEZIL HYDROCHLORIDE 10 MILLIGRAM(S): 10 TABLET, FILM COATED ORAL at 22:20

## 2024-02-08 RX ADMIN — Medication 1 MILLIGRAM(S): at 12:10

## 2024-02-08 RX ADMIN — HEPARIN SODIUM 5000 UNIT(S): 5000 INJECTION INTRAVENOUS; SUBCUTANEOUS at 06:25

## 2024-02-08 RX ADMIN — PREGABALIN 1000 MICROGRAM(S): 225 CAPSULE ORAL at 12:10

## 2024-02-08 RX ADMIN — RISPERIDONE 0.5 MILLIGRAM(S): 4 TABLET ORAL at 22:20

## 2024-02-08 RX ADMIN — HEPARIN SODIUM 5000 UNIT(S): 5000 INJECTION INTRAVENOUS; SUBCUTANEOUS at 22:30

## 2024-02-08 RX ADMIN — Medication 400 UNIT(S): at 12:10

## 2024-02-08 RX ADMIN — Medication 25 MILLIGRAM(S): at 17:34

## 2024-02-08 RX ADMIN — MEMANTINE HYDROCHLORIDE 10 MILLIGRAM(S): 10 TABLET ORAL at 06:26

## 2024-02-08 RX ADMIN — Medication 25 MILLIGRAM(S): at 06:26

## 2024-02-08 RX ADMIN — MEMANTINE HYDROCHLORIDE 10 MILLIGRAM(S): 10 TABLET ORAL at 17:35

## 2024-02-08 RX ADMIN — SERTRALINE 50 MILLIGRAM(S): 25 TABLET, FILM COATED ORAL at 12:10

## 2024-02-08 RX ADMIN — RISPERIDONE 0.25 MILLIGRAM(S): 4 TABLET ORAL at 12:10

## 2024-02-08 NOTE — PROGRESS NOTE ADULT - SUBJECTIVE AND OBJECTIVE BOX
[facility] [place] [facility]   MD ANEL FOLEY MD ANGELA WONG, PA QIAN CHEN, NP    TEL:  OFFICE: [phone]  From 5pm-7am Answering Service [phone]    -- RENAL FOLLOW UP NOTE ---Date of Service 02-08-24 @ 14:39    Patient is a 78y old  Male who presents with a chief complaint of Fall, elevated calcium.    Patient seen and examined at bedside. No chest pain/SOB.      VITALS:  T(F): 98.2 (02-08-24 @ 06:22), Max: 98.8 (02-07-24 @ 17:20)  HR: 73 (02-08-24 @ 06:22)  BP: 159/79 (02-08-24 @ 06:22)  RR: 18 (02-08-24 @ 06:22)  SpO2: 95% (02-08-24 @ 06:22)  Wt(kg): --    02-07 @ 07:01  -  02-08 @ 07:00  --------------------------------------------------------  IN: 800 mL / OUT: 0 mL / NET: 800 mL      PHYSICAL EXAM:  General: NAD  Neck: No JVD  Respiratory: CTAB, no wheezes, rales or rhonchi  Cardiovascular: S1, S2, RRR  Gastrointestinal: BS+, soft, NT/ND  Extremities: No peripheral edema    Hospital Medications:   MEDICATIONS  (STANDING):  cholecalciferol 400 Unit(s) Oral daily  cyanocobalamin 1000 MICROGram(s) Oral daily  donepezil 10 milliGRAM(s) Oral at bedtime  folic acid 1 milliGRAM(s) Oral daily  heparin   Injectable 5000 Unit(s) SubCutaneous every 8 hours  memantine 10 milliGRAM(s) Oral two times a day  metoprolol tartrate 25 milliGRAM(s) Oral two times a day  risperiDONE   Tablet 0.25 milliGRAM(s) Oral daily  risperiDONE   Tablet 0.5 milliGRAM(s) Oral at bedtime  sertraline 50 milliGRAM(s) Oral daily      LABS:  02-07    137  |  111<H>  |  11  ----------------------------<  84  4.0   |  19<L>  |  0.55    Ca    6.6<L>      07 Feb 2024 06:30  Phos  1.8     02-07  Mg     1.90     02-07      Creatinine Trend: 0.55 <--, 0.57 <--, 0.55 <--, 0.60 <--, 0.70 <--, 0.66 <--, 0.71 <--      Urine Studies:  Urinalysis - [02-07-24 @ 06:30]      Color  / Appearance  / SG  / pH       Gluc 84 / Ketone   / Bili  / Urobili        Blood  / Protein  / Leuk Est  / Nitrite       RBC  / WBC  / Hyaline  / Gran  / Sq Epi  / Non Sq Epi  / Bacteria       PTH -- (Ca --)      [01-27-24 @ 10:56]   8  PTH -- (Ca --)      [01-14-24 @ 06:50]   5  PTH -- (Ca --)      [01-13-24 @ 06:10]   6  Vitamin D (25OH) 46.4      [01-27-24 @ 10:56]      Immunofixation Serum:   Corresponding IgG and kappa bands consistent with monoclonal IgG kappa  protein. ADITYA Huggins M.D.  Reference Range: None Detected      [01-27-24 @ 10:56]  SPEP Interpretation: Distinct band in Gamma region with suppression of normal Gammaglobulin  suggestive of Monoclonal Gammopathy. Serum and Urine Immunofixation  Electrophoresis are recommended if not previously performed.  Clara Covington M.D.      [01-27-24 @ 10:56]

## 2024-02-08 NOTE — PROGRESS NOTE ADULT - SUBJECTIVE AND OBJECTIVE BOX
DATE OF SERVICE: 02-08-24    no events overnight, ROS limited     Review of Systems:   Constitutional: [ ] fevers, [ ] chills.   Skin: [ ] dry skin. [ ] rashes.  Psychiatric: [ ] depression, [ ] anxiety.   Gastrointestinal: [ ] BRBPR, [ ] melena.   Neurological: [ ] confusion. [ ] seizures. [ ] shuffling gait.   Ears,Nose,Mouth and Throat: [ ] ear pain [ ] sore throat.   Eyes: [ ] diplopia.   Respiratory: [ ] hemoptysis. [ ] shortness of breath  Cardiovascular: See HPI above  Hematologic/Lymphatic: [ ] anemia. [ ] painful nodes. [ ] prolonged bleeding.   Genitourinary: [ ] hematuria. [ ] flank pain.   Endocrine: [ ] significant change in weight. [ ] intolerance to heat and cold.     Review of systems [x ] otherwise negative, [ ] otherwise unable to obtain    FH: no family history of sudden cardiac death in first degree relatives    SH: [ ] tobacco, [ ] alcohol, [ ] drugs    acetaminophen     Tablet .. 650 milliGRAM(s) Oral every 6 hours PRN  aluminum hydroxide/magnesium hydroxide/simethicone Suspension 30 milliLiter(s) Oral every 4 hours PRN  cholecalciferol 400 Unit(s) Oral daily  cyanocobalamin 1000 MICROGram(s) Oral daily  donepezil 10 milliGRAM(s) Oral at bedtime  folic acid 1 milliGRAM(s) Oral daily  heparin   Injectable 5000 Unit(s) SubCutaneous every 8 hours  melatonin 3 milliGRAM(s) Oral at bedtime PRN  memantine 10 milliGRAM(s) Oral two times a day  metoprolol tartrate 25 milliGRAM(s) Oral two times a day  ondansetron Injectable 4 milliGRAM(s) IV Push every 8 hours PRN  QUEtiapine 12.5 milliGRAM(s) Oral every 6 hours PRN  risperiDONE   Tablet 0.25 milliGRAM(s) Oral daily  risperiDONE   Tablet 0.5 milliGRAM(s) Oral at bedtime  sertraline 50 milliGRAM(s) Oral daily        137  |  111<H>  |  11  ----------------------------<  84  4.0   |  19<L>  |  0.55    Ca    6.6<L>      07 Feb 2024 06:30  Phos  1.8     02-07  Mg     1.90     02-07    T(C): 36.8 (02-08-24 @ 06:22), Max: 37.1 (02-07-24 @ 17:20)  HR: 73 (02-08-24 @ 06:22) (53 - 76)  BP: 159/79 (02-08-24 @ 06:22) (110/66 - 159/79)  RR: 18 (02-08-24 @ 06:22) (17 - 18)  SpO2: 95% (02-08-24 @ 06:22) (95% - 100%)  Wt(kg): --    I&O's Summary    07 Feb 2024 07:01  -  08 Feb 2024 07:00  --------------------------------------------------------  IN: 800 mL / OUT: 0 mL / NET: 800 mL      General:  NAD  Head: Normocephalic and atraumatic.   Neck: No JVD. No bruits. Supple. Does not appear to be enlarged.   Cardiovascular: + S1,S2 ; RRR Soft systolic murmur at the left lower sternal border. No rubs noted.    Lungs: CTA b/l. No rhonchi, rales or wheezes.   Abdomen: + BS, soft. Non tender. Non distended. No rebound. No guarding.   Extremities: No clubbing/cyanosis/edema.   Neurologic: Moves all four extremities. Full range of motion.   Skin: Warm and moist. The patient's skin has normal elasticity and good skin turgor.   Psychiatric: unable to fully assess  Musculoskeletal: Normal range of motion, normal strength     ECG:  	NSR    < from: Transthoracic Echocardiogram (07.10.23 @ 11:15) >  CONCLUSIONS:  1. Calcified trileaflet aortic valve with normal opening.  Minimal aortic regurgitation.  2. Endocardium not well visualized; grossly decreased  possibly mild left ventricular systolic dysfunction.  3. The right ventricle is not well visualized; grossly  normal right ventricular systolic function.  ------------------------------------------------------------------------  Confirmed on  7/10/2023 - 13:57:46 by Jolene Rowan M.D. RPVI  < end of copied text >      ASSESSMENT/PLAN: This is a 78M with history of MM, HTN, and Dementia (AAO x 1-2 to self, to place, not to time at baseline per brother) who presents to the hospital from [facility] after a fall.     Patient is a poor historian 2/2 dementia. As per paperwork from the NH, patient had a fall and was noted to have a hematoma on the occipital head and was sent to the hospital for evaluation. Here imaging were negative for fractures but his work up showed him to have a significantly elevated calcium of 14 (corrected to 15.1) with DAVID and a significantly elevated protein gap. He was given NS 1.5L and repeat BMP showed persistence of the calcium elevation though improving. He was admitted to medicine on telemetry for further evaluation.  Recent admission for influenza, where his lopressor and norvasc were discontinued.    1. HTN/h/o MVR  - BP stable  - s/p fluids for hypercalcemia, no EKG changes associated with hypercalcemia  - TTE from last year with mildly decreased LVEF, cont medical management with Lopressor  - s/p PRBC 1/29, monitor h/h  - continue lopressor 25 mg BID for PAT and CM     - Keep K>4 , Mag >2  Dc planning

## 2024-02-08 NOTE — PROGRESS NOTE ADULT - SUBJECTIVE AND OBJECTIVE BOX
Patient is a 78y old  Male who presents with a chief complaint of Fall, elevated calcium (08 Feb 2024 14:38)    Date of servie : 02-08-24 @ 15:07  INTERVAL HPI/OVERNIGHT EVENTS:  T(C): 36.8 (02-08-24 @ 06:22), Max: 37.1 (02-07-24 @ 17:20)  HR: 73 (02-08-24 @ 06:22) (53 - 76)  BP: 159/79 (02-08-24 @ 06:22) (110/66 - 159/79)  RR: 18 (02-08-24 @ 06:22) (17 - 18)  SpO2: 95% (02-08-24 @ 06:22) (95% - 100%)  Wt(kg): --  I&O's Summary    07 Feb 2024 07:01  -  08 Feb 2024 07:00  --------------------------------------------------------  IN: 800 mL / OUT: 0 mL / NET: 800 mL        LABS:    02-07    137  |  111<H>  |  11  ----------------------------<  84  4.0   |  19<L>  |  0.55    Ca    6.6<L>      07 Feb 2024 06:30  Phos  1.8     02-07  Mg     1.90     02-07        Urinalysis Basic - ( 07 Feb 2024 06:30 )    Color: x / Appearance: x / SG: x / pH: x  Gluc: 84 mg/dL / Ketone: x  / Bili: x / Urobili: x   Blood: x / Protein: x / Nitrite: x   Leuk Esterase: x / RBC: x / WBC x   Sq Epi: x / Non Sq Epi: x / Bacteria: x      CAPILLARY BLOOD GLUCOSE            Urinalysis Basic - ( 07 Feb 2024 06:30 )    Color: x / Appearance: x / SG: x / pH: x  Gluc: 84 mg/dL / Ketone: x  / Bili: x / Urobili: x   Blood: x / Protein: x / Nitrite: x   Leuk Esterase: x / RBC: x / WBC x   Sq Epi: x / Non Sq Epi: x / Bacteria: x        MEDICATIONS  (STANDING):  cholecalciferol 400 Unit(s) Oral daily  cyanocobalamin 1000 MICROGram(s) Oral daily  donepezil 10 milliGRAM(s) Oral at bedtime  folic acid 1 milliGRAM(s) Oral daily  heparin   Injectable 5000 Unit(s) SubCutaneous every 8 hours  memantine 10 milliGRAM(s) Oral two times a day  metoprolol tartrate 25 milliGRAM(s) Oral two times a day  risperiDONE   Tablet 0.25 milliGRAM(s) Oral daily  risperiDONE   Tablet 0.5 milliGRAM(s) Oral at bedtime  sertraline 50 milliGRAM(s) Oral daily    MEDICATIONS  (PRN):  acetaminophen     Tablet .. 650 milliGRAM(s) Oral every 6 hours PRN Temp greater or equal to 38C (100.4F), Mild Pain (1 - 3)  aluminum hydroxide/magnesium hydroxide/simethicone Suspension 30 milliLiter(s) Oral every 4 hours PRN Dyspepsia  melatonin 3 milliGRAM(s) Oral at bedtime PRN Insomnia  ondansetron Injectable 4 milliGRAM(s) IV Push every 8 hours PRN Nausea and/or Vomiting  QUEtiapine 12.5 milliGRAM(s) Oral every 6 hours PRN for agitation          PHYSICAL EXAM:  GENERAL: NAD, well-groomed, well-developed  HEAD:  Atraumatic, Normocephalic  CHEST/LUNG: Clear to percussion bilaterally; No rales, rhonchi, wheezing, or rubs  HEART: Regular rate and rhythm; No murmurs, rubs, or gallops  ABDOMEN: Soft, Nontender, Nondistended; Bowel sounds present  EXTREMITIES:  2+ Peripheral Pulses, No clubbing, cyanosis, or edema  LYMPH: No lymphadenopathy noted  SKIN: No rashes or lesions    Care Discussed with Consultants/Other Providers [ ] YES  [ ] NO

## 2024-02-08 NOTE — PROGRESS NOTE ADULT - ASSESSMENT
78M with history of MM, HTN, and Dementia (AAO x 1-2 to self, to place, not to time at baseline per brother) who presents to the hospital from [facility] after a fall. Patient is a poor historian 2/2 dementia, unable to state what happened. As per paperwork from the NH, patient had a fall and was noted to have a hematoma on the occipital head and was sent to the hospital for evaluation. Here imaging were negative for fractures but his work up showed him to have a significantly elevated calcium of 14 (corrected to 15.1) with DAVID and a significantly elevated protein gap.  Nephrology consulted for DAVID and hypercalemia.    A/P:  DAVID:  Baseline S.Cr 0.7-0.9.  S.Cr 1.39 on admission.  Likely pre-renal in setting of dehydration/hypercalcemia.  UA + proteinuria; no blood.  FeNa 1.1% - suggests ATN.  Renal function remains stable - no BMP today.  Monitor BMP and UO.    HTN:  Controlled.  Avoid hypotension.  C/W current meds.  Monitor BP.    Hypercalcemia/hypocalcemia:  Likely due to Monoclonal Gammopathy.  PTH low, suppressed by high Ca.  Total protein high.  Vit. D 25OH 46.4.  S/p Zometa 4mg x1 dose and calcitonin 200IU q12hrs x2 doses.  corrected Ca 8 today  s/p 2g Calcium gluconate again 2/7  C/W cholecalciferol   Optimize albumin.  Monitor Ca.    Acidosis:  Hyperchloremic acidosis.  Hyperchloremia improving.  IVF d/c'd 2/5.  Monitor CO2.    Anemia:  Likely sec to MM vs. other etiology.  Workup per primary team.  Heme/onc on board  Monitor Hgb.  Transfuse for Hgb <8.    Hypophosphatemia:  Likely sec. to poor PO intake.  S/P Kphos 30mmol again 2/7.  Replete w/ Kphos as needed  Monitor PO4 daily.    Hypokalemia:   Likely sec to poor PO intake  improved  monitor K.    Hypomagnesemia:  Likely sec to poor PO intake.  improved  Replete as needed   Monitor Mg.    Proteinuria:  Possibly sec to MM.  Monitor.

## 2024-02-09 RX ADMIN — DONEPEZIL HYDROCHLORIDE 10 MILLIGRAM(S): 10 TABLET, FILM COATED ORAL at 22:01

## 2024-02-09 RX ADMIN — MEMANTINE HYDROCHLORIDE 10 MILLIGRAM(S): 10 TABLET ORAL at 17:58

## 2024-02-09 RX ADMIN — RISPERIDONE 0.25 MILLIGRAM(S): 4 TABLET ORAL at 12:37

## 2024-02-09 RX ADMIN — Medication 25 MILLIGRAM(S): at 18:00

## 2024-02-09 RX ADMIN — PREGABALIN 1000 MICROGRAM(S): 225 CAPSULE ORAL at 12:39

## 2024-02-09 RX ADMIN — Medication 400 UNIT(S): at 12:40

## 2024-02-09 RX ADMIN — SERTRALINE 50 MILLIGRAM(S): 25 TABLET, FILM COATED ORAL at 12:39

## 2024-02-09 RX ADMIN — HEPARIN SODIUM 5000 UNIT(S): 5000 INJECTION INTRAVENOUS; SUBCUTANEOUS at 12:41

## 2024-02-09 RX ADMIN — HEPARIN SODIUM 5000 UNIT(S): 5000 INJECTION INTRAVENOUS; SUBCUTANEOUS at 05:46

## 2024-02-09 RX ADMIN — HEPARIN SODIUM 5000 UNIT(S): 5000 INJECTION INTRAVENOUS; SUBCUTANEOUS at 22:01

## 2024-02-09 RX ADMIN — RISPERIDONE 0.5 MILLIGRAM(S): 4 TABLET ORAL at 22:01

## 2024-02-09 RX ADMIN — Medication 1 MILLIGRAM(S): at 12:39

## 2024-02-09 NOTE — PROGRESS NOTE ADULT - SUBJECTIVE AND OBJECTIVE BOX
DATE OF SERVICE: 02-09-24    No events    MEDICATIONS:  acetaminophen     Tablet .. 650 milliGRAM(s) Oral every 6 hours PRN  aluminum hydroxide/magnesium hydroxide/simethicone Suspension 30 milliLiter(s) Oral every 4 hours PRN  cholecalciferol 400 Unit(s) Oral daily  cyanocobalamin 1000 MICROGram(s) Oral daily  donepezil 10 milliGRAM(s) Oral at bedtime  folic acid 1 milliGRAM(s) Oral daily  heparin   Injectable 5000 Unit(s) SubCutaneous every 8 hours  melatonin 3 milliGRAM(s) Oral at bedtime PRN  memantine 10 milliGRAM(s) Oral two times a day  metoprolol tartrate 25 milliGRAM(s) Oral two times a day  ondansetron Injectable 4 milliGRAM(s) IV Push every 8 hours PRN  QUEtiapine 12.5 milliGRAM(s) Oral every 6 hours PRN  risperiDONE   Tablet 0.25 milliGRAM(s) Oral daily  risperiDONE   Tablet 0.5 milliGRAM(s) Oral at bedtime  sertraline 50 milliGRAM(s) Oral daily      LABS:  Hemoglobin: 8.1 g/dL (02-06 @ 10:11)  Hemoglobin: 6.8 g/dL (02-06 @ 07:55)  Hemoglobin: 7.1 g/dL (02-05 @ 06:25)    Creatinine Trend: 0.55<--, 0.57<--, 0.55<--, 0.60<--, 0.70<--, 0.66<--    PHYSICAL EXAM:  T(C): 36.7 (02-09-24 @ 05:43), Max: 37.2 (02-09-24 @ 01:13)  HR: 66 (02-09-24 @ 05:43) (66 - 76)  BP: 142/82 (02-09-24 @ 05:43) (110/75 - 142/82)  RR: 18 (02-09-24 @ 05:43) (17 - 18)  SpO2: 97% (02-09-24 @ 05:43) (97% - 100%)  Wt(kg): --    I&O's Summary    08 Feb 2024 07:01  -  09 Feb 2024 07:00  --------------------------------------------------------  IN: 300 mL / OUT: 0 mL / NET: 300 mL    General:  NAD  Head: Normocephalic and atraumatic.   Neck: No JVD. No bruits. Supple. Does not appear to be enlarged.   Cardiovascular: + S1,S2 ; RRR Soft systolic murmur at the left lower sternal border. No rubs noted.    Lungs: CTA b/l. No rhonchi, rales or wheezes.   Abdomen: + BS, soft. Non tender. Non distended. No rebound. No guarding.   Extremities: No clubbing/cyanosis/edema.   Neurologic: Moves all four extremities. Full range of motion.   Skin: Warm and moist. The patient's skin has normal elasticity and good skin turgor.   Psychiatric: unable to fully assess  Musculoskeletal: Normal range of motion, normal strength     ECG:  	NSR    < from: Transthoracic Echocardiogram (07.10.23 @ 11:15) >  CONCLUSIONS:  1. Calcified trileaflet aortic valve with normal opening.  Minimal aortic regurgitation.  2. Endocardium not well visualized; grossly decreased  possibly mild left ventricular systolic dysfunction.  3. The right ventricle is not well visualized; grossly  normal right ventricular systolic function.  ------------------------------------------------------------------------  Confirmed on  7/10/2023 - 13:57:46 by MARLA Vegas  < end of copied text >      ASSESSMENT/PLAN: This is a 78M with history of MM, HTN, and Dementia (AAO x 1-2 to self, to place, not to time at baseline per brother) who presents to the hospital from [facility] after a fall.     Patient is a poor historian 2/2 dementia. As per paperwork from the NH, patient had a fall and was noted to have a hematoma on the occipital head and was sent to the hospital for evaluation. Here imaging were negative for fractures but his work up showed him to have a significantly elevated calcium of 14 (corrected to 15.1) with DAVID and a significantly elevated protein gap. He was given NS 1.5L and repeat BMP showed persistence of the calcium elevation though improving. He was admitted to medicine on telemetry for further evaluation.  Recent admission for influenza, where his lopressor and norvasc were discontinued.    1. HTN/h/o MVR  - BP stable  - s/p fluids for hypercalcemia, no EKG changes associated with hypercalcemia  - TTE from last year with mildly decreased LVEF, cont medical management with Lopressor  - s/p PRBC  monitor h/h  - continue lopressor 25 mg BID for PAT and CM     - Keep K>4 , Mag >2    Staci Muñiz MD  Pager: [phone]

## 2024-02-09 NOTE — PROGRESS NOTE ADULT - SUBJECTIVE AND OBJECTIVE BOX
Patient is a 78y old  Male who presents with a chief complaint of Fall, elevated calcium (09 Feb 2024 11:18)    Date of servie : 02-09-24 @ 15:05  INTERVAL HPI/OVERNIGHT EVENTS:  T(C): 36.7 (02-09-24 @ 05:43), Max: 37.2 (02-09-24 @ 01:13)  HR: 66 (02-09-24 @ 05:43) (66 - 76)  BP: 142/82 (02-09-24 @ 05:43) (110/75 - 142/82)  RR: 18 (02-09-24 @ 05:43) (17 - 18)  SpO2: 97% (02-09-24 @ 05:43) (97% - 100%)  Wt(kg): --  I&O's Summary    08 Feb 2024 07:01  -  09 Feb 2024 07:00  --------------------------------------------------------  IN: 300 mL / OUT: 0 mL / NET: 300 mL        LABS:              CAPILLARY BLOOD GLUCOSE                MEDICATIONS  (STANDING):  cholecalciferol 400 Unit(s) Oral daily  cyanocobalamin 1000 MICROGram(s) Oral daily  donepezil 10 milliGRAM(s) Oral at bedtime  folic acid 1 milliGRAM(s) Oral daily  heparin   Injectable 5000 Unit(s) SubCutaneous every 8 hours  memantine 10 milliGRAM(s) Oral two times a day  metoprolol tartrate 25 milliGRAM(s) Oral two times a day  risperiDONE   Tablet 0.25 milliGRAM(s) Oral daily  risperiDONE   Tablet 0.5 milliGRAM(s) Oral at bedtime  sertraline 50 milliGRAM(s) Oral daily    MEDICATIONS  (PRN):  acetaminophen     Tablet .. 650 milliGRAM(s) Oral every 6 hours PRN Temp greater or equal to 38C (100.4F), Mild Pain (1 - 3)  aluminum hydroxide/magnesium hydroxide/simethicone Suspension 30 milliLiter(s) Oral every 4 hours PRN Dyspepsia  melatonin 3 milliGRAM(s) Oral at bedtime PRN Insomnia  ondansetron Injectable 4 milliGRAM(s) IV Push every 8 hours PRN Nausea and/or Vomiting  QUEtiapine 12.5 milliGRAM(s) Oral every 6 hours PRN for agitation          PHYSICAL EXAM:  GENERAL: NAD, well-groomed, well-developed  HEAD:  Atraumatic, Normocephalic  CHEST/LUNG: Clear to percussion bilaterally; No rales, rhonchi, wheezing, or rubs  HEART: Regular rate and rhythm; No murmurs, rubs, or gallops  ABDOMEN: Soft, Nontender, Nondistended; Bowel sounds present  EXTREMITIES:  2+ Peripheral Pulses, No clubbing, cyanosis, or edema  LYMPH: No lymphadenopathy noted  SKIN: No rashes or lesions    Care Discussed with Consultants/Other Providers [ ] YES  [ ] NO

## 2024-02-09 NOTE — CHART NOTE - NSCHARTNOTEFT_GEN_A_CORE
Source: Patient [ ]    Family [ ]     other [x ] PCA, Chart review    Current Diet : Diet, Regular:   DASH/TLC {Sodium & Cholesterol Restricted} (DASH)  Pureed (PUREED) (01-27-24 @ 02:24) [Active]    PO intake:   % [x ]    Height (cm): 185.4 (27 Jan 2024 09:28)  Weight (kg): 81.647 (27 Jan 2024 09:28)  BMI (kg/m2): 23.8 (27 Jan 2024 09:28)      Nutrition Note: 78M with history of MM, HTN, and Dementia (AAO x 1-2 to self, to place, not to time at baseline per brother) who presents to the hospital from [facility] after a fall. Patient is a poor historian 2/2 dementia, unable to state what happened. As per paperwork from the NH, patient had a fall and was noted to have a hematoma on the occipital head and was sent to the hospital for evaluation. Here imaging were negative for fractures but his work up showed him to have a significantly elevated calcium of 14 (corrected to 15.1) with DAVID and a significantly elevated protein gap. Nephrology consulted for DAVID and hypercalemia, per chart.     Patient is seen for nutrition follow up. Patient is consuming lunch during visit. Per PCA by bedside, patient consumed ~50% of meals and 25-50% of Hormel supplement this morning. Patient requires assistance in feeding. Per RN flow sheet, patient is consuming % of meals. No reports of any difficulty chewing or swallowing current diet consistency noted at this time. No recent episodes of any nausea, vomiting, diarrhea, constipation reported at this time. Last bowel movement 2/8, per RN flow sheet. Patient is on cholecalciferol, cyanocobalamin, folic acid for micronutrient support. Noted low phos-1.8(2/7), monitor and replete electrolytes(phos). Per RN flow sheet, no muscle or fat loss detected at this time.     __________________ Pertinent Medications__________________   MEDICATIONS  (STANDING):  cholecalciferol 400 Unit(s) Oral daily  cyanocobalamin 1000 MICROGram(s) Oral daily  donepezil 10 milliGRAM(s) Oral at bedtime  folic acid 1 milliGRAM(s) Oral daily  heparin   Injectable 5000 Unit(s) SubCutaneous every 8 hours  memantine 10 milliGRAM(s) Oral two times a day  metoprolol tartrate 25 milliGRAM(s) Oral two times a day  risperiDONE   Tablet 0.25 milliGRAM(s) Oral daily  risperiDONE   Tablet 0.5 milliGRAM(s) Oral at bedtime  sertraline 50 milliGRAM(s) Oral daily    MEDICATIONS  (PRN):  acetaminophen     Tablet .. 650 milliGRAM(s) Oral every 6 hours PRN Temp greater or equal to 38C (100.4F), Mild Pain (1 - 3)  aluminum hydroxide/magnesium hydroxide/simethicone Suspension 30 milliLiter(s) Oral every 4 hours PRN Dyspepsia  melatonin 3 milliGRAM(s) Oral at bedtime PRN Insomnia  ondansetron Injectable 4 milliGRAM(s) IV Push every 8 hours PRN Nausea and/or Vomiting  QUEtiapine 12.5 milliGRAM(s) Oral every 6 hours PRN for agitation      __________________ Pertinent Labs__________________   02-07 Na137 mmol/L Glu 84 mg/dL K+ 4.0 mmol/L Cr  0.55 mg/dL BUN 11 mg/dL 02-07 Phos 1.8 mg/dL<L> 02-05 Alb 1.7 g/dL<L>      Estimated Needs:   [ x] no change since previous assessment    Previous Nutrition Diagnosis:    [x ] Severe Malnutrition   Nutrition Diagnosis is [ x] ongoing  New Nutrition Diagnosis: [ x] not applicable    Education:  [x ] Not applicable.     Interventions:   Recommend  [ x] Continue with current diet, remains appropriate at this time.   [x ] Nutrition department to provide Hormel shake 8.45oz 1x/day (520kcal, 22gm pro) for nutrient support and add Magic Cup 4oz 2x/day (580kcal, 18gm pro) for trial.   [x ] Monitor and replete electrolytes (phos).  [ x] Continue to provide assistance with meals .   [x ] Encourage PO intake and honor food preferences as able.      Monitoring and Evaluation:   [x ] PO intake [x ] Tolerance to diet prescription [x ] weights [x ] follow up per protocol  [x ] other: bowel movement, skin integrity, labs.

## 2024-02-09 NOTE — PROGRESS NOTE ADULT - SUBJECTIVE AND OBJECTIVE BOX
[facility]   MD ANEL FOLEY MD RUORU WONG, PA    TEL:  FROM 9 AM to 5 PM ---OFFICE: [phone]  AVAILABLE ON TEAMS    FROM 5 PM - 9 AM PLEASE CALL ANSWERING SERVICE: [phone]    RENAL FOLLOW UP NOTE--Date of Service 02-09-24 @ 11:18  --------------------------------------------------------------------------------  HPI:      Pt seen and examined at bedside.       PAST HISTORY  --------------------------------------------------------------------------------  No significant changes to PMH, PSH, FHx, SHx, unless otherwise noted    ALLERGIES & MEDICATIONS  --------------------------------------------------------------------------------  Allergies    No Known Allergies    Intolerances      Standing Inpatient Medications  cholecalciferol 400 Unit(s) Oral daily  cyanocobalamin 1000 MICROGram(s) Oral daily  donepezil 10 milliGRAM(s) Oral at bedtime  folic acid 1 milliGRAM(s) Oral daily  heparin   Injectable 5000 Unit(s) SubCutaneous every 8 hours  memantine 10 milliGRAM(s) Oral two times a day  metoprolol tartrate 25 milliGRAM(s) Oral two times a day  risperiDONE   Tablet 0.25 milliGRAM(s) Oral daily  risperiDONE   Tablet 0.5 milliGRAM(s) Oral at bedtime  sertraline 50 milliGRAM(s) Oral daily    PRN Inpatient Medications  acetaminophen     Tablet .. 650 milliGRAM(s) Oral every 6 hours PRN  aluminum hydroxide/magnesium hydroxide/simethicone Suspension 30 milliLiter(s) Oral every 4 hours PRN  melatonin 3 milliGRAM(s) Oral at bedtime PRN  ondansetron Injectable 4 milliGRAM(s) IV Push every 8 hours PRN  QUEtiapine 12.5 milliGRAM(s) Oral every 6 hours PRN      REVIEW OF SYSTEMS  --------------------------------------------------------------------------------  General: no fever  MSK: no edema     VITALS/PHYSICAL EXAM  --------------------------------------------------------------------------------  T(C): 36.7 (02-09-24 @ 05:43), Max: 37.2 (02-09-24 @ 01:13)  HR: 66 (02-09-24 @ 05:43) (66 - 76)  BP: 142/82 (02-09-24 @ 05:43) (110/75 - 142/82)  RR: 18 (02-09-24 @ 05:43) (17 - 18)  SpO2: 97% (02-09-24 @ 05:43) (97% - 100%)  Wt(kg): --        02-08-24 @ 07:01  -  02-09-24 @ 07:00  --------------------------------------------------------  IN: 300 mL / OUT: 0 mL / NET: 300 mL      Physical Exam:  	General: NAD  Neck: No JVD  Respiratory: CTAB, no wheezes, rales or rhonchi  Cardiovascular: S1, S2, RRR  Gastrointestinal: BS+, soft, NT/ND  Extremities: No peripheral edema  LABS/STUDIES  --------------------------------------------------------------------------------                Creatinine Trend:  SCr 0.55 [02-07 @ 06:30]  SCr 0.57 [02-06 @ 21:50]  SCr 0.55 [02-06 @ 07:55]  SCr 0.60 [02-05 @ 06:25]  SCr 0.70 [02-04 @ 08:06]    Urinalysis - [02-07-24 @ 06:30]      Color  / Appearance  / SG  / pH       Gluc 84 / Ketone   / Bili  / Urobili        Blood  / Protein  / Leuk Est  / Nitrite       RBC  / WBC  / Hyaline  / Gran  / Sq Epi  / Non Sq Epi  / Bacteria       PTH -- (Ca --)      [01-27-24 @ 10:56]   8  PTH -- (Ca --)      [01-14-24 @ 06:50]   5  PTH -- (Ca --)      [01-13-24 @ 06:10]   6  Vitamin D (25OH) 46.4      [01-27-24 @ 10:56]      Immunofixation Serum:   Corresponding IgG and kappa bands consistent with monoclonal IgG kappa  protein. ADITYA Huggins M.D.  Reference Range: None Detected      [01-27-24 @ 10:56]  SPEP Interpretation: Distinct band in Gamma region with suppression of normal Gammaglobulin  suggestive of Monoclonal Gammopathy. Serum and Urine Immunofixation  Electrophoresis are recommended if not previously performed.  Clara Covington M.D.      [01-27-24 @ 10:56]

## 2024-02-09 NOTE — PROGRESS NOTE ADULT - ASSESSMENT
78M with history of MM, HTN, and Dementia who presents to the hospital after a fall at his NH here found to have significant hypercalcemia.       Problem/Plan - 1:  ·  Problem: Hypercalcemia.   ·  Plan: - monitor bmp   - heme fu     Problem/Plan - 2:·  Problem: Fall. ·  Plan: - Unclear cause of his fall, unclear mechanism, patient unable to state what happened and the NH paperwork does not mention how the patient fell- Will monitor on telemetry for now, trend troponin- cars fu     Problem/Plan - 3:·  Problem: DAVID (acute kidney injury). ·  Plan: - New DAVID likely 2/2 severe hypercalcemia -> c/w IVF as above- Monitor I/O  - Trend BUN/Cr    Problem/Plan - 4:·  Problem: Toxic metabolic encephalopathy.   ·  Plan: - Baseline oriented to self and place, currently oriented to self but cannot state where he is, unable to state what the current month/year is, unable to give significant HPI or ROS (seems to be the baseline when compared to his prior admission)  - Likely has some encephalopathy 2/2 severe hypercalcemia -> c/w management as above  - c/w dementia management as below.    Problem/Plan - 5:  ·  Problem: Multiple myeloma. ·  Plan: - Heme CONSULT appreciated   monitor cbc  sp transfusion     Problem/Plan - 6:  ·  Problem: Essential hypertension.   ·  Plan: - Not on medications, BPs currently well controlled  - Monitor BPs.    Problem/Plan - 7:  ·  Problem: Dementia.   ·  Plan: - c/w donepezil, memantine  - c/w risperdal, sertraline, and seroquel as per outpatient medications.    dc planning awaiting rehab bed

## 2024-02-09 NOTE — PROGRESS NOTE ADULT - ASSESSMENT
78M with history of MM, HTN, and Dementia (AAO x 1-2 to self, to place, not to time at baseline per brother) who presents to the hospital from [facility] after a fall. Patient is a poor historian 2/2 dementia, unable to state what happened. As per paperwork from the NH, patient had a fall and was noted to have a hematoma on the occipital head and was sent to the hospital for evaluation. Here imaging were negative for fractures but his work up showed him to have a significantly elevated calcium of 14 (corrected to 15.1) with DAVID and a significantly elevated protein gap.  Nephrology consulted for DAVID and hypercalemia.    A/P:  DAVID:  Baseline S.Cr 0.7-0.9.  S.Cr 1.39 on admission.  Likely pre-renal in setting of dehydration/hypercalcemia.  UA + proteinuria; no blood.  FeNa 1.1% - suggests ATN.  Renal function remains stable - no BMP today.  Monitor BMP and UO.    HTN:  Controlled.  Avoid hypotension.  C/W current meds.  Monitor BP.    Hypercalcemia/hypocalcemia:  Likely due to Monoclonal Gammopathy.  PTH low, suppressed by high Ca.  Total protein high.  Vit. D 25OH 46.4.  S/p Zometa 4mg x1 dose and calcitonin 200IU q12hrs x2 doses.  corrected Ca 8 today  s/p 2g Calcium gluconate again 2/7  C/W cholecalciferol   Optimize albumin.  Monitor Ca.    Acidosis:  Hyperchloremic acidosis.  Hyperchloremia improving.  IVF d/c'd 2/5.  Monitor CO2.    Anemia:  Likely sec to MM vs. other etiology.  Workup per primary team.  Heme/onc on board  Monitor Hgb.  Transfuse for Hgb <8.    Hypophosphatemia:  Likely sec. to poor PO intake.  S/P Kphos 30mmol again 2/7.  Replete w/ Kphos as needed  Monitor PO4 daily.    Hypokalemia:   Likely sec to poor PO intake  improved  monitor K.    Hypomagnesemia:  Likely sec to poor PO intake.  improved  Replete as needed   Monitor Mg.    Proteinuria:  Possibly sec to MM.  Monitor.

## 2024-02-10 LAB
ANION GAP SERPL CALC-SCNC: 5 MMOL/L — LOW (ref 7–14)
BUN SERPL-MCNC: 11 MG/DL — SIGNIFICANT CHANGE UP (ref 7–23)
CALCIUM SERPL-MCNC: 7.2 MG/DL — LOW (ref 8.4–10.5)
CHLORIDE SERPL-SCNC: 108 MMOL/L — HIGH (ref 98–107)
CO2 SERPL-SCNC: 22 MMOL/L — SIGNIFICANT CHANGE UP (ref 22–31)
CREAT SERPL-MCNC: 0.56 MG/DL — SIGNIFICANT CHANGE UP (ref 0.5–1.3)
EGFR: 102 ML/MIN/1.73M2 — SIGNIFICANT CHANGE UP
GLUCOSE SERPL-MCNC: 93 MG/DL — SIGNIFICANT CHANGE UP (ref 70–99)
HCT VFR BLD CALC: 23.3 % — LOW (ref 39–50)
HGB BLD-MCNC: 7.8 G/DL — LOW (ref 13–17)
MAGNESIUM SERPL-MCNC: 1.8 MG/DL — SIGNIFICANT CHANGE UP (ref 1.6–2.6)
MCHC RBC-ENTMCNC: 33.5 GM/DL — SIGNIFICANT CHANGE UP (ref 32–36)
MCHC RBC-ENTMCNC: 33.6 PG — SIGNIFICANT CHANGE UP (ref 27–34)
MCV RBC AUTO: 100.4 FL — HIGH (ref 80–100)
NRBC # BLD: 0 /100 WBCS — SIGNIFICANT CHANGE UP (ref 0–0)
NRBC # FLD: 0 K/UL — SIGNIFICANT CHANGE UP (ref 0–0)
PHOSPHATE SERPL-MCNC: 1.9 MG/DL — LOW (ref 2.5–4.5)
PLATELET # BLD AUTO: 332 K/UL — SIGNIFICANT CHANGE UP (ref 150–400)
POTASSIUM SERPL-MCNC: 4.1 MMOL/L — SIGNIFICANT CHANGE UP (ref 3.5–5.3)
POTASSIUM SERPL-SCNC: 4.1 MMOL/L — SIGNIFICANT CHANGE UP (ref 3.5–5.3)
RBC # BLD: 2.32 M/UL — LOW (ref 4.2–5.8)
RBC # FLD: 14.8 % — HIGH (ref 10.3–14.5)
SODIUM SERPL-SCNC: 135 MMOL/L — SIGNIFICANT CHANGE UP (ref 135–145)
WBC # BLD: 4.35 K/UL — SIGNIFICANT CHANGE UP (ref 3.8–10.5)
WBC # FLD AUTO: 4.35 K/UL — SIGNIFICANT CHANGE UP (ref 3.8–10.5)

## 2024-02-10 RX ADMIN — RISPERIDONE 0.5 MILLIGRAM(S): 4 TABLET ORAL at 21:28

## 2024-02-10 RX ADMIN — SERTRALINE 50 MILLIGRAM(S): 25 TABLET, FILM COATED ORAL at 12:01

## 2024-02-10 RX ADMIN — HEPARIN SODIUM 5000 UNIT(S): 5000 INJECTION INTRAVENOUS; SUBCUTANEOUS at 05:21

## 2024-02-10 RX ADMIN — HEPARIN SODIUM 5000 UNIT(S): 5000 INJECTION INTRAVENOUS; SUBCUTANEOUS at 21:28

## 2024-02-10 RX ADMIN — DONEPEZIL HYDROCHLORIDE 10 MILLIGRAM(S): 10 TABLET, FILM COATED ORAL at 21:28

## 2024-02-10 RX ADMIN — PREGABALIN 1000 MICROGRAM(S): 225 CAPSULE ORAL at 12:01

## 2024-02-10 RX ADMIN — RISPERIDONE 0.25 MILLIGRAM(S): 4 TABLET ORAL at 12:02

## 2024-02-10 RX ADMIN — Medication 25 MILLIGRAM(S): at 17:49

## 2024-02-10 RX ADMIN — MEMANTINE HYDROCHLORIDE 10 MILLIGRAM(S): 10 TABLET ORAL at 05:21

## 2024-02-10 RX ADMIN — Medication 400 UNIT(S): at 12:01

## 2024-02-10 RX ADMIN — Medication 1 MILLIGRAM(S): at 12:02

## 2024-02-10 RX ADMIN — Medication 25 MILLIGRAM(S): at 05:21

## 2024-02-10 RX ADMIN — Medication 63.75 MILLIMOLE(S): at 12:14

## 2024-02-10 RX ADMIN — HEPARIN SODIUM 5000 UNIT(S): 5000 INJECTION INTRAVENOUS; SUBCUTANEOUS at 12:03

## 2024-02-10 RX ADMIN — MEMANTINE HYDROCHLORIDE 10 MILLIGRAM(S): 10 TABLET ORAL at 17:45

## 2024-02-10 NOTE — PROGRESS NOTE ADULT - ASSESSMENT
78M with history of MM, HTN, and Dementia (AAO x 1-2 to self, to place, not to time at baseline per brother) who presents to the hospital from [facility] after a fall. Patient is a poor historian 2/2 dementia, unable to state what happened. As per paperwork from the NH, patient had a fall and was noted to have a hematoma on the occipital head and was sent to the hospital for evaluation. Here imaging were negative for fractures but his work up showed him to have a significantly elevated calcium of 14 (corrected to 15.1) with DAIVD and a significantly elevated protein gap.  Nephrology consulted for DAVID and hypercalemia.    A/P:  DAVID:  Baseline S.Cr 0.7-0.9.  S.Cr 1.39 on admission.  Likely pre-renal in setting of dehydration/hypercalcemia.  UA + proteinuria; no blood.  FeNa 1.1% - suggests ATN.  Renal function remains stable - no BMP today.  Monitor BMP and UO.    HTN:  Controlled.  Avoid hypotension.  C/W current meds.  Monitor BP.    Hypercalcemia/hypocalcemia:  Likely due to Monoclonal Gammopathy.  PTH low, suppressed by high Ca  Vit. D 25OH 46.4.  S/p Zometa 4mg x1 dose and calcitonin 200IU q12hrs x2 doses.  s/p 2g Calcium gluconate again 2/7  C/W cholecalciferol   Optimize albumin.  Monitor Ca.--NO LABS since 2/7. Please check BMP    Acidosis:  Hyperchloremic acidosis.  Hyperchloremia improving.  Monitor CO2.    Anemia:  Likely sec to MM vs. other etiology.  Workup per primary team.  Heme/onc on board  Monitor Hgb.  Transfuse for Hgb <8.    Hypophosphatemia:  Likely sec. to poor PO intake.  Replete w/ Kphos as needed  Monitor PO4 daily.    Hypokalemia:   Likely sec to poor PO intake  improved  monitor K.    Hypomagnesemia:  Likely sec to poor PO intake.  Replete as needed   Monitor Mg.    Proteinuria:  Possibly sec to MM.  Monitor.

## 2024-02-10 NOTE — PROGRESS NOTE ADULT - ASSESSMENT
78M with history of MM, HTN, and Dementia who presents to the hospital after a fall at his NH here found to have significant hypercalcemia.       Problem/Plan - 1:  ·  Problem: Hypercalcemia.   ·  Plan: - monitor bmp   - heme fu     Problem/Plan - 2:·  Problem: Fall. ·  Plan: - Unclear cause of his fall, unclear mechanism, patient unable to state what happened and the NH paperwork does not mention how the patient fell- Will monitor on telemetry for now, trend troponin- cars fu     Problem/Plan - 3:·  Problem: DAVID (acute kidney injury). ·  Plan: - New DAVID likely 2/2 severe hypercalcemia -> c/w IVF as above- Monitor I/O  - Trend BUN/Cr    Problem/Plan - 4:·  Problem: Toxic metabolic encephalopathy.   ·  Plan: - Baseline oriented to self and place, currently oriented to self but cannot state where he is, unable to state what the current month/year is, unable to give significant HPI or ROS (seems to be the baseline when compared to his prior admission)  - Likely has some encephalopathy 2/2 severe hypercalcemia -> c/w management as above  - c/w dementia management as below.    Problem/Plan - 5:  ·  Problem: Multiple myeloma. ·  Plan: - Heme CONSULT appreciated   monitor cbc  sp transfusion     Problem/Plan - 6:  ·  Problem: Essential hypertension.   ·  Plan: - Not on medications, BPs currently well controlled- Monitor BPs.    Problem/Plan - 7:  ·  Problem: Dementia.   ·  Plan: - c/w donepezil, memantine  - c/w risperdal, sertraline, and seroquel as per outpatient medications.    dc planning awaiting rehab bed

## 2024-02-10 NOTE — PROGRESS NOTE ADULT - SUBJECTIVE AND OBJECTIVE BOX
[facility] [facility]   MD ANEL FOLEY MD RUORU WONG, PA    TEL:  FROM 9 AM to 5 PM ---OFFICE: [phone]  AVAILABLE ON TEAMS    FROM 5 PM - 9 AM PLEASE CALL ANSWERING SERVICE: [phone]    RENAL FOLLOW UP NOTE--Date of Service 02-10-24 @ 09:29  --------------------------------------------------------------------------------  HPI:      Pt seen and examined at bedside.       PAST HISTORY  --------------------------------------------------------------------------------  No significant changes to PMH, PSH, FHx, SHx, unless otherwise noted    ALLERGIES & MEDICATIONS  --------------------------------------------------------------------------------  Allergies    No Known Allergies    Intolerances      Standing Inpatient Medications  cholecalciferol 400 Unit(s) Oral daily  cyanocobalamin 1000 MICROGram(s) Oral daily  donepezil 10 milliGRAM(s) Oral at bedtime  folic acid 1 milliGRAM(s) Oral daily  heparin   Injectable 5000 Unit(s) SubCutaneous every 8 hours  memantine 10 milliGRAM(s) Oral two times a day  metoprolol tartrate 25 milliGRAM(s) Oral two times a day  risperiDONE   Tablet 0.25 milliGRAM(s) Oral daily  risperiDONE   Tablet 0.5 milliGRAM(s) Oral at bedtime  sertraline 50 milliGRAM(s) Oral daily    PRN Inpatient Medications  acetaminophen     Tablet .. 650 milliGRAM(s) Oral every 6 hours PRN  aluminum hydroxide/magnesium hydroxide/simethicone Suspension 30 milliLiter(s) Oral every 4 hours PRN  melatonin 3 milliGRAM(s) Oral at bedtime PRN  ondansetron Injectable 4 milliGRAM(s) IV Push every 8 hours PRN  QUEtiapine 12.5 milliGRAM(s) Oral every 6 hours PRN      REVIEW OF SYSTEMS  --------------------------------------------------------------------------------  General: no fever  MSK: no edema     VITALS/PHYSICAL EXAM  --------------------------------------------------------------------------------  T(C): 36.7 (02-10-24 @ 05:21), Max: 36.7 (02-10-24 @ 05:21)  HR: 69 (02-10-24 @ 05:21) (63 - 89)  BP: 133/74 (02-10-24 @ 05:21) (121/71 - 133/74)  RR: 18 (02-10-24 @ 05:21) (18 - 18)  SpO2: 95% (02-10-24 @ 05:21) (95% - 99%)  Wt(kg): --        Physical Exam:  	Gen: NAD  	HEENT: MMM  	Pulm: CTA B/L  	CV: S1S2  	Abd: Soft, +BS  	Ext: No LE edema B/L                      Neuro: Awake   	Skin: Warm and Dry   	Vascular access: NO HD catheter            no  galilea  LABS/STUDIES  --------------------------------------------------------------------------------                Creatinine Trend:  SCr 0.55 [02-07 @ 06:30]  SCr 0.57 [02-06 @ 21:50]  SCr 0.55 [02-06 @ 07:55]  SCr 0.60 [02-05 @ 06:25]  SCr 0.70 [02-04 @ 08:06]    Urinalysis - [02-07-24 @ 06:30]      Color  / Appearance  / SG  / pH       Gluc 84 / Ketone   / Bili  / Urobili        Blood  / Protein  / Leuk Est  / Nitrite       RBC  / WBC  / Hyaline  / Gran  / Sq Epi  / Non Sq Epi  / Bacteria       PTH -- (Ca --)      [01-27-24 @ 10:56]   8  PTH -- (Ca --)      [01-14-24 @ 06:50]   5  PTH -- (Ca --)      [01-13-24 @ 06:10]   6  Vitamin D (25OH) 46.4      [01-27-24 @ 10:56]      Immunofixation Serum:   Corresponding IgG and kappa bands consistent with monoclonal IgG kappa  protein. ADITYA Huggins M.D.  Reference Range: None Detected      [01-27-24 @ 10:56]  SPEP Interpretation: Distinct band in Gamma region with suppression of normal Gammaglobulin  suggestive of Monoclonal Gammopathy. Serum and Urine Immunofixation  Electrophoresis are recommended if not previously performed.  Clara Covington M.D.      [01-27-24 @ 10:56]

## 2024-02-10 NOTE — PROGRESS NOTE ADULT - SUBJECTIVE AND OBJECTIVE BOX
Patient is a 78y old  Male who presents with a chief complaint of Fall, elevated calcium (10 Feb 2024 13:20)    Date of servie : 02-10-24 @ 17:14  INTERVAL HPI/OVERNIGHT EVENTS:  T(C): 36.6 (02-10-24 @ 12:12), Max: 36.7 (02-10-24 @ 05:21)  HR: 72 (02-10-24 @ 15:43) (63 - 89)  BP: 98/55 (02-10-24 @ 15:43) (98/55 - 133/74)  RR: 18 (02-10-24 @ 15:43) (18 - 19)  SpO2: 97% (02-10-24 @ 15:43) (95% - 99%)  Wt(kg): --  I&O's Summary      LABS:                        7.8    4.35  )-----------( 332      ( 10 Feb 2024 09:30 )             23.3     02-10    135  |  108<H>  |  11  ----------------------------<  93  4.1   |  22  |  0.56    Ca    7.2<L>      10 Feb 2024 09:30  Phos  1.9     02-10  Mg     1.80     02-10        Urinalysis Basic - ( 10 Feb 2024 09:30 )    Color: x / Appearance: x / SG: x / pH: x  Gluc: 93 mg/dL / Ketone: x  / Bili: x / Urobili: x   Blood: x / Protein: x / Nitrite: x   Leuk Esterase: x / RBC: x / WBC x   Sq Epi: x / Non Sq Epi: x / Bacteria: x      CAPILLARY BLOOD GLUCOSE            Urinalysis Basic - ( 10 Feb 2024 09:30 )    Color: x / Appearance: x / SG: x / pH: x  Gluc: 93 mg/dL / Ketone: x  / Bili: x / Urobili: x   Blood: x / Protein: x / Nitrite: x   Leuk Esterase: x / RBC: x / WBC x   Sq Epi: x / Non Sq Epi: x / Bacteria: x        MEDICATIONS  (STANDING):  cholecalciferol 400 Unit(s) Oral daily  cyanocobalamin 1000 MICROGram(s) Oral daily  donepezil 10 milliGRAM(s) Oral at bedtime  folic acid 1 milliGRAM(s) Oral daily  heparin   Injectable 5000 Unit(s) SubCutaneous every 8 hours  memantine 10 milliGRAM(s) Oral two times a day  metoprolol tartrate 25 milliGRAM(s) Oral two times a day  risperiDONE   Tablet 0.25 milliGRAM(s) Oral daily  risperiDONE   Tablet 0.5 milliGRAM(s) Oral at bedtime  sertraline 50 milliGRAM(s) Oral daily    MEDICATIONS  (PRN):  acetaminophen     Tablet .. 650 milliGRAM(s) Oral every 6 hours PRN Temp greater or equal to 38C (100.4F), Mild Pain (1 - 3)  aluminum hydroxide/magnesium hydroxide/simethicone Suspension 30 milliLiter(s) Oral every 4 hours PRN Dyspepsia  melatonin 3 milliGRAM(s) Oral at bedtime PRN Insomnia  ondansetron Injectable 4 milliGRAM(s) IV Push every 8 hours PRN Nausea and/or Vomiting  QUEtiapine 12.5 milliGRAM(s) Oral every 6 hours PRN for agitation          PHYSICAL EXAM:  GENERAL: NAD, well-groomed, well-developed  HEAD:  Atraumatic, Normocephalic  CHEST/LUNG: Clear to percussion bilaterally; No rales, rhonchi, wheezing, or rubs  HEART: Regular rate and rhythm; No murmurs, rubs, or gallops  ABDOMEN: Soft, Nontender, Nondistended; Bowel sounds present  EXTREMITIES:  2+ Peripheral Pulses, No clubbing, cyanosis, or edema  LYMPH: No lymphadenopathy noted  SKIN: No rashes or lesions    Care Discussed with Consultants/Other Providers [ ] YES  [ ] NO

## 2024-02-10 NOTE — PROGRESS NOTE ADULT - SUBJECTIVE AND OBJECTIVE BOX
DATE OF SERVICE: 02-10-24    No events; ros limited       acetaminophen     Tablet .. 650 milliGRAM(s) Oral every 6 hours PRN  aluminum hydroxide/magnesium hydroxide/simethicone Suspension 30 milliLiter(s) Oral every 4 hours PRN  cholecalciferol 400 Unit(s) Oral daily  cyanocobalamin 1000 MICROGram(s) Oral daily  donepezil 10 milliGRAM(s) Oral at bedtime  folic acid 1 milliGRAM(s) Oral daily  heparin   Injectable 5000 Unit(s) SubCutaneous every 8 hours  melatonin 3 milliGRAM(s) Oral at bedtime PRN  memantine 10 milliGRAM(s) Oral two times a day  metoprolol tartrate 25 milliGRAM(s) Oral two times a day  ondansetron Injectable 4 milliGRAM(s) IV Push every 8 hours PRN  QUEtiapine 12.5 milliGRAM(s) Oral every 6 hours PRN  risperiDONE   Tablet 0.25 milliGRAM(s) Oral daily  risperiDONE   Tablet 0.5 milliGRAM(s) Oral at bedtime  sertraline 50 milliGRAM(s) Oral daily                            7.8    4.35  )-----------( 332      ( 10 Feb 2024 09:30 )             23.3       02-10    135  |  108<H>  |  11  ----------------------------<  93  4.1   |  22  |  0.56    Ca    7.2<L>      10 Feb 2024 09:30  Phos  1.9     02-10  Mg     1.80     02-10              T(C): 36.7 (02-10-24 @ 05:21), Max: 36.7 (02-10-24 @ 05:21)  HR: 69 (02-10-24 @ 05:21) (63 - 89)  BP: 133/74 (02-10-24 @ 05:21) (121/71 - 133/74)  RR: 18 (02-10-24 @ 05:21) (18 - 18)  SpO2: 95% (02-10-24 @ 05:21) (95% - 99%)  Wt(kg): --    I&O's Summary      General:  NAD  Head: Normocephalic and atraumatic.   Neck: No JVD. No bruits. Supple. Does not appear to be enlarged.   Cardiovascular: + S1,S2 ; RRR Soft systolic murmur at the left lower sternal border. No rubs noted.    Lungs: CTA b/l. No rhonchi, rales or wheezes.   Abdomen: + BS, soft. Non tender. Non distended. No rebound. No guarding.   Extremities: No clubbing/cyanosis/edema.   Neurologic: Moves all four extremities. Full range of motion.   Skin: Warm and moist. The patient's skin has normal elasticity and good skin turgor.   Psychiatric: unable to fully assess  Musculoskeletal: Normal range of motion, normal strength     ECG:  	NSR    < from: Transthoracic Echocardiogram (07.10.23 @ 11:15) >  CONCLUSIONS:  1. Calcified trileaflet aortic valve with normal opening.  Minimal aortic regurgitation.  2. Endocardium not well visualized; grossly decreased  possibly mild left ventricular systolic dysfunction.  3. The right ventricle is not well visualized; grossly  normal right ventricular systolic function.  ------------------------------------------------------------------------  Confirmed on  7/10/2023 - 13:57:46 by Jolene Rowan M.D. RPVI  < end of copied text >      ASSESSMENT/PLAN: This is a 78M with history of MM, HTN, and Dementia (AAO x 1-2 to self, to place, not to time at baseline per brother) who presents to the hospital from [facility] after a fall.     Patient is a poor historian 2/2 dementia. As per paperwork from the NH, patient had a fall and was noted to have a hematoma on the occipital head and was sent to the hospital for evaluation. Here imaging were negative for fractures but his work up showed him to have a significantly elevated calcium of 14 (corrected to 15.1) with DAVID and a significantly elevated protein gap. He was given NS 1.5L and repeat BMP showed persistence of the calcium elevation though improving. He was admitted to medicine on telemetry for further evaluation.  Recent admission for influenza, where his lopressor and norvasc were discontinued.    1. HTN/h/o MVR  - BP stable  - s/p fluids for hypercalcemia, no EKG changes associated with hypercalcemia  - TTE from last year with mildly decreased LVEF, cont medical management with Lopressor  - s/p PRBC  monitor h/h  - continue lopressor 25 mg BID for PAT and CM   - Keep K>4 , Mag >2    Elgin Maynard MD

## 2024-02-11 LAB
ANION GAP SERPL CALC-SCNC: 8 MMOL/L — SIGNIFICANT CHANGE UP (ref 7–14)
BUN SERPL-MCNC: 9 MG/DL — SIGNIFICANT CHANGE UP (ref 7–23)
CALCIUM SERPL-MCNC: 7 MG/DL — LOW (ref 8.4–10.5)
CHLORIDE SERPL-SCNC: 107 MMOL/L — SIGNIFICANT CHANGE UP (ref 98–107)
CO2 SERPL-SCNC: 20 MMOL/L — LOW (ref 22–31)
CREAT SERPL-MCNC: 0.55 MG/DL — SIGNIFICANT CHANGE UP (ref 0.5–1.3)
EGFR: 102 ML/MIN/1.73M2 — SIGNIFICANT CHANGE UP
GLUCOSE SERPL-MCNC: 86 MG/DL — SIGNIFICANT CHANGE UP (ref 70–99)
MAGNESIUM SERPL-MCNC: 1.9 MG/DL — SIGNIFICANT CHANGE UP (ref 1.6–2.6)
PHOSPHATE SERPL-MCNC: 2 MG/DL — LOW (ref 2.5–4.5)
POTASSIUM SERPL-MCNC: 3.7 MMOL/L — SIGNIFICANT CHANGE UP (ref 3.5–5.3)
POTASSIUM SERPL-SCNC: 3.7 MMOL/L — SIGNIFICANT CHANGE UP (ref 3.5–5.3)
SODIUM SERPL-SCNC: 135 MMOL/L — SIGNIFICANT CHANGE UP (ref 135–145)

## 2024-02-11 RX ORDER — POTASSIUM PHOSPHATE, MONOBASIC POTASSIUM PHOSPHATE, DIBASIC 236; 224 MG/ML; MG/ML
30 INJECTION, SOLUTION INTRAVENOUS ONCE
Refills: 0 | Status: COMPLETED | OUTPATIENT
Start: 2024-02-11 | End: 2024-02-11

## 2024-02-11 RX ADMIN — Medication 400 UNIT(S): at 12:16

## 2024-02-11 RX ADMIN — PREGABALIN 1000 MICROGRAM(S): 225 CAPSULE ORAL at 12:17

## 2024-02-11 RX ADMIN — RISPERIDONE 0.25 MILLIGRAM(S): 4 TABLET ORAL at 12:16

## 2024-02-11 RX ADMIN — MEMANTINE HYDROCHLORIDE 10 MILLIGRAM(S): 10 TABLET ORAL at 05:17

## 2024-02-11 RX ADMIN — HEPARIN SODIUM 5000 UNIT(S): 5000 INJECTION INTRAVENOUS; SUBCUTANEOUS at 05:17

## 2024-02-11 RX ADMIN — POTASSIUM PHOSPHATE, MONOBASIC POTASSIUM PHOSPHATE, DIBASIC 83.33 MILLIMOLE(S): 236; 224 INJECTION, SOLUTION INTRAVENOUS at 12:15

## 2024-02-11 RX ADMIN — SERTRALINE 50 MILLIGRAM(S): 25 TABLET, FILM COATED ORAL at 12:17

## 2024-02-11 RX ADMIN — HEPARIN SODIUM 5000 UNIT(S): 5000 INJECTION INTRAVENOUS; SUBCUTANEOUS at 21:35

## 2024-02-11 RX ADMIN — Medication 3 MILLIGRAM(S): at 21:36

## 2024-02-11 RX ADMIN — MEMANTINE HYDROCHLORIDE 10 MILLIGRAM(S): 10 TABLET ORAL at 17:42

## 2024-02-11 RX ADMIN — RISPERIDONE 0.5 MILLIGRAM(S): 4 TABLET ORAL at 21:36

## 2024-02-11 RX ADMIN — Medication 1 MILLIGRAM(S): at 12:16

## 2024-02-11 RX ADMIN — Medication 25 MILLIGRAM(S): at 05:17

## 2024-02-11 RX ADMIN — DONEPEZIL HYDROCHLORIDE 10 MILLIGRAM(S): 10 TABLET, FILM COATED ORAL at 21:36

## 2024-02-11 RX ADMIN — HEPARIN SODIUM 5000 UNIT(S): 5000 INJECTION INTRAVENOUS; SUBCUTANEOUS at 12:27

## 2024-02-11 RX ADMIN — Medication 25 MILLIGRAM(S): at 17:44

## 2024-02-11 NOTE — PROGRESS NOTE ADULT - SUBJECTIVE AND OBJECTIVE BOX
Patient is a 78y old  Male who presents with a chief complaint of Fall, elevated calcium (11 Feb 2024 14:40)    Date of servie : 02-11-24 @ 18:10  INTERVAL HPI/OVERNIGHT EVENTS:  T(C): 36.3 (02-11-24 @ 05:17), Max: 36.6 (02-10-24 @ 19:43)  HR: 72 (02-11-24 @ 05:17) (64 - 72)  BP: 142/76 (02-11-24 @ 05:17) (121/71 - 142/76)  RR: 18 (02-11-24 @ 05:17) (18 - 18)  SpO2: 96% (02-11-24 @ 05:17) (96% - 99%)  Wt(kg): --  I&O's Summary      LABS:                        7.8    4.35  )-----------( 332      ( 10 Feb 2024 09:30 )             23.3     02-11    135  |  107  |  9   ----------------------------<  86  3.7   |  20<L>  |  0.55    Ca    7.0<L>      11 Feb 2024 07:05  Phos  2.0     02-11  Mg     1.90     02-11        Urinalysis Basic - ( 11 Feb 2024 07:05 )    Color: x / Appearance: x / SG: x / pH: x  Gluc: 86 mg/dL / Ketone: x  / Bili: x / Urobili: x   Blood: x / Protein: x / Nitrite: x   Leuk Esterase: x / RBC: x / WBC x   Sq Epi: x / Non Sq Epi: x / Bacteria: x      CAPILLARY BLOOD GLUCOSE            Urinalysis Basic - ( 11 Feb 2024 07:05 )    Color: x / Appearance: x / SG: x / pH: x  Gluc: 86 mg/dL / Ketone: x  / Bili: x / Urobili: x   Blood: x / Protein: x / Nitrite: x   Leuk Esterase: x / RBC: x / WBC x   Sq Epi: x / Non Sq Epi: x / Bacteria: x        MEDICATIONS  (STANDING):  cholecalciferol 400 Unit(s) Oral daily  cyanocobalamin 1000 MICROGram(s) Oral daily  donepezil 10 milliGRAM(s) Oral at bedtime  folic acid 1 milliGRAM(s) Oral daily  heparin   Injectable 5000 Unit(s) SubCutaneous every 8 hours  memantine 10 milliGRAM(s) Oral two times a day  metoprolol tartrate 25 milliGRAM(s) Oral two times a day  risperiDONE   Tablet 0.25 milliGRAM(s) Oral daily  risperiDONE   Tablet 0.5 milliGRAM(s) Oral at bedtime  sertraline 50 milliGRAM(s) Oral daily    MEDICATIONS  (PRN):  acetaminophen     Tablet .. 650 milliGRAM(s) Oral every 6 hours PRN Temp greater or equal to 38C (100.4F), Mild Pain (1 - 3)  aluminum hydroxide/magnesium hydroxide/simethicone Suspension 30 milliLiter(s) Oral every 4 hours PRN Dyspepsia  melatonin 3 milliGRAM(s) Oral at bedtime PRN Insomnia  ondansetron Injectable 4 milliGRAM(s) IV Push every 8 hours PRN Nausea and/or Vomiting  QUEtiapine 12.5 milliGRAM(s) Oral every 6 hours PRN for agitation          PHYSICAL EXAM:  GENERAL: NAD, well-groomed, well-developed  HEAD:  Atraumatic, Normocephalic  CHEST/LUNG: Clear to percussion bilaterally; No rales, rhonchi, wheezing, or rubs  HEART: Regular rate and rhythm; No murmurs, rubs, or gallops  ABDOMEN: Soft, Nontender, Nondistended; Bowel sounds present  EXTREMITIES: edema +    Care Discussed with Consultants/Other Providers [ x] YES  [ ] NO

## 2024-02-11 NOTE — PROGRESS NOTE ADULT - SUBJECTIVE AND OBJECTIVE BOX
[facility]   MD ANEL FOLEY MD ANGELA WONG, PA QIAN CHEN, NP    TEL:  OFFICE: [phone]  From 5pm-7am Answering Service [phone]    -- RENAL FOLLOW UP NOTE ---Date of Service 02-11-24 @ 14:41    Patient is a 78y old  Male who presents with a chief complaint of Fall, elevated calcium.    Patient seen and examined at bedside. No chest pain/SOB.    VITALS:  T(F): 97.4 (02-11-24 @ 05:17), Max: 97.8 (02-10-24 @ 19:43)  HR: 72 (02-11-24 @ 05:17)  BP: 142/76 (02-11-24 @ 05:17)  RR: 18 (02-11-24 @ 05:17)  SpO2: 96% (02-11-24 @ 05:17)  Wt(kg): --      PHYSICAL EXAM:  General: NAD  Neck: No JVD  Respiratory: CTAB, no wheezes, rales or rhonchi  Cardiovascular: S1, S2, RRR  Gastrointestinal: BS+, soft, NT/ND  Extremities: No peripheral edema    Hospital Medications:   MEDICATIONS  (STANDING):  cholecalciferol 400 Unit(s) Oral daily  cyanocobalamin 1000 MICROGram(s) Oral daily  donepezil 10 milliGRAM(s) Oral at bedtime  folic acid 1 milliGRAM(s) Oral daily  heparin   Injectable 5000 Unit(s) SubCutaneous every 8 hours  memantine 10 milliGRAM(s) Oral two times a day  metoprolol tartrate 25 milliGRAM(s) Oral two times a day  risperiDONE   Tablet 0.25 milliGRAM(s) Oral daily  risperiDONE   Tablet 0.5 milliGRAM(s) Oral at bedtime  sertraline 50 milliGRAM(s) Oral daily      LABS:  02-11    135  |  107  |  9   ----------------------------<  86  3.7   |  20<L>  |  0.55    Ca    7.0<L>      11 Feb 2024 07:05  Phos  2.0     02-11  Mg     1.90     02-11      Creatinine Trend: 0.55 <--, 0.56 <--, 0.55 <--, 0.57 <--, 0.55 <--, 0.60 <--    Phosphorus: 2.0 mg/dL (02-11 @ 07:05)                          7.8    4.35  )-----------( 332      ( 10 Feb 2024 09:30 )             23.3     Urine Studies:  Urinalysis - [02-11-24 @ 07:05]      Color  / Appearance  / SG  / pH       Gluc 86 / Ketone   / Bili  / Urobili        Blood  / Protein  / Leuk Est  / Nitrite       RBC  / WBC  / Hyaline  / Gran  / Sq Epi  / Non Sq Epi  / Bacteria       PTH -- (Ca --)      [01-27-24 @ 10:56]   8  PTH -- (Ca --)      [01-14-24 @ 06:50]   5  PTH -- (Ca --)      [01-13-24 @ 06:10]   6  Vitamin D (25OH) 46.4      [01-27-24 @ 10:56]      Immunofixation Serum:   Corresponding IgG and kappa bands consistent with monoclonal IgG kappa  protein. ADITYA Huggins M.D.  Reference Range: None Detected      [01-27-24 @ 10:56]  SPEP Interpretation: Distinct band in Gamma region with suppression of normal Gammaglobulin  suggestive of Monoclonal Gammopathy. Serum and Urine Immunofixation  Electrophoresis are recommended if not previously performed.  Clara Covington M.D.      [01-27-24 @ 10:56]

## 2024-02-11 NOTE — PROGRESS NOTE ADULT - SUBJECTIVE AND OBJECTIVE BOX
DATE OF SERVICE: 02-11-24    No events; ros limited           acetaminophen     Tablet .. 650 milliGRAM(s) Oral every 6 hours PRN  aluminum hydroxide/magnesium hydroxide/simethicone Suspension 30 milliLiter(s) Oral every 4 hours PRN  cholecalciferol 400 Unit(s) Oral daily  cyanocobalamin 1000 MICROGram(s) Oral daily  donepezil 10 milliGRAM(s) Oral at bedtime  folic acid 1 milliGRAM(s) Oral daily  heparin   Injectable 5000 Unit(s) SubCutaneous every 8 hours  melatonin 3 milliGRAM(s) Oral at bedtime PRN  memantine 10 milliGRAM(s) Oral two times a day  metoprolol tartrate 25 milliGRAM(s) Oral two times a day  ondansetron Injectable 4 milliGRAM(s) IV Push every 8 hours PRN  QUEtiapine 12.5 milliGRAM(s) Oral every 6 hours PRN  risperiDONE   Tablet 0.5 milliGRAM(s) Oral at bedtime  risperiDONE   Tablet 0.25 milliGRAM(s) Oral daily  sertraline 50 milliGRAM(s) Oral daily                            7.8    4.35  )-----------( 332      ( 10 Feb 2024 09:30 )             23.3       02-11    135  |  107  |  9   ----------------------------<  86  3.7   |  20<L>  |  0.55    Ca    7.0<L>      11 Feb 2024 07:05  Phos  2.0     02-11  Mg     1.90     02-11              T(C): 36.3 (02-11-24 @ 05:17), Max: 36.6 (02-10-24 @ 19:43)  HR: 72 (02-11-24 @ 05:17) (62 - 72)  BP: 142/76 (02-11-24 @ 05:17) (98/55 - 142/76)  RR: 18 (02-11-24 @ 05:17) (18 - 18)  SpO2: 96% (02-11-24 @ 05:17) (96% - 99%)  Wt(kg): --    I&O's Summary    General:  NAD  Head: Normocephalic and atraumatic.   Neck: No JVD. No bruits. Supple. Does not appear to be enlarged.   Cardiovascular: + S1,S2 ; RRR Soft systolic murmur at the left lower sternal border. No rubs noted.    Lungs: CTA b/l. No rhonchi, rales or wheezes.   Abdomen: + BS, soft. Non tender. Non distended. No rebound. No guarding.   Extremities: No clubbing/cyanosis/edema.   Neurologic: Moves all four extremities. Full range of motion.   Skin: Warm and moist. The patient's skin has normal elasticity and good skin turgor.   Psychiatric: unable to fully assess      ECG:  	NSR    < from: Transthoracic Echocardiogram (07.10.23 @ 11:15) >  CONCLUSIONS:  1. Calcified trileaflet aortic valve with normal opening.  Minimal aortic regurgitation.  2. Endocardium not well visualized; grossly decreased  possibly mild left ventricular systolic dysfunction.  3. The right ventricle is not well visualized; grossly  normal right ventricular systolic function.  ------------------------------------------------------------------------  Confirmed on  7/10/2023 - 13:57:46 by MARLA Vegas  < end of copied text >      ASSESSMENT/PLAN: This is a 78M with history of MM, HTN, and Dementia (AAO x 1-2 to self, to place, not to time at baseline per brother) who presents to the hospital from [facility] after a fall.     Patient is a poor historian 2/2 dementia. As per paperwork from the NH, patient had a fall and was noted to have a hematoma on the occipital head and was sent to the hospital for evaluation. Here imaging were negative for fractures but his work up showed him to have a significantly elevated calcium of 14 (corrected to 15.1) with DAVID and a significantly elevated protein gap. He was given NS 1.5L and repeat BMP showed persistence of the calcium elevation though improving. He was admitted to medicine on telemetry for further evaluation.  Recent admission for influenza, where his lopressor and norvasc were discontinued.    1. HTN/h/o MVR  - BP stable  - s/p fluids for hypercalcemia, no EKG changes associated with hypercalcemia  - TTE from last year with mildly decreased LVEF, cont medical management with Lopressor  - s/p PRBC  monitor h/h  - continue lopressor 25 mg BID for PAT and CM   - Keep K>4 , Mag >2    Elgin Maynard MD

## 2024-02-11 NOTE — PROGRESS NOTE ADULT - ASSESSMENT
78M with history of MM, HTN, and Dementia (AAO x 1-2 to self, to place, not to time at baseline per brother) who presents to the hospital from [facility] after a fall. Patient is a poor historian 2/2 dementia, unable to state what happened. As per paperwork from the NH, patient had a fall and was noted to have a hematoma on the occipital head and was sent to the hospital for evaluation. Here imaging were negative for fractures but his work up showed him to have a significantly elevated calcium of 14 (corrected to 15.1) with DAVID and a significantly elevated protein gap.  Nephrology consulted for DAVID and hypercalemia.    A/P:  DAVID:  Baseline S.Cr 0.7-0.9.  S.Cr 1.39 on admission.  Likely pre-renal in setting of dehydration/hypercalcemia.  UA + proteinuria; no blood.  FeNa 1.1% - suggests ATN.  Renal function remains stable.  Monitor BMP and UO.    HTN:  Controlled.  Avoid hypotension.  C/W current meds.  Monitor BP.    Hypercalcemia/hypocalcemia:  Likely due to Monoclonal Gammopathy.  PTH low, suppressed by high Ca  Vit. D 25OH 46.4.  S/p Zometa 4mg x1 dose and calcitonin 200IU q12hrs x2 doses.  s/p 2g Calcium gluconate again 2/7.  C/W cholecalciferol.  Corrected Ca for hypoalbuminemia 8.5  Optimize albumin.  Replete as needed.  Monitor Ca.    Acidosis:  Hyperchloremic acidosis.  Hyperchloremia improving.  Monitor CO2.    Anemia:  Likely sec to MM vs. other etiology.  Workup and management per primary team.  Heme/onc on board  Monitor Hgb.  Transfuse for Hgb <8.    Hypophosphatemia:  Likely sec. to poor PO intake.  Received Kphos 30mmol x1 today.  Replete w/ Kphos as needed  Monitor PO4 daily.    Hypokalemia:   Likely sec to poor PO intake  Stable.  Given Kphos 30mmol today.  monitor K.    Hypomagnesemia:  Likely sec to poor PO intake.  Replete as needed   Monitor Mg.    Proteinuria:  Possibly sec to MM.  Monitor.

## 2024-02-12 LAB
ANION GAP SERPL CALC-SCNC: 7 MMOL/L — SIGNIFICANT CHANGE UP (ref 7–14)
BUN SERPL-MCNC: 10 MG/DL — SIGNIFICANT CHANGE UP (ref 7–23)
CALCIUM SERPL-MCNC: 7.2 MG/DL — LOW (ref 8.4–10.5)
CHLORIDE SERPL-SCNC: 105 MMOL/L — SIGNIFICANT CHANGE UP (ref 98–107)
CO2 SERPL-SCNC: 22 MMOL/L — SIGNIFICANT CHANGE UP (ref 22–31)
CREAT SERPL-MCNC: 0.58 MG/DL — SIGNIFICANT CHANGE UP (ref 0.5–1.3)
EGFR: 100 ML/MIN/1.73M2 — SIGNIFICANT CHANGE UP
GLUCOSE SERPL-MCNC: 90 MG/DL — SIGNIFICANT CHANGE UP (ref 70–99)
MAGNESIUM SERPL-MCNC: 1.9 MG/DL — SIGNIFICANT CHANGE UP (ref 1.6–2.6)
PHOSPHATE SERPL-MCNC: 2.4 MG/DL — LOW (ref 2.5–4.5)
POTASSIUM SERPL-MCNC: 3.8 MMOL/L — SIGNIFICANT CHANGE UP (ref 3.5–5.3)
POTASSIUM SERPL-SCNC: 3.8 MMOL/L — SIGNIFICANT CHANGE UP (ref 3.5–5.3)
SODIUM SERPL-SCNC: 134 MMOL/L — LOW (ref 135–145)

## 2024-02-12 RX ORDER — SERTRALINE 25 MG/1
1 TABLET, FILM COATED ORAL
Refills: 0 | DISCHARGE

## 2024-02-12 RX ORDER — SODIUM,POTASSIUM PHOSPHATES 278-250MG
1 POWDER IN PACKET (EA) ORAL ONCE
Refills: 0 | Status: COMPLETED | OUTPATIENT
Start: 2024-02-12 | End: 2024-02-12

## 2024-02-12 RX ORDER — PREGABALIN 225 MG/1
1 CAPSULE ORAL
Refills: 0 | DISCHARGE

## 2024-02-12 RX ORDER — METOPROLOL TARTRATE 50 MG
1 TABLET ORAL
Qty: 0 | Refills: 0 | DISCHARGE
Start: 2024-02-12

## 2024-02-12 RX ORDER — MEMANTINE HYDROCHLORIDE 10 MG/1
1 TABLET ORAL
Qty: 0 | Refills: 0 | DISCHARGE
Start: 2024-02-12

## 2024-02-12 RX ORDER — PREGABALIN 225 MG/1
1 CAPSULE ORAL
Qty: 0 | Refills: 0 | DISCHARGE
Start: 2024-02-12

## 2024-02-12 RX ORDER — FOLIC ACID 0.8 MG
1 TABLET ORAL
Refills: 0 | DISCHARGE

## 2024-02-12 RX ORDER — RISPERIDONE 4 MG/1
1 TABLET ORAL
Refills: 0 | DISCHARGE

## 2024-02-12 RX ORDER — CHOLECALCIFEROL (VITAMIN D3) 125 MCG
400 CAPSULE ORAL
Qty: 0 | Refills: 0 | DISCHARGE
Start: 2024-02-12

## 2024-02-12 RX ORDER — RISPERIDONE 4 MG/1
1 TABLET ORAL
Qty: 0 | Refills: 0 | DISCHARGE
Start: 2024-02-12

## 2024-02-12 RX ORDER — SERTRALINE 25 MG/1
1 TABLET, FILM COATED ORAL
Qty: 0 | Refills: 0 | DISCHARGE
Start: 2024-02-12

## 2024-02-12 RX ORDER — DONEPEZIL HYDROCHLORIDE 10 MG/1
1 TABLET, FILM COATED ORAL
Qty: 0 | Refills: 0 | DISCHARGE
Start: 2024-02-12

## 2024-02-12 RX ORDER — FOLIC ACID 0.8 MG
1 TABLET ORAL
Qty: 0 | Refills: 0 | DISCHARGE
Start: 2024-02-12

## 2024-02-12 RX ADMIN — Medication 25 MILLIGRAM(S): at 18:21

## 2024-02-12 RX ADMIN — PREGABALIN 1000 MICROGRAM(S): 225 CAPSULE ORAL at 10:27

## 2024-02-12 RX ADMIN — RISPERIDONE 0.25 MILLIGRAM(S): 4 TABLET ORAL at 10:28

## 2024-02-12 RX ADMIN — MEMANTINE HYDROCHLORIDE 10 MILLIGRAM(S): 10 TABLET ORAL at 18:22

## 2024-02-12 RX ADMIN — MEMANTINE HYDROCHLORIDE 10 MILLIGRAM(S): 10 TABLET ORAL at 05:48

## 2024-02-12 RX ADMIN — Medication 1 PACKET(S): at 10:24

## 2024-02-12 RX ADMIN — SERTRALINE 50 MILLIGRAM(S): 25 TABLET, FILM COATED ORAL at 10:27

## 2024-02-12 RX ADMIN — Medication 400 UNIT(S): at 10:25

## 2024-02-12 RX ADMIN — Medication 25 MILLIGRAM(S): at 05:48

## 2024-02-12 RX ADMIN — HEPARIN SODIUM 5000 UNIT(S): 5000 INJECTION INTRAVENOUS; SUBCUTANEOUS at 14:34

## 2024-02-12 RX ADMIN — RISPERIDONE 0.5 MILLIGRAM(S): 4 TABLET ORAL at 21:23

## 2024-02-12 RX ADMIN — DONEPEZIL HYDROCHLORIDE 10 MILLIGRAM(S): 10 TABLET, FILM COATED ORAL at 21:22

## 2024-02-12 RX ADMIN — HEPARIN SODIUM 5000 UNIT(S): 5000 INJECTION INTRAVENOUS; SUBCUTANEOUS at 21:22

## 2024-02-12 RX ADMIN — HEPARIN SODIUM 5000 UNIT(S): 5000 INJECTION INTRAVENOUS; SUBCUTANEOUS at 05:48

## 2024-02-12 RX ADMIN — Medication 1 MILLIGRAM(S): at 10:27

## 2024-02-12 NOTE — PROGRESS NOTE ADULT - SUBJECTIVE AND OBJECTIVE BOX
Patient is a 78y old  Male who presents with a chief complaint of Fall, elevated calcium (12 Feb 2024 14:47)    Date of servie : 02-12-24 @ 14:52  INTERVAL HPI/OVERNIGHT EVENTS:  T(C): 36 (02-12-24 @ 13:34), Max: 36.9 (02-11-24 @ 21:36)  HR: 71 (02-12-24 @ 13:34) (59 - 71)  BP: 104/88 (02-12-24 @ 13:34) (104/88 - 147/83)  RR: 17 (02-12-24 @ 13:34) (17 - 18)  SpO2: 98% (02-12-24 @ 13:34) (97% - 98%)  Wt(kg): --  I&O's Summary      LABS:    02-12    134<L>  |  105  |  10  ----------------------------<  90  3.8   |  22  |  0.58    Ca    7.2<L>      12 Feb 2024 06:24  Phos  2.4     02-12  Mg     1.90     02-12        Urinalysis Basic - ( 12 Feb 2024 06:24 )    Color: x / Appearance: x / SG: x / pH: x  Gluc: 90 mg/dL / Ketone: x  / Bili: x / Urobili: x   Blood: x / Protein: x / Nitrite: x   Leuk Esterase: x / RBC: x / WBC x   Sq Epi: x / Non Sq Epi: x / Bacteria: x      CAPILLARY BLOOD GLUCOSE            Urinalysis Basic - ( 12 Feb 2024 06:24 )    Color: x / Appearance: x / SG: x / pH: x  Gluc: 90 mg/dL / Ketone: x  / Bili: x / Urobili: x   Blood: x / Protein: x / Nitrite: x   Leuk Esterase: x / RBC: x / WBC x   Sq Epi: x / Non Sq Epi: x / Bacteria: x        MEDICATIONS  (STANDING):  cholecalciferol 400 Unit(s) Oral daily  cyanocobalamin 1000 MICROGram(s) Oral daily  donepezil 10 milliGRAM(s) Oral at bedtime  folic acid 1 milliGRAM(s) Oral daily  heparin   Injectable 5000 Unit(s) SubCutaneous every 8 hours  memantine 10 milliGRAM(s) Oral two times a day  metoprolol tartrate 25 milliGRAM(s) Oral two times a day  risperiDONE   Tablet 0.25 milliGRAM(s) Oral daily  risperiDONE   Tablet 0.5 milliGRAM(s) Oral at bedtime  sertraline 50 milliGRAM(s) Oral daily    MEDICATIONS  (PRN):  acetaminophen     Tablet .. 650 milliGRAM(s) Oral every 6 hours PRN Temp greater or equal to 38C (100.4F), Mild Pain (1 - 3)  aluminum hydroxide/magnesium hydroxide/simethicone Suspension 30 milliLiter(s) Oral every 4 hours PRN Dyspepsia  melatonin 3 milliGRAM(s) Oral at bedtime PRN Insomnia  ondansetron Injectable 4 milliGRAM(s) IV Push every 8 hours PRN Nausea and/or Vomiting  QUEtiapine 12.5 milliGRAM(s) Oral every 6 hours PRN for agitation          PHYSICAL EXAM:  GENERAL: NAD, well-groomed, well-developed  HEAD:  Atraumatic, Normocephalic  CHEST/LUNG: Clear to percussion bilaterally; No rales, rhonchi, wheezing, or rubs  HEART: Regular rate and rhythm; No murmurs, rubs, or gallops  ABDOMEN: Soft, Nontender, Nondistended; Bowel sounds present  EXTREMITIES:  2+ Peripheral Pulses, No clubbing, cyanosis, or edema  LYMPH: No lymphadenopathy noted  SKIN: No rashes or lesions    Care Discussed with Consultants/Other Providers [ ] YES  [ ] NO

## 2024-02-12 NOTE — PROGRESS NOTE ADULT - NUTRITIONAL ASSESSMENT
This patient has been assessed with a concern for Malnutrition and has been determined to have a diagnosis/diagnoses of Severe protein-calorie malnutrition.    This patient is being managed with:   Diet Regular-  DASH/TLC {Sodium & Cholesterol Restricted} (DASH)  Pureed (PUREED)  Entered: Jan 27 2024  2:23AM  

## 2024-02-12 NOTE — PROGRESS NOTE ADULT - ASSESSMENT
78M with history of MM, HTN, and Dementia (AAO x 1-2 to self, to place, not to time at baseline per brother) who presents to the hospital from [facility] after a fall. Patient is a poor historian 2/2 dementia, unable to state what happened. As per paperwork from the NH, patient had a fall and was noted to have a hematoma on the occipital head and was sent to the hospital for evaluation. Here imaging were negative for fractures but his work up showed him to have a significantly elevated calcium of 14 (corrected to 15.1) with DAVID and a significantly elevated protein gap.  Nephrology consulted for DAVID and hypercalemia.    A/P:  DAVID:  Baseline S.Cr 0.7-0.9.  S.Cr 1.39 on admission.  Likely pre-renal in setting of dehydration/hypercalcemia.  UA + proteinuria; no blood.  FeNa 1.1% - suggests ATN.  Renal function remains stable.  Monitor BMP and UO.    HTN:  Controlled.  Avoid hypotension.  C/W current meds.  Monitor BP.    Hypercalcemia/hypocalcemia:  Likely due to Monoclonal Gammopathy.  PTH low, suppressed by high Ca  Vit. D 25OH 46.4.  S/p Zometa 4mg x1 dose and calcitonin 200IU q12hrs x2 doses.  s/p 2g Calcium gluconate again 2/7.  C/W cholecalciferol.  Corrected Ca for hypoalbuminemia 8.5  Optimize albumin.  Replete as needed.  Monitor Ca.    Acidosis:  Hyperchloremic acidosis.  Hyperchloremia improving.  Monitor CO2.    Anemia:  Likely sec to MM vs. other etiology.  Workup and management per primary team.  Heme/onc on board  Monitor Hgb.  Transfuse for Hgb <8.    Hypophosphatemia:  Likely sec. to poor PO intake.  Received Kphos 30mmol x1 today.  Replete w/ Kphos as needed  Monitor PO4 daily.    Hypokalemia:   Likely sec to poor PO intake  Stable.  Given Kphos 30mmol today.  monitor K.    Hypomagnesemia:  Likely sec to poor PO intake.  Replete as needed   Monitor Mg.    Proteinuria:  Possibly sec to MM.  Monitor.    Hyponatremia:  likely sec to higher liquid diet than solid  Increase solid food in diet  MOnitor Na level, if drops further get Urine Na, urine osmolality

## 2024-02-12 NOTE — PROGRESS NOTE ADULT - NS ATTEND OPT1 GEN_ALL_CORE

## 2024-02-12 NOTE — PROGRESS NOTE ADULT - NS ATTEND AMEND GEN_ALL_CORE FT
Patient seen and examined. Agree with plan as detailed in PA/NP Note.     Continue to hold BP meds    Staci Muñiz MD  Pager: [phone]
Patient seen and examined. Agree with plan as detailed in PA/NP Note.     C/w BB for JASPREET Muñiz MD  Pager: [phone]
Patient seen and examined. Agree with plan as detailed in PA/NP Note.     C/w Lopressor for JASPREET Muñiz MD  Pager: [phone]
Patient seen and examined. Agree with plan as detailed in PA/NP Note.     Noted hemoglobin drop, received PRBC, f/u heme/onc      Staci Muñiz MD  Pager: [phone]
as above
as above
replete ca and po4
As above.    78M with history of multiple myeloma, admitted with fall. Family reports patient is on lenalidomide. Obtain records if possible. Replete hypocalcemia. Follow-up outpatient prior to restarting myeloma medications.
Patient seen and examined. Agree with plan as detailed in PA/NP Note.     HR 39 when asleep, asymptomatic, c/w BB for PAt  d/c tele    Staci Muñiz MD  Pager: [phone]
Patient seen and examined. Agree with plan as detailed in PA/NP Note.     c/w BB for JASPREET Muñiz MD  Pager: [phone]
as above
off IVF  Ca gluconate 2gm  K phos 30mmol
supplement PO4 as suggested-monitor electrolytes
Decrease IVF 75cc/hr  supplement K and PHo4 as suggetsed  monitor renal function and electrolytes closely
Patient seen and examined. Agree with plan as detailed in PA/NP Note.     C/w BB can uptitrate as tolerated    Staci Muñiz MD  Pager: [phone]
as above
Scr, Ca improving  reviewed labs  decrease same IVF 100cc/hr  follow up pending work up

## 2024-02-12 NOTE — PROGRESS NOTE ADULT - SUBJECTIVE AND OBJECTIVE BOX
Dr. Blanco  Office [phone] (9 am to 5 pm)  Service: [phone] (5pm to 9am)  Rosario RODRIGES      RENAL PROGRESS NOTE: DATE OF SERVICE 02-12-24 @ 14:47    Patient is a 78y old  Male who presents with a chief complaint of Fall, elevated calcium (12 Feb 2024 10:26)      Patient seen and examined at bedside. No apparent distress    VITALS:  T(F): 96.8 (02-12-24 @ 13:34), Max: 98.4 (02-11-24 @ 21:36)  HR: 71 (02-12-24 @ 13:34)  BP: 104/88 (02-12-24 @ 13:34)  RR: 17 (02-12-24 @ 13:34)  SpO2: 98% (02-12-24 @ 13:34)  Wt(kg): --        PHYSICAL EXAM:  Constitutional: NAD  Neck: No JVD  Respiratory: CTAB, no wheezes, rales or rhonchi  Cardiovascular: S1, S2, RRR  Gastrointestinal: BS+, soft, NT/ND  Extremities: No peripheral edema    Hospital Medications:   MEDICATIONS  (STANDING):  cholecalciferol 400 Unit(s) Oral daily  cyanocobalamin 1000 MICROGram(s) Oral daily  donepezil 10 milliGRAM(s) Oral at bedtime  folic acid 1 milliGRAM(s) Oral daily  heparin   Injectable 5000 Unit(s) SubCutaneous every 8 hours  memantine 10 milliGRAM(s) Oral two times a day  metoprolol tartrate 25 milliGRAM(s) Oral two times a day  risperiDONE   Tablet 0.25 milliGRAM(s) Oral daily  risperiDONE   Tablet 0.5 milliGRAM(s) Oral at bedtime  sertraline 50 milliGRAM(s) Oral daily      LABS:  02-12    134<L>  |  105  |  10  ----------------------------<  90  3.8   |  22  |  0.58    Ca    7.2<L>      12 Feb 2024 06:24  Phos  2.4     02-12  Mg     1.90     02-12      Creatinine Trend: 0.58 <--, 0.55 <--, 0.56 <--, 0.55 <--, 0.57 <--, 0.55 <--    Phosphorus: 2.4 mg/dL (02-12 @ 06:24)          Urine Studies:  Urinalysis - [02-12-24 @ 06:24]      Color  / Appearance  / SG  / pH       Gluc 90 / Ketone   / Bili  / Urobili        Blood  / Protein  / Leuk Est  / Nitrite       RBC  / WBC  / Hyaline  / Gran  / Sq Epi  / Non Sq Epi  / Bacteria       PTH -- (Ca --)      [01-27-24 @ 10:56]   8  PTH -- (Ca --)      [01-14-24 @ 06:50]   5  PTH -- (Ca --)      [01-13-24 @ 06:10]   6  Vitamin D (25OH) 46.4      [01-27-24 @ 10:56]      Immunofixation Serum:   Corresponding IgG and kappa bands consistent with monoclonal IgG kappa  protein. ADITYA Huggins M.D.  Reference Range: None Detected      [01-27-24 @ 10:56]  SPEP Interpretation: Distinct band in Gamma region with suppression of normal Gammaglobulin  suggestive of Monoclonal Gammopathy. Serum and Urine Immunofixation  Electrophoresis are recommended if not previously performed.  Clara Covington M.D.      [01-27-24 @ 10:56]    RADIOLOGY & ADDITIONAL STUDIES:

## 2024-02-12 NOTE — PROGRESS NOTE ADULT - SUBJECTIVE AND OBJECTIVE BOX
DATE OF SERVICE: 02-12-24    No events; ros limited     Review of Systems:   Constitutional: [ ] fevers, [ ] chills.   Skin: [ ] dry skin. [ ] rashes.  Psychiatric: [ ] depression, [ ] anxiety.   Gastrointestinal: [ ] BRBPR, [ ] melena.   Neurological: [ ] confusion. [ ] seizures. [ ] shuffling gait.   Ears,Nose,Mouth and Throat: [ ] ear pain [ ] sore throat.   Eyes: [ ] diplopia.   Respiratory: [ ] hemoptysis. [ ] shortness of breath  Cardiovascular: See HPI above  Hematologic/Lymphatic: [ ] anemia. [ ] painful nodes. [ ] prolonged bleeding.   Genitourinary: [ ] hematuria. [ ] flank pain.   Endocrine: [ ] significant change in weight. [ ] intolerance to heat and cold.     Review of systems [x ] otherwise negative, [ ] otherwise unable to obtain    FH: no family history of sudden cardiac death in first degree relatives    SH: [ ] tobacco, [ ] alcohol, [ ] drugs    acetaminophen     Tablet .. 650 milliGRAM(s) Oral every 6 hours PRN  aluminum hydroxide/magnesium hydroxide/simethicone Suspension 30 milliLiter(s) Oral every 4 hours PRN  cholecalciferol 400 Unit(s) Oral daily  cyanocobalamin 1000 MICROGram(s) Oral daily  donepezil 10 milliGRAM(s) Oral at bedtime  folic acid 1 milliGRAM(s) Oral daily  heparin   Injectable 5000 Unit(s) SubCutaneous every 8 hours  melatonin 3 milliGRAM(s) Oral at bedtime PRN  memantine 10 milliGRAM(s) Oral two times a day  metoprolol tartrate 25 milliGRAM(s) Oral two times a day  ondansetron Injectable 4 milliGRAM(s) IV Push every 8 hours PRN  potassium phosphate / sodium phosphate Powder (PHOS-NaK) 1 Packet(s) Oral once  QUEtiapine 12.5 milliGRAM(s) Oral every 6 hours PRN  risperiDONE   Tablet 0.25 milliGRAM(s) Oral daily  risperiDONE   Tablet 0.5 milliGRAM(s) Oral at bedtime  sertraline 50 milliGRAM(s) Oral daily      134<L>  |  105  |  10  ----------------------------<  90  3.8   |  22  |  0.58    Ca    7.2<L>      12 Feb 2024 06:24  Phos  2.4     02-12  Mg     1.90     02-12              T(C): 36.3 (02-12-24 @ 05:48), Max: 36.9 (02-11-24 @ 21:36)  HR: 66 (02-12-24 @ 05:48) (59 - 67)  BP: 147/83 (02-12-24 @ 05:48) (140/70 - 147/83)  RR: 18 (02-12-24 @ 05:48) (18 - 18)  SpO2: 98% (02-12-24 @ 05:48) (97% - 98%)  Wt(kg): --    General:  NAD  Head: Normocephalic and atraumatic.   Neck: No JVD. No bruits. Supple. Does not appear to be enlarged.   Cardiovascular: + S1,S2 ; RRR Soft systolic murmur at the left lower sternal border. No rubs noted.    Lungs: CTA b/l. No rhonchi, rales or wheezes.   Abdomen: + BS, soft. Non tender. Non distended. No rebound. No guarding.   Extremities: No clubbing/cyanosis/edema.   Neurologic: Moves all four extremities. Full range of motion.   Skin: Warm and moist. The patient's skin has normal elasticity and good skin turgor.   Psychiatric: unable to fully assess      ECG:  	NSR    < from: Transthoracic Echocardiogram (07.10.23 @ 11:15) >  CONCLUSIONS:  1. Calcified trileaflet aortic valve with normal opening.  Minimal aortic regurgitation.  2. Endocardium not well visualized; grossly decreased  possibly mild left ventricular systolic dysfunction.  3. The right ventricle is not well visualized; grossly  normal right ventricular systolic function.  ------------------------------------------------------------------------  Confirmed on  7/10/2023 - 13:57:46 by MARLA Vegas  < end of copied text >      ASSESSMENT/PLAN: This is a 78M with history of MM, HTN, and Dementia (AAO x 1-2 to self, to place, not to time at baseline per brother) who presents to the hospital from [facility] after a fall.     Patient is a poor historian 2/2 dementia. As per paperwork from the NH, patient had a fall and was noted to have a hematoma on the occipital head and was sent to the hospital for evaluation. Here imaging were negative for fractures but his work up showed him to have a significantly elevated calcium of 14 (corrected to 15.1) with DAVID and a significantly elevated protein gap. He was given NS 1.5L and repeat BMP showed persistence of the calcium elevation though improving. He was admitted to medicine on telemetry for further evaluation.  Recent admission for influenza, where his lopressor and norvasc were discontinued.    1. HTN/h/o MVR  - BP stable  - s/p fluids for hypercalcemia, no EKG changes associated with hypercalcemia  - TTE from last year with mildly decreased LVEF, cont medical management with Lopressor  - s/p PRBC  monitor h/h  - continue lopressor 25 mg BID for PAT and CM   - Keep K>4 , Mag >2

## 2024-02-13 ENCOUNTER — TRANSCRIPTION ENCOUNTER (OUTPATIENT)
Age: 79
End: 2024-02-13

## 2024-02-13 VITALS
HEART RATE: 83 BPM | DIASTOLIC BLOOD PRESSURE: 60 MMHG | RESPIRATION RATE: 18 BRPM | TEMPERATURE: 98 F | OXYGEN SATURATION: 99 % | SYSTOLIC BLOOD PRESSURE: 107 MMHG

## 2024-02-13 LAB — SARS-COV-2 RNA SPEC QL NAA+PROBE: SIGNIFICANT CHANGE UP

## 2024-02-13 RX ADMIN — HEPARIN SODIUM 5000 UNIT(S): 5000 INJECTION INTRAVENOUS; SUBCUTANEOUS at 04:15

## 2024-02-13 RX ADMIN — PREGABALIN 1000 MICROGRAM(S): 225 CAPSULE ORAL at 11:24

## 2024-02-13 RX ADMIN — Medication 1 MILLIGRAM(S): at 11:25

## 2024-02-13 RX ADMIN — MEMANTINE HYDROCHLORIDE 10 MILLIGRAM(S): 10 TABLET ORAL at 04:15

## 2024-02-13 RX ADMIN — Medication 400 UNIT(S): at 11:25

## 2024-02-13 RX ADMIN — SERTRALINE 50 MILLIGRAM(S): 25 TABLET, FILM COATED ORAL at 11:23

## 2024-02-13 RX ADMIN — Medication 25 MILLIGRAM(S): at 04:15

## 2024-02-13 RX ADMIN — RISPERIDONE 0.25 MILLIGRAM(S): 4 TABLET ORAL at 11:24

## 2024-02-13 NOTE — DISCHARGE NOTE NURSING/CASE MANAGEMENT/SOCIAL WORK - PATIENT PORTAL LINK FT
You can access the FollowMyHealth Patient Portal offered by [facility] by registering at the following website: [email or web address]. By joining [facility]’s FollowMyHealth portal, you will also be able to view your health information using other applications (apps) compatible with our system.

## 2024-02-13 NOTE — PROGRESS NOTE ADULT - SUBJECTIVE AND OBJECTIVE BOX
Dr. Blanco  Office [phone] (9 am to 5 pm)  Service: [phone] (5pm to 9am)  Rosario RODRIGES      RENAL PROGRESS NOTE: DATE OF SERVICE 02-13-24 @ 14:16    Patient is a 78y old  Male who presents with a chief complaint of Fall, elevated calcium (13 Feb 2024 11:59)      Patient seen and examined at bedside. No chest pain/sob    VITALS:  T(F): 97.9 (02-13-24 @ 10:48), Max: 97.9 (02-13-24 @ 10:48)  HR: 83 (02-13-24 @ 10:48)  BP: 107/60 (02-13-24 @ 10:48)  RR: 18 (02-13-24 @ 10:48)  SpO2: 99% (02-13-24 @ 10:48)  Wt(kg): --    02-12 @ 07:01  -  02-13 @ 07:00  --------------------------------------------------------  IN: 0 mL / OUT: 400 mL / NET: -400 mL          PHYSICAL EXAM:  Constitutional: NAD  Neck: No JVD  Respiratory: CTAB, no wheezes, rales or rhonchi  Cardiovascular: S1, S2, RRR  Gastrointestinal: BS+, soft, NT/ND  Extremities: No peripheral edema    Hospital Medications:   MEDICATIONS  (STANDING):  cholecalciferol 400 Unit(s) Oral daily  cyanocobalamin 1000 MICROGram(s) Oral daily  donepezil 10 milliGRAM(s) Oral at bedtime  folic acid 1 milliGRAM(s) Oral daily  heparin   Injectable 5000 Unit(s) SubCutaneous every 8 hours  memantine 10 milliGRAM(s) Oral two times a day  metoprolol tartrate 25 milliGRAM(s) Oral two times a day  risperiDONE   Tablet 0.25 milliGRAM(s) Oral daily  risperiDONE   Tablet 0.5 milliGRAM(s) Oral at bedtime  sertraline 50 milliGRAM(s) Oral daily      LABS:  02-12    134<L>  |  105  |  10  ----------------------------<  90  3.8   |  22  |  0.58    Ca    7.2<L>      12 Feb 2024 06:24  Phos  2.4     02-12  Mg     1.90     02-12      Creatinine Trend: 0.58 <--, 0.55 <--, 0.56 <--, 0.55 <--, 0.57 <--            Urine Studies:  Urinalysis - [02-12-24 @ 06:24]      Color  / Appearance  / SG  / pH       Gluc 90 / Ketone   / Bili  / Urobili        Blood  / Protein  / Leuk Est  / Nitrite       RBC  / WBC  / Hyaline  / Gran  / Sq Epi  / Non Sq Epi  / Bacteria       PTH -- (Ca --)      [01-27-24 @ 10:56]   8  PTH -- (Ca --)      [01-14-24 @ 06:50]   5  PTH -- (Ca --)      [01-13-24 @ 06:10]   6  Vitamin D (25OH) 46.4      [01-27-24 @ 10:56]      Immunofixation Serum:   Corresponding IgG and kappa bands consistent with monoclonal IgG kappa  protein. ADITYA Huggins M.D.  Reference Range: None Detected      [01-27-24 @ 10:56]  SPEP Interpretation: Distinct band in Gamma region with suppression of normal Gammaglobulin  suggestive of Monoclonal Gammopathy. Serum and Urine Immunofixation  Electrophoresis are recommended if not previously performed.  Clara Covington M.D.      [01-27-24 @ 10:56]    RADIOLOGY & ADDITIONAL STUDIES:

## 2024-02-13 NOTE — PROGRESS NOTE ADULT - REASON FOR ADMISSION
Fall, elevated calcium

## 2024-02-13 NOTE — PROGRESS NOTE ADULT - ASSESSMENT
78M with history of MM, HTN, and Dementia (AAO x 1-2 to self, to place, not to time at baseline per brother) who presents to the hospital from [facility] after a fall. Patient is a poor historian 2/2 dementia, unable to state what happened. As per paperwork from the NH, patient had a fall and was noted to have a hematoma on the occipital head and was sent to the hospital for evaluation. Here imaging were negative for fractures but his work up showed him to have a significantly elevated calcium of 14 (corrected to 15.1) with DAVID and a significantly elevated protein gap.  Nephrology consulted for DAVID and hypercalemia.    A/P:  DAVID:  Baseline S.Cr 0.7-0.9.  S.Cr 1.39 on admission.  Likely pre-renal in setting of dehydration/hypercalcemia.  UA + proteinuria; no blood.  FeNa 1.1% - suggests ATN.  Renal function remains stable.  Monitor BMP and UO.    HTN:  Controlled.  Avoid hypotension.  C/W current meds.  Monitor BP.    Hypercalcemia/hypocalcemia:  Likely due to Monoclonal Gammopathy.  PTH low, suppressed by high Ca  Vit. D 25OH 46.4.  S/p Zometa 4mg x1 dose and calcitonin 200IU q12hrs x2 doses.  s/p 2g Calcium gluconate again 2/7.  C/W cholecalciferol.  Corrected Ca for hypoalbuminemia 8.5  Optimize albumin.  Replete as needed.  Monitor Ca.    Acidosis:  Hyperchloremic acidosis.  Hyperchloremia improving.  Monitor CO2.    Anemia:  Likely sec to MM vs. other etiology.  Workup and management per primary team.  Heme/onc on board  Monitor Hgb.  Transfuse for Hgb <8.    Hypophosphatemia:  Likely sec. to poor PO intake.  Received Kphos 30mmol x1 today.  Replete w/ Kphos as needed  Monitor PO4 daily.    Hypokalemia:   Likely sec to poor PO intake  Stable.  Given Kphos 30mmol today.  monitor K.    Hypomagnesemia:  Likely sec to poor PO intake.  Replete as needed   Monitor Mg.    Proteinuria:  Possibly sec to MM.  Monitor.    Hyponatremia:  likely sec to higher liquid diet than solid  Increase solid food in diet  MOnitor Na level, if drops further get Urine Na, urine osmolality

## 2024-02-13 NOTE — PROGRESS NOTE ADULT - SUBJECTIVE AND OBJECTIVE BOX
DATE OF SERVICE: 02-13-24    No overnight events    MEDICATIONS:  acetaminophen     Tablet .. 650 milliGRAM(s) Oral every 6 hours PRN  aluminum hydroxide/magnesium hydroxide/simethicone Suspension 30 milliLiter(s) Oral every 4 hours PRN  cholecalciferol 400 Unit(s) Oral daily  cyanocobalamin 1000 MICROGram(s) Oral daily  donepezil 10 milliGRAM(s) Oral at bedtime  folic acid 1 milliGRAM(s) Oral daily  heparin   Injectable 5000 Unit(s) SubCutaneous every 8 hours  melatonin 3 milliGRAM(s) Oral at bedtime PRN  memantine 10 milliGRAM(s) Oral two times a day  metoprolol tartrate 25 milliGRAM(s) Oral two times a day  ondansetron Injectable 4 milliGRAM(s) IV Push every 8 hours PRN  QUEtiapine 12.5 milliGRAM(s) Oral every 6 hours PRN  risperiDONE   Tablet 0.25 milliGRAM(s) Oral daily  risperiDONE   Tablet 0.5 milliGRAM(s) Oral at bedtime  sertraline 50 milliGRAM(s) Oral daily    LABS:  Hemoglobin: 7.8 g/dL (02-10 @ 09:30)  02-12    134<L>  |  105  |  10  ----------------------------<  90  3.8   |  22  |  0.58    Ca    7.2<L>      12 Feb 2024 06:24  Phos  2.4     02-12  Mg     1.90     02-12    Creatinine Trend: 0.58<--, 0.55<--, 0.56<--, 0.55<--, 0.57<--, 0.55<--    PHYSICAL EXAM:  T(C): 36.6 (02-13-24 @ 10:48), Max: 36.6 (02-13-24 @ 04:00)  HR: 83 (02-13-24 @ 10:48) (68 - 83)  BP: 107/60 (02-13-24 @ 10:48) (104/88 - 137/63)  RR: 18 (02-13-24 @ 10:48) (17 - 18)  SpO2: 99% (02-13-24 @ 10:48) (96% - 99%)  Wt(kg): --    I&O's Summary    12 Feb 2024 07:01  -  13 Feb 2024 07:00  --------------------------------------------------------  IN: 0 mL / OUT: 400 mL / NET: -400 mL          General:  NAD  Head: Normocephalic and atraumatic.   Neck: No JVD. No bruits. Supple. Does not appear to be enlarged.   Cardiovascular: + S1,S2 ; RRR Soft systolic murmur at the left lower sternal border. No rubs noted.    Lungs: CTA b/l. No rhonchi, rales or wheezes.   Abdomen: + BS, soft. Non tender. Non distended. No rebound. No guarding.   Extremities: No clubbing/cyanosis/edema.   Neurologic: Moves all four extremities. Full range of motion.   Skin: Warm and moist. The patient's skin has normal elasticity and good skin turgor.   Psychiatric: unable to fully assess      ECG:  	NSR    < from: Transthoracic Echocardiogram (07.10.23 @ 11:15) >  CONCLUSIONS:  1. Calcified trileaflet aortic valve with normal opening.  Minimal aortic regurgitation.  2. Endocardium not well visualized; grossly decreased  possibly mild left ventricular systolic dysfunction.  3. The right ventricle is not well visualized; grossly  normal right ventricular systolic function.      ASSESSMENT/PLAN: This is a 78M with history of MM, HTN, and Dementia (AAO x 1-2 to self, to place, not to time at baseline per brother) who presents to the hospital from [facility] after a fall.     Patient is a poor historian 2/2 dementia. As per paperwork from the NH, patient had a fall and was noted to have a hematoma on the occipital head and was sent to the hospital for evaluation. Here imaging were negative for fractures but his work up showed him to have a significantly elevated calcium of 14 (corrected to 15.1) with DAVID and a significantly elevated protein gap. He was given NS 1.5L and repeat BMP showed persistence of the calcium elevation though improving. He was admitted to medicine on telemetry for further evaluation.  Recent admission for influenza, where his lopressor and norvasc were discontinued.    1. HTN/h/o MVR  - BP stable  - s/p fluids for hypercalcemia, no EKG changes associated with hypercalcemia  - TTE from last year with mildly decreased LVEF  - s/p PRBC  monitor h/h  - continue lopressor 25 mg BID for PAT and CM       Staci Muñiz MD  Pager: [phone]

## 2024-02-13 NOTE — PROGRESS NOTE ADULT - PROVIDER SPECIALTY LIST ADULT
Cardiology
Hospitalist
Nephrology
Cardiology
Heme/Onc
Hospitalist
Nephrology
Pulmonology
Cardiology
Hospitalist
Nephrology
Pulmonology
Cardiology
Heme/Onc
Hospitalist
Nephrology
Nephrology
Pulmonology
Pulmonology
Heme/Onc
Hospitalist

## 2024-09-04 NOTE — ED PROVIDER NOTE - MDM ORDERS SUBMITTED SELECTION
Labs/EKG/Imaging Studies Render In Strict Bullet Format?: No Continue Regimen: Cabtreo gel once daily(currently not applying consistently) Plan: Briefly discussed Accutane Detail Level: Zone

## 2024-09-25 NOTE — DIETITIAN INITIAL EVALUATION ADULT - OBTAIN WEEKLY WEIGHT
yes Is This A New Presentation, Or A Follow-Up?: Skin Lesion How Severe Is Your Skin Lesion?: mild Have Your Skin Lesions Been Treated?: not been treated